# Patient Record
Sex: FEMALE | Race: WHITE
[De-identification: names, ages, dates, MRNs, and addresses within clinical notes are randomized per-mention and may not be internally consistent; named-entity substitution may affect disease eponyms.]

---

## 2021-02-05 ENCOUNTER — HOSPITAL ENCOUNTER (OUTPATIENT)
Age: 61
End: 2021-02-05
Payer: MEDICARE

## 2021-02-05 DIAGNOSIS — R92.8: Primary | ICD-10-CM

## 2021-02-05 PROCEDURE — 19081 BX BREAST 1ST LESION STRTCTC: CPT

## 2021-02-05 PROCEDURE — 88305 TISSUE EXAM BY PATHOLOGIST: CPT

## 2023-01-22 ENCOUNTER — HOSPITAL ENCOUNTER (INPATIENT)
Dept: HOSPITAL 100 - ED | Age: 63
LOS: 2 days | Discharge: HOME | DRG: 392 | End: 2023-01-24
Payer: MEDICARE

## 2023-01-22 VITALS
SYSTOLIC BLOOD PRESSURE: 102 MMHG | OXYGEN SATURATION: 100 % | HEART RATE: 72 BPM | DIASTOLIC BLOOD PRESSURE: 59 MMHG | TEMPERATURE: 98.24 F | RESPIRATION RATE: 18 BRPM

## 2023-01-22 VITALS
DIASTOLIC BLOOD PRESSURE: 71 MMHG | OXYGEN SATURATION: 94 % | TEMPERATURE: 98.24 F | RESPIRATION RATE: 18 BRPM | HEART RATE: 77 BPM | SYSTOLIC BLOOD PRESSURE: 115 MMHG

## 2023-01-22 VITALS
RESPIRATION RATE: 18 BRPM | TEMPERATURE: 98.6 F | DIASTOLIC BLOOD PRESSURE: 61 MMHG | HEART RATE: 66 BPM | SYSTOLIC BLOOD PRESSURE: 96 MMHG | OXYGEN SATURATION: 97 %

## 2023-01-22 VITALS
SYSTOLIC BLOOD PRESSURE: 102 MMHG | HEART RATE: 81 BPM | RESPIRATION RATE: 16 BRPM | OXYGEN SATURATION: 95 % | DIASTOLIC BLOOD PRESSURE: 62 MMHG

## 2023-01-22 VITALS
SYSTOLIC BLOOD PRESSURE: 151 MMHG | TEMPERATURE: 97.7 F | RESPIRATION RATE: 18 BRPM | DIASTOLIC BLOOD PRESSURE: 95 MMHG | OXYGEN SATURATION: 98 % | HEART RATE: 97 BPM

## 2023-01-22 VITALS
HEART RATE: 71 BPM | SYSTOLIC BLOOD PRESSURE: 108 MMHG | OXYGEN SATURATION: 96 % | DIASTOLIC BLOOD PRESSURE: 65 MMHG | RESPIRATION RATE: 18 BRPM | TEMPERATURE: 97.1 F

## 2023-01-22 VITALS
HEART RATE: 80 BPM | RESPIRATION RATE: 16 BRPM | SYSTOLIC BLOOD PRESSURE: 102 MMHG | DIASTOLIC BLOOD PRESSURE: 62 MMHG | TEMPERATURE: 98.3 F

## 2023-01-22 VITALS — BODY MASS INDEX: 44.6 KG/M2 | BODY MASS INDEX: 45.5 KG/M2

## 2023-01-22 VITALS
HEART RATE: 71 BPM | DIASTOLIC BLOOD PRESSURE: 63 MMHG | OXYGEN SATURATION: 96 % | RESPIRATION RATE: 18 BRPM | SYSTOLIC BLOOD PRESSURE: 108 MMHG | TEMPERATURE: 97.1 F

## 2023-01-22 DIAGNOSIS — B96.20: ICD-10-CM

## 2023-01-22 DIAGNOSIS — Z90.49: ICD-10-CM

## 2023-01-22 DIAGNOSIS — Z87.891: ICD-10-CM

## 2023-01-22 DIAGNOSIS — E11.65: ICD-10-CM

## 2023-01-22 DIAGNOSIS — Z90.710: ICD-10-CM

## 2023-01-22 DIAGNOSIS — E66.01: ICD-10-CM

## 2023-01-22 DIAGNOSIS — I10: ICD-10-CM

## 2023-01-22 DIAGNOSIS — Z79.84: ICD-10-CM

## 2023-01-22 DIAGNOSIS — I77.6: ICD-10-CM

## 2023-01-22 DIAGNOSIS — K57.20: Primary | ICD-10-CM

## 2023-01-22 DIAGNOSIS — Z85.3: ICD-10-CM

## 2023-01-22 DIAGNOSIS — Z79.899: ICD-10-CM

## 2023-01-22 DIAGNOSIS — N30.01: ICD-10-CM

## 2023-01-22 LAB
ANION GAP: 10 (ref 5–15)
ANION GAP: 7 (ref 5–15)
BUN SERPL-MCNC: 7 MG/DL (ref 7–18)
BUN SERPL-MCNC: 8 MG/DL (ref 7–18)
BUN/CREAT RATIO: 8.2 RATIO (ref 10–20)
BUN/CREAT RATIO: 9.7 RATIO (ref 10–20)
CALCIUM SERPL-MCNC: 9.5 MG/DL (ref 8.5–10.1)
CALCIUM SERPL-MCNC: 9.8 MG/DL (ref 8.5–10.1)
CARBON DIOXIDE: 23 MMOL/L (ref 21–32)
CARBON DIOXIDE: 26 MMOL/L (ref 21–32)
CHLORIDE: 100 MMOL/L (ref 98–107)
CHLORIDE: 104 MMOL/L (ref 98–107)
DEPRECATED RDW RBC: 44.2 FL (ref 35.1–43.9)
DEPRECATED RDW RBC: 44.8 FL (ref 35.1–43.9)
ERYTHROCYTE [DISTWIDTH] IN BLOOD: 15 % (ref 11.6–14.6)
ERYTHROCYTE [DISTWIDTH] IN BLOOD: 15.2 % (ref 11.6–14.6)
EST GLOM FILT RATE - AFR AMER: 104 ML/MIN (ref 60–?)
EST GLOM FILT RATE - AFR AMER: 74 ML/MIN (ref 60–?)
ESTIMATED CREATININE CLEARANCE: 56.4 ML/MIN
ESTIMATED CREATININE CLEARANCE: 76.76 ML/MIN
GLUCOSE: 104 MG/DL (ref 74–106)
GLUCOSE: 142 MG/DL (ref 74–106)
HCT VFR BLD AUTO: 38.4 % (ref 37–47)
HCT VFR BLD AUTO: 39.7 % (ref 37–47)
HEMOGLOBIN: 12.1 G/DL (ref 12–15)
HEMOGLOBIN: 12.8 G/DL (ref 12–15)
HGB BLD-MCNC: 12.1 G/DL (ref 12–15)
HGB BLD-MCNC: 12.8 G/DL (ref 12–15)
IMMATURE GRANULOCYTES COUNT: 0.16 X10^3/UL (ref 0–0)
IMMATURE GRANULOCYTES COUNT: 0.2 X10^3/UL (ref 0–0)
KETONE-DIPSTICK: 5 MG/DL
LEUKOCYTE ESTERASE UR QL STRIP: 500 /UL
MCV RBC: 80.5 FL (ref 81–99)
MCV RBC: 81.4 FL (ref 81–99)
MEAN CORP HGB CONC: 31.5 G/DL (ref 32–36)
MEAN CORP HGB CONC: 32.2 G/DL (ref 32–36)
MEAN PLATELET VOL.: 8.9 FL (ref 6.2–12)
MEAN PLATELET VOL.: 9.2 FL (ref 6.2–12)
MUCOUS THREADS URNS QL MICRO: (no result) /HPF
NRBC FLAGGED BY ANALYZER: 0 % (ref 0–5)
NRBC FLAGGED BY ANALYZER: 0 % (ref 0–5)
PLATELET # BLD: 454 K/MM3 (ref 150–450)
PLATELET # BLD: 479 K/MM3 (ref 150–450)
PLATELET COUNT: 454 K/MM3 (ref 150–450)
PLATELET COUNT: 479 K/MM3 (ref 150–450)
POTASSIUM: 3.3 MMOL/L (ref 3.5–5.1)
POTASSIUM: 3.5 MMOL/L (ref 3.5–5.1)
PROT UR QL STRIP.AUTO: 500 MG/DL
RBC # BLD AUTO: 4.72 M/MM3 (ref 4.2–5.4)
RBC # BLD AUTO: 4.93 M/MM3 (ref 4.2–5.4)
RBC DISTRIBUTION WIDTH CV: 15 % (ref 11.6–14.6)
RBC DISTRIBUTION WIDTH CV: 15.2 % (ref 11.6–14.6)
RBC DISTRIBUTION WIDTH SD: 44.2 FL (ref 35.1–43.9)
RBC DISTRIBUTION WIDTH SD: 44.8 FL (ref 35.1–43.9)
RBC UR QL: (no result) /HPF (ref 0–5)
RBC UR QL: 250 /UL
SP GR UR: 1.01 (ref 1–1.03)
SQUAMOUS URNS QL MICRO: (no result) /HPF (ref 5–10)
URINE PRESERVATIVE: (no result)
WBC # BLD AUTO: 12.6 K/MM3 (ref 4.4–11)
WBC # BLD AUTO: 14.3 K/MM3 (ref 4.4–11)
WHITE BLOOD COUNT: 12.6 K/MM3 (ref 4.4–11)
WHITE BLOOD COUNT: 14.3 K/MM3 (ref 4.4–11)

## 2023-01-22 PROCEDURE — 74176 CT ABD & PELVIS W/O CONTRAST: CPT

## 2023-01-22 PROCEDURE — 87040 BLOOD CULTURE FOR BACTERIA: CPT

## 2023-01-22 PROCEDURE — 74177 CT ABD & PELVIS W/CONTRAST: CPT

## 2023-01-22 PROCEDURE — 97802 MEDICAL NUTRITION INDIV IN: CPT

## 2023-01-22 PROCEDURE — 87088 URINE BACTERIA CULTURE: CPT

## 2023-01-22 PROCEDURE — 87086 URINE CULTURE/COLONY COUNT: CPT

## 2023-01-22 PROCEDURE — 85025 COMPLETE CBC W/AUTO DIFF WBC: CPT

## 2023-01-22 PROCEDURE — 81001 URINALYSIS AUTO W/SCOPE: CPT

## 2023-01-22 PROCEDURE — 87186 SC STD MICRODIL/AGAR DIL: CPT

## 2023-01-22 PROCEDURE — 84100 ASSAY OF PHOSPHORUS: CPT

## 2023-01-22 PROCEDURE — 83605 ASSAY OF LACTIC ACID: CPT

## 2023-01-22 PROCEDURE — A4216 STERILE WATER/SALINE, 10 ML: HCPCS

## 2023-01-22 PROCEDURE — 83735 ASSAY OF MAGNESIUM: CPT

## 2023-01-22 PROCEDURE — 80048 BASIC METABOLIC PNL TOTAL CA: CPT

## 2023-01-22 PROCEDURE — 82962 GLUCOSE BLOOD TEST: CPT

## 2023-01-22 PROCEDURE — 36415 COLL VENOUS BLD VENIPUNCTURE: CPT

## 2023-01-22 PROCEDURE — 99284 EMERGENCY DEPT VISIT MOD MDM: CPT

## 2023-01-22 RX ADMIN — SODIUM CHLORIDE 100 ML: 9 INJECTION, SOLUTION INTRAVENOUS at 04:31

## 2023-01-22 RX ADMIN — POTASSIUM CHLORIDE 20 MEQ: 1500 TABLET, EXTENDED RELEASE ORAL at 16:58

## 2023-01-22 RX ADMIN — POTASSIUM CHLORIDE 20 MEQ: 1500 TABLET, EXTENDED RELEASE ORAL at 09:30

## 2023-01-22 RX ADMIN — SODIUM CHLORIDE 150 ML: 9 INJECTION, SOLUTION INTRAVENOUS at 02:44

## 2023-01-22 RX ADMIN — SODIUM CHLORIDE 100 ML: 9 INJECTION, SOLUTION INTRAVENOUS at 15:40

## 2023-01-23 VITALS
DIASTOLIC BLOOD PRESSURE: 60 MMHG | SYSTOLIC BLOOD PRESSURE: 98 MMHG | RESPIRATION RATE: 16 BRPM | TEMPERATURE: 98.24 F | OXYGEN SATURATION: 96 % | HEART RATE: 62 BPM

## 2023-01-23 VITALS
DIASTOLIC BLOOD PRESSURE: 67 MMHG | RESPIRATION RATE: 16 BRPM | OXYGEN SATURATION: 96 % | TEMPERATURE: 97.88 F | HEART RATE: 66 BPM | SYSTOLIC BLOOD PRESSURE: 96 MMHG

## 2023-01-23 VITALS
HEART RATE: 72 BPM | OXYGEN SATURATION: 95 % | TEMPERATURE: 97.88 F | RESPIRATION RATE: 16 BRPM | DIASTOLIC BLOOD PRESSURE: 63 MMHG | SYSTOLIC BLOOD PRESSURE: 109 MMHG

## 2023-01-23 VITALS
TEMPERATURE: 98.24 F | HEART RATE: 65 BPM | DIASTOLIC BLOOD PRESSURE: 66 MMHG | RESPIRATION RATE: 18 BRPM | OXYGEN SATURATION: 99 % | SYSTOLIC BLOOD PRESSURE: 118 MMHG

## 2023-01-23 LAB
ANION GAP: 9 (ref 5–15)
BUN SERPL-MCNC: 5 MG/DL (ref 7–18)
BUN/CREAT RATIO: 7.7 RATIO (ref 10–20)
CALCIUM SERPL-MCNC: 9.1 MG/DL (ref 8.5–10.1)
CARBON DIOXIDE: 23 MMOL/L (ref 21–32)
CHLORIDE: 108 MMOL/L (ref 98–107)
DEPRECATED RDW RBC: 45.3 FL (ref 35.1–43.9)
ERYTHROCYTE [DISTWIDTH] IN BLOOD: 15.4 % (ref 11.6–14.6)
EST GLOM FILT RATE - AFR AMER: 118 ML/MIN (ref 60–?)
ESTIMATED CREATININE CLEARANCE: 85.03 ML/MIN
GLUCOSE: 93 MG/DL (ref 74–106)
HCT VFR BLD AUTO: 37.7 % (ref 37–47)
HEMOGLOBIN: 11.9 G/DL (ref 12–15)
HGB BLD-MCNC: 11.9 G/DL (ref 12–15)
IMMATURE GRANULOCYTES COUNT: 0.18 X10^3/UL (ref 0–0)
MCV RBC: 81.1 FL (ref 81–99)
MEAN CORP HGB CONC: 31.6 G/DL (ref 32–36)
MEAN PLATELET VOL.: 9.2 FL (ref 6.2–12)
NRBC FLAGGED BY ANALYZER: 0 % (ref 0–5)
PLATELET # BLD: 467 K/MM3 (ref 150–450)
PLATELET COUNT: 467 K/MM3 (ref 150–450)
POTASSIUM: 3.5 MMOL/L (ref 3.5–5.1)
RBC # BLD AUTO: 4.65 M/MM3 (ref 4.2–5.4)
RBC DISTRIBUTION WIDTH CV: 15.4 % (ref 11.6–14.6)
RBC DISTRIBUTION WIDTH SD: 45.3 FL (ref 35.1–43.9)
WBC # BLD AUTO: 9.3 K/MM3 (ref 4.4–11)
WHITE BLOOD COUNT: 9.3 K/MM3 (ref 4.4–11)

## 2023-01-23 RX ADMIN — POTASSIUM CHLORIDE 20 MEQ: 1500 TABLET, EXTENDED RELEASE ORAL at 17:03

## 2023-01-23 RX ADMIN — SODIUM CHLORIDE 100 ML: 9 INJECTION, SOLUTION INTRAVENOUS at 14:54

## 2023-01-23 RX ADMIN — SODIUM CHLORIDE 100 ML: 9 INJECTION, SOLUTION INTRAVENOUS at 03:20

## 2023-01-23 RX ADMIN — POTASSIUM CHLORIDE 20 MEQ: 1500 TABLET, EXTENDED RELEASE ORAL at 08:43

## 2023-01-24 VITALS
HEART RATE: 57 BPM | TEMPERATURE: 97.52 F | DIASTOLIC BLOOD PRESSURE: 59 MMHG | OXYGEN SATURATION: 97 % | RESPIRATION RATE: 16 BRPM | SYSTOLIC BLOOD PRESSURE: 101 MMHG

## 2023-01-24 VITALS
DIASTOLIC BLOOD PRESSURE: 75 MMHG | OXYGEN SATURATION: 98 % | HEART RATE: 72 BPM | RESPIRATION RATE: 16 BRPM | TEMPERATURE: 97.88 F | SYSTOLIC BLOOD PRESSURE: 119 MMHG

## 2023-01-24 VITALS
OXYGEN SATURATION: 98 % | TEMPERATURE: 97.88 F | SYSTOLIC BLOOD PRESSURE: 106 MMHG | RESPIRATION RATE: 16 BRPM | HEART RATE: 62 BPM | DIASTOLIC BLOOD PRESSURE: 61 MMHG

## 2023-01-24 VITALS — OXYGEN SATURATION: 97 %

## 2023-01-24 LAB
ANION GAP: 6 (ref 5–15)
BUN SERPL-MCNC: 4 MG/DL (ref 7–18)
BUN/CREAT RATIO: 6.1 RATIO (ref 10–20)
CALCIUM SERPL-MCNC: 8.9 MG/DL (ref 8.5–10.1)
CARBON DIOXIDE: 25 MMOL/L (ref 21–32)
CHLORIDE: 111 MMOL/L (ref 98–107)
DEPRECATED RDW RBC: 46.5 FL (ref 35.1–43.9)
ERYTHROCYTE [DISTWIDTH] IN BLOOD: 15.5 % (ref 11.6–14.6)
EST GLOM FILT RATE - AFR AMER: 117 ML/MIN (ref 60–?)
ESTIMATED CREATININE CLEARANCE: 83.74 ML/MIN
GLUCOSE: 89 MG/DL (ref 74–106)
HCT VFR BLD AUTO: 36.7 % (ref 37–47)
HEMOGLOBIN: 11.5 G/DL (ref 12–15)
HGB BLD-MCNC: 11.5 G/DL (ref 12–15)
IMMATURE GRANULOCYTES COUNT: 0.2 X10^3/UL (ref 0–0)
MAGNESIUM: 1.9 MG/DL (ref 1.6–2.6)
MCV RBC: 81.7 FL (ref 81–99)
MEAN CORP HGB CONC: 31.3 G/DL (ref 32–36)
MEAN PLATELET VOL.: 8.9 FL (ref 6.2–12)
NRBC FLAGGED BY ANALYZER: 0 % (ref 0–5)
PLATELET # BLD: 492 K/MM3 (ref 150–450)
PLATELET COUNT: 492 K/MM3 (ref 150–450)
POTASSIUM: 3.4 MMOL/L (ref 3.5–5.1)
RBC # BLD AUTO: 4.49 M/MM3 (ref 4.2–5.4)
RBC DISTRIBUTION WIDTH CV: 15.5 % (ref 11.6–14.6)
RBC DISTRIBUTION WIDTH SD: 46.5 FL (ref 35.1–43.9)
WBC # BLD AUTO: 7.6 K/MM3 (ref 4.4–11)
WHITE BLOOD COUNT: 7.6 K/MM3 (ref 4.4–11)

## 2023-01-24 RX ADMIN — POTASSIUM CHLORIDE 20 MEQ: 1500 TABLET, EXTENDED RELEASE ORAL at 08:43

## 2023-01-24 RX ADMIN — SODIUM CHLORIDE 100 ML: 9 INJECTION, SOLUTION INTRAVENOUS at 03:59

## 2023-01-24 RX ADMIN — POTASSIUM CHLORIDE 20 MEQ: 1500 TABLET, EXTENDED RELEASE ORAL at 16:14

## 2023-03-03 ENCOUNTER — HOSPITAL ENCOUNTER (EMERGENCY)
Age: 63
Discharge: HOME | End: 2023-03-03
Payer: MEDICARE

## 2023-03-03 VITALS
RESPIRATION RATE: 24 BRPM | HEART RATE: 88 BPM | OXYGEN SATURATION: 98 % | TEMPERATURE: 98.78 F | SYSTOLIC BLOOD PRESSURE: 118 MMHG | DIASTOLIC BLOOD PRESSURE: 84 MMHG

## 2023-03-03 VITALS — BODY MASS INDEX: 47 KG/M2

## 2023-03-03 VITALS
HEART RATE: 81 BPM | SYSTOLIC BLOOD PRESSURE: 114 MMHG | OXYGEN SATURATION: 96 % | DIASTOLIC BLOOD PRESSURE: 71 MMHG | RESPIRATION RATE: 16 BRPM

## 2023-03-03 DIAGNOSIS — R10.32: Primary | ICD-10-CM

## 2023-03-03 DIAGNOSIS — D72.829: ICD-10-CM

## 2023-03-03 DIAGNOSIS — Z87.891: ICD-10-CM

## 2023-03-03 DIAGNOSIS — K57.32: ICD-10-CM

## 2023-03-03 DIAGNOSIS — E66.9: ICD-10-CM

## 2023-03-03 LAB
ANION GAP: 7 (ref 5–15)
BUN SERPL-MCNC: 7 MG/DL (ref 7–18)
BUN/CREAT RATIO: 8.7 RATIO (ref 10–20)
CALCIUM SERPL-MCNC: 10.2 MG/DL (ref 8.5–10.1)
CARBON DIOXIDE: 29 MMOL/L (ref 21–32)
CHLORIDE: 102 MMOL/L (ref 98–107)
DEPRECATED RDW RBC: 53.2 FL (ref 35.1–43.9)
ERYTHROCYTE [DISTWIDTH] IN BLOOD: 17.9 % (ref 11.6–14.6)
EST GLOM FILT RATE - AFR AMER: 92 ML/MIN (ref 60–?)
ESTIMATED CREATININE CLEARANCE: 66.51 ML/MIN
GLUCOSE: 119 MG/DL (ref 74–106)
HCT VFR BLD AUTO: 45.2 % (ref 37–47)
HEMOGLOBIN: 14.7 G/DL (ref 12–15)
HGB BLD-MCNC: 14.7 G/DL (ref 12–15)
IMMATURE GRANULOCYTES COUNT: 0.07 X10^3/UL (ref 0–0)
LEUKOCYTE ESTERASE UR QL STRIP: 500 /UL
MCV RBC: 82.5 FL (ref 81–99)
MEAN CORP HGB CONC: 32.5 G/DL (ref 32–36)
MEAN PLATELET VOL.: 9.6 FL (ref 6.2–12)
MUCOUS THREADS URNS QL MICRO: (no result) /HPF
NRBC FLAGGED BY ANALYZER: 0 % (ref 0–5)
PLATELET # BLD: 424 K/MM3 (ref 150–450)
PLATELET COUNT: 424 K/MM3 (ref 150–450)
POTASSIUM: 3.4 MMOL/L (ref 3.5–5.1)
RBC # BLD AUTO: 5.48 M/MM3 (ref 4.2–5.4)
RBC DISTRIBUTION WIDTH CV: 17.9 % (ref 11.6–14.6)
RBC DISTRIBUTION WIDTH SD: 53.2 FL (ref 35.1–43.9)
RBC UR QL: (no result) /HPF (ref 0–5)
RBC UR QL: 10 /UL
SP GR UR: 1.01 (ref 1–1.03)
SQUAMOUS URNS QL MICRO: (no result) /HPF (ref 5–10)
URINE PRESERVATIVE: (no result)
WBC # BLD AUTO: 12.8 K/MM3 (ref 4.4–11)
WHITE BLOOD COUNT: 12.8 K/MM3 (ref 4.4–11)

## 2023-03-03 PROCEDURE — 80048 BASIC METABOLIC PNL TOTAL CA: CPT

## 2023-03-03 PROCEDURE — A4216 STERILE WATER/SALINE, 10 ML: HCPCS

## 2023-03-03 PROCEDURE — 99283 EMERGENCY DEPT VISIT LOW MDM: CPT

## 2023-03-03 PROCEDURE — 81001 URINALYSIS AUTO W/SCOPE: CPT

## 2023-03-03 PROCEDURE — 74177 CT ABD & PELVIS W/CONTRAST: CPT

## 2023-03-03 PROCEDURE — 85025 COMPLETE CBC W/AUTO DIFF WBC: CPT

## 2023-03-28 ENCOUNTER — HOSPITAL ENCOUNTER (OUTPATIENT)
Dept: HOSPITAL 100 - BIRAD | Age: 63
Discharge: HOME | End: 2023-03-28
Payer: MEDICARE

## 2023-03-28 DIAGNOSIS — R92.1: ICD-10-CM

## 2023-03-28 DIAGNOSIS — R92.8: Primary | ICD-10-CM

## 2023-03-28 PROCEDURE — 88305 TISSUE EXAM BY PATHOLOGIST: CPT

## 2023-03-28 PROCEDURE — 19081 BX BREAST 1ST LESION STRTCTC: CPT

## 2023-05-01 ENCOUNTER — HOSPITAL ENCOUNTER (OUTPATIENT)
Dept: HOSPITAL 100 - EN | Age: 63
Discharge: HOME | End: 2023-05-01
Payer: MEDICARE

## 2023-05-01 VITALS
DIASTOLIC BLOOD PRESSURE: 82 MMHG | OXYGEN SATURATION: 96 % | TEMPERATURE: 97.4 F | RESPIRATION RATE: 16 BRPM | SYSTOLIC BLOOD PRESSURE: 108 MMHG | HEART RATE: 70 BPM

## 2023-05-01 VITALS
RESPIRATION RATE: 16 BRPM | OXYGEN SATURATION: 97 % | DIASTOLIC BLOOD PRESSURE: 82 MMHG | SYSTOLIC BLOOD PRESSURE: 103 MMHG | HEART RATE: 73 BPM | TEMPERATURE: 98.7 F

## 2023-05-01 VITALS
OXYGEN SATURATION: 99 % | HEART RATE: 66 BPM | RESPIRATION RATE: 16 BRPM | DIASTOLIC BLOOD PRESSURE: 77 MMHG | SYSTOLIC BLOOD PRESSURE: 103 MMHG

## 2023-05-01 VITALS — DIASTOLIC BLOOD PRESSURE: 82 MMHG | SYSTOLIC BLOOD PRESSURE: 103 MMHG

## 2023-05-01 VITALS
HEART RATE: 61 BPM | OXYGEN SATURATION: 100 % | DIASTOLIC BLOOD PRESSURE: 82 MMHG | SYSTOLIC BLOOD PRESSURE: 107 MMHG | RESPIRATION RATE: 16 BRPM | TEMPERATURE: 97.7 F

## 2023-05-01 VITALS
SYSTOLIC BLOOD PRESSURE: 103 MMHG | DIASTOLIC BLOOD PRESSURE: 79 MMHG | RESPIRATION RATE: 16 BRPM | OXYGEN SATURATION: 98 % | HEART RATE: 67 BPM

## 2023-05-01 DIAGNOSIS — E11.9: ICD-10-CM

## 2023-05-01 DIAGNOSIS — Z79.899: ICD-10-CM

## 2023-05-01 DIAGNOSIS — Z87.891: ICD-10-CM

## 2023-05-01 DIAGNOSIS — Z79.84: ICD-10-CM

## 2023-05-01 DIAGNOSIS — K57.30: Primary | ICD-10-CM

## 2023-05-01 DIAGNOSIS — D12.0: ICD-10-CM

## 2023-05-01 DIAGNOSIS — I10: ICD-10-CM

## 2023-05-01 DIAGNOSIS — K64.0: ICD-10-CM

## 2023-05-01 DIAGNOSIS — K62.1: ICD-10-CM

## 2023-05-01 PROCEDURE — 88305 TISSUE EXAM BY PATHOLOGIST: CPT

## 2023-05-01 PROCEDURE — 0DJD8ZZ INSPECTION OF LOWER INTESTINAL TRACT, VIA NATURAL OR ARTIFICIAL OPENING ENDOSCOPIC: ICD-10-PCS | Performed by: SURGERY

## 2023-05-01 PROCEDURE — 82962 GLUCOSE BLOOD TEST: CPT

## 2023-05-01 PROCEDURE — 45380 COLONOSCOPY AND BIOPSY: CPT

## 2024-01-29 ENCOUNTER — HOSPITAL ENCOUNTER (EMERGENCY)
Age: 64
Discharge: HOME | End: 2024-01-29
Payer: MEDICARE

## 2024-01-29 VITALS
TEMPERATURE: 98.9 F | HEART RATE: 71 BPM | DIASTOLIC BLOOD PRESSURE: 96 MMHG | RESPIRATION RATE: 18 BRPM | SYSTOLIC BLOOD PRESSURE: 126 MMHG | OXYGEN SATURATION: 99 %

## 2024-01-29 VITALS — HEART RATE: 75 BPM | OXYGEN SATURATION: 99 % | RESPIRATION RATE: 16 BRPM

## 2024-01-29 VITALS — BODY MASS INDEX: 48.1 KG/M2

## 2024-01-29 DIAGNOSIS — Z87.891: ICD-10-CM

## 2024-01-29 DIAGNOSIS — E66.9: ICD-10-CM

## 2024-01-29 DIAGNOSIS — E11.9: ICD-10-CM

## 2024-01-29 DIAGNOSIS — R10.32: Primary | ICD-10-CM

## 2024-01-29 DIAGNOSIS — K57.32: ICD-10-CM

## 2024-01-29 LAB
ALANINE AMINOTRANSFER ALT/SGPT: 41 U/L (ref 13–56)
ALBUMIN SERPL-MCNC: 3.6 G/DL (ref 3.2–5)
ALKALINE PHOSPHATASE: 140 U/L (ref 45–117)
ANION GAP: 7 (ref 5–15)
AST(SGOT): 33 U/L (ref 15–37)
BUN SERPL-MCNC: 8 MG/DL (ref 7–18)
BUN/CREAT RATIO: 7.8 RATIO (ref 10–20)
CALCIUM SERPL-MCNC: 10.6 MG/DL (ref 8.5–10.1)
CARBON DIOXIDE: 25 MMOL/L (ref 21–32)
CHLORIDE: 106 MMOL/L (ref 98–107)
DEPRECATED RDW RBC: 45.3 FL (ref 35.1–43.9)
ERYTHROCYTE [DISTWIDTH] IN BLOOD: 15.1 % (ref 11.6–14.6)
EST GLOM FILT RATE - AFR AMER: 70 ML/MIN (ref 60–?)
ESTIMATED CREATININE CLEARANCE: 77.82 ML/MIN
GLOBULIN: 4.9 G/DL (ref 2.2–4.2)
GLUCOSE: 145 MG/DL (ref 74–106)
HCT VFR BLD AUTO: 48.8 % (ref 37–47)
HGB BLD-MCNC: 15.6 G/DL (ref 12–15)
IMMATURE GRANULOCYTES COUNT: 0.04 X10^3/UL (ref 0–0)
LEUKOCYTE ESTERASE UR QL STRIP: 500 /UL
MCV RBC: 84 FL (ref 81–99)
MEAN CORP HGB CONC: 32 G/DL (ref 32–36)
MEAN PLATELET VOL.: 9.8 FL (ref 6.2–12)
MUCOUS THREADS URNS QL MICRO: (no result) /HPF
NRBC FLAGGED BY ANALYZER: 0 % (ref 0–5)
PLATELET # BLD: 355 K/MM3 (ref 150–450)
POTASSIUM: 4 MMOL/L (ref 3.5–5.1)
PROT UR QL STRIP.AUTO: 15 MG/DL
RBC # BLD AUTO: 5.81 M/MM3 (ref 4.2–5.4)
RBC UR QL: (no result) /HPF (ref 0–5)
RBC UR QL: 10 /UL
SP GR UR: 1 (ref 1–1.03)
SQUAMOUS URNS QL MICRO: (no result) /HPF (ref 5–10)
URINE PRESERVATIVE: (no result)
WBC # BLD AUTO: 9.4 K/MM3 (ref 4.4–11)

## 2024-01-29 PROCEDURE — A4216 STERILE WATER/SALINE, 10 ML: HCPCS

## 2024-01-29 PROCEDURE — 81001 URINALYSIS AUTO W/SCOPE: CPT

## 2024-01-29 PROCEDURE — 99283 EMERGENCY DEPT VISIT LOW MDM: CPT

## 2024-01-29 PROCEDURE — 85025 COMPLETE CBC W/AUTO DIFF WBC: CPT

## 2024-01-29 PROCEDURE — 74177 CT ABD & PELVIS W/CONTRAST: CPT

## 2024-01-29 PROCEDURE — 80053 COMPREHEN METABOLIC PANEL: CPT

## 2024-01-29 PROCEDURE — 87631 RESP VIRUS 3-5 TARGETS: CPT

## 2024-01-29 NOTE — XMS RPT_ITS
Comprehensive CCD (C-CDA v2.1)  
  
                          Created on: 2024  
  
  
Navdeep Guillen  
External Reference #: CDR,PersonID:[id number]  
: 1960  
Sex: Female  
  
Demographics  
  
  
                                        Address             [address] [place]  [address]  
   
                                        Home Phone          [phone]  
   
                                        Mobile Phone        [phone]  
   
                                        Phone               [phone]  
   
                                        Preferred Language  en  
   
                                        Marital Status        
   
                                        [employer] Affiliation Unknown  
   
                                        Race                White  
   
                                        Ethnic Group        Not  or Lati  
no  
  
  
Author  
  
  
                                        Name                Unknown  
   
                                        Address             [address]  
[address]  
[place]  [address]  
   
                                        Phone               [phone]  
   
                                        Organization        [facility]  
  
  
Care Team Providers  
  
  
                                Care Team Member Name Role            Phone  
   
                                BARK, MARICRUZ E Unavailable     Unavailable  
   
                                BARK, MARICRUZ E Unavailable     Unavailable  
   
                                BARK, MARICRUZ E Unavailable     Unavailable  
   
                                BARK, MARICRUZ E Unavailable     Unavailable  
   
                                MAKKAR, LANI K Unavailable     Unavailable  
   
                                MAKKAR, LANI K Unavailable     Unavailable  
   
                                MAKKAR, LANI K Unavailable     Unavailable  
   
                                MAKKAR, LANI K Unavailable     Unavailable  
   
                                Steve Govea Admitting       Unavailabl  
e  
   
                                Steve Govea Attending       Unavailabl  
e  
   
                                Ganta, Cynthia C Primary Care    Unavailable  
   
                                Steve Govea Admitting       Unavailabl  
e  
   
                                Steve Govea Attending       Unavailabl  
e  
   
                                Ganta, Cynthia C Primary Care    Unavailable  
   
                                Bark, Maricruz E Admitting       Unavailable  
   
                                Bark, Maricruz E Attending       Unavailable  
   
                                Ganta, Cynthia C Primary Care    Unavailable  
   
                                Cynthia Okeefe MD Primary Care Provider [phone]  
[phone]  
   
                                Nelson BEVERLY MD, Rosaline Unavailable     [phone]  
[phone]  
   
                                Dothiago RN, Martha  Unavailable     Unavailable  
   
                                Cynthia Okeefe MD Primary Care Provider [phone]  
[phone]  
   
                                Nelson BEVERLY MD, Rosaline Unavailable     [phone]  
[phone]  
   
                                Doup RN, Martha  Unavailable     Unavailable  
   
                                Cynthia Okeefe MD Primary Care Provider [phone]  
[phone]  
   
                                Nelson BEVERLY MD, Rosaline Unavailable     [phone]  
[phone]  
   
                                Doup RN, Martha  Unavailable     Unavailable  
   
                                Doup RN, Martha  Unavailable     Unavailable  
   
                                KAJAL DUEÑAS Attending       Unavailable  
   
                                GANTA, CYNTHIA   Primary Care    Unavailable  
   
                                GANTA, CYNTHIA   Primary Care    Unavailable  
   
                                FRANCIS SHELL   Referring       Unavailable  
   
                                GANTA, CYNTHIA   Primary Care    Unavailable  
   
                                JACUQELYN MILNER     Referring       Unavailable  
   
                                GANTA, CYNTHIA   Primary Care    Unavailable  
   
                                GANTA, CYNTHIA   Referring       Unavailable  
   
                                GANTA, CYNTHIA   Primary Care    Unavailable  
   
                                GANTA, CYNTHIA   Referring       Unavailable  
   
                                GANTA, CYNTHIA   Primary Care    Unavailable  
   
                                GANTA, CYNTHIA   Attending       Unavailable  
   
                                GANTA, CYNTHIA   Primary Care    Unavailable  
   
                                GANTA, CYNTHIA   Primary Care    Unavailable  
   
                                ENRIQUE ULRICH Attending       Unavailable  
   
                                MASCIFRANCIS   Referring       Unavailable  
   
                                GANTA, CYNTHIA   Primary Care    Unavailable  
   
                                GANTA, CYNTHIA   Referring       Unavailable  
   
                                GANTA, CYNTHIA   Primary Care    Unavailable  
   
                                OLDER, SHERLY      Referring       Unavailable  
   
                                GANTA, CYNTHIA   Primary Care    Unavailable  
   
                                OLDER, SHERLY      Attending       Unavailable  
   
                                GANTA, CYNTHIA   Primary Care    Unavailable  
   
                                GANTA, CYNTHIA   Attending       Unavailable  
   
                                GANTA, CYNTHIA   Referring       Unavailable  
   
                                GANTA, CYNTHIA   Primary Care    Unavailable  
   
                                VIKKI POSADA Attending       Unavailable  
   
                                GANTA, CYNTHIA   Primary Care    Unavailable  
   
                                JACQUELYN MINLER     Referring       Unavailable  
   
                                GANTA, CYNTHIA   Primary Care    Unavailable  
   
                                FRANCIS SHELL   Attending       Unavailable  
   
                                JACQUELYN MILNER     Referring       Unavailable  
   
                                GANTA, CYNTHIA   Primary Care    Unavailable  
   
                                KAJAL DUEÑAS Attending       Unavailable  
   
                                MASCI, FRANCIS A   Referring       Unavailable  
   
                                GANTA, CYNTHIA   Primary Care    Unavailable  
  
  
  
Allergies  
  
  
                                                    Allergy   
Classification                          Reported   
Allergen(s)               Allergy Type              Date of   
Onset                     Reaction(s)               Facility  
   
                                                      
(1 source)                              Sulfonamides   
(Antibiotic);   
Translations:   
[sulfa drugs]                           Propensity to   
adverse   
reactions to   
drug   
(disorder)                                                  [facility]  
   
                                                      
(20 sources)                            silver   
sulfADIAZINE;   
Translations:   
[SILVER   
SULFADIAZINE]             Drug Allergy                
7                         [facility]  
   
                                                      
(20 sources)                            Sulfonamides   
(Antibiotic);   
Translations:   
[SULFA   
(SULFONAMIDE   
ANTIBIOTICS)]             Drug Allergy                
1                         [facility]  
  
  
  
Medications  
Current Medications  
  
  
  
                      Medication Drug Class(es) Dates      Sig (Normalized) Sig   
(Original)  
   
                                                    [id number]   
actuat albuterol   
0.09 mg/actuat   
metered dose   
inhaler  
(7 sources)                             beta2-Adrenergic   
Agonist                                 Start:   
2022  
End:   
2023                              take 2 puff(s) by   
inhalation every   
six hours as needed                     albuterol HFA   
(PROAIR HFA) 90   
mcg/actuation   
inhaler Inhale 2   
Puffs as instructed   
every 6 hours as   
needed. 1 Each 0   
2022   
Discontinued  
  
  
  
                                          
   
                                          
   
                                          
   
                                          
   
                                          
   
                                          
   
                                          
   
                                          
   
                                          
   
                                          
  
  
  
Completed/Discontinued Medications  
  
  
  
                      Medication Drug Class(es) Dates      Sig (Normalized) Sig   
(Original)  
   
                                                    ascorbic acid 500   
mg oral tablet  
(20 sources)              Vitamin C                   
End:   
10-                              take 1 tablet by   
mouth once daily                        ascorbic acid,   
vitamin C, (VITAMIN   
C) 500 mg tablet   
Take 500 mg by   
mouth once daily. 0   
10/02/2023   
Discontinued  
  
  
  
                                          
   
                                          
   
                                          
   
                                          
   
                                          
   
                                          
   
                                          
   
                                          
   
                                          
   
                                          
   
                                          
   
                                          
   
                                          
   
                                          
   
                                          
   
                                          
   
                                          
   
                                          
   
                                          
   
                                          
   
                                          
   
                                          
   
                                          
   
                                          
   
                                          
   
                                          
   
                                          
   
                                          
   
                                          
   
                                          
   
                                          
   
                                          
   
                                          
   
                                          
   
                                          
   
                                          
   
                                          
   
                                          
   
                                          
   
                                          
   
                                          
   
                                          
   
                                          
   
                                          
   
                                          
   
                                          
   
                                          
   
                                          
  
  
  
Problems  
Active Problems  
  
  
                                                    Problem   
Classification  Problem         Date            Documented Date Episodic/Chronic  
   
                                                    Cancer of breast  
(20 sources)                            Primary malignant   
neoplasm of lower   
outer quadrant of   
female breast;   
Translations:   
[Malignant neoplasm of   
lower-outer quadrant   
of right female   
breast]                                 Onset:   
05-                05-                Chronic  
   
                                                    Diabetes mellitus   
without complication  
(20 sources)                            Type 2 diabetes   
mellitus without   
complication;   
Translations: [Type 2   
diabetes mellitus   
without complications]                  Onset:   
2016                                          Chronic  
   
                                                    Disorders of lipid   
metabolism  
(20 sources)                            Hypercholesterolemia;   
Translations: [Pure   
hypercholesterolemia,   
unspecified]                            Onset:   
2014                                          Chronic  
   
                                                    Diverticulosis and   
diverticulitis  
(4 sources)                             Diverticulitis;   
Translations:   
[Diverticulitis of   
intestine, part   
unspecified, without   
perforation or abscess   
without bleeding]                       Onset:   
2023                                          Chronic  
   
                                                    Esophageal disorders  
(20 sources)                            Gastroesophageal   
reflux disease;   
Translations:   
[Gastro-esophageal   
reflux disease without   
esophagitis]                            05-          Chronic  
   
                                                    Essential   
hypertension  
(20 sources)                            Essential   
hypertension;   
Translations:   
[Essential (primary)   
hypertension]                           Onset:   
2014                                          Chronic  
   
                                                    Fluid and   
electrolyte   
disorders  
(2 sources)                             Hypokalemia;   
Translations:   
[Hypokalemia]                                               Episodic  
   
                                                    Mycoses  
(2 sources)                             Candidiasis;   
Translations:   
[Candidiasis,   
unspecified]                                                Episodic  
   
                                                    Other nutritional;   
endocrine; and   
metabolic disorders  
(20 sources)                            Body mass index 40+ -   
severely obese;   
Translations: [Morbid   
(severe) obesity due   
to excess calories]                     Onset:   
2014                                          Chronic  
   
                                                    Other upper   
respiratory   
infections  
(1 source)                              Chronic sinusitis;   
Translations: [Chronic   
sinusitis,   
unspecified]                                                Chronic  
   
                                                    Residual codes;   
unclassified  
(1 source)                              Menopause present;   
Translations:   
[Asymptomatic   
menopausal state]                                           Episodic  
   
                                                    Unclassified  
(1 source)                              New Patient /   
[id number]()                            Onset:   
2018                                            
   
                                                    Unclassified  
(1 source)                Unknown / UNK(Unknown)    Onset:   
07-                                            
  
  
Past or Other Problems  
  
  
                                                    Problem   
Classification  Problem         Date            Documented Date Episodic/Chronic  
   
                                                    Blindness and vision   
defects  
(20 sources)                            Hypermetropia;   
Translations:   
[Hypermetropia,   
unspecified eye]                        Onset:   
2016                Episodic  
   
                                                    Genitourinary   
symptoms and   
ill-defined   
conditions  
(5 sources)                             Jose hematuria;   
Translations: [Gross   
hematuria]                              Onset:   
2023                                          Episodic  
   
                                                    Nonmalignant breast   
conditions  
(3 sources)                             Mammographic   
calcification of   
right breast;   
Translations:   
[Mammographic   
calcification found   
on diagnostic   
imaging of breast]                      Onset:   
2023                                          Episodic  
   
                                                    Other and unspecified   
benign neoplasm  
(20 sources)                            Benign neoplasm of   
skin of eyelid;   
Translations: [Other   
benign neoplasm of   
skin of unspecified   
eyelid, including   
canthus]                                Onset:   
2016                Episodic  
   
                                                    Other screening for   
suspected conditions   
(not mental disorders   
or infectious   
disease)  
(7 sources)                             Mammography   
abnormal;   
Translations: [Other   
abnormal and   
inconclusive   
findings on   
diagnostic imaging   
of breast]                              Onset:   
2023                                          Episodic  
   
                                                    Residual codes;   
unclassified  
(20 sources)                            Family history of   
malignant neoplasm   
of ovary;   
Translations:   
[Family history of   
malignant neoplasm   
of ovary]                               Onset:   
05-                05-                Episodic  
   
                                                    Residual codes;   
unclassified  
(1 source)                              Estrogen receptor   
positive status   
[ER+]; Translations:   
[Carcinoma of right   
breast, estrogen and   
progesterone   
receptor positive   
(HCC)]                                  Onset:   
05-                                          Episodic  
   
                                                    Spondylosis;   
intervertebral disc   
disorders; other back   
problems  
(20 sources)                            Chronic low back   
pain; Translations:   
[Lumbago with   
sciatica, left side]                    Onset:   
2016                Episodic  
   
                                                    Unclassified  
(1 source)                              New Patient;   
Translations: [New   
Patient]                                Onset:   
2018                                            
  
  
  
Results  
  
  
                      Test Name  Value      Interpretation Reference Range Facil  
it  
  
  
  
                                          
   
                                          
   
                                          
   
                                          
   
                                          
   
                                          
   
                                          
   
                                          
   
                                          
   
                                          
   
                                          
   
                                          
   
                                          
   
                                          
   
                                          
   
                                          
   
                                          
   
                                          
   
                                          
   
                                          
   
                                          
   
                                          
   
                                          
   
                                          
   
                                          
   
                                          
   
                                          
   
                                          
   
                                          
   
                                          
   
                                          
   
                                          
   
                                          
   
                                          
   
                                          
   
                                          
   
                                          
   
                                          
   
                                          
   
                                          
   
                                          
   
                                          
   
                                          
   
                                          
   
                                          
   
                                          
   
                                          
   
                                          
   
                                          
   
                                          
   
                                          
   
                                          
   
                                          
   
                                          
   
                                          
   
                                          
   
                                          
   
                                          
   
                                          
   
                                          
   
                                          
   
                                          
   
                                          
   
                                          
   
                                          
   
                                          
   
                                          
   
                                          
   
                                          
   
                                          
   
                                          
   
                                          
   
                                          
   
                                          
   
                                          
   
                                          
   
                                          
   
                                          
   
                                          
   
                                          
   
                                          
   
                                          
   
                                          
   
                                          
   
                                          
   
                                          
   
                                          
   
                                          
   
                                          
   
                                          
   
                                          
   
                                          
   
                                          
   
                                          
   
                                          
   
                                          
   
                                          
   
                                          
   
                                          
   
                                          
   
                                          
   
                                          
   
                                          
   
                                          
   
                                          
   
                                          
   
                                          
   
                                          
   
                                          
   
                                          
   
                                          
   
                                          
   
                                          
   
                                          
   
                                          
   
                                          
   
                                          
   
                                          
   
                                          
   
                                          
   
                                          
   
                                          
   
                                          
   
                                          
   
                                          
   
                                          
   
                                          
   
                                          
   
                                          
   
                                          
   
                                          
   
                                          
   
                                          
   
                                          
   
                                          
   
                                          
   
                                          
   
                                          
   
                                          
   
                                          
   
                                          
   
                                          
   
                                          
   
                                          
   
                                          
   
                                          
   
                                          
   
                                          
   
                                          
   
                                          
   
                                          
   
                                          
   
                                          
   
                                          
   
                                          
   
                                          
   
                                          
   
                                          
   
                                          
   
                                          
   
                                          
   
                                          
   
                                          
   
                                          
   
                                          
   
                                          
   
                                          
   
                                          
   
                                          
   
                                          
   
                                          
   
                                          
   
                                          
   
                                          
   
                                          
   
                                          
   
                                          
   
                                          
   
                                          
   
                                          
   
                                          
   
                                          
   
                                          
   
                                          
   
                                          
   
                                          
   
                                          
   
                                          
   
                                          
   
                                          
   
                                          
   
                                          
   
                                          
   
                                          
   
                                          
   
                                          
   
                                          
   
                                          
   
                                          
   
                                          
   
                                          
   
                                          
   
                                          
   
                                          
   
                                          
   
                                          
   
                                          
   
                                          
   
                                          
   
                                          
   
                                          
   
                                          
   
                                          
   
                                          
   
                                          
   
                                          
   
                                          
   
                                          
   
                                          
   
                                          
   
                                          
   
                                          
   
                                          
   
                                          
   
                                          
   
                                          
   
                                          
   
                                          
   
                                          
   
                                          
   
                                          
   
                                          
   
                                          
   
                                          
   
                                          
   
                                          
   
                                          
   
                                          
   
                                          
   
                                          
   
                                          
   
                                          
   
                                          
   
                                          
   
                                          
   
                                          
   
                                          
   
                                          
   
                                          
   
                                          
   
                                          
   
                                          
   
                                          
   
                                          
   
                                          
   
                                          
   
                                          
   
                                          
   
                                          
   
                                          
   
                                          
   
                                          
   
                                          
   
                                          
   
                                          
   
                                          
   
                                          
   
                                          
   
                                          
   
                                          
   
                                          
   
                                          
   
                                          
   
                                          
   
                                          
   
                                          
   
                                          
   
                                          
   
                                          
   
                                          
   
                                          
   
                                          
   
                                          
   
                                          
   
                                          
   
                                          
   
                                          
   
                                          
   
                                          
   
                                          
   
                                          
   
                                          
   
                                          
   
                                          
   
                                          
   
                                          
   
                                          
   
                                          
   
                                          
   
                                          
   
                                          
   
                                          
   
                                          
   
                                          
   
                                          
   
                                          
   
                                          
   
                                          
   
                                          
   
                                          
   
                                          
   
                                          
   
                                          
   
                                          
   
                                          
   
                                          
   
                                          
   
                                          
   
                                          
   
                                          
   
                                          
   
                                          
   
                                          
   
                                          
   
                                          
   
                                          
   
                                          
   
                                          
   
                                          
   
                                          
   
                                          
   
                                          
   
                                          
   
                                          
   
                                          
   
                                          
   
                                          
   
                                          
   
                                          
   
                                          
   
                                          
   
                                          
   
                                          
   
                                          
   
                                          
   
                                          
   
                                          
   
                                          
   
                                          
   
                                          
   
                                          
   
                                          
   
                                          
   
                                          
   
                                          
   
                                          
   
                                          
   
                                          
   
                                          
   
                                          
   
                                          
   
                                          
   
                                          
   
                                          
   
                                          
   
                                          
   
                                          
   
                                          
   
                                          
   
                                          
   
                                          
   
                                          
   
                                          
   
                                          
   
                                          
   
                                          
   
                                          
   
                                          
   
                                          
   
                                          
   
                                          
   
                                          
   
                                          
   
                                          
   
                                          
   
                                          
   
                                          
   
                                          
   
                                          
   
                                          
   
                                          
   
                                          
   
                                          
   
                                          
   
                                          
   
                                          
   
                                          
   
                                          
   
                                          
   
                                          
   
                                          
   
                                          
   
                                          
   
                                          
   
                                          
   
                                          
   
                                          
   
                                          
   
                                          
   
                                          
   
                                          
   
                                          
   
                                          
   
                                          
   
                                          
   
                                          
   
                                          
   
                                          
   
                                          
   
                                          
   
                                          
   
                                          
   
                                          
   
                                          
   
                                          
   
                                          
   
                                          
   
                                          
   
                                          
   
                                          
   
                                          
   
                                          
   
                                          
   
                                          
   
                                          
   
                                          
   
                                          
   
                                          
   
                                          
   
                                          
   
                                          
   
                                          
   
                                          
   
                                          
   
                                          
   
                                          
   
                                          
   
                                          
   
                                          
   
                                          
   
                                          
   
                                          
   
                                          
   
                                          
   
                                          
   
                                          
   
                                          
   
                                          
   
                                          
   
                                          
   
                                          
   
                                          
   
                                          
   
                                          
   
                                          
   
                                          
   
                                          
   
                                          
   
                                          
   
                                          
   
                                          
   
                                          
   
                                          
   
                                          
   
                                          
   
                                          
   
                                          
   
                                          
   
                                          
   
                                          
   
                                          
   
                                          
   
                                          
   
                                          
   
                                          
   
                                          
   
                                          
   
                                          
   
                                          
   
                                          
   
                                          
   
                                          
   
                                          
   
                                          
   
                                          
   
                                          
   
                                          
   
                                          
   
                                          
   
                                          
   
                                          
   
                                          
   
                                          
   
                                          
   
                                          
   
                                          
   
                                          
   
                                          
   
                                          
   
                                          
   
                                          
   
                                          
   
                                          
   
                                          
   
                                          
   
                                          
   
                                          
   
                                          
   
                                          
   
                                          
   
                                          
   
                                          
   
                                          
   
                                          
   
                                          
   
                                          
   
                                          
   
                                          
   
                                          
   
                                          
   
                                          
   
                                          
   
                                          
   
                                          
   
                                          
   
                                          
   
                                          
   
                                          
   
                                          
   
                                          
   
                                          
   
                                          
   
                                          
   
                                          
   
                                          
   
                                          
   
                                          
   
                                          
   
                                          
   
                                          
   
                                          
   
                                          
   
                                          
   
                                          
   
                                          
   
                                          
   
                                          
   
                                          
   
                                          
   
                                          
   
                                          
   
                                          
   
                                          
   
                                          
   
                                          
   
                                          
   
                                          
   
                                          
   
                                          
   
                                          
   
                                          
   
                                          
   
                                          
   
                                          
   
                                          
   
                                          
   
                                          
   
                                          
   
                                          
   
                                          
   
                                          
   
                                          
   
                                          
   
                                          
   
                                          
   
                                          
   
                                          
   
                                          
   
                                          
   
                                          
   
                                          
   
                                          
   
                                          
   
                                          
   
                                          
   
                                          
   
                                          
   
                                          
   
                                          
   
                                          
   
                                          
   
                                          
   
                                          
   
                                          
   
                                          
   
                                          
   
                                          
   
                                          
   
                                          
   
                                          
   
                                          
   
                                          
   
                                          
   
                                          
   
                                          
   
                                          
   
                                          
   
                                          
   
                                          
   
                                          
  
  
  
Vital Signs  
  
  
                      Date Time  Vital Sign Value      Performing Clinician Jose sears  
   
                                                    2023   
10:          Body weight         137.53 kg            APRN.LUIS ENRIQUE  
Work Phone:   
[phone]                          [facility]  
   
                                                    2023   
10:                              Diastolic blood   
pressure                  76 mm[Hg]                  APRN.LUIS ENRIQUE  
Work Phone:   
[phone]                          [facility]  
   
                                                    2023   
10:          Heart rate          68 /min              APRN.CNP  
Work Phone:   
[phone]                          [facility]  
   
                                                    2023   
10:          Respiratory rate    16 /min             Sherly Older APRN.LUIS ENRIQUE  
Work Phone:   
[phone]                          [facility]  
   
                                                    2023   
10:                              SaO2% (BldA) [Mass   
fraction]                 97 %                      Sherly Older APRN.LUIS ENRIQUE  
Work Phone:   
[phone]                          [facility]  
   
                                                    2023   
10:                              Systolic blood   
pressure                  122 mm[Hg]                Sherly An APRN.CNP  
Work Phone:   
[phone]                          [facility]  
   
                                                    2023   
09:          Body weight         132.9 kg            Cynthia Okeefe MD  
Work Phone:   
[phone]                          [facility]  
   
                                                    2023   
09:                              Diastolic blood   
pressure                  72 mm[Hg]                 Cynthia Okeefe MD  
Work Phone:   
[phone]                          [facility]  
   
                                                    2023   
09:          Heart rate          64 /min             Cynthia Okeefe MD  
Work Phone:   
[phone]                          [facility]  
   
                                                    2023   
09:          Respiratory rate    16 /min             Cynthia Okeefe MD  
Work Phone:   
[phone]                          [facility]  
   
                                                    2023   
09:                              SaO2% (BldA) [Mass   
fraction]                 97 %                      Cynthia Okeefe MD  
Work Phone:   
[phone]                          [facility]  
   
                                                    2023   
09:                              Systolic blood   
pressure                  118 mm[Hg]                Cynthia Okeefe MD  
Work Phone:   
[phone]                          [facility]  
   
                                                    2023   
12:          Body height         167.6 cm            Kajal Dueñas MD  
Work Phone:   
[phone]                          [facility]  
   
                                                    2023   
12:          Body temperature    98.6 [degF]         Kajal Dueñas MD  
Work Phone:   
[phone]                          [facility]  
   
                                                    2023   
12:          Body weight         131.09 kg           Kajal Dueñas MD  
Work Phone:   
[phone]                          [facility]  
   
                                                    2023   
12:                              Diastolic blood   
pressure                  72 mm[Hg]                 Kajal Dueñas MD  
Work Phone:   
[phone]                          [facility]  
   
                                                    2023   
12:          Heart rate          81 /min             Kajal Dueñas MD  
Work Phone:   
[phone]                          [facility]  
   
                                                    2023   
12:                              SaO2% (BldA) [Mass   
fraction]                 96 %                      Kajal Dueñas MD  
Work Phone:   
[phone]                          [facility]  
   
                                                    2023   
12:                              Systolic blood   
pressure                  118 mm[Hg]                Kajal Dueñas MD  
Work Phone:   
[phone]                          [facility]  
   
                                                    2023   
11:          Body height         167.6 cm            Enrique Ulrich PA-C  
Work Phone:   
[phone]                          [facility]  
   
                                                    2023   
11:          Body temperature    97.7 [degF]         Enrique Ulrich PA-C  
Work Phone:   
[phone]                          [facility]  
   
                                                    2023   
11:          Body weight         132.45 kg           Enrique Ulrich PA-C  
Work Phone:   
[phone]                          [facility]  
   
                                                    2023   
11:                              Diastolic blood   
pressure                  80 mm[Hg]                 Enrique Ulrich PA-C  
Work Phone:   
[phone]                          [facility]  
   
                                                    2023   
11:          Heart rate          86 /min             Enrique Ulrich PA-C  
Work Phone:   
[phone]                          [facility]  
   
                                                    2023   
11:                              SaO2% (BldA) [Mass   
fraction]                 98 %                      Enrique Ulrich PA-C  
Work Phone:   
[phone]                          [facility]  
   
                                                    2023   
11:                              Systolic blood   
pressure                  110 mm[Hg]                Enrique Ulrich PA-C  
Work Phone:   
[phone]                          [facility]  
   
                                                    2023   
09:          Body height         167 cm              Francis Masci DO  
Work Phone:   
[phone]                          [facility]  
   
                                                    2023   
09:          Body temperature    98.2 [degF]         Francis Masci DO  
Work Phone:   
[phone]                          [facility]  
   
                                                    2023   
09:          Body weight         132.22 kg           Francis Masci DO  
Work Phone:   
[phone]                          [facility]  
   
                                                    2023   
09:                              Diastolic blood   
pressure                  70 mm[Hg]                 Francis Masci DO  
Work Phone:   
[phone]                          [facility]  
   
                                                    2023   
09:          Heart rate          81 /min             Francis Masci DO  
Work Phone:   
[phone]                          [facility]  
   
                                                    2023   
09:                              SaO2% (BldA) [Mass   
fraction]                 98 %                      Francis Shell DO  
Work Phone:   
[phone]                          [facility]  
   
                                                    2023   
09:                              Systolic blood   
pressure                  115 mm[Hg]                Francis Shell DO  
Work Phone:   
[phone]                          [facility]  
   
                                                    2023   
15:          Body height         165.1 cm            Cynthia Okeefe MD  
Work Phone:   
[phone]                          [facility]  
   
                                                    2023   
15:          Body temperature    98.29 [degF]        Cynthia Okeefe MD  
Work Phone:   
[phone]                          [facility]  
   
                                                    2023   
15:          Body weight         131.54 kg           Cynthia Okeefe MD  
Work Phone:   
[phone]                          [facility]  
   
                                                    2023   
15:                              Diastolic blood   
pressure                  82 mm[Hg]                 Cynthia Okeefe MD  
Work Phone:   
[phone]                          [facility]  
   
                                                    2023   
15:          Heart rate          71 /min             Cynthia Okeefe MD  
Work Phone:   
[phone]                          [facility]  
   
                                                    2023   
15:          Respiratory rate    16 /min             Cynthia Okeefe MD  
Work Phone:   
[phone]                          [facility]  
   
                                                    2023   
15:                              SaO2% (BldA) [Mass   
fraction]                 97 %                      Cynthia Okeefe MD  
Work Phone:   
[phone]                          [facility]  
   
                                                    2023   
15:                              Systolic blood   
pressure                  120 mm[Hg]                Cynthia Okeefe MD  
Work Phone:   
[phone]                          [facility]  
   
                                                    2022   
12:          Body temperature    97.5 [degF]         Jasbir RAMOSCNP  
Work Phone:   
[phone]                          [facility]  
   
                                                    2022   
12:          Body weight         137.71 kg           Jasbir RAMOSCNP  
Work Phone:   
[phone]                          [facility]  
   
                                                    2022   
12:                              Diastolic blood   
pressure                  74 mm[Hg]                 [facility]   
APRN.CNP  
Work Phone:   
[phone]                          [facility]  
   
                                                    2022   
12:          Heart rate          65 /min             [facility]   
AP[facility]  
Work Phone:   
[phone]                          [facility]  
   
                                                    2022   
12:          Respiratory rate    18 /min             [facility]   
APRN.CNP  
Work Phone:   
[phone]                          [facility]  
   
                                                    2022   
12:                              SaO2% (BldA) [Mass   
fraction]                 97 %                      [facility]   
APRN.CNP  
Work Phone:   
[phone]                          [facility]  
   
                                                    2022   
12:                              Systolic blood   
pressure                  122 mm[Hg]                [facility]   
AP[facility].CNP  
Work Phone:   
[phone]                          [facility]  
   
                                                    2022   
14:          Body height         165.1 cm            Cynthia Okeefe MD  
Work Phone:   
[phone]                          [facility]  
   
                                                    2022   
14:          Body temperature    98.4 [degF]         Cynthia Okeefe MD  
Work Phone:   
[phone]                          [facility]  
   
                                                    2022   
14:          Body weight         137.89 kg           Cynthia Okeefe MD  
Work Phone:   
[phone]                          [facility]  
   
                                                    2022   
14:                              Diastolic blood   
pressure                  76 mm[Hg]                 Cynthia Okeefe MD  
Work Phone:   
[phone]                          [facility]  
   
                                                    2022   
14:          Heart rate          75 /min             Cynthia Okeefe MD  
Work Phone:   
[phone]                          [facility]  
   
                                                    2022   
14:          Respiratory rate    16 /min             Cynthia Okeefe MD  
Work Phone:   
[phone]                          [facility]  
   
                                                    2022   
14:                              SaO2% (BldA) [Mass   
fraction]                 94 %                      Cynthia Okeefe MD  
Work Phone:   
[phone]                          [facility]  
   
                                                    2022   
14:                              Systolic blood   
pressure                  122 mm[Hg]                Cynthia Okeefe MD  
Work Phone:   
[phone]                          [facility]  
  
  
  
Encounters  
  
  
                          Encounter Date Encounter Type Care Provider Facility  
   
                                Start: 2023 Harvey dejesus PA-C  
Work Phone: [phone]              [facility]  
  
  
  
                                          
   
                                          
   
                                          
   
                                          
   
                                          
   
                                          
   
                                          
   
                                          
   
                                          
   
                                          
   
                                          
   
                                          
   
                                          
   
                                          
   
                                          
   
                                          
   
                                          
   
                                          
   
                                          
   
                                          
   
                                          
   
                                          
   
                                          
   
                                          
   
                                          
   
                                          
   
                                          
   
                                          
   
                                          
   
                                          
   
                                          
   
                                          
   
                                          
   
                                          
   
                                          
   
                                          
   
                                          
   
                                          
   
                                          
   
                                          
   
                                          
   
                                          
   
                                          
   
                                          
   
                                          
   
                                          
   
                                          
   
                                          
   
                                          
   
                                          
   
                                          
   
                                          
   
                                          
   
                                          
   
                                          
   
                                          
   
                                          
   
                                          
   
                                          
   
                                          
   
                                          
   
                                          
   
                                          
   
                                          
   
                                          
   
                                          
   
                                          
   
                                          
   
                                          
   
                                          
   
                                          
   
                                          
   
                                          
   
                                          
   
                                          
   
                                          
   
                                          
   
                                          
   
                                          
   
                                          
   
                                          
   
                                          
   
                                          
   
                                          
   
                                          
   
                                          
   
                                          
   
                                          
   
                                          
   
                                          
   
                                          
  
  
  
Procedures  
  
  
                          Date         Procedure    Procedure Detail Performing   
Clinician  
   
                          Start: 2023 Colonoscopy               [facility]  
er  
   
                                        Start: 2023   Urnls dip stick/tabl  
et   
rgnt auto w/o microscopy                            Cynthia Okeefe MD  
Work Phone:   
[phone]  
   
                                        Start: 2023   Urnls dip stick/tabl  
et   
rgnt auto w/o microscopy                            Enrique Ulrich PA-C  
Work Phone:   
[phone]  
   
                                                    Start: 2023  
End: 2023     Mammography                             Jacquelyn Milner MD  
Work Phone:   
[phone]  
   
                                        Start: 2022   Adult depression scr  
eening   
assessment                                          Cynthia Okeefe MD  
Work Phone:   
[phone]  
   
                          Start: 2022 Mammography               Cynthia unger MD  
Work Phone:   
[phone]  
   
                          Start: 2011 Colonoscopy               Cynthia unger MD  
Work Phone:   
[phone]  
  
  
  
Plan of Treatment  
  
  
                          Date         Care Activity Detail       Author  
   
                          Start: 2028 Colonoscopy  COLONOSCOPY  [facility]  
   
                                        Start: 2028   COLORECTAL CANCER   
SCREENING                               COLORECTAL CANCER   
SCREENING                               [facility]  
   
                                        Start: 2028   Screening for malign  
ant   
neoplasm of colon                                   [facility]  
   
                                        Start: 2024   Annual PCP Team   
efrain   
Disease Visit                           Annual PCP Team Chronic   
Disease Visit                           [facility]  
   
                          Start: 2024 BP Controlled (<130/80) BP Controlle  
d (<130/80) [facility]  
   
                                Start: 2024 Hepatitis B screening Urine Al  
bumin:Creatinine   
Ratio                                   [facility]  
   
                                        Start: 2024   Hepatitis B Vaccine   
(1 of   
3 - Risk 3-dose series)                 Hepatitis B Vaccine (1 of   
3 - Risk 3-dose series)                 [facility]  
  
  
  
                                          
   
                                          
   
                                          
   
                                          
   
                                          
   
                                          
   
                                          
   
                                          
   
                                          
   
                                          
   
                                          
   
                                          
   
                                          
   
                                          
   
                                          
   
                                          
   
                                          
   
                                          
   
                                          
   
                                          
   
                                          
   
                                          
   
                                          
   
                                          
   
                                          
   
                                          
   
                                          
   
                                          
   
                                          
   
                                          
   
                                          
   
                                          
   
                                          
   
                                          
   
                                          
   
                                          
   
                                          
   
                                          
   
                                          
   
                                          
   
                                          
   
                                          
   
                                          
   
                                          
   
                                          
   
                                          
   
                                          
   
                                          
   
                                          
   
                                          
   
                                          
   
                                          
   
                                          
   
                                          
   
                                          
   
                                          
   
                                          
   
                                          
   
                                          
   
                                          
   
                                          
   
                                          
   
                                          
   
                                          
   
                                          
   
                                          
   
                                          
   
                                          
   
                                          
   
                                          
   
                                          
   
                                          
   
                                          
   
                                          
   
                                          
   
                                          
   
                                          
   
                                          
   
                                          
   
                                          
   
                                          
   
                                          
   
                                          
   
                                          
   
                                          
   
                                          
   
                                          
   
                                          
   
                                          
   
                                          
   
                                          
   
                                          
   
                                          
   
                                          
   
                                          
   
                                          
   
                                          
   
                                          
   
                                          
   
                                          
   
                                          
   
                                          
   
                                          
   
                                          
   
                                          
   
                                          
   
                                          
   
                                          
   
                                          
   
                                          
   
                                          
   
                                          
   
                                          
   
                                          
   
                                          
   
                                          
   
                                          
   
                                          
   
                                          
   
                                          
   
                                          
   
                                          
   
                                          
   
                                          
   
                                          
   
                                          
   
                                          
   
                                          
   
                                          
   
                                          
   
                                          
   
                                          
   
                                          
   
                                          
   
                                          
   
                                          
   
                                          
   
                                          
   
                                          
   
                                          
   
                                          
   
                                          
   
                                          
   
                                          
  
  
  
Immunizations  
  
  
                      Immunization Date Immunization Notes      Care Provider Micki barrientos  
   
                                        10-          influenza virus   
vaccine, unspecified   
formulation                             [facility]         [facility]  
   
                                        2014          tetanus toxoid, redu  
xiomara   
diphtheria toxoid, and   
acellular pertussis   
vaccine, adsorbed                                   Cynthia Okeefe MD  
Work Phone:   
[phone]                          [facility]  
Work Phone:   
[phone]  
  
  
  
Payers  
  
  
                          Date         Payer Category Payer        Policy ID  
   
                          2020   Medicaid                  [id number]  
   
                                2020      Medicaid        MEDICAID [facility]  
   
MEDICAID [id number]   
2020-Present   
[phone] [address]   
[address] [place] [address]   
Medicaid                                [id number]   
[id number]  
[id number]  
   
                                2019      Medicare        [facility] MEDICARE [facility]  
 DUAL   
COMPLETE HMO SNP   
[id number]   
2019-Present   
[phone] [address]   
[address] [place]   
[address] Medicare                     [id number]   
[id number]  
[id number]  
   
                          2019   Unknown                   [id number]  
   
                          2019   Medicaid                    
   
                          2019   Medicare                    
   
                          2018   Private Health Insurance                
   
                          1960   Unknown                   [id number]   
[id number]  
[id number]  
   
                          1960   Unknown                   [id number]   
[id number]  
[id number]  
   
                          1960   Unknown                   [id number]   
[id number]  
[id number]  
   
                          1960   Unknown                   [id number]   
[id number]  
[id number]  
   
                                       Medicare                  [id number]  
  
  
  
Social History  
  
  
                          Date         Type         Detail       Facility  
   
                                                    Start:   
05-  
End: 2022                         Tobacco smoking status   
NHIS                      Ex-smoker                 [facility]  
Work Phone:   
[phone]  
   
                                                      
End: 05-     History of tobacco use Current smoker      [facility]  
Work Phone:   
[phone]  
   
                                                      
End: 05-     History of tobacco use Cigarette Smoker    [facility]  
Work Phone:   
[phone]  
   
                                                    Start:   
05-  
End: 10-                         Cigarettes smoked current   
(pack per day) - Reported 1                         [facility]  
   
                                                    Start:   
05-  
End: 2022           Tobacco use and exposure  Smokeless tobacco   
non-user                                [facility]  
Work Phone:   
[phone]  
   
                                                    Start:   
2022  
End: 2023           Alcohol intake            Current non-drinker of   
alcohol (finding)                       [facility]  
   
                                                    Start:   
10-  
End: 2023                         History SDOH Alcohol   
Binge                     1                         [facility]  
   
                                                    Start:   
10-  
End: 2023                         History SDOH Social   
Conne[employer] Phone         5                         [facility]  
   
                                                    Start:   
10-  
End: 2022                         History SDOH Social   
[employer]        98                        [facility]  
   
                                                    Start:   
10-  
End: 2023                         History SDOH Social   
[facility] Membership    2                         [facility]  
   
                                                    Start:   
10-  
End: 2022     History SDOH Financial 3                   [facility]  
   
                                                    Start:   
2020          Education           12                  [facility]  
   
                                                    Start:   
1960          Sex Assigned At Birth Female              [facility]  
   
                                                    Start:   
2022  
End: 2022                         Exposure to SARS-CoV-2   
(event)                   Not sure                  [facility]  
Work Phone:   
[phone]  
   
                                                    Start:   
2022  
End: 2023                         History SDOH Alcohol Std   
Drinks                    0                         [facility]  
   
                                                    Start:   
2023  
End: 10-                         Social connection and   
isolation panel                                     [facility]  
   
                                                            Do you belong to any  
   
clubs or organizations   
such as Anab[employer]ist groups,   
unions, fraternal or   
athletic groups, or   
school groups?            No                        [facility]  
   
                                                            Are you now ,  
   
, ,   
, never    
or living with a partner?                   [facility]  
   
                                                            How often to you hav  
e a   
drink containing alcohol? Never                     [facility]  
   
                                                            How many standard dr  
inks   
containing alcohol do you   
have on a typical day?    Patient does not drink    [facility]  
   
                                                            Do you feel stress -  
   
tense, restless, nervous,   
or anxious, or unable to   
sleep at night because   
your mind is troubled all   
the time - these days   
[OSQ]                     Not at all                [facility]  
   
                                                            (I/We) worried wheth  
er   
(my/our) food would run   
out before (I/we) got   
money to buy more.        Never true                [facility]  
   
                                                    Start:   
10-                Gender identity           Identifies as female   
gender (finding)                        [facility]  
   
                                                    Start:   
10-          Sexual orientation  Heterosexual (finding) [facility]  
  
  
  
Medical Equipment  
  
  
                                Procedure Code  Equipment Code  Equipment Origin  
al   
Text                      Equipment Identifier      Dates  
   
                                                                Sys Endscp Fix   
[employer] -   
[id number]                [id number]                Start:   
2015  
   
                                                                  Start:   
2020  
End:   
2023  
  
  
  
                                          
   
                                          
  
  
  
Clinical Notes 2016 to 2023  
Sherly An APRN.CNP - 2023 10:44 AM ESTTelephone Encounter - Daiana Kellie  
 LPN - 2023 8:11 AM EDTTelephone Encounter - Kellie Walls LPN - 2023 
10:41 AM EDTPatient Instructions  
  
                                Note Date & Type Note            Facility  
   
                                        2023 Note     HNO ID: [id number]  
Author: Sherly An APRN.CNP  
Service: ?  
Author Type: Nurse Practitioner  
Type: Progress Notes  
Filed: 2023 4:01 PM  
Note Text:  
CC: Patient presents with:  
F/U 6 months  
HPI  
Navdeepflavio Guillen is a 63 year old   
female who presents today for   
routine  
follow up.  
DIABETES MELLITUS: Ms. Guillen   
denies excessive thirst or   
increased  
frequency of urination, chest pain   
or dyspnea , numbness, tingling or   
pain  
in extremities, new or unusual   
visual symptoms, low   
sugar/hypoglycemic  
reactions, weight loss/gain,   
lightheadedness/dizziness. Follows   
a diabetic  
diet most of the time. She is   
compliant with medication(s) and   
is  
tolerating med(s) without any side   
effects. She reports checking her  
glucose on a twice a day schedule   
with sugars in the fasting   
120s-140s and  
postprandial 70s-90s range.   
Patient's last HgA1C was  
Hemoglobin A1C (%)  
Date Value  
2023 6.1  
2023 6.8  
07/15/2020 7.7  
2020 7.6  
Hemoglobin A1C (POCT) (%)  
Date Value  
10/26/2021 7.1  
)  
Last Ophthalmology exam was over a   
year ago.  
HTN: Ms. Guillen indicates that she   
is feeling well and denies any  
symptoms referable to elevated   
blood pressure. Specifically   
denies  
headache, chest pain,   
palpitations, dyspnea, and   
peripheral edema.  
Patient denies any side effects of   
her medication(s) and is compliant   
with  
their regimen. She does check BP's   
away from this office with average  
BP's in the 110s-120s/70s-80   
range. Navdeep gets sporadic   
irregular  
exercise with walking. She watches   
her diet for sodium, low fat and   
low  
cholesterol some of the time.  
Last 3 Encounter BP Readings:  
Date: BP:  
2023 122/76  
10/02/2023 103/67  
2023 118/72  
Obesity: Has difficulty with   
exercise because of arthritic   
pain. Will  
every now and then take an   
ibuprofen to help when she is   
unable to sleep.  
Does not use more than once a week   
or less.  
REVIEW OF SYSTEMS  
General: no fevers, no chills, no   
night sweats, no recurrent   
infections,  
no change in appetite, no change   
in energy, and no significant   
changes in  
weight  
Respiratory: no cough, no   
wheezing, no shortness of breath,   
no hemoptysis  
Cardiovascular: no chest pain, no   
chest pressure, no palpitations,   
and no  
swelling  
Endocrine: no fatigue, no   
polyuria, no polyphagia, and no   
polydipsia  
Neurologic: No headache, weakness,   
numbness, dizziness, memory loss,  
syncope.  
PAST MEDICAL HISTORY  
Diagnosis Date  
Abnormal mammogram 2021  
Achilles tendon pain 2015  
Ankle weakness 2015  
Chronic pain of left ankle   
12/15/2016  
Colon abnormality 2014  
Thickening of the ascending colon   
seen on the recent CT scan.   
Patient has  
had a colonoscopy around 4 years   
ago. Records were obtained for   
when they  
were done, 4 years ago , in   
[facility]. her   
colonoscopy was  
completely normal, no thickening,   
and the biopsy too was normal   
except for  
lymphoid aggregates.  
Diverticulitis   
Treated by Dr. Patricio at [facility]  
DJD (degenerative joint disease),   
ankle and foot 2016  
DM (diabetes mellitus) (HCC)  
GERD (gastroesophageal reflux   
disease)  
Gross hematuria 05/15/2014  
2014, had hematuria, investigated   
extensively along with cytoscopy,   
a  
stone was found but no signs of   
any malignancy.  
Heel pain, chronic 12/15/2016  
Hepatic steatosis 10/03/2017  
Hiatal hernia 10/03/2017  
Hypertension  
Insomnia  
Kidney stone 05/15/2014  
Morbid obesity (HCC)  
Nephrolithiasis  
Neuritis 2016  
Renal lesion 05/15/2014  
S/P Achilles tendon repair   
2015 sx done  
PAST SURGICAL HISTORY  
Procedure Laterality Date  
BREAST LUMPECTOMY HX Right 2017  
BX BREAST W/DEVICE 1ST LESION   
STEREOTACTIC GUID Right 2021  
CHOLECYSTECTOMY   
gallbladder removal  
COLONOSCOPY   
CT ABDOMEN 2014  
PAST SURGICAL HISTORY OF   
2014  
[facility] hysteroscopy  
PAST SURGICAL HISTORY OF Left   
2015  
Left foot surgery  
S PK LAVH [id number] 10/16/2014  
ALLERGIES Silvadene [Silver   
Sulfadiazine] and Sulfa   
(Sulfonamide  
Antibiotics)  
MEDICATIONS  
metFORMIN ER (GLUCOPHAGE XR) 500   
mg 24 hr tablet Take 1 tablet by   
mouth  
daily with breakfast.  
blood sugar diagnostic (BLOOD   
GLUCOSE TEST) test strip Test   
blood sugars  
2daily. Dx:ucnontrolled diabetes .   
Insulin: Yes  
chlorthalidone (HYGROTON) 25 mg   
tablet Take 0.5 tablets by mouth   
once  
daily.  
atenolol (TENORMIN) 50 mg tablet   
Take 1 tablet by mouth once daily.  
potassium chloride 20 mEq TbER   
Take 1 tablet by mouth twice   
daily.  
glipiZIDE (GLUCOTROL XL) 2.5 mg 24   
hr tablet Take 1 tablet by mouth   
once  
daily.  
exemestane (AROMASIN) 25 mg tablet   
Take 1 tablet by mouth once daily.  
ibuprofen (MOTRIN) 800 mg tablet   
Take 1 tablet by mouth every 8   
hours as  
needed for pain. Take with food.  
lancets (ONE TOUCH DELICA) 33   
gauge misc Test blood sugar(s) 2   
daily.  
Dx: Type 2 DM - Controlled E11.9   
Insulin: Yes  
Blood-Glucose Meter monitoring kit   
Glucose Meter of Choice - Kit -   
Dx:  
Type 2 DM - Uncontrolled E11.65  
MV-MN/FOLIC A (more content not   
included)...                            [facility]  
   
                                                    2023 History of   
Present illness Narrative               Formatting of this note is   
different from the original.  
CC: Patient presents with:  
F/U 6 months  
  
HPI  
Navdeepflavio Guillen is a 63 year old   
female who presents today for   
routine follow up.  
  
DIABETES MELLITUS: Ms. Guillen   
denies excessive thirst or   
increased frequency of urination,   
chest pain or dyspnea , numbness,   
tingling or pain in extremities,   
new or unusual visual symptoms,   
low sugar/hypoglycemic reactions,   
weight loss/gain,   
lightheadedness/dizziness. Follows   
a diabetic diet most of the time.   
She is compliant with   
medication(s) and is tolerating   
med(s) without any side effects.   
She reports checking her glucose   
on a twice a day schedule with   
sugars in the fasting 120s-140s   
and postprandial 70s-90s range.   
Patient's last HgA1C was  
Hemoglobin A1C (%)  
Date Value  
2023 6.1  
2023 6.8  
07/15/2020 7.7  
2020 7.6  
  
Hemoglobin A1C (POCT) (%)  
Date Value  
10/26/2021 7.1  
)  
Last Ophthalmology exam was over a   
year ago.  
  
HTN: Ms. Guillen indicates that she   
is feeling well and denies any   
symptoms referable to elevated   
blood pressure. Specifically   
denies headache, chest pain,   
palpitations, dyspnea, and   
peripheral edema. Patient denies   
any side effects of her   
medication(s) and is compliant   
with their regimen. She does check   
BP's away from this office with   
average BP's in the   
110s-120s/70s-80 range. Navdeep   
gets sporadic irregular exercise   
with walking. She watches her diet   
for sodium, low fat and low   
cholesterol some of the time.  
Last 3 Encounter BP Readings:  
Date: BP:  
2023 122/76  
10/02/2023 103/67  
2023 118/72  
  
Obesity: Has difficulty with   
exercise because of arthritic   
pain. Will every now and then take   
an ibuprofen to help when she is   
unable to sleep. Does not use more   
than once a week or less.  
  
REVIEW OF SYSTEMS  
General: no fevers, no chills, no   
night sweats, no recurrent   
infections, no change in appetite,   
no change in energy, and no   
significant changes in weight  
Respiratory: no cough, no   
wheezing, no shortness of breath,   
no hemoptysis  
Cardiovascular: no chest pain, no   
chest pressure, no palpitations,   
and no swelling  
Endocrine: no fatigue, no   
polyuria, no polyphagia, and no   
polydipsia  
Neurologic: No headache, weakness,   
numbness, dizziness, memory loss,   
syncope.  
  
PAST MEDICAL HISTORY  
Diagnosis Date  
Abnormal mammogram 2021  
Achilles tendon pain 2015  
Ankle weakness 2015  
Chronic pain of left ankle   
12/15/2016  
Colon abnormality 2014  
Thickening of the ascending colon   
seen on the recent CT scan.   
Patient has had a colonoscopy   
around 4 years ago. Records were   
obtained for when they were done,   
4 years ago , in [facility]. her colonoscopy was   
completely normal, no thickening,   
and the biopsy too was normal   
except for lymphoid aggregates.  
Diverticulitis   
Treated by Dr. Patricio at [facility]  
DJD (degenerative joint disease),   
ankle and foot 2016  
DM (diabetes mellitus) (HCC)  
GERD (gastroesophageal reflux   
disease)  
Gross hematuria 05/15/2014  
2014, had hematuria, investigated   
extensively along with cytoscopy,   
a stone was found but no signs of   
any malignancy.  
Heel pain, chronic 12/15/2016  
Hepatic steatosis 10/03/2017  
Hiatal hernia 10/03/2017  
Hypertension  
Insomnia  
Kidney stone 05/15/2014  
Morbid obesity (HCC)  
Nephrolithiasis  
Neuritis 2016  
Renal lesion 05/15/2014  
S/P Achilles tendon repair   
2015 sx done  
  
  
PAST SURGICAL HISTORY  
Procedure Laterality Date  
BREAST LUMPECTOMY HX Right   
BX BREAST W/DEVICE 1ST LESION   
STEREOTACTIC GUID Right 2021  
CHOLECYSTECTOMY   
gallbladder removal  
COLONOSCOPY   
CT ABDOMEN 2014  
PAST SURGICAL HISTORY OF   
2014  
[facility] hysteroscopy  
PAST SURGICAL HISTORY OF Left   
2015  
Left foot surgery  
S PK LAVH [id number] 10/16/2014  
  
ALLERGIES Silvadene [Silver   
Sulfadiazine] and Sulfa   
(Sulfonamide Antibiotics)  
  
MEDICATIONS  
metFORMIN ER (GLUCOPHAGE XR) 500   
mg 24 hr tablet Take 1 tablet by   
mouth daily with breakfast.  
blood sugar diagnostic (BLOOD   
GLUCOSE TEST) test strip Test   
blood sugars 2daily.   
Dx:ucnontrolled diabetes .   
Insulin: Yes  
chlorthalidone (HYGROTON) 25 mg   
tablet Take 0.5 tablets by mouth   
once daily.  
atenolol (TENORMIN) 50 mg tablet   
Take 1 tablet by mouth once daily.  
potassium chloride 20 mEq TbER   
Take 1 tablet by mouth twice   
daily.  
glipiZIDE (GLUCOTROL XL) 2.5 mg 24   
hr tablet Take 1 tablet by mouth   
once daily.  
exemestane (AROMASIN) 25 mg tablet   
Take 1 tablet by mouth once daily.  
ibuprofen (MOTRIN) 800 mg tablet   
Take 1 tablet by mouth every 8   
hours as needed for pain. Take   
with food.  
lancets (ONE TOUCH DELICA) 33   
gauge misc Test blood sugar(s) 2   
daily. Dx: Type 2 DM - Controlled   
E11.9 Insulin: Yes  
Blood-Glucose Meter monitoring kit   
Glucose Meter of Choice - Kit -   
Dx: Type 2 DM - Uncontrolled   
E11.65  
MV-MN/FOLIC ACID/CALCIUM/VIT K   
(ONE-A-DAY WOMEN'S 50 PLUS ORAL)   
Take 1 tablet by mouth once daily.  
  
azithromycin (ZITHROMAX Z-ALLAN) 250   
mg tablet TAKE 2 TABS ON THE FIRST   
DAY, THEN ONE TAB DAILY FOR 4   
DAYS.  
  
FAMILY HISTORY  
Problem Relation Age of Onset  
Ovarian cancer Mother 39  
Heart Father  
Hypertension Father  
Prostate Cancer Father 78  
other (kidney stones) Brother  
Hypertension Brother  
Diabetes Brother  
Seizures Son  
Breast Cancer Other 55  
Double first cousin (daughter of   
mother's sister and father's   
brother)  
  
Social History  
  
Tobacco Use  
Smoking status: Former  
Packs/day: 1.00  
Years: 10.00  
Additional pack years: 0.00  
Total pack years: 10.00  
Types: Cigarettes  
Quit date: 5/15/2007  
Years since quittin.5  
Smokeless tobacco: Never  
Vaping Use  
Vaping Use: Never used  
Substance Use Topics  
Alcohol use: No  
Drug use: Not Currently  
Comment: marijuana for insomnia -   
currently not using  
  
PHYSICAL EXAM  
/76 (BP Site: Left Arm, BP   
Position: Sitting, BP Cuff Size:   
Regular Adult)   Pulse 68   Resp   
16   Wt (!) 137.5 kg (303 lb 3.2   
oz)   SpO2 97%   BMI 49.69 kg/m  
General Appearance: well   
appearing, in no acute distress,   
alert  
Pysch: mood and affect broad and   
appropriate  
Skin: Skin color, texture, turgor   
normal for age;  
Eyes: conjunctiva pink and moist,   
no icterus, sclera white,   
non-injected  
Neck: Thyroid normal size and   
symmetric without palpable   
nodules, Neck supple, No   
adenopathy  
Lymph nodes: No cervical   
lymphadenopathy and No   
supraclavicular lymphadenopathy  
Lungs: Lungs clear to   
auscultation. No wheezing,   
rhonchi, rales.  
Heart: RRR without murmur, gallop,   
or rubs. No ectopy  
  
Health maintenance reviewed with   
patient:  
HIV Screening Never done  
Shingrix Vaccine(1 of 2) Never   
done  
Hepatitis B Vaccine(1 of 3 - Risk   
3-dose series) Never done  
RSV Vaccine(1 - 1-dose 60+ series)   
Never done  
Dilated Retinal Exam due on   
2022  
Urine Albumin:Creatinine Ratio due   
on 2023  
Bone Density Screening due on   
2023  
Advance Directive Discussion Never   
done  
Influenza Vaccine(1) due on   
2023  
Diabetic Foot Exam due on   
2023  
HbA1C due on 10/03/2023  
Mammogram Screening due on   
2024  
Covid-19 Vaccine(1) due on   
2024  
Pneumococcal Vaccine: 65+(1 - PCV)   
due on 2024  
LDL Cholesterol due on 2024  
Annual PCP Team Chronic Disease   
Visit due on 2024  
DTaP,Tdap,Td Vaccine(2 - Td or   
Tdap) due on 2024  
BP Controlled (<130/80) due on   
10/02/2024  
Colorectal Cancer Screening due on   
2028  
Depression Assessment Completed  
Hepatitis C Screening Completed  
HPV Vaccine Aged Out  
Pap Testing Discontinued  
  
DATA REVIEWED: No new labs  
  
ASSESSMENT/PLAN:  
1. Controlled type 2 diabetes   
mellitus without complication,   
without long-term current use of   
insulin (HCC) - ICD9: 250.00,   
ICD10: E11.9 (primary diagnosis)  
- Control undetermined, due for   
labs  
- Continue current medications  
- Blood glucose monitoring on a   
once daily schedule  
- Counseled on healthy diet and   
regular exercise  
- Discussed need for and benefit   
of weight loss. BMI 49.69 kg/(m^2)  
- CONSULT TO OPHTHALMOLOGY  
- ALBUMIN/CREAT RATIO RND UR  
- HGB A1C  
- BASIC METABOLIC PNL  
- CBC  
- LIPID PANEL BASIC  
- CBC  
- COMP METABOLIC PANEL  
- HGB A1C  
  
2. Primary hypertension - ICD9:   
401.9, ICD10: I10  
- Controlled  
- Continue current medications  
- Recommend home blood pressure   
monitoring, to bring results to   
next visit  
- Encouraged sodium restriction,   
DASH or Mediterranean diet  
- Recommend regular aerobic   
exercise  
- CBC  
- LIPID PANEL BASIC  
- CBC  
- COMP METABOLIC PANEL  
  
3. Morbid obesity with BMI of   
40.0-44.9, adult (HCC) - ICD9:   
278.01, V85.41, ICD10: E66.01,   
Z68.41  
Weight increasing  
- Behavioral intervention  
- low fat, low sugar, well   
portioned balanced diet  
- at least 30min of aerobic   
exercise 5 days a week  
  
Prescription instructions reviewed   
with patient as applicable.   
Potential red flag symptoms   
discussed with the patient.   
Reviewed appropriate action plan   
to take if red flag symptoms   
occur. Patient agreeable to   
treatment plan.  
Sherly An APRN.CNP  
Electronically signed by Sherly An APRN.CNP at 2023 4:01   
PM EST  
documented in this encounter            [facility]  
   
                                        10- Note     HNO ID: [id number]  
Author: Posada, Vikki, APRN.CNP  
Service: ?  
Author Type: Nurse Practitioner  
Type: Progress Notes  
Filed: 10/4/2023 1:49 PM  
Note Text:  
Chief Complaint  
Patient presents with:  
Established Patient  
HPI:  
Navdeep Guillen is a 63 year old   
female who presents here today for   
follow  
up breast cancer.  
Per Dr. Milner/Sumaya's previous note:  
H/o hypertension and borderline   
diabetes/insulin resistant,   
obesity, who  
presented with an abnormal breast   
exam at her PCP office. Subsequent  
diagnostic mammogram revealing a   
lobulated lesion in the right   
breast at  
the lower outer quadrant highly   
suspicious of malignancy.  
Patient had a mammotome breast   
biopsy on 3/27/17 that showed   
invasive  
ductal carcinoma, positive for   
estrogen and progesterone   
receptors,  
equivocal for overexpression   
HER-2/andi.  
She had surgery by Dr. Dueñas, right   
breast lumpectomy and right   
axillary  
sentinel lymph node biopsy on   
17 for right breast cancer.  
Stage IA- pT1c pN0 (3/3 sentinel   
lymph nodes negative for   
metastatic  
disease)  
Tumor size 1.5 cm x 1 x 0.9 cm  
Overall grade 2 out of 7  
Margins - 0.4 cm from posterior   
margin  
ER/MN >95%, [id number] not amplified   
by FISH  
Lymph/vasc invasion - none;   
derm/vasc/lymph invasion - none  
Menstrual history: Menarche at age   
11, first pregnancy at age 18, 8  
pregnancy; surgical   
menopause-total abdominal   
hysterectomy age 54. No  
estrogen replacement therapy.   
Family history: Mother  of   
ovarian  
cancer in her 40's. No family   
history of breast cancer. She was   
initially  
started on anastrozole, then   
subsequent switch to Aromasin   
because of  
joint pain.  
RADIATION-17 to 17  
Right breast  
Current treatment:  
1) Aromasin 25 mg once daily  
No new concerns today.  
Appetite: Ok.  Energy level: Eh.   
Denies fevers. +sinus   
congestion/drainage  
Resp:denies cough or sob  
Cardiac:denies chest   
pain/palpitations  
GI:denies abd pain, n/v, moving   
bowels regularly  
:denies dysuria/hematuria  
Extrem:occ. RUE aches, L foot pain   
s/p surgery  
Endo:occ. hot flash at night  
Neuro:denies symptoms of   
neuropathy  
Skin:denies rashes  
Heme:denies bleeding  
The ROS is otherwise negative.  
Past medical history,   
appointments, medications,   
allergies reviewed. No  
changes.  
EXAM:  
/67   Pulse 70   Temp 36.5   
?C (97.7 ?F) (Temporal)   Ht 166.4   
cm  
(5' 5.5 )   Wt 135.6 kg (299 lb)     
SpO2 96%   BMI 49.00 kg/m?  
APPEARANCE Well appearing, alert,   
in no acute distress,   
well-hydrated,  
well nourished.  
HEART RRR with normal S1 and S2,   
no murmurs  
LUNG clear to auscultation  
BREAST FEMALE no mass/nodule b/l,   
R radiation changes  
LYMPH NODES No cervical   
lymphadenopathy, No   
supraclavicular  
lymphadenopathy, and No axillary   
lymphadenopathy.  
ABDOMEN bowel sounds normoactive,   
soft, non-tender  
EXTREMITIES No edema  
NEURO Awake, alert and oriented x   
3, Normal gait, and No involuntary  
motions.  
SKIN Skin color, texture, turgor   
normal, no suspicious rashes or   
lesions  
ASSESSMENT/PLAN:  
1. Malignant neoplasm of   
lower-outer quadrant of right   
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  
estrogen receptor positive (HCC) -   
ICD9: 174.5, V86.0, ICD10:   
C50.511,  
Z17.0 (primary diagnosis)  
pT1c pN0 (3/3 sentinel lymph nodes   
negative for metastatic disease)   
stage  
IA infiltrating ductal carcinoma   
the right breast. ER/MN >95%,   
Jet0ety  
non-amplified by FISH.  
- No concerning findings on exam.  
- Overall tolerating aromasin   
well.  
- Continue aromasin.  
- Mammogram due end of 2024.  
- Follow up in 6 months.  
- Pt. aware to call office with   
any questions/concerns.  
The patient indicates   
understanding of these issues and   
agrees with the  
plan.  
All documentation from previous   
visit of 23-Dr. Shell was   
copied and  
pasted, documentation has been   
reviewed and edited as necessary   
for  
today's visit.  
Vikki Posada, NIC.[facility]  
   
                                                    2023 Miscellaneous   
Notes                                   Formatting of this note is   
different from the original.  
Patient has been identified by   
name and date of birth: No  
Patient phones for refill(s):  
Requested Prescriptions  
  
Pending Prescriptions Disp Refills  
metFORMIN ER (GLUCOPHAGE XR) 500   
mg 24 hr tablet 180 tablet 3  
Sig: Take 1 tablet by mouth daily   
with breakfast.  
blood sugar diagnostic (BLOOD   
GLUCOSE TEST) test strip 50 Strip   
11  
Sig: Test blood sugars 2daily.   
Dx:ucnontrolled diabetes .   
Insulin: Yes  
chlorthalidone (HYGROTON) 25 mg   
tablet 45 tablet 3  
Sig: Take 0.5 tablets by mouth   
once daily.  
atenolol (TENORMIN) 50 mg tablet   
90 tablet 3  
Sig: Take 1 tablet by mouth once   
daily.  
potassium chloride 20 mEq TbER 180   
tablet 1  
Sig: Take 1 tablet by mouth twice   
daily.  
glipiZIDE XL (GLUCOTROL XL) 2.5 mg   
24 hr tablet 90 tablet 3  
Sig: Take 1 tablet by mouth once   
daily.  
  
Date of last office visit in   
primary care: 23  
Last 2 Encounter Wt Readings:  
Date: Wt:  
2023 132.9 kg (293 lb)  
2023 132 kg (291 lb)  
Previous labs/tests for   
medication: Diabetes:  
Hemoglobin A1C (%)  
Date Value  
2023 6.1  
2023 6.8  
07/15/2020 7.7  
2020 7.6  
  
Hemoglobin A1C (POCT) (%)  
Date Value  
10/26/2021 7.1  
  
Blood Pressure:  
BUN (mg/dL)  
Date Value  
2023 10  
2022 15  
  
Sodium (mmol/L)  
Date Value  
2023 139  
2022 136  
Last 1 Encounter BP Readings:  
Date: BP:  
2023 118/72  
Please advise. Thank you. Kellie Ahmadi LPN  
  
Electronically signed by Kellie Ahmadi LPN at 2023 8:14 AM EDT  
documented in this encounter            [facility]  
   
                                                    2023 Miscellaneous   
Notes                                   Formatting of this note is   
different from the original.  
Patient has been identified by   
name and date of birth: No  
Patient phones for refill(s):  
Requested Prescriptions  
  
Pending Prescriptions Disp Refills  
glipiZIDE XL (GLUCOTROL XL) 2.5 mg   
24 hr tablet [Pharmacy Med Name:   
GLIPIZIDE ER 2.5 MG TB24 2.5   
Tablet] 90 tablet 3  
Sig: TAKE 1 TABLET BY MOUTH ONCE   
DAILY.  
  
Date of last office visit in   
primary care: 23  
Last 2 Encounter Wt Readings:  
Date: Wt:  
2023 132.9 kg (293 lb)  
2023 132 kg (291 lb)  
Previous labs/tests for   
medication: Diabetes:  
Hemoglobin A1C (%)  
Date Value  
2023 6.1  
2023 6.8  
07/15/2020 7.7  
2020 7.6  
  
Hemoglobin A1C (POCT) (%)  
Date Value  
10/26/2021 7.1  
  
Please advise. Thank you. Kellie Walls LPN  
  
Electronically signed by Kellie Walls LPN at 2023 10:42 AM   
EDT  
documented in this encounter            [facility]  
   
                                        2023 Note     HNO ID: [id number]  
Author: Cynthia Okeefe MD  
Service: ?  
Author Type: Physician  
Type: Progress Notes  
Filed: 2023 1:42 PM  
Note Text:  
Reason for Visit  
Patient presents with:  
F/U 6 months  
Navdeep Guillen is a 63 year old   
female who presents here today for   
Above  
Complaints..  
Health Maintenance  
PNEUMOCOCCAL: 65+(1 - PCV)  
HIV SCREENING  
SHINGRIX VACCINE(1 of 2)  
COLORECTAL CANCER SCREENING  
DILATED RETINAL EXAM  
DEPRESSION ASSESSMENT  
URINE ALBUMIN:CREATININE RATIO  
BONE DENSITY  
ADVANCE DIRECTIVE DISCUSSION  
HPI  
Patient is here for chronic   
medical conditions follow-up and   
to date on  
her current diverticulitis   
condition  
Reviewed blood work  
She had diverticulitis the end of   
January. Was on abx of a couple   
courses.  
Patient was in the hospital with   
IV for 3 days and the on oral abx   
, one  
for uti and one for diverticulitis   
, total of 3/4 courses of abx and   
then  
had yeast infection and took   
diflucan. Right now she still has   
discharge  
from the vagina. She is having a   
colonoscopy with Dr Bocanegra.  
Also had another breast cancer   
scare but everything is ok as of   
now.  
Sugars well controlled .  
Patient has lost 10 pounds of   
weight because of the   
diverticulitis , the  
low fiber is giving her   
constipation.  
HPL: Reviewed test results with   
patient , takes medications   
regularly ,  
does not report side effects.   
Conscious to avoid red meats, full   
fat dairy  
and its by products. Exercising 3   
to 5 times a week.  
HTN: BP controlled today. Checks   
BP at home. Compliant with   
medications.  
Denies any chest pain,   
palpitations, SOB, swelling in the   
feet. Careful  
with diet to avoid salt, trying to   
eat more fruits and vegetables,  
exercises regularly.  
Tsh is normal.  
No problem-specific Assessment AND   
Plan notes found for this   
encounter.  
PAST MEDICAL HISTORY  
Diagnosis Date  
Abnormal mammogram 2021  
Achilles tendon pain 2015  
Ankle weakness 2015  
Chronic pain of left ankle   
12/15/2016  
Colon abnormality 2014  
Thickening of the ascending colon   
seen on the recent CT scan.   
Patient has  
had a colonoscopy around 4 years   
ago. Records were obtained for   
when they  
were done, 4 years ago , in   
[facility]. her   
colonoscopy was  
completely normal, no thickening,   
and the biopsy too was normal   
except for  
lymphoid aggregates.  
Diverticulitis   
Treated by Dr. Patricio at [facility]  
DJD (degenerative joint disease),   
ankle and foot 2016  
DM (diabetes mellitus) (HCC)  
GERD (gastroesophageal reflux   
disease)  
Gross hematuria 05/15/2014  
2014, had hematuria, investigated   
extensively along with cytoscopy,   
a  
stone was found but no signs of   
any malignancy.  
Heel pain, chronic 12/15/2016  
Hepatic steatosis 10/03/2017  
Hiatal hernia 10/03/2017  
Hypertension  
Insomnia  
Kidney stone 05/15/2014  
Morbid obesity (HCC)  
Nephrolithiasis  
Neuritis 2016  
Renal lesion 05/15/2014  
S/P Achilles tendon repair   
2015 sx done  
PAST SURGICAL HISTORY  
Procedure Laterality Date  
BREAST LUMPECTOMY HX Right   
BX BREAST W/DEVICE 1ST LESION   
STEREOTACTIC GUID Right 2021  
CHOLECYSTECTOMY   
gallbladder removal  
COLONOSCOPY   
CT ABDOMEN 2014  
PAST SURGICAL HISTORY OF   
2014  
[facility] hysteroscopy  
PAST SURGICAL HISTORY OF Left   
2015  
Left foot surgery  
S PK LAVH [id number] 10/16/2014  
FAMILY HISTORY  
Problem Relation Age of Onset  
Ovarian cancer Mother 39  
Heart Father  
Hypertension Father  
Prostate Cancer Father 78  
other (kidney stones) Brother  
Hypertension Brother  
Diabetes Brother  
Seizures Son  
Breast Cancer Other 55  
Double first cousin (daughter of   
mother's sister and father's  
brother)  
Social History  
Tobacco Use  
Smoking status: Former  
Packs/day: 1.00  
Years: 10.00  
Pack years: 10.00  
Types: Cigarettes  
Quit date: 5/15/2007  
Years since quitting: 15.9  
Smokeless tobacco: Never  
Vaping Use  
Vaping Use: Never used  
Substance Use Topics  
Alcohol use: No  
Drug use: Not Currently  
Comment: marijuana for insomnia -   
currently not using  
Past medical history,   
appointments, medications,   
allergies reviewed.  
Pertinent Lab/Diagnostic Studies   
are reviewed and discussed today  
Current Outpatient Medications:  
potassium chloride 20 mEq TbER  
atenolol (TENORMIN) 50 mg tablet  
exemestane (AROMASIN) 25 mg tablet  
chlorthalidone (HYGROTON) 25 mg   
tablet  
metFORMIN ER (GLUCOPHAGE XR) 500   
mg 24 hr tablet  
glipiZIDE (GLUCOTROL XL) 2.5 mg 24   
hr tablet  
blood sugar diagnostic (BLOOD   
GLUCOSE TEST) test strip  
ibuprofen (MOTRIN) 800 mg tablet  
lancets (ONE TOUCH DELICA) 33   
gauge misc  
Blood-Glucose Meter monitoring kit  
MV-MN/FOLIC ACID/CALCIUM/VIT K   
(ONE-A-DAY WOMEN'S 50 PLUS ORAL)  
fluconazole (DIFLUCAN) 150 mg   
tablet  
ciprofloxacin HCl (CIPRO) 500 mg   
tablet  
metroNIDAZOLE (FLAGYL) 500 mg   
tablet  
ascorbic acid, vitamin C, (VITAMIN   
C) 500 mg tablet  
Review of Systems  
CONSTITUTIONAL: No fevers, chills   
night sweats, unintended weight   
loss  
CARDIOVASCULAR: N (more content   
not included)...                        [facility]  
   
                                                    2023 History of   
Present illness Narrative               Formatting of this note is   
different from the original.  
Reason for Visit  
Patient presents with:  
F/U 6 months  
  
Navdeep Guillen is a 63 year old   
female who presents here today for   
Above Complaints..  
  
Health Maintenance  
PNEUMOCOCCAL: 65+(1 - PCV)  
HIV SCREENING  
SHINGRIX VACCINE(1 of 2)  
COLORECTAL CANCER SCREENING  
DILATED RETINAL EXAM  
DEPRESSION ASSESSMENT  
URINE ALBUMIN:CREATININE RATIO  
BONE DENSITY  
ADVANCE DIRECTIVE DISCUSSION  
  
HPI  
  
Patient is here for chronic   
medical conditions follow-up and   
to date on her current   
diverticulitis condition  
  
Reviewed blood work  
  
She had diverticulitis the end of   
January. Was on abx of a couple   
courses. Patient was in the   
hospital with IV for 3 days and   
the on oral abx , one for uti and   
one for diverticulitis , total of   
3/4 courses of abx and then had   
yeast infection and took diflucan.   
Right now she still has discharge   
from the vagina. She is having a   
colonoscopy with Dr Bocanegra.  
  
Also had another breast cancer   
scare but everything is ok as of   
now.  
  
Sugars well controlled .  
  
Patient has lost 10 pounds of   
weight because of the   
diverticulitis , the low fiber is   
giving her constipation.  
  
HPL: Reviewed test results with   
patient , takes medications   
regularly , does not report side   
effects. Conscious to avoid red   
meats, full fat dairy and its by   
products. Exercising 3 to 5 times   
a week.  
  
HTN: BP controlled today. Checks   
BP at home. Compliant with   
medications. Denies any chest   
pain, palpitations, SOB, swelling   
in the feet. Careful with diet to   
avoid salt, trying to eat more   
fruits and vegetables, exercises   
regularly.  
  
Tsh is normal.  
  
No problem-specific Assessment &   
Plan notes found for this   
encounter.  
  
PAST MEDICAL HISTORY  
Diagnosis Date  
Abnormal mammogram 2021  
Achilles tendon pain 2015  
Ankle weakness 2015  
Chronic pain of left ankle   
12/15/2016  
Colon abnormality 2014  
Thickening of the ascending colon   
seen on the recent CT scan.   
Patient has had a colonoscopy   
around 4 years ago. Records were   
obtained for when they were done,   
4 years ago , in [facility]. her colonoscopy was   
completely normal, no thickening,   
and the biopsy too was normal   
except for lymphoid aggregates.  
Diverticulitis   
Treated by Dr. Patricio at [facility]  
DJD (degenerative joint disease),   
ankle and foot 2016  
DM (diabetes mellitus) (HCC)  
GERD (gastroesophageal reflux   
disease)  
Gross hematuria 05/15/2014  
2014, had hematuria, investigated   
extensively along with cytoscopy,   
a stone was found but no signs of   
any malignancy.  
Heel pain, chronic 12/15/2016  
Hepatic steatosis 10/03/2017  
Hiatal hernia 10/03/2017  
Hypertension  
Insomnia  
Kidney stone 05/15/2014  
Morbid obesity (HCC)  
Nephrolithiasis  
Neuritis 2016  
Renal lesion 05/15/2014  
S/P Achilles tendon repair   
2015 sx done  
  
  
PAST SURGICAL HISTORY  
Procedure Laterality Date  
BREAST LUMPECTOMY HX Right   
BX BREAST W/DEVICE 1ST LESION   
STEREOTACTIC GUID Right 2021  
CHOLECYSTECTOMY 2011  
gallbladder removal  
COLONOSCOPY   
CT ABDOMEN 2014  
PAST SURGICAL HISTORY OF   
2014  
[facility] hysteroscopy  
PAST SURGICAL HISTORY OF Left   
2015  
Left foot surgery  
S PK LAVH [id number] 10/16/2014  
  
FAMILY HISTORY  
Problem Relation Age of Onset  
Ovarian cancer Mother 39  
Heart Father  
Hypertension Father  
Prostate Cancer Father 78  
other (kidney stones) Brother  
Hypertension Brother  
Diabetes Brother  
Seizures Son  
Breast Cancer Other 55  
Double first cousin (daughter of   
mother's sister and father's   
brother)  
  
Social History  
  
Tobacco Use  
Smoking status: Former  
Packs/day: 1.00  
Years: 10.00  
Pack years: 10.00  
Types: Cigarettes  
Quit date: 5/15/2007  
Years since quitting: 15.9  
Smokeless tobacco: Never  
Vaping Use  
Vaping Use: Never used  
Substance Use Topics  
Alcohol use: No  
Drug use: Not Currently  
Comment: marijuana for insomnia -   
currently not using  
  
Past medical history,   
appointments, medications,   
allergies reviewed.  
Pertinent Lab/Diagnostic Studies   
are reviewed and discussed today  
  
Current Outpatient Medications:  
potassium chloride 20 mEq TbER  
atenolol (TENORMIN) 50 mg tablet  
exemestane (AROMASIN) 25 mg tablet  
chlorthalidone (HYGROTON) 25 mg   
tablet  
metFORMIN ER (GLUCOPHAGE XR) 500   
mg 24 hr tablet  
glipiZIDE (GLUCOTROL XL) 2.5 mg 24   
hr tablet  
blood sugar diagnostic (BLOOD   
GLUCOSE TEST) test strip  
ibuprofen (MOTRIN) 800 mg tablet  
lancets (ONE TOUCH DELICA) 33   
gauge misc  
Blood-Glucose Meter monitoring kit  
MV-MN/FOLIC ACID/CALCIUM/VIT K   
(ONE-A-DAY WOMEN'S 50 PLUS ORAL)  
fluconazole (DIFLUCAN) 150 mg   
tablet  
ciprofloxacin HCl (CIPRO) 500 mg   
tablet  
metroNIDAZOLE (FLAGYL) 500 mg   
tablet  
ascorbic acid, vitamin C, (VITAMIN   
C) 500 mg tablet  
  
Review of Systems  
CONSTITUTIONAL: No fevers, chills   
night sweats, unintended weight   
loss  
CARDIOVASCULAR: No chest pain,   
dyspnea, palpitations, orthopnea,   
PND, ankle edema.  
PULM: No dyspnea, unexplained   
cough.  
GI: No dysphagia/odynophagia,   
problematic reflux, constipation,   
diarrhea, changes in stool habits,   
hematochezia, melena.  
: No new urinary complaints,   
including dysuria, gross hematuria   
or pyuria.  
NEURO: No new balance problems,   
peripheral weakness/paresthesias   
or numbness of concern.  
  
Physical Exam  
/72 (BP Site: Left Arm, BP   
Position: Sitting, BP Cuff Size:   
Large Adult)   Pulse 64   Resp 16   
  Wt 132.9 kg (293 lb)   SpO2 97%   
  BMI 47.29 kg/m  
General appearance: Well   
appearing, alert, in no acute   
distress, well nourished.  
Skin: Skin color, texture, turgor   
normal, no suspicious rashes or   
lesions  
Head: Normocephalic, no masses,   
lesions, tenderness or   
abnormalities  
Eyes: Anicteric sclera. Pupils are   
equally round and reactive to   
light. Extraocular movements are   
intact.  
Lungs: Lungs clear to   
auscultation. No wheezing,   
rhonchi, rales  
Heart: RRR without murmur, gallop,   
or rubs.  
Extremities: No deformities,   
edema, skin discoloration,   
clubbing or cyanosis. Good   
capillary refill.  
  
ASSESSMENT/PLAN:  
1. Type 2 diabetes mellitus   
without complication, without   
long-term current use of insulin   
(HCC) - ICD9: 250.00, ICD10: E11.9   
(primary diagnosis)  
  
- IBUPROFEN 800 MG TABLET  
- ALBUMIN/CREAT RATIO RND UR  
- DEPRESSION SCREENING/ASSESSMENT  
  
2. Screening for osteoporosis -   
ICD9: V82.81, ICD10: Z13.820  
  
- DXA-AXIAL SKELETON  
  
3. Asymptomatic menopause - ICD9:   
V49.81, ICD10: Z78.0  
  
4. Primary hypertension - ICD9:   
401.9, ICD10: I10  
- good control  
- Recommended regular aerobic   
exercise.  
- Recommend home blood pressure   
monitoring, to bring results in on   
next visit  
- Goal of BP <130/80  
  
5. Hypercholesterolemia - ICD9:   
272.0, ICD10: E78.00  
  
6. Diverticulitis - ICD9: 562.11,   
ICD10: K57.92  
We will see what the colonoscopy   
has to say  
  
7. Dysuria - ICD9: 788.1, ICD10:   
R30.0  
acute  
- Patient education for prevention   
given  
- URINE CULTURE  
  
Cynthia Okeefe MD  
  
  
Electronically signed by Cynthia Okeefe MD at 2023 1:42 PM   
EDT  
documented in this encounter            [facility]  
   
                                        2023 Note     HNO ID: [id number]  
Author: Kajal Dueñas MD  
Service: ?  
Author Type: Physician  
Type: Progress Notes  
Filed: 2023 4:16 PM  
Note Text:  
TELEPHONE FOLLOW-UP ENCOUNTER  
Navdeep Guillen [id number]   
has requested a telemedicine   
follow-up  
visit. Navdeep Guillen verbalized   
informed consent to proceed with   
the  
telemedicine follow-up visit.   
Navdeep Guillen was informed that   
the  
details of this telephone visit   
would be recorded as part of their  
electronic medical record.  
I had a telephone visit with Ms. Guillen today for follow up of   
right  
stereotactic breast biopsy done at   
[facility] on 3/28/2023.  
Pathology reveals  fat necrosis,   
focal clustered banal  
microcalcifications, no evidence   
of malignancy   
Patient states that she has no   
problems from her biopsy.  
PAST MEDICAL HISTORY  
Diagnosis Date  
Abnormal mammogram 2021  
Achilles tendon pain 2015  
Ankle weakness 2015  
Chronic pain of left ankle   
12/15/2016  
Colon abnormality 2014  
Thickening of the ascending colon   
seen on the recent CT scan.   
Patient has  
had a colonoscopy around 4 years   
ago. Records were obtained for   
when they  
were done, 4 years ago , in   
[facility]. her   
colonoscopy was  
completely normal, no thickening,   
and the biopsy too was normal   
except for  
lymphoid aggregates.  
Diverticulitis   
Treated by Dr. Patricio at [facility]  
DJD (degenerative joint disease),   
ankle and foot 2016  
DM (diabetes mellitus) (HCC)  
GERD (gastroesophageal reflux   
disease)  
Gross hematuria 05/15/2014  
2014, had hematuria, investigated   
extensively along with cytoscopy,   
a  
stone was found but no signs of   
any malignancy.  
Heel pain, chronic 12/15/2016  
Hepatic steatosis 10/03/2017  
Hiatal hernia 10/03/2017  
Hypertension  
Insomnia  
Kidney stone 05/15/2014  
Morbid obesity (HCC)  
Nephrolithiasis  
Neuritis 2016  
Renal lesion 05/15/2014  
S/P Achilles tendon repair   
2015 sx done  
PAST SURGICAL HISTORY  
Procedure Laterality Date  
BREAST LUMPECTOMY HX Right   
BX BREAST W/DEVICE 1ST LESION   
STEREOTACTIC GUID Right 2021  
CHOLECYSTECTOMY   
gallbladder removal  
COLONOSCOPY   
CT ABDOMEN 2014  
PAST SURGICAL HISTORY OF   
2014  
DANDC hysteroscopy  
PAST SURGICAL HISTORY OF Left   
2015  
Left foot surgery  
S PK LAVH [id number] 10/16/2014  
FAMILY HISTORY  
Problem Relation Age of Onset  
Ovarian cancer Mother 39  
Heart Father  
Hypertension Father  
Prostate Cancer Father 78  
other (kidney stones) Brother  
Hypertension Brother  
Diabetes Brother  
Seizures Son  
Breast Cancer Other 55  
Double first cousin (daughter of   
mother's sister and father's  
brother)  
Social History  
Tobacco Use  
Smoking status: Former  
Packs/day: 1.00  
Years: 10.00  
Pack years: 10.00  
Types: Cigarettes  
Quit date: 5/15/2007  
Years since quitting: 15.9  
Smokeless tobacco: Never  
Vaping Use  
Vaping Use: Never used  
Substance Use Topics  
Alcohol use: No  
Drug use: Not Currently  
Comment: marijuana for insomnia -   
currently not using  
Current Outpatient Medications  
Medication Sig Dispense Refill  
fluconazole (DIFLUCAN) 150 mg   
tablet (Patient not taking:   
Reported on  
2023)  
ciprofloxacin HCl (CIPRO) 500 mg   
tablet Take 1 tablet by mouth   
twice  
daily. 20 tablet 0  
metroNIDAZOLE (FLAGYL) 500 mg   
tablet Take 500 mg by mouth three   
times  
daily. TAKE ONE(2) TABLET TWO(2)   
TIMES DAILY FOR (1) DAY. 0  
potassium chloride 20 mEq TbER   
Take 1 tablet by mouth twice   
daily. 180  
tablet 1  
atenolol (TENORMIN) 50 mg tablet   
Take 1 tablet by mouth once daily.   
90  
tablet 3  
exemestane (AROMASIN) 25 mg tablet   
TAKE 1 TABLET BY MOUTH ONCE DAILY.   
90  
tablet 3  
chlorthalidone (HYGROTON) 25 mg   
tablet Take 0.5 tablets by mouth   
once  
daily. 45 tablet 3  
metFORMIN ER (GLUCOPHAGE XR) 500   
mg 24 hr tablet Take 1 tablet by   
mouth  
daily with breakfast. 180 tablet 3  
glipiZIDE (GLUCOTROL XL) 2.5 mg 24   
hr tablet Take 1 tablet by mouth   
once  
daily. 90 tablet 3  
blood sugar diagnostic (BLOOD   
GLUCOSE TEST) test strip Test   
blood sugars  
2daily. Dx:ucnontrolled diabetes .   
Insulin: Yes 50 Strip 11  
ibuprofen (MOTRIN) 800 mg tablet   
Take 1 tablet by mouth every 8   
hours as  
needed for pain. Take with food.   
45 tablet 1  
ascorbic acid, vitamin C, (VITAMIN   
C) 500 mg tablet Take 500 mg by   
mouth  
once daily.  
lancets (ONE TOUCH DELICA) 33   
gauge misc Test blood sugar(s) 2   
daily.  
Dx: Type 2 DM - Controlled E11.9   
Insulin: Yes 1 Each 11  
Blood-Glucose Meter monitoring kit   
Glucose Meter of Choice - Kit -   
Dx:  
Type 2 DM - Uncontrolled E11.65 1   
Each 0  
MV-MN/FOLIC ACID/CALCIUM/VIT K   
(ONE-A-DAY WOMEN'S 50 PLUS ORAL)   
Take 1  
tablet by mouth once daily.  
No current facility-administered   
medications for this visit.  
ALLERGIES  
Allergen Reactions  
Silvadene [Silver S* Hives  
Sulfa (Sulfonamide * Hives  
No physical exam performed due to   
telephone encounter.  
Assessment  
IMPRESSION  
No evidence of breast cancer from   
biopsy  
PLAN  
Reassurance to patient that there   
is no c (more content not   
included)...                            [facility]  
   
                                                    2023 History of   
Present illness Narrative               Formatting of this note is   
different from the original.  
TELEPHONE FOLLOW-UP ENCOUNTER  
  
Navdeep Guillen [id number]   
has requested a telemedicine   
follow-up visit. Navdeep uGillen   
verbalized informed consent to   
proceed with the telemedicine   
follow-up visit. Navdeep Guillen   
was informed that the details of   
this telephone visit would be   
recorded as part of their   
electronic medical record.  
  
I had a telephone visit with MsMerry Guillen today for follow up of   
right stereotactic breast biopsy   
done at [facility] on 3/28/2023.  
Pathology reveals  fat necrosis,   
focal clustered banal   
microcalcifications, no evidence   
of malignancy   
Patient states that she has no   
problems from her biopsy.  
  
PAST MEDICAL HISTORY  
Diagnosis Date  
Abnormal mammogram 2021  
Achilles tendon pain 2015  
Ankle weakness 2015  
Chronic pain of left ankle   
12/15/2016  
Colon abnormality 2014  
Thickening of the ascending colon   
seen on the recent CT scan.   
Patient has had a colonoscopy   
around 4 years ago. Records were   
obtained for when they were done,   
4 years ago , in [facility]. her colonoscopy was   
completely normal, no thickening,   
and the biopsy too was normal   
except for lymphoid aggregates.  
Diverticulitis   
Treated by Dr. Patricio at [facility]  
DJD (degenerative joint disease),   
ankle and foot 2016  
DM (diabetes mellitus) (HCC)  
GERD (gastroesophageal reflux   
disease)  
Gross hematuria 05/15/2014  
2014, had hematuria, investigated   
extensively along with cytoscopy,   
a stone was found but no signs of   
any malignancy.  
Heel pain, chronic 12/15/2016  
Hepatic steatosis 10/03/2017  
Hiatal hernia 10/03/2017  
Hypertension  
Insomnia  
Kidney stone 05/15/2014  
Morbid obesity (HCC)  
Nephrolithiasis  
Neuritis 2016  
Renal lesion 05/15/2014  
S/P Achilles tendon repair   
2015 sx done  
  
PAST SURGICAL HISTORY  
Procedure Laterality Date  
BREAST LUMPECTOMY HX Right 2017  
BX BREAST W/DEVICE 1ST LESION   
STEREOTACTIC GUID Right 2021  
CHOLECYSTECTOMY   
gallbladder removal  
COLONOSCOPY   
CT ABDOMEN 2014  
PAST SURGICAL HISTORY OF   
2014  
D&C hysteroscopy  
PAST SURGICAL HISTORY OF Left   
2015  
Left foot surgery  
S PK LAVH [id number] 10/16/2014  
  
FAMILY HISTORY  
Problem Relation Age of Onset  
Ovarian cancer Mother 39  
Heart Father  
Hypertension Father  
Prostate Cancer Father 78  
other (kidney stones) Brother  
Hypertension Brother  
Diabetes Brother  
Seizures Son  
Breast Cancer Other 55  
Double first cousin (daughter of   
mother's sister and father's   
brother)  
  
Social History  
  
Tobacco Use  
Smoking status: Former  
Packs/day: 1.00  
Years: 10.00  
Pack years: 10.00  
Types: Cigarettes  
Quit date: 5/15/2007  
Years since quitting: 15.9  
Smokeless tobacco: Never  
Vaping Use  
Vaping Use: Never used  
Substance Use Topics  
Alcohol use: No  
Drug use: Not Currently  
Comment: marijuana for insomnia -   
currently not using  
  
Current Outpatient Medications  
Medication Sig Dispense Refill  
fluconazole (DIFLUCAN) 150 mg   
tablet (Patient not taking:   
Reported on 2023)  
ciprofloxacin HCl (CIPRO) 500 mg   
tablet Take 1 tablet by mouth   
twice daily. 20 tablet 0  
metroNIDAZOLE (FLAGYL) 500 mg   
tablet Take 500 mg by mouth three   
times daily. TAKE ONE(2) TABLET   
TWO(2) TIMES DAILY FOR (1) DAY. 0  
potassium chloride 20 mEq TbER   
Take 1 tablet by mouth twice   
daily. 180 tablet 1  
atenolol (TENORMIN) 50 mg tablet   
Take 1 tablet by mouth once daily.   
90 tablet 3  
exemestane (AROMASIN) 25 mg tablet   
TAKE 1 TABLET BY MOUTH ONCE DAILY.   
90 tablet 3  
chlorthalidone (HYGROTON) 25 mg   
tablet Take 0.5 tablets by mouth   
once daily. 45 tablet 3  
metFORMIN ER (GLUCOPHAGE XR) 500   
mg 24 hr tablet Take 1 tablet by   
mouth daily with breakfast. 180   
tablet 3  
glipiZIDE (GLUCOTROL XL) 2.5 mg 24   
hr tablet Take 1 tablet by mouth   
once daily. 90 tablet 3  
blood sugar diagnostic (BLOOD   
GLUCOSE TEST) test strip Test   
blood sugars 2daily.   
Dx:ucnontrolled diabetes .   
Insulin: Yes 50 Strip 11  
ibuprofen (MOTRIN) 800 mg tablet   
Take 1 tablet by mouth every 8   
hours as needed for pain. Take   
with food. 45 tablet 1  
ascorbic acid, vitamin C, (VITAMIN   
C) 500 mg tablet Take 500 mg by   
mouth once daily.  
lancets (ONE TOUCH DELICA) 33   
gauge misc Test blood sugar(s) 2   
daily. Dx: Type 2 DM - Controlled   
E11.9 Insulin: Yes 1 Each 11  
Blood-Glucose Meter monitoring kit   
Glucose Meter of Choice - Kit -   
Dx: Type 2 DM - Uncontrolled   
E11.65 1 Each 0  
MV-MN/FOLIC ACID/CALCIUM/VIT K   
(ONE-A-DAY WOMEN'S 50 PLUS ORAL)   
Take 1 tablet by mouth once daily.  
  
  
No current facility-administered   
medications for this visit.  
  
ALLERGIES  
Allergen Reactions  
Silvadene [Silver S* Hives  
Sulfa (Sulfonamide * Hives  
  
No physical exam performed due to   
telephone encounter.  
  
Assessment  
IMPRESSION  
No evidence of breast cancer from   
biopsy  
  
PLAN  
Reassurance to patient that there   
is no clinical evidence of breast   
malignancy.  
Patient to continue routine breast   
cancer screening - yearly   
mammograms.  
Patient will continue follow up   
with Hem/onc.  
  
I spent 5 minutes in the telephone   
visit.  
I have confirmed and edited as   
necessary, the PFSH and ROS   
obtained by others.  
  
Unrelated to E/M, telemedicine, or   
virtual visit service provided   
within previous 7 days.  
No E/M service or procedure   
anticipated within next 24 hours.  
  
  
Kajal Dueñas MD  
2023  
14:39 PM  
  
  
  
Electronically signed by Kajal Dueñas MD at 2023 4:16   
PM EDT  
documented in this encounter            [facility]  
   
                                                    2023 Miscellaneous   
Notes                                   Formatting of this note might be   
different from the original.  
Navdeep is scheduled for a provider   
specialty phone call on 2023.  
  
Madisyn Parikh RN  
  
Electronically signed by Madisyn Parikh RN at 2023 2:03   
PM EDT  
Formatting of this note might be   
different from the original.  
[facility] - Pathology [ID   
[id number]]  
Electronically signed by Madisyn Parikh RN at 2023 1:09   
PM EDT  
documented in this encounter            [facility]  
   
                                                    2023 Miscellaneous   
Notes                                   Formatting of this note might be   
different from the original.  
Spoke with patient. Patient was   
driving, unable to check schedule.   
Informed to schedule midday and   
mail reminder. Completed.  
Electronically signed by Shirley Jiménez Pss at 2023 4:06 PM   
EDT  
Formatting of this note might be   
different from the original.  
PSS- OV with Vikki in about 6   
months.  
Electronically signed by Samantha Hunter LPN at 2023 3:54 PM   
EDT  
Formatting of this note might be   
different from the original.  
Left detailed message on   
identified voicemail also sent   
information via my chart.  
  
Yoko Avendano LPN  
  
Electronically signed by Yoko Avendano LPN at 2023   
10:19 AM EDT  
Formatting of this note might be   
different from the original.  
If she is tolerating it then I   
would advise 7 to 10 years of   
therapy. OV with Vikki in about 6   
months.  
  
Francis Shell DO  
Electronically signed by Francis Shell DO at 2023 10:02 AM   
EDT  
Formatting of this note might be   
different from the original.  
Patient called stating biopsy was   
benign and that she has been on   
cancer medication for 6 years,   
asking if she is able to   
discontinue. Please advise.  
Electronically signed by Shirley Jiménez Pss at 2023 8:36 AM   
EDT  
documented in this encounter            [facility]  
   
                                                    2023 Miscellaneous   
Notes                                   Formatting of this note might be   
different from the original.  
Patient returned call and went   
over results, notes from express   
care provider with understanding.  
Electronically signed by Saige Garcia LPN at 2023   
10:17 AM EST  
Formatting of this note might be   
different from the original.  
Left message for pt to call back.  
Tami Tolentino MA  
  
Electronically signed by Tami Tolentino MA at 2023 10:05 AM   
EST  
Formatting of this note might be   
different from the original.  
----- Message from NIC Tubbs.CNP sent at   
3/2/2023 7:18 AM EST -----  
Urine culture did not show clear   
evidence of infection. Recommend   
follow up with PCP to ensure   
hematuria has resolved. Kerrie Don CNP  
  
Electronically signed by Tami Tolentino MA at 2023 10:02 AM   
EST  
documented in this encounter            [facility]  
   
                                        2023 Note     Patient Outreach (RHIANNA BLACKAV)  
----------------------------------  
----------------------------------  
------------  
NAVDEEP GUILLEN ([id number])   
1960 F  
Date Time Provider Department  
3/1/23 STEPHANIE HOSKINS  
During your visit today, we   
recorded the following information   
about you:  
Stephanie Hoskins MA 3/1/2023 9:35 AM   
Signed  
POPULATION HEALTH NAVIGATION   
OUTREACH  
Action/FYI  
Left message on patient's voice   
mail to return my call. [facility]   
message sent.  
Patient is on [facility] for the   
following HM care gaps:  
DILATED RETINAL EXAM  
COLORECTAL CANCER SCREENING  
Advance Directives  
Patient Identified by Name and   
: NO  
Outreach Outcome/Action  
Unable to reach patient: Left   
message  
MyChart message sent  
Did you use a PCP flex slot to   
schedule this appointment?  
N/A  
Reason for Outreach  
Care Gap or Scheduling/Wellness   
visits  
Payer:  
Payor: [facility] MEDICARE / Plan: [facility]   
DUAL COMPLETE HMO SNP / Product   
Type: Medicare  
/  
Care Gap Reviewed::  
Colorectal Cancer Screening  
Diabetic Eye Exam  
Reminder: Reminder note to check   
Health Maintenance for items below  
Health Maintenance items due:  
COVID-19 VACCINE(1) Never done  
PNEUMOCOCCAL(1 - PCV) Never done  
HIV SCREENING Never done  
SHINGRIX VACCINE(1 of 2) Never   
done  
PAP TESTING due on 2019  
HPV TESTING due on 2019  
COLORECTAL CANCER SCREENING due on   
2021  
DILATED RETINAL EXAM due on   
2022  
DEPRESSION ASSESSMENT Never done  
URINE ALBUMIN:CREATININE RATIO due   
on 2023  
Navigation Signature:  
Stephanie Hoskins MA  
2023 7:27 AM  
Allergies As of Date: 2023   
Noted Allergy Reaction  
SILVADENE (SILVER SULFADIAZINE)   
2017 4 - Hives  
SULFA (SULFONAMIDE ANTIBIOTICS)   
2021 4 - Hives  
Date Reviewed: 2023  
Reviewed by: Madisyn Parikh RN   
- Fully Assessed  
Reason for Visit:  
Population Health Navigation   
Outreach [3910] Cmt: [facility] Care Gaps  
Prescriptions as of 2023  
- fluconazole (DIFLUCAN) 150 mg   
tablet  
- ciprofloxacin HCl (CIPRO) 500 mg   
tablet  
Take 1 tablet by mouth twice   
daily.  
- metroNIDAZOLE (FLAGYL) 500 mg   
tablet  
Take 500 mg by mouth three times   
daily. TAKE ONE(2) TABLET TWO(2)   
TIMES DAILY  
FOR (1) DAY.  
- potassium chloride 20 mEq TbER  
Take 1 tablet by mouth twice   
daily.  
- atenolol (TENORMIN) 50 mg tablet  
Take 1 tablet by mouth once daily.  
- exemestane (AROMASIN) 25 mg   
tablet  
TAKE 1 TABLET BY MOUTH ONCE DAILY.  
- chlorthalidone (HYGROTON) 25 mg   
tablet  
Take 0.5 tablets by mouth once   
daily.  
- metFORMIN ER (GLUCOPHAGE XR) 500   
mg 24 hr tablet  
Take 1 tablet by mouth daily with   
breakfast.  
- glipiZIDE (GLUCOTROL XL) 2.5 mg   
24 hr tablet  
Take 1 tablet by mouth once daily.  
- blood sugar diagnostic (BLOOD   
GLUCOSE TEST) test strip  
Test blood sugars 2daily.   
Dx:ucnontrolled diabetes .   
Insulin: Yes  
- ibuprofen (MOTRIN) 800 mg tablet  
Take 1 tablet by mouth every 8   
hours as needed for pain. Take   
with food.  
- ascorbic acid, vitamin C,   
(VITAMIN C) 500 mg tablet  
Take 500 mg by mouth once daily.  
- lancets (ONE TOUCH DELICA) 33   
gauge misc  
Test blood sugar(s) 2 daily. Dx:   
Type 2 DM - Controlled E11.9   
Insulin: Yes  
- Blood-Glucose Meter monitoring   
kit  
Glucose Meter of Choice - Kit -   
Dx: Type 2 DM - Uncontrolled   
E11.65  
- MV-MN/FOLIC ACID/CALCIUM/VIT K   
(ONE-A-DAY WOMEN'S 50 PLUS ORAL)  
Take 1 tablet by mouth once daily.  
Meds Comments as of 2015:  
Patient only took the Etodolac for   
a few days and noticed no   
difference so she  
quit taking this medication.  
Problem List As Of Date 2023   
Noted Resolved  
Morbid obesity with BMI of   
40.0-44.9, adult (HC*2014  
Uterus disorder [N85.9] 2014  
Hypertension [I10] 2014  
Hypercholesterolemia [E78.00]   
2014  
Endometrial hyperplasia without   
atypia, complex*2014  
Uterine prolapse without mention   
of vaginal wal*2014  
Rectocele [N81.6] 2014  
Complex endometrial hyperplasia   
with atypia [N8*2014  
Follow-up examination, following   
other surgery *2015  
Bilateral low back pain with   
left-sided sciatic*2016  
Peroneal tendonitis [M76.70]   
2016  
Controlled diabetes mellitus type   
II without co*2016  
Hyperopia [H52.00] 2016  
Presbyopia [H52.4] 2016  
Benign neoplasm of skin of eyelid   
including can*2016  
Chronic bilateral low back pain   
with left-sided*2016  
Malignant neoplasm of lower-outer   
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  
ER+ MN+ carcinoma of breast (HCC)   
[C50.919, Z17*05/10/2017  
Family history of ovarian cancer   
[Z80.41] 05/10/2017  
GERD (gastroesophageal reflux   
disease) [K21.9]  
Encounter Number: [id number]  
Encounter Status:Closed by STEPHANIE HOSKINS on 3/1/23                        [facility]  
   
                                        2023 Note     HNO ID: [id number]  
Author: Stephanie Hoskins MA  
Service: ?  
Author Type: Medical Assistant  
Type: Progress Notes  
Filed: 3/1/2023 9:35 AM  
Note Text:  
POPULATION HEALTH NAVIGATION   
OUTREACH  
Action/FYI  
Left message on patient's voice   
mail to return my call. MyChart   
message  
sent.  
Patient is on [facility] for the   
following HM care gaps:  
DILATED RETINAL EXAM  
COLORECTAL CANCER SCREENING  
Advance Directives  
Patient Identified by Name and   
: NO  
Outreach Outcome/Action  
Unable to reach patient: Left   
message  
MyChart message sent  
Did you use a PCP flex slot to   
schedule this appointment?  
N/A  
Reason for Outreach  
Care Gap or Scheduling/Wellness   
visits  
Payer:  
Payor: [facility] MEDICARE / Plan: [facility]   
DUAL COMPLETE HMO SNP / Product   
Type:  
Medicare /  
Care Gap Reviewed::  
Colorectal Cancer Screening  
Diabetic Eye Exam  
Reminder: Reminder note to check   
Health Maintenance for items below  
Health Maintenance items due:  
COVID-19 VACCINE(1) Never done  
PNEUMOCOCCAL(1 - PCV) Never done  
HIV SCREENING Never done  
SHINGRIX VACCINE(1 of 2) Never   
done  
PAP TESTING due on 2019  
HPV TESTING due on 2019  
COLORECTAL CANCER SCREENING due on   
2021  
DILATED RETINAL EXAM due on   
2022  
DEPRESSION ASSESSMENT Never done  
URINE ALBUMIN:CREATININE RATIO due   
on 2023  
Navigation Signature:  
Stephanie Hoskins MA  
2023 7:27 AM                   [facility]  
   
                                                    2023 History of   
Present illness Narrative               Formatting of this note is   
different from the original.  
POPULATION HEALTH NAVIGATION   
OUTREACH  
  
Action/FYI  
  
Left message on patient's voice   
mail to return my call. MyChart   
message sent.  
  
Patient is on [facility] for the   
following HM care gaps:  
  
DILATED RETINAL EXAM  
  
COLORECTAL CANCER SCREENING  
  
Advance Directives  
  
  
Patient Identified by Name and   
: NO  
  
Outreach Outcome/Action  
Unable to reach patient: Left   
message  
MyChart message sent  
  
Did you use a PCP flex slot to   
schedule this appointment?  
N/A  
  
Reason for Outreach  
Care Gap or Scheduling/Wellness   
visits  
  
Payer:  
Payor: [facility] MEDICARE / Plan: [facility]   
DUAL COMPLETE HMO SNP / Product   
Type: Medicare /  
  
Care Gap Reviewed::  
Colorectal Cancer Screening  
Diabetic Eye Exam  
  
Reminder: Reminder note to check   
Health Maintenance for items below  
  
Health Maintenance items due:  
COVID-19 VACCINE(1) Never done  
PNEUMOCOCCAL(1 - PCV) Never done  
HIV SCREENING Never done  
SHINGRIX VACCINE(1 of 2) Never   
done  
PAP TESTING due on 2019  
HPV TESTING due on 2019  
COLORECTAL CANCER SCREENING due on   
2021  
DILATED RETINAL EXAM due on   
2022  
DEPRESSION ASSESSMENT Never done  
URINE ALBUMIN:CREATININE RATIO due   
on 2023  
  
Navigation Signature:  
  
Stephanie Hoskins MA  
2023 7:27 AM  
  
  
Electronically signed by Stephanie Hoskins MA at 2023 9:35 AM   
EST  
documented in this encounter            [facility]  
   
                                        2023 Note     HNO ID: [id number]  
Author: Kajal Dueñas MD  
Service: ?  
Author Type: Physician  
Type: Progress Notes  
Filed: 3/2/2023 7:49 AM  
Note Text:  
Navdeep Guillen  
1960  
REFERRING PHYSICIAN: Francis Shell DO  
CHIEF COMPLAINT: Consult (Abnormal   
mammogram)  
HPI: The patient is a 62 year old   
female presents with abnormal   
right  
breast radiographs.  
She has a history of right breast   
cancer. She is s/p right breast  
lumpectomy/SLNBBx and XRT in 2017  
There are pleomorphic scattered   
calcifications that are noted and   
they are  
in the vicinity of the previous   
surgery.  
She denies palpable breast masses.  
She denies nipple discharge.  
She denies chronic pain to the   
right breast, she notes twinges of  
discomfort in the area,   
intermittently.  
She also presents s/p episode of   
acute diverticulitis for which she   
was  
hospitalized at [facility]. I have reviewed the CT   
scan  
findings obtained there. She is   
scheduled for colonoscopy in the   
near  
future. She asks about the disease   
progression of diverticular   
disease  
and why the need for colonscopy.  
PAST MEDICAL HISTORY  
Diagnosis Date  
Abnormal mammogram 2021  
Achilles tendon pain 2015  
Ankle weakness 2015  
Chronic pain of left ankle   
12/15/2016  
Colon abnormality 2014  
Thickening of the ascending colon   
seen on the recent CT scan.   
Patient has  
had a colonoscopy around 4 years   
ago. Records were obtained for   
when they  
were done, 4 years ago , in   
[facility]. her   
colonoscopy was  
completely normal, no thickening,   
and the biopsy too was normal   
except for  
lymphoid aggregates.  
Diverticulitis   
Treated by Dr. Ptaricio at [facility]  
DJD (degenerative joint disease),   
ankle and foot 2016  
DM (diabetes mellitus) (HCC)  
GERD (gastroesophageal reflux   
disease)  
Gross hematuria 05/15/2014  
2014, had hematuria, investigated   
extensively along with cytoscopy,   
a  
stone was found but no signs of   
any malignancy.  
Heel pain, chronic 12/15/2016  
Hepatic steatosis 10/03/2017  
Hiatal hernia 10/03/2017  
Hypertension  
Insomnia  
Kidney stone 05/15/2014  
Morbid obesity (HCC)  
Nephrolithiasis  
Neuritis 2016  
Renal lesion 05/15/2014  
S/P Achilles tendon repair   
2015 sx done  
PAST SURGICAL HISTORY  
Procedure Laterality Date  
BREAST LUMPECTOMY HX Right 2017  
BX BREAST W/DEVICE 1ST LESION   
STEREOTACTIC GUID Right 2021  
CHOLECYSTECTOMY   
gallbladder removal  
COLONOSCOPY   
CT ABDOMEN 2014  
PAST SURGICAL HISTORY OF   
2014  
DANDC hysteroscopy  
PAST SURGICAL HISTORY OF Left   
2015  
Left foot surgery  
S PK LAVH [id number] 10/16/2014  
Current Outpatient Medications  
Medication Sig  
potassium chloride 20 mEq TbER   
Take 1 tablet by mouth twice   
daily.  
atenolol (TENORMIN) 50 mg tablet   
Take 1 tablet by mouth once daily.  
exemestane (AROMASIN) 25 mg tablet   
TAKE 1 TABLET BY MOUTH ONCE DAILY.  
chlorthalidone (HYGROTON) 25 mg   
tablet Take 0.5 tablets by mouth   
once  
daily.  
metFORMIN ER (GLUCOPHAGE XR) 500   
mg 24 hr tablet Take 1 tablet by   
mouth  
daily with breakfast.  
glipiZIDE (GLUCOTROL XL) 2.5 mg 24   
hr tablet Take 1 tablet by mouth   
once  
daily.  
blood sugar diagnostic (BLOOD   
GLUCOSE TEST) test strip Test   
blood sugars  
2daily. Dx:ucnontrolled diabetes .   
Insulin: Yes  
ibuprofen (MOTRIN) 800 mg tablet   
Take 1 tablet by mouth every 8   
hours as  
needed for pain. Take with food.  
lancets (ONE TOUCH DELICA) 33   
gauge misc Test blood sugar(s) 2   
daily.  
Dx: Type 2 DM - Controlled E11.9   
Insulin: Yes  
Blood-Glucose Meter monitoring kit   
Glucose Meter of Choice - Kit -   
Dx:  
Type 2 DM - Uncontrolled E11.65  
MV-MN/FOLIC ACID/CALCIUM/VIT K   
(ONE-A-DAY WOMEN'S 50 PLUS ORAL)   
Take 1  
tablet by mouth once daily.  
fluconazole (DIFLUCAN) 150 mg   
tablet (Patient not taking:   
Reported on  
2023)  
ciprofloxacin HCl (CIPRO) 500 mg   
tablet Take 1 tablet by mouth   
twice  
daily.  
metroNIDAZOLE (FLAGYL) 500 mg   
tablet Take 500 mg by mouth three   
times  
daily. TAKE ONE(2) TABLET TWO(2)   
TIMES DAILY FOR (1) DAY.  
ascorbic acid, vitamin C, (VITAMIN   
C) 500 mg tablet Take 500 mg by   
mouth  
once daily.  
ALLERGIES: Silvadene [Silver   
Sulfadiazine] and Sulfa   
(Sulfonamide  
Antibiotics)  
PERSONAL HISTORY:  
Social History  
Tobacco Use  
Smoking status: Former  
Packs/day: 1.00  
Years: 10.00  
Pack years: 10.00  
Types: Cigarettes  
Quit date: 5/15/2007  
Years since quitting: 15.8  
Smokeless tobacco: Never  
Vaping Use  
Vaping Use: Never used  
Substance Use Topics  
Alcohol use: No  
Drug use: Not Currently  
Comment: marijuana for insomnia -   
currently not using  
FAMILY HISTORY  
Problem Relation Age of Onset  
Ovarian cancer Mother 39  
Heart Father  
Hypertension Father  
Prostate Cancer Father 78  
other (kidney stones) Brother  
Hypertension Brother  
Diabetes Brother  
Seizures Son  
Breast Cancer Other 55  
Double first cousin (daughter of   
mother's sister and father's  
brother)  
The review of systems data was   
entered by the nurse and reviewed   
by me  
David (more content not   
included)...                            [facility]  
   
                                                    2023 History of   
Present illness Narrative               Formatting of this note is   
different from the original.  
Navdeep Guillen  
1960  
  
REFERRING PHYSICIAN: Francis Shell DO  
  
CHIEF COMPLAINT: Consult (Abnormal   
mammogram)  
  
HPI: The patient is a 62 year old   
female presents with abnormal   
right breast radiographs.  
She has a history of right breast   
cancer. She is s/p right breast   
lumpectomy/SLNBBx and XRT in 2017  
There are pleomorphic scattered   
calcifications that are noted and   
they are in the vicinity of the   
previous surgery.  
She denies palpable breast masses.  
She denies nipple discharge.  
She denies chronic pain to the   
right breast, she notes twinges of   
discomfort in the area,   
intermittently.  
  
She also presents s/p episode of   
acute diverticulitis for which she   
was hospitalized at [facility]. I have   
reviewed the CT scan findings   
obtained there. She is scheduled   
for colonoscopy in the near   
future. She asks about the disease   
progression of diverticular   
disease and why the need for   
colonscopy.  
  
PAST MEDICAL HISTORY  
Diagnosis Date  
Abnormal mammogram 2021  
Achilles tendon pain 2015  
Ankle weakness 2015  
Chronic pain of left ankle   
12/15/2016  
Colon abnormality 2014  
Thickening of the ascending colon   
seen on the recent CT scan.   
Patient has had a colonoscopy   
around 4 years ago. Records were   
obtained for when they were done,   
4 years ago , in [facility]. her colonoscopy was   
completely normal, no thickening,   
and the biopsy too was normal   
except for lymphoid aggregates.  
Diverticulitis   
Treated by Dr. Patricio at [facility]  
DJD (degenerative joint disease),   
ankle and foot 2016  
DM (diabetes mellitus) (HCC)  
GERD (gastroesophageal reflux   
disease)  
Gross hematuria 05/15/2014  
2014, had hematuria, investigated   
extensively along with cytoscopy,   
a stone was found but no signs of   
any malignancy.  
Heel pain, chronic 12/15/2016  
Hepatic steatosis 10/03/2017  
Hiatal hernia 10/03/2017  
Hypertension  
Insomnia  
Kidney stone 05/15/2014  
Morbid obesity (HCC)  
Nephrolithiasis  
Neuritis 2016  
Renal lesion 05/15/2014  
S/P Achilles tendon repair   
2015 sx done  
  
  
PAST SURGICAL HISTORY  
Procedure Laterality Date  
BREAST LUMPECTOMY HX Right 2017  
BX BREAST W/DEVICE 1ST LESION   
STEREOTACTIC GUID Right 2021  
CHOLECYSTECTOMY 2011  
gallbladder removal  
COLONOSCOPY   
CT ABDOMEN 2014  
PAST SURGICAL HISTORY OF   
2014  
D&C hysteroscopy  
PAST SURGICAL HISTORY OF Left   
2015  
Left foot surgery  
S PK [facility] [id number] 10/16/2014  
  
Current Outpatient Medications  
Medication Sig  
potassium chloride 20 mEq TbER   
Take 1 tablet by mouth twice   
daily.  
atenolol (TENORMIN) 50 mg tablet   
Take 1 tablet by mouth once daily.  
exemestane (AROMASIN) 25 mg tablet   
TAKE 1 TABLET BY MOUTH ONCE DAILY.  
chlorthalidone (HYGROTON) 25 mg   
tablet Take 0.5 tablets by mouth   
once daily.  
metFORMIN ER (GLUCOPHAGE XR) 500   
mg 24 hr tablet Take 1 tablet by   
mouth daily with breakfast.  
glipiZIDE (GLUCOTROL XL) 2.5 mg 24   
hr tablet Take 1 tablet by mouth   
once daily.  
blood sugar diagnostic (BLOOD   
GLUCOSE TEST) test strip Test   
blood sugars 2daily.   
Dx:ucnontrolled diabetes .   
Insulin: Yes  
ibuprofen (MOTRIN) 800 mg tablet   
Take 1 tablet by mouth every 8   
hours as needed for pain. Take   
with food.  
lancets (ONE TOUCH DELICA) 33   
gauge misc Test blood sugar(s) 2   
daily. Dx: Type 2 DM - Controlled   
E11.9 Insulin: Yes  
Blood-Glucose Meter monitoring kit   
Glucose Meter of Choice - Kit -   
Dx: Type 2 DM - Uncontrolled   
E11.65  
MV-MN/FOLIC ACID/CALCIUM/VIT K   
(ONE-A-DAY WOMEN'S 50 PLUS ORAL)   
Take 1 tablet by mouth once daily.  
  
fluconazole (DIFLUCAN) 150 mg   
tablet (Patient not taking:   
Reported on 2023)  
ciprofloxacin HCl (CIPRO) 500 mg   
tablet Take 1 tablet by mouth   
twice daily.  
metroNIDAZOLE (FLAGYL) 500 mg   
tablet Take 500 mg by mouth three   
times daily. TAKE ONE(2) TABLET   
TWO(2) TIMES DAILY FOR (1) DAY.  
ascorbic acid, vitamin C, (VITAMIN   
C) 500 mg tablet Take 500 mg by   
mouth once daily.  
  
ALLERGIES: Silvadene [Silver   
Sulfadiazine] and Sulfa   
(Sulfonamide Antibiotics)  
  
PERSONAL HISTORY:  
Social History  
  
Tobacco Use  
Smoking status: Former  
Packs/day: 1.00  
Years: 10.00  
Pack years: 10.00  
Types: Cigarettes  
Quit date: 5/15/2007  
Years since quitting: 15.8  
Smokeless tobacco: Never  
Vaping Use  
Vaping Use: Never used  
Substance Use Topics  
Alcohol use: No  
Drug use: Not Currently  
Comment: marijuana for insomnia -   
currently not using  
  
  
FAMILY HISTORY  
Problem Relation Age of Onset  
Ovarian cancer Mother 39  
Heart Father  
Hypertension Father  
Prostate Cancer Father 78  
other (kidney stones) Brother  
Hypertension Brother  
Diabetes Brother  
Seizures Son  
Breast Cancer Other 55  
Double first cousin (daughter of   
mother's sister and father's   
brother)  
  
The review of systems data was   
entered by the nurse and reviewed   
by me  
  
Nursing Notes:  
Madisyn Parikh RN 2023   
12:50 PM Signed  
REVIEW OF SYSTEMS:  
General: The patient denies   
fatigue, denies weight loss,   
denies weight gain, denies feeling   
hot, and denies feelings of cold.  
Eyes: The patient denies glaucoma,   
denies eye injury/surgery, wears   
glasses or contacts.  
Ear/Nose/Throat: The patient NOTES   
allergies, denies hayfever, denies   
ear infections, and denies bloody   
noses.  
Cardiovascular: The patient denies   
chest pain, denies heart disease,   
NOTES high blood pressure,denies   
cardiac stent, denies prior heart   
attack, denies irregular heart   
beat, denies high cholesterol,   
denies poor circulation, denies   
heart failure, other cardiac   
issues, denies claudication,   
denies cold feet, denies   
peripheral arterial stent.  
Respiratory: The patient denies   
tuberculosis, denies pneumonia,   
denies frequent cough, denies   
pulmonary embolism, denies   
shortness of breath, and denies   
coughing up blood.  
Gastrointestinal: The patient   
denies difficulty swallowing,   
denies acid reflux, denies ulcers,   
denies vomiting, denies   
jaundice/hepatitis, NOTES   
gallbladder problems, denies black   
or tarry stools, denies   
hemorrhoids, denies bleeding from   
rectum, NOTES diverticulitis,   
denies constipation, denies   
diarrhea, denies loss of stool   
control, and denies hernias.  
Kidney/Bladder: The patient NOTES   
kidney stones, NOTES urine   
infections, and NOTES bloody   
urine.  
Skin: The patient denies a history   
of skin cancer, denies   
bleeding/changing moles, and NOTES   
a history of skin rash.  
Neurologic: The patient denies a   
history of epilepsy/convulsions,   
denies headaches, denies   
head/spinal injuries, and denies   
stroke/TIA.  
Psychiatric: The patient denies   
psychiatric medications, denies   
depression, and denies voices,   
denies substance abuse.  
Endocrine: The patient denies   
thyroid disorders, NOTES diabetes,   
and denies hormonal problems.  
Hematologic: The patient denies a   
history of bruising, denies   
bleeding, and denies anemia,   
denies blood clots.  
Infections: The patient denies a   
history of measles and mumps,   
denies rheumatic fever, and denies   
sexually transmitted diseases.  
Musculoskeletal: The patient   
denies back pain/injury, denies   
back problems, denies sciatica,   
NOTES knee/foot trouble, NOTES   
arthritis, or denies gout.  
When was patient's last Mammogram   
screening? 23 and 23  
Last Colonoscopy: 2011  
Madisyn Parikh RN  
  
  
PHYSICAL EXAMINATION:  
General: The patient is 62 year   
old female, well nourished, well   
hydrated in no acute distress. The   
patient is oriented to time,   
place, and person.  
VITALS: Blood pressure 118/72,   
pulse 81, temperature 37 C (98.6   
F), height 167.6 cm (5' 6 ),   
weight 131.1 kg (289 lb), SpO2 96   
%. Body mass index is 46.65 kg/m .  
Head - Normocephalic. EOM intact   
with sclera clear and no icterus   
noted.  
Neck - supple with no jugular   
venous distention noted. Trachea   
is midline. No thyroid enlargement   
or thyroid nodules detected. No   
masses noted.  
Chest/breast - no asymmetry of   
breasts noted, no suspicious skin   
lesions noted, - healed incisional   
sites of upper outer quadrant and   
inferior aspect of right breast,   
no nipple discharge and both   
nipples everted, no breast masses   
noted  
Lungs - clear to auscultation.   
Normal breath sounds. No   
rales/rhonchi/wheezing noted. No   
labored breathing noted, such as   
retractions. No cough heard.  
Heart - normal S1 and S2   
auscultated. No   
rubs/clicks/murmurs noted. Regular   
rate.  
Abdomen - soft and benign.   
Difficult to determine if any   
masses or organomegaly due to body   
habitus.  
Extremities - no calf tenderness   
noted. No pitting edema noted.  
Skin - normal skin integrity.  
Lymph - no cervical adenopathy   
detected, no supraclavicular   
adenopathy detected, no axillary   
adenopathy detected  
Neurological - gait normal, no   
focal deficits noted  
Psych - calm and appropriate  
  
RADIOLOGIC STUDIES: As Noted  
  
Assessment  
IMPRESSION: abnormal   
calcifications on right breast   
mammograms, history of right   
breast cancer  
  
PLAN: I have discussed the above   
with the patient.  
I have reviewed the abnormal   
breast radiographs with the   
patient.  
I have offered right stereotactic   
breast biopsy  
I have explained the procedure to   
the patient.  
I have counseled the patient as to   
the risks of the procedure,   
including but not limited to:  
Infection (increased risk due to   
history of surgery and XRT),   
bleeding, injury to any blood   
vessels/nerves, scar tissue, wound   
infections, complications of  
anesthesia, etc. - the patient   
understands.  
The patient wishes to proceed.  
I have also explained diverticular   
disease to the patient and   
indications for colonoscopy   
(findings of wall thickening on CT   
scan). She was concerned about   
requiring surgery and I have   
explained the indications for   
surgery to the patient. She feels   
better informed and more   
comfortable with her course of   
testing.  
The patient acknowledges above.  
  
I have answered all questions to   
the patient s satisfaction and the   
patient has no further questions.  
  
I have confirmed and edited as   
necessary, the PFSH and ROS   
obtained by others.  
  
Consultation requested by Dr. Francis Shell for an opinion regarding   
patient's abnormal breast   
radiographs. My final   
recommendations will be   
communicated back to the   
requesting physician by way of   
shared Medical record or letter to   
requesting physician via [place] mail.  
  
.  
Diagnoses: (R92.1) Calcification   
of right breast on mammography  
(K57.30) Diverticulosis of large   
intestine without perforation or   
abscess without bleeding  
(R93.3) Abnormal CT scan,   
gastrointestinal tract  
  
Return to Clinic: The patient will   
be scheduled for stereotactic   
breast biopsy at [facility].  
  
I spent a total of 41 minutes on   
the date of the service which   
included preparing to see the   
patient with review of any   
pertinent laboratory   
studies/radiological   
imaging/medical records from other   
medical facilities such as [facility], face-to-face   
patient care, obtaining oral   
medical history from the patient   
in this encounter, performing a   
medically appropriate examination,   
counseling and educating the   
patient/family/caregiver, and   
ordering and/or scheduling of   
medications/tests/procedures, and   
completing appropriate medical   
documentation.  
  
_____________________________  
Kajal Dueñas MD  
Electronically signed by Kajal Dueñas MD at 2023 7:49   
AM EST  
documented in this encounter            [facility]  
   
                                        2023 Note     HNO ID: [id number]  
Author: NIC [email or web address].CNP  
Service: ?  
Author Type: Nurse Practitioner  
Type: Progress Notes  
Filed: 2023 11:37 AM  
Note Text:  
CC: Patient presents with:  
Hives: comes and goes x 4 days,   
itching. Pelvic cramping x 2 days  
Patient had extensive antibiotics   
treatment for diverticulitis in   
January.  
Still not completely healed from   
this and patient has a follow up  
colonoscopy scheduled.  
JONA Guillen is a 62 year old   
female who presents with complaint   
of  
possible UTI. These symptoms have   
been present for 2  
days.  
Associated symptoms: pressure   
/crampy feeling  
Denies: burning, fever, chills,   
sweats, abdominal pain, and flank   
pain  
Treatments: nothing  
The ROS was otherwise negative.  
PMH, Medications, labs, allergies,   
and recent past visits with PCP   
were  
reviewed and updated as able.  
PHYSICAL EXAM:  
/64   Pulse 82   Temp 37.2   
?C (98.9 ?F)   Resp 16   Wt 132 kg  
(291 lb)   SpO2 97%   BMI 46.97   
kg/m?  
General: Well appearing and alert  
CV: Regular rate and rhythm   
without obvious murmur  
Lungs: clear to auscultation   
bilaterally  
Back: straight and symmetric  
Abdomen: soft, nontender,   
nondistended  
PAST MEDICAL HISTORY  
Diagnosis Date  
Abnormal mammogram 2021  
Achilles tendon pain 2015  
Ankle weakness 2015  
Chronic pain of left ankle   
12/15/2016  
Colon abnormality 2014  
Thickening of the ascending colon   
seen on the recent CT scan.   
Patient has  
had a colonoscopy around 4 years   
ago. Records were obtained for   
when they  
were done, 4 years ago , in   
[facility]. her   
colonoscopy was  
completely normal, no thickening,   
and the biopsy too was normal   
except for  
lymphoid aggregates.  
DJD (degenerative joint disease),   
ankle and foot 2016  
DM (diabetes mellitus) (HCC)  
GERD (gastroesophageal reflux   
disease)  
Gross hematuria 05/15/2014  
2014, had hematuria, investigated   
extensively along with cytoscopy,   
a  
stone was found but no signs of   
any malignancy.  
Heel pain, chronic 12/15/2016  
Hepatic steatosis 10/03/2017  
Hiatal hernia 10/03/2017  
Hypertension  
Insomnia  
Kidney stone 05/15/2014  
Morbid obesity (HCC)  
Nephrolithiasis  
Neuritis 2016  
Renal lesion 05/15/2014  
S/P Achilles tendon repair   
2015 sx done  
PAST SURGICAL HISTORY  
Procedure Laterality Date  
BREAST LUMPECTOMY HX Right   
BX BREAST W/DEVICE 1ST LESION   
STEREOTACTIC GUID Right 2021  
CHOLECYSTECTOMY   
gallbladder removal  
COLONOSCOPY   
CT ABDOMEN 2014  
PAST SURGICAL HISTORY OF   
2014  
DANGA hysteroscopy  
PAST SURGICAL HISTORY OF Left   
2015  
Left foot surgery  
S [facility] [id number] 10/16/2014  
ALLERGIES Silvadene [Silver   
Sulfadiazine] and Sulfa   
(Sulfonamide  
Antibiotics)  
MEDICATIONS  
atenolol (TENORMIN) 50 mg tablet   
Take 1 tablet by mouth once daily.  
exemestane (AROMASIN) 25 mg tablet   
TAKE 1 TABLET BY MOUTH ONCE DAILY.  
chlorthalidone (HYGROTON) 25 mg   
tablet Take 0.5 tablets by mouth   
once  
daily.  
metFORMIN ER (GLUCOPHAGE XR) 500   
mg 24 hr tablet Take 1 tablet by   
mouth  
daily with breakfast.  
glipiZIDE (GLUCOTROL XL) 2.5 mg 24   
hr tablet Take 1 tablet by mouth   
once  
daily.  
blood sugar diagnostic (BLOOD   
GLUCOSE TEST) test strip Test   
blood sugars  
2daily. Dx:ucnontrolled diabetes .   
Insulin: Yes  
ibuprofen (MOTRIN) 800 mg tablet   
Take 1 tablet by mouth every 8   
hours as  
needed for pain. Take with food.  
lancets (ONE TOUCH DELICA) 33   
gauge misc Test blood sugar(s) 2   
daily.  
Dx: Type 2 DM - Controlled E11.9   
Insulin: Yes  
Blood-Glucose Meter monitoring kit   
Glucose Meter of Choice - Kit -   
Dx:  
Type 2 DM - Uncontrolled E11.65  
MV-MN/FOLIC ACID/CALCIUM/VIT K   
(ONE-A-DAY WOMEN'S 50 PLUS ORAL)   
Take 1  
tablet by mouth once daily.  
fluconazole (DIFLUCAN) 150 mg   
tablet (Patient not taking:   
Reported on  
2023)  
ciprofloxacin HCl (CIPRO) 500 mg   
tablet Take 1 tablet by mouth   
twice  
daily.  
metroNIDAZOLE (FLAGYL) 500 mg   
tablet Take 500 mg by mouth three   
times  
daily. TAKE ONE(2) TABLET TWO(2)   
TIMES DAILY FOR (1) DAY.  
potassium chloride 20 mEq TbER   
Take 1 tablet by mouth twice   
daily.  
ascorbic acid, vitamin C, (VITAMIN   
C) 500 mg tablet Take 500 mg by   
mouth  
once daily.  
FAMILY HISTORY  
Problem Relation Age of Onset  
Ovarian cancer Mother 39  
Heart Father  
Hypertension Father  
Prostate Cancer Father 78  
other (kidney stones) Brother  
Hypertension Brother  
Diabetes Brother  
Seizures Son  
Breast Cancer Other 55  
Double first cousin (daughter of   
mother's sister and father's  
brother)  
Social History  
Tobacco Use  
Smoking status: Former  
Packs/day: 1.00  
Years: 10.00  
Pack years: 10.00  
Types: Cigarettes  
Quit date: 5/15/2007  
Years since quitting: 15.8  
Smokeless tobacco: Never  
Vaping Use  
Vaping Use: Never used  
Substance Use Topics  
Alcohol use: No  
Drug use: Not Currently  
Comment: marijuana for insomnia -   
currently not using  
ASSESSMENT/PLAN:  
1. Pelvic cramping - ICD9: 625.9,   
ICD10: R10.2  
- UA DIP, URINE (POC)  
(more content not included)...          [facility]  
   
                                        2023 Nurse Note Formatting of this  
 note might be   
different from the original.  
REVIEW OF SYSTEMS:  
General: The patient denies   
fatigue, denies weight loss,   
denies weight gain, denies feeling   
hot, and denies feelings of cold.  
Eyes: The patient denies glaucoma,   
denies eye injury/surgery, wears   
glasses or contacts.  
Ear/Nose/Throat: The patient NOTES   
allergies, denies hayfever, denies   
ear infections, and denies bloody   
noses.  
Cardiovascular: The patient denies   
chest pain, denies heart disease,   
NOTES high blood pressure,denies   
cardiac stent, denies prior heart   
attack, denies irregular heart   
beat, denies high cholesterol,   
denies poor circulation, denies   
heart failure, other cardiac   
issues, denies claudication,   
denies cold feet, denies   
peripheral arterial stent.  
Respiratory: The patient denies   
tuberculosis, denies pneumonia,   
denies frequent cough, denies   
pulmonary embolism, denies   
shortness of breath, and denies   
coughing up blood.  
Gastrointestinal: The patient   
denies difficulty swallowing,   
denies acid reflux, denies ulcers,   
denies vomiting, denies   
jaundice/hepatitis, NOTES   
gallbladder problems, denies black   
or tarry stools, denies   
hemorrhoids, denies bleeding from   
rectum, NOTES diverticulitis,   
denies constipation, denies   
diarrhea, denies loss of stool   
control, and denies hernias.  
Kidney/Bladder: The patient NOTES   
kidney stones, NOTES urine   
infections, and NOTES bloody   
urine.  
Skin: The patient denies a history   
of skin cancer, denies   
bleeding/changing moles, and NOTES   
a history of skin rash.  
Neurologic: The patient denies a   
history of epilepsy/convulsions,   
denies headaches, denies   
head/spinal injuries, and denies   
stroke/TIA.  
Psychiatric: The patient denies   
psychiatric medications, denies   
depression, and denies voices,   
denies substance abuse.  
Endocrine: The patient denies   
thyroid disorders, NOTES diabetes,   
and denies hormonal problems.  
Hematologic: The patient denies a   
history of bruising, denies   
bleeding, and denies anemia,   
denies blood clots.  
Infections: The patient denies a   
history of measles and mumps,   
denies rheumatic fever, and denies   
sexually transmitted diseases.  
Musculoskeletal: The patient   
denies back pain/injury, denies   
back problems, denies sciatica,   
NOTES knee/foot trouble, NOTES   
arthritis, or denies gout.  
  
When was patient's last Mammogram   
screening? 23 and 23  
  
Last Colonoscopy: 2011  
  
Madisyn Parikh RN  
Electronically signed by Madisyn Parikh RN at 2023 12:50   
PM EST  
documented in this encounter            [facility]  
   
                                        2023 Note     HNO ID: [id number]  
Author: Karlene Brown  
Service: ?  
Author Type: Technician  
Type: Progress Notes  
Filed: 2023 10:44 AM  
Note Text:  
Radiology Service Progress Note  
PATIENT NAME: Navdeep Guillen  
MRN: [id number]  
DATE OF SERVICE: 2023  
TIME: 10:33 AM  
PATIENT IDENTITY VERIFICATION   
COMPLETED USING TWO (2)   
IDENTIFIERS: Name  
and Date of Birth confirmed by   
patient verbally.  
FALL SCREENING: Has the patient   
had 2 falls in the last year or 1   
fall  
with injury or currently using an   
Ambulatory Assistive Device   
(Walker,  
Cane, Wheelchair, Crutches, etc.)?   
No  
PATIENT GENDER DATA: Female.   
Pregnancy status: Pregnant: No  
Breastfeeding status: NO.  
PATIENT RELEVANT IMPLANT DATA   
REVIEWED: Not Applicable  
RADIOLOGY DEPARTMENT: Mammography  
PERIPHERAL IV DATA: Not applicable  
SIGNED BY: Karlene Brown  
2023 10:33 AM              [facility]  
   
                                        2023 Note     HNO ID: [id number]  
Author: Enrique Ulrich PA-C  
Service: ?  
Author Type: Physician Assistant  
Type: Progress Notes  
Filed: 2023 1:05 PM  
Note Text:  
[facility]OLOG[facility]   
INSTITUTE  
[facility] NOTE  
SERVICE DATE: 2023  
SERVICE TIME: 12:51 PM  
NAME: Navdeep Guillen  
MRN: [id number]  
CHIEF COMPLAINT: Hematuria  
HISTORY OF PRESENT ILLNESS:  
Navdeep Guillen is a 62 year old   
female presenting with 2 episodes   
of  
hematuria , she was found to have   
a UTI with E Coli and was given   
Cipro  
500 mg  
The patient reports UTI improved,   
and has not seen blood in urine   
for more  
than a week. We discussed common   
causes of hematuria and reviewed   
tests  
She had CT and urine culture with   
E coli  
LUTS:  
DYSURIA: yes  
URGENCY: Yes  
FREQUENCY:5 per day  
NOCTURIA: 0 per night  
STRAINING TO VOID: No  
EMPTIES COMPLETELY: Yes  
UTI: 1 past 12 months  
GROSS HEMATURIA: yes  
UA DIPSTICK POSITIVE ONLY: yes  
Other symptoms:  
LABS:  
No results found for: TESTOST  
No results found for: TESTFREE  
No results found for: PSA  
Hematocrit (%)  
Date Value  
2023 45.0  
2023 42.6  
2022 44.1  
2022 45.9  
2021 43.1  
07/15/2020 44.4  
No results found for: PSA  
Creatinine  
Date Value Ref Range Status  
2023 0.84 0.58 - 0.96 mg/dL   
Final  
2023 0.72 0.58 - 0.96 mg/dL   
Final  
2022 0.70 0.58 - 0.96 mg/dL   
Final  
2022 0.74 0.58 - 0.96 mg/dL   
Final  
MEDICATIONS:  
ciprofloxacin HCl (CIPRO) 500 mg   
tablet Take 1 tablet by mouth   
twice  
daily.  
metroNIDAZOLE (FLAGYL) 500 mg   
tablet Take 500 mg by mouth three   
times  
daily. TAKE ONE(2) TABLET TWO(2)   
TIMES DAILY FOR (1) DAY.  
potassium chloride 20 mEq TbER   
Take 1 tablet by mouth twice   
daily.  
atenolol (TENORMIN) 50 mg tablet   
Take 1 tablet by mouth once daily.  
exemestane (AROMASIN) 25 mg tablet   
TAKE 1 TABLET BY MOUTH ONCE DAILY.  
chlorthalidone (HYGROTON) 25 mg   
tablet Take 0.5 tablets by mouth   
once  
daily.  
metFORMIN ER (GLUCOPHAGE XR) 500   
mg 24 hr tablet Take 1 tablet by   
mouth  
daily with breakfast.  
glipiZIDE (GLUCOTROL XL) 2.5 mg 24   
hr tablet Take 1 tablet by mouth   
once  
daily.  
blood sugar diagnostic (BLOOD   
GLUCOSE TEST) test strip Test   
blood sugars  
2daily. Dx:ucnontrolled diabetes .   
Insulin: Yes  
ibuprofen (MOTRIN) 800 mg tablet   
Take 1 tablet by mouth every 8   
hours as  
needed for pain. Take with food.  
lancets (ONE TOUCH DELICA) 33   
gauge misc Test blood sugar(s) 2   
daily.  
Dx: Type 2 DM - Controlled E11.9   
Insulin: Yes  
Blood-Glucose Meter monitoring kit   
Glucose Meter of Choice - Kit -   
Dx:  
Type 2 DM - Uncontrolled E11.65  
MV-MN/FOLIC ACID/CALCIUM/VIT K   
(ONE-A-DAY WOMEN'S 50 PLUS ORAL)   
Take 1  
tablet by mouth once daily.  
fluconazole (DIFLUCAN) 150 mg   
tablet (Patient not taking:   
Reported on  
2023)  
ascorbic acid, vitamin C, (VITAMIN   
C) 500 mg tablet Take 500 mg by   
mouth  
once daily.  
PAST MEDICAL HISTORY:  
PAST MEDICAL HISTORY  
Diagnosis Date  
Abnormal mammogram 2021  
Achilles tendon pain 2015  
Ankle weakness 2015  
Chronic pain of left ankle   
12/15/2016  
Colon abnormality 2014  
Thickening of the ascending colon   
seen on the recent CT scan.   
Patient has  
had a colonoscopy around 4 years   
ago. Records were obtained for   
when they  
were done, 4 years ago , in   
[facility]. her   
colonoscopy was  
completely normal, no thickening,   
and the biopsy too was normal   
except for  
lymphoid aggregates.  
DJD (degenerative joint disease),   
ankle and foot 2016  
DM (diabetes mellitus) (HCC)  
GERD (gastroesophageal reflux   
disease)  
Gross hematuria 05/15/2014  
2014, had hematuria, investigated   
extensively along with cytoscopy,   
a  
stone was found but no signs of   
any malignancy.  
Heel pain, chronic 12/15/2016  
Hepatic steatosis 10/03/2017  
Hiatal hernia 10/03/2017  
Hypertension  
Insomnia  
Kidney stone 05/15/2014  
Morbid obesity (HCC)  
Nephrolithiasis  
Neuritis 2016  
Renal lesion 05/15/2014  
S/P Achilles tendon repair   
2015 sx done  
PAST SURGICAL HISTORY:  
PAST SURGICAL HISTORY  
Procedure Laterality Date  
BREAST LUMPECTOMY HX Right 2017  
BX BREAST W/DEVICE 1ST LESION   
STEREOTACTIC GUID Right 2021  
CHOLECYSTECTOMY   
gallbladder removal  
COLONOSCOPY   
CT ABDOMEN 2014  
PAST SURGICAL HISTORY OF   
2014  
[facility] hysteroscopy  
PAST SURGICAL HISTORY OF Left   
2015  
Left foot surgery  
S PK LAVH [id number] 10/16/2014  
FAMILY HISTORY:  
FAMILY HISTORY  
Problem Relation Age of Onset  
Ovarian cancer Mother 39  
Heart Father  
Hypertension Father  
Prostate Cancer Father 78  
other (kidney stones) Brother  
Hypertension Brother  
Diabetes Brother  
Seizures Son  
Breast Cancer Other 55  
Double first cousin (daughter of   
mother's sister and father's  
brother)  
SOCIAL HISTORY:  
Social Connections: Socially   
Isolated  
Frequency of Communication with   
Friends and Family: More than   
three  
times a week  
Frequency of Social Gatherings   
with Friends and Family: On (more   
content not included)...                [facility]  
   
                                        2023 Note     HNO ID: [id number]  
Author: Shayna Grady LPN  
Service: ?  
Author Type: ?  
Type: Progress Notes  
Filed: 2023 1:05 PM  
Note Text:  
Verified name and date of birth.  
CC Post Void Residual  
HPI:  
Navdeep Guillen is a 62 year old   
female.  
The patient is here now for an   
appointment with Enrique Ulrich, SHANE,  
MT, PA-JHOAN.  
Procedure:  
Explained procedure to patient and   
verbalizes understanding.   
Performed a  
PVR.  
Patient urinated and instructed to   
empty bladder as much as possible   
just  
prior to having PVR done using   
bladder ultrasound scanner.  
Results of scan: 0 mL  
The patient tolerated the   
procedure well.  
Plan:  
Appointment with Enrique.               [facility]  
   
                                                    2023 History of   
Present illness Narrative               Formatting of this note is   
different from the original.  
Images from the original note were   
not included.  
  
[facility]   
INSTITUTE  
[place] [facility]  
NEW PATIENT CLINIC NOTE  
  
  
SERVICE DATE: 2023  
SERVICE TIME: 12:51 PM  
NAME: Navdeep Guillen  
MRN: [id number]  
  
CHIEF COMPLAINT: Hematuria  
  
HISTORY OF PRESENT ILLNESS:  
Navdeep Guillen is a 62 year old   
female presenting with 2 episodes   
of hematuria , she was found to   
have a UTI with E Coli and was   
given Cipro 500 mg  
The patient reports UTI improved,   
and has not seen blood in urine   
for more than a week. We discussed   
common causes of hematuria and   
reviewed tests  
She had CT and urine culture with   
E coli  
  
LUTS:  
DYSURIA: yes  
URGENCY: Yes  
FREQUENCY:5 per day  
NOCTURIA: 0 per night  
STRAINING TO VOID: No  
EMPTIES COMPLETELY: Yes  
UTI: 1 past 12 months  
GROSS HEMATURIA: yes  
UA DIPSTICK POSITIVE ONLY: yes  
  
Other symptoms:  
  
LABS:  
No results found for: TESTOST  
No results found for: TESTFREE  
No results found for: PSA  
Hematocrit (%)  
Date Value  
2023 45.0  
2023 42.6  
2022 44.1  
2022 45.9  
2021 43.1  
07/15/2020 44.4  
  
No results found for: PSA  
  
Creatinine  
Date Value Ref Range Status  
2023 0.84 0.58 - 0.96 mg/dL   
Final  
2023 0.72 0.58 - 0.96 mg/dL   
Final  
2022 0.70 0.58 - 0.96 mg/dL   
Final  
2022 0.74 0.58 - 0.96 mg/dL   
Final  
  
  
MEDICATIONS:  
  
ciprofloxacin HCl (CIPRO) 500 mg   
tablet Take 1 tablet by mouth   
twice daily.  
metroNIDAZOLE (FLAGYL) 500 mg   
tablet Take 500 mg by mouth three   
times daily. TAKE ONE(2) TABLET   
TWO(2) TIMES DAILY FOR (1) DAY.  
potassium chloride 20 mEq TbER   
Take 1 tablet by mouth twice   
daily.  
atenolol (TENORMIN) 50 mg tablet   
Take 1 tablet by mouth once daily.  
exemestane (AROMASIN) 25 mg tablet   
TAKE 1 TABLET BY MOUTH ONCE DAILY.  
chlorthalidone (HYGROTON) 25 mg   
tablet Take 0.5 tablets by mouth   
once daily.  
metFORMIN ER (GLUCOPHAGE XR) 500   
mg 24 hr tablet Take 1 tablet by   
mouth daily with breakfast.  
glipiZIDE (GLUCOTROL XL) 2.5 mg 24   
hr tablet Take 1 tablet by mouth   
once daily.  
blood sugar diagnostic (BLOOD   
GLUCOSE TEST) test strip Test   
blood sugars 2daily.   
Dx:ucnontrolled diabetes .   
Insulin: Yes  
ibuprofen (MOTRIN) 800 mg tablet   
Take 1 tablet by mouth every 8   
hours as needed for pain. Take   
with food.  
lancets (ONE TOUCH DELICA) 33   
gauge misc Test blood sugar(s) 2   
daily. Dx: Type 2 DM - Controlled   
E11.9 Insulin: Yes  
Blood-Glucose Meter monitoring kit   
Glucose Meter of Choice - Kit -   
Dx: Type 2 DM - Uncontrolled   
E11.65  
MV-MN/FOLIC ACID/CALCIUM/VIT K   
(ONE-A-DAY WOMEN'S 50 PLUS ORAL)   
Take 1 tablet by mouth once daily.  
  
fluconazole (DIFLUCAN) 150 mg   
tablet (Patient not taking:   
Reported on 2023)  
ascorbic acid, vitamin C, (VITAMIN   
C) 500 mg tablet Take 500 mg by   
mouth once daily.  
  
PAST MEDICAL HISTORY:  
  
PAST MEDICAL HISTORY  
Diagnosis Date  
Abnormal mammogram 2021  
Achilles tendon pain 2015  
Ankle weakness 2015  
Chronic pain of left ankle   
12/15/2016  
Colon abnormality 2014  
Thickening of the ascending colon   
seen on the recent CT scan.   
Patient has had a colonoscopy   
around 4 years ago. Records were   
obtained for when they were done,   
4 years ago , in [facility]. her colonoscopy was   
completely normal, no thickening,   
and the biopsy too was normal   
except for lymphoid aggregates.  
DJD (degenerative joint disease),   
ankle and foot 2016  
DM (diabetes mellitus) (HCC)  
GERD (gastroesophageal reflux   
disease)  
Gross hematuria 05/15/2014  
2014, had hematuria, investigated   
extensively along with cytoscopy,   
a stone was found but no signs of   
any malignancy.  
Heel pain, chronic 12/15/2016  
Hepatic steatosis 10/03/2017  
Hiatal hernia 10/03/2017  
Hypertension  
Insomnia  
Kidney stone 05/15/2014  
Morbid obesity (HCC)  
Nephrolithiasis  
Neuritis 2016  
Renal lesion 05/15/2014  
S/P Achilles tendon repair   
2015 sx done  
  
PAST SURGICAL HISTORY:  
  
PAST SURGICAL HISTORY  
Procedure Laterality Date  
BREAST LUMPECTOMY HX Right   
BX BREAST W/DEVICE 1ST LESION   
STEREOTACTIC GUID Right 2021  
CHOLECYSTECTOMY   
gallbladder removal  
COLONOSCOPY   
CT ABDOMEN 2014  
PAST SURGICAL HISTORY OF   
2014  
[facility] hysteroscopy  
PAST SURGICAL HISTORY OF Left   
2015  
Left foot surgery  
S PK LAVH [id number] 10/16/2014  
  
FAMILY HISTORY:  
  
FAMILY HISTORY  
Problem Relation Age of Onset  
Ovarian cancer Mother 39  
Heart Father  
Hypertension Father  
Prostate Cancer Father 78  
other (kidney stones) Brother  
Hypertension Brother  
Diabetes Brother  
Seizures Son  
Breast Cancer Other 55  
Double first cousin (daughter of   
mother's sister and father's   
brother)  
  
SOCIAL HISTORY:  
Social Connections: Socially   
Isolated  
Frequency of Communication with   
Friends and Family: More than   
three times a week  
Frequency of Social Gatherings   
with Friends and Family: Once a   
week  
Attends [employer] Services: Never  
Active Member of Clubs or   
Organizations: No  
Attends Club or Organization   
Meetings: Never  
Marital Status:   
  
REVIEW OF SYSTEMS:  
GENERAL: No fever, chills, weight   
loss, or fatigue.  
ENMT: Negative  
CARDIOVASCULAR:NO CHEST PAIN,   
PALPITATIONS, ANKLE EDEMA  
RESPIRATORY: No chronic cough,   
wheezing, dyspnea, hemoptysis.  
GENITOURINARY: SEE HPI  
MUSCULOSKELETAL:NO CHRONIC BACK   
PAIN, ARTHRITIS, CHRONIC NECK PAIN  
SKIN: NO VARICOSE VEINS, RASH,   
ABNORMAL ITCHING  
HEME/LYMPH/IMMUNE:Negative for   
prolonged bleeding, bruising   
easily or swollen nodes  
NEUROLOGICAL: NO HEADACHES,   
NUMBNESS, SEIZURES, STROKE  
DIABETES: Yes  
All other systems reviewed and are   
negative  
  
PHYSICAL EXAMINATION:  
Blood pressure 110/80, pulse 86,   
temperature 36.5 C (97.7 F),   
temperature source Temporal,   
height 167.6 cm (5' 6 ), weight   
132.5 kg (292 lb), SpO2 98 %.  
GENERAL: WNL nutrition, no   
deformities, healthy appearing  
NEURO: Awake, alert and oriented x   
3 and Normal gait  
PSYCH: No signs of depression,   
anxiety, or agitation  
ENMT (Ear, Nose, Mouth, Throat):   
No masses, adenopathy, icterus.   
Thyroid nonpalpable  
RESP: NL effort, no retractions or   
purse-lip breathing.  
CV: No extremity swelling,   
varices, edema, pallor, erythema  
GASTROINTESTINAL: Soft, nontender,   
nondistended, no masses.  
HERNIAS: None  
SKIN: No rash, lesions  
No palpable lymphadenopathy  
MUSCULOSKELETAL: Extremities   
normal. No deformities, edema,   
clubbing or skin discoloration.  
  
PROBLEM LIST REVIEW:  
Yes  
  
LABS:  
  
Results for orders placed or   
performed in visit on 23  
UA DIP, URINE (POC)  
Result Value Ref Range  
GLUCOSE UA (POCT) Negative   
Negative mg/dL  
BILIRUBIN UA (POCT) Negative   
Negative  
KETONE UA (POCT) Negative Negative   
mg/dL  
SPECIFIC GRAVITY UA (POCT) 1.020   
1.005 - 1.030  
HEMOGLOBIN/BLOOD UA (POCT) Small   
(A) Negative  
PH UA (POCT) 7.0 4.5 - 8.0  
PROTEIN UA (POCT) 30 (A) Negative   
mg/dL  
UROBILINOGEN UA (POCT) 0.2 Normal   
E.U./dL  
NITRITE UA (POCT) Negative   
Negative  
LEUKOCYTES UA (POCT) Large (A)   
Negative  
COLOR UA (POCT) Yellow  
CLARITY UA (POCT) Clear  
  
Urine Culture: Pending  
  
PROCEDURES:  
  
PVR: 0 ml  
  
IMAGING:  
  
CT Urogram - Scanned into Epic -   
[facility]  
> Normal Kidney and bladder  
  
IMPRESSION/PLAN:  
62 year old female with  
1. Gross hematuria - ICD9: 599.71,   
ICD10: R31.0  
  
> Urine culture today  
> Finish Cipro and Flagyl  
  
I spent a total of 30 minutes on   
the date of the service which   
included preparing to see the   
patient, face to face patient   
care, completing clinical   
documentation, obtaining and/or   
reviewing separately obtained   
history, performing a medically   
appropriate examination,   
counseling and educating the   
patient/family/caregiver, ordering   
medications, tests, or procedures,   
and care coordination.  
  
SHANE Delaney MT, PA-C  
  
  
Electronically signed by Enrique Ulrich PA-C at 2023 1:05 PM   
EST  
Formatting of this note might be   
different from the original.  
Verified name and date of birth.  
  
CC Post Void Residual  
  
HPI:  
Navdeep Guillen is a 62 year old   
female.  
The patient is here now for an   
appointment with Enrique E.   
Ulrich, MPAS, MT, PA-COV.  
  
Procedure:  
Explained procedure to patient and   
verbalizes understanding.   
Performed a PVR.  
Patient urinated and instructed to   
empty bladder as much as possible   
just prior to having PVR done   
using bladder ultrasound scanner.  
Results of scan: 0 mL  
  
The patient tolerated the   
procedure well.  
  
Plan:  
Appointment with Enrique.  
  
  
Electronically signed by Shayna Grady LPN at 2023 1:05 PM EST  
documented in this encounter            [facility]  
   
                                                    2023 Miscellaneous   
Notes                                   Formatting of this note might be   
different from the original.  
Called pt and warmed transferred   
her as directed by scheduling tree   
to [phone]  
Electronically signed by Michelle Vaerla at 2023 1:48 PM EST  
Formatting of this note might be   
different from the original.  
PSS- please reach out to schedule   
with urology at [place] and notify   
patient.  
  
Samantha Hunter LPN  
  
Electronically signed by Samantha Hunter LPN at 2023 1:23 PM   
EST  
Addended by: FRANCIS SHELL on:   
2023 01:18 PM  
  
Modules accepted: Orders  
  
  
Electronically signed by Francis Shell DO at 2023 1:18 PM   
EST  
Formatting of this note might be   
different from the original.  
Any of the urologists at [place].  
  
Francis Shell DO  
Electronically signed by Francis Shell DO at 2023 1:18 PM   
EST  
Formatting of this note might be   
different from the original.  
Dr. Dejesus's office called to say   
they do not accept patient's   
insurance (Medicaid).  
  
Who would you like patient   
referred to instead?  
  
Samantha Hunter LPN  
  
Electronically signed by Samantha Hunter LPN at 2023 1:14 PM   
EST  
Formatting of this note might be   
different from the original.  
Patient is aware of all   
information and verbalized   
understanding.  
  
Referral faxed to Dr. Dejesus's   
office asking them to reach out to   
patient to schedule.  
  
Samantha Hunter LPN  
  
Electronically signed by Samantha Hunter LPN at 2023 11:34 AM   
EST  
Formatting of this note might be   
different from the original.  
Let her know that I reviewed her   
hospital records. As I discussed   
during the OV I recommend urology   
evaluation for the gross hematuria   
she had. Please make a referral to   
Dr. Dejesus at [facility]. Can let her   
know I talked with Dr. Patricio   
who concurs. Keep appointment with   
Dr. Patricio as well.  
  
Francis Shell DO  
Electronically signed by Francis Shell DO at 2023 11:11 AM   
EST  
documented in this encounter            [facility]  
   
                                        2023 Note     HNO ID: [id number]  
Author: Francis Shell DO  
Service: ?  
Author Type: Physician  
Type: Progress Notes  
Filed: 2023 11:10 AM  
Note Text:  
Per Dr. Milner's most recent OV note.   
Personally reviewed and updated by  
me.DIAGNOSIS: Breast cancer  
HPI: The patient is a 62 year-old   
female with history of   
hypertension and  
borderline diabetes/insulin   
resistant, obesity, who presented   
with an  
abnormal breast exam at her PCP   
office. Subsequent diagnostic   
mammogram  
revealing a lobulated lesion in   
the right breast at the lower   
outer  
quadrant highly suspicious of   
malignancy.  
Patient had a mammotome breast   
biopsy on 3/27/17 that showed   
invasive  
ductal carcinoma, positive for   
estrogen and progesterone   
receptors,  
equivocal for overexpression   
HER-2/andi.  
She had surgery by Dr. Dueñas, right   
breast lumpectomy and right   
axillary  
sentinel lymph node biopsy on   
17 for right breast cancer.  
Stage IA- pT1c pN0 (3/3 sentinel   
lymph nodes negative for   
metastatic  
disease)  
Tumor size 1.5 cm x 1 x 0.9 cm  
Overall grade 2 out of 7  
Margins - 0.4 cm from posterior   
margin  
ER/MN >95%, [id number] not amplified   
by FISH  
Lymph/vasc invasion - none;   
derm/vasc/lymph invasion - none  
Menstrual history: Menarche at age   
11, first pregnancy at age 18, 8  
pregnancy; surgical   
menopause-total abdominal   
hysterectomy age 54. No  
estrogen replacement therapy.   
Family history: Mother  of   
ovarian  
cancer in her 40's. No family   
history of breast cancer. She was   
initially  
started on anastrozole, then   
subsequent switch to Aromasin   
because of  
joint pain.  
Current treatment:  
1) Aromasin 25 mg once daily  
Interim history:  
Tolerating Aromasin well.  
Occasional ache in hands.  
Arthritis pain chronic both knees.  
Currently on antibiotics for   
diverticulitis and UTI. Went to ED   
with  
gross hematuria with clots. Was   
also having pain LLQ. Wasn't   
clearly  
having dysuria. Had no fever. CT   
of the abdomen pelvis on 2023  
demonstrated numerous diverticula   
projecting from the descending and  
sigmoid colon. Along the segment   
of the colon extending from the   
distal  
descending through the mid to   
distal sigmoid showed mild to   
moderate mural  
thickening with extensive   
pericolonic stranding. There is a   
broad band of  
soft tissue density containing air   
bubbles extending anteriorly and  
inferiorly from the proximal   
sigmoid colon indicating   
microperforation.  
No fluid collection was observed.   
Urinary bladder was noted to tilt  
toward the soft tissue density but   
no air bubbles were observed   
within the  
bladder to indicate fistula.   
However the bladder was noted to   
be nearly  
empty and the bladder wall was   
thickened with perivesicular   
stranding  
which was attributed to either   
cystitis and/or reactive edema   
from  
adjacent sigmoid inflammation.  
Urine culture demonstrated   
11-25,000 CFU's presumptive E.   
coli.  
2 sets of blood cultures drawn   
 remain negative through 5   
days.  
Gross hematuria resolved and pain   
subsided.  
PMH, medications and allergies   
personally reviewed by me today.   
Any  
changes documented in appropriate   
section.  
ROS:  
Constitutional: Denies episodes of   
fever and night sweats.  
Neuro: Denies HA, vertigo,   
dizziness and imbalance. Denies   
symptoms of  
neuropathy.  
HEENT: No recent change in voice,   
vision or hearing.  
Resp: Denies cough, wheeze and   
hemoptysis. Denies shortness of   
breath at  
rest. Denies DUBOIS.  
CVS: Denies exertional chest pain,   
PND, orthopnea and LE edema.  
GI: Denies dysgeusia. Denies   
symptoms of stomatitis. Denies   
dysphagia and  
odynophagia. Some nausea due to   
antibiotics.  
: See above.  
Endo: Denies hot flashes. Denies   
polyuria and polydipsia. Denies   
heat and  
cold intolerance.  
Musculoskeletal: See above.  
Derm: Denies rash. Denies jaundice   
and diffuse pruritis.  
Heme: Denies unusual bleeding and   
unexplained bruising.  
Psych: Normal mood.  
PHYSICAL EXAM:  
Vitals: Blood pressure 115/70,   
pulse 81, temperature 36.8 ?C   
(98.2 ?F),  
height 167 cm (5' 5.75 ), weight   
132.2 kg (291 lb 8 oz), SpO2 98 %.  
Well-appearing and in no acute   
distress.  
EYES: Sclerae are anicteric   
bilaterally.  
LYMPHATIC: There is no palpable   
cervical, supraclavicular or   
axillary  
adenopathy.  
RESPIRATORY: Inspiratory breath   
sounds are of normal intensity in   
all  
fields. No rales, wheezes or   
rhonchi.  
CARDIOVASCULAR: Rhythm is regular.  
BREAST: Declined chaperone. There   
is some contraction of the right   
breast  
secondary to radiation. In the   
lower inner quadrant there is   
chronic  
swelling/edema of the skin. No   
discrete nodule appreciated in the   
breast  
tissue itself although palpatory   
exam is difficult secondary to   
radiation  
changes. Left breast no concerning   
mass or nodule.  
ABDOMEN: The abdomen is   
nondistended.  
Extremities: No swelling or edema.  
SKIN: No jaundice or rash. No   
petechiae.  
NEUROLOGIC: CNs II-XII are grossly   
intact.  
LABS:  
Component Latest Ref Rng AND Units   
2023  
WBC 3.70 - 11.00 k/uL 11.42 (H)  
RBC 3.90 - 5.20 m/uL 5.56 (H)  
Hemoglobin 11.5 - 15. (more   
content not included)...                [facility]  
   
                                                    2023 History of   
Present illness Narrative               Formatting of this note is   
different from the original.  
Per Dr. Milner's most recent OV note.   
Personally reviewed and updated by   
me.DIAGNOSIS: Breast cancer  
  
HPI: The patient is a 62 year-old   
female with history of   
hypertension and borderline   
diabetes/insulin resistant,   
obesity, who presented with an   
abnormal breast exam at her PCP   
office. Subsequent diagnostic   
mammogram revealing a lobulated   
lesion in the right breast at the   
lower outer quadrant highly   
suspicious of malignancy.  
  
Patient had a mammotome breast   
biopsy on 3/27/17 that showed   
invasive ductal carcinoma,   
positive for estrogen and   
progesterone receptors, equivocal   
for overexpression HER-2/andi.  
  
She had surgery by Dr. Dueñas, right   
breast lumpectomy and right   
axillary sentinel lymph node   
biopsy on 17 for right breast   
cancer.  
  
Stage IA- pT1c pN0 (3/3 sentinel   
lymph nodes negative for   
metastatic disease)  
Tumor size 1.5 cm x 1 x 0.9 cm  
Overall grade 2 out of 7  
Margins - 0.4 cm from posterior   
margin  
ER/MN >95%, [id number] not amplified   
by FISH  
Lymph/vasc invasion - none;   
derm/vasc/lymph invasion - none  
  
Menstrual history: Menarche at age   
11, first pregnancy at age 18, 8   
pregnancy; surgical   
menopause-total abdominal   
hysterectomy age 54. No estrogen   
replacement therapy. Family   
history: Mother  of ovarian   
cancer in her 40's. No family   
history of breast cancer. She was   
initially started on anastrozole,   
then subsequent switch to Aromasin   
because of joint pain.  
  
Current treatment:  
1) Aromasin 25 mg once daily  
  
Interim history:  
Tolerating Aromasin well.  
Occasional ache in hands.  
Arthritis pain chronic both knees.  
  
Currently on antibiotics for   
diverticulitis and UTI. Went to ED   
with gross hematuria with clots.   
Was also having pain LLQ. Wasn't   
clearly having dysuria. Had no   
fever. CT of the abdomen pelvis on   
2023 demonstrated numerous   
diverticula projecting from the   
descending and sigmoid colon.   
Along the segment of the colon   
extending from the distal   
descending through the mid to   
distal sigmoid showed mild to   
moderate mural thickening with   
extensive pericolonic stranding.   
There is a broad band of soft   
tissue density containing air   
bubbles extending anteriorly and   
inferiorly from the proximal   
sigmoid colon indicating   
microperforation. No fluid   
collection was observed. Urinary   
bladder was noted to tilt toward   
the soft tissue density but no air   
bubbles were observed within the   
bladder to indicate fistula.   
However the bladder was noted to   
be nearly empty and the bladder   
wall was thickened with   
perivesicular stranding which was   
attributed to either cystitis   
and/or reactive edema from   
adjacent sigmoid inflammation.  
  
Urine culture demonstrated   
11-25,000 CFU's presumptive E.   
coli.  
  
2 sets of blood cultures drawn   
 remain negative through 5   
days.  
  
Gross hematuria resolved and pain   
subsided.  
  
PMH, medications and allergies   
personally reviewed by me today.   
Any changes documented in   
appropriate section.  
  
ROS:  
Constitutional: Denies episodes of   
fever and night sweats.  
Neuro: Denies HA, vertigo,   
dizziness and imbalance. Denies   
symptoms of neuropathy.  
HEENT: No recent change in voice,   
vision or hearing.  
Resp: Denies cough, wheeze and   
hemoptysis. Denies shortness of   
breath at rest. Denies DUBOIS.  
CVS: Denies exertional chest pain,   
PND, orthopnea and LE edema.  
GI: Denies dysgeusia. Denies   
symptoms of stomatitis. Denies   
dysphagia and odynophagia. Some   
nausea due to antibiotics.  
: See above.  
Endo: Denies hot flashes. Denies   
polyuria and polydipsia. Denies   
heat and cold intolerance.  
Musculoskeletal: See above.  
Derm: Denies rash. Denies jaundice   
and diffuse pruritis.  
Heme: Denies unusual bleeding and   
unexplained bruising.  
Psych: Normal mood.  
  
PHYSICAL EXAM:  
Vitals: Blood pressure 115/70,   
pulse 81, temperature 36.8 C (98.2   
F), height 167 cm (5' 5.75 ),   
weight 132.2 kg (291 lb 8 oz),   
SpO2 98 %.  
Well-appearing and in no acute   
distress.  
EYES: Sclerae are anicteric   
bilaterally.  
LYMPHATIC: There is no palpable   
cervical, supraclavicular or   
axillary adenopathy.  
RESPIRATORY: Inspiratory breath   
sounds are of normal intensity in   
all fields. No rales, wheezes or   
rhonchi.  
CARDIOVASCULAR: Rhythm is regular.  
BREAST: Declined chaperone. There   
is some contraction of the right   
breast secondary to radiation. In   
the lower inner quadrant there is   
chronic swelling/edema of the   
skin. No discrete nodule   
appreciated in the breast tissue   
itself although palpatory exam is   
difficult secondary to radiation   
changes. Left breast no concerning   
mass or nodule.  
ABDOMEN: The abdomen is   
nondistended.  
Extremities: No swelling or edema.  
SKIN: No jaundice or rash. No   
petechiae.  
NEUROLOGIC: CNs II-XII are grossly   
intact.  
  
LABS:  
Component Latest Ref Rng & Units   
2023  
WBC 3.70 - 11.00 k/uL 11.42 (H)  
RBC 3.90 - 5.20 m/uL 5.56 (H)  
Hemoglobin 11.5 - 15.5 g/dL 14.7  
Hematocrit 36.0 - 46.0 % 45.0  
MCV 80.0 - 100.0 fL 80.9  
MCH 26.0 - 34.0 pg 26.4  
MCHC 30.5 - 36.0 g/dL 32.7  
RDW-CV 11.5 - 15.0 % 17.6 (H)  
Platelet Count 150 - 400 k/uL 531   
(H)  
MPV 9.0 - 12.7 fL 8.9 (L)  
Neut% % 71.6  
Abs Neut (ANC) 1.45 - 7.50 k/uL   
8.17 (H)  
Lymph% % 17.4  
Abs Lymph 1.00 - 4.00 k/uL 1.99  
Mono% % 6.1  
Abs Mono <0.87 k/uL 0.70  
Eosin% % 3.4  
Abs Eosin <0.46 k/uL 0.39  
Baso% % 0.5  
Abs Baso <0.11 k/uL 0.06  
Immature Gran % % 1.0  
IMMATURE GRANS (ABS) <0.10 k/uL   
0.11 (H)  
NRBC /100 WBC 0.0  
Absolute nRBC <0.01 k/uL <0.01  
DTYPE Auto  
Protein, Total 6.3 - 8.0 g/dL 7.3  
Albumin 3.9 - 4.9 g/dL 3.7 (L)  
Calcium 8.5 - 10.2 mg/dL 9.5  
Bilirubin, Total 0.2 - 1.3 mg/dL   
0.3  
Alkaline Phosphatase 34 - 123 U/L   
70  
AST 13 - 35 U/L 40 (H)  
ALT 7 - 38 U/L 47 (H)  
Glucose 74 - 99 mg/dL 225 (H)  
BUN 7 - 21 mg/dL 9  
Creatinine 0.58 - 0.96 mg/dL 0.84  
Sodium 136 - 144 mmol/L 137  
Potassium 3.7 - 5.1 mmol/L 3.9  
Chloride 97 - 105 mmol/L 101  
CO2 22 - 30 mmol/L 25  
Anion Gap 9 - 18 mmol/L 11  
eGFR >=60 mL/min/1.73m 78  
  
ASSESSMENT/PLAN:  
(C50.511, Z17.0) Malignant   
neoplasm of lower-outer quadrant   
of right breast of female,   
estrogen receptor positive (HCC)   
(primary encounter diagnosis)  
(R92.8) Abnormal mammogram  
Assessment:  
-pT1c pN0 (3/3 sentinel lymph   
nodes negative for metastatic   
disease) stage IA infiltrating   
ductal carcinoma the right breast.   
ER/MN >95%, [id number] non-amplified   
by FISH.  
-Has been on exemestane  
-Reviewed mammogram. New   
calcifications lower inner   
quadrant right breast.  
-Discussed with Dr. Patricio.  
Plan:  
-Continue exemestane for now.  
-Diagnostic mammogram right breast   
with ultrasound.  
-Further plan pending those   
results.  
  
(R31.0) Gross hematuria  
Assessment:  
-She had painless gross hematuria   
with passing of clots on   
presentation to the ED. UA   
demonstrated low-level E. coli.   
She is a former smoker. I   
discussed and recommended urology   
evaluation for opinion on   
cystoscopy.  
Plan:  
-Referral to Dr. Dejesus.  
  
Portions of this documentation   
were copied and pasted from   
previous office visit notes in   
order to provide a cohesive   
continuity of the history. The   
note has been reviewed and edited   
and updated as necessary.  
  
I spent a total of 50 minutes on   
the date of the service which   
included preparing to see the   
patient, face-to-face patient   
care, completing clinical   
documentation, obtaining and/or   
reviewing separately obtained   
history, performing a medically   
appropriate examination,   
counseling and educating the   
patient/family/caregiver, ordering   
medications, tests, or procedures,   
communicating with other HCPs (not   
separately reported),   
independently interpreting results   
(not separately reported),   
communicating results to the   
patient/family/caregiver, and care   
coordination (not separately   
reported).  
  
Francis Shell DO  
  
Cc: Cynthia Okeefe MD  
Electronically signed by Francis Shell DO at 2023 11:10 AM   
EST  
documented in this encounter            [facility]  
   
                                                    2023 Miscellaneous   
Notes                                   Formatting of this note might be   
different from the original.  
2023  
  
PID: [id number] Navdeep Guillen  
[address] [place] [address]  
  
Dear Ms. Guillen,  
  
Your recent breast imaging exam on   
2023 showed a possible   
finding that requires additional   
imaging studies for a complete   
evaluation. Most such findings are   
probably benign (not cancer).  
  
If you have a healthcare provider   
who ordered/prescribed your   
screening mammogram: Please call   
[phone] or [phone]   
EXT: [phone] to schedule an   
appointment for your additional   
imaging (if you have not already   
done so).  
  
If you DO NOT have a healthcare   
provider (ie you did not have an   
order/prescription for your   
screening mammogram):  
Please call [phone] to   
schedule an appointment for your   
additional imaging (if you have   
not already done so).  
  
You must have an   
order/prescription from your   
physician when calling to schedule   
your appointment. If your   
order/prescription is not   
electronic, you must bring the   
hard copy with you on the day of   
your exam to avoid delays.  
Your imaging studies and reports   
are kept on file at [facility] as part of your permanent   
medical record, and are available   
for your continuing care.  
  
Thank you for allowing us to help   
in meeting your health care needs.  
  
Sincerely,  
  
Dr. Delgadillo  
Interpreting Radiologist  
[facility]  
  
(Additional imaging)  
Electronically signed by   
Mammography Coordinator at   
2023 9:48 AM EST  
documented in this encounter            [facility]  
   
                                        2023 Note     HNO ID: [id number]  
Author: RT Que(R)  
Service: ?  
Author Type: Technologist  
Type: Progress Notes  
Filed: 2023 10:48 AM  
Note Text:  
Radiology Service Progress Note  
PATIENT NAME: Navdeep Guillen  
MRN: [id number]  
DATE OF SERVICE: 2023  
TIME: 10:48 AM  
PATIENT IDENTITY VERIFICATION   
COMPLETED USING TWO (2)   
IDENTIFIERS: Name  
and Date of Birth confirmed by   
patient verbally.  
FALL SCREENING: Has the patient   
had 2 falls in the last year or 1   
fall  
with injury or currently using an   
Ambulatory Assistive Device   
(Walker,  
Cane, Wheelchair, Crutches, etc.)?   
No  
PATIENT GENDER DATA: Female.   
Pregnancy status: Pregnant: No  
Breastfeeding status: NO.  
PATIENT RELEVANT IMPLANT DATA   
REVIEWED: Not Applicable  
RADIOLOGY DEPARTMENT: Mammography  
PERIPHERAL IV DATA: Not applicable  
SIGNED BY: RT Que(R)  
2023 10:48 AM               [facility]  
   
                                                    2023 History of   
Present illness Narrative               Formatting of this note might be   
different from the original.  
Radiology Service Progress Note  
  
PATIENT NAME: Navdeep Guillen  
MRN: [id number]  
  
DATE OF SERVICE: 2023  
TIME: 10:48 AM  
PATIENT IDENTITY VERIFICATION   
COMPLETED USING TWO (2)   
IDENTIFIERS: Name and Date of   
Birth confirmed by patient   
verbally.  
FALL SCREENING: Has the patient   
had 2 falls in the last year or 1   
fall with injury or currently   
using an Ambulatory Assistive   
Device (Walker, Cane, Wheelchair,   
Crutches, etc.)? No  
PATIENT GENDER DATA: Female.   
Pregnancy status: Pregnant: No   
Breastfeeding status: NO.  
PATIENT RELEVANT IMPLANT DATA   
REVIEWED: Not Applicable  
  
RADIOLOGY DEPARTMENT: Mammography  
  
PERIPHERAL IV DATA: Not applicable  
  
SIGNED BY: RT Que(R)  
2023 10:48 AM  
  
  
Electronically signed by Italia Barfield RT(R) at 2023 10:48   
AM EST  
documented in this encounter            [facility]  
   
                                        2023 Note     HNO ID: [id number]  
Author: Cynthia Okeefe MD  
Service: ?  
Author Type: Physician  
Type: Progress Notes  
Filed: 2023 5:52 PM  
Note Text:  
Reason for Visit  
Patient presents with:  
Same Day Appointment: right upper   
thigh bump size of pea  
Navdeep Guillen is a 62 year old   
female who presents here today for   
Above  
Complaints.  
Health Maintenance  
COVID-19 VACCINE(1)  
PNEUMOCOCCAL(1 - PCV)  
HIV SCREENING  
SHINGRIX VACCINE(1 of 2)  
PAP TESTING  
HPV TESTING  
COLORECTAL CANCER SCREENING  
DILATED RETINAL EXAM  
DEPRESSION ASSESSMENT  
MAMMOGRAM  
URINE ALBUMIN:CREATININE RATIO  
HPI  
Being treated for diverticulitis   
currently with cipro and flagyl.   
She has  
been to the emergency room, they   
had her on IV abx and  
On clear liquids, 2 ct scans were   
done. Was there for 3 days, there   
they  
noted it to be subacute and   
discussed surgery for her. She has   
a follow up  
with surgeon.. since taking abx   
her sugars are on the lower   
side.She has  
not taken metformin and her sugar   
was a little on the lower side.   
Still  
on the glipizide. Patient is   
eating different, but not eating   
lesser than  
normal. She is eating soft foods.   
Patient has intentional weight   
loss. She  
was not eating all that much. She   
feels bloated and gassy with  
medications. Patient does have a   
follow up appointment.  
Has developed fungal infection  
She is here today also for a bump   
on the upper thigh, It is actually   
in  
the lower thing and very   
superficial , around 0.8 cms and   
hard like a  
cyst, she was concerned about dvt   
but we reassure her that it is not  
likely to be from dvt.  
No problem-specific Assessment AND   
Plan notes found for this   
encounter.  
PAST MEDICAL HISTORY  
Diagnosis Date  
Abnormal mammogram 2021  
Achilles tendon pain 2015  
Ankle weakness 2015  
Chronic pain of left ankle   
12/15/2016  
Colon abnormality 2014  
Thickening of the ascending colon   
seen on the recent CT scan.   
Patient has  
had a colonoscopy around 4 years   
ago. Records were obtained for   
when they  
were done, 4 years ago , in   
[facility]. her   
colonoscopy was  
completely normal, no thickening,   
and the biopsy too was normal   
except for  
lymphoid aggregates.  
DJD (degenerative joint disease),   
ankle and foot 2016  
DM (diabetes mellitus) (HCC)  
GERD (gastroesophageal reflux   
disease)  
Gross hematuria 05/15/2014  
2014, had hematuria, investigated   
extensively along with cytoscopy,   
a  
stone was found but no signs of   
any malignancy.  
Heel pain, chronic 12/15/2016  
Hepatic steatosis 10/03/2017  
Hiatal hernia 10/03/2017  
Hypertension  
Insomnia  
Kidney stone 05/15/2014  
Morbid obesity (HCC)  
Nephrolithiasis  
Neuritis 2016  
Renal lesion 05/15/2014  
S/P Achilles tendon repair   
2015 sx done  
PAST SURGICAL HISTORY  
Procedure Laterality Date  
BREAST LUMPECTOMY HX Right 2017  
BX BREAST W/DEVICE 1ST LESION   
STEREOTACTIC GUID Right 2021  
CHOLECYSTECTOMY   
gallbladder removal  
COLONOSCOPY   
CT ABDOMEN 2014  
PAST SURGICAL HISTORY OF   
2014  
[facility] hysteroscopy  
PAST SURGICAL HISTORY OF Left   
2015  
Left foot surgery  
S PK LAVH [id number] 10/16/2014  
FAMILY HISTORY  
Problem Relation Age of Onset  
Ovarian cancer Mother 39  
Heart Father  
Hypertension Father  
Prostate Cancer Father 78  
other (kidney stones) Brother  
Hypertension Brother  
Diabetes Brother  
Seizures Son  
Breast Cancer Other 55  
Double first cousin (daughter of   
mother's sister and father's  
brother)  
Social History  
Tobacco Use  
Smoking status: Former  
Packs/day: 1.00  
Years: 10.00  
Pack years: 10.00  
Types: Cigarettes  
Quit date: 5/15/2007  
Years since quitting: 15.7  
Smokeless tobacco: Never  
Substance Use Topics  
Alcohol use: No  
Drug use: Yes  
Comment: marijuana for insomnia -   
currently not using  
Past medical history,   
appointments, medications,   
allergies reviewed.  
Pertinent Lab/Diagnostic Studies   
are reviewed and discussed today  
Current Outpatient Medications:  
potassium chloride 20 mEq TbER  
atenolol (TENORMIN) 50 mg tablet  
exemestane (AROMASIN) 25 mg tablet  
benzonatate (TESSALON PERLES) 100   
mg capsule  
albuterol HFA (PROAIR HFA) 90   
mcg/actuation inhaler  
chlorthalidone (HYGROTON) 25 mg   
tablet  
metFORMIN ER (GLUCOPHAGE XR) 500   
mg 24 hr tablet  
glipiZIDE (GLUCOTROL XL) 2.5 mg 24   
hr tablet  
blood sugar diagnostic (BLOOD   
GLUCOSE TEST) test strip  
ibuprofen (MOTRIN) 800 mg tablet  
ascorbic acid, vitamin C, (VITAMIN   
C) 500 mg tablet  
lancets (ONE TOUCH DELICA) 33   
gauge misc  
Blood-Glucose Meter monitoring kit  
MV-MN/FOLIC ACID/CALCIUM/VIT K   
(ONE-A-DAY WOMEN'S 50 PLUS ORAL)  
Review of Systems  
CONSTITUTIONAL: No fevers, chills   
night sweats, unintended weight   
loss  
CARDIOVASCULAR: No chest pain,   
dyspnea, palpitations, orthopnea,   
PND,  
ankle edema.  
PULM: No dyspnea, unexplained   
cough.  
GI: No dysphagia/odynophagia,   
problematic reflux, constipation,   
diarrhea,  
changes in stool habits,   
hematochezia, melena.  
: No new urinary complaints,   
(more content not included)...          [facility]  
   
                                                    2023 History of   
Present illness Narrative               Formatting of this note is   
different from the original.  
Reason for Visit  
Patient presents with:  
Same Day Appointment: right upper   
thigh bump size of pea  
  
Navdeep F Guillen is a 62 year old   
female who presents here today for   
Above Complaints.  
  
Health Maintenance  
COVID-19 VACCINE(1)  
PNEUMOCOCCAL(1 - PCV)  
HIV SCREENING  
SHINGRIX VACCINE(1 of 2)  
PAP TESTING  
HPV TESTING  
COLORECTAL CANCER SCREENING  
DILATED RETINAL EXAM  
DEPRESSION ASSESSMENT  
MAMMOGRAM  
URINE ALBUMIN:CREATININE RATIO  
  
HPI  
  
Being treated for diverticulitis   
currently with cipro and flagyl.   
She has been to the emergency   
room, they had her on IV abx and  
On clear liquids, 2 ct scans were   
done. Was there for 3 days, there   
they noted it to be subacute and   
discussed surgery for her. She has   
a follow up with surgeon.. since   
taking abx her sugars are on the   
lower side.She has not taken   
metformin and her sugar was a   
little on the lower side. Still on   
the glipizide. Patient is eating   
different, but not eating lesser   
than normal. She is eating soft   
foods. Patient has intentional   
weight loss. She was not eating   
all that much. She feels bloated   
and gassy with medications.   
Patient does have a follow up   
appointment.  
Has developed fungal infection  
  
She is here today also for a bump   
on the upper thigh, It is actually   
in the lower thing and very   
superficial , around 0.8 cms and   
hard like a cyst, she was   
concerned about dvt but we   
reassure her that it is not likely   
to be from dvt.  
  
No problem-specific Assessment &   
Plan notes found for this   
encounter.  
  
PAST MEDICAL HISTORY  
Diagnosis Date  
Abnormal mammogram 2021  
Achilles tendon pain 2015  
Ankle weakness 2015  
Chronic pain of left ankle   
12/15/2016  
Colon abnormality 2014  
Thickening of the ascending colon   
seen on the recent CT scan.   
Patient has had a colonoscopy   
around 4 years ago. Records were   
obtained for when they were done,   
4 years ago , in [facility]. her colonoscopy was   
completely normal, no thickening,   
and the biopsy too was normal   
except for lymphoid aggregates.  
DJD (degenerative joint disease),   
ankle and foot 2016  
DM (diabetes mellitus) (HCC)  
GERD (gastroesophageal reflux   
disease)  
Gross hematuria 05/15/2014  
2014, had hematuria, investigated   
extensively along with cytoscopy,   
a stone was found but no signs of   
any malignancy.  
Heel pain, chronic 12/15/2016  
Hepatic steatosis 10/03/2017  
Hiatal hernia 10/03/2017  
Hypertension  
Insomnia  
Kidney stone 05/15/2014  
Morbid obesity (HCC)  
Nephrolithiasis  
Neuritis 2016  
Renal lesion 05/15/2014  
S/P Achilles tendon repair   
2015 sx done  
  
  
PAST SURGICAL HISTORY  
Procedure Laterality Date  
BREAST LUMPECTOMY HX Right 2017  
BX BREAST W/DEVICE 1ST LESION   
STEREOTACTIC GUID Right 2021  
CHOLECYSTECTOMY 2011  
gallbladder removal  
COLONOSCOPY   
CT ABDOMEN 2014  
PAST SURGICAL HISTORY OF   
2014  
[facility] hysteroscopy  
PAST SURGICAL HISTORY OF Left   
2015  
Left foot surgery  
S PK LAVH [id number] 10/16/2014  
  
FAMILY HISTORY  
Problem Relation Age of Onset  
Ovarian cancer Mother 39  
Heart Father  
Hypertension Father  
Prostate Cancer Father 78  
other (kidney stones) Brother  
Hypertension Brother  
Diabetes Brother  
Seizures Son  
Breast Cancer Other 55  
Double first cousin (daughter of   
mother's sister and father's   
brother)  
  
Social History  
  
Tobacco Use  
Smoking status: Former  
Packs/day: 1.00  
Years: 10.00  
Pack years: 10.00  
Types: Cigarettes  
Quit date: 5/15/2007  
Years since quitting: 15.7  
Smokeless tobacco: Never  
Substance Use Topics  
Alcohol use: No  
Drug use: Yes  
Comment: marijuana for insomnia -   
currently not using  
  
Past medical history,   
appointments, medications,   
allergies reviewed.  
Pertinent Lab/Diagnostic Studies   
are reviewed and discussed today  
  
Current Outpatient Medications:  
potassium chloride 20 mEq TbER  
atenolol (TENORMIN) 50 mg tablet  
exemestane (AROMASIN) 25 mg tablet  
benzonatate (TESSALON PERLES) 100   
mg capsule  
albuterol HFA (PROAIR HFA) 90   
mcg/actuation inhaler  
chlorthalidone (HYGROTON) 25 mg   
tablet  
metFORMIN ER (GLUCOPHAGE XR) 500   
mg 24 hr tablet  
glipiZIDE (GLUCOTROL XL) 2.5 mg 24   
hr tablet  
blood sugar diagnostic (BLOOD   
GLUCOSE TEST) test strip  
ibuprofen (MOTRIN) 800 mg tablet  
ascorbic acid, vitamin C, (VITAMIN   
C) 500 mg tablet  
lancets (ONE TOUCH DELICA) 33   
gauge misc  
Blood-Glucose Meter monitoring kit  
MV-MN/FOLIC ACID/CALCIUM/VIT K   
(ONE-A-DAY WOMEN'S 50 PLUS ORAL)  
  
Review of Systems  
CONSTITUTIONAL: No fevers, chills   
night sweats, unintended weight   
loss  
CARDIOVASCULAR: No chest pain,   
dyspnea, palpitations, orthopnea,   
PND, ankle edema.  
PULM: No dyspnea, unexplained   
cough.  
GI: No dysphagia/odynophagia,   
problematic reflux, constipation,   
diarrhea, changes in stool habits,   
hematochezia, melena.  
: No new urinary complaints,   
including dysuria, gross hematuria   
or pyuria.  
NEURO: No new balance problems,   
peripheral weakness/paresthesias   
or numbness of concern.  
  
Physical Exam  
/82 (BP Site: Left Arm, BP   
Position: Sitting, BP Cuff Size:   
Large Adult)   Pulse 71   Temp   
36.8 C (98.3 F)   Resp 16   Ht   
165.1 cm (5' 5 )   Wt 131.5 kg   
(290 lb)   SpO2 97%   BMI 48.26   
kg/m  
General appearance: Well   
appearing, alert, in no acute   
distress, well nourished.  
Skin: Skin color, texture, turgor   
normal, no suspicious rashes or   
lesions  
Head: Normocephalic, no masses,   
lesions, tenderness or   
abnormalities  
Eyes: Anicteric sclera. Pupils are   
equally round and reactive to   
light. Extraocular movements are   
intact.  
Lungs: Lungs clear to   
auscultation. No wheezing,   
rhonchi, rales  
Heart: RRR without murmur, gallop,   
or rubs.  
Extremities: No deformities,   
edema, skin discoloration,   
clubbing or cyanosis. Good   
capillary refill.  
  
ASSESSMENT/PLAN:  
1. Primary hypertension - ICD9:   
401.9, ICD10: I10 (primary   
diagnosis)  
- good control  
- Recommended regular aerobic   
exercise.  
- Recommend home blood pressure   
monitoring, to bring results in on   
next visit  
- Goal of BP <130/80  
  
2. Controlled type 2 diabetes   
mellitus without complication,   
without long-term current use of   
insulin (HCC) - ICD9: 250.00,   
ICD10: E11.9  
Advised to check sugars more often  
  
3. Diverticulitis - ICD9: 562.11,   
ICD10: K57.92  
She has been told that this is   
subacute and she might need   
surgery in the future. She is   
going to follow-up with surgeon to   
call team. Developed candidiasis   
for which I gave her Diflucan to   
take after her antibiotics.  
Cynthia Okeefe MD  
  
  
Electronically signed by Cynthia Okeefe MD at 2023 5:52 PM   
EST  
documented in this encounter            [facility]  
   
                                                    2023 Miscellaneous   
Notes                                   Formatting of this note is   
different from the original.  
Patient has been identified by   
name and date of birth: NO  
Patient phones for refill(s):  
Requested Prescriptions  
  
Pending Prescriptions Disp Refills  
potassium chloride 20 mEq TbER 180   
tablet 1  
Sig: Take 1 tablet by mouth twice   
daily.  
  
Date of last office visit in   
primary care: 2022  
Last 2 Encounter Wt Readings:  
Date: Wt:  
2022 137.7 kg (303 lb 9.6   
oz)  
2022 132.9 kg (293 lb)  
Previous labs/tests for   
medication: Not applicable  
Please advise. Thank you. Kellie Walls LPN  
  
Electronically signed by Kellie Walls LPN at 2023 2:21 PM   
EST  
documented in this encounter            [facility]  
   
                                                    2023 Miscellaneous   
Notes                                   Formatting of this note might be   
different from the original.  
Patient calling, she has noticed   
since doubling her potassium she   
is having some constipation.   
States that she bought miralax but   
has not taken any. Wondering if it   
is ok to start with that. She is   
having small bowel movements and   
passing gas. Please advise.  
Electronically signed by Gayla Skaggs LPN at 2023 11:10 AM   
EST  
documented in this encounter            [facility]  
   
                                                    2023 Miscellaneous   
Notes                                   Formatting of this note might be   
different from the original.  
Left below results on identified   
vm.  
Helen Zollinger LPN  
  
Electronically signed by Helen E Zollinger LPN at 2023 1:09   
PM EST  
Formatting of this note might be   
different from the original.  
----- Message from Cynthia Okeefe MD sent at 2023 5:43 PM EST   
-----  
Hba1c is a little better navdeep   
and the lipids and thyroid are   
stable  
Electronically signed by Helen E Zollinger LPN at 2023 1:07   
PM EST  
documented in this encounter            [facility]  
   
                                                    2022 Miscellaneous   
Notes                                   Formatting of this note might be   
different from the original.  
Pt is calling for a refill for   
atenolol  
  
LOV: 22  
NOV: 3/27/23  
Last Refill: 22 #90 3   
refills--this order was canceled   
bc pt was going to increase med to   
100mg then her BP's were in normal   
range so she continued at 50mg but   
order was already canceled at   
pharmacy so now pt needs a refill.  
  
Pt reports she is still getting   
good readings with her BP, states   
systolic has not been over 120.  
  
Casie Arellano LPN  
  
  
Electronically signed by Casie Arellano LPN at 2022 12:19 PM   
EST  
documented in this encounter            [facility]  
   
                                        2022 Note     HNO ID: [id number]  
Author: NIC Tyson.CNP  
Service: ?  
Author Type: Nurse Practitioner  
Type: Progress Notes  
Filed: 2022 12:32 PM  
Note Text:  
Subjective  
HPI  
Nontoxic-appearing female presents   
urgent care chief complaint cough   
and  
chest congestion. Duration of   
symptoms 10 days. Associated   
symptoms  
cough chest congestion sinus   
pressure. States initially she did   
have body  
aches chills sore throat headache.   
Son had similar signs and   
symptoms.  
Fever body aches and chills   
headache and sore throat has since   
subsided.  
Presents today with new worsening   
sinus pressure. No OTC medications   
used  
today. Denies any fever body aches   
chills productive cough chest pain  
shortness of breath pleuritic pain   
hemoptysis nausea vomiting   
abdominal  
pain change in bowel or bladder   
habits. Past medical history   
prescription  
medication use and allergies   
reviewed.  
.Patient presents with:  
Cough: Cough, chest congestion and   
scratchy throat x 10 days  
PAST MEDICAL HISTORY  
Diagnosis Date  
Abnormal mammogram 2021  
Achilles tendon pain 2015  
Ankle weakness 2015  
Chronic pain of left ankle   
12/15/2016  
Colon abnormality 2014  
Thickening of the ascending colon   
seen on the recent CT scan.   
Patient has  
had a colonoscopy around 4 years   
ago. Records were obtained for   
when they  
were done, 4 years ago , in   
[facility]. her   
colonoscopy was  
completely normal, no thickening,   
and the biopsy too was normal   
except for  
lymphoid aggregates.  
DJD (degenerative joint disease),   
ankle and foot 2016  
DM (diabetes mellitus) (HCC)  
GERD (gastroesophageal reflux   
disease)  
Gross hematuria 05/15/2014  
2014, had hematuria, investigated   
extensively along with cytoscopy,   
a  
stone was found but no signs of   
any malignancy.  
Heel pain, chronic 12/15/2016  
Hepatic steatosis 10/03/2017  
Hiatal hernia 10/03/2017  
Hypertension  
Insomnia  
Kidney stone 05/15/2014  
Morbid obesity (HCC)  
Nephrolithiasis  
Neuritis 2016  
Renal lesion 05/15/2014  
S/P Achilles tendon repair   
2015 sx done  
PAST SURGICAL HISTORY  
Procedure Laterality Date  
BREAST LUMPECTOMY HX Right 2017  
BX BREAST W/DEVICE 1ST LESION   
STEREOTACTIC GUID Right 2021  
CHOLECYSTECTOMY   
gallbladder removal  
COLONOSCOPY   
CT ABDOMEN 2014  
PAST SURGICAL HISTORY OF   
2014  
[facility] hysteroscopy  
PAST SURGICAL HISTORY OF Left   
2015  
Left foot surgery  
S PK LAVH [id number] 10/16/2014  
ALLERGIES Silvadene [Silver   
Sulfadiazine] and Sulfa   
(Sulfonamide  
Antibiotics)  
MEDICATIONS  
chlorthalidone (HYGROTON) 25 mg   
tablet Take 0.5 tablets by mouth   
once  
daily.  
potassium chloride 20 mEq TbER   
Take 1 tablet by mouth twice   
daily.  
metFORMIN ER (GLUCOPHAGE XR) 500   
mg 24 hr tablet Take 1 tablet by   
mouth  
daily with breakfast.  
glipiZIDE (GLUCOTROL XL) 2.5 mg 24   
hr tablet Take 1 tablet by mouth   
once  
daily.  
blood sugar diagnostic (BLOOD   
GLUCOSE TEST) test strip Test   
blood sugars  
2daily. Dx:ucnontrolled diabetes .   
Insulin: Yes  
ibuprofen (MOTRIN) 800 mg tablet   
Take 1 tablet by mouth every 8   
hours as  
needed for pain. Take with food.  
exemestane (AROMASIN) 25 mg tablet   
Take 1 tablet by mouth once daily.  
ascorbic acid, vitamin C, (VITAMIN   
C) 500 mg tablet Take 500 mg by   
mouth  
once daily.  
lancets (ONE TOUCH DELICA) 33   
gauge misc Test blood sugar(s) 2   
daily.  
Dx: Type 2 DM - Controlled E11.9   
Insulin: Yes  
Blood-Glucose Meter monitoring kit   
Glucose Meter of Choice - Kit -   
Dx:  
Type 2 DM - Uncontrolled E11.65  
MV-MN/FOLIC ACID/CALCIUM/VIT K   
(ONE-A-DAY WOMEN'S 50 PLUS ORAL)   
Take 1  
tablet by mouth once daily.  
atenolol (TENORMIN) 100 mg tablet   
Take 1 tablet by mouth once daily.  
Vitamin E, dl, acetate, (VITAMIN   
E) 100 unit capsule Take 100 Units   
by  
mouth once daily.  
polyethylene glycol 3350 (MIRALAX,   
GLYCOLAX) 17 gram/dose powder Use   
as  
directed for Miralax / Gatorade   
Bowel Prep Kit  
Gatorade Sports Drink Use as   
directed for Miralax / Gatorade   
Bowel Prep  
Kit  
cholecalciferol, vitamin D3,   
(VITAMIN D3 ORAL) Take 1 tablet by   
mouth  
once daily.  
FAMILY HISTORY  
Problem Relation Age of Onset  
Ovarian cancer Mother 39  
Heart Father  
Hypertension Father  
Prostate Cancer Father 78  
other (kidney stones) Brother  
Hypertension Brother  
Diabetes Brother  
Seizures Son  
Breast Cancer Other 55  
Double first cousin (daughter of   
mother's sister and father's  
brother)  
Social History  
Tobacco Use  
Smoking status: Former  
Packs/day: 1.00  
Years: 10.00  
Pack years: 10.00  
Types: Cigarettes  
Quit date: 5/15/2007  
Years since quitting: 15.5  
Smokeless tobacco: Never  
Substance Use Topics  
Alcohol use: No  
Drug use: Yes  
Comment: marijuana for insomnia -   
currently not using  
/74   Pulse 65   Temp 36.4   
?C (97.5 ?F) (Tympanic)   Resp 18   
   
Wt (!) 137.7 kg (303 lb 9.6 oz)     
SpO2 97%   BMI 50.52 kg/m?  
Review of Systems  
Constitutional: Negative for   
chills, fever and malaise/fatigue.  
HENT: Positive for congestion and   
sin (more content not included)...      [facility]  
   
                                        2022 Instructions   
  
  
NIC Tyson.CNP -   
2022 12:25 PM ESTFormatting   
of this note might be different   
from the original.  
COLD AND SINUS PATIENT   
INSTRUCTIONS  
  
AS A DECONGESTANT  
Use a nasal saline 3 times daily  
Directions; 1-2 squirts in each   
nostril  
Saline suggestions; Ocean Spray or   
Little Noses or Salt and Water  
  
AT BEDTIME TO HELP WITH CONGESTION  
Use a cool mist vaporizer  
May use Vicks Vapor Rub on the   
chest  
Elevate the head to aid with   
coughing post nasal drip  
  
ACUTE SINUSITIS OVERVIEW  
Rhinosinusitis, or more commonly   
sinusitis, is the medical term for   
inflammation (swelling) of the   
lining of the sinuses and nose.   
The sinuses are the hollow areas   
within the facial bones that are   
connected to the nasal openings.   
The sinuses are lined with mucous   
membranes, similar to the inside   
of the nose.  
There are two main types of   
sinusitis: acute and chronic.   
Acute sinusitis is inflammation   
that lasts for less than four   
weeks while chronic sinusitis   
lasts for more than 12 weeks.   
Acute sinusitis is common,   
affecting approximately one   
million people per year in the   
[place].  
  
ACUTE SINUSITIS CAUSES  
The most common cause of acute   
sinusitis is a viral infection   
associated with the common cold.   
Bacterial sinusitis occurs much   
less commonly, in only 0.5 to 2   
percent of cases, usually as a   
complication of viral sinusitis.  
Because antibiotics are effective   
only against bacterial, and not   
viral, infections, most people do   
not need antibiotics for acute   
sinusitis.  
  
ACUTE SINUSITIS SYMPTOMS  
Symptoms of acute sinusitis   
include:  
Nasal congestion or blockage  
Thick, yellow to green discharge   
from the nose  
Pain in the teeth  
Pain or pressure in the face that   
is worse when bending forwards  
Other acute sinusitis symptoms can   
include fever (temperature greater   
than 100.4 F or 38 C), fatigue,   
cough, difficulty or inability to   
smell, ear pressure or fullness,   
headache, and bad breath.  
In most cases, these symptoms   
develop over the course of one day   
and begin to improve within seven   
to 10 days.  
  
DO I NEED TO BE EXAMINED?  
It is difficult to know if you   
have a viral or bacterial sinus   
infection initially. However, most   
people with a viral infection   
improve without treatment within   
seven to 10 days after symptoms   
begin. Bacterial sinusitis also   
sometimes improves without   
treatment, although it can also   
worsen and require treatment.  
If one or more of the following   
bothersome symptoms last more than   
seven days, an examination by a   
healthcare provider is   
recommended:  
Thick, yellow to green discharge   
from the nose  
Face or tooth pain, especially if   
it is only on one side  
Tenderness over the maxillary   
sinuses (located on the left and   
right side of the nose, inside the   
cheekbones)  
Symptoms that initially improve   
and then worsen  
  
WHEN TO SEEK IMMEDIATE HELP  
If you have one or more of the   
following symptoms, you should   
seek medical attention immediately   
(even if symptoms have been   
present for less than seven days):  
High fever (>102.5 F or 39.2 C)  
Sudden, severe pain in the face or   
head  
Double vision or difficulty seeing  
Confusion or difficulty thinking   
clearly  
Swelling or redness around one or   
both eyes  
Stiff neck, shortness of breath  
  
ACUTE SINUSITIS TREATMENT  
Initial treatment of a sinus   
infection aims to relieve symptoms   
since almost everyone will improve   
within the first seven to 10 days.   
Experts recommend avoiding   
antibiotics during this time   
unless there is clear evidence of   
a severe bacterial infection.  
  
INITIAL TREATMENT  
Pain relief -- Non-prescription   
pain medications, such as   
acetaminophen (eg, Tylenol ) or   
ibuprofen (eg, Motrin , Advil )   
are recommended for pain.  
Nasal irrigation and saline sprays   
-- Rinsing the nose with a   
salt-water (saline) solution is   
called nasal irrigation or nasal   
lavage. Saline is also available   
in a standard nasal spray,   
although this is not as effective   
as using larger amounts of water   
in an irrigation.  
Nasal irrigation is particularly   
useful for treating drainage down   
the back of the throat, sneezing,   
nasal dryness, and congestion. The   
treatment helps by rinsing out   
allergens and irritants from the   
nose. Saline rinses also clean the   
nasal lining and can be used   
before applying sprays containing   
medications, to get a better   
effect from the medication.  
Nasal lavage with warmed saline   
can be performed as needed, once   
per day, or twice daily for   
increased symptoms. Nasal lavage   
carries few risks when performed   
correctly. Saline nasal sprays and   
irrigation kits can be purchased   
over-the-counter. Saline mixes can   
also be purchased or patients can   
make their own solution.  
A variety of devices, including   
bulb syringes, Neti pots, and   
bottle sprayers, may be used to   
perform nasal lavage; instructions   
for nasal lavage are provided in   
the table. At least 200 mL (about   
3/4 cup) of fluid is recommended   
for each nostril.  
Nasal decongestants -- Nasal   
decongestant sprays, including   
oxymetazoline (Afrin ) and   
phenylephrine (Jesse-synephrine )   
can be used to temporarily treat   
congestion. However, these sprays   
should not be used for more than   
two to three days due to the risk   
of rebound congestion (when the   
nose is congested constantly   
unless the medication is used   
repeatedly).  
Other treatments -- Other   
treatments for congestion, such as   
oral antihistamines (such as   
diphenhydramine/Benadryl ) or zinc   
supplements are not proven to   
improve symptoms of sinusitis and   
can have unwanted side effects.   
Medications to thin secretions   
(such as guaifenesin) may help to   
clear mucus.  
Secondline treatment -- If   
symptoms have not improved in   
seven to ten days, you should   
arrange for medical evaluation.   
You may need further treatment.  
Nasal glucocorticoids -- Nasal   
glucocorticoids (steroids   
delivered by a nasal spray) can   
help to reduce swelling inside the   
nose, usually within two to three   
days. These drugs have few side   
effects and dramatically relieve   
symptoms in most people.  
There are a number of nasal   
glucocorticoids available by   
prescription. These drugs are all   
effective, but differ in how   
frequently they must be used and   
how much they cost.  
You may need to use a nasal   
decongestant for a few days before   
starting a nasal glucocorticoid to   
reduce nasal swelling; this will   
allow the nasal glucocorticoid to   
reach more areas of the nasal   
passages  
Do I need an antibiotic? -- If   
bothersome symptoms of sinusitis   
persist for 10 or more days, it is   
possible that you have bacterial   
sinusitis. The need for   
antibiotics depends upon the   
severity of your symptoms.  
Mild symptoms -- There are two   
possible treatment options if you   
have mild sinusitis symptoms:   
treat with antibiotics or continue   
to watch and wait for one week.  
Watching and waiting is a   
reasonable option because up to 75   
percent of people with bacterial   
sinusitis improve within one month   
without antibiotics. During the   
watch and wait period, treatments   
to improve symptoms are   
recommended.  
If symptoms worsen or do not   
improve after watching and   
waiting, treatment with an   
antibiotic is usually recommended.   
Treatments to relieve symptoms are   
recommended while using   
antibiotics.  
Moderate or severe symptoms --   
Most healthcare providers will   
prescribe an antibiotic for   
moderate to severe symptoms   
(temperature >38.3 C or 101 F   
and/or severe pain that interferes   
with usual activities).  
Treatments to relieve symptoms are   
also recommended during antibiotic   
treatment.  
One of the least expensive and   
most effective antibiotics for   
sinusitis is amoxicillin. An   
alternate antibiotic will be   
prescribed if you are allergic to   
penicillin. Regardless of which   
antibiotic is prescribed, it is   
important to follow the dosing   
instructions carefully and to   
finish the entire course of   
treatment. Taking the medication   
less often than prescribed or   
stopping the medication early can   
lead to complications, such as a   
recurrent infection.  
What if I do not improve with   
treatment? -- If you do not   
improve or worsen after a course   
of antibiotics, you should be   
re-examined.  
In some cases, symptoms of   
sinusitis improve but then recur.   
This is usually because the   
infection was not completely   
eliminated by the antibiotic. An   
alternate antibiotic, extended   
antibiotic treatment, and/or   
further testing may be   
recommended, depending upon your   
individual situation.  
Electronically signed by Jasbir Govea APRN.CNP at 2022   
12:25 PM EST  
  
documented in this encounter            [facility]  
   
                                                    2022 History of   
Present illness Narrative               Formatting of this note is   
different from the original.  
Subjective  
HPI  
Nontoxic-appearing female presents   
urgent care chief complaint cough   
and chest congestion. Duration of   
symptoms 10 days. Associated   
symptoms cough chest congestion   
sinus pressure. States initially   
she did have body aches chills   
sore throat headache. Son had   
similar signs and symptoms. Fever   
body aches and chills headache and   
sore throat has since subsided.   
Presents today with new worsening   
sinus pressure. No OTC medications   
used today. Denies any fever body   
aches chills productive cough   
chest pain shortness of breath   
pleuritic pain hemoptysis nausea   
vomiting abdominal pain change in   
bowel or bladder habits. Past   
medical history prescription   
medication use and allergies   
reviewed.  
  
.Patient presents with:  
Cough: Cough, chest congestion and   
scratchy throat x 10 days  
  
PAST MEDICAL HISTORY  
Diagnosis Date  
Abnormal mammogram 2021  
Achilles tendon pain 2015  
Ankle weakness 2015  
Chronic pain of left ankle   
12/15/2016  
Colon abnormality 2014  
Thickening of the ascending colon   
seen on the recent CT scan.   
Patient has had a colonoscopy   
around 4 years ago. Records were   
obtained for when they were done,   
4 years ago , in [facility]. her colonoscopy was   
completely normal, no thickening,   
and the biopsy too was normal   
except for lymphoid aggregates.  
DJD (degenerative joint disease),   
ankle and foot 2016  
DM (diabetes mellitus) (HCC)  
GERD (gastroesophageal reflux   
disease)  
Gross hematuria 05/15/2014  
2014, had hematuria, investigated   
extensively along with cytoscopy,   
a stone was found but no signs of   
any malignancy.  
Heel pain, chronic 12/15/2016  
Hepatic steatosis 10/03/2017  
Hiatal hernia 10/03/2017  
Hypertension  
Insomnia  
Kidney stone 05/15/2014  
Morbid obesity (HCC)  
Nephrolithiasis  
Neuritis 2016  
Renal lesion 05/15/2014  
S/P Achilles tendon repair   
2015 sx done  
  
  
PAST SURGICAL HISTORY  
Procedure Laterality Date  
BREAST LUMPECTOMY HX Right   
BX BREAST W/DEVICE 1ST LESION   
STEREOTACTIC GUID Right 2021  
CHOLECYSTECTOMY 2011  
gallbladder removal  
COLONOSCOPY   
CT ABDOMEN 2014  
PAST SURGICAL HISTORY OF   
2014  
[facility] hysteroscopy  
PAST SURGICAL HISTORY OF Left   
2015  
Left foot surgery  
S PK LAVH [id number] 10/16/2014  
  
ALLERGIES Silvadene [Silver   
Sulfadiazine] and Sulfa   
(Sulfonamide Antibiotics)  
  
MEDICATIONS  
chlorthalidone (HYGROTON) 25 mg   
tablet Take 0.5 tablets by mouth   
once daily.  
potassium chloride 20 mEq TbER   
Take 1 tablet by mouth twice   
daily.  
metFORMIN ER (GLUCOPHAGE XR) 500   
mg 24 hr tablet Take 1 tablet by   
mouth daily with breakfast.  
glipiZIDE (GLUCOTROL XL) 2.5 mg 24   
hr tablet Take 1 tablet by mouth   
once daily.  
blood sugar diagnostic (BLOOD   
GLUCOSE TEST) test strip Test   
blood sugars 2daily.   
Dx:ucnontrolled diabetes .   
Insulin: Yes  
ibuprofen (MOTRIN) 800 mg tablet   
Take 1 tablet by mouth every 8   
hours as needed for pain. Take   
with food.  
exemestane (AROMASIN) 25 mg tablet   
Take 1 tablet by mouth once daily.  
ascorbic acid, vitamin C, (VITAMIN   
C) 500 mg tablet Take 500 mg by   
mouth once daily.  
lancets (ONE TOUCH DELICA) 33   
gauge misc Test blood sugar(s) 2   
daily. Dx: Type 2 DM - Controlled   
E11.9 Insulin: Yes  
Blood-Glucose Meter monitoring kit   
Glucose Meter of Choice - Kit -   
Dx: Type 2 DM - Uncontrolled   
E11.65  
MV-MN/FOLIC ACID/CALCIUM/VIT K   
(ONE-A-DAY WOMEN'S 50 PLUS ORAL)   
Take 1 tablet by mouth once daily.  
  
atenolol (TENORMIN) 100 mg tablet   
Take 1 tablet by mouth once daily.  
Vitamin E, dl, acetate, (VITAMIN   
E) 100 unit capsule Take 100 Units   
by mouth once daily.  
polyethylene glycol 3350 (MIRALAX,   
GLYCOLAX) 17 gram/dose powder Use   
as directed for Miralax / Gatorade   
Bowel Prep Kit  
Gatorade Sports Drink Use as   
directed for Miralax / Gatorade   
Bowel Prep Kit  
cholecalciferol, vitamin D3,   
(VITAMIN D3 ORAL) Take 1 tablet by   
mouth once daily.  
  
FAMILY HISTORY  
Problem Relation Age of Onset  
Ovarian cancer Mother 39  
Heart Father  
Hypertension Father  
Prostate Cancer Father 78  
other (kidney stones) Brother  
Hypertension Brother  
Diabetes Brother  
Seizures Son  
Breast Cancer Other 55  
Double first cousin (daughter of   
mother's sister and father's   
brother)  
  
Social History  
  
Tobacco Use  
Smoking status: Former  
Packs/day: 1.00  
Years: 10.00  
Pack years: 10.00  
Types: Cigarettes  
Quit date: 5/15/2007  
Years since quitting: 15.5  
Smokeless tobacco: Never  
Substance Use Topics  
Alcohol use: No  
Drug use: Yes  
Comment: marijuana for insomnia -   
currently not using  
  
/74   Pulse 65   Temp 36.4 C   
(97.5 F) (Tympanic)   Resp 18   Wt   
(!) 137.7 kg (303 lb 9.6 oz)     
SpO2 97%   BMI 50.52 kg/m  
  
Review of Systems  
Constitutional: Negative for   
chills, fever and malaise/fatigue.  
HENT: Positive for congestion and   
sinus pain. Negative for ear   
discharge, ear pain and sore   
throat.  
Eyes: Negative for blurred vision,   
pain, discharge and redness.  
Respiratory: Positive for cough.   
Negative for hemoptysis, sputum   
production, shortness of breath,   
wheezing and stridor.  
Cardiovascular: Negative for chest   
pain.  
Gastrointestinal: Negative for   
abdominal pain, diarrhea, nausea   
and vomiting.  
Musculoskeletal: Negative for   
myalgias.  
Skin: Negative for itching and   
rash.  
Neurological: Negative for   
dizziness and headaches.  
  
  
Objective  
Physical Exam  
Constitutional:  
General: She is not in acute   
distress.  
Appearance: She is not   
diaphoretic.  
HENT:  
Head: Normocephalic.  
Right Ear: Tympanic membrane, ear   
canal and external ear normal.  
Left Ear: Tympanic membrane, ear   
canal and external ear normal.  
Nose:  
Right Sinus: Frontal sinus   
tenderness present.  
Left Sinus: Frontal sinus   
tenderness present.  
Mouth/Throat:  
Lips: Pink.  
Mouth: Mucous membranes are moist.  
Pharynx: Oropharynx is clear. No   
pharyngeal swelling, oropharyngeal   
exudate, posterior oropharyngeal   
erythema or uvula swelling.  
Eyes:  
Conjunctiva/sclera: Conjunctivae   
normal.  
Pupils: Pupils are equal, round,   
and reactive to light.  
Cardiovascular:  
Rate and Rhythm: Normal rate and   
regular rhythm.  
Heart sounds: Normal heart sounds.  
Pulmonary:  
Effort: Pulmonary effort is   
normal. No tachypnea, accessory   
muscle usage or respiratory   
distress.  
Breath sounds: Normal breath   
sounds. No stridor.  
Abdominal:  
Palpations: Abdomen is soft.  
Tenderness: There is no abdominal   
tenderness.  
Musculoskeletal:  
Cervical back: Normal range of   
motion and neck supple. No   
rigidity or tenderness.  
Lymphadenopathy:  
Cervical: No cervical adenopathy.  
Skin:  
General: Skin is warm and dry.  
Neurological:  
Mental Status: She is alert and   
oriented to person, place, and   
time.  
  
ASSESSMENT/PLAN:  
1. Sinobronchitis - ICD9: 473.9,   
490, ICD10: J32.9, J40  
  
Patient was diagnosed with   
sinobronchitis. Does have a cough.   
No advantageous lung sounds was   
noted. Maxillary sinus pressure.   
More pronounced frontal sinus   
pressure with palpation. Will be   
placed on doxycycline. Has   
tolerated this in the past. Red   
flags for prompt reevaluation   
discussed. Patient was educated on   
supportive therapies. Patient will   
follow up with primary care   
provider as needed. Patient was   
instructed to immediately proceed   
to emergency room for any new,   
worsening, or symptoms lasting   
longer than anticipated. The   
patient's clinical presentation is   
otherwise unremarkable at this   
time. Based on exam and clinical   
finding, the patient is stable for   
discharge. Plan of care was   
discussed with patient. Patient   
verbalizes understanding and   
agrees to plan of care. This note   
was generated using Dragon   
software. It may contain errors in   
wording, punctuation, or spelling.  
  
Jasbir Govea APRN.CNP  
  
  
Electronically signed by Jasbir Govea APRN.CNP at 2022   
12:32 PM EST  
documented in this encounter            [facility]  
   
                                                    2022 Miscellaneous   
Notes                                   Formatting of this note might be   
different from the original.  
Noted.  
Sherly An APRN.CNP  
  
Electronically signed by Sherly An APRN.CNP at 2022 3:25   
PM EDT  
Formatting of this note might be   
different from the original.  
Patient notified, those BP   
readings are without doubling up   
the medication. Advised patient to   
only take one of the atenolol and   
monitor BP and update with any   
changes.  
Electronically signed by Manisha Jesus Ma at 2022 1:16 PM EDT  
Formatting of this note might be   
different from the original.  
Both of those blood pressures are   
normal and actually very good. If   
she is concerned, she can take the   
50 mg of atenolol and update us   
with how her blood pressure is   
doing.  
Thank you  
Sherly An APRN.CNP  
  
Electronically signed by Sherly An APRN.CNP at 2022   
12:44 PM EDT  
Formatting of this note might be   
different from the original.  
Patient reports she was instructed   
yesterday, by Np, to increase her   
atenolol from 50 mg to 100 mg, and   
decreased the chlorthalidone in   
half from 25 mg to 12.5 mg.   
Patient reports her BP last night   
was 120/74 (68), and this morning   
114/63 (68). She is worried the   
increase atenolol would drop it   
too low. Patient has not yet taken   
the atenolol 100 mg. Will wait for   
provider reply.  
Electronically signed by PHU Calderón RN at 2022 11:12 AM   
EDT  
documented in this encounter            [facility]  
   
                                                    2022 History of   
Present illness Narrative               Formatting of this note is   
different from the original.  
POPULATION HEALTH NAVIGATION   
OUTREACH  
  
Action/FYI  
  
PCP appointment needed: NO, has   
appointment scheduled on   
2022 and 3/27/2023  
  
HCC Gaps: N/A  
  
Care Gaps to Address:  
  
COLORECTAL CANCER SCREENING -   
colonoscopy ordered on 2021  
BP CONTROLLED (<130/80) - had    
PCP visit  
DILATED RETINAL EXAM - due   
2022  
Advance Directives Needed  
  
Outcomes:  
Spoke to patient who declined to   
schedule and ended call.  
Message already added to   
appointment notes.  
  
  
Pt identified by name and :   
YES, via phone  
  
Outreach Outcome/Action  
Spoke to patient or caregiver:   
Patient declined  
  
Did you use a PCP flex slot to   
schedule this appointment?  
N/A  
  
Reason for Outreach  
Care Gap or Scheduling/Wellness   
visits  
  
Payer:  
Payor: [facility] MEDICARE / Plan: [facility]   
DUAL COMPLETE HMO SNP / Product   
Type: Medicare /  
  
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   
  
                                                    Diagnosis  
   
                                                      
  
  
Hypokalemia- Primary  
  
  
Hypopotassemia  
  
documented in this encounter  
[facility]   
  
                                                    Diagnosis  
   
                                                      
  
  
Type 2 diabetes mellitus without complication, without long-term current use of   
insulin (HCC)  
  
documented in this encounter  
[facility]   
  
                                                    Diagnosis  
   
                                                      
  
  
Hypokalemia  
  
  
Hypopotassemia  
  
documented in this encounter  
[facility]   
  
                                                    Diagnosis  
   
                                                      
  
  
Sinobronchitis- Primary  
  
  
Unspecified sinusitis (chronic)  
  
documented in this encounter  
[facility]   
  
                                                    Diagnosis  
   
                                                      
  
  
Primary hypertension- Primary  
  
  
Unspecified essential hypertension  
   
                                                      
  
  
Controlled type 2 diabetes mellitus without complication, without long-term 
current   
use of insulin (HCC)  
   
                                                      
  
  
Diverticulitis  
  
  
Diverticulitis of colon (without mention of hemorrhage)  
   
                                                      
  
  
Candida infection  
  
  
Candidiasis of unspecified site  
  
documented in this encounter  
[facility]   
  
                                                    Diagnosis  
   
                                                      
  
  
Primary cancer of lower outer quadrant of right female breast (HCC)- Primary  
   
                                                      
  
  
Carcinoma of right breast, estrogen and progesterone receptor positive (HCC)  
  
documented in this encounter  
[facility]   
  
                                                    Diagnosis  
   
                                                      
  
  
Malignant neoplasm of lower-outer quadrant of right breast of female, estrogen   
receptor positive (HCC)- Primary  
   
                                                      
  
  
Abnormal mammogram  
  
  
Abnormal mammogram, unspecified  
   
                                                      
  
  
Gross hematuria  
  
documented in this encounter  
[facility]   
  
                                                    Diagnosis  
   
                                                      
  
  
Gross hematuria- Primary  
  
documented in this encounter  
[facility]   
  
                                                    Diagnosis  
   
                                                      
  
  
Gross hematuria  
  
documented in this encounter  
[facility]*   
  
                                                    Diagnosis  
   
                                                      
  
  
Calcification of right breast on mammography  
   
                                                      
  
  
Diverticulosis of large intestine without perforation or abscess without 
bleeding  
  
  
Diverticulosis of colon (without mention of hemorrhage)  
   
                                                      
  
  
Abnormal CT scan, gastrointestinal tract  
  
  
Nonspecific (abnormal) findings on radiological and other examination of   
gastrointestinal tract  
  
documented in this encounter  
[facility]   
  
                                                    Diagnosis  
   
                                                      
  
  
Calcification of right breast on mammography- Primary  
  
documented in this encounter  
[facility]   
  
                                                    Diagnosis  
   
                                                      
  
  
Type 2 diabetes mellitus without complication, without long-term current use of   
insulin (HCC)- Primary  
   
                                                      
  
  
Screening for osteoporosis  
  
  
Special screening for osteoporosis  
   
                                                      
  
  
Asymptomatic menopause  
   
                                                      
  
  
Primary hypertension  
  
  
Unspecified essential hypertension  
   
                                                      
  
  
Hypercholesterolemia  
  
  
Pure hypercholesterolemia  
   
                                                      
  
  
Diverticulitis  
  
  
Diverticulitis of colon (without mention of hemorrhage)  
   
                                                      
  
  
Dysuria  
   
                                                      
  
  
Vaginal yeast infection  
  
  
Candidiasis of vulva and vagina  
   
                                                      
  
  
Morbid obesity with BMI of 40.0-44.9, adult (HCC)  
  
  
Morbid obesity  
  
documented in this encounter  
[facility]   
  
                                                    Diagnosis  
   
                                                      
  
  
Primary cancer of lower outer quadrant of right female breast (HCC)  
   
                                                      
  
  
Carcinoma of right breast, estrogen and progesterone receptor positive (HCC)  
  
documented in this encounter  
[facility]   
  
                                                    Diagnosis  
   
                                                      
  
  
Type 2 diabetes mellitus without complication, without long-term current use of   
insulin (HCC)  
  
documented in this encounter  
[facility] note*   
  
                                                    Diagnosis  
   
                                                      
  
  
Type 2 diabetes mellitus without complication, without long-term current use of   
insulin (HCC)  
  
documented in this encounter  
[facility]   
  
                                                    Diagnosis  
   
                                                      
  
  
Primary cancer of lower outer quadrant of right female breast (HCC)  
   
                                                      
  
  
Carcinoma of right breast, estrogen and progesterone receptor positive (HCC)  
   
                                                      
  
  
Encounter for screening mammogram for malignant neoplasm of breast  
  
  
Other screening mammogram  
  
documented in this encounter  
[facility]   
  
                                                    Diagnosis  
   
                                                      
  
  
Controlled type 2 diabetes mellitus without complication, without long-term 
current   
use of insulin (HCC)- Primary  
   
                                                      
  
  
Primary hypertension  
  
  
Unspecified essential hypertension  
   
                                                      
  
  
Morbid obesity with BMI of 40.0-44.9, adult (HCC)  
  
  
Morbid obesity  
  
documented in this encounter  
[facility] for referral (narrative)* Diagnostic Procedure Only 
  (Routine) - Pending Review  
  
                          Specialty    Diagnoses / Procedures Referred By Contac  
t Referred To Contact  
   
                                        [facility]            
  
  
Diagnoses  
  
  
Abnormal mammogram  
  
  
  
Procedures  
  
  
US BREAST LTD RT  
  
  
US BREAST UNI REAL TIME   
WITH IMAGE LIMITED                        
  
  
Francis Shell, DO  
  
  
[address] [place] [address]  
  
  
Phone: [phone]  
  
  
Fax: [phone]                         
  
  
[facility]  
  
  
[address] [place] [address]  
  
  
  
                          Referral ID  Status       Reason       Start   
Date                                    Expiration   
Date                                    Visits   
Requested                               Visits   
Authorized  
   
                                        [id number]            Pending   
Review                                    
  
  
Auto-Generat  
ed Referral     2023        3/3/2024        1               1  
  
  
  
  
Electronically signed by Francis Shell DO at 2023 10:13 AM EST  
  
  
* Diagnostic Procedure Only (Routine) - Authorized  
  
                          Specialty    Diagnoses / Procedures Referred By Contac  
t Referred To Contact  
   
                                        [facility]            
  
  
Diagnoses  
  
  
Abnormal mammogram  
  
  
  
Procedures  
  
  
YUAN DIAGNOSTIC RT  
  
  
DIAGNOSTIC MAMMOGRAPHY   
COMPUTER-AIDED DETCJ UNI                  
  
  
Francis Shell DO  
  
  
[address] [facility] [address] [facility] [place] [address]  
  
  
Phone: [phone]  
  
  
Fax: [phone]                         
  
  
[facility]  
  
  
[address] [place]   
[address]  
  
  
  
                          Referral ID  Status       Reason       Start   
Date                                    Expiration   
Date                                    Visits   
Requested                               Visits   
Authorized  
   
                                [id number]        Authorized        
  
  
Auto-Generat  
ed Referral     2023        3/3/2024        1               1  
  
  
  
  
Electronically signed by Francis Shell DO at 2023 10:13 AM [facility] for referral (narrative)* Diagnostic Procedure Only 
  (Routine) - Closed  
  
                          Specialty    Diagnoses / Procedures Referred By Contac  
t Referred To Contact  
   
                                        [facility]            
  
  
Diagnoses  
  
  
Primary cancer of lower   
outer quadrant of right   
female breast (HCC)  
  
  
Carcinoma of right breast,   
estrogen and progesterone   
receptor positive (HCC)  
  
  
Encounter for screening   
mammogram for malignant   
neoplasm of breast  
  
  
  
Procedures  
  
  
YUAN SCREENING  
  
  
SCREENING MAMMOGRAPHY BI   
[facility] Jacquelyn Davis MD  
  
  
[place] [address]  
  
  
Phone: [phone]  
  
  
Fax: [phone]                         
  
  
[facility]  
  
  
[address] [place]   
[address]  
  
  
  
                    Referral ID Status    Reason    Start Date Expiration Date V  
isits   
Requested                               Visits   
Authorized  
   
                                [id number]        Closed            
  
  
Auto-Generate  
d Referral      2023       3/2/2023        1               1  
  
  
  
  
Electronically signed by Jacquelyn Milner MD at 2023 10:40 AM [facility] for visit Narrative* Diagnostic Procedure Only (Routine) 
  - Closed  
  
                          Specialty    Diagnoses / Procedures Referred By Contac  
t Referred To Contact  
   
                                        [facility]            
  
  
Diagnoses  
  
  
Primary cancer of lower   
outer quadrant of right   
female breast (HCC)  
  
  
Carcinoma of right breast,   
estrogen and progesterone   
receptor positive (HCC)  
  
  
Encounter for screening   
mammogram for malignant   
neoplasm of breast  
  
  
  
Procedures  
  
  
YUAN SCREENING  
  
  
SCREENING MAMMOGRAPHY BI   
[employer] Jacquelyn Davis MD  
  
  
[address]  
  
  
[place] [address]  
  
  
Phone: [phone]  
  
  
Fax: [phone]                         
  
  
[facility]  
  
  
[address]  
  
  
[place]   
[address]  
  
  
  
                    Referral ID Status    Reason    Start Date Expiration Date V  
isits   
Requested                               Visits   
Authorized  
   
                                [id number]        Closed            
  
  
Auto-Generate  
d Referral      2023       3/2/2023        1               1  
  
  
  
[facility]  
  
Summary Purpose  
  
  
                                                      
  
  
  
Family History  
No Family History Records FoundNo Family History Records FoundNo Family History 
Records FoundNo Family History Records FoundNo Family History Records Found  
  
Advance Directives  
No Advanced Directives Records FoundNo Advanced Directives Records FoundNo 
Advanced Directives Records FoundNo Advanced Directives Records FoundNo Advanced
Directives Records Found  
  
Reason for Referral  
  
  
                          Specialty    Diagnoses / Procedures Referred By Contac  
t Referred To Contact  
   
                                        Urology               
  
  
Diagnoses  
  
  
Gross hematuria  
  
  
  
Procedures  
  
  
CONSULT TO UROLOGY  
  
  
OFFICE/OUTPATIENT [facility] 60-74 MINUTES                         
  
  
Francis Shell, DO  
  
  
[address] [place] [address]  
  
  
Phone: [phone]  
  
  
Fax: [phone]                         
  
  
  
  
  
                          Referral ID  Status       Reason       Start   
Date                                    Expiration   
Date                                    Visits   
Requested                               Visits   
Authorized  
   
                                [id number]        Authorized        
  
  
PCP Requested   
Referral        2023        1               1  
  
  
  
                          Specialty    Diagnoses / Procedures Referred By Contac  
t Referred To Contact  
   
                                        Ophthalmology         
  
  
Diagnoses  
  
  
Controlled type 2 diabetes   
mellitus without   
complication, without   
long-term current use of   
insulin (HCC)  
  
  
  
Procedures  
  
  
CONSULT TO OPHTHALMOLOGY  
  
  
OFFICE/OUTPATIENT [facility] 60-74 MINUTES                         
  
  
Sherly An APRN.CNP  
  
  
[address] [place] [address]  
  
  
Phone: [phone]  
  
  
Fax: [phone]                         
  
  
  
  
  
                          Referral ID  Status       Reason       Start   
Date                                    Expiration   
Date                                    Visits   
Requested                               Visits   
Authorized  
   
                                        [id number]            Pending   
Review                                    
  
  
PCP Requested   
Referral                                  
3                   2024          1                   1  
  
  
  
Additional Source Comments  
  
  
  
                                                    INFORMATION SOURCE (unrecogn  
ized section and content)  
   
                                          
  
  
  
                                          
   
                                          
  
  
  
                                DATE CREATED    AUTHOR          AUTHOR'S ORGANIZ  
ATION  
   
                                2018                      [facility]  
al  
  
  
  
                                DATE CREATED    AUTHOR          AUTHOR'S ORGANIZ  
ATION  
   
                                2019                      [facility]  
  
  
  
                                DATE CREATED    AUTHOR          AUTHOR'S ORGANIZ  
ATION  
   
                                2019                      [facility]  
  
  
  
                                DATE CREATED    AUTHOR          AUTHOR'S ORGANIZ  
ATION  
   
                                11/15/2023                      [facility]  
  
  
  
  
  
                                                    Source Comments (unrecognize  
d section and content)  
   
                                                    In the event this informatio  
n is protected by the Federal Confidentiality of   
Alcohol   
and Drug Abuse Patient Records regulations: This information has been disclosed 
to   
you from records protected by Federal confidentiality rules (42 CFR Part 2). The
  
Federal rules prohibit you from making any further disclosure of this 
information   
unless further disclosure is expressly permitted by the written consent of the 
person   
to whom it pertains or as otherwise permitted by 42 CFR Part 2. A general   
authorization for the release of medical or other information is NOT sufficient 
for   
this purpose. The Federal rules restrict any use of the information to 
criminally   
investigate or prosecute any alcohol or drug abuse patient.[facility] 
the   
event this information is protected by the Federal Confidentiality of Alcohol 
and   
Drug Abuse Patient Records regulations: This information has been disclosed to 
you   
from records protected by Federal confidentiality rules (42 CFR Part 2). The 
Federal   
rules prohibit you from making any further disclosure of this information unless
  
further disclosure is expressly permitted by the written consent of the person 
to   
whom it pertains or as otherwise permitted by 42 CFR Part 2. A general 
authorization   
for the release of medical or other information is NOT sufficient for this 
purpose.   
The Federal rules restrict any use of the information to criminally investigate 
or   
prosecute any alcohol or drug abuse patient.[facility] the event this   
information is protected by the Federal Confidentiality of Alcohol and Drug 
Abuse   
Patient Records regulations: This information has been disclosed to you from 
records   
protected by Federal confidentiality rules (42 CFR Part 2). The Federal rules   
prohibit you from making any further disclosure of this information unless 
further   
disclosure is expressly permitted by the written consent of the person to whom 
it   
pertains or as otherwise permitted by 42 CFR Part 2. A general authorization for
the   
release of medical or other information is NOT sufficient for this purpose. The   
Federal rules restrict any use of the information to criminally investigate or   
prosecute any alcohol or drug abuse patient.[facility] the event this   
information is protected by the Federal Confidentiality of Alcohol and Drug 
Abuse   
Patient Records regulations: This information has been disclosed to you from 
records   
protected by Federal confidentiality rules (42 CFR Part 2). The Federal rules   
prohibit you from making any further disclosure of this information unless 
further   
disclosure is expressly permitted by the written consent of the person to whom 
it   
pertains or as otherwise permitted by 42 CFR Part 2. A general authorization for
the   
release of medical or other information is NOT sufficient for this purpose. The   
Federal rules restrict any use of the information to criminally investigate or   
prosecute any alcohol or drug abuse patient.[facility] the event this   
information is protected by the Federal Confidentiality of Alcohol and Drug 
Abuse   
Patient Records regulations: This information has been disclosed to you from 
records   
protected by Federal confidentiality rules (42 CFR Part 2). The Federal rules   
prohibit you from making any further disclosure of this information unless 
further   
disclosure is expressly permitted by the written consent of the person to whom 
it   
pertains or as otherwise permitted by 42 CFR Part 2. A general authorization for
the   
release of medical or other information is NOT sufficient for this purpose. The   
Federal rules restrict any use of the information to criminally investigate or   
prosecute any alcohol or drug abuse patient.[facility]In the event this   
information is protected by the Federal Confidentiality of Alcohol and Drug 
Abuse   
Patient Records regulations: This information has been disclosed to you from 
records   
protected by Federal confidentiality rules (42 CFR Part 2). The Federal rules   
prohibit you from making any further disclosure of this information unless 
further   
disclosure is expressly permitted by the written consent of the person to whom 
it   
pertains or as otherwise permitted by 42 CFR Part 2. A general authorization for
the   
release of medical or other information is NOT sufficient for this purpose. The   
Federal rules restrict any use of the information to criminally investigate or   
prosecute any alcohol or drug abuse patient.[facility]In the event this   
information is protected by the Federal Confidentiality of Alcohol and Drug 
Abuse   
Patient Records regulations: This information has been disclosed to you from 
records   
protected by Federal confidentiality rules (42 CFR Part 2). The Federal rules   
prohibit you from making any further disclosure of this information unless 
further   
disclosure is expressly permitted by the written consent of the person to whom 
it   
pertains or as otherwise permitted by 42 CFR Part 2. A general authorization for
the   
release of medical or other information is NOT sufficient for this purpose. The   
Federal rules restrict any use of the information to criminally investigate or   
prosecute any alcohol or drug abuse patient.[facility] HospitalIn the event this   
information is protected by the Federal Confidentiality of Alcohol and Drug 
Abuse   
Patient Records regulations: This information has been disclosed to you from 
records   
protected by Federal confidentiality rules (42 CFR Part 2). The Federal rules   
prohibit you from making any further disclosure of this information unless 
further   
disclosure is expressly permitted by the written consent of the person to whom 
it   
pertains or as otherwise permitted by 42 CFR Part 2. A general authorization for
the   
release of medical or other information is NOT sufficient for this purpose. The   
Federal rules restrict any use of the information to criminally investigate or   
prosecute any alcohol or drug abuse patient.[facility] HospitalIn the event this   
information is protected by the Federal Confidentiality of Alcohol and Drug 
Abuse   
Patient Records regulations: This information has been disclosed to you from 
records   
protected by Federal confidentiality rules (42 CFR Part 2). The Federal rules   
prohibit you from making any further disclosure of this information unless 
further   
disclosure is expressly permitted by the written consent of the person to whom 
it   
pertains or as otherwise permitted by 42 CFR Part 2. A general authorization for
the   
release of medical or other information is NOT sufficient for this purpose. The   
Federal rules restrict any use of the information to criminally investigate or   
prosecute any alcohol or drug abuse patient.[facility] HospitalIn the event this   
information is protected by the Federal Confidentiality of Alcohol and Drug 
Abuse   
Patient Records regulations: This information has been disclosed to you from 
records   
protected by Federal confidentiality rules (42 CFR Part 2). The Federal rules   
prohibit you from making any further disclosure of this information unless 
further   
disclosure is expressly permitted by the written consent of the person to whom 
it   
pertains or as otherwise permitted by 42 CFR Part 2. A general authorization for
the   
release of medical or other information is NOT sufficient for this purpose. The   
Federal rules restrict any use of the information to criminally investigate or   
prosecute any alcohol or drug abuse patient.[facility] HospitalIn the event this   
information is protected by the Federal Confidentiality of Alcohol and Drug 
Abuse   
Patient Records regulations: This information has been disclosed to you from 
records   
protected by Federal confidentiality rules (42 CFR Part 2). The Federal rules   
prohibit you from making any further disclosure of this information unless 
further   
disclosure is expressly permitted by the written consent of the person to whom 
it   
pertains or as otherwise permitted by 42 CFR Part 2. A general authorization for
the   
release of medical or other information is NOT sufficient for this purpose. The   
Federal rules restrict any use of the information to criminally investigate or   
prosecute any alcohol or drug abuse patient.[facility] HospitalIn the event this   
information is protected by the Federal Confidentiality of Alcohol and Drug 
Abuse   
Patient Records regulations: This information has been disclosed to you from 
records   
protected by Federal confidentiality rules (42 CFR Part 2). The Federal rules   
prohibit you from making any further disclosure of this information unless 
further   
disclosure is expressly permitted by the written consent of the person to whom 
it   
pertains or as otherwise permitted by 42 CFR Part 2. A general authorization for
the   
release of medical or other information is NOT sufficient for this purpose. The   
Federal rules restrict any use of the information to criminally investigate or   
prosecute any alcohol or drug abuse patient.[facility] HospitalIn the event this   
information is protected by the Federal Confidentiality of Alcohol and Drug 
Abuse   
Patient Records regulations: This information has been disclosed to you from 
records   
protected by Federal confidentiality rules (42 CFR Part 2). The Federal rules   
prohibit you from making any further disclosure of this information unless 
further   
disclosure is expressly permitted by the written consent of the person to whom 
it   
pertains or as otherwise permitted by 42 CFR Part 2. A general authorization for
the   
release of medical or other information is NOT sufficient for this purpose. The   
Federal rules restrict any use of the information to criminally investigate or   
prosecute any alcohol or drug abuse patient.[facility] HospitalIn the event this   
information is protected by the Federal Confidentiality of Alcohol and Drug 
Abuse   
Patient Records regulations: This information has been disclosed to you from 
records   
protected by Federal confidentiality rules (42 CFR Part 2). The Federal rules   
prohibit you from making any further disclosure of this information unless 
further   
disclosure is expressly permitted by the written consent of the person to whom 
it   
pertains or as otherwise permitted by 42 CFR Part 2. A general authorization for
the   
release of medical or other information is NOT sufficient for this purpose. The   
Federal rules restrict any use of the information to criminally investigate or   
prosecute any alcohol or drug abuse patient.[facility] HospitalIn the event this   
information is protected by the Federal Confidentiality of Alcohol and Drug 
Abuse   
Patient Records regulations: This information has been disclosed to you from 
records   
protected by Federal confidentiality rules (42 CFR Part 2). The Federal rules   
prohibit you from making any further disclosure of this information unless 
further   
disclosure is expressly permitted by the written consent of the person to whom 
it   
pertains or as otherwise permitted by 42 CFR Part 2. A general authorization for
the   
release of medical or other information is NOT sufficient for this purpose. The   
Federal rules restrict any use of the information to criminally investigate or   
prosecute any alcohol or drug abuse patient.[facility] HospitalIn the event this   
information is protected by the Federal Confidentiality of Alcohol and Drug 
Abuse   
Patient Records regulations: This information has been disclosed to you from 
records   
protected by Federal confidentiality rules (42 CFR Part 2). The Federal rules   
prohibit you from making any further disclosure of this information unless 
further   
disclosure is expressly permitted by the written consent of the person to whom 
it   
pertains or as otherwise permitted by 42 CFR Part 2. A general authorization for
the   
release of medical or other information is NOT sufficient for this purpose. The   
Federal rules restrict any use of the information to criminally investigate or   
prosecute any alcohol or drug abuse patient.[facility] HospitalIn the event this   
information is protected by the Federal Confidentiality of Alcohol and Drug 
Abuse   
Patient Records regulations: This information has been disclosed to you from 
records   
protected by Federal confidentiality rules (42 CFR Part 2). The Federal rules   
prohibit you from making any further disclosure of this information unless 
further   
disclosure is expressly permitted by the written consent of the person to whom 
it   
pertains or as otherwise permitted by 42 CFR Part 2. A general authorization for
the   
release of medical or other information is NOT sufficient for this purpose. The   
Federal rules restrict any use of the information to criminally investigate or   
prosecute any alcohol or drug abuse patient.[facility] HospitalIn the event this   
information is protected by the Federal Confidentiality of Alcohol and Drug 
Abuse   
Patient Records regulations: This information has been disclosed to you from 
records   
protected by Federal confidentiality rules (42 CFR Part 2). The Federal rules   
prohibit you from making any further disclosure of this information unless 
further   
disclosure is expressly permitted by the written consent of the person to whom 
it   
pertains or as otherwise permitted by 42 CFR Part 2. A general authorization for
the   
release of medical or other information is NOT sufficient for this purpose. The   
Federal rules restrict any use of the information to criminally investigate or   
prosecute any alcohol or drug abuse patient.[facility] HospitalIn the event this   
information is protected by the Federal Confidentiality of Alcohol and Drug 
Abuse   
Patient Records regulations: This information has been disclosed to you from 
records   
protected by Federal confidentiality rules (42 CFR Part 2). The Federal rules   
prohibit you from making any further disclosure of this information unless 
further   
disclosure is expressly permitted by the written consent of the person to whom 
it   
pertains or as otherwise permitted by 42 CFR Part 2. A general authorization for
the   
release of medical or other information is NOT sufficient for this purpose. The   
Federal rules restrict any use of the information to criminally investigate or   
prosecute any alcohol or drug abuse patient.[facility] HospitalIn the event this   
information is protected by the Federal Confidentiality of Alcohol and Drug 
Abuse   
Patient Records regulations: This information has been disclosed to you from 
records   
protected by Federal confidentiality rules (42 CFR Part 2). The Federal rules   
prohibit you from making any further disclosure of this information unless 
further   
disclosure is expressly permitted by the written consent of the person to whom 
it   
pertains or as otherwise permitted by 42 CFR Part 2. A general authorization for
the   
release of medical or other information is NOT sufficient for this purpose. The   
Federal rules restrict any use of the information to criminally investigate or   
prosecute any alcohol or drug abuse patient.[facility] HospitalIn the event this   
information is protected by the Federal Confidentiality of Alcohol and Drug 
Abuse   
Patient Records regulations: This information has been disclosed to you from 
records   
protected by Federal confidentiality rules (42 CFR Part 2). The Federal rules   
prohibit you from making any further disclosure of this information unless 
further   
disclosure is expressly permitted by the written consent of the person to whom 
it   
pertains or as otherwise permitted by 42 CFR Part 2. A general authorization for
the   
release of medical or other information is NOT sufficient for this purpose. The   
Federal rules restrict any use of the information to criminally investigate or   
prosecute any alcohol or drug abuse patient.[facility] HospitalIn the event this   
information is protected by the Federal Confidentiality of Alcohol and Drug 
Abuse   
Patient Records regulations: This information has been disclosed to you from 
records   
protected by Federal confidentiality rules (42 CFR Part 2). The Federal rules   
prohibit you from making any further disclosure of this information unless 
further   
disclosure is expressly permitted by the written consent of the person to whom 
it   
pertains or as otherwise permitted by 42 CFR Part 2. A general authorization for
the   
release of medical or other information is NOT sufficient for this purpose. The   
Federal rules restrict any use of the information to criminally investigate or   
prosecute any alcohol or drug abuse patient.[facility] HospitalIn the event this   
information is protected by the Federal Confidentiality of Alcohol and Drug 
Abuse   
Patient Records regulations: This information has been disclosed to you from 
records   
protected by Federal confidentiality rules (42 CFR Part 2). The Federal rules   
prohibit you from making any further disclosure of this information unless 
further   
disclosure is expressly permitted by the written consent of the person to whom 
it   
pertains or as otherwise permitted by 42 CFR Part 2. A general authorization for
the   
release of medical or other information is NOT sufficient for this purpose. The   
Federal rules restrict any use of the information to criminally investigate or   
prosecute any alcohol or drug abuse patient.[facility] HospitalIn the event this   
information is protected by the Federal Confidentiality of Alcohol and Drug 
Abuse   
Patient Records regulations: This information has been disclosed to you from 
records   
protected by Federal confidentiality rules (42 CFR Part 2). The Federal rules   
prohibit you from making any further disclosure of this information unless 
further   
disclosure is expressly permitted by the written consent of the person to whom 
it   
pertains or as otherwise permitted by 42 CFR Part 2. A general authorization for
the   
release of medical or other information is NOT sufficient for this purpose. The   
Federal rules restrict any use of the information to criminally investigate or   
prosecute any alcohol or drug abuse patient.[facility] HospitalIn the event this   
information is protected by the Federal Confidentiality of Alcohol and Drug 
Abuse   
Patient Records regulations: This information has been disclosed to you from 
records   
protected by Federal confidentiality rules (42 CFR Part 2). The Federal rules   
prohibit you from making any further disclosure of this information unless 
further   
disclosure is expressly permitted by the written consent of the person to whom 
it   
pertains or as otherwise permitted by 42 CFR Part 2. A general authorization for
the   
release of medical or other information is NOT sufficient for this purpose. The   
Federal rules restrict any use of the information to criminally investigate or   
prosecute any alcohol or drug abuse patient.[facility] HospitalIn the event this   
information is protected by the Federal Confidentiality of Alcohol and Drug 
Abuse   
Patient Records regulations: This information has been disclosed to you from 
records   
protected by Federal confidentiality rules (42 CFR Part 2). The Federal rules   
prohibit you from making any further disclosure of this information unless 
further   
disclosure is expressly permitted by the written consent of the person to whom 
it   
pertains or as otherwise permitted by 42 CFR Part 2. A general authorization for
the   
release of medical or other information is NOT sufficient for this purpose. The   
Federal rules restrict any use of the information to criminally investigate or   
prosecute any alcohol or drug abuse patient.[facility] HospitalIn the event this   
information is protected by the Federal Confidentiality of Alcohol and Drug 
Abuse   
Patient Records regulations: This information has been disclosed to you from 
records   
protected by Federal confidentiality rules (42 CFR Part 2). The Federal rules   
prohibit you from making any further disclosure of this information unless 
further   
disclosure is expressly permitted by the written consent of the person to whom 
it   
pertains or as otherwise permitted by 42 CFR Part 2. A general authorization for
the   
release of medical or other information is NOT sufficient for this purpose. The   
Federal rules restrict any use of the information to criminally investigate or   
prosecute any alcohol or drug abuse patient.[facility] HospitalIn the event this   
information is protected by the Federal Confidentiality of Alcohol and Drug 
Abuse   
Patient Records regulations: This information has been disclosed to you from 
records   
protected by Federal confidentiality rules (42 CFR Part 2). The Federal rules   
prohibit you from making any further disclosure of this information unless 
further   
disclosure is expressly permitted by the written consent of the person to whom 
it   
pertains or as otherwise permitted by 42 CFR Part 2. A general authorization for
the   
release of medical or other information is NOT sufficient for this purpose. The   
Federal rules restrict any use of the information to criminally investigate or   
prosecute any alcohol or drug abuse patient.[facility]In the event this   
information is protected by the Federal Confidentiality of Alcohol and Drug 
Abuse   
Patient Records regulations: This information has been disclosed to you from 
records   
protected by Federal confidentiality rules (42 CFR Part 2). The Federal rules   
prohibit you from making any further disclosure of this information unless 
further   
disclosure is expressly permitted by the written consent of the person to whom 
it   
pertains or as otherwise permitted by 42 CFR Part 2. A general authorization for
the   
release of medical or other information is NOT sufficient for this purpose. The   
Federal rules restrict any use of the information to criminally investigate or   
prosecute any alcohol or drug abuse patient.[facility] HospitalIn the event this   
information is protected by the Federal Confidentiality of Alcohol and Drug 
Abuse   
Patient Records regulations: This information has been disclosed to you from 
records   
protected by Federal confidentiality rules (42 CFR Part 2). The Federal rules   
prohibit you from making any further disclosure of this information unless 
further   
disclosure is expressly permitted by the written consent of the person to whom 
it   
pertains or as otherwise permitted by 42 CFR Part 2. A general authorization for
the   
release of medical or other information is NOT sufficient for this purpose. The   
Federal rules restrict any use of the information to criminally investigate or   
prosecute any alcohol or drug abuse patient.[facility] HospitalIn the event this   
information is protected by the Federal Confidentiality of Alcohol and Drug 
Abuse   
Patient Records regulations: This information has been disclosed to you from 
records   
protected by Federal confidentiality rules (42 CFR Part 2). The Federal rules   
prohibit you from making any further disclosure of this information unless 
further   
disclosure is expressly permitted by the written consent of the person to whom 
it   
pertains or as otherwise permitted by 42 CFR Part 2. A general authorization for
the   
release of medical or other information is NOT sufficient for this purpose. The   
Federal rules restrict any use of the information to criminally investigate or   
prosecute any alcohol or drug abuse patient.[facility] HospitalIn the event this   
information is protected by the Federal Confidentiality of Alcohol and Drug 
Abuse   
Patient Records regulations: This information has been disclosed to you from 
records   
protected by Federal confidentiality rules (42 CFR Part 2). The Federal rules   
prohibit you from making any further disclosure of this information unless 
further   
disclosure is expressly permitted by the written consent of the person to whom 
it   
pertains or as otherwise permitted by 42 CFR Part 2. A general authorization for
the   
release of medical or other information is NOT sufficient for this purpose. The   
Federal rules restrict any use of the information to criminally investigate or   
prosecute any alcohol or drug abuse patient.[facility] HospitalIn the event this   
information is protected by the Federal Confidentiality of Alcohol and Drug 
Abuse   
Patient Records regulations: This information has been disclosed to you from 
records   
protected by Federal confidentiality rules (42 CFR Part 2). The Federal rules   
prohibit you from making any further disclosure of this information unless 
further   
disclosure is expressly permitted by the written consent of the person to whom 
it   
pertains or as otherwise permitted by 42 CFR Part 2. A general authorization for
the   
release of medical or other information is NOT sufficient for this purpose. The   
Federal rules restrict any use of the information to criminally investigate or   
prosecute any alcohol or drug abuse patient.[facility] HospitalIn the event this   
information is protected by the Federal Confidentiality of Alcohol and Drug 
Abuse   
Patient Records regulations: This information has been disclosed to you from 
records   
protected by Federal confidentiality rules (42 CFR Part 2). The Federal rules   
prohibit you from making any further disclosure of this information unless 
further   
disclosure is expressly permitted by the written consent of the person to whom 
it   
pertains or as otherwise permitted by 42 CFR Part 2. A general authorization for
the   
release of medical or other information is NOT sufficient for this purpose. The   
Federal rules restrict any use of the information to criminally investigate or   
prosecute any alcohol or drug abuse patient.[facility] HospitalIn the event this   
information is protected by the Federal Confidentiality of Alcohol and Drug 
Abuse   
Patient Records regulations: This information has been disclosed to you from 
records   
protected by Federal confidentiality rules (42 CFR Part 2). The Federal rules   
prohibit you from making any further disclosure of this information unless 
further   
disclosure is expressly permitted by the written consent of the person to whom 
it   
pertains or as otherwise permitted by 42 CFR Part 2. A general authorization for
the   
release of medical or other information is NOT sufficient for this purpose. The   
Federal rules restrict any use of the information to criminally investigate or   
prosecute any alcohol or drug abuse patient.[facility] HospitalIn the event this   
information is protected by the Federal Confidentiality of Alcohol and Drug 
Abuse   
Patient Records regulations: This information has been disclosed to you from 
records   
protected by Federal confidentiality rules (42 CFR Part 2). The Federal rules   
prohibit you from making any further disclosure of this information unless 
further   
disclosure is expressly permitted by the written consent of the person to whom 
it   
pertains or as otherwise permitted by 42 CFR Part 2. A general authorization for
the   
release of medical or other information is NOT sufficient for this purpose. The   
Federal rules restrict any use of the information to criminally investigate or   
prosecute any alcohol or drug abuse patient.[facility] Hospital[facility] the event this   
information is protected by the Federal Confidentiality of Alcohol and Drug 
Abuse   
Patient Records regulations: This information has been disclosed to you from 
records   
protected by Federal confidentiality rules (42 CFR Part 2). The Federal rules   
prohibit you from making any further disclosure of this information unless 
further   
disclosure is expressly permitted by the written consent of the person to whom 
it   
pertains or as otherwise permitted by 42 CFR Part 2. A general authorization for
the   
release of medical or other information is NOT sufficient for this purpose. The   
Federal rules restrict any use of the information to criminally investigate or   
prosecute any alcohol or drug abuse patient.[facility] Hospital[facility] the event this   
information is protected by the Federal Confidentiality of Alcohol and Drug 
Abuse   
Patient Records regulations: This information has been disclosed to you from 
records   
protected by Federal confidentiality rules (42 CFR Part 2). The Federal rules   
prohibit you from making any further disclosure of this information unless 
further   
disclosure is expressly permitted by the written consent of the person to whom 
it   
pertains or as otherwise permitted by 42 CFR Part 2. A general authorization for
the   
release of medical or other information is NOT sufficient for this purpose. The   
Federal rules restrict any use of the information to criminally investigate or   
prosecute any alcohol or drug abuse patient.[facility]  
  
  
  
  
                                                    Reason for Visit (unrecogniz  
ed section and content)  
   
                                          
  
  
  
                                          
   
                                          
  
  
  
                          Specialty    Diagnoses / Procedures Referred By Contac  
t Referred To Contact  
   
                                                    Internal Medicine /   
INTERNAL MEDICINE                         
  
  
Diagnoses  
  
  
3 month follow up  
  
  
  
Procedures  
  
  
4C EST                                    
  
  
Self                                      
  
  
Cynthia Okeefe MD  
  
  
[address] [place] [address]  
  
  
Phone: [phone]  
  
  
Fax: [phone]  
  
  
  
                    Referral ID Status    Reason    Start Date Expiration Date V  
isits   
Requested                               Visits   
Authorized  
   
                                [id number]        Closed            
  
  
Patient   
Cleared   
INN/SMCP   
Payor Auth   
Obtained        2022      1               1  
  
  
  
                                        Reason              Comments  
   
                                        Refill Request        
  
  
  
                                Reason          Onset Date      Comments  
   
                                Refill Request  2022        
  
  
  
                                Reason          Onset Date      Comments  
   
                                Refill Request  2022        
  
  
  
                                Reason          Onset Date      Comments  
   
                                Population Health Navigation Outreach 2022  
      [facility] care gaps  
  
  
  
                                        Reason              Comments  
   
                                        Lab Orders            
   
                                        Orders                
  
  
  
                                Reason          Onset Date      Comments  
   
                                Refill Request  2022        
  
  
  
                                        Reason              Comments  
   
                                        Results               
  
  
  
                                Reason          Onset Date      Comments  
   
                                Population Health Navigation Outreach 2022  
      [facility]  
  
  
  
                                        Reason              Comments  
   
                                        Cough               Cough, chest congest  
ion and scratchy throat x 10 days  
  
  
  
                                Reason          Onset Date      Comments  
   
                                Refill Request  2022        
  
  
  
                                        Reason              Comments  
   
                                        Constipation          
  
  
  
                                Reason          Onset Date      Comments  
   
                                Refill Request  2023        
  
  
  
                                        Reason              Comments  
   
                                        Same Day Appointment right upper thigh b  
ump size of pea  
  
  
  
                                        Reason              Comments  
   
                                        Established Patient   
  
  
  
                          Specialty    Diagnoses / Procedures Referred By Contac  
t Referred To Contact  
   
                                        HEMATOLOGY/ONCOLOGY   
  
  
Diagnoses  
  
  
OV  
  
  
  
Procedures  
  
  
OFFICE VISIT, EST PT.,   
LEVEL 2 TC  
  
  
OV                                        
  
  
Jacquelyn Milner MD  
  
  
[address]  
  
  
[place] [address]  
  
  
Phone: [phone]  
  
  
Fax: [phone]                         
  
  
Juan Pending sale to [facility]  
  
  
[address]  
  
  
[place] [address]  
  
  
Phone: [phone]  
  
  
Fax: [phone]  
  
  
  
                          Referral ID  Status       Reason       Start   
Date                                    Expiration   
Date                                    Visits   
Requested                               Visits   
Authorized  
   
                                        [id number]            Pending   
Review                                    
  
  
OON/Self Pay   
Override                                  
2                   2023           1                   1  
  
  
  
                                        Reason              Comments  
   
                                        Mammogram Result Call Back   
  
  
  
                                        Reason              Comments  
   
                                        Follow Up           Referral to Dr. Cochran  
no  
  
  
  
                                        Reason              Comments  
   
                                        Consult               
   
                                        Blood In Urine        
  
  
  
                          Specialty    Diagnoses / Procedures Referred By Contac  
t Referred To Contact  
   
                                        Urology               
  
  
Diagnoses  
  
  
Gross hematuria  
  
  
  
Procedures  
  
  
CONSULT TO UROLOGY  
  
  
OFFICE/OUTPATIENT NEW HIGH   
MDM 60-74 MINUTES                         
  
  
Francis Shell DO  
  
  
[address]  
  
  
[place] [address]  
  
  
Phone: [phone]  
  
  
Fax: [phone]                         
  
  
  
  
  
                    Referral ID Status    Reason    Start Date Expiration Date V  
isits   
Requested                               Visits   
Authorized  
   
                                [id number]        Closed            
  
  
PCP Requested   
Referral        2023        1               1  
  
  
  
                                Reason          Onset Date      Comments  
   
                                Population Health Navigation Outreach 2023  
      [facility] Care Gaps  
  
  
  
                                        Reason              Comments  
   
                                        Consult             Abnormal mammogram  
  
  
  
                                        Reason              Comments  
   
                                        Patient Question      
  
  
  
                                        Reason              Comments  
   
                                        Results             Right breast biopsy   
results  
  
  
  
                                        Reason              Comments  
   
                                        Post Op Follow Up     
  
  
  
                                        Reason              Comments  
   
                                        F/U 6 months          
  
  
  
                                        Reason              Comments  
   
                                        Medication concern    
  
  
  
                                Reason          Onset Date      Comments  
   
                                Refill Request  2023        
  
  
  
                                Reason          Onset Date      Comments  
   
                                Refill Request  2023        
  
  
  
                                        Reason              Comments  
   
                                        F/U 6 months          
  
  
  
  
  
                                                    Care Teams (unrecognized sec  
tion and content)  
   
                                          
  
  
  
                                          
   
                                          
   
                                          
   
                                          
  
  
  
                      Team Member Relationship Specialty  Start Date End Date  
   
                                                      
  
  
Cynthia Okeefe MD  
  
  
[address] [place], [place] [address] [phone] (Work)  
  
  
[phone] (Fax) PCP - General   Internal Medicine 6/3/14            
   
                                                      
  
  
Rosaline Damon MD, MD  
  
  
[address] [facility] [place], [place] [address] [phone] (Work)  
  
  
[phone] (Fax) Physician       Radiation Oncology 17           
   
                                                      
  
  
Martha Steel RN                         Specialty Care   
Coordinator         Oncology            18               
  
  
  
                      Team Member Relationship Specialty  Start Date End Date  
   
                                                      
  
  
Cynthia Okeefe MD  
  
  
[address] [place], [place] [address] [phone] (Work)  
  
  
[phone] (Fax) PCP - General   Internal Medicine 6/3/14            
   
                                                      
  
  
Rosaline Damon MD, MD  
  
  
[address]  
  
  
[place] [address]  
  
  
[phone] (Work)  
  
  
[phone] (Fax) Physician       Radiation Oncology 17           
   
                                                      
  
  
Martha Steel RN                         Specialty Care   
Coordinator         Oncology            18               
  
  
  
                      Team Member Relationship Specialty  Start Date End Date  
   
                                                      
  
  
Cynthia Okeefe MD  
  
  
[address] [place], [place] [address] [phone] (Work)  
  
  
[phone] (Fax) PCP - General   Internal Medicine 6/3/14            
   
                                                      
  
  
Rosaline Damon MD, MD  
  
  
[address]  
  
  
[place] [address]  
  
  
[phone] (Work)  
  
  
[phone] (Fax) Physician       Radiation Oncology 17           
   
                                                      
  
  
Martha Steel RN                         Specialty Care   
Coordinator         Oncology            18               
  
  
  
                      Team Member Relationship Specialty  Start Date End Date  
   
                                                      
  
  
Cynthia Okeefe MD  
  
  
[address] [place] [address]  
  
  
[phone] (Work)  
  
  
[phone] (Fax) PCP - General   Internal Medicine 6/3/14            
   
                                                      
  
  
Rosaline Damon MD, MD  
  
  
[address] [facility] [place] [address]  
  
  
[phone] (Work)  
  
  
[phone] (Fax) Physician       Radiation Oncology 17           
   
                                                      
  
  
Martha Steel RN                         Specialty Care   
Coordinator         Oncology            18               
  
  
  
                      Team Member Relationship Specialty  Start Date End Date  
   
                                                      
  
  
Cynthia Okeefe MD  
  
  
[address] [place] [address]  
  
  
[phone] (Work)  
  
  
[phone] (Fax) PCP - General   Internal Medicine 6/3/14            
   
                                                      
  
  
Rosaline Damon MD, MD  
  
  
[address] [facility] [place] [address]  
  
  
[phone] (Work)  
  
  
[phone] (Fax) Physician       Radiation Oncology 17           
   
                                                      
  
  
Martha Steel RN                         Specialty Care   
Coordinator         Oncology            18               
  
  
  
                      Team Member Relationship Specialty  Start Date End Date  
   
                                                      
  
  
Cynthia Okeefe MD  
  
  
[address] [place] [address]  
  
  
[phone] (Work)  
  
  
[phone] (Fax) PCP - General   Internal Medicine 6/3/14            
   
                                                      
  
  
Rosaline Damon MD, MD  
  
  
[address] [facility] [place] [address]  
  
  
[phone] (Work)  
  
  
[phone] (Fax) Physician       Radiation Oncology 17           
   
                                                      
  
  
Martha Steel RN                         Specialty Care   
Coordinator         Oncology            18               
  
  
  
                      Team Member Relationship Specialty  Start Date End Date  
   
                                                      
  
  
Cynthia Okeefe MD  
  
  
[address] [place] [address]  
  
  
[phone] (Work)  
  
  
[phone] (Fax) PCP - General   Internal Medicine 6/3/14            
   
                                                      
  
  
Rosaline Damon MD, MD  
  
  
[address] [facility] [place] [address]  
  
  
[phone] (Work)  
  
  
[phone] (Fax) Physician       Radiation Oncology 17           
   
                                                      
  
  
Martha Steel RN                         Specialty Care   
Coordinator         Oncology            18               
  
  
  
                      Team Member Relationship Specialty  Start Date End Date  
   
                                                      
  
  
Cynthia Okeefe MD  
  
  
[address] [place] [address]  
  
  
[phone] (Work)  
  
  
[phone] (Fax) PCP - General   Internal Medicine 6/3/14            
   
                                                      
  
  
Rosaline Damon MD, MD  
  
  
[address] [facility] [place] [address]  
  
  
[phone] (Work)  
  
  
[phone] (Fax) Physician       Radiation Oncology 17           
   
                                                      
  
  
Martha Steel RN                         Specialty Care   
Coordinator         Oncology            18               
  
  
  
                      Team Member Relationship Specialty  Start Date End Date  
   
                                                      
  
  
Cynthia Okeefe MD  
  
  
[address] [place] [address]  
  
  
[phone] (Work)  
  
  
[phone] (Fax) PCP - General   Internal Medicine 6/3/14            
   
                                                      
  
  
Rosaline Damon MD, MD  
  
  
[address] [facility] [place] [address]  
  
  
[phone] (Work)  
  
  
[phone] (Fax) Physician       Radiation Oncology 17           
   
                                                      
  
  
Martha Steel RN                         Specialty Care   
Coordinator         Oncology            18               
  
  
  
                      Team Member Relationship Specialty  Start Date End Date  
   
                                                      
  
  
Cynthia Okeefe MD  
  
  
[address] [place] [address]  
  
  
[phone] (Work)  
  
  
[phone] (Fax) PCP - General   Internal Medicine 6/3/14            
   
                                                      
  
  
Rosaline Damon MD, MD  
  
  
[address] [facility] [place] [address]  
  
  
[phone] (Work)  
  
  
[phone] (Fax) Physician       Radiation Oncology 17           
   
                                                      
  
  
Martha Steel RN                         Specialty Care   
Coordinator         Oncology            18               
  
  
  
                      Team Member Relationship Specialty  Start Date End Date  
   
                                                      
  
  
Cynthia Okeefe MD  
  
  
[address] [place] [address]  
  
  
[phone] (Work)  
  
  
[phone] (Fax) PCP - General   Internal Medicine 6/3/14            
   
                                                      
  
  
Rosaline Damon MD, MD  
  
  
[address] [facility] [place] [address]  
  
  
[phone] (Work)  
  
  
[phone] (Fax) Physician       Radiation Oncology 17           
   
                                                      
  
  
Martha Steel RN                         Specialty Care   
Coordinator         Oncology            18               
  
  
  
                      Team Member Relationship Specialty  Start Date End Date  
   
                                                      
  
  
Cynhtia Okeefe MD  
  
  
[address] [place] [address]  
  
  
[phone] (Work)  
  
  
[phone] (Fax) PCP - General   Internal Medicine 6/3/14            
   
                                                      
  
  
Rosaline Damon MD, MD  
  
  
[address] [place] [address]  
  
  
[phone] (Work)  
  
  
[phone] (Fax) Physician       Radiation Oncology 17           
   
                                                      
  
  
Martha Steel RN                         Specialty Care   
Coordinator         Oncology            18               
  
  
  
                      Team Member Relationship Specialty  Start Date End Date  
   
                                                      
  
  
Cynthia Okeefe MD  
  
  
[address] [place] [address]  
  
  
[phone] (Work)  
  
  
[phone] (Fax) PCP - General   Internal Medicine 6/3/14            
   
                                                      
  
  
Rosaline Damon MD, MD  
  
  
[address] [place] [address]  
  
  
[phone] (Work)  
  
  
[phone] (Fax) Physician       Radiation Oncology 17           
   
                                                      
  
  
Martha Steel RN                         Specialty Care   
Coordinator         Oncology            18               
  
  
  
                      Team Member Relationship Specialty  Start Date End Date  
   
                                                      
  
  
Cynthia Okeefe MD  
  
  
[address] [place] [address]  
  
  
[phone] (Work)  
  
  
[phone] (Fax) PCP - General   Internal Medicine 6/3/14            
   
                                                      
  
  
Rosaline Damon MD, MD  
  
  
[address] [place] [address]  
  
  
[phone] (Work)  
  
  
[phone] (Fax) Physician       Radiation Oncology 17           
   
                                                      
  
  
Martha Steel RN                         Specialty Care   
Coordinator         Oncology            18               
  
  
  
                      Team Member Relationship Specialty  Start Date End Date  
   
                                                      
  
  
Cynthia Okeefe MD  
  
  
[address] [place] [address]  
  
  
[phone] (Work)  
  
  
[phone] (Fax) PCP - General   Internal Medicine 6/3/14            
   
                                                      
  
  
Rosaline Damon MD, MD  
  
  
[address] [facility] [place] [address]  
  
  
[phone] (Work)  
  
  
[phone] (Fax) Physician       Radiation Oncology 17           
   
                                                      
  
  
Martha Steel RN                         Specialty Care   
Coordinator         Oncology            18               
  
  
  
                      Team Member Relationship Specialty  Start Date End Date  
   
                                                      
  
  
Cynthia Okeefe MD  
  
  
[address] [place] [address]  
  
  
[phone] (Work)  
  
  
[phone] (Fax) PCP - General   Internal Medicine 6/3/14            
   
                                                      
  
  
Rosaline Damon MD, MD  
  
  
[address] [facility] [place] [address]  
  
  
[phone] (Work)  
  
  
[phone] (Fax) Physician       Radiation Oncology 17           
   
                                                      
  
  
Martha Steel RN                         Specialty Care   
Coordinator         Oncology            18               
  
  
  
                      Team Member Relationship Specialty  Start Date End Date  
   
                                                      
  
  
Cynthia Okeefe MD  
  
  
[address] [place] [address]  
  
  
[phone] (Work)  
  
  
[phone] (Fax) PCP - General   Internal Medicine 6/3/14            
   
                                                      
  
  
Rosaline Damon MD, MD  
  
  
[address] [place] [address]  
  
  
[phone] (Work)  
  
  
[phone] (Fax) Physician       Radiation Oncology 17           
   
                                                      
  
  
Martha Steel RN                         Specialty Care   
Coordinator         Oncology            18               
  
  
  
                      Team Member Relationship Specialty  Start Date End Date  
   
                                                      
  
  
Cynthia Okeefe MD  
  
  
[address] [place] [address]  
  
  
[phone] (Work)  
  
  
[phone] (Fax) PCP - General   Internal Medicine 6/3/14            
   
                                                      
  
  
Rosaline Damon MD, MD  
  
  
[address] [place] [address]  
  
  
[phone] (Work)  
  
  
[phone] (Fax) Physician       Radiation Oncology 17           
   
                                                      
  
  
Martha Steel RN                         Specialty Care   
Coordinator         Oncology            18               
  
  
  
                      Team Member Relationship Specialty  Start Date End Date  
   
                                                      
  
  
Cynthia Okeefe MD  
  
  
[address] [place] [address]  
  
  
[phone] (Work)  
  
  
[phone] (Fax) PCP - General   Internal Medicine 6/3/14            
   
                                                      
  
  
Rosaline Damon MD, MD  
  
  
[address] [place] [address]  
  
  
[phone] (Work)  
  
  
[phone] (Fax) Physician       Radiation Oncology 17           
   
                                                      
  
  
Martha Steel RN                         Specialty Care   
Coordinator         Oncology            18               
  
  
  
                      Team Member Relationship Specialty  Start Date End Date  
   
                                                      
  
  
Cynthia Okeefe MD  
  
  
NPI: [id number]  
  
  
[address] [place] [address]  
  
  
[phone] (Work)  
  
  
[phone] (Fax) PCP - General   Internal Medicine 6/3/14            
   
                                                      
  
  
Rosaline Damon MD, MD  
  
  
NPI: [id number]  
  
  
[address] [place] [address]  
  
  
[phone] (Work)  
  
  
[phone] (Fax) Physician       Radiation Oncology 17           
   
                                                      
  
  
Martha Steel RN                         Specialty Care   
Coordinator         Oncology            18               
  
  
  
                      Team Member Relationship Specialty  Start Date End Date  
   
                                                      
  
  
Cynthia Okeefe MD  
  
  
NPI: [id number]  
  
  
[address] [place] [address]  
  
  
[phone] (Work)  
  
  
[phone] (Fax) PCP - General   Internal Medicine 6/3/14            
   
                                                      
  
  
Rosaline Damon MD, MD  
  
  
NPI: [id number]  
  
  
[address] [place] [address]  
  
  
[phone] (Work)  
  
  
[phone] (Fax) Physician       Radiation Oncology 17           
   
                                                      
  
  
Martha Steel RN                         Specialty Care   
Coordinator         Oncology            18               
  
  
  
                      Team Member Relationship Specialty  Start Date End Date  
   
                                                      
  
  
Cynthia Okeefe MD  
  
  
NPI: [id number]  
  
  
[address] [place] [address]  
  
  
[phone] (Work)  
  
  
[phone] (Fax) PCP - General   Internal Medicine 6/3/14            
   
                                                      
  
  
Rosaline Damon MD, MD  
  
  
NPI: [id number]  
  
  
[address] [place] [address]  
  
  
[phone] (Work)  
  
  
[phone] (Fax) Physician       Radiation Oncology 17           
   
                                                      
  
  
Martha Steel RN                         Specialty Care   
Coordinator         Oncology            18               
  
  
  
                      Team Member Relationship Specialty  Start Date End Date  
   
                                                      
  
  
Cynthia Okeefe MD  
  
  
NPI: [id number]  
  
  
[address] [place] [address]  
  
  
[phone] (Work)  
  
  
[phone] (Fax) PCP - General   Internal Medicine 6/3/14            
   
                                                      
  
  
Rosaline Damon MD, MD  
  
  
NPI: [id number]  
  
  
[address] [place] [address]  
  
  
[phone] (Work)  
  
  
[phone] (Fax) Physician       Radiation Oncology 17           
   
                                                      
  
  
Martha Steel RN                         Specialty Care   
Coordinator         Oncology            18               
  
  
  
                      Team Member Relationship Specialty  Start Date End Date  
   
                                                      
  
  
Cynthia Okeefe MD  
  
  
NPI: [id number]  
  
  
[address] [place], [place] [address] [phone] (Work)  
  
  
[phone] (Fax) PCP - General   Internal Medicine 6/3/14            
   
                                                      
  
  
Rosaline Damon MD, MD  
  
  
NPI: [id number]  
  
  
[address], [place] [id number] [phone] (Work)  
  
  
[phone] (Fax) Physician       Radiation Oncology 17           
   
                                                      
  
  
Martha Steel RN                         Specialty Care   
Coordinator         Oncology            18               
  
  
FOR RECORDS PERTAINING TO PATIENTS WHO ARE OR HAVE BEEN ENROLLED IN A CHEMICAL 
DEPENDENCY/SUBSTANCEABUSE PROGRAM, SOME INFORMATION MAY BE OMITTED. This 
clinical summary was aggregated from multiple sources. Caution should be 
exercised in using it in the provision of clinical care. This summary normalizes
information from multiple sources, and as a consequence, information in this 
document may materially change the coding, format and clinical context of 
patient data. In addition, data may be omitted in some cases. CLINICAL DECISIONS
SHOULD BE BASED ON THE PRIMARY CLINICAL RECORDS. [employer] [facility]. provides 
no warranty or guarantee of the accuracy or completeness of information in this 
document.

## 2024-01-29 NOTE — [id number]
HPI
History of Present Illness
Chief Complaint: Abd Pain
Informant: patient
Narrative
Narrative: 
63-year-old female with a history of diverticulitis.  She states that since Saturday she has not really had much of an appetite.  She states she has been drinking lots of fluids.  She has not had a fever.  She notes some pain in the left lower 
quadrant and suprapubic region.  She states it is similar to the pain that she had when she had diverticulitis.  She reports that today she had several loose stools which she states is not unexpected for her.  She also notes nasal congestion and 
drainage into her throat.  She now notes a frontal headache.  She also feels bloated.  She states the pain in the lower abdomen seems to radiate to her low back.

[facility]
Medical History (Reviewed 01/29/24 @ 20:50 by Dr. Trey Gonzalez, )

Arthritis
Bone spur
Cancer
Diabetes
Former smoker
History of diverticulitis
History of stress test
HTN (hypertension)
Hypertension
Invasive ductal carcinoma of right breast
Rash
Wears glasses


Home Medications

metformin 500 mg tablet (Glucophage) 500 mg PO DAILY diabetes 04/10/17 [History Last Taken 01/21/23 10:00]
multivit-iron 18 mg-folic acid 400 mcg-calcium 500 mg-minerals tablet (Women's Daily Formula) 1 ea PO DAILY supplememnt 04/10/17 [History Last Taken 01/21/23 10:00]
atenolol 50 mg tablet 50 mg PO DAILY blood pressure 01/22/23 [History Last Taken 05/01/23]
chlorthalidone 25 mg tablet 12.5 mg PO DAILY water pill 01/22/23 [History Last Taken 01/21/23 10:00]
exemestane 25 mg tablet 25 mg PO DAILY breast cancer 01/22/23 [History Last Taken 01/21/23 10:00]
glipizide 2.5 mg tablet, extended release 24 hr 2.5 mg PO DAILY diabetes 01/22/23 [History Last Taken 01/21/23 10:00]
potassium chloride 20 mEq tablet,extended release 20 meq PO BID supplement 01/22/23 [History Last Taken 01/21/23 18:00]
amoxicillin 875 mg-potassium clavulanate 125 mg tablet 1 tab PO BID #19 tabs 01/29/24 [Rx Last Taken Unknown]




Allergy/AdvReac Type Severity Reaction Status Date / Time
silver sulfadiazine Allergy  Hives Verified 01/29/24 18:59
[From Hank]     


Family History (Reviewed 01/29/24 @ 20:50 by Dr. Trey Gonzalez DO)
Other
 Cancer



Surgical History (Reviewed 01/29/24 @ 20:50 by Dr. Trey Gonzalez DO)

H/O: hysterectomy
History of cholecystectomy
History of foot surgery
S/P lumpectomy, right breast


Social History (Reviewed 01/29/24 @ 20:50 by Dr. Trey Gonzalez DO)
Smoking Status:  Former smoker 



ROS
ROS ED
Constitutional
Constitutional ED: Reports chills; Denies fever(s), sweats or weight loss
Eyes
Eyes: Denies change in vision or diplopia
ENT
ENT ED: Reports rhinorrhea; Denies ear pain or sore throat
Cardiovascular
Cardiovascular: Denies chest pain, orthopnea, palpitations or racing heartbeat
Respiratory/Chest
Respiratory/Chest: Denies cough, dyspnea or orthopnea
Gastrointestinal
Gastrointestinal: Reports abdominal pain, diarrhea and other Details: Anorexia ; 
Denies nausea or vomiting
Genitourinary
Genitourinary ED: Denies dysuria, hematuria or urinary frequency
Musculoskeletal
Musculoskeletal: Reports back pain; Denies arthralgias or myalgias
Integumentary
Denies abscess or rash
Neurologic
Neurologic: Denies headache(s) or weakness
Psychiatric
Psychiatric: Denies anxiety, depression, suicidal ideation or suicidal thoughts
Endocrine
Endocrinology: Denies polydipsia, polyphagia or polyuria
Allergic/Immunologic
Allergic/Immunologic ED: Denies mouth swelling, tongue swelling or urticaria

EXAM
Physical Exam
Const
Vital Signs: 



 01/29/24
18:59 01/29/24
23:00
Temperature 98.9 F 
Temperature Source Temporal 
Pulse Rate 71 75
Respiratory Rate 18 16
Blood Pressure 126/96 H 
Blood Pressure Mean 106 
Pulse Ox 99 99
Oxygen Delivery Method Room Air Room Air



Positive well nourished, well developed and obese
General Appearance ED: well developed
Nutritional Appearance: obese
HEENT
Reports normocephalic, head/scalp atraumatic and moist mucous membranes
HEENT Narrative: 
Nasal congestion mild frontal sinus tenderness to palpation
Eyes
PERRL and EOMs intact bilaterally
Neck
no lymphadenopathy, supple and no JVD
Resp
normal respiratory effort and clear to auscultation bilaterally
Cardio
regular rate, regular rhythm and no murmurs
GI
Inspection: Negative for abdominal distention
Auscultation: normoactive bowel sounds
Palpation: soft and tender LLQ and suprapubic; Negative for guarding or rebound tenderness present
Back/Spine
no CVA tenderness and normal ROM
Extremity
normal to inspection
General Extremety ED: Negative for edema
General Extremity: Negative for edema
Neuro
oriented x3 and CN's II-XII intact bilaterally
Sensorium / Orientation: alert
Motor Exam: strength 5/5 throughout
Psych
mental status grossly normal
Mood & Affect: Negative for depressed or tearful
Skin
no rashes or lesions noted and no wounds

MDM
MDM
MDM Narrative
Medical decision making narrative: 
Patient's white count was 9.4 with a hemoglobin of 15.6.  Platelet count of 355.  BMP creatinine 1.02 CO2 of 25 alk phos 140 urinalysis with 10-25 white cells rare bacteria negative nitrates urine color is yellow.  CT of the abdomen pelvis was 
obtained.  This was reviewed by myself read by radiology as acute diverticulitis of the sigmoid colon without discrete abscess.  They feel that there is possible microperforation.  They question findings suggesting enterovesicular fistula.  Her 
urine however does not not appear to have stool with that there is a small amount of white cells.  Patient did have colonoscopy by surgery in May 2023.  I spoke with general surgery regarding this patient.  Reviewed the CT together.  Plan is that we 
will start her on Augmentin.  The patient does not wish any pain or nausea medication.  I doubt that this represents a enterovesicular fistula.  There is no obvious abscess.  Microperforation can always be a possibility and be very small.  However 
her exam is not overly tender her white count is normal and she is without fever.  She will follow-up with surgery in the office return if worsening
History & Record Review
Discussion w/independent historian: Patient
Lab Data
Attestation: I reviewed the patient's lab results.
Labs: 

Laboratory Results - last 24 hr

  01/29/24 01/29/24
  19:30 19:40
WBC   9.4
RBC   5.81 H
Hgb   15.6 H
Hct   48.8 H
MCV   84.0
MCH   26.9 L
MCHC   32.0
RDW Std Deviation   45.3 H
RDW Coeff of Elzbieta   15.1 H
Plt Count   355
MPV   9.8
Immature Gran % (Auto)   0.400
Neut % (Auto)   65.9
Lymph % (Auto)   23.0
Mono % (Auto)   8.0
Eos % (Auto)   2.0
Baso % (Auto)   0.7
Absolute Neuts (auto)   6.2
Absolute Lymphs (auto)   2.16
Nucleated RBC %   0
Sodium   138
Potassium   4.0
Chloride   106
Carbon Dioxide   25.0
Anion Gap   7
BUN   8
Creatinine   1.02
Estim Creat Clear Calc   77.82
Est GFR (MDRD) Af Amer   70
Est GFR (MDRD) Non-Af   58 L
BUN/Creatinine Ratio   7.8 L
Glucose   145 H
Calcium   10.6 H
Total Bilirubin   0.50
AST   33
ALT   41
Alkaline Phosphatase   140 H
Total Protein   8.5 H
Albumin   3.6
Globulin   4.9 H
Albumin/Globulin Ratio   0.7 L
Urine Color  Yellow 
Urine Clarity  Sl. Cloudy 
Urine pH  7.0 
Ur Specific Gravity  1.005 
Urine Protein  15 H 
Urine Glucose (UA)  Normal 
Urine Ketones  Negative 
Urine Occult Blood  10 H 
Urine Nitrite  Negative 
Urine Bilirubin  Negative 
Urine Urobilinogen  Normal 
Ur Leukocyte Esterase  500 H 
Urine RBC  0 SEEN 
Urine WBC  10-25 SEEN 
Ur Squamous Epith Cells  0-5 SEEN 
Urine Bacteria  RARE 
Urine Mucus  0 SEEN 



Radiography
Diagnostic Testing: 

Clinical Impression(s) from Imaging Studies

Abdomen/Pelvis CT  01/29/24 20:39
IMPRESSION:
 
Acute diverticulitis of the sigmoid colon. No discrete abscess. Possible
microperforation. Similar appearance of findings suggesting possible
enterovesicular fistula versus adhesions/scarring from prior disease.
 
Electronically Signed:
Misael Johnson DO
2024/01/29 at 21:20 EST
Reading Location ID and State: [address] / [place]
Tel [phone], Service support  [phone], Fax [phone]
 

ADDENDUM: 01/29/24 2154
IMPRESSION:
 
Acute diverticulitis of the sigmoid colon. No discrete abscess. Possible
microperforation. Similar appearance of findings suggesting possible
enterovesicular fistula versus adhesions/scarring from prior disease.
 
N.B. : The above Results were Read Back by Misael Johnson DO to
Trey Gonzalez DO, and understanding confirmed on 01/29/2024 21:47:39 (ET).
 
Electronically Signed:
Misael Johnson DO
2024/01/29 at 21:20 EST
Reading Location ID and State: [address] / [place]
Tel [phone], Service support  [phone], Fax [phone]
 




Management
Discussion w/another healthcare provider: Consultant (Surgery (Dr. Cooper))

Discharge Plan
Triage
Chief Complaint: Abd Pain

ED Provider: Trey Gonzalez

Dx/Rx/DC Orders
Clinical Impression:
 Abdominal pain, Diverticulitis of intestine with perforation without abscess or bleeding


Instructions:  ED Diverticulitis

Prescriptions:
New
  amoxicillin-pot clavulanate 875-125 mg tablet 
   1 tab PO BID Qty: 19 0RF

No Action
  metformin [Glucophage] 500 MG tablet 
   500 mg PO DAILY 
  Women's Daily Formula 1 EACH tablet 
   1 ea PO DAILY 
  exemestane 25 mg tablet 
   25 mg PO DAILY 
  atenolol 50 mg tablet 
   50 mg PO DAILY 
  chlorthalidone 25 mg tablet 
   12.5 mg PO DAILY 
  glipizide 2.5 mg tablet extended release 24hr 
   2.5 mg PO DAILY 
  potassium chloride 20 mEq tablet extended release 
   20 meq PO BID 

Primary Care Provider: Estela Okeefe

Referrals:
Estela Okeefe MD [Primary Care Provider] - 
Eun Patricio MD [Med Staff - Active Staff] - As soon as possible

Disposition
***Disposition***: Home, Self Care

Discharge Date/Time: 01/29/24 23:46

## 2024-01-29 NOTE — CT_ITS
**We are attempting to reach an attending provider to discuss findings. An 
addendum with communication details will be sent when the communication is 
complete. ** 
 
REPORT-ID:[id number] 
 
EXAM:  CT ABDOMEN AND PELVIS WITH INTRAVENOUS CONTRAST 
 
CLINICAL INDICATION:  diverticulitis 
 
TECHNIQUE:  Helically acquired images were obtained of the abdomen and 
pelvis with intravenous contrast.  This CT exam was performed using one or 
more of the following dose reduction techniques:  automated exposure 
control, adjustment of the mA and/or kV according to patient size, and/or 
use of iterative reconstruction technique. 
 
CONTRAST:  IV 100mL Isovue-370 
 
COMPARISON:  3/3/2023 
 
FINDINGS: 
 
LOWER THORAX:  No significant abnormality.  Lung bases are clear.  No 
cardiomegaly.  No significant pericardial effusion. 
 
ABDOMEN: 
 
LIVER:  No significant abnormality.  Homogeneous.  No focal mass. 
 
GALLBLADDER AND BILE DUCTS:  Status post cholecystectomy.  No intra- or 
extrahepatic biliary ductal dilation. 
 
PANCREAS:  No significant abnormality.  No focal cystic or solid mass. 
 
SPLEEN:  No significant abnormality.  Normal size without focal cystic or 
solid mass. 
 
ADRENALS:  No significant abnormality.  No nodules. 
 
KIDNEYS AND URETERS:  Left lower pole parenchymal calcification. No 
obstructive urolithiasis. 
 
STOMACH AND BOWEL:  Small foci of air adjacent to the sigmoid colon may be 
within focal diverticula versus indicative of microperforation.  Colonic 
diverticulosis, mild sigmoid wall thickening, and pericolonic inflammation 
suggesting acute uncomplicated diverticulitis.  No stomach or bowel 
distention. 
 
PELVIS: 
 
APPENDIX:  No evidence of acute appendicitis. 
 
BLADDER:  Strandy opacity within the mesentery between the sigmoid colon 
and the urinary bladder. Urinary bladder wall thickening. No air within the 
urinary bladder lumen. 
 
REPRODUCTIVE: Status post hysterectomy. No mass. 
 
ABDOMEN and PELVIS: 
 
INTRAPERITONEAL SPACE: Strandy opacities adjacent to the sigmoid colon. 
Possible microperforation. No abscess. No significant free fluid. 
 
BONES/JOINTS:  No significant abnormality.  No suspicious lytic or blastic 
abnormality. 
 
SOFT TISSUES:  No significant abnormality.  No discrete abdominal or pelvic 
wall hernia. 
 
VASCULATURE:  No significant abnormality.  Abdominal aorta is non-dilated. 
 
LYMPH NODES:  No significant abnormality.  No enlarged lymph nodes. 
 
ORDER #: [id number] CT/Abdomen/Pelvis W IV Cont ONLY  
IMPRESSION:  
 
  
Acute diverticulitis of the sigmoid colon. No discrete abscess. Possible  
microperforation. Similar appearance of findings suggesting possible  
enterovesicular fistula versus adhesions/scarring from prior disease.  
 
  
Electronically Signed:  
Misael Johnson DO  
2024/01/29 at 21:20 EST  
Reading Location ID and State: [address] / [place]  
Tel [phone], Service support  [phone], Fax [phone]

## 2024-01-29 NOTE — EDS_ITS
HPI    
History of Present Illness    
Chief Complaint: Abd Pain    
Informant: patient    
Narrative    
Narrative:     
63-year-old female with a history of diverticulitis.  She states that since   
Saturday she has not really had much of an appetite.  She states she has been   
drinking lots of fluids.  She has not had a fever.  She notes some pain in the   
left lower quadrant and suprapubic region.  She states it is similar to the pain  
that she had when she had diverticulitis.  She reports that today she had   
several loose stools which she states is not unexpected for her.  She also notes  
nasal congestion and drainage into her throat.  She now notes a frontal   
headache.  She also feels bloated.  She states the pain in the lower abdomen   
seems to radiate to her low back.    
    
[facility]    
Medical History (Reviewed 01/29/24 @ 20:50 by Dr. Trey Gonzalez, )    
    
Arthritis    
Bone spur    
Cancer    
Diabetes    
Former smoker    
History of diverticulitis    
History of stress test    
HTN (hypertension)    
Hypertension    
Invasive ductal carcinoma of right breast    
Rash    
Wears glasses    
    
    
                                Home Medications    
    
metformin 500 mg tablet (Glucophage) 500 mg PO DAILY diabetes 04/10/17 [History   
Last Taken 01/21/23 10:00]    
multivit-iron 18 mg-folic acid 400 mcg-calcium 500 mg-minerals tablet (Women's   
Daily Formula) 1 ea PO DAILY supplememnt 04/10/17 [History Last Taken 01/21/23   
10:00]    
atenolol 50 mg tablet 50 mg PO DAILY blood pressure 01/22/23 [History Last Taken  
 05/01/23]    
chlorthalidone 25 mg tablet 12.5 mg PO DAILY water pill 01/22/23 [History Last   
Taken 01/21/23 10:00]    
exemestane 25 mg tablet 25 mg PO DAILY breast cancer 01/22/23 [History Last   
Taken 01/21/23 10:00]    
glipizide 2.5 mg tablet, extended release 24 hr 2.5 mg PO DAILY diabetes   
01/22/23 [History Last Taken 01/21/23 10:00]    
potassium chloride 20 mEq tablet,extended release 20 meq PO BID supplement   
01/22/23 [History Last Taken 01/21/23 18:00]    
amoxicillin 875 mg-potassium clavulanate 125 mg tablet 1 tab PO BID #19 tabs   
01/29/24 [Rx Last Taken Unknown]    
    
    
                                            
    
    
    
Allergy/AdvReac Type Severity Reaction Status Date / Time    
     
silver sulfadiazine Allergy  Hives Verified 01/29/24 18:59    
    
[From Hank]         
    
    
    
Family History (Reviewed 01/29/24 @ 20:50 by Dr. Trey Gonzalez DO)    
Other   Cancer    
    
    
    
Surgical History (Reviewed 01/29/24 @ 20:50 by Dr. Trey Gonzalez DO)    
    
H/O: hysterectomy    
History of cholecystectomy    
History of foot surgery    
S/P lumpectomy, right breast    
    
    
Social History (Reviewed 01/29/24 @ 20:50 by Dr. Trey Gonzalez DO)    
Smoking Status:  Former smoker     
    
    
    
ROS    
ROS ED    
Constitutional    
Constitutional ED: Reports chills; Denies fever(s), sweats or weight loss    
Eyes    
Eyes: Denies change in vision or diplopia    
ENT    
ENT ED: Reports rhinorrhea; Denies ear pain or sore throat    
Cardiovascular    
Cardiovascular: Denies chest pain, orthopnea, palpitations or racing heartbeat    
Respiratory/Chest    
Respiratory/Chest: Denies cough, dyspnea or orthopnea    
Gastrointestinal    
Gastrointestinal: Reports abdominal pain, diarrhea and other Details: Anorexia ;  
    
Denies nausea or vomiting    
Genitourinary    
Genitourinary ED: Denies dysuria, hematuria or urinary frequency    
Musculoskeletal    
Musculoskeletal: Reports back pain; Denies arthralgias or myalgias    
Integumentary    
Denies abscess or rash    
Neurologic    
Neurologic: Denies headache(s) or weakness    
Psychiatric    
Psychiatric: Denies anxiety, depression, suicidal ideation or suicidal thoughts    
Endocrine    
Endocrinology: Denies polydipsia, polyphagia or polyuria    
Allergic/Immunologic    
Allergic/Immunologic ED: Denies mouth swelling, tongue swelling or urticaria    
    
EXAM    
Physical Exam    
Const    
Vital Signs:     
    
                                            
    
    
    
 01/29/24    
18:59 01/29/24    
23:00    
     
Temperature 98.9 F     
     
Temperature Source Temporal     
     
Pulse Rate 71 75    
     
Respiratory Rate 18 16    
     
Blood Pressure 126/96 H     
     
Blood Pressure Mean 106     
     
Pulse Ox 99 99    
     
Oxygen Delivery Method Room Air Room Air    
    
    
    
    
Positive well nourished, well developed and obese    
General Appearance ED: well developed    
Nutritional Appearance: obese    
HEENT    
Reports normocephalic, head/scalp atraumatic and moist mucous membranes    
HEENT Narrative:     
Nasal congestion mild frontal sinus tenderness to palpation    
Eyes    
PERRL and EOMs intact bilaterally    
Neck    
no lymphadenopathy, supple and no JVD    
Resp    
normal respiratory effort and clear to auscultation bilaterally    
Cardio    
regular rate, regular rhythm and no murmurs    
GI    
Inspection: Negative for abdominal distention    
Auscultation: normoactive bowel sounds    
Palpation: soft and tender LLQ and suprapubic; Negative for guarding or rebound   
tenderness present    
Back/Spine    
no CVA tenderness and normal ROM    
Extremity    
normal to inspection    
General Extremety ED: Negative for edema    
General Extremity: Negative for edema    
Neuro    
oriented x3 and CN's II-XII intact bilaterally    
Sensorium / Orientation: alert    
Motor Exam: strength 5/5 throughout    
Psych    
mental status grossly normal    
Mood & Affect: Negative for depressed or tearful    
Skin    
no rashes or lesions noted and no wounds    
    
MDM    
MDM    
MDM Narrative    
Medical decision making narrative:     
Patient's white count was 9.4 with a hemoglobin of 15.6.  Platelet count of 355.  
 BMP creatinine 1.02 CO2 of 25 alk phos 140 urinalysis with 10-25 white cells   
rare bacteria negative nitrates urine color is yellow.  CT of the abdomen pelvis  
was obtained.  This was reviewed by myself read by radiology as acute   
diverticulitis of the sigmoid colon without discrete abscess.  They feel that   
there is possible microperforation.  They question findings suggesting   
enterovesicular fistula.  Her urine however does not not appear to have stool   
with that there is a small amount of white cells.  Patient did have colonoscopy   
by surgery in May 2023.  I spoke with general surgery regarding this patient.    
Reviewed the CT together.  Plan is that we will start her on Augmentin.  The   
patient does not wish any pain or nausea medication.  I doubt that this   
represents a enterovesicular fistula.  There is no obvious abscess.    
Microperforation can always be a possibility and be very small.  However her   
exam is not overly tender her white count is normal and she is without fever.    
She will follow-up with surgery in the office return if worsening    
History & Record Review    
Discussion w/independent historian: Patient    
Lab Data    
Attestation: I reviewed the patient's lab results.    
Labs:     
    
                         Laboratory Results - last 24 hr    
    
    
    
  01/29/24 01/29/24    
    
  19:30 19:40    
     
WBC   9.4    
     
RBC   5.81 H    
     
Hgb   15.6 H    
     
Hct   48.8 H    
     
MCV   84.0    
     
MCH   26.9 L    
     
MCHC   32.0    
     
RDW Std Deviation   45.3 H    
     
RDW Coeff of Elzbieta   15.1 H    
     
Plt Count   355    
     
MPV   9.8    
     
Immature Gran % (Auto)   0.400    
     
Neut % (Auto)   65.9    
     
Lymph % (Auto)   23.0    
     
Mono % (Auto)   8.0    
     
Eos % (Auto)   2.0    
     
Baso % (Auto)   0.7    
     
Absolute Neuts (auto)   6.2    
     
Absolute Lymphs (auto)   2.16    
     
Nucleated RBC %   0    
     
Sodium   138    
     
Potassium   4.0    
     
Chloride   106    
     
Carbon Dioxide   25.0    
     
Anion Gap   7    
     
BUN   8    
     
Creatinine   1.02    
     
Estim Creat Clear Calc   77.82    
     
Est GFR (MDRD) Af Amer   70    
     
Est GFR (MDRD) Non-Af   58 L    
     
BUN/Creatinine Ratio   7.8 L    
     
Glucose   145 H    
     
Calcium   10.6 H    
     
Total Bilirubin   0.50    
     
AST   33    
     
ALT   41    
     
Alkaline Phosphatase   140 H    
     
Total Protein   8.5 H    
     
Albumin   3.6    
     
Globulin   4.9 H    
     
Albumin/Globulin Ratio   0.7 L    
     
Urine Color  Yellow     
     
Urine Clarity  Sl. Cloudy     
     
Urine pH  7.0     
     
Ur Specific Gravity  1.005     
     
Urine Protein  15 H     
     
Urine Glucose (UA)  Normal     
     
Urine Ketones  Negative     
     
Urine Occult Blood  10 H     
     
Urine Nitrite  Negative     
     
Urine Bilirubin  Negative     
     
Urine Urobilinogen  Normal     
     
Ur Leukocyte Esterase  500 H     
     
Urine RBC  0 SEEN     
     
Urine WBC  10-25 SEEN     
     
Ur Squamous Epith Cells  0-5 SEEN     
     
Urine Bacteria  RARE     
     
Urine Mucus  0 SEEN     
    
    
    
    
Radiography    
Diagnostic Testing:     
    
Clinical Impression(s) from Imaging Studies    
    
Abdomen/Pelvis CT  01/29/24 20:39    
IMPRESSION:    
     
Acute diverticulitis of the sigmoid colon. No discrete abscess. Possible    
microperforation. Similar appearance of findings suggesting possible    
enterovesicular fistula versus adhesions/scarring from prior disease.    
     
Electronically Signed:    
Misael Johnson DO    
2024/01/29 at 21:20 EST    
Reading Location ID and State: [id number] / [place]    
Tel [phone], Service support  [phone], Fax [phone]    
     
    
ADDENDUM: 01/29/24 2154    
IMPRESSION:    
     
Acute diverticulitis of the sigmoid colon. No discrete abscess. Possible    
microperforation. Similar appearance of findings suggesting possible    
enterovesicular fistula versus adhesions/scarring from prior disease.    
     
N.B. : The above Results were Read Back by Misael Johnson DO to    
Trey Gonzalez DO, and understanding confirmed on 01/29/2024 21:47:39 (ET).    
     
Electronically Signed:    
Misael Johnson DO    
2024/01/29 at 21:20 EST    
Reading Location ID and State: [id number] / [place]    
Tel [phone], Service support  [phone], Fax [phone]    
     
    
    
    
    
Management    
Discussion w/another healthcare provider: Consultant (Surgery (Dr. Cooper))    
    
Discharge Plan    
Triage    
Chief Complaint: Abd Pain    
    
ED Provider: Trey Gonzalez    
    
Dx/Rx/DC Orders    
Clinical Impression:    
 Abdominal pain, Diverticulitis of intestine with perforation without abscess or  
bleeding    
    
    
Instructions:  ED Diverticulitis    
    
Prescriptions:    
New    
  amoxicillin-pot clavulanate 875-125 mg tablet     
   1 tab PO BID Qty: 19 0RF    
    
No Action    
  metformin [Glucophage] 500 MG tablet     
   500 mg PO DAILY     
  Women's Daily Formula 1 EACH tablet     
   1 ea PO DAILY     
  exemestane 25 mg tablet     
   25 mg PO DAILY     
  atenolol 50 mg tablet     
   50 mg PO DAILY     
  chlorthalidone 25 mg tablet     
   12.5 mg PO DAILY     
  glipizide 2.5 mg tablet extended release 24hr     
   2.5 mg PO DAILY     
  potassium chloride 20 mEq tablet extended release     
   20 meq PO BID     
    
Primary Care Provider: Estela Okeefe    
    
Referrals:    
Estela Okeefe MD [Primary Care Provider] -     
Eun Patricio MD [Med Staff - Active Staff] - As soon as possible    
    
Disposition    
***Disposition***: Home, Self Care    
    
Discharge Date/Time: 01/29/24 23:46

## 2024-09-09 ENCOUNTER — HOSPITAL ENCOUNTER (EMERGENCY)
Age: 64
Discharge: HOME | End: 2024-09-09
Payer: MEDICARE

## 2024-09-09 VITALS
HEART RATE: 79 BPM | BODY MASS INDEX: 48.8 KG/M2 | DIASTOLIC BLOOD PRESSURE: 92 MMHG | OXYGEN SATURATION: 100 % | RESPIRATION RATE: 16 BRPM | SYSTOLIC BLOOD PRESSURE: 175 MMHG | TEMPERATURE: 98.2 F

## 2024-09-09 VITALS
OXYGEN SATURATION: 98 % | DIASTOLIC BLOOD PRESSURE: 81 MMHG | SYSTOLIC BLOOD PRESSURE: 132 MMHG | HEART RATE: 67 BPM | RESPIRATION RATE: 18 BRPM

## 2024-09-09 VITALS
OXYGEN SATURATION: 99 % | SYSTOLIC BLOOD PRESSURE: 120 MMHG | HEART RATE: 74 BPM | DIASTOLIC BLOOD PRESSURE: 66 MMHG | RESPIRATION RATE: 16 BRPM | TEMPERATURE: 97.4 F

## 2024-09-09 DIAGNOSIS — E11.9: ICD-10-CM

## 2024-09-09 DIAGNOSIS — Z87.891: ICD-10-CM

## 2024-09-09 DIAGNOSIS — N39.0: ICD-10-CM

## 2024-09-09 DIAGNOSIS — K57.80: Primary | ICD-10-CM

## 2024-09-09 LAB
ALANINE AMINOTRANSFER ALT/SGPT: 33 U/L (ref 13–56)
ALBUMIN SERPL-MCNC: 3.2 G/DL (ref 3.2–5)
ALKALINE PHOSPHATASE: 130 U/L (ref 45–117)
ANION GAP: 7 (ref 5–15)
AST(SGOT): 40 U/L (ref 15–37)
BUN SERPL-MCNC: 11 MG/DL (ref 7–18)
BUN/CREAT RATIO: 11.8 RATIO (ref 10–20)
CALCIUM SERPL-MCNC: 10.3 MG/DL (ref 8.5–10.1)
CARBON DIOXIDE: 27 MMOL/L (ref 21–32)
CHLORIDE: 104 MMOL/L (ref 98–107)
DEPRECATED RDW RBC: 46.2 FL (ref 35.1–43.9)
ERYTHROCYTE [DISTWIDTH] IN BLOOD: 15.3 % (ref 11.6–14.6)
EST GLOM FILT RATE - AFR AMER: 77 ML/MIN (ref 60–?)
ESTIMATED CREATININE CLEARANCE: 85 ML/MIN
GLOBULIN: 5 G/DL (ref 2.2–4.2)
GLUCOSE: 144 MG/DL (ref 74–106)
HCT VFR BLD AUTO: 45.8 % (ref 37–47)
HEMOGLOBIN: 14.8 G/DL (ref 12–15)
HGB BLD-MCNC: 14.8 G/DL (ref 12–15)
IMMATURE GRANULOCYTES COUNT: 0.05 X10^3/UL (ref 0–0)
LEUKOCYTE ESTERASE UR QL STRIP: 500 /UL
MCV RBC: 82.8 FL (ref 81–99)
MEAN CORP HGB CONC: 32.3 G/DL (ref 32–36)
MEAN PLATELET VOL.: 9.7 FL (ref 6.2–12)
MUCOUS THREADS URNS QL MICRO: (no result) /HPF
NRBC FLAGGED BY ANALYZER: 0 % (ref 0–5)
PLATELET # BLD: 326 K/MM3 (ref 150–450)
PLATELET COUNT: 326 K/MM3 (ref 150–450)
POTASSIUM: 4.5 MMOL/L (ref 3.5–5.1)
RBC # BLD AUTO: 5.53 M/MM3 (ref 4.2–5.4)
RBC DISTRIBUTION WIDTH CV: 15.3 % (ref 11.6–14.6)
RBC DISTRIBUTION WIDTH SD: 46.2 FL (ref 35.1–43.9)
RBC UR QL: (no result) /HPF (ref 0–5)
RBC UR QL: 25 /UL
SP GR UR: 1.01 (ref 1–1.03)
SQUAMOUS URNS QL MICRO: (no result) /HPF (ref 5–10)
URINE PRESERVATIVE: (no result)
WBC # BLD AUTO: 10.3 K/MM3 (ref 4.4–11)
WHITE BLOOD COUNT: 10.3 K/MM3 (ref 4.4–11)

## 2024-09-09 PROCEDURE — 74177 CT ABD & PELVIS W/CONTRAST: CPT

## 2024-09-09 PROCEDURE — 99284 EMERGENCY DEPT VISIT MOD MDM: CPT

## 2024-09-09 PROCEDURE — 81001 URINALYSIS AUTO W/SCOPE: CPT

## 2024-09-09 PROCEDURE — 85025 COMPLETE CBC W/AUTO DIFF WBC: CPT

## 2024-09-09 PROCEDURE — 87088 URINE BACTERIA CULTURE: CPT

## 2024-09-09 PROCEDURE — A4216 STERILE WATER/SALINE, 10 ML: HCPCS

## 2024-09-09 PROCEDURE — 87086 URINE CULTURE/COLONY COUNT: CPT

## 2024-09-09 PROCEDURE — 80053 COMPREHEN METABOLIC PANEL: CPT

## 2024-09-09 PROCEDURE — 96365 THER/PROPH/DIAG IV INF INIT: CPT

## 2024-09-09 NOTE — ED.VIS.GI
HPI
HPI - GI
History of Present Illness
Chief Complaint: Abd Pain
Informant: patient
Narrative
Narrative: 
Reports was 10 days ago had upper respiratory symptoms with diarrhea that was subsiding.  Last 4 days discomfort initially right flank that developed to the left side abdomen.  Nausea without vomiting.  Still having intermittent loose stools 
nonbloody.  No recent antibiotics.  She has a urine frequency for the past week.  Chills today.  No fevers.  History of diverticulitis with similar presentation.  Last time was this past February and March.  She has had previously 2 episodes.  
Hysterectomy and cholecystectomy in the past.  Denies any antibiotic allergies.
Prior similar symptoms: Yes

[facility]
Medical History (Reviewed 09/09/24 @ 21:29 by Dr. Kolby Swanson, DO)

Wears glasses
Cancer
Rash
Diabetes
Arthritis
History of diverticulitis
History of stress test
Invasive ductal carcinoma of right breast
Hypertension
Former smoker
Bone spur
HTN (hypertension)


Home Medications

?Medication ?Instructions ?Recorded ?Last Taken ?Type
metformin 500 mg tablet 500 mg PO DAILY diabetes 04/10/17 01/21/23 10:00 History
(Glucophage)    
multivit-iron 18 mg-folic acid 400 1 ea PO DAILY supplememnt 04/10/17 01/21/23 10:00 History
mcg-calcium 500 mg-minerals tablet    
(Women's Daily Formula)    
atenolol 50 mg tablet 50 mg PO DAILY blood pressure 01/22/23 05/01/23 History
chlorthalidone 25 mg tablet 12.5 mg PO DAILY water pill 01/22/23 01/21/23 10:00 History
exemestane 25 mg tablet 25 mg PO DAILY breast cancer 01/22/23 01/21/23 10:00 History
glipizide 2.5 mg tablet, extended 2.5 mg PO DAILY diabetes 01/22/23 01/21/23 10:00 History
release 24 hr    
potassium chloride 20 mEq 20 meq PO BID supplement 01/22/23 01/21/23 18:00 History
tablet,extended release    
amoxicillin 875 mg-potassium 1 tab PO BID #14 tabs 02/15/24 Unknown Rx
clavulanate 125 mg tablet    
fluconazole 200 mg tablet 200 mg PO DAILY #2 tabs 02/15/24 Unknown Rx
(Diflucan)    
cefdinir 300 mg capsule 300 mg PO Q12H #14 caps 09/09/24 Unknown Rx
metronidazole 500 mg tablet 500 mg PO Q8H #21 tabs 09/09/24 Unknown Rx




Allergy/AdvReac Type Severity Reaction Status Date / Time
silver sulfadiazine (From Allergy  Hives Verified 09/09/24 20:14
Hank)     


Family History (Reviewed 09/09/24 @ 21:29 by Dr. Kolby Swanson DO)
Other
 Cancer



Surgical History (Reviewed 09/09/24 @ 21:29 by Dr. Kolby Swanson DO)

History of colonoscopy
History of foot surgery
S/P lumpectomy, right breast
History of cholecystectomy
H/O: hysterectomy


Social History (Reviewed 09/09/24 @ 21:29 by Dr. Kolby Swanson DO)
Smoking Status:  Former smoker 



ROS
ROS ED
Constitutional
Constitutional ED: Reports chills; Denies fever(s) or sweats
Eyes
Eyes: Denies change in vision
ENT
ENT ED: Denies dysphagia or sore throat
Cardiovascular
Cardiovascular: Denies chest pain, leg edema, palpitations or racing heartbeat
Respiratory/Chest
Respiratory/Chest: Denies cough, dyspnea or dyspnea on exertion
Gastrointestinal
Gastrointestinal: Reports abdominal pain and nausea; Denies diarrhea or vomiting
Genitourinary
Genitourinary ED: Reports urinary frequency; Denies dysuria or hematuria
Musculoskeletal
Musculoskeletal: Denies back pain, extremity pain or neck pain
Integumentary
Denies rash or wounds
Neurologic
Neurologic: Denies headache(s), paresthesias or weakness

EXAM
Physical Exam
Const
Vital Signs: 



 09/09/24
20:14 09/09/24
22:14 09/09/24
23:29
Temperature 98.2 F  97.4 F L
Temperature Source Temporal  
Pulse Rate 79 67 74
Respiratory Rate 16 18 16
Blood Pressure 175/92 H 132/81 H 120/66
Blood Pressure Mean 119 98 84
Pulse Ox 100 98 99
Oxygen Delivery Method Room Air  



Positive well nourished and well developed
General Appearance ED: well developed and NAD
HEENT
Reports moist mucous membranes
normocephalic and atraumatic
Eyes
EOMs intact bilaterally and conjunctivae normal
General Eye ED: Yes normal appearance of both eyes
Neck
no lymphadenopathy and supple
General: Negative for tenderness
Chest Wall
Chest: Negative for tenderness
Resp
normal respiratory effort and normal air movement
Effort and Inspection: symmetric chest movement; Negative for respiratory distress
Cardio
regular rate, regular rhythm and no murmurs
Peripheral Pulses: pulses 2+ throughout
GI
normal to inspection, nondistended, normoactive bowel sounds
GI Narrative: 
Mild tenderness left mid abdomen, no guarding or rebound.
Palpation: Negative for guarding or rebound tenderness present
Back/Spine
no CVA tenderness and no thoracic nor lumbar tenderness
Extremity
normal to inspection
General Extremety ED: Negative for edema or tenderness
General Extremity: Negative for edema
Neuro
oriented x3 and no sensory deficits noted
Sensorium / Orientation: awake and alert
Skin
no rashes or lesions noted and no wounds

MDM
MDM
MDM Narrative
Medical decision making narrative: 
Interventions / MDM: 

Differential diagnosis: Diverticulitis, abdominal pain

Diagnosis considered but do not suspect: Kidney stone however CT negative

My EKG interpretation: N/A

Imaging independently reviewed and interpreted by myself: CT abdomen pelvis IV contrast: Uncomplicated sigmoid diverticulitis, thickening and scarring noted between sigmoid and bladder no current fistula.

External documents reviewed: N/A

Test considered but not ordered:N/A

ED course: Patient is protocol labs performed through triage white count was 10 hemoglobin 14.8.  Creatinine 0.93.  Patient had fluids ordered, Zofran, CT abdomen pelvis ordered for further evaluation.  She declines any pain medicines.

Urine resulted no signs of infection.  Culture sent.  White count is normal creatinine 0.93.  She is covered with IV Rocephin.

CT scan discussed with radiologist on-call for diverticulitis however noted scarring thickening between the bladder and sigmoid colon without gui fistulization.  I did discuss this with the patient.  She reports she is followed with surgery Dr. Patricio.  She had to follow-up with her from her last bout of diverticulitis, she was told no concerns for fistula at this time.  Further discussion with patient she states she would have occasional gas with urination.  Discussed possible fistula 
since her last event.  Discussed with her to follow-up with her surgeon for outpatient discussion.  Return precautions discussed.   all questions were answered.

Re-evaluation: stable 

Disposition discussed with patient/family/significant other: Patient

Case discussed with consulting clinician: N/A

This note was generated with Dragon dictation software. It may contain incorrect words, spelling, and punctuation that were not noted in checking the note before signing.
Lab Data
Attestation: I reviewed the patient's lab results.
Labs: 

Laboratory Results - last 24 hr

  09/09/24 09/09/24
  20:31 20:45
WBC   10.3
RBC   5.53 H
Hgb   14.8
Hct   45.8
MCV   82.8
MCH   26.8 L
MCHC   32.3
RDW Std Deviation   46.2 H
RDW Coeff of Elzbieta   15.3 H
Plt Count   326
MPV   9.7
Immature Gran % (Auto)   0.500
Neut % (Auto)   64.9
Lymph % (Auto)   23.0
Mono % (Auto)   7.4
Eos % (Auto)   3.6
Baso % (Auto)   0.6
Absolute Neuts (auto)   6.7
Absolute Lymphs (auto)   2.38
Nucleated RBC %   0
Sodium   138
Potassium   4.5
Chloride   104
Carbon Dioxide   27.0
Anion Gap   7
BUN   11
Creatinine   0.93
Estim Creat Clear Calc   85.00
Est GFR (MDRD) Af Amer   77
Est GFR (MDRD) Non-Af   64
BUN/Creatinine Ratio   11.8
Glucose   144 H
Calcium   10.3 H
Total Bilirubin   0.50
AST   40 H
ALT   33
Alkaline Phosphatase   130 H
Total Protein   8.2
Albumin   3.2
Globulin   5.0 H
Albumin/Globulin Ratio   0.6 L
Urine Color  Yellow 
Urine Clarity  Clear 
Urine pH  6.5 
Ur Specific Gravity  1.010 
Urine Protein  Negative 
Urine Glucose (UA)  Normal 
Urine Ketones  Negative 
Urine Occult Blood  25 H 
Urine Nitrite  Negative 
Urine Bilirubin  Negative 
Urine Urobilinogen  Normal 
Ur Leukocyte Esterase  500 H 
Urine RBC  0-5 SEEN 
Urine WBC  5-10 SEEN 
Ur Squamous Epith Cells  0-5 SEEN 
Urine Bacteria  2+ 
Urine Mucus  0 SEEN 



Radiography
Diagnostic Testing: 

Clinical Impression(s) from Imaging Studies

Abdomen/Pelvis CT  09/09/24 21:42
IMPRESSION:
 
1.  Findings most likely due to mild recurrent sigmoid diverticulitis.
There is a thick band of presumed inflammation between the proximal sigmoid
and the bladder visible on the coronal images, in the region of a prominent
previous pocket of air, without gui colovesical fistulization at this
time. Moderate bladder wall thickening without intraluminal air. Correlate
with evidence of cystitis.
 
2.  No discrete drainable abscess. No free air or free fluid. Mild
inflammation along the left upper pelvic sidewall-iliac fossa.
 
3.  Cholecystectomy. Hysterectomy. Nonvisualized appendix.
 
4.  Mild asymmetric irregular soft tissue lobulated density and coarse
calcifications in the slightly superior-medial breast, similar to January
2023,  not fully included. This may be surgical scarring but it is not
fully evaluated. Please correlate with prior procedures and follow-up
breast exam, mammogram and ultrasound if it is thought to be indicated.
Mammogram April 11, 2017 mentions mass within a resected right biopsy
specimen.
 
Electronically Signed:
Morena Bob MD
2024/09/09 at 22:47 EDT
Reading Location ID and State: [id number] / [place]
Tel [phone], Service support  [phone], Fax [phone]
 

ADDENDUM: 09/09/24 2303
IMPRESSION:
 
1.  Findings most likely due to mild recurrent sigmoid diverticulitis.
There is a thick band of presumed inflammation between the proximal sigmoid
and the bladder visible on the coronal images, in the region of a prominent
previous pocket of air, without gui colovesical fistulization at this
time. Moderate bladder wall thickening without intraluminal air. Correlate
with evidence of cystitis.
 
2.  No discrete drainable abscess. No free air or free fluid. Mild
inflammation along the left upper pelvic sidewall-iliac fossa.
 
3.  Cholecystectomy. Hysterectomy. Nonvisualized appendix.
 
4.  Mild asymmetric irregular soft tissue lobulated density and coarse
calcifications in the slightly superior-medial breast, similar to January
2023,  not fully included. This may be surgical scarring but it is not
fully evaluated. Please correlate with prior procedures and follow-up
breast exam, mammogram and ultrasound if it is thought to be indicated.
Mammogram April 11, 2017 mentions mass within a resected right biopsy
specimen.
 
N.B. : The above Results were Read Back by Morena Bob MD to Kolby Swanson DO, and understanding confirmed on 09/09/2024 22:56:42 (ET).
 
Electronically Signed:
Morena Bob MD
2024/09/09 at 22:47 EDT
Reading Location ID and State: [id number] / [place]
Tel [phone], Service support  [phone], Fax [phone]
 





Discharge Plan
Triage
Chief Complaint: Abd Pain

Other Complaint:
 Complaint

ED Provider: Kolby Swanson

Dx/Rx/DC Orders
Clinical Impression:
 Diverticulitis of intestine with perforation without abscess or bleeding, UTI (urinary tract infection)


Instructions:  Diverticulitis Dc, ED UTIs Women

Prescriptions:
New
  metronidazole 500 mg tablet 
   500 mg PO Q8H Qty: 21 0RF
  cefdinir 300 mg capsule 
   300 mg PO Q12H Qty: 14 0RF

No Action
  fluconazole [Diflucan] 200 mg tablet 
   200 mg PO DAILY Qty: 2 0RF
   Rx Instructions:
   take one now and if yeast infection not improved take other in 72 hrs or after the next dose of abx prescribed
  amoxicillin-pot clavulanate 875-125 mg tablet 
   1 tab PO BID Qty: 14 0RF
  metformin [Glucophage] 500 MG tablet 
   500 mg PO DAILY 
  Women's Daily Formula 1 EACH tablet 
   1 ea PO DAILY 
  exemestane 25 mg tablet 
   25 mg PO DAILY 
  atenolol 50 mg tablet 
   50 mg PO DAILY 
  chlorthalidone 25 mg tablet 
   12.5 mg PO DAILY 
  glipizide 2.5 mg tablet extended release 24hr 
   2.5 mg PO DAILY 
  potassium chloride 20 mEq tablet extended release 
   20 meq PO BID 

Primary Care Provider: Estela Okeefe

Referrals:
Estela Okeefe MD [Primary Care Provider] - 
Eun Patricio MD [Med Staff - Active Staff] - 1-2 Weeks

Activity Restrictions/Additional Instructions:
Uncomplicated diverticulitis.  UTI.  Take and finish antibiotic as prescribed.  CT also notes thickening and inflammation between proximal sigmoid and bladder, at risk for fistulization.  Follow-up with your surgeon.

Print Language: English

Disposition
***Disposition***: Home, Self Care

Discharge Date/Time: 09/09/24 23:35

## 2024-09-09 NOTE — EDS_ITS
HPI    
HPI - GI    
History of Present Illness    
Chief Complaint: Abd Pain    
Informant: patient    
Narrative    
Narrative:     
Reports was 10 days ago had upper respiratory symptoms with diarrhea that was   
subsiding.  Last 4 days discomfort initially right flank that developed to the   
left side abdomen.  Nausea without vomiting.  Still having intermittent loose   
stools nonbloody.  No recent antibiotics.  She has a urine frequency for the   
past week.  Chills today.  No fevers.  History of diverticulitis with similar   
presentation.  Last time was this past February and March.  She has had   
previously 2 episodes.  Hysterectomy and cholecystectomy in the past.  Denies   
any antibiotic allergies.    
Prior similar symptoms: Yes    
    
[facility]    
Medical History (Reviewed 09/09/24 @ 21:29 by Dr. Kolby Swanson, DO)    
    
Wears glasses    
Cancer    
Rash    
Diabetes    
Arthritis    
History of diverticulitis    
History of stress test    
Invasive ductal carcinoma of right breast    
Hypertension    
Former smoker    
Bone spur    
HTN (hypertension)    
    
    
                                Home Medications    
    
    
    
?Medication ?Instructions ?Recorded ?Last Taken ?Type    
     
metformin 500 mg tablet 500 mg PO DAILY diabetes 04/10/17 01/21/23 10:00 History    
    
(Glucophage)        
     
multivit-iron 18 mg-folic acid 400 1 ea PO DAILY supplememnt 04/10/17 01/21/23   
10:00 History    
    
mcg-calcium 500 mg-minerals tablet        
    
(Women's Daily Formula)        
     
atenolol 50 mg tablet 50 mg PO DAILY blood pressure 01/22/23 05/01/23 History    
     
chlorthalidone 25 mg tablet 12.5 mg PO DAILY water pill 01/22/23 01/21/23 10:00   
History    
     
exemestane 25 mg tablet 25 mg PO DAILY breast cancer 01/22/23 01/21/23 10:00   
History    
     
glipizide 2.5 mg tablet, extended 2.5 mg PO DAILY diabetes 01/22/23 01/21/23   
10:00 History    
    
release 24 hr        
     
potassium chloride 20 mEq 20 meq PO BID supplement 01/22/23 01/21/23 18:00   
History    
    
tablet,extended release        
     
amoxicillin 875 mg-potassium 1 tab PO BID #14 tabs 02/15/24 Unknown Rx    
    
clavulanate 125 mg tablet        
     
fluconazole 200 mg tablet 200 mg PO DAILY #2 tabs 02/15/24 Unknown Rx    
    
(Diflucan)        
     
cefdinir 300 mg capsule 300 mg PO Q12H #14 caps 09/09/24 Unknown Rx    
     
metronidazole 500 mg tablet 500 mg PO Q8H #21 tabs 09/09/24 Unknown Rx    
    
    
    
                                            
    
    
    
Allergy/AdvReac Type Severity Reaction Status Date / Time    
     
silver sulfadiazine (From Allergy  Hives Verified 09/09/24 20:14    
    
Silvadene)         
    
    
    
Family History (Reviewed 09/09/24 @ 21:29 by Dr. Kolby Swanson DO)    
Other   Cancer    
    
    
    
Surgical History (Reviewed 09/09/24 @ 21:29 by Dr. Kolby Swanson DO)    
    
History of colonoscopy    
History of foot surgery    
S/P lumpectomy, right breast    
History of cholecystectomy    
H/O: hysterectomy    
    
    
Social History (Reviewed 09/09/24 @ 21:29 by Dr. Kolby Swanson DO)    
Smoking Status:  Former smoker     
    
    
    
ROS    
ROS ED    
Constitutional    
Constitutional ED: Reports chills; Denies fever(s) or sweats    
Eyes    
Eyes: Denies change in vision    
ENT    
ENT ED: Denies dysphagia or sore throat    
Cardiovascular    
Cardiovascular: Denies chest pain, leg edema, palpitations or racing heartbeat    
Respiratory/Chest    
Respiratory/Chest: Denies cough, dyspnea or dyspnea on exertion    
Gastrointestinal    
Gastrointestinal: Reports abdominal pain and nausea; Denies diarrhea or vomiting    
Genitourinary    
Genitourinary ED: Reports urinary frequency; Denies dysuria or hematuria    
Musculoskeletal    
Musculoskeletal: Denies back pain, extremity pain or neck pain    
Integumentary    
Denies rash or wounds    
Neurologic    
Neurologic: Denies headache(s), paresthesias or weakness    
    
EXAM    
Physical Exam    
Const    
Vital Signs:     
    
                                            
    
    
    
 09/09/24    
20:14 09/09/24    
22:14 09/09/24    
23:29    
     
Temperature 98.2 F  97.4 F L    
     
Temperature Source Temporal      
     
Pulse Rate 79 67 74    
     
Respiratory Rate 16 18 16    
     
Blood Pressure 175/92 H 132/81 H 120/66    
     
Blood Pressure Mean 119 98 84    
     
Pulse Ox 100 98 99    
     
Oxygen Delivery Method Room Air      
    
    
    
    
Positive well nourished and well developed    
General Appearance ED: well developed and NAD    
HEENT    
Reports moist mucous membranes    
normocephalic and atraumatic    
Eyes    
EOMs intact bilaterally and conjunctivae normal    
General Eye ED: Yes normal appearance of both eyes    
Neck    
no lymphadenopathy and supple    
General: Negative for tenderness    
Chest Wall    
Chest: Negative for tenderness    
Resp    
normal respiratory effort and normal air movement    
Effort and Inspection: symmetric chest movement; Negative for respiratory   
distress    
Cardio    
regular rate, regular rhythm and no murmurs    
Peripheral Pulses: pulses 2+ throughout    
GI    
normal to inspection, nondistended, normoactive bowel sounds    
GI Narrative:     
Mild tenderness left mid abdomen, no guarding or rebound.    
Palpation: Negative for guarding or rebound tenderness present    
Back/Spine    
no CVA tenderness and no thoracic nor lumbar tenderness    
Extremity    
normal to inspection    
General Extremety ED: Negative for edema or tenderness    
General Extremity: Negative for edema    
Neuro    
oriented x3 and no sensory deficits noted    
Sensorium / Orientation: awake and alert    
Skin    
no rashes or lesions noted and no wounds    
    
MDM    
MDM    
MDM Narrative    
Medical decision making narrative:     
Interventions / MDM:     
    
Differential diagnosis: Diverticulitis, abdominal pain    
    
Diagnosis considered but do not suspect: Kidney stone however CT negative    
    
My EKG interpretation: N/A    
    
Imaging independently reviewed and interpreted by myself: CT abdomen pelvis IV   
contrast: Uncomplicated sigmoid diverticulitis, thickening and scarring noted   
between sigmoid and bladder no current fistula.    
    
External documents reviewed: N/A    
    
Test considered but not ordered:N/A    
    
ED course: Patient is protocol labs performed through triage white count was 10   
hemoglobin 14.8.  Creatinine 0.93.  Patient had fluids ordered, Zofran, CT   
abdomen pelvis ordered for further evaluation.  She declines any pain medicines.    
    
Urine resulted no signs of infection.  Culture sent.  White count is normal   
creatinine 0.93.  She is covered with IV Rocephin.    
    
CT scan discussed with radiologist on-call for diverticulitis however noted   
scarring thickening between the bladder and sigmoid colon without gui   
fistulization.  I did discuss this with the patient.  She reports she is   
followed with surgery Dr. Patricio.  She had to follow-up with her from her last  
bout of diverticulitis, she was told no concerns for fistula at this time.    
Further discussion with patient she states she would have occasional gas with   
urination.  Discussed possible fistula since her last event.  Discussed with her  
to follow-up with her surgeon for outpatient discussion.  Return precautions   
discussed.   all questions were answered.    
    
Re-evaluation: stable     
    
Disposition discussed with patient/family/significant other: Patient    
    
Case discussed with consulting clinician: N/A    
    
This note was generated with Dragon dictation software. It may contain incorrect  
words, spelling, and punctuation that were not noted in checking the note before  
signing.    
Lab Data    
Attestation: I reviewed the patient's lab results.    
Labs:     
    
                         Laboratory Results - last 24 hr    
    
    
    
  09/09/24 09/09/24    
    
  20:31 20:45    
     
WBC   10.3    
     
RBC   5.53 H    
     
Hgb   14.8    
     
Hct   45.8    
     
MCV   82.8    
     
MCH   26.8 L    
     
MCHC   32.3    
     
RDW Std Deviation   46.2 H    
     
RDW Coeff of Elzbieta   15.3 H    
     
Plt Count   326    
     
MPV   9.7    
     
Immature Gran % (Auto)   0.500    
     
Neut % (Auto)   64.9    
     
Lymph % (Auto)   23.0    
     
Mono % (Auto)   7.4    
     
Eos % (Auto)   3.6    
     
Baso % (Auto)   0.6    
     
Absolute Neuts (auto)   6.7    
     
Absolute Lymphs (auto)   2.38    
     
Nucleated RBC %   0    
     
Sodium   138    
     
Potassium   4.5    
     
Chloride   104    
     
Carbon Dioxide   27.0    
     
Anion Gap   7    
     
BUN   11    
     
Creatinine   0.93    
     
Estim Creat Clear Calc   85.00    
     
Est GFR (MDRD) Af Amer   77    
     
Est GFR (MDRD) Non-Af   64    
     
BUN/Creatinine Ratio   11.8    
     
Glucose   144 H    
     
Calcium   10.3 H    
     
Total Bilirubin   0.50    
     
AST   40 H    
     
ALT   33    
     
Alkaline Phosphatase   130 H    
     
Total Protein   8.2    
     
Albumin   3.2    
     
Globulin   5.0 H    
     
Albumin/Globulin Ratio   0.6 L    
     
Urine Color  Yellow     
     
Urine Clarity  Clear     
     
Urine pH  6.5     
     
Ur Specific Gravity  1.010     
     
Urine Protein  Negative     
     
Urine Glucose (UA)  Normal     
     
Urine Ketones  Negative     
     
Urine Occult Blood  25 H     
     
Urine Nitrite  Negative     
     
Urine Bilirubin  Negative     
     
Urine Urobilinogen  Normal     
     
Ur Leukocyte Esterase  500 H     
     
Urine RBC  0-5 SEEN     
     
Urine WBC  5-10 SEEN     
     
Ur Squamous Epith Cells  0-5 SEEN     
     
Urine Bacteria  2+     
     
Urine Mucus  0 SEEN     
    
    
    
    
Radiography    
Diagnostic Testing:     
    
Clinical Impression(s) from Imaging Studies    
    
Abdomen/Pelvis CT  09/09/24 21:42    
IMPRESSION:    
     
1.  Findings most likely due to mild recurrent sigmoid diverticulitis.    
There is a thick band of presumed inflammation between the proximal sigmoid    
and the bladder visible on the coronal images, in the region of a prominent    
previous pocket of air, without gui colovesical fistulization at this    
time. Moderate bladder wall thickening without intraluminal air. Correlate    
with evidence of cystitis.    
     
2.  No discrete drainable abscess. No free air or free fluid. Mild    
inflammation along the left upper pelvic sidewall-iliac fossa.    
     
3.  Cholecystectomy. Hysterectomy. Nonvisualized appendix.    
     
4.  Mild asymmetric irregular soft tissue lobulated density and coarse    
calcifications in the slightly superior-medial breast, similar to January 2023,  not fully included. This may be surgical scarring but it is not    
fully evaluated. Please correlate with prior procedures and follow-up    
breast exam, mammogram and ultrasound if it is thought to be indicated.    
Mammogram April 11, 2017 mentions mass within a resected right biopsy    
specimen.    
     
Electronically Signed:    
Morena Bob MD    
2024/09/09 at 22:47 EDT    
Reading Location ID and State: [id number] / [place]    
Tel [phone], Service support  [phone], Fax [phone]    
     
    
ADDENDUM: 09/09/24 2303    
IMPRESSION:    
     
1.  Findings most likely due to mild recurrent sigmoid diverticulitis.    
There is a thick band of presumed inflammation between the proximal sigmoid    
and the bladder visible on the coronal images, in the region of a prominent    
previous pocket of air, without gui colovesical fistulization at this    
time. Moderate bladder wall thickening without intraluminal air. Correlate    
with evidence of cystitis.    
     
2.  No discrete drainable abscess. No free air or free fluid. Mild    
inflammation along the left upper pelvic sidewall-iliac fossa.    
     
3.  Cholecystectomy. Hysterectomy. Nonvisualized appendix.    
     
4.  Mild asymmetric irregular soft tissue lobulated density and coarse    
calcifications in the slightly superior-medial breast, similar to January 2023,  not fully included. This may be surgical scarring but it is not    
fully evaluated. Please correlate with prior procedures and follow-up    
breast exam, mammogram and ultrasound if it is thought to be indicated.    
Mammogram April 11, 2017 mentions mass within a resected right biopsy    
specimen.    
     
N.B. : The above Results were Read Back by Morena Bob MD to Kolby Swanson DO, and understanding confirmed on 09/09/2024 22:56:42 (ET).    
     
Electronically Signed:    
Morena Bob MD    
2024/09/09 at 22:47 EDT    
Reading Location ID and State: [id number] / [place]    
Tel [phone], Service support  [phone], Fax [phone]    
     
    
    
    
    
    
Discharge Plan    
Triage    
Chief Complaint: Abd Pain    
    
Other Complaint:    
 Complaint    
    
ED Provider: Kolby Swanson    
    
Dx/Rx/DC Orders    
Clinical Impression:    
 Diverticulitis of intestine with perforation without abscess or bleeding, UTI   
(urinary tract infection)    
    
    
Instructions:  Diverticulitis Dc, ED UTIs Women    
    
Prescriptions:    
New    
  metronidazole 500 mg tablet     
   500 mg PO Q8H Qty: 21 0RF    
  cefdinir 300 mg capsule     
   300 mg PO Q12H Qty: 14 0RF    
    
No Action    
  fluconazole [Diflucan] 200 mg tablet     
   200 mg PO DAILY Qty: 2 0RF    
   Rx Instructions:    
   take one now and if yeast infection not improved take other in 72 hrs or   
after the next dose of abx prescribed    
  amoxicillin-pot clavulanate 875-125 mg tablet     
   1 tab PO BID Qty: 14 0RF    
  metformin [Glucophage] 500 MG tablet     
   500 mg PO DAILY     
  Women's Daily Formula 1 EACH tablet     
   1 ea PO DAILY     
  exemestane 25 mg tablet     
   25 mg PO DAILY     
  atenolol 50 mg tablet     
   50 mg PO DAILY     
  chlorthalidone 25 mg tablet     
   12.5 mg PO DAILY     
  glipizide 2.5 mg tablet extended release 24hr     
   2.5 mg PO DAILY     
  potassium chloride 20 mEq tablet extended release     
   20 meq PO BID     
    
Primary Care Provider: Estela Okeefe    
    
Referrals:    
Estela Okeefe MD [Primary Care Provider] -     
Eun Patricio MD [Med Staff - Active Staff] - 1-2 Weeks    
    
Activity Restrictions/Additional Instructions:    
Uncomplicated diverticulitis.  UTI.  Take and finish antibiotic as prescribed.    
CT also notes thickening and inflammation between proximal sigmoid and bladder,   
at risk for fistulization.  Follow-up with your surgeon.    
    
Print Language: English    
    
Disposition    
***Disposition***: Home, Self Care    
    
Discharge Date/Time: 09/09/24 23:35

## 2024-09-09 NOTE — XMS RPT_ITS
Comprehensive CCD (C-CDA v2.1)  
  
                          Created on: 2024  
  
  
Navdeep Guillen  
External Reference #: CDR,PersonID:[id number]  
: 1960  
Sex: Female  
  
Demographics  
  
  
                                        Address             [address] [place]  [address]  
   
                                        Home Phone          [phone]  
   
                                        Mobile Phone        [phone]  
   
                                        Phone               [phone]  
   
                                        Preferred Language  en  
   
                                        Marital Status        
   
                                        Orthodox Affiliation Unknown  
   
                                        Race                White  
   
                                        Ethnic Group        Not  or Lati  
no  
  
  
Author  
  
  
                                        Organization        [facility]  
  
  
Care Team Providers  
  
  
                                Care Team Member Name Role            Phone  
   
                                BARK, MARICRUZ E Unavailable     Unavailable  
   
                                BARK, MARICRUZ E Unavailable     Unavailable  
   
                                BARK, MARICRUZ E Unavailable     Unavailable  
   
                                BARK, MARICRUZ E Unavailable     Unavailable  
   
                                MAKKAR, LANI K Unavailable     Unavailable  
   
                                MAKKAR, LANI K Unavailable     Unavailable  
   
                                MAKKAR, LANI K Unavailable     Unavailable  
   
                                MAKKAR, LANI K Unavailable     Unavailable  
   
                                Steve Govea Admitting       Unavailabl  
e  
   
                                PendSteve tadeo Attending       Unavailabl  
e  
   
                                Ganta, Cynthia C Primary Care    Unavailable  
   
                                Steve Govea Admitting       Unavailabl  
e  
   
                                Steve Govea Attending       Unavailabl  
e  
   
                                Ganta, Cynthia C Primary Care    Unavailable  
   
                                Bark, Maricruz E Admitting       Unavailable  
   
                                Bark, Maricruz E Attending       Unavailable  
   
                                Sary, Cynthia C Primary Care    Unavailable  
   
                                Cynthia Douglas MD Primary Care Provider [phone]  
[phone]  
   
                                Nelson BEVERLY MD, Rosaline Unavailable     [phone]  
[phone]  
   
                                Doup RN, Martha  Unavailable     Unavailable  
   
                                Cynthia Douglas MD Primary Care Provider [phone]  
[phone]  
   
                                Nelson BEVERLY MD, Rosaline Unavailable     [phone]  
[phone]  
   
                                Doup RN, Martha  Unavailable     Unavailable  
   
                                Cynthia Douglas MD Primary Care Provider [phone]  
[phone]  
   
                                Nelson BEVERLY MD, Rosaline Unavailable     [phone]  
[phone]  
   
                                Doup RN, Martha  Unavailable     Unavailable  
   
                                Doup RN, Martha  Unavailable     Unavailable  
   
                                Rosaline Damon MD Unavailable     [phone]  
   
                                Cynthia Douglas MD Primary Care Provider [phone]  
[phone]  
   
                                OLDER, SHERLY      Referring       Unavailable  
   
                                GANTA, CYNTHIA   Primary Care    Unavailable  
   
                                OLDER, SHERLY      Attending       Unavailable  
   
                                GANTA, CYNTHIA   Primary Care    Unavailable  
   
                                CORINTA, CYNTHIA   Primary Care    Unavailable  
   
                                VIKKI POSADA Attending       Unavailable  
   
                                VIKKI POSADA Referring       Unavailable  
   
                                GANTA, CYNTHIA   Primary Care    Unavailable  
   
                                VIKKI POSADA Attending       Unavailable  
   
                                VIKKI POSADA Referring       Unavailable  
   
                                [facility], CYNTHIA   Primary Care    Unavailable  
   
                                SHERLY AN      Attending       Unavailable  
   
                                [facility], [facility]   Primary Care    Unavailable  
   
                                AMEE SHERLY      Referring       Unavailable  
   
                                [facility], CY[place]TH[place]   Primary Care    Unavailable  
   
                                [facility], CY[place]TH[place]   Primary Care    Unavailable  
   
                                MEERA MENCHACA  Attending       Unavailable  
   
                                [facility], [place]   Primary Care    Unavailable  
   
                                GAN, CYNTHIA   Referring       Unavailable  
  
  
  
Allergies  
  
  
                                                    Allergy   
Classification                          Reported   
Allergen(s)               Allergy Type              Date of   
Onset                     Reaction(s)               Facility  
   
                                                      
(1 source)                              Sulfonamides   
(Antibiotic);   
Translations:   
[sulfa drugs]                           Propensity to   
adverse   
reactions to   
drug   
(disorder)                                                  [facility]  
   
                                                      
(20 sources)                            silver   
sulfADIAZINE;   
Translations:   
[SILVER   
SULFADIAZINE]             Drug Allergy                
7                         [facility]  
   
                                                      
(20 sources)                            Sulfonamides   
(Antibiotic);   
Translations:   
[SULFA   
(SULFONAMIDE   
ANTIBIOTICS)]             Drug Allergy                
1                         [facility]  
  
  
  
Medications  
Current Medications  
  
  
  
                      Medication Drug Class(es) Dates      Sig (Normalized) Sig   
(Original)  
   
                                                    [id number]   
actuat albuterol   
0.09 mg/actuat   
metered dose   
inhaler  
(7 sources)                             beta2-Adrenergic   
Agonist                                 Start:   
2022  
End:   
2023                              take 2 puff(s) by   
inhalation every   
six hours as needed                     albuterol HFA   
(PROAIR HFA) 90   
mcg/actuation   
inhaler Inhale 2   
Puffs as instructed   
every 6 hours as   
needed. 1 Each 0   
2022   
Discontinued  
   
                                        Comment on above:   Inhale 2 Puffs as in  
structed every 6 hours as needed.   
   
                                                    atenolol 50 mg   
oral tablet  
(20 sources)                            beta-Adrenergic   
Blocker                                 Start:   
2022  
End:   
2024                              take 1 tablet by   
mouth once daily                        atenolol (TENORMIN)   
50 mg tablet Take 1   
tablet by mouth   
once daily. 90   
tablet 3 2024   
Active  
  
  
  
                                        Start: 2022   take 1 tablet by myah  
th once   
daily                                   atenolol (TENORMIN) 100 mg tablet   
Take 1 tablet by mouth once daily.   
90 tablet 1 2022 Active  
   
                                                    Start: 10-  
End: 2022                         take 1 tablet by mouth once   
daily                                   atenolol (TENORMIN) 50 mg tablet   
Take 1 tablet by mouth once daily.   
90 tablet 3 2022 Active  
  
  
  
                                        Comment on above:   Take 1 tablet by myah  
th once daily.   
   
                                                    benzonatate 100 mg   
oral capsule  
(7 sources)                             Non-narcotic   
Antitussive                             Start:   
  
2  
End:   
  
3                                       take 1 capsule   
by mouth every   
eight hours as   
needed                                  benzonatate   
(TESSALON PERLES)   
100 mg capsule Take   
1 capsule by mouth   
three times daily as   
needed for cough. 12   
capsule 0 2022   
Discontinued  
   
                                        Comment on above:   Take 1 capsule by mo  
uth three times daily as needed for cough.  
  
   
                                                    Blood-Glucose Meter   
monitoring kit  
(20 sources)                                        Start:   
  
4  
End:   
  
4                                                   Blood-Glucose Meter   
monitoring kit   
Glucose Meter of   
Choice - Kit - Dx:   
Type 2 DM -   
Controlled E11.9 1   
Each 0 2024 Active  
  
  
  
                                Start: 2018                 Blood-Glucose   
Meter monitoring kit Indications: Type 2   
diabetes   
mellitus without complication, without long-term current use of   
insulin ([facility]) Glucose Meter of Choice - Kit - Dx: Type 2 DM -   
Uncontrolled E11.65 1 Each 2018 Active  
   
                                Start: 2018                 Blood-Glucose   
Meter monitoring kit Indications: Type 2   
diabetes   
mellitus without complication, without long-term current use of   
insulin (HCC) Glucose Meter of Choice - Kit - Dx: Type 2 DM -   
Uncontrolled E11.65 1 Each 0 2018 Active  
  
  
  
                                        Comment on above:   Glucose Meter of Cho  
ice - Kit - Dx: Type 2 DM - Uncontrolled   
E11.65   
   
                                                    chlorthalidone 25 mg   
oral tablet  
(20 sources)                            Thiazide-like   
Diuretic                                Start:   
20  
22  
End:   
20  
24                                      take 0.5   
tablet by   
mouth once   
daily                                   chlorthalidone   
(HYGROTON) 25 mg   
tablet Take 0.5   
tablets by mouth once   
daily. 45 tablet 3   
2024 Active  
  
  
  
                                                    Start: 10-  
End: 2022                         take 1 tablet by mouth once   
daily                                   chlorthalidone (HYGROTON) 25 mg   
tablet Take 1 tablet by mouth once   
daily. 90 tablet 3 2022 Active  
  
  
  
                                        Comment on above:   Take 1 tablet by myah  
th once daily.   
   
                                                            Take 0.5 tablets by   
mouth once daily.   
   
                                                    ciprofloxacin 500 mg   
oral tablet  
(20 sources)                            Quinolone   
Antimicrobial                           Start:   
20  
End:   
20  
24                                      take 1 tablet   
by mouth twice   
daily                                   ciprofloxacin HCl   
(CIPRO) 500 mg tablet   
Take 1 tablet by   
mouth two times a day   
for 7 days. 14 tablet   
0 2024 Active  
  
  
  
                                                    Start: 2023  
End: 10-                         take 1 tablet by mouth twice   
daily                                   ciprofloxacin HCl (CIPRO) 500 mg   
tablet Take 1 tablet by mouth twice   
daily. 20 tablet 0 2023   
10/02/2023 Discontinued  
  
  
  
                                        Comment on above:   Take 1 tablet by myah  
th twice daily.   
   
                                                            Take 1 tablet by myah  
th two times a day for 7 days.   
   
                                                    doxycycline hyclate   
100 mg oral tablet  
(1 source)                              Tetracycline-class   
Drug                                    Start:   
  
2  
End:   
  
2                                       take 1 tablet   
by mouth twice   
daily                                   doxycycline   
(VIBRA-TABS) 100 mg   
tablet Take 1 tablet   
by mouth twice daily   
for 7 days. 14 tablet   
0 2022 Active  
   
                                        Comment on above:   Take 1 tablet by myah  
th twice daily for 7 days.   
   
                                                    exemestane 25 mg oral   
tablet  
(20 sources)              Aromatase Inhibitor       Start:   
  
4                                       take 1 tablet   
by mouth once   
daily                                   exemestane (AROMASIN)   
25 mg tablet   
Indications: Primary   
cancer of lower outer   
quadrant of right   
female breast (HCC) ,   
Carcinoma of right   
breast, estrogen and   
progesterone receptor   
positive (HCC) Take 1   
tablet by mouth once   
daily. 90 tablet 1   
2024 Active  
  
  
  
                                                    Start: 2022  
End: 06-                         take 1 tablet by mouth once   
daily                                   exemestane (AROMASIN) 25 mg tablet   
Indications: Primary cancer of lower   
outer quadrant of right female   
breast (HCC) , Carcinoma of right   
breast, estrogen and progesterone   
receptor positive (HCC) Take 1   
tablet by mouth once daily. 90   
tablet 3 2023 06/10/2024   
Discontinued  
   
                                                    Start: 2021  
End: 2022                         take 1 tablet by mouth once   
daily                                   exemestane (AROMASIN) 25 mg tablet   
Indications: Primary cancer of lower   
outer quadrant of right female   
breast (HCC) , Carcinoma of right   
breast, estrogen and progesterone   
receptor positive (HCC) Take 1   
tablet by mouth once daily. 80   
tablet 3 2022 Active  
  
  
  
                                        Comment on above:   Take 1 tablet by myah  
th once daily.   
   
                                                    fluconazole 150 mg oral   
tablet  
(10 sources)              Azole Antifungal          Start:   
2023  
End:   
2023                                          fluconazole (DIFLUCAN)   
150 mg tablet   
Indications: Vaginal   
yeast infection Take 1   
tablet by mouth one   
time only for 1 dose.   
Repeat in 3 days as   
needed. 2 tablet 0   
2023   
Active  
  
  
  
                                                    Start: 2023  
End: 2023                                     fluconazole (DIFLUCAN) 150 m  
g tablet  
   
                                                    Start: 2023  
End: 2023           take 1 tablet by mouth once fluconazole (DIFLUCAN) 150  
 mg tablet  
  
Take 1 tablet by mouth one time only   
for 1 dose. 2 tablet 1 2023 Active  
  
  
  
                                        Comment on above:   Take 1 tablet by myah  
th one time only for 1 dose.   
   
                                                            Take 1 tablet by myah  
th one time only for 1 dose. Repeat in 3 days   
as needed.   
   
                                                    glipiZIDE er 2.5 mg   
24 hr extended   
release oral tablet  
(20 sources)              Sulfonylurea              Start:   
10-  
End:   
2024                              take 1 tablet by   
mouth once daily                        glipiZIDE   
(GLUCOTROL XL) 2.5   
mg 24 hr tablet   
Take 1 tablet by   
mouth once daily.   
90 tablet 3   
2024 Active  
   
                                        Comment on above:   Take 1 tablet by myah  
th once daily.   
   
                                                    ibuprofen 800 mg   
oral tablet  
(20 sources)                            Nonsteroidal   
Anti-inflammatory Drug                  Start:   
2022  
End:   
2023                              take 1 tablet by   
mouth every   
eight hours as   
needed                                  ibuprofen (MOTRIN)   
800 mg tablet Take   
1 tablet by mouth   
every 8 hours as   
needed for pain.   
Take with food. 45   
tablet 1   
2023 Active  
  
  
  
                                                    Start: 10-  
End: 2022                         take 1 tablet by mouth every   
eight hours as needed for pain          ibuprofen (MOTRIN) 800 mg tablet   
Indications: Type 2 diabetes mellitus   
without complication, without   
long-term current use of insulin   
(HCC) Take 1 tablet by mouth every 8   
hours as needed for pain. Take with   
food. 45 tablet 1 10/11/2021   
2022 Discontinued  
  
  
  
                                        Comment on above:   Take 1 tablet by myah  
th every 8 hours as needed for pain. Take   
with food.   
   
                                                    24 hr metFORMIN   
hydrochloride 500 mg   
extended release oral   
tablet  
(20 sources)              Biguanide                 Start:   
10-  
1  
End:   
  
4                                       take 1 tablet by   
mouth once daily   
at breakfast                            metFORMIN ER   
(GLUCOPHAGE XR)   
500 mg 24 hr   
tablet   
Indications: Type   
2 diabetes   
mellitus without   
complication,   
without long-term   
current use of   
insulin (HCC) Take   
1 tablet by mouth   
daily with   
breakfast. 90   
tablet 3   
2024 Active  
   
                                        Comment on above:   Take 1 tablet by myah  
th daily with breakfast.   
   
                                                    MV-MN/FOLIC   
ACID/CALCIUM/VIT K   
(ONE-A-DAY WOMEN'S 50   
PLUS ORAL)  
(20 sources)                                                take 1 tablet by   
mouth once daily                        MV-MN/FOLIC   
ACID/CALCIUM/VIT K   
(ONE-A-DAY WOMEN'S   
50 PLUS ORAL) Take   
1 tablet by mouth   
once daily. Active  
  
  
  
                                                take 1 tablet by mouth once raul  
y MV-MN/FOLIC ACID/CALCIUM/VIT K (ONE-A-DAY   
WOMEN'S   
50 PLUS ORAL) Take 1 tablet by mouth once daily.   
0 Active  
  
  
  
                                        Comment on above:   Take 1 tablet by myah  
th once daily.  
   
   
                                                    potassium chloride   
20 meq extended   
release oral tablet  
(20 sources)                                        Start:   
10-  
End:   
2024                              take 1 tablet by   
mouth twice daily                       potassium chloride   
20 mEq TbER Take 1   
tablet by mouth two   
times a day. 180   
tablet 1 2024   
Active  
  
  
  
                                Start: 10-                 potassium chlo  
ride (K-TAB) 10 mEq   
tablet Indications: Hypokalemia   
Take 2 tablets in AM and 1 tablet   
in PM with meals 270 tablet 3   
10/06/2022 Active  
   
                                                    Start: 10-  
End: 10-                         take 1 tablet by mouth twice   
daily                                   potassium chloride (K-TAB) 10 mEq   
tablet Indications: Hypokalemia   
Take 1 tablet by mouth twice daily.   
180 tablet 1 10/03/2022 10/06/2022   
Discontinued  
   
                                        Start: 2022   take 1 tablet by myah  
th twice   
daily                                   potassium chloride (K-TAB) 10 mEq   
tablet Indications: Hypokalemia   
Take 1 tablet by mouth twice daily.   
180 tablet 1 2022 Active  
   
                                                    Start: 10-  
End: 2022                         take 1 tablet by mouth once   
daily at breakfast                      potassium chloride (K-TAB) 10 mEq   
tablet Take 1 tablet by mouth daily   
with breakfast. 90 tablet 3   
10/11/2021 2022 Discontinued  
  
  
  
                                        Comment on above:   Take 1 tablet by myah  
th daily with breakfast.   
   
                                                            Take 1 tablet by myah  
th twice daily.   
   
                                                            Take 2 tablets in AM  
 and 1 tablet in PM with meals   
   
                                                            Take 1 tablet by myah  
th two times a day.   
  
  
  
Completed/Discontinued Medications  
  
  
  
                      Medication Drug Class(es) Dates      Sig (Normalized) Sig   
(Original)  
   
                                                    ascorbic acid 500   
mg oral tablet  
(20 sources)              Vitamin C                   
End:   
10-                              take 1 tablet by   
mouth once daily                        ascorbic acid,   
vitamin C, (VITAMIN   
C) 500 mg tablet   
Take 500 mg by   
mouth once daily. 0   
10/02/2023   
Discontinued  
   
                                        Comment on above:   Take 500 mg by mouth  
 once daily.   
   
                                                    azithromycin 250 mg   
oral tablet  
(8 sources)                             Macrolide   
Antimicrobial                           Start:   
10-  
End:   
2024                                          azithromycin   
(ZITHROMAX Z-ALLAN)   
250 mg tablet TAKE   
2 TABS ON THE FIRST   
DAY, THEN ONE TAB   
DAILY FOR 4 DAYS. 6   
tablet 0 10/02/2023   
2024   
Discontinued  
   
                                        Comment on above:   TAKE 2 TABS ON THE F  
IRST DAY, THEN ONE TAB DAILY FOR 4 DAYS.   
   
                                                    cholecalciferol,   
vitamin D3,   
(VITAMIN D3 ORAL)  
(12 sources)                                                take 1 tablet by   
mouth once daily                        cholecalciferol,   
vitamin D3,   
(VITAMIN D3 ORAL)   
Take 1 tablet by   
mouth once daily. 0   
Active  
   
                                        Comment on above:   Take 1 tablet by myah  
th once daily.   
   
                                                    0.5 ml dulaglutide   
1.5 mg/ml   
auto-injector  
(1 source)                              GLP-1 Receptor   
Agonist                                 Start:   
10-  
End:   
2022                              inject 0.75 mg by   
subcutaneous   
injection every   
week                                    dulaglutide   
(TRULICITY) 0.75   
mg/0.5 mL pen   
injector Inject   
0.75 mg   
subcutaneously one   
time a week. Inject   
dose once per week.   
Discard Pen After 4   
Each 4 10/26/2021   
2022   
Discontinued  
   
                                        Comment on above:   Inject 0.75 mg subcu  
taneously one time a week. Inject dose   
once   
per week. Discard Pen After   
   
                                                    Gatorade Sports   
Drink  
(12 sources)                                        Start:   
10-                                          Gatorade Sports   
Drink Indications:   
Screening for colon   
cancer Use as   
directed for   
Miralax / Gatorade   
Bowel Prep Kit 0   
10/26/2021 Active  
   
                                        Comment on above:   Use as directed for   
Miralax / Gatorade Bowel Prep Kit   
   
                                                    metroNIDAZOLE 500   
mg oral tablet  
(18 sources)                            Nitroimidazole   
Antimicrobial                           Start:   
2023  
End:   
10-                              take 1 tablet by   
mouth three times   
daily                                   metroNIDAZOLE   
(FLAGYL) 500 mg   
tablet Take 500 mg   
by mouth three   
times daily. TAKE   
ONE(2) TABLET   
TWO(2) TIMES DAILY   
FOR (1) DAY. 0   
2023   
10/02/2023   
Discontinued  
   
                                        Comment on above:   Take 1 tablet by myah  
 three times daily. TAKE ONE(2) TABLET   
TWO(2) TIMES DAILY FOR (1) DAY.   
   
                                                            Take 500 mg by mouth  
 three times daily. TAKE ONE(2) TABLET TWO(2)   
TIMES DAILY FOR (1) DAY.   
   
                                                    polyethylene glycol   
3350 77816 mg   
powder for oral   
solution  
(12 sources)              Osmotic Laxative          Start:   
10-                                          polyethylene glycol   
3350 (MIRALAX,   
GLYCOLAX) 17   
gram/dose powder   
Indications:   
Screening for colon   
cancer Use as   
directed for   
Miralax / Gatorade   
Bowel Prep Kit 238   
g 0 10/26/2021   
Active  
   
                                        Comment on above:   Use as directed for   
Miralax / Gatorade Bowel Prep Kit   
   
                                                    vitamin e 100 unt   
oral capsule  
(12 sources)                                                take 1 capsule by   
mouth once daily                        Vitamin E, dl,   
acetate, (VITAMIN   
E) 100 unit capsule   
Take 100 Units by   
mouth once daily. 0   
Active  
   
                                        Comment on above:   Take 100 Units by mo  
[facility] once daily.   
  
  
  
Problems  
Active Problems  
  
  
                                                    Problem   
Classification  Problem         Date            Documented Date Episodic/Chronic  
   
                                                    Abdominal pain  
(1 source)                              Left flank pain;   
Translations:   
[Unspecified abdominal   
pain]                                   2024          Episodic  
   
                                                    Cancer of breast  
(20 sources)                            Primary malignant   
neoplasm of lower   
outer quadrant of   
female breast;   
Translations:   
[Malignant neoplasm of   
lower-outer quadrant   
of right female   
breast]                                 Onset:   
05-                05-                Chronic  
   
                                                    Diabetes mellitus   
without complication  
(20 sources)                            Type 2 diabetes   
mellitus without   
complication;   
Translations: [Type 2   
diabetes mellitus   
without complications]                  Onset:   
2016                                          Chronic  
   
                                                    Disorders of lipid   
metabolism  
(20 sources)                            Hypercholesterolemia;   
Translations: [Pure   
hypercholesterolemia,   
unspecified]                            Onset:   
2014                                          Chronic  
   
                                                    Diverticulosis and   
diverticulitis  
(3 sources)                             Diverticulitis;   
Translations:   
[Diverticulitis of   
intestine, part   
unspecified, without   
perforation or abscess   
without bleeding]                                           Chronic  
   
                                                    Esophageal disorders  
(20 sources)                            Gastroesophageal   
reflux disease;   
Translations:   
[Gastro-esophageal   
reflux disease without   
esophagitis]                            05-          Chronic  
   
                                                    Essential   
hypertension  
(20 sources)                            Essential   
hypertension;   
Translations:   
[Essential (primary)   
hypertension]                           Onset:   
2014                                          Chronic  
   
                                                    Fluid and   
electrolyte   
disorders  
(2 sources)                             Hypokalemia;   
Translations:   
[Hypokalemia]                                               Episodic  
   
                                                    Genitourinary   
symptoms and   
ill-defined   
conditions  
(4 sources)                             Jose hematuria;   
Translations: [Gross   
hematuria]                                                  Episodic  
   
                                                    Mycoses  
(2 sources)                             Candidiasis;   
Translations:   
[Candidiasis,   
unspecified]                                                Episodic  
   
                                                    Nonmalignant breast   
conditions  
(2 sources)                             Mammographic   
calcification of right   
breast; Translations:   
[Mammographic   
calcification found on   
diagnostic imaging of   
breast]                                                     Episodic  
   
                                                    Other nutritional;   
endocrine; and   
metabolic disorders  
(20 sources)                            Body mass index 40+ -   
severely obese;   
Translations: [Morbid   
(severe) obesity due   
to excess calories]                     Onset:   
2014                                          Chronic  
   
                                                    Other upper   
respiratory disease  
(1 source)                              Seasonal allergic   
rhinitis;   
Translations: [Other   
seasonal allergic   
rhinitis]                               2024          Chronic  
   
                                                    Other upper   
respiratory   
infections  
(1 source)                              Chronic sinusitis;   
Translations: [Chronic   
sinusitis,   
unspecified]                                                Chronic  
   
                                                    Other upper   
respiratory   
infections  
(1 source)                              Upper respiratory   
infection;   
Translations: [Acute   
upper respiratory   
infection,   
unspecified]                            2024          Episodic  
   
                                                    Residual codes;   
unclassified  
(1 source)                              Menopause present;   
Translations:   
[Asymptomatic   
menopausal state]                                           Episodic  
   
                                                    Unclassified  
(1 source)                              New Patient /   
[id number]()                            Onset:   
2018                                            
   
                                                    Unclassified  
(1 source)                Unknown / UNK(Unknown)    Onset:   
07-                                            
  
  
Past or Other Problems  
  
  
                                                    Problem   
Classification  Problem         Date            Documented Date Episodic/Chronic  
   
                                                    Blindness and vision   
defects  
(20 sources)                            Hypermetropia;   
Translations:   
[Hypermetropia,   
unspecified eye]                        Onset:   
2016                Episodic  
   
                                                    Other aftercare  
(6 sources)                             Surgical follow-up;   
Translations:   
[Encounter for   
follow-up examination   
after completed   
treatment for   
conditions other than   
malignant neoplasm]                     Onset:   
2015  
Resolved:   
2016                Episodic  
   
                                                    Other and unspecified   
benign neoplasm  
(20 sources)                            Benign neoplasm of   
skin of eyelid;   
Translations: [Other   
benign neoplasm of   
skin of unspecified   
eyelid, including   
canthus]                                Onset:   
2016                Episodic  
   
                                                    Other connective   
tissue disease  
(6 sources)                             Peroneal tendinitis;   
Translations:   
[Peroneal tendinitis,   
unspecified leg]                        Onset:   
2016  
Resolved:   
2019                Episodic  
   
                                                    Other female genital   
disorders  
(6 sources)                             Complex endometrial   
hyperplasia without   
atypia; Translations:   
[Benign endometrial   
hyperplasia]                            Onset:   
2014  
Resolved:   
2014                Chronic  
   
                                                    Other female genital   
disorders  
(6 sources)                             Complex atypical   
endometrial   
hyperplasia;   
Translations:   
[Endometrial   
intraepithelial   
neoplasia [EIN]]                        Onset:   
2014  
Resolved:   
2014                Chronic  
   
                                                    Other female genital   
disorders  
(6 sources)                             Disorder of uterus;   
Translations:   
[Noninflammatory   
disorder of uterus,   
unspecified]                            Onset:   
2014  
Resolved:   
2014                Episodic  
   
                                                    Other screening for   
suspected conditions   
(not mental disorders   
or infectious   
disease)  
(8 sources)                             Mammography abnormal;   
Translations: [Other   
abnormal and   
inconclusive findings   
on diagnostic imaging   
of breast]                              Onset:   
2024                                          Episodic  
   
                                                    Prolapse of female   
genital organs  
(12 sources)                            Uterine prolapse;   
Translations:   
[Uterovaginal   
prolapse,   
unspecified]                            Onset:   
2014  
Resolved:   
2014                Chronic  
   
                                                    Residual codes;   
unclassified  
(20 sources)                            Family history of   
malignant neoplasm of   
ovary; Translations:   
[Family history of   
malignant neoplasm of   
ovary]                                  Onset:   
05-                05-                Episodic  
   
                                                    Residual codes;   
unclassified  
(1 source)                              Estrogen receptor   
positive status   
[ER+]; Translations:   
[Malignant neoplasm   
of lower-outer   
quadrant of right   
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   
estrogen receptor   
positive (HCC) (HCC)]                   Onset:   
2023                                          Episodic  
   
                                                    Spondylosis;   
intervertebral disc   
disorders; other back   
problems  
(20 sources)                            Chronic low back   
pain; Translations:   
[Lumbago with   
sciatica, left side]                    Onset:   
2016  
Resolved:   
2016                Episodic  
   
                                                    Unclassified  
(1 source)                              New Patient;   
Translations: [New   
Patient]                                Onset:   
2018                                            
  
  
  
Results  
  
  
                      Test Name  Value      Interpretation Reference Range Facil  
ity  
   
                                                    STREP A MOLECULAR (POC)on    
   
                      Procedural Control Valid                            [facility]  
   
                      Strep A (POCT) Negative              Negative   [facility]  
   
                                                    CNPNon 2024   
   
                                        CNPN                Telephone (INTMWS)  
----------------------  
--------------  
NAVDEEP GUILLEN   
([id number]) 1960   
F  
Date Time Provider   
Department  
24 CYNTHIA DOUGLAS  
During your visit   
today, we recorded the   
following information   
about you:  
Ani Lin LPN 2024 9:50 AM   
Signed  
Pt called upset that   
she got a letter that   
instructed her Dr. Douglas was  
terminated from [facility]. Pt asking   
to please check into   
this. Pt  
repots she told them   
she would go with Sherly An ut would like us   
to check into  
this. Pt would like to   
stay with Dr. Douglas.  
Please advise pt of   
findings.  
SAMMI Chavira Chitra, MD   
2024 10:14 AM   
Signed  
Please let them know   
that the system has to   
be updated, and it   
should soon be ,  
I will still see her   
and she will remain my   
patient.  
Regards,  
Manisha Gaona MD, MA   
2024 10:48 AM   
Signed  
Patient notified.   
Patient will fax over   
copy of letter   
received.  
Allergies As of Date:   
2024 Noted   
Allergy Reaction  
SILVADENE (SILVER   
SULFADIAZINE)   
2017 4 - Hives  
SULFA (SULFONAMIDE   
ANTIBIOTICS)   
2021 4 - Hives  
Date Reviewed:   
2024  
Reviewed by:   
Vikki Posada APRN.CNP - Fully   
Assessed  
Prescriptions as of   
2024  
- atenolol (TENORMIN)   
50 mg tablet  
Take 1 tablet by mouth   
once daily.  
- chlorthalidone   
(HYGROTON) 25 mg   
tablet  
Take 0.5 tablets by   
mouth once daily.  
- glipiZIDE (GLUCOTROL   
XL) 2.5 mg 24 hr   
tablet  
Take 1 tablet by mouth   
once daily.  
- metFORMIN ER   
(GLUCOPHAGE XR) 500 mg   
24 hr tablet  
Take 1 tablet by mouth   
daily with breakfast.  
- blood sugar   
diagnostic (BLOOD   
GLUCOSE TEST) test   
strip  
Test blood sugar(s) 2   
times daily. Dx: Type   
2 DM - Controlled   
E11.9  
Insulin: No  
- Lancets  
Test blood sugar(s) 2   
times daily. Dx: Type   
2 DM - Controlled   
E11.9  
Insulin: No  
- exemestane   
(AROMASIN) 25 mg   
tablet  
Take 1 tablet by mouth   
once daily.  
- potassium chloride   
20 mEq TbER  
Take 1 tablet by mouth   
two times a day.  
- ibuprofen (MOTRIN)   
800 mg tablet  
Take 1 tablet by mouth   
every 8 hours as   
needed for pain. Take   
with food.  
- blood sugar   
diagnostic (BLOOD   
GLUCOSE TEST) test   
strip  
Test blood sugars   
2daily.   
Dx:ucnontrolled   
diabetes . Insulin:   
Yes  
- lancets (ONE TOUCH   
DELICA) 33 gauge misc  
Test blood sugar(s) 2   
daily. Dx: Type 2 DM -   
Controlled E11.9   
Insulin: Yes  
- Blood-Glucose Meter   
monitoring kit  
Glucose Meter of   
Choice - Kit - Dx:   
Type 2 DM -   
Uncontrolled E11.65  
- MV-MN/FOLIC   
ACID/CALCIUM/VIT K   
(ONE-A-DAY WOMEN'S 50   
PLUS ORAL)  
Take 1 tablet by mouth   
once daily.  
Meds Comments as of   
2015:  
Patient only took the   
Etodolac for a few   
days and noticed no   
difference so she  
quit taking this   
medication.  
Problem List As Of   
Date 2024 Noted   
Resolved  
Morbid obesity with   
BMI of 40.0-44.9,   
adult (HC*2014  
Uterus disorder   
[N85.9] 2014  
Hypertension [I10]   
2014  
Hypercholesterolemia   
[E78.00] 2014  
Endometrial   
hyperplasia without   
atypia,   
complex*2014  
Uterine prolapse   
without mention of   
vaginal wal*2014  
Rectocele [N81.6]   
2014  
Complex endometrial   
hyperplasia with   
atypia [N8*2014  
Follow-up examination,   
following other   
surgery *2015  
Bilateral low back   
pain with left-sided   
sciatic*2016  
Peroneal tendonitis   
[M76.70] 2016  
Controlled diabetes   
mellitus type II   
without co*2016  
Hyperopia [H52.00]   
2016  
Presbyopia [H52.4]   
2016  
Benign neoplasm of   
skin of eyelid   
including   
can*2016  
Chronic bilateral low   
back pain with   
left-sided*2016  
Malignant neoplasm of   
lower-outer quadrant   
of r*05/10/2017  
ER+ CA+ carcinoma of   
breast (HCC) [C50.919,   
Z17*05/10/2017  
Family history of   
ovarian cancer   
[Z80.41] 05/10/2017  
GERD (gastroesophageal   
reflux disease)   
[K21.9]  
Encounter Number:   
[id number]  
Encounter   
Status:Closed by MANISHA JESUS on 24      [facility]  
   
                                                    CNOVon 2024   
   
                                        CNOV                Office Visit (INTMWS  
)  
----------------------  
--------------  
NAVDEEP GUILLEN   
([id number]) 1960   
F  
Date Time Provider   
Department  
24 9:40 AM SHERLY AN INTHARISH  
During your visit   
today, we recorded the   
following information   
about you:  
Pulse Respiration   
Blood pressure Weight  
64/minute 16/minute   
116/78 136.5 kg  
Sherly An APRN.CNP   
2024 10:12 AM   
Signed  
CC: Patient presents   
with:  
Recheck: 6 month   
follow up  
HPI  
Navdeep LAMAS Wilmer is a   
64 year old female who   
presents today for   
routine follow  
up.  
DIABETES MELLITUS: Ms. Guillen denies   
excessive thirst or   
increased frequency of  
urination, chest pain   
or dyspnea , numbness,   
tingling or pain in   
extremities,  
new or unusual visual   
symptoms, low   
sugar/hypoglycemic   
reactions, weight  
loss/gain,   
lightheadedness/dizzin  
ess, and bowel   
changes/loose stools.  
Follows a diabetic   
diet most of the time.   
She is compliant with   
medication(s)  
and is tolerating   
med(s) without any   
side effects. She   
reports checking her  
glucose on a twice a   
day schedule with   
sugars in the fasting   
130s and  
postprandial 90s-110s   
range. Patient's last   
HgA1C was  
Hemoglobin A1C (%)  
Date Value  
2024 6.2  
2023 6.5  
07/15/2020 7.7  
2020 7.6  
Hemoglobin A1C (POCT)   
(%)  
Date Value  
10/26/2021 7.1  
)  
Last Ophthalmology   
exam was within the   
past 6 months at   
[facility] in [place]  
HTN: Ms. Guillen   
indicates that she is   
feeling well and   
denies any symptoms  
referable to elevated   
blood pressure.   
Specifically denies   
headache, chest pain,  
palpitations, dyspnea,   
and peripheral edema.   
Patient denies any   
side effects  
of her medication(s)   
and is compliant with   
their regimen. She   
does check BP's  
away from this office   
with average BP's in   
the 110s/80s range.   
Navdeep works out  
regularly 7 times per   
week with walking. She   
watches her diet for   
sodium, low  
fat and low   
cholesterol most of   
the time.  
Last 3 Encounter BP   
Readings:  
Date: BP:  
2024 116/78  
2024 99/69  
2024 146/80  
Right Breast CA: Had   
lumpectomy in 2017.   
Follows with oncology   
and still on  
aromasin  
Allergies starting   
this morning with   
runny nose and itchy   
eyes with sneezing.  
Denies fever, chills,   
headaches, dizziness,   
cough, wheezing, or   
shortness of  
breath.  
REVIEW OF SYSTEMS  
See HPI  
PAST MEDICAL HISTORY  
Diagnosis Date  
Abnormal mammogram   
2021  
Achilles tendon pain   
2015  
Ankle weakness   
2015  
Chronic pain of left   
ankle 12/15/2016  
Colon abnormality   
2014  
Thickening of the   
ascending colon seen   
on the recent CT scan.   
Patient has had  
a colonoscopy around 4   
years ago. Records   
were obtained for when   
they were  
done, 4 years ago , in   
[facility].   
her colonoscopy was   
completely  
normal, no thickening,   
and the biopsy too was   
normal except for   
lymphoid  
aggregates.  
Diverticulitis   
Treated by Dr. Patricio at [facility]  
DJD (degenerative   
joint disease), ankle   
and foot 2016  
DM (diabetes mellitus)   
(HCC)  
GERD (gastroesophageal   
reflux disease)  
Gross hematuria   
05/15/2014  
2014, had hematuria,   
investigated   
extensively along with   
cytoscopy, a stone  
was found but no signs   
of any malignancy.  
Heel pain, chronic   
12/15/2016  
Hepatic steatosis   
10/03/2017  
Hiatal hernia   
10/03/2017  
Hypertension  
Insomnia  
Kidney stone   
05/15/2014  
Morbid obesity (HCC)  
Nephrolithiasis  
Neuritis 2016  
Renal lesion   
05/15/2014  
S/P Achilles tendon   
repair 2015 sx done  
PAST SURGICAL HISTORY  
Procedure Laterality   
Date  
BREAST LUMPECTOMY HX   
Right   
BX BREAST W/DEVICE 1ST   
LESION STEREOTACTIC   
GUID Right 2021  
CHOLECYSTECTOMY 2011  
gallbladder removal  
COLONOSCOPY   
CT ABDOMEN 2014  
PAST SURGICAL HISTORY   
OF 2014  
[facility] hysteroscopy  
PAST SURGICAL HISTORY   
OF Left 2015  
Left foot surgery  
S PK LAVH [id number]   
10/16/2014  
ALLERGIES Silvadene   
[Silver Sulfadiazine]   
and Sulfa (Sulfonamide   
Antibiotics)  
MEDICATIONS  
exemestane (AROMASIN)   
25 mg tablet Take 1   
tablet by mouth once   
daily.  
potassium chloride 20   
mEq TbER Take 1 tablet   
by mouth two times a   
day.  
ibuprofen (MOTRIN) 800   
mg tablet Take 1   
tablet by mouth every   
8 hours as  
needed for pain. Take   
with food.  
metFORMIN ER   
(GLUCOPHAGE XR) 500 mg   
24 hr tablet Take 1   
tablet by mouth daily  
with breakfast.  
blood sugar diagnostic   
(BLOOD GLUCOSE TEST)   
test strip Test blood   
sugars  
2daily.   
Dx:ucnontrolled   
diabetes . Insulin:   
Yes  
chlorthalidone   
(HYGROTON) 25 mg   
tablet Take 0.5   
tablets by mouth once   
daily.  
atenolol (TENORMIN) 50   
mg tablet Take 1   
tablet by mouth once   
daily.  
glipiZIDE (GLUCOTROL   
XL) 2.5 mg 24 hr   
tablet Take 1 tablet   
by mouth once  
daily.  
lancets (ONE TOUCH   
DELICA) 33 gauge misc   
Test blood sugar(s) 2   
daily. Dx:  
Type 2 DM - Controlled   
E11.9 Insulin: Yes  
Blood-Glucose Meter   
monitoring kit Glucose   
Meter of Choice - Kit   
- Dx: Type 2  
DM - Uncontrolled   
E11.65  
MV-MN/FOLIC   
ACID/CALCIUM/VIT K   
(ONE-A-DAY WOMEN'S 50   
PLUS ORAL) Take 1   
tablet  
by mouth onc (more   
content not   
included)...        Normal                                  [facility]  
   
                                                    CBC panel Auto (Bld)on    
   
                                                    Erythrocyte   
distribution width   
(RBC) [Ratio]   14.8 %          Normal          11.5-15.0       [facility]  
   
                                        Comment on above:   Order Comment: Speci  
men Type: BLOOD SPECIMENOrdering Facility:  
   
[facility] Address: [address] [place] [address]   
   
                                                            Performed By: #### 5  
8410-2 ####[facility] CLAUDIO ACOSTA [id number] [place] [address]   
[place]   
   
                                                    Hematocrit (Bld)   
[Volume fraction] 46.3 %          High            36.0-46.0       [facility]  
   
                                        Comment on above:   Order Comment: Speci  
men Type: BLOOD SPECIMENOrdering Facility:  
  
[facility] Address: [address] [place] [address]   
   
                                                            Performed By: #### 5  
8410-2 ####[facility] [id number] [place] [address]   
[place]   
   
                                                    Hemoglobin (Bld)   
[Mass/Vol]      15.4 g/dL       Normal          11.5-15.5       [facility]  
   
                                        Comment on above:   Order Comment: Speci  
men Type: BLOOD SPECIMENOrdering Facility:  
   
[facility] Address: [address] [place] [address]   
   
                                                            Performed By: #### 5  
8410-2 ####[facility] [id number] [place] [address]   
[place]   
   
                                                    MCH (RBC) [Entitic   
mass]           27.3 pg         Normal          26.0-34.0       [facility]  
   
                                        Comment on above:   Order Comment: Speci  
men Type: BLOOD SPECIMENOrdering Facility:  
  
[facility] Address: [address] [place] [address]   
   
                                                            Performed By: #### 5  
8410-2 ####[facility] [id number] [place] [address]   
[place]   
   
                                                    MCHC (RBC)   
[Mass/Vol]      33.3 g/dL       Normal          30.5-36.0       [facility]  
   
                                        Comment on above:   Order Comment: Speci  
men Type: BLOOD SPECIMENOrdering Facility:  
   
[facility] Address: [address] [place] [address]   
   
                                                            Performed By: #### 5  
8410-2 ####[facility] HospitalNCL[facility] [id number] [place] [address]   
[place]   
   
                                                    MCV (RBC) [Entitic   
vol]            82.1 fL         Normal          80.0-100.0      [facility]  
   
                                        Comment on above:   Order Comment: Speci  
men Type: BLOOD SPECIMENOrdering Facility:  
  
[facility] Address: [address] [place] [address]   
   
                                                            Performed By: #### 5  
8410-2 ####[facility] HospitalNCD[facility] [id number] [place] [address]   
[place]   
   
                                                    Nucleated RBC (Bld)   
[#/Vol]         10*3/uL         Normal          <0.01           [facility]  
   
                                        Comment on above:   Order Comment: Speci  
men Type: BLOOD SPECIMENOrdering Facility:  
   
[facility] Address: [address] [place] [address]   
   
                                                            Performed By: #### 5  
8410-2 ####[facility] [id number] [place] [address]   
[place]   
   
                                                    Platelet mean   
volume (Bld)   
[Entitic vol]   10.3 fL         Normal          9.0-12.7        [facility]  
   
                                        Comment on above:   Order Comment: Speci  
men Type: BLOOD SPECIMENOrdering Facility:  
  
[facility] Address: [address] [place] [address]   
   
                                                            Performed By: #### 5  
8410-2 ####[facility] [id number] [place] [address]   
[place]   
   
                                                    Platelets (Bld)   
[#/Vol]         280 10*3/uL     Normal          150-400         [facility]  
   
                                        Comment on above:   Order Comment: Speci  
men Type: BLOOD SPECIMENOrdering Facility:  
   
[facility] Address: [address] [place] [address]   
   
                                                            Performed By: #### 5  
8410-2 ####[facility] [id number] [place] [address]   
[place]   
   
                      RBC (Bld) [#/Vol] 5.64 10*6/uL High       3.90-5.20  [facility]  
   
                                        Comment on above:   Order Comment: Speci  
men Type: BLOOD SPECIMENOrdering Facility:  
   
[facility] Address: [address] [place], [place] [address]   
   
                                                            Performed By: #### 5  
8410-2 ####[facility] CLAUDIO ROQUENCLIA [id number] [place] [address]   
[place]   
   
                      WBC (Bld) [#/Vol] 9.50 10*3/uL Normal     3.70-11.00 [facility]  
   
                                        Comment on above:   Order Comment: Speci  
men Type: BLOOD SPECIMENOrdering Facility:  
   
[facility] Address: [address] [place], [place] [address]   
   
                                                            Performed By: #### 5  
8410-2 ####[facility] CLAUDIO ROQUENCLIA [id number] [place], [place] [address] [place] [address] [place]   
   
                                                    CNOVSPon 2024   
   
                                        CNOVSP              Visit (SP) Office   
(HEMAWS)  
----------------------  
--------------  
WILMERNAVDEEP ADAMS   
([id number]) 1960   
F  
Date Time Provider   
Department  
24 10:30 AM   
VIKKI POSADA  
During your visit   
today, we recorded the   
following information   
about you:  
Temperature Pulse   
Blood pressure Weight  
97.9 degrees 67/minute   
99/69 136.8 kg  
Vikki Posada APRN.CNP 2024   
10:34 AM Signed  
Chief Complaint  
Patient presents with:  
Established Patient  
HPI:  
Navdeep LAMAS Wilmer is a   
64 year old female who   
presents here today   
for follow up  
breast cancer.  
Per Dr. Milner/Sumaya's   
previous note:  
H/o hypertension and   
borderline   
diabetes/insulin   
resistant, obesity,   
who  
presented with an   
abnormal breast exam   
at her PCP office.   
Subsequent diagnostic  
mammogram revealing a   
lobulated lesion in   
the right breast at   
the lower outer  
quadrant highly   
suspicious of   
malignancy.  
Patient had a   
mammotome breast   
biopsy on 3/27/17 that   
showed invasive ductal  
carcinoma, positive   
for estrogen and   
progesterone   
receptors, equivocal   
for  
overexpression   
HER-2/andi.  
She had surgery by Dr. Dueñas, right breast   
lumpectomy and right   
axillary  
sentinel lymph node   
biopsy on 17 for   
right breast cancer.  
Stage IA- pT1c pN0   
(3/3 sentinel lymph   
nodes negative for   
metastatic disease)  
Tumor size 1.5 cm x 1   
x 0.9 cm  
Overall grade 2 out of   
7  
Margins - 0.4 cm from   
posterior margin  
ER/CA >95%, [id number]   
not amplified by FISH  
Lymph/vasc invasion -   
none; derm/vasc/lymph   
invasion - none  
Menstrual history:   
Menarche at age 11,   
first pregnancy at age   
18, 8 pregnancy;  
surgical   
menopause-total   
abdominal hysterectomy   
age 54. No estrogen   
replacement  
therapy. Family   
history: Mother    
of ovarian cancer in   
her 40's. No family  
history of breast   
cancer. She was   
initially started on   
anastrozole, then  
subsequent switch to   
Aromasin because of   
joint pain.  
RADIATION-17 to   
17  
Right breast  
Current treatment:  
1) Aromasin 25 mg once   
daily  
No new concerns today.  
Appetite: Ok.  Wt.   
stable. Energy   
level: It's good.   
Denies fevers or   
recent illness.  
Resp:denies cough or   
sob, +seasonal   
allergies  
Cardiac:denies chest   
pain/palpitations  
GI:denies abd pain,   
n/v, moving bowels   
regularly h/o   
diverticulitis  
:denies   
dysuria/hematuria  
Extrem:denies new   
pain, L foot pain s/p   
surgery  
Endo:denies hot flash  
Neuro:denies symptoms   
of neuropathy  
Skin:denies   
rashes/lesions  
Heme:denies bleeding  
The ROS is otherwise   
negative.  
Past medical history,   
appointments,   
medications, allergies   
reviewed. No changes.  
EXAM:  
BP 99/69   Pulse 67     
Temp 36.6 ?C (97.9 ?F)   
(Temporal)   Wt (!)   
136.8 kg  
(301 lb 9.6 oz)   SpO2   
96%   BMI 49.43 kg/m?  
APPEARANCE Well   
appearing, alert, in   
no acute distress,   
well-hydrated, well  
nourished.  
HEART RRR with normal   
S1 and S2, no murmurs  
LUNG clear to   
auscultation  
BREAST FEMALE no   
mass/nodule b/l, R   
radiation changes  
LYMPH NODES No   
cervical   
lymphadenopathy, No   
supraclavicular   
lymphadenopathy,  
and No axillary   
lymphadenopathy.  
ABDOMEN bowel sounds   
normoactive, soft,   
non-tender  
EXTREMITIES No edema  
NEURO Awake, alert and   
oriented x 3, Normal   
gait, and No   
involuntary motions.  
SKIN Skin color,   
texture, turgor   
normal, no suspicious   
rashes or lesions  
RADIOLOGY:  
Mammogram 24:  
IMPRESSION: BENIGN   
FINDING  
There is no   
mammographic evidence   
of malignancy. A 1   
year screening  
mammogram is   
recommended.  
ASSESSMENT/PLAN:  
1. Malignant neoplasm   
of lower-outer   
quadrant of right   
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  
estrogen receptor   
positive (HCC) - ICD9:   
174.5, V86.0, ICD10:   
C50.511, Z17.0  
pT1c pN0 (3/3 sentinel   
lymph nodes negative   
for metastatic   
disease) stage IA  
infiltrating ductal   
carcinoma the right   
breast. ER/CA >95%,   
[id number]  
non-amplified by FISH.  
- No concerning   
findings on exam.  
- Overall tolerating   
aromasin well.  
- Reviewed mammogram   
with pt.  
- Continue aromasin.  
- Mammogram due mid   
2025.  
- Follow up in 6   
months.  
- Pt. aware to call   
office with any   
questions/concerns.  
The patient indicates   
understanding of these   
issues and agrees with   
the plan.  
All documentation from   
previous visit of   
10/2/23-Dr. Shell/myself was   
copied  
and pasted,   
documentation has been   
reviewed and edited as   
necessary for today's  
visit.  
NIC Ugalde.CNP  
Referring Provider:   
VIKKI POSADA   
[[id number]]  
Allergies As of Date:   
2024 Noted   
Allergy Reaction  
SILVADENE (SILVER   
SULFADIAZINE)   
2017 4 - Hives  
SULFA (SULFONAMIDE   
ANTIBIOTICS)   
2021 4 - Hives  
Date Reviewed:   
2024  
Reviewed by:   
Vikki Posada APRN.CNP - Fully   
Assessed  
Reason for Visit:  
Established Patient   
[175]  
Primary Visit   
Diagnosis:Malignant   
neoplasm of   
lower-outer quadrant   
of right  
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   
estrogen receptor   
positive (HCC)  
[C50.511, Z17.0]  
Other Visit   
Diagnosis:Morbid   
obesity with BMI of   
40.0-44.9, adult (HCC)  
[E66.01, Z68.41]  
Follow-up and   
Disposition History   
for Encounter  
Date Provider   
Department Center   
(more content not   
included)...        Normal                                  [facility]  
   
                                                    Comprehensive metabolic 2000  
 panelon 2024   
   
                      Albumin [Mass/Vol] 4.0 g/dL   Normal     3.9-4.9    [facility]  
   
                                        Comment on above:   Order Comment: Speci  
men Type: BLOOD SPECIMENOrdering Facility:  
   
[facility] Address: [address] [place] [address]   
   
                                                            Performed By: #### 2  
4323-8 ####[facility] CenterWNCLIA [id number] [place] [address]   
[place]   
   
                                                    ALP [Catalytic   
activity/Vol]   132 U/L         High                      [facility]  
   
                                        Comment on above:   Order Comment: Speci  
men Type: BLOOD SPECIMENOrdering Facility:  
  
[facility] Address: [address] [place] [address]   
   
                                                            Performed By: #### 2  
4323-8 ####[facility]   
MILLWNCLIA [id number] [place] [address]   
[place]   
   
                                                    ALT [Catalytic   
activity/Vol]   23 U/L          Normal          7-38            [facility]  
   
                                        Comment on above:   Order Comment: Speci  
men Type: BLOOD SPECIMENOrdering Facility:  
  
[facility] Address: [address] [place] [address]   
   
                                                            Performed By: #### 2  
4323-8 ####[facility] [id number] [place] [address]   
[place]   
   
                                                    Anion gap   
[Moles/Vol]     11 mmol/L       Normal          9-18            [facility]  
   
                                        Comment on above:   Order Comment: Speci  
men Type: BLOOD SPECIMENOrdering Facility:  
   
[facility] Address: [address] [place] [address]   
   
                                                            Performed By: #### 2  
4323-8 ####[facility]   
MILLWNCLIA [id number] [place] [address]   
[place]   
   
                                                    AST [Catalytic   
activity/Vol]   17 U/L          Normal          13-35           [facility]  
   
                                        Comment on above:   Order Comment: Speci  
men Type: BLOOD SPECIMENOrdering Facility:  
  
[facility] Address: [address] [place] [address]   
   
                                                            Performed By: #### 2  
4323-8 ####[facility]   
MILLTOWNCLIA [id number] [place] [address]   
[place]   
   
                                                    Bilirubin   
[Mass/Vol]      0.3 mg/dL       Normal          0.2-1.3         [facility]  
   
                                        Comment on above:   Order Comment: Speci  
men Type: BLOOD SPECIMENOrdering Facility:  
   
[facility] Address: [address] [place] [address]   
   
                                                            Performed By: #### 2  
4323-8 ####[facility]   
MILLWNCLIA [id number] [place] [address]   
[place]   
   
                      Calcium [Mass/Vol] 10.3 mg/dL High       8.5-10.2   [facility]  
   
                                        Comment on above:   Order Comment: Speci  
men Type: BLOOD SPECIMENOrdering Facility:  
   
[facility] Address: [address] [place] [address]   
   
                                                            Performed By: #### 2  
4323-8 ####[facility] HospitalLIA [id number] [place] [address]   
[place]   
   
                                                    Chloride   
[Moles/Vol]     105 mmol/L      Normal                    [facility]  
   
                                        Comment on above:   Order Comment: Speci  
men Type: BLOOD SPECIMENOrdering Facility:  
   
[facility] Address: [address] [place] [address]   
   
                                                            Performed By: #### 2  
4323-8 ####[facility]   
MILLTOWNCLIA [id number] [place] [address]   
[place]   
   
                      CO2 [Moles/Vol] 22 mmol/L  Normal     22-30      [facility]  
   
                                        Comment on above:   Order Comment: Speci  
men Type: BLOOD SPECIMENOrdering Facility:  
   
[facility] Address: [address] [place] [address]   
   
                                                            Performed By: #### 2  
4323-8 ####[facility] HospitalNCLIA [id number] [place] [address]   
[place]   
   
                                                    Creatinine   
[Mass/Vol]      0.72 mg/dL      Normal          0.58-0.96       [facility]  
   
                                        Comment on above:   Order Comment: Speci  
men Type: BLOOD SPECIMENOrdering Facility:  
   
[facility] Address: [address] [place] [address]   
   
                                                            Performed By: #### 2  
4323-8 ####[facility] [id number] [place] [address]   
[place]   
   
                                                    Creatinine and   
Glomerular   
filtration   
rate.predicted   
panel (S/P/Bld) 92 mL/min/1.73m??? Normal          >=60            [facility]  
   
                                        Comment on above:   Order Comment: Speci  
men Type: BLOOD SPECIMENOrdering Facility:  
   
[facility] Address: [address] [place] [address]   
   
                                                            Result Comment: Orly  
mated Glomerular Filtration Rate (eGFR) is   
calculated using the  CKD-EPI creatinine equation. This   
equation utilizes serum creatinine, sex, and age as parameters.   
The creatinine assay has traceable calibration to isotope   
dilution-mass spectrometry. Refer to KDIGO guidelines for clinical   
interpretation. In patients with unstable renal function, e.g.   
those with acute kidney injury, the eGFR may not accurately   
reflect actual GFR.   
   
                                                            Performed By: #### 2  
4323-8 ####[facility] [id number] [place] [address]   
[place]   
   
                      Glucose [Mass/Vol] 141 mg/dL  High       74-99      [facility]  
   
                                        Comment on above:   Order Comment: Speci  
men Type: BLOOD SPECIMENOrdering Facility:  
   
[facility] Address: [address] [place] [address]   
   
                                                            Result Comment: The   
American Diabetes Association (ADA) provides   
guidance for cutoff values for fasting glucose and random glucose.   
The ADA defines fasting as no caloric intake for at least 8 hours.   
Fasting plasma glucose results between 100 to 125 mg/dL indicate   
increased risk for diabetes (prediabetes).  
Fasting plasma glucose results greater than or equal to 126 mg/dL   
meet the criteria for diagnosis of diabetes. In the absence of   
unequivocal hyperglycemia, results should be confirmed by repeat   
testing. In a patient with classic symptoms of hyperglycemia or   
hyperglycemic crisis, random plasma glucose results greater than   
or equal to 200 mg/dL meet the criteria for diagnosis of diabetes.  
Reference: Standards of Medical Care in Diabetes 2016, American   
Diabetes Association. Diabetes Care. 2016.39(Suppl 1).   
   
                                                            Performed By: #### 2  
4323-8 ####[facility] CLAUDIO ROQUEKAI [id number] [place] [address]   
[place]   
   
                                                    Potassium   
[Moles/Vol]     3.8 mmol/L      Normal          3.7-5.1         [facility]  
   
                                        Comment on above:   Order Comment: Speci  
men Type: BLOOD SPECIMENOrdering Facility:  
   
[facility] Address: [address] [place] [address]   
   
                                                            Performed By: #### 2  
4323-8 ####[facility] HospitalKAI [id number] [place] [address]   
[place]   
   
                      Protein [Mass/Vol] 7.5 g/dL   Normal     6.3-8.0    [facility]  
   
                                        Comment on above:   Order Comment: Speci  
men Type: BLOOD SPECIMENOrdering Facility:  
   
[facility] Address: [address] [place] [address]   
   
                                                            Performed By: #### 2  
4323-8 ####[facility] HospitalJÚNIORA [id number] [place] [address]   
[place]   
   
                      Sodium [Moles/Vol] 138 mmol/L Normal     136-144    [facility]  
   
                                        Comment on above:   Order Comment: Speci  
men Type: BLOOD SPECIMENOrdering Facility:  
   
[facility] Address: [address] [place] [address]   
   
                                                            Performed By: #### 2  
4323-8 ####[facility] HospitalNCLIA [id number] [place] [address]   
[place]   
   
                                                    Urea nitrogen   
[Mass/Vol]      11 mg/dL        Normal          7-21            [facility]  
   
                                        Comment on above:   Order Comment: Speci  
men Type: BLOOD SPECIMENOrdering Facility:  
   
[facility] Address: [address] [place] [address]   
   
                                                            Performed By: #### 2  
4323-8 ####[facility] [id number] [place] [address]   
[place]   
   
                                                    HbA1c (Bld)on 2024   
   
                                                    Average glucose   
Estimated from   
glycated hemoglobin   
(Bld) [Mass/Vol] 131 mg/dL       Normal                          [facility]  
   
                                        Comment on above:   Order Comment: Speci  
men Type: BLOOD SPECIMENOrdering Facility:  
   
[facility] Address: [address] [place] [address]   
   
                                                            Result Comment: eAG:  
 (Estimated average glucose) is a calculated   
value from HgbA1c and is representative of the average blood   
glucose level in the last 2-3 month period.   
   
                                                            Performed By: #### 5  
5454-3 ####[facility]   
LABIA [id number] [place] [address]   
[place]   
   
                                                    HbA1c (Bld) [Mass   
fraction]       6.2 %           High            4.3-5.6         [facility]  
   
                                        Comment on above:   Order Comment: Speci  
men Type: BLOOD SPECIMENOrdering Facility:  
  
[facility] Address: [address] [place] [address]   
   
                                                            Result Comment: Amer  
ican Diabetes Association guidelines indicate   
that patients with HgbA1c in the range 5.7-6.4% are at increased   
risk for development of diabetes, and intervention by lifestyle   
modification may be beneficial. HgbA1c greater or equal to 6.5% is   
considered diagnostic of diabetes.   
   
                                                            Performed By: #### 5  
5454-3 ####[facility]   
LABIA [id number] [place] [address]   
[place]   
   
                                                    Lipid 1996 panelon   
4   
   
                                                    Cholesterol   
[Mass/Vol]      170 mg/dL       Normal          <200            [facility]  
   
                                        Comment on above:   Order Comment: Speci  
men Type: BLOOD SPECIMENOrdering Facility:  
   
[facility] Address: [address] [place] [address]   
   
                                                            Result Comment: <200  
 mg/dL, Desirable  
200-239 mg/dL, Borderline high  
>239 mg/dL, High   
   
                                                            Performed By: #### 2  
4331-1 ####[facility]   
LABCLIA [id number] [address]   
[id number] [place] [address] [place]   
   
                                                    Cholesterol in HDL   
[Mass/Vol]      37 mg/dL        Low             >39             [facility]  
   
                                        Comment on above:   Order Comment: Speci  
men Type: BLOOD SPECIMENOrdering Facility:  
   
[facility] Address: [address] [place] [address]   
   
                                                            Result Comment: 40-5  
9 mg/dL, Acceptable  
>59 mg/dL, High: Negative risk factor for coronary heart disease  
<40 mg/dL, Low: Positive risk factor for coronary heart disease   
   
                                                            Performed By: #### 2  
4331-1 ####[facility]   
LABCLIA [id number] [address]   
[id number] [place] [address] [place]   
   
                                                    Cholesterol in LDL   
[Mass/Vol]      103 mg/dL       High            <100            [facility]  
   
                                        Comment on above:   Order Comment: Speci  
men Type: BLOOD SPECIMENOrdering Facility:  
   
[facility] [facility] Address: [address] [place] [address]   
   
                                                            Result Comment: <100  
 mg/dL, Optimal  
100-129 mg/dL, Near optimal/above optimal  
130-159 mg/dL, Borderline high  
160-189 mg/dL, High  
>189 mg/dL, Very high  
Secondary prevention optimal LDL Cholesterol levels are   
recommended to be < 70 mg/dL   
   
                                                            Performed By: #### 2  
4331-1 ####[facility]   
LABCLIA [id number] [address]   
[id number] [place] [address] [place]   
   
                                                    Cholesterol in   
LDL/Cholesterol in   
HDL [Mass ratio] 2.78 {ratio}    High            <2.54           [facility]  
   
                                        Comment on above:   Order Comment: Speci  
men Type: BLOOD SPECIMENOrdering Facility:  
  
[facility] Address: [address] [place] [address]   
   
                                                            Result Comment: Margaret smith:  
1. National Cholesterol Education Program ATP III Guideline   
At-A-Glance Quick Desk Reference: National Heart, Lung, and Blood   
[place]. National Institutes of Health. 2001: NIH Publication   
No. .  
2. An International Atherosclerosis Society position paper: global   
recommendations for the management of dyslipidemia: executive   
summary, Atherosclerosis. 2014: 232(2):410-413.   
   
                                                            Performed By: #### 2  
4331-1 ####[facility]   
LABCLIA [id number] [address]   
[id number] [place] [address] [place]   
[place]   
   
                                                    Cholesterol in VLDL   
[Mass/Vol]      30 mg/dL        High            <30             [facility]  
   
                                        Comment on above:   Order Comment: Speci  
men Type: BLOOD SPECIMENOrdering Facility:  
   
[facility] Address: [address] [place] [address]   
   
                                                            Performed By: #### 2  
4331-1 ####[facility]   
LABCLIA [id number] [address]   
[id number] [place] [address] [place]   
   
                                                    Cholesterol non HDL   
[Mass/Vol]      133 mg/dL       High            <130            [facility]  
   
                                        Comment on above:   Order Comment: Speci  
men Type: BLOOD SPECIMENOrdering Facility:  
   
[facility] Address: [address] [place] [address]   
   
                                                            Result Comment: <130  
 mg/dL, Optimal  
130-159 mg/dL, Near optimal/above optimal  
160-189 mg/dL, Borderline high  
190-219 mg/dL, High  
>219 mg/dL, Very high  
Secondary prevention optimal non HDL Cholesterol levels are   
recommended to be <100 mg/dL   
   
                                                            Performed By: #### 2  
4331-1 ####[facility]   
LABCLIA [id number] [address]   
[id number] [place] [address] [place]   
   
                                                    Cholesterol.total/C  
holesterol in HDL   
[Mass ratio]    4.59 {ratio}    Normal          <5.10           [facility]  
   
                                        Comment on above:   Order Comment: Speci  
men Type: BLOOD SPECIMENOrdering Facility:  
  
[facility] Address: [address] [place], [place] [address]   
   
                                                            Performed By: #### 2  
4331-1 ####[facility]   
LABCLIA [id number] [address]   
[id number] [place] [address] [place]   
   
                      FASTING TIME 12 hrs     Normal                [facility]  
   
                                        Comment on above:   Order Comment: Speci  
men Type: BLOOD SPECIMENOrdering Facility:  
  
[facility] Address: [address] [place] [address]   
   
                                                            Performed By: #### 2  
4331-1 ####[facility]   
LABCLIA [id number] [address]   
[id number] [place] [address] [place]   
   
                                                    Triglyceride   
[Mass/Vol]      148 mg/dL       Normal          <150            [facility]  
   
                                        Comment on above:   Order Comment: Speci  
men Type: BLOOD SPECIMENOrdering Facility:  
   
[facility] Address: [address] [place], [place] [address]   
   
                                                            Result Comment: <150  
 mg/dL, Normal  
150-199 mg/dL, Borderline high  
200-499 mg/dL, High  
>499 mg/dL, Very high   
   
                                                            Performed By: #### 2  
4331-1 ####[facility]   
LABIA [id number] [place] [id number]   
[facility]   
[id number] [place] [address] [place]   
   
                                                    Bacteria Ur Culton   
4   
   
                                                    Bacteria identified   
Cx Nom (U)                              ORGANISM ID: 1  
10,000 -<50,000 CFU/ml   
Mixed microbiota  
No further workup.   
Mixed microbiota can   
be due   
to???urine???contamina  
tion with skin   
bacteria at time of   
collection or presence   
of a long-term urinary   
catheter. If a new   
culture is needed,   
please consider   
re-education of the   
patient on proper   
midstream collection   
technique or straight   
catheterization   
for???urine???collecti  
on.                 Normal                                  [facility]  
   
                                        Comment on above:   Performed By: #### 6  
30-4 ####[facility]   
LABCLIA   
[id number] [address] OF [facility]   
   
                                                    CNOVon 2024   
   
                                        CNOV                Office Visit (INTMWS  
)  
----------------------  
--------------  
NAVDEEP GUILLEN   
([id number]) 1960   
F  
Date Time Provider   
Department  
3/4/24 2:40 PM MEERA MENCHACA INTMWS  
During your visit   
today, we recorded the   
following information   
about you:  
Temperature Pulse   
Respiration Blood   
pressure  
98.7 degrees 73/minute   
16/minute 146/80  
Weight Height  
133.4 kg 1.664 m  
Meera Menchaca PA-C   
3/4/2024 6:16 PM   
Signed  
CC: Patient presents   
with:  
Same Day Appointment:   
abdomen cramping, left   
flank pain, pinkish   
when wipes,  
burning at times  
HPI  
Navdeep LAMAS Wilmer is a   
63 year old female who   
presents with   
complaint of  
increased frequency,   
blood in urine, and L   
flank discomfort and   
left abdominal  
discomfort for 1 days.   
Other symptoms include   
lower back pain (right   
sided)  
and nausea. The   
patient denies fever,   
vomiting, and vaginal   
discharge. She  
has tried nothing to   
help to alleviate her   
symptoms. Ms. Guillen   
has history of  
previous UTIs and   
kidney stones.  
She called her   
surgeon, Dr. Patricio   
(gen surg at [facility])   
because of her hx of  
diverticulitis, for   
which she just had a   
bout and was txed end   
of January.  
REVIEW OF SYSTEMS  
GI: Positive for   
abdominal discomfort   
looser stools, Denies   
any melena or  
hematochezia  
All other systems   
negative.  
PAST MEDICAL HISTORY  
Diagnosis Date  
Abnormal mammogram   
2021  
Achilles tendon pain   
2015  
Ankle weakness   
2015  
Chronic pain of left   
ankle 12/15/2016  
Colon abnormality   
2014  
Thickening of the   
ascending colon seen   
on the recent CT scan.   
Patient has had  
a colonoscopy around 4   
years ago. Records   
were obtained for when   
they were  
done, 4 years ago , in   
[facility].   
her colonoscopy was   
completely  
normal, no thickening,   
and the biopsy too was   
normal except for   
lymphoid  
aggregates.  
Diverticulitis   
Treated by Dr. Patricio at [facility]  
DJD (degenerative   
joint disease), ankle   
and foot 2016  
DM (diabetes mellitus)   
(HCC)  
GERD (gastroesophageal   
reflux disease)  
Gross hematuria   
05/15/2014  
2014, had hematuria,   
investigated   
extensively along with   
cytoscopy, a stone  
was found but no signs   
of any malignancy.  
Heel pain, chronic   
12/15/2016  
Hepatic steatosis   
10/03/2017  
Hiatal hernia   
10/03/2017  
Hypertension  
Insomnia  
Kidney stone   
05/15/2014  
Morbid obesity (HCC)  
Nephrolithiasis  
Neuritis 2016  
Renal lesion   
05/15/2014  
S/P Achilles tendon   
repair 2015 sx done  
PAST SURGICAL HISTORY  
Procedure Laterality   
Date  
BREAST LUMPECTOMY HX   
Right   
BX BREAST W/DEVICE 1ST   
LESION STEREOTACTIC   
GUID Right 2021  
CHOLECYSTECTOMY   
gallbladder removal  
COLONOSCOPY   
CT ABDOMEN 2014  
PAST SURGICAL HISTORY   
OF 2014  
[facility] hysteroscopy  
PAST SURGICAL HISTORY   
OF Left 2015  
Left foot surgery  
[id number]   
10/16/2014  
ALLERGIES Silvadene   
[Silver Sulfadiazine]   
and Sulfa (Sulfonamide   
Antibiotics)  
MEDICATIONS  
ibuprofen (MOTRIN) 800   
mg tablet Take 1   
tablet by mouth every   
8 hours as  
needed for pain. Take   
with food.  
metFORMIN ER   
(GLUCOPHAGE XR) 500 mg   
24 hr tablet Take 1   
tablet by mouth daily  
with breakfast.  
blood sugar diagnostic   
(BLOOD GLUCOSE TEST)   
test strip Test blood   
sugars  
2daily.   
Dx:ucnontrolled   
diabetes . Insulin:   
Yes  
chlorthalidone   
(HYGROTON) 25 mg   
tablet Take 0.5   
tablets by mouth once   
daily.  
atenolol (TENORMIN) 50   
mg tablet Take 1   
tablet by mouth once   
daily.  
potassium chloride 20   
mEq TbER Take 1 tablet   
by mouth twice daily.  
glipiZIDE (GLUCOTROL   
XL) 2.5 mg 24 hr   
tablet Take 1 tablet   
by mouth once  
daily.  
exemestane (AROMASIN)   
25 mg tablet Take 1   
tablet by mouth once   
daily.  
azithromycin   
(ZITHROMAX Z-ALLAN) 250   
mg tablet TAKE 2 TABS   
ON THE FIRST DAY,  
THEN ONE TAB DAILY FOR   
4 DAYS.  
lancets (ONE TOUCH   
DELICA) 33 gauge misc   
Test blood sugar(s) 2   
daily. Dx:  
Type 2 DM - Controlled   
E11.9 Insulin: Yes  
Blood-Glucose Meter   
monitoring kit Glucose   
Meter of Choice - Kit   
- Dx: Type 2  
DM - Uncontrolled   
E11.65  
MV-MN/FOLIC   
ACID/CALCIUM/VIT K   
(ONE-A-DAY WOMEN'S 50   
PLUS ORAL) Take 1   
tablet  
by mouth once daily.  
FAMILY HISTORY  
Problem Relation Age   
of Onset  
Ovarian cancer Mother   
39  
Heart Father  
Hypertension Father  
Prostate Cancer Father   
78  
other (kidney stones)   
Brother  
Hypertension Brother  
Diabetes Brother  
Seizures Son  
Breast Cancer Other 55  
Double first cousin   
(daughter of mother's   
sister and father's   
brother)  
Social History  
Tobacco Use  
Smoking status: Former  
Packs/day: 1.00  
Years: 10.00  
Additional pack years:   
0.00  
Total pack years:   
10.00  
Types: Cigarettes  
Quit date: 5/15/2007  
Years since quittin.8  
Smokeless tobacco:   
Never  
Vaping Use  
Vaping Use: Never used  
Substance Use Topics  
Alcohol use: No  
Drug use: Not   
Currently  
Comment: marijuana for   
insomnia - currently   
not using  
PHYSICAL EXAM  
Pulse 73   Temp 37.1   
?C (98.7 ?F)   Resp 16   
  Ht 166.4 cm (5'   
5.5 )   Wt  
133.4 kg (294 lb)     
SpO2 97%   BMI 48.18   
kg/m?  
General Appe (more   
content not   
included)...        Normal                                  [facility]  
   
                                                    UA DIP, URINE (POC)on 2024   
   
                      BILIRUBIN UA (POCT) Negative              Negative   [facility]  
   
                      CLARITY UA (POCT) Cloudy                           [facility]  
[facility]  
   
                      COLOR UA (POCT) Yellow                           [facility]  
   
                      GLUCOSE UA (POCT) Negative              Negative mg/dL [facility]  
   
                      Hemoglobin Ql (U) Trace-intact Abnormal   Negative   [facility]  
   
                      KETONE UA (POCT) Negative              Negative mg/dL [facility]  
   
                                                    LEUKOCYTES UA   
(POCT)          Large           Abnormal        Negative        [facility]  
   
                      NITRITE UA (POCT) Negative              Negative   [facility]  
   
                      PH UA (POCT) 7.0                   4.5 - 8.0  [facility]  
   
                      Protein Ql (U) Negative              Negative mg/dL [facility]  
[facility]   
[facility]  
   
                                                    SPECIFIC GRAVITY UA   
(POCT)          1.020                           1.005 - 1.030   [facility]  
   
                                                    UROBILINOGEN UA   
(POCT)          0.2 E.U./dL                     Normal E.U./dL  [facility]  
   
                                                    CNCOon 2024   
   
                                        CNCO                HNO ID: [id number]  
Author: COORDINATOR,   
MAMMOGRAPHY, ?  
Service: ?  
Author Type: Physician  
Type: Letter  
Filed: 2024   
10:52  
Note Text:  
2024  
PID:  
[id number] Navdeep Guillen  
[address] [place] [address]  
Dear Ms. Guillen,  
We are pleased to   
inform you that the   
results of your recent   
breast imaging exam  
on 2024 are   
normal.  
Early detection of   
cancer is very   
important. We also   
understand   
recommendations  
regarding breast   
cancer screening are   
controversial. Please   
discuss with your  
primary care provider   
which strategy is best   
for you and whether a   
mammogram is  
right for you.  
Your imaging studies   
and report will be   
kept on file at   
[facility] as   
part  
of your permanent   
medical record and are   
available for your   
continuing care.  
Thank you for allowing   
us to help in meeting   
your health care   
needs.  
Sincerely,  
Dr. Mcdermott  
Interpreting   
Radiologist  
[facility]  
(Normal over 40)    Normal                                  [facility]  
   
                                                    BD DXA - AXIAL SKELETONon    
   
                                                    BD DXA - AXIAL   
SKELETON                                * * *Final Report* * *  
DATE OF EXAM: 2024 10:47AM  
[facility] 0804 - BD DXA -   
AXIAL SKELETON /   
ACCESSION # [id number]  
PROCEDURE REASON:   
Screening for   
osteoporosis  
  
* * * * Physician   
Interpretation * * * *  
EXAMINATION: DXA BONE   
DENSITOMETRY BD DXA -   
AXIAL SKELETON  
PATIENT DEMOGRAPHICS:   
Age: 63 years, Gender:   
Female  
SCANNER INFORMATION:  
DXA Model: [place]   
[facility] [employer]   
[facility] C [id number]  
Date Scanned:   
2024 10:47 AM  
CLINICAL HISTORY:   
DIAGNOSTIC Screening   
for osteoporosis .  
RISK FACTORS FOR   
OSTEOPOROSIS AND   
ASSOCIATED FRACTURES   
REPORTED BY THIS  
PATIENT:  
Please refer to Bone   
Health Questionnaire   
in the EMR  
CURRENT THERAPY:  
Please refer to Bone   
Health Questionnaire   
in the EMR  
TECHNICAL LIMITATIONS:  
None  
RESULTS:  
Lumbar spine (L1, L2,   
L3, L4): 1.062 g/cm2,   
T-score 0.2, Z-score   
2.0  
Lumbar spine: 2017:   
1.117 g/cm2  
Right Femoral Neck:   
1.064 g/cm2, T-score   
1.9, Z-score 3.5  
Right Total Hip: 1.254   
g/cm2, T-score 2.6,   
Z-score 3.8  
Left Femoral Neck:   
0.989 g/cm2, T-score   
1.3, Z-score 2.8  
Left Femoral Neck:   
2017: 1.015 g/cm2  
There has been a 2.6%   
interval decrease in   
bone mineral density  
Left Total Hip: 1.314   
g/cm2, T-score 3.1,   
Z-score 4.3  
Left Total Hip: 2017:   
1.344 g/cm2  
There has been a 2.2%   
statistically   
significant interval   
decrease in bone  
mineral density.  
CHANGE IS   
STATISTICALLY   
SIGNIFICANT IN THE   
SPINE OR HIP IF   
GREATER THAN  
OR EQUAL TO 0.04 g/cm2  
VERTEBRAL FRACTURE   
ASSESSMENT  
Not performed.  
IMPRESSION:  
THE LOWEST T-SCORE IS   
0.2 IN THE SPINE  
1) DIAGNOSIS (based on   
BMD alone): NORMAL   
BONE DENSITY  
Caution: Medical   
conditions other than   
osteoporosis may cause   
low bone  
density, such as   
osteomalacia or renal   
osteodystrophy.   
Clinical  
correlation is   
necessary.  
2) FRACTURE RISK   
(based on FRAX):  
10-year absolute   
fracture risk:  
- major osteoporotic   
fracture = 4.8 %  
- hip fracture = 0.1 %  
- A diagnosis of   
Osteoporosis, a 10   
year probability  
of hip fracture   
greater  
than or equal to 3% or   
a 10 year probability   
of any  
major   
osteoporosis-related  
fracture greater than   
or equal to 20% should   
be  
considered for  
treatment.  
- DXA scanner   
generated FRAX   
calculations may  
slightly differ from  
online FRAX   
calculations due to   
differences in  
software versions.  
- All recommendations   
and calculations are   
to be considered as  
guidelines and should   
not replace sound   
clinical judgement  
- Caution: Fracture   
risk may be increased   
independent of BMD in  
patients with   
corticosteroid use,   
age greater than 65   
years, or a  
history of prior   
fragility fracture.  
RECOMMENDATIONS:  
Follow-up in 2 years   
or as clinically   
indicated. Patients   
that are  
taking   
corticosteroids, are   
transplant recipients   
or have  
hyperparathyroidism   
should have annual   
follow-up. Follow-up   
scans should  
always be done on the   
same machine for   
accurate comparison.  
FOR MORE INFORMATION   
ABOUT DIAGNOSIS AND   
TREATMENT:  
[facility] for   
Osteoporosis and   
Metabolic Bone  
Disease:?   
www.ccf.org/arthritis/  
osteo  
National Osteoporosis   
Foundation:?   
www.nof.org  
International Society   
of Clinical   
Densitometry   
www.iscd.org  
: SHARON  
Transcribe Date/Time:   
2024 11:13A  
Dictated by : CHRISTOPHER BOYER MD  
This examination was   
interpreted and the   
report reviewed and  
electronically signed   
by:  
CHRISTOPHER BOYER MD on   
2024 11:15AM   
EST  
[id number]  
0.2                 Normal                                  [facility]  
   
                                                    DXA Skeletal system.axial Vi  
ews for bone densityon 2024   
   
                      LOWEST T-SCORE 0.2                              [facility]  
   
                                                    YUAN SCREENING W TOMOon    
   
                                                    YUAN SCREENING W   
GALILEA                                    * * *Final Report* * *  
DATE OF EXAM: 2024 11:25AM  
[facility] [id number] - YUAN   
SCREENING W GALILEA /   
ACCESSION # [id number]  
PROCEDURE REASON:   
multiple diagnoses  
  
* * * * Physician   
Interpretation * * * *  
RESULT: #[id number] -   
YUAN SCREENING W GALILEA  
BILATERAL DIGITAL   
SCREENING MAMMOGRAM   
TOMOSYNTHESIS WITH   
CAD: 2024  
HISTORY: Multiple   
Diagnoses / Screening   
Mammogram-Patient   
reports NO  
symptoms. /priors   
available for   
comparison /SEE TECH   
NOTE.  
RESULT:  
TECHNIQUE: The study   
was acquired using   
full field digital   
technology and  
interpreted from soft   
copy.  
Digital Breast   
Tomosynthesis (DBT)   
images were obtained   
and used to  
assist in the   
interpretation of this   
examination.  
Current study was also   
evaluated with a   
Computer Aided   
Detection (CAD).  
Comparison is made to   
exams dated: 2023   
mammogram, 2023  
mammogram, 2022   
mammogram, 2020   
mammogram, and   
2021  
mammogram - [facility].   
There are scattered   
areas of  
fibroglandular   
density.  
There are benign post   
operative findings and   
a biopsy clip in the   
right  
breast.  
No significant masses,   
calcifications, or   
other findings are   
seen in  
either breast.  
There has been no   
significant interval   
change.  
IMPRESSION: BENIGN   
FINDING  
There is no   
mammographic evidence   
of malignancy. A 1   
year screening  
mammogram is   
recommended.  
Ana Mcdermott M.D., ch/penrad:2024   
10:52:48  
Imaging   
Technologist(s):   
Italia Barfield   
[facility]  
letter sent: Normal   
over 40  
Mammogram BI-RADS: 2   
Benign finding  
Multiple national   
specialty   
organizations have   
released breast cancer  
screening guidelines   
for women at average   
risk for developing   
breast  
cancer - guidelines   
that are based on both   
evidence and opinion,   
yet  
differ on when to   
start and how often to   
screen for breast   
cancer. With  
representation from   
Breast Imaging,   
Internal Medicine,   
Women's Health,  
Family Medicine, and   
Medical/Surgical   
Oncology, the   
[facility] has  
carefully reviewed the   
data and reached the   
following consensus:  
1) All women should   
engage in shared   
decision-making with   
their providers  
to decide when to   
start and how often to   
screen;  
2) All women should   
have the opportunity   
to start screening   
mammography  
at age 40;  
3) For women ages   
45-55, we recommend   
annual screening   
mammograms;  
4) For women ages 55   
and over, we support   
both the transition   
from an  
annual to a biennial   
interval if this   
aligns more with   
patient's values  
and preferences, or   
continuation with   
annual screening;  
5) All women should   
discuss with their   
providers when to stop   
screening  
mammograms.  
:   
Lillie  
Transcribe Date/Time:   
2024 10:45A  
Dictated by: ANA MCDERMOTT MD  
This examination was   
interpreted and the   
report reviewed and  
electronically signed   
by:  
ANA MCDERMOTT MD on   
2024 10:52AM   
EST  
[id number] Normal                                  [facility]  
   
                                                    Emilia 2024   
   
                                        LENNY                Telephone (INTMWS)  
----------------------  
--------------  
NAVDEEP GUILLEN   
([id number]) 1960   
F  
Date Time Provider   
Department  
24 CYNTHIA DOUGLAS   
INTPHUWS  
During your visit   
today, we recorded the   
following information   
about you:  
Joie Hardwick, RN   
2024 2:33 PM   
Signed  
Patient reports she   
went to [facility] ER on   
 and was   
seen for  
diverticulitis.  
She was prescribed:  
Amoxicillin-Pot   
Clavulanate Active 1   
TABLET PO TWICE A DAY   
 12:00am  
Pt states she has not   
improved much because   
she has only taken the   
antibiotics  
for 2 days so far.  
Patient was   
recommended to follow   
up with PCP Office and   
Dr. Patricio. Pt  
states she sees Dr. Patricio for her   
diverticulitis.  
Pt asking if she   
should follow-up with   
PCP or not? She does   
not wish to follow  
up with both PCP and   
Dr. Patricio, if not   
necessary.  
Please advise patient,   
if able. [phone]  
Thank you.  
Sherly An APRN.CNP   
2024 8:40 AM   
Signed  
Please get [facility]   
records so I can   
review them  
Thank you  
NIC Barney.CNP  
Manisha Jesus Ma 2024   
10:44 AM Signed  
Records printed and   
placed on provider   
desk for review.  
Sherly An APRN.CNP   
2024 12:29 PM   
Signed  
If patient is   
following closely with   
general surgery then   
no need to follow up  
with me. Her kidney   
function was slightly   
decreased in ER so   
needs rechecked  
after after she is   
feeling better and   
diverticulitis has   
resolved.  
Thank you  
NIC Barney.Olimpia Philippe, RN   
2024 12:48 PM   
Signed  
Called and left a   
voicemail for the   
Patient to call back   
and ask for a nurse to  
receive the providers   
message.  
RAISSA Gannon Barbara, RN   
2024 10:50 AM   
Signed  
Pt returned call and   
given Sherly An's   
instructions and   
recommendations. Pt  
states she will call   
Dr. Patricio's office   
to set up her follow   
up. She was  
waiting to hear back   
from Sherly about who she   
should f/u with.   
Notified to call  
us back to repeat labs   
when feeling better  
Allergies As of Date:   
2024 Noted   
Allergy Reaction  
SILVADENE (SILVER   
SULFADIAZINE)   
2017 4 - Hives  
SULFA (SULFONAMIDE   
ANTIBIOTICS)   
2021 4 - Hives  
Date Reviewed:   
2023  
Reviewed by: Meme Matute - Fully   
Assessed  
Reason for Visit:  
Patient Question   
[6717]  
ER F/U [41]  
Prescriptions as of   
2024  
- ibuprofen (MOTRIN)   
800 mg tablet  
Take 1 tablet by mouth   
every 8 hours as   
needed for pain. Take   
with food.  
- azithromycin   
(ZITHROMAX Z-ALLAN) 250   
mg tablet  
TAKE 2 TABS ON THE   
FIRST DAY, THEN ONE   
TAB DAILY FOR 4 DAYS.  
- metFORMIN ER   
(GLUCOPHAGE XR) 500 mg   
24 hr tablet  
Take 1 tablet by mouth   
daily with breakfast.  
- blood sugar   
diagnostic (BLOOD   
GLUCOSE TEST) test   
strip  
Test blood sugars   
2daily.   
Dx:ucnontrolled   
diabetes . Insulin:   
Yes  
- chlorthalidone   
(HYGROTON) 25 mg   
tablet  
Take 0.5 tablets by   
mouth once daily.  
- atenolol (TENORMIN)   
50 mg tablet  
Take 1 tablet by mouth   
once daily.  
- potassium chloride   
20 mEq TbER  
Take 1 tablet by mouth   
twice daily.  
- glipiZIDE (GLUCOTROL   
XL) 2.5 mg 24 hr   
tablet  
Take 1 tablet by mouth   
once daily.  
- exemestane   
(AROMASIN) 25 mg   
tablet  
Take 1 tablet by mouth   
once daily.  
- lancets (ONE TOUCH   
DELICA) 33 gauge misc  
Test blood sugar(s) 2   
daily. Dx: Type 2 DM -   
Controlled E11.9   
Insulin: Yes  
- Blood-Glucose Meter   
monitoring kit  
Glucose Meter of   
Choice - Kit - Dx:   
Type 2 DM -   
Uncontrolled E11.65  
- MV-MN/FOLIC   
ACID/CALCIUM/VIT K   
(ONE-A-DAY WOMEN'S 50   
PLUS ORAL)  
Take 1 tablet by mouth   
once daily.  
Meds Comments as of   
2015:  
Patient only took the   
Etodolac for a few   
days and noticed no   
difference so she  
quit taking this   
medication.  
Problem List As Of   
Date 2024 Noted   
Resolved  
Morbid obesity with   
BMI of 40.0-44.9,   
adult (HC*2014  
Uterus disorder   
[N85.9] 2014  
Hypertension [I10]   
2014  
Hypercholesterolemia   
[E78.00] 2014  
Endometrial   
hyperplasia without   
atypia,   
complex*2014  
Uterine prolapse   
without mention of   
vaginal wal*2014  
Rectocele [N81.6]   
2014  
Complex endometrial   
hyperplasia with   
atypia [N8*2014  
Follow-up examination,   
following other   
surgery *2015  
Bilateral low back   
pain with left-sided   
sciatic*2016  
Peroneal tendonitis   
[M76.70] 2016  
Controlled diabetes   
mellitus type II   
without co*2016  
Hyperopia [H52.00]   
2016  
Presbyopia [H52.4]   
2016  
Benign neoplasm of   
skin of eyelid   
including   
can*2016  
Chronic bilateral low   
back pain with   
left-sided*2016  
Malignant neoplasm of   
lower-outer quadrant   
of r*05/10/2017  
ER+ CA+ carcinoma of   
breast (HCC) [C50.919,   
Z17*05/10/2017  
Family history of   
ovarian cancer   
[Z80.41] 05/10/2017  
GERD (gastroesophageal   
reflux disease)   
[K21.9]  
Encounter Number:   
[id number]  
Encounter   
Status:Closed by   
AMANUEL ABREU on   
24              Normal                                  [facility]  
   
                                                    ALBUMIN/CREAT RATIO RND URon  
 2023   
   
                                                    Albumin DL <= 20   
mg/L (U) [Mass/Vol] 105.3 mg/L      Normal                          [facility]  
   
                                        Comment on above:   Order Comment: Speci  
men Type: URINE SPECIMENOrdering Facility:  
   
[facility] Address: [address] [place] [address]   
   
                                                            Performed By: #### U  
ACR ####[facility] LABCLIA   
[id number] [place] [address] [place]   
   
                                                    Albumin/Creatinine   
(U) [Mass ratio] 85 mg/g         High            <30             [facility]  
   
                                        Comment on above:   Order Comment: Speci  
men Type: URINE SPECIMENOrdering Facility:  
  
[facility] Address: [address] [place] [address]   
   
                                                            Result Comment: Adul  
t Male and Female Nephrotic Criteria:  
<30 mg/g is considered normal to mildly increased  
 mg/g is considered moderately increased  
>300 mg/g is considered severely increased  
KDIGO. (2013). KDIGO 2012 Clinical Practice Guideline for the   
Evaluation and Management of Chronic Kidney Disease. Official   
Journal of the International Society of Nephrology, 3(1), 1-150.   
   
                                                            Performed By: #### U  
ACR ####[facility] LABCLIA   
[id number] [place] [address] [place]   
   
                                                    Creatinine (U)   
[Mass/Vol]      123.2 mg/dL     Normal          20.0-300.0      [facility]  
   
                                        Comment on above:   Order Comment: Speci  
men Type: URINE SPECIMENOrdering Facility:  
   
[facility] Address: [address] [place] [address]   
   
                                                            Performed By: #### U  
ACR ####[facility] LABCLIA   
[id number] [place] [address] [place]   
   
                                                    Basic metabolic 2000 panelon  
 2023   
   
                                                    Anion gap   
[Moles/Vol]     13 mmol/L       Normal          9-18            [facility]  
   
                                        Comment on above:   Order Comment: Speci  
men Type: BLOOD SPECIMENOrdering Facility:  
   
[facility] Address: [address] [place] [address]   
   
                                                            Performed By: #### 2  
4321-2 ####[facility]   
LABCLIA [id number] [place] [address]   
[place]   
   
                      Calcium [Mass/Vol] 10.3 mg/dL High       8.5-10.2   [facility]  
   
                                        Comment on above:   Order Comment: Speci  
men Type: BLOOD SPECIMENOrdering Facility:  
   
[facility] Address: [address] [place] [address]   
   
                                                            Performed By: #### 2  
4321-2 ####[facility]   
LABCLIA [id number] [place] [address]   
[place]   
   
                                                    Chloride   
[Moles/Vol]     104 mmol/L      Normal                    [facility]  
   
                                        Comment on above:   Order Comment: Speci  
men Type: BLOOD SPECIMENOrdering Facility:  
   
[facility] Address:  [place] [address]   
   
                                                            Performed By: #### 2  
4321-2 ####[facility]   
LABCLIA [id number] [place] [address]   
[place]   
   
                      CO2 [Moles/Vol] 26 mmol/L  Normal     22-30      [facility]  
   
                                        Comment on above:   Order Comment: Speci  
men Type: BLOOD SPECIMENOrdering Facility:  
   
[facility] Address: [address] [place] [address]   
   
                                                            Performed By: #### 2  
4321-2 ####[facility]   
LABCLIA [id number] [place] [address]   
[place]   
   
                                                    Creatinine   
[Mass/Vol]      0.83 mg/dL      Normal          0.58-0.96       [facility]  
   
                                        Comment on above:   Order Comment: Speci  
men Type: BLOOD SPECIMENOrdering Facility:  
   
[facility] Address:  [place] [address]   
   
                                                            Performed By: #### 2  
4321-2 ####[facility]   
LABCLIA [id number] [place] [address]   
[place]   
   
                                                    Creatinine and   
Glomerular   
filtration   
rate.predicted   
panel (S/P/Bld) 78 mL/min/1.73m??? Normal          >=60            [facility]  
   
                                        Comment on above:   Order Comment: Speci  
men Type: BLOOD SPECIMENOrdering Facility:  
   
[facility] Address: [address] [place] [address]   
   
                                                            Result Comment: Orly  
mated Glomerular Filtration Rate (eGFR) is   
calculated using the  CKD-EPI creatinine equation. This   
equation utilizes serum creatinine, sex, and age as parameters.   
The creatinine assay has traceable calibration to isotope   
dilution-mass spectrometry. Refer to KDIGO guidelines for clinical   
interpretation. In patients with unstable renal function, e.g.   
those with acute kidney injury, the eGFR may not accurately   
reflect actual GFR.   
   
                                                            Performed By: #### 2  
4321-2 ####[facility]   
LABCLIA [id number] [place] [address]   
[place]   
   
                      Glucose [Mass/Vol] 146 mg/dL  High       74-99      [facility]  
   
                                        Comment on above:   Order Comment: Speci  
men Type: BLOOD SPECIMENOrdering Facility:  
   
[facility] Address: [address] [place] [address]   
   
                                                            Result Comment: The   
American Diabetes Association (ADA) provides   
guidance for cutoff values for fasting glucose and random glucose.   
The ADA defines fasting as no caloric intake for at least 8 hours.   
Fasting plasma glucose results between 100 to 125 mg/dL indicate   
increased risk for diabetes (prediabetes).  
Fasting plasma glucose results greater than or equal to 126 mg/dL   
meet the criteria for diagnosis of diabetes. In the absence of   
unequivocal hyperglycemia, results should be confirmed by repeat   
testing. In a patient with classic symptoms of hyperglycemia or   
hyperglycemic crisis, random plasma glucose results greater than   
or equal to 200 mg/dL meet the criteria for diagnosis of diabetes.  
Reference: Standards of Medical Care in Diabetes 2016, American   
Diabetes Association. Diabetes Care. 2016.39(Suppl 1).   
   
                                                            Performed By: #### 2  
4321-2 ####[facility]   
LABCLIA [id number] [place] [address]   
[place]   
   
                                                    Potassium   
[Moles/Vol]     3.9 mmol/L      Normal          3.7-5.1         [facility]  
   
                                        Comment on above:   Order Comment: Speci  
men Type: BLOOD SPECIMENOrdering Facility:  
   
[facility] Address: [address] [place] [address]   
   
                                                            Performed By: #### 2  
4321-2 ####[facility]   
LABIA [id number] [place] [address]   
[place]   
   
                      Sodium [Moles/Vol] 143 mmol/L Normal     136-144    [facility]  
   
                                        Comment on above:   Order Comment: Speci  
men Type: BLOOD SPECIMENOrdering Facility:  
   
[facility] Address: [address] [place] [address]   
   
                                                            Performed By: #### 2  
4321-2 ####[facility]   
LABIA [id number] [place] [address]   
[place]   
   
                                                    Urea nitrogen   
[Mass/Vol]      11 mg/dL        Normal          7-21            [facility]  
   
                                        Comment on above:   Order Comment: Speci  
men Type: BLOOD SPECIMENOrdering Facility:  
   
[facility] Address: [address] [place] [address]   
   
                                                            Performed By: #### 2  
4321-2 ####[facility]   
LABIA [id number] [place] [address]   
[place]   
   
                                                    CBC panel Auto (Bld)on    
   
                                                    Erythrocyte   
distribution width   
(RBC) [Ratio]   15.8 %          High            11.5 - 15.0 %   [facility]  
   
                                                    Hematocrit (Bld)   
[Volume fraction] 47.1 %          High            36.0 - 46.0 %   [facility]  
   
                                                    Hemoglobin (Bld)   
[Mass/Vol]          15.2 g/dL                               11.5 - 15.5   
g/dL                                    [facility]  
   
                                                    MCH (RBC) [Entitic   
mass]           27.1 pg                         26.0 - 34.0 pg  [facility]  
   
                                                    MCHC (RBC)   
[Mass/Vol]          32.3 g/dL                               30.5 - 36.0   
g/dL                                    [facility]  
   
                                                    MCV (RBC) [Entitic   
vol]            84.0 fL                         80.0 - 100.0 fL [facility]  
   
                                                    Nucleated RBC (Bld)   
[#/Vol]                                         <0.01 k/uL      [facility]  
   
                                                    Platelet mean   
volume (Bld)   
[Entitic vol]   10.0 fL                         9.0 - 12.7 fL   [facility]  
   
                                                    Platelets (Bld)   
[#/Vol]         354 10*3/uL                     150 - 400 k/uL  [facility]  
   
                          RBC (Bld) [#/Vol] 5.61 10*6/uL High         3.90 - 5.2  
0   
m/uL                                    [facility]  
   
                          WBC (Bld) [#/Vol] 9.70 10*3/uL              3.70 - 11.  
00   
k/uL                                    [facility]  
   
                                                    Erythrocyte   
distribution width   
(RBC) [Ratio]   15.8 %          High            11.5-15.0       [facility]  
   
                                        Comment on above:   Order Comment: Speci  
men Type: BLOOD SPECIMENOrdering Facility:  
   
[facility] Address: [address] [place] [address]   
   
                                                            Performed By: #### 5  
8410-2 ####[facility]   
LABIA [id number] [place] [address]   
[place]   
   
                                                    Hematocrit (Bld)   
[Volume fraction] 47.1 %          High            36.0-46.0       [facility]  
   
                                        Comment on above:   Order Comment: Speci  
men Type: BLOOD SPECIMENOrdering Facility:  
  
[facility] Address: [address] [place] [address]   
   
                                                            Performed By: #### 5  
8410-2 ####[facility]   
LABIA [id number] [place] [address]   
[place]   
   
                                                    Hemoglobin (Bld)   
[Mass/Vol]      15.2 g/dL       Normal          11.5-15.5       [facility]  
   
                                        Comment on above:   Order Comment: Speci  
men Type: BLOOD SPECIMENOrdering Facility:  
   
[facility] Address: [address] [place] [address]   
   
                                                            Performed By: #### 5  
8410-2 ####[facility]   
LABIA [id number] [place] [address]   
[place]   
   
                                                    MCH (RBC) [Entitic   
mass]           27.1 pg         Normal          26.0-34.0       [facility]  
   
                                        Comment on above:   Order Comment: Speci  
men Type: BLOOD SPECIMENOrdering Facility:  
  
[facility] Address: [address] [place] [address]   
   
                                                            Performed By: #### 5  
8410-2 ####[facility]   
LABIA [id number] [place] [address]   
[place]   
   
                                                    MCHC (RBC)   
[Mass/Vol]      32.3 g/dL       Normal          30.5-36.0       [facility]  
   
                                        Comment on above:   Order Comment: Speci  
men Type: BLOOD SPECIMENOrdering Facility:  
   
[facility] Address: [address] [place] [address]   
   
                                                            Performed By: #### 5  
8410-2 ####[facility]   
LABIA [id number] [place] [address]   
[place]   
   
                                                    MCV (RBC) [Entitic   
vol]            84.0 fL         Normal          80.0-100.0      [facility]  
   
                                        Comment on above:   Order Comment: Speci  
men Type: BLOOD SPECIMENOrdering Facility:  
  
[facility] Address: [address] [place] [address]   
   
                                                            Performed By: #### 5  
8410-2 ####[facility]   
LABIA [id number] [place] [address]   
[place]   
   
                                                    Nucleated RBC (Bld)   
[#/Vol]         10*3/uL         Normal          <0.01           [facility]  
   
                                        Comment on above:   Order Comment: Speci  
men Type: BLOOD SPECIMENOrdering Facility:  
   
[facility] Address: [address] [place], [place] [address]   
   
                                                            Performed By: #### 5  
8410-2 ####[facility]   
LABCLIA [id number] [place] [address]   
[place]   
   
                                                    Platelet mean   
volume (Bld)   
[Entitic vol]   10.0 fL         Normal          9.0-12.7        [facility]  
   
                                        Comment on above:   Order Comment: Speci  
men Type: BLOOD SPECIMENOrdering Facility:  
  
[facility] Address:  [place] [address]   
   
                                                            Performed By: #### 5  
8410-2 ####[facility]   
LABCLIA [id number] [place] [address]   
[place]   
   
                                                    Platelets (Bld)   
[#/Vol]         354 10*3/uL     Normal          150-400         [facility]  
   
                                        Comment on above:   Order Comment: Speci  
men Type: BLOOD SPECIMENOrdering Facility:  
   
[facility] Address: [address] [place] [address]   
   
                                                            Performed By: #### 5  
8410-2 ####[facility]   
LABCLIA [id number] [place] [address]   
[place]   
   
                      RBC (Bld) [#/Vol] 5.61 10*6/uL High       3.90-5.20  [facility]  
   
                                        Comment on above:   Order Comment: Speci  
men Type: BLOOD SPECIMENOrdering Facility:  
   
[facility] Address:  [place] [address]   
   
                                                            Performed By: #### 5  
8410-2 ####[facility]   
LABCLIA [id number] [place] [address]   
[place]   
   
                      WBC (Bld) [#/Vol] 9.70 10*3/uL Normal     3.70-11.00 [facility]  
   
                                        Comment on above:   Order Comment: Speci  
men Type: BLOOD SPECIMENOrdering Facility:  
   
[facility] Address: [address] [place] [address]   
   
                                                            Performed By: #### 5  
8410-2 ####[facility]   
LABCLIA [id number] [facility] [id number] [place] [address]   
[place]   
   
                                                    CNOVon 2023   
   
                                        CNOV                Office Visit (INTMWS  
)  
----------------------  
--------------  
NAVDEEP GUILLEN   
([id number]) 1960   
F  
Date Time Provider   
Department  
23 10:40 AM   
OLDERSHERLY INTHARISH  
During your visit   
today, we recorded the   
following information   
about you:  
Pulse Respiration   
Blood pressure Weight  
68/minute 16/minute   
122/76 137.5 kg  
Sherly An APRN.CNP   
2023 4:01 PM   
Signed  
CC: Patient presents   
with:  
F/U 6 months  
HPI  
Navdeep LAMAS Wilmer is a   
63 year old female who   
presents today for   
routine follow  
up.  
DIABETES MELLITUS: Ms. Guillen denies   
excessive thirst or   
increased frequency  
of urination, chest   
pain or dyspnea ,   
numbness, tingling or   
pain in  
extremities, new or   
unusual visual   
symptoms, low   
sugar/hypoglycemic   
reactions,  
weight loss/gain,   
lightheadedness/dizzin  
ess. Follows a   
diabetic diet most of  
the time. She is   
compliant with   
medication(s) and is   
tolerating med(s)   
without  
any side effects. She   
reports checking her   
glucose on a twice a   
day schedule  
with sugars in the   
fasting 120s-140s and   
postprandial 70s-90s   
range.  
Patient's last HgA1C   
was  
Hemoglobin A1C (%)  
Date Value  
2023 6.1  
2023 6.8  
07/15/2020 7.7  
2020 7.6  
Hemoglobin A1C (POCT)   
(%)  
Date Value  
10/26/2021 7.1  
)  
Last Ophthalmology   
exam was over a year   
ago.  
HTN: Ms. Guillen   
indicates that she is   
feeling well and   
denies any symptoms  
referable to elevated   
blood pressure.   
Specifically denies   
headache, chest pain,  
palpitations, dyspnea,   
and peripheral edema.   
Patient denies any   
side effects  
of her medication(s)   
and is compliant with   
their regimen. She   
does check BP's  
away from this office   
with average BP's in   
the 110s-120s/70s-80   
range. Navdeep  
gets sporadic   
irregular exercise   
with walking. She   
watches her diet for  
sodium, low fat and   
low cholesterol some   
of the time.  
Last 3 Encounter BP   
Readings:  
Date: BP:  
2023 122/76  
10/02/2023 103/67  
2023 118/72  
Obesity: Has   
difficulty with   
exercise because of   
arthritic pain. Will   
every  
now and then take an   
ibuprofen to help when   
she is unable to   
sleep. Does not  
use more than once a   
week or less.  
REVIEW OF SYSTEMS  
General: no fevers, no   
chills, no night   
sweats, no recurrent   
infections, no  
change in appetite, no   
change in energy, and   
no significant changes   
in weight  
Respiratory: no cough,   
no wheezing, no   
shortness of breath,   
no hemoptysis  
Cardiovascular: no   
chest pain, no chest   
pressure, no   
palpitations, and no  
swelling  
Endocrine: no fatigue,   
no polyuria, no   
polyphagia, and no   
polydipsia  
Neurologic: No   
headache, weakness,   
numbness, dizziness,   
memory loss, syncope.  
PAST MEDICAL HISTORY  
Diagnosis Date  
Abnormal mammogram   
2021  
Achilles tendon pain   
2015  
Ankle weakness   
2015  
Chronic pain of left   
ankle 12/15/2016  
Colon abnormality   
2014  
Thickening of the   
ascending colon seen   
on the recent CT scan.   
Patient has had  
a colonoscopy around 4   
years ago. Records   
were obtained for when   
they were  
done, 4 years ago , in   
[facility].   
her colonoscopy was   
completely  
normal, no thickening,   
and the biopsy too was   
normal except for   
lymphoid  
aggregates.  
Diverticulitis   
Treated by Dr. Patricio at [facility]  
DJD (degenerative   
joint disease), ankle   
and foot 2016  
DM (diabetes mellitus)   
(HCC)  
GERD (gastroesophageal   
reflux disease)  
Gross hematuria   
05/15/2014  
2014, had hematuria,   
investigated   
extensively along with   
cytoscopy, a stone  
was found but no signs   
of any malignancy.  
Heel pain, chronic   
12/15/2016  
Hepatic steatosis   
10/03/2017  
Hiatal hernia   
10/03/2017  
Hypertension  
Insomnia  
Kidney stone   
05/15/2014  
Morbid obesity (HCC)  
Nephrolithiasis  
Neuritis 2016  
Renal lesion   
05/15/2014  
S/P Achilles tendon   
repair 2015 sx done  
PAST SURGICAL HISTORY  
Procedure Laterality   
Date  
BREAST LUMPECTOMY HX   
Right 2017  
BX BREAST W/DEVICE 1ST   
LESION STEREOTACTIC   
GUID Right 2021  
CHOLECYSTECTOMY 2011  
gallbladder removal  
COLONOSCOPY   
CT ABDOMEN 2014  
PAST SURGICAL HISTORY   
OF 2014  
DANMN hysteroscopy  
PAST SURGICAL HISTORY   
OF Left 2015  
Left foot surgery  
S PK LAVH [id number]   
10/16/2014  
ALLERGIES Silvadene   
[Silver Sulfadiazine]   
and Sulfa (Sulfonamide   
Antibiotics)  
MEDICATIONS  
metFORMIN ER   
(GLUCOPHAGE XR) 500 mg   
24 hr tablet Take 1   
tablet by mouth daily  
with breakfast.  
blood sugar diagnostic   
(BLOOD GLUCOSE TEST)   
test strip Test blood   
sugars  
2daily.   
Dx:ucnontrolled   
diabetes . Insulin:   
Yes  
chlorthalidone   
(HYGROTON) 25 mg   
tablet Take 0.5   
tablets by mouth once   
daily.  
atenolol (TENORMIN) 50   
mg tablet Take 1   
tablet by mouth once   
daily.  
potassium chloride 20   
mEq TbER Take 1 tablet   
by mouth twice daily.  
glipiZIDE (GLUCOTROL   
XL) 2.5 mg 24 hr   
tablet Take 1 tablet   
by mouth once  
daily.  
exemestane (AROMASIN)   
25 mg tablet Take 1   
tablet by mouth once   
daily.  
ibuprofen (MOTRIN) 800   
mg tablet Take 1   
tablet by mouth every   
8 hours as  
needed for pain. Take   
wit (more content not   
included)...        Normal                                  [facility]  
   
                                                    HbA1c (Bld)on 2023   
   
                                                    Average glucose   
Estimated from   
glycated hemoglobin   
(Bld) [Mass/Vol] 140 mg/dL                                       [facility]  
   
                                                    HbA1c (Bld) [Mass   
fraction]       6.5 %           High            4.3 - 5.6 %     [facility]  
   
                                                    Average glucose   
Estimated from   
glycated hemoglobin   
(Bld) [Mass/Vol] 140 mg/dL       Normal                          [facility]  
   
                                        Comment on above:   Order Comment: Speci  
men Type: BLOOD SPECIMENOrdering Facility:  
   
[facility] Address: [address] [place] [address]   
   
                                                            Result Comment: eAG:  
 (Estimated average glucose) is a calculated   
value from HgbA1c and is representative of the average blood   
glucose level in the last 2-3 month period.   
   
                                                            Performed By: #### 5  
5454-3 ####[facility]   
LABCLIA [id number] [place] [address]   
[place]   
   
                                                    HbA1c (Bld) [Mass   
fraction]       6.5 %           High            4.3-5.6         [facility]  
   
                                        Comment on above:   Order Comment: Speci  
men Type: BLOOD SPECIMENOrdering Facility:  
  
[facility] Address: [address] [place] [address]   
   
                                                            Result Comment: Amer  
ican Diabetes Association guidelines indicate   
that patients with HgbA1c in the range 5.7-6.4% are at increased   
risk for development of diabetes, and intervention by lifestyle   
modification may be beneficial. HgbA1c greater or equal to 6.5% is   
considered diagnostic of diabetes.   
   
                                                            Performed By: #### 5  
5454-3 ####[facility]   
LABCLIA [id number] [id number] [id number]   
[facility]   
   
                                                    CNOVSPon 10-   
   
                                        CNOVSP              Visit (SP) Office   
(HEMAWS)  
----------------------  
--------------  
NAVDEEP GUILLEN   
([id number]) 1960   
F  
Date Time Provider   
Department  
10/2/23 1:30 PM   
VIKKI POSADA  
During your visit   
today, we recorded the   
following information   
about you:  
Temperature Pulse   
Blood pressure Weight  
97.7 degrees 70/minute   
103/67 135.6 kg  
Height  
1.664 m  
Vikki Posada APRN.CNP 10/4/2023   
1:49 PM Signed  
Chief Complaint  
Patient presents with:  
Established Patient  
HPI:  
Navdeepflavio Guillen is a   
63 year old female who   
presents here today   
for follow up  
breast cancer.  
Per Dr. Milner/Sumaya's   
previous note:  
H/o hypertension and   
borderline   
diabetes/insulin   
resistant, obesity,   
who  
presented with an   
abnormal breast exam   
at her PCP office.   
Subsequent diagnostic  
mammogram revealing a   
lobulated lesion in   
the right breast at   
the lower outer  
quadrant highly   
suspicious of   
malignancy.  
Patient had a   
mammotome breast   
biopsy on 3/27/17 that   
showed invasive ductal  
carcinoma, positive   
for estrogen and   
progesterone   
receptors, equivocal   
for  
overexpression   
HER-2/andi.  
She had surgery by Dr. Dueñas, right breast   
lumpectomy and right   
axillary  
sentinel lymph node   
biopsy on 17 for   
right breast cancer.  
Stage IA- pT1c pN0   
(3/3 sentinel lymph   
nodes negative for   
metastatic disease)  
Tumor size 1.5 cm x 1   
x 0.9 cm  
Overall grade 2 out of   
7  
Margins - 0.4 cm from   
posterior margin  
ER/CA >95%, [id number]   
not amplified by FISH  
Lymph/vasc invasion -   
none; derm/vasc/lymph   
invasion - none  
Menstrual history:   
Menarche at age 11,   
first pregnancy at age   
18, 8 pregnancy;  
surgical   
menopause-total   
abdominal hysterectomy   
age 54. No estrogen   
replacement  
therapy. Family   
history: Mother    
of ovarian cancer in   
her 40's. No family  
history of breast   
cancer. She was   
initially started on   
anastrozole, then  
subsequent switch to   
Aromasin because of   
joint pain.  
RADIATION-17 to   
17  
Right breast  
Current treatment:  
1) Aromasin 25 mg once   
daily  
No new concerns today.  
Appetite: Ok.  Energy   
level: Eh.   
Denies fevers. +sinus   
congestion/drainage  
Resp:denies cough or   
sob  
Cardiac:denies chest   
pain/palpitations  
GI:denies abd pain,   
n/v, moving bowels   
regularly  
:denies   
dysuria/hematuria  
Extrem:occ. RUE aches,   
L foot pain s/p   
surgery  
Endo:occ. hot flash at   
night  
Neuro:denies symptoms   
of neuropathy  
Skin:denies rashes  
Heme:denies bleeding  
The ROS is otherwise   
negative.  
Past medical history,   
appointments,   
medications, allergies   
reviewed. No changes.  
EXAM:  
/67   Pulse 70     
Temp 36.5 ?C (97.7 ?F)   
(Temporal)   Ht 166.4   
cm (5'  
5.5 )   Wt 135.6 kg   
(299 lb)   SpO2 96%     
BMI 49.00 kg/m?  
APPEARANCE Well   
appearing, alert, in   
no acute distress,   
well-hydrated, well  
nourished.  
HEART RRR with normal   
S1 and S2, no murmurs  
LUNG clear to   
auscultation  
BREAST FEMALE no   
mass/nodule b/l, R   
radiation changes  
LYMPH NODES No   
cervical   
lymphadenopathy, No   
supraclavicular   
lymphadenopathy,  
and No axillary   
lymphadenopathy.  
ABDOMEN bowel sounds   
normoactive, soft,   
non-tender  
EXTREMITIES No edema  
NEURO Awake, alert and   
oriented x 3, Normal   
gait, and No   
involuntary motions.  
SKIN Skin color,   
texture, turgor   
normal, no suspicious   
rashes or lesions  
ASSESSMENT/PLAN:  
1. Malignant neoplasm   
of lower-outer   
quadrant of right   
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  
estrogen receptor   
positive (HCC) - ICD9:   
174.5, V86.0, ICD10:   
C50.511, Z17.0  
(primary diagnosis)  
pT1c pN0 (3/3 sentinel   
lymph nodes negative   
for metastatic   
disease) stage IA  
infiltrating ductal   
carcinoma the right   
breast. ER/CA >95%,   
[id number]  
non-amplified by FISH.  
- No concerning   
findings on exam.  
- Overall tolerating   
aromasin well.  
- Continue aromasin.  
- Mammogram due end of   
2024.  
- Follow up in 6   
months.  
- Pt. aware to call   
office with any   
questions/concerns.  
The patient indicates   
understanding of these   
issues and agrees with   
the plan.  
All documentation from   
previous visit of   
23-Dr. Shell was   
copied and  
pasted, documentation   
has been reviewed and   
edited as necessary   
for today's  
visit.  
NIC Ugalde.CNP  
Allergies As of Date:   
10/02/2023 Noted   
Allergy Reaction  
SILVADENE (SILVER   
SULFADIAZINE)   
2017 4 - Hives  
SULFA (SULFONAMIDE   
ANTIBIOTICS)   
2021 4 - Hives  
Date Reviewed:   
10/02/2023  
Reviewed by:   
Vikki Posada APRN.CNP - Fully   
Assessed  
Reason for Visit:  
Established Patient   
[175]  
Primary Visit   
Diagnosis:Malignant   
neoplasm of   
lower-outer quadrant   
of right  
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   
estrogen receptor   
positive (HCC)  
[C50.511, Z17.0]  
Other Visit   
Diagnoses:Encounter   
for screening   
mammogram for   
high-risk patient  
[Z12.31]  
Morbid obesity with   
BMI of 40.0-44.9,   
adult (HCC)  
[E66.01, Z68.41]  
Order(s):[facility] SCREENING   
W GALILEA [[id number]] Order   
#: [id number] FUTURE  
azithromycin   
(ZITHROMAX Z-ALLAN) 250   
mg tabletTAKE 2 TABS   
ON THE FIRST  
DAY, THEN ONE TAB   
DAILY FOR 4 DAYS.Disp:   
6 tabletRfl: 0  
Follow-up and   
Disposition History   
for Encounter  
Date Pr (more content   
not included)...    Normal                                  [facility]  
   
                                                    UA DIP, URINE (POC)on 2023   
   
                      BILIRUBIN UA (POCT) Negative              Negative   [facility]  
   
                      CLARITY UA (POCT) Clear                            [facility]  
[facility]  
   
                      COLOR UA (POCT) Yellow                           [facility]  
   
                      GLUCOSE UA (POCT) Negative              Negative mg/dL [facility]  
   
                                                    HEMOGLOBIN/BLOOD UA   
(POCT)          Trace-intact    Abnormal        Negative        [facility]  
   
                      KETONE UA (POCT) Negative              Negative mg/dL [facility]  
   
                                                    LEUKOCYTES UA   
(POCT)          Moderate        Abnormal        Negative        [facility]  
   
                      NITRITE UA (POCT) Negative              Negative   [facility]  
   
                      PH UA (POCT) 6.0                   4.5 - 8.0  [facility]  
   
                      Protein Ql (U) Negative              Negative mg/dL [facility]  
and   
Clinic  
   
                                                    SPECIFIC GRAVITY UA   
(POCT)          1.020                           1.005 - 1.030   [facility]  
   
                                                    UROBILINOGEN UA   
(POCT)          0.2 E.U./dL                     Normal E.U./dL  [facility]  
   
                                                    UA DIP, URINE (POC)on 2023   
   
                      BILIRUBIN UA (POCT) Negative              Negative   [facility]  
   
                      CLARITY UA (POCT) Clear                            [facility]  
   
                      COLOR UA (POCT) Yellow                           [facility]  
   
                      GLUCOSE UA (POCT) Negative              Negative mg/dL [facility]  
   
                                                    HEMOGLOBIN/BLOOD UA   
(POCT)          Small           Abnormal        Negative        [facility]  
   
                      KETONE UA (POCT) Negative              Negative mg/dL [facility]  
   
                                                    LEUKOCYTES UA   
(POCT)          Large           Abnormal        Negative        [facility]  
   
                      NITRITE UA (POCT) Negative              Negative   [facility]  
   
                      PH UA (POCT) 7.0                   4.5 - 8.0  [facility]  
   
                      Protein Ql (U) 30 mg/dL   Abnormal   Negative [facility]dL [facility]  
[facility]   
Cl[facility]  
   
                                                    SPECIFIC GRAVITY UA   
(POCT)          1.020                           1.005 - 1.030   [facility]  
   
                                                    UROBILINOGEN UA   
(POCT)          0.2 E.U./dL                     Normal E.U./dL  [facility]  
   
                                                    YUAN SCREENINGon 2023   
   
                                                                  [facility]  
   
                                                    XR Foot 3+ Views Righton    
   
                                                    XR Foot 3+ Views   
Right                                   Exam Date/Time:  
2019 14:15 EDT  
Reason for Exam:  
Pain, Traumatic  
Report  
STUDY:  
XR Foot 3+ Views   
Right; 2019 2:15   
pm  
INDICATION:  
Pain, Traumatic.  
COMPARISON:  
None.  
ACCESSION NUMBER(S):  
[id number]  
ORDERING CLINICIAN:  
Steve Govea  
FINDINGS:  
Three views of the   
right foot including   
AP, oblique and   
lateral projections   
were obtained. There  
is no radiographic   
evidence of acute   
fracture or   
dislocation of the   
right foot.   
Degenerative  
changes are present at   
multiple   
interphalangeal joints   
in the right great toe   
metatarsal-  
phalangeal joint with   
mild hallux valgus   
angulation. No acute   
erosive changes are   
present.  
Prominent posterior   
and plantar calcaneal   
enthesophytes are   
present off of the   
calcaneal  
tuberosity.  
IMPRESSION:  
1. No acute fracture   
or dislocation   
identified.  
2. Degenerative   
changes, as described   
above.  
***** FINAL REPORT   
*****  
Dictated: 2019   
2:25 pm Odin Yost MD  
Signed (Electronic   
Signature): 2019   
2:25 pm  
Signed by: Odin Yost MD  
Technologist: MB    [facility]  
   
                                                    PROGRESSon 2018   
   
                      OSU NOTES             [facility]  
   
                      OSU NOTES             [facility]  
  
  
  
Vital Signs  
  
  
                      Date Time  Vital Sign Value      Performing Clinician Yolyi  
[facility]ion  
   
                                                    2024   
16:                              Body mass index (BMI)   
[Ratio]                   49.46 kg/m2               Fabiana Schmitt   
APRN.CNP  
Work Phone:   
[phone]                          [facility]  
   
                                                    2024   
16:          Body temperature    98.49 [degF]        Fabiana Schmitt   
APRN.CNP  
Work Phone:   
[phone]                          [facility]  
   
                                                    2024   
16:          Body weight         136.9 kg            Fabiana Schmitt   
APRN.CNP  
Work Phone:   
[phone]                          [facility]  
   
                                                    2024   
16:                              Diastolic blood   
pressure                  78 mm[Hg]                 Fabiana Schmitt   
APRN.CNP  
Work Phone:   
[phone]                          [facility]  
   
                                                    2024   
16:          Heart rate          63 /min             Fabiana Schmitt   
APRN.CNP  
Work Phone:   
[phone]                          [facility]  
   
                                                    2024   
16:          Respiratory rate    18 /min             Fabiana Schmitt   
APRN.CNP  
Work Phone:   
[phone]                          [facility]  
   
                                                    2024   
16:                              SaO2% (BldA) [Mass   
fraction]                 97 %                      Fabiana Schmitt   
APRN.CNP  
Work Phone:   
[phone]                          [facility]  
   
                                                    2024   
16:                              Systolic blood   
pressure                  122 mm[Hg]                Fabiana Schmitt   
APRN.CNP  
Work Phone:   
[phone]                          [facility]  
   
                                                    2024   
09:                              Body mass index (BMI)   
[Ratio]                   49.33 kg/m2               Sherly Older APRN.CNP  
Work Phone:   
[phone]                          [facility]  
   
                                                    2024   
09:          Body weight         136.53 kg           Sherly Older APRN.CNP  
Work Phone:   
[phone]                          [facility]  
   
                                                    2024   
09:                              Diastolic blood   
pressure                  78 mm[Hg]                 Sherly Older APRN.CNP  
Work Phone:   
[phone]                          [facility]  
   
                                                    2024   
09:          Heart rate          64 /min             Sherly Older APRN.CNP  
Work Phone:   
[phone]                          [facility]  
   
                                                    2024   
09:          Respiratory rate    16 /min             Sherly Older APRN.CNP  
Work Phone:   
[phone]                          [facility]  
   
                                                    2024   
09:                              SaO2% (BldA) [Mass   
fraction]                 97 %                      Sherly Older APRN.CNP  
Work Phone:   
[phone]                          [facility]  
   
                                                    2024   
09:                              Systolic blood   
pressure                  116 mm[Hg]                 APRN.CNP  
Work Phone:   
[phone]                          [facility]  
   
                                                    2024   
10:          Body temperature    97.9 [degF]         Quincy Posada   
APRN.CNP  
Work Phone:   
[phone]                          [facility]  
   
                                                    2024   
10:          Body weight         136.81 kg           Vikki Posada   
APRN.CNP  
Work Phone:   
[phone]                          [facility]  
   
                                                    2024   
10:                              Diastolic blood   
pressure                  69 mm[Hg]                 Quincy Posada   
APRN.CNP  
Work Phone:   
[phone]                          [facility]  
   
                                                    2024   
10:          Heart rate          67 /min             Vikki Posada   
APRN.CNP  
Work Phone:   
[phone]                          [facility]  
   
                                                    2024   
10:                              SaO2% (BldA) [Mass   
fraction]                 96 %                      Vikki Posada   
APRN.CNP  
Work Phone:   
[phone]                          [facility]  
   
                                                    2024   
10:                              Systolic blood   
pressure                  99 mm[Hg]                 Vikki Posada   
APRN.CNP  
Work Phone:   
[phone]                          [facility]  
   
                                                    2024   
14:          Body height         166.4 cm            Meera Denbow PA-C  
Work Phone:   
[phone]                          [facility]  
   
                                                    2024   
14:          Body temperature    98.71 [degF]        Meera Denbow PA-C  
Work Phone:   
[phone]                          [facility]  
   
                                                    2024   
14:          Body weight         133.36 kg           Meera Denbow PA-C  
Work Phone:   
[phone]                          [facility]  
   
                                                    2024   
14:                              Diastolic blood   
pressure                  80 mm[Hg]                 Meera Denbow PA-C  
Work Phone:   
[phone]                          [facility]  
   
                                                    2024   
14:          Heart rate          73 /min             Meera Denbow PA-C  
Work Phone:   
[phone]                          [facility]  
   
                                                    2024   
14:          Respiratory rate    16 /min             Meera Denbow PA-C  
Work Phone:   
[phone]                          [facility]  
   
                                                    2024   
14:                              SaO2% (BldA) [Mass   
fraction]                 97 %                      Meera Denbow PA-C  
Work Phone:   
[phone]                          [facility]  
   
                                                    2024   
14:                              Systolic blood   
pressure                  146 mm[Hg]                Meera Denbow PA-C  
Work Phone:   
[phone]                          [facility]  
   
                                                    2023   
10:          Body weight         137.53 kg           Sherly Older APRN.CNP  
Work Phone:   
[phone]                          [facility]  
   
                                                    2023   
10:                              Diastolic blood   
pressure                  76 mm[Hg]                 Sherly Older APRN.CNP  
Work Phone:   
[phone]                          [facility]  
   
                                                    2023   
10:          Heart rate          68 /min             Sherly Older APRN.CNP  
Work Phone:   
[phone]                          [facility]  
   
                                                    2023   
10:          Respiratory rate    16 /min             Sherly Older APRN.CNP  
Work Phone:   
[phone]                          [facility]  
   
                                                    2023   
10:                              SaO2% (BldA) [Mass   
fraction]                 97 %                      Sherly Older APRN.CNP  
Work Phone:   
[phone]                          [facility]  
   
                                                    2023   
10:                              Systolic blood   
pressure                  122 mm[Hg]                Sherly Older APRN.CNP  
Work Phone:   
[phone]                          [facility]  
   
                                                    2023   
09:          Body weight         132.9 kg            Cynthia Douglas MD  
Work Phone:   
[phone]                          [facility]  
   
                                                    2023   
09:                              Diastolic blood   
pressure                  72 mm[Hg]                 Cynthia Douglas MD  
Work Phone:   
[phone]                          [facility]  
   
                                                    2023   
09:          Heart rate          64 /min             Cynthia Douglas MD  
Work Phone:   
[phone]                          [facility]  
   
                                                    2023   
09:          Respiratory rate    16 /min             Cynthia Douglas MD  
Work Phone:   
[phone]                          [facility]  
   
                                                    2023   
09:                              SaO2% (BldA) [Mass   
fraction]                 97 %                      Cynthia Douglas MD  
Work Phone:   
[phone]                          [facility]  
   
                                                    2023   
09:                              Systolic blood   
pressure                  118 mm[Hg]                Cynthia Douglas MD  
Work Phone:   
[phone]                          [facility]  
   
                                                    2023   
12:          Body height         167.6 cm            Kajal Dueñas MD  
Work Phone:   
[phone]                          [facility]  
   
                                                    2023   
12:          Body temperature    98.6 [degF]         Kajal Dueñas MD  
Work Phone:   
[phone]                          [facility]  
   
                                                    2023   
12:          Body weight         131.09 kg           Kajal Duñeas MD  
Work Phone:   
[phone]                          [facility]  
   
                                                    2023   
12:                              Diastolic blood   
pressure                  72 mm[Hg]                 Kajal Dueñas MD  
Work Phone:   
[phone]                          [facility]  
   
                                                    2023   
12:          Heart rate          81 /min             Kajal Dueñas MD  
Work Phone:   
[phone]                          [facility]  
   
                                                    2023   
12:                              SaO2% (BldA) [Mass   
fraction]                 96 %                      Kajal Dueñas MD  
Work Phone:   
[phone]                          [facility]  
   
                                                    2023   
12:                              Systolic blood   
pressure                  118 mm[Hg]                Kajal Dueñas MD  
Work Phone:   
[phone]                          [facility]  
   
                                                    2023   
11:          Body height         167.6 cm            Julito Osborne PA-C  
Work Phone:   
[phone]                          [facility]  
   
                                                    2023   
11:          Body temperature    97.7 [degF]         Julito Osborne PA-C  
Work Phone:   
[phone]                          [facility]  
   
                                                    2023   
11:          Body weight         132.45 kg           Julito Osborne PA-C  
Work Phone:   
[phone]                          [facility]  
   
                                                    2023   
11:                              Diastolic blood   
pressure                  80 mm[Hg]                 Julito Osborne PA-C  
Work Phone:   
[phone]                          [facility]  
   
                                                    2023   
11:          Heart rate          86 /min             Julito Osborne PA-C  
Work Phone:   
[phone]                          [facility]  
   
                                                    2023   
11:                              SaO2% (BldA) [Mass   
fraction]                 98 %                      Julito Osborne PA-C  
Work Phone:   
[phone]                          [facility]  
   
                                                    2023   
11:                              Systolic blood   
pressure                  110 mm[Hg]                Julito Osborne PA-C  
Work Phone:   
[phone]                          [facility]  
   
                                                    2023   
09:          Body height         167 cm              Francis Shell DO  
Work Phone:   
[phone]                          [facility]  
   
                                                    2023   
09:          Body temperature    98.2 [degF]         Francis Shell DO  
Work Phone:   
[phone]                          [facility]  
   
                                                    2023   
09:          Body weight         132.22 kg           Francis Shell DO  
Work Phone:   
[phone]                          [facility]  
   
                                                    2023   
09:                              Diastolic blood   
pressure                  70 mm[Hg]                 Francis Shell DO  
Work Phone:   
[phone]                          [facility]  
   
                                                    2023   
09:          Heart rate          81 /min             Francis Lacyi DO  
Work Phone:   
[phone]                          [facility]  
   
                                                    2023   
09:                              SaO2% (BldA) [Mass   
fraction]                 98 %                      Francis Shell DO  
Work Phone:   
[phone]                          [facility]  
   
                                                    2023   
09:                              Systolic blood   
pressure                  115 mm[Hg]                Francis Lacyi DO  
Work Phone:   
[phone]                          [facility]  
   
                                                    2023   
15:          Body height         165.1 cm            Cynthia Douglas MD  
Work Phone:   
[phone]                          [facility]  
   
                                                    2023   
15:          Body temperature    98.29 [degF]        Cynthia Douglas MD  
Work Phone:   
[phone]                          [facility]  
   
                                                    2023   
15:          Body weight         131.54 kg           Cynthia Douglas MD  
Work Phone:   
[phone]                          [facility]  
   
                                                    2023   
15:                              Diastolic blood   
pressure                  82 mm[Hg]                 Cynthia Douglas MD  
Work Phone:   
[phone]                          [facility]  
   
                                                    2023   
15:          Heart rate          71 /min             Cynthia Douglas MD  
Work Phone:   
[phone]                          [facility]  
   
                                                    2023   
15:          Respiratory rate    16 /min             Cynthia Douglas MD  
Work Phone:   
[phone]                          [facility]  
   
                                                    2023   
15:                              SaO2% (BldA) [Mass   
fraction]                 97 %                      Cynthia Douglas MD  
Work Phone:   
[phone]                          [facility]  
   
                                                    2023   
15:                              Systolic blood   
pressure                  120 mm[Hg]                Cynthia Douglas MD  
Work Phone:   
[phone]                          [facility]  
   
                                                    2022   
12:          Body temperature    97.5 [degF]         Jasbir Pendlebury   
APRN.CNP  
Work Phone:   
[phone]                          [facility]  
   
                                                    2022   
12:          Body weight         137.71 kg           [facility]   
AP[facility]  
Work Phone:   
[phone]                          [facility]  
   
                                                    2022   
12:                              Diastolic blood   
pressure                  74 mm[Hg]                 Jasbir Pendlebury   
APRN.CNP  
Work Phone:   
[phone]                          [facility]  
   
                                                    2022   
12:          Heart rate          65 /min             Jasbir Pendvinibury   
APRN.CNP  
Work Phone:   
[phone]                          [facility]  
   
                                                    2022   
12:          Respiratory rate    18 /min             [facility]   
AP[facility].CNP  
Work Phone:   
[phone]                          [facility]  
   
                                                    2022   
12:                              SaO2% (BldA) [Mass   
fraction]                 97 %                      [facility]   
APRN.CNP  
Work Phone:   
[phone]                          [facility]  
   
                                                    2022   
12:                              Systolic blood   
pressure                  122 mm[Hg]                [facility]   
APRN.CNP  
Work Phone:   
[phone]                          [facility]  
   
                                                    2022   
14:          Body height         165.1 cm            Cynthia Douglas MD  
Work Phone:   
[phone]                          [facility]  
   
                                                    2022   
14:          Body temperature    98.4 [degF]         Cynthia Douglas MD  
Work Phone:   
[phone]                          [facility]  
   
                                                    2022   
14:          Body weight         137.89 kg           Cynthia Douglas MD  
Work Phone:   
[phone]                          [facility]  
   
                                                    2022   
14:                              Diastolic blood   
pressure                  76 mm[Hg]                 Cynthia Douglas MD  
Work Phone:   
[phone]                          [facility]  
   
                                                    2022   
14:          Heart rate          75 /min             Cynthia Douglas MD  
Work Phone:   
[phone]                          [facility]  
   
                                                    2022   
14:          Respiratory rate    16 /min             Cynthia Douglas MD  
Work Phone:   
[phone]                          [facility]  
   
                                                    2022   
14:060400                              SaO2% (BldA) [Mass   
fraction]                 94 %                      Cynthia Douglas MD  
Work Phone:   
[phone]                          [facility]  
   
                                                    2022   
14:060400                              Systolic blood   
pressure                  122 mm[Hg]                Cynthia Douglas MD  
Work Phone:   
[phone]                          [facility]  
  
  
  
Encounters  
  
  
                          Encounter Date Encounter Type Care Provider Facility  
   
                                                    Start: 2024  
End: 2024     ambulatory          Kerrie Temple MA [facility]   
[place]  
   
                                                    Start: 2024  
End: 2024                         Patient encounter   
procedure                 Kerrie Temple MA      [facility]  
   
                                                    Start: 2024  
End: 2024                         Patient encounter   
procedure                               Fabiana Schmitt   
APRN.CNP  
Work Phone:   
[phone]                          [facility]  
   
                                        Comment on above:   Upper respiratory tr  
act infection, unspecified type (Primary   
Dx)   
   
                                Start: 2024 Telephone encounter Cynthia unger MD  
Work Phone:   
[phone]                          Internal Medicine   
[place]  
   
                                                    Start: 2024  
End: 2024     ambulatory          SHERLY AN           Facility:[facility]  
   
                                                    Start: 2024  
End: 2024                         Patient encounter   
procedure                               Sherly An APRN.CNP  
Work Phone:   
[phone]                          Internal Medicine   
[place]  
   
                                        Comment on above:   Type 2 diabetes aruna  
itus without complication, without long-  
term   
current use of insulin (HCC) (Primary Dx);  
Primary hypertension;  
Hypercholesterolemia;  
Malignant neoplasm of lower-outer quadrant of right breast of   
female, estrogen receptor positive (HCC);  
Seasonal allergic rhinitis, unspecified trigger   
   
                                Start: 2024 Refill          Vikki robison   
APRN.CNP  
Work Phone:   
[phone]                          Hematology/Oncology  
   
                                        Comment on above:   Refill Request   
   
                                Start: 06- Refill          Vikki robison   
APRN.CNP  
Work Phone:   
[phone]                          Hematology/Oncology  
   
                                        Comment on above:   Refill Request   
   
                                                    Start: 2024  
End: 2024                         Patient encounter   
procedure                               Vikki Posada   
APRN.CNP  
Work Phone:   
[phone]                          [facility]  
   
                                                    Start: 2024  
End: 2024           ambulatory                Vikki Posada   
APRN.CNP  
Work Phone:   
[phone]                          Hematology/Oncology  
   
                                        Comment on above:   Malignant neoplasm o  
f lower-outer quadrant of right breast of   
female, estrogen receptor positive (HCC) (Primary Dx);  
Morbid obesity with BMI of 40.0-44.9, adult (HCC)   
   
                                Start: 2024 ambulatory      Sherly An APRN  
.CNP  
Work Phone:   
[phone]                          Internal Medicine   
[place]  
   
                                        Comment on above:   Potassium   
   
                                Start: 2024 ambulatory      Kinza Doshi MA                               [facility] [place]  
   
                                        Start: 2024   Patient encounter   
procedure                               Kinza Doshi MA                               [facility]   
[place]  
   
                                        Comment on above:   Population Health Na  
vigation Outreach ([facility]   
Visit )   
   
                                                    Start: 2024  
End: 2024     ambulatory          CYNTHIA DOUGLAS        Facility:[facility]  
   
                                                    Start: 2024  
End: 2024                         Patient encounter   
procedure                               Meera Menchaca PA-C  
Work Phone:   
[phone]                          Internal Medicine   
[place]  
   
                                        Comment on above:   Left flank pain (Ashley  
slava Dx)   
   
                                Start: 2024 Documentation procedure Mammog  
jason   
Coordinator                             [facility]   
MAIN  
   
                                Start: 2024 Letter encounter Mammography   
Coordinator                             [facility]   
Department  
   
                                                    Start: 2024  
End: 2024     ambulatory          VIKKI POSADA     Facility:[facility]  
   
                                                    Start: 2024  
End: 2024                         Subsequent hospital visit   
by physician                            [facility]  
Work Phone:   
[phone]                          Radiology  
   
                                        Comment on above:   Screening for osteop  
orosis [Z13.820]   
   
                                                            Malignant neoplasm o  
f lower-outer quadrant of right breast of   
female, estrogen receptor positive (HCC) (HCC) [C50.511, Z17.0]   
   
                                Start: 2024 ambulatory      Sherly An APRN  
.CNP  
Work Phone:   
[phone]                          Internal Medicine   
[place]  
   
                                        Comment on above:   Phone message   
   
                                Start: 2024 Telephone encounter Cynthia unger MD  
Work Phone:   
[phone]                          Internal Medicine   
[place]  
   
                                        Comment on above:   Patient Question; ER  
 F/U   
   
                                Start: 2023 Refill          Lillie dejesus PA-C  
Work Phone:   
[phone]                          Internal Medicine   
[place]  
   
                                        Comment on above:   Refill Request   
   
                                                    Start: 2023  
End: 2023     ambulatory          SHERLY OLDER           Facility:[facility]  
   
                                                    Start: 2023  
End: 2023                         Patient encounter   
procedure                               Sherly Older APRN.CNP  
Work Phone:   
[phone]                          Internal Medicine   
[place]  
   
                                        Comment on above:   Controlled type 2 di  
abetes mellitus without complication,   
without   
long-term current use of insulin ([facility]) (Primary Dx);  
Primary hypertension;  
Morbid obesity with BMI of 40.0-44.9, adult ([facility])   
   
                                                    Start: 10-  
End: 10-     ambulatory          CYNTHIA DOUGLAS        Facility:[facility]  
   
                                Start: 2023 Refill          Sherly Older APRN  
.CNP  
Work Phone:   
[phone]                          Internal Medicine   
[place]  
   
                                        Comment on above:   Refill Request   
   
                                Start: 2023 Refill          Sherly Older APRN  
.CNP  
Work Phone:   
[phone]                          Internal Medicine   
[place]  
   
                                        Comment on above:   Refill Request   
   
                                Start: 2023 Refill          Sherly Older APRN  
.CNP  
Work Phone:   
[phone]                          Internal Medicine   
[place]  
   
                                        Comment on above:   Refill Request   
   
                          Start: 2023 Refill       [facility] Provider Hematology  
/Oncology  
   
                                        Comment on above:   Refill Request   
   
                                Start: 2023 Telephone encounter Cynthia unger MD  
Work Phone:   
[phone]                          Internal Medicine   
[place]  
   
                                                    Start: 2023  
End: 2023                         Patient encounter   
procedure                               Cynthia Douglas MD  
Work Phone:   
[phone]                          Internal Medicine   
[place]  
   
                                        Comment on above:   Type 2 diabetes aruna  
itus without complication, without long-  
term   
current use of insulin (HCC) (Primary Dx);  
Screening for osteoporosis;  
Asymptomatic menopause;  
Primary hypertension;  
Hypercholesterolemia;  
Diverticulitis;  
Dysuria;  
Vaginal yeast infection;  
Morbid obesity with BMI of 40.0-44.9, adult ([facility])   
   
                                                    Start: 2023  
End: 2023           ambulatory                Kajal Dueñas MD  
Work Phone:   
[phone]                          General Surgery  
   
                                        Comment on above:   Calcification of rig  
ht breast on mammography (Primary Dx)   
   
                                                    Start: 2023  
End: 2023                         Telemedicine consultation   
with patient                            Kajal Dueñas MD  
Work Phone:   
[phone]                          [facility]  
   
                                Start: 2023 Telephone encounter Francis miramontes DO  
Work Phone:   
[phone]                          Hematology/Oncology  
   
                                        Comment on above:   Patient Question   
   
                                Start: 2023 Telephone encounter Kajal Padilla MD  
Work Phone:   
[phone]                          General Surgery  
   
                                        Comment on above:   Results (Right breas  
t biopsy results)   
   
                          Start: 2023 ambulatory   Stephanie Hoskins MA [facility]   
[place]  
   
                                        Comment on above:   Population Health Na  
vigation Outreach ([facility])   
   
                                Start: 2023 Telephone encounter Frannie cisneros PA-C  
Work Phone:   
[phone]                          [facility]  
   
                                        Comment on above:   Results   
   
                                                    Start: 2023  
End: 2023                         Patient encounter   
procedure                               Kajal Dueñas MD  
Work Phone:   
[phone]                          General Surgery  
   
                                        Comment on above:   Calcification of rig  
ht breast on mammography;  
Diverticulosis of large intestine without perforation or abscess   
without bleeding;  
Abnormal CT scan, gastrointestinal tract   
   
                                                    Start: 2023  
End: 2023                         Patient encounter   
procedure                               Julito Osborne PA-C  
Work Phone:   
[phone]                          Urology  
   
                                        Comment on above:   Gross hematuria   
   
                                Start: 2023 Telephone encounter Tasneem morales MD  
Work Phone:   
[phone]                          Mammography  
   
                                        Comment on above:   Mammogram Result Alex  
l Back   
   
                                                            Follow Up (Referral   
to Dr. Dejesus)   
   
                                                    Start: 2023  
End: 2023           ambulatory                Francis Shell DO  
Work Phone:   
[phone]                          Hematology/Oncology  
   
                                        Comment on above:   Malignant neoplasm o  
f lower-outer quadrant of right breast of   
female, estrogen receptor positive (HCC) (Primary Dx);  
Abnormal mammogram;  
Gross hematuria   
   
                                                    Start: 2023  
End: 2023                         Patient encounter   
procedure                               Francis Shell DO  
Work Phone:   
[phone]                          [facility]  
   
                                Start: 2023 Documentation procedure Mammog  
jason   
Coordinator                             [facility]   
MAIN  
   
                                Start: 2023 Letter encounter Mammography   
Coordinator                             [facility]   
Department  
   
                                                    Start: 2023  
End: 2023                         Subsequent hospital visit   
by physician              [facility]     Mammogram  
   
                                        Comment on above:   Primary cancer of lo  
wer outer quadrant of right female breast   
(HCC)   
[C50.511]   
   
                                                    Start: 2023  
End: 2023                         Patient encounter   
procedure                               Cynthia Douglas MD  
Work Phone:   
[phone]                          Internal Medicine   
[place]  
   
                                        Comment on above:   Primary hypertension  
 (Primary Dx);  
Controlled type 2 diabetes mellitus without complication, without   
long-term current use of insulin (HCC);  
Diverticulitis;  
Candida infection   
   
                                                            Primary cancer of lo  
wer outer quadrant of right female breast (HCC)   
(Primary Dx);  
Carcinoma of right breast, estrogen and progesterone receptor   
positive (HCC)   
   
                                Start: 2023 Refill          Sherly SousaCNP  
Work Phone:   
[phone]                          Internal Medicine   
[place]  
   
                                        Comment on above:   Refill Request   
   
                                Start: 2023 Telephone encounter Cynthia unger MD  
Work Phone:   
[phone]                          Internal Medicine   
[place]  
   
                                        Comment on above:   Constipation   
   
                                Start: 2023 Telephone encounter Cynthia unger MD  
Work Phone:   
[phone]                          Internal Medicine   
[place]  
   
                                        Comment on above:   Results   
   
                                Start: 2022 Refill          Cynthia STRINGER  
Work Phone:   
[phone]                          Family Medicine   
[place]  
   
                                        Comment on above:   Refill Request   
   
                                                    Start: 2022  
End: 2022                         Office outpatient visit 25   
minutes                                 Jasbir RAMOSCNP  
Work Phone:   
[phone]                          [facility]  
   
                                        Comment on above:   Sinobronchitis (Prim  
david Dx)   
   
                          Start: 2022 ambulatory   Itzel Stone MA [facility]  
e Clinic   
[place]  
   
                                        Comment on above:   Population Health Na  
vigation Outreach ([facility])   
   
                                Start: 2022 Telephone encounter Cynthia unger MD  
Work Phone:   
[phone]                          Internal Medicine   
[place]  
   
                                        Comment on above:   Medication concern   
   
                                Start: 10- Telephone encounter Sherly An APRN.CNP  
Work Phone:   
[phone]                          Internal Medicine   
[place]  
   
                                        Comment on above:   Results   
   
                                Start: 2022 Refill          Jacquelyn Milner MD  
Work Phone:   
[phone]                          Hematology/Oncology  
   
                                        Comment on above:   Refill Request   
   
                                                            Results   
   
                                Start: 2022 Telephone encounter Cynthia unger MD  
Work Phone:   
[phone]                          Family Medicine   
[place]  
   
                                        Comment on above:   Lab Orders; Orders   
   
                          Start: 2022 ambulatory   Shayna [facility]   
[place]  
   
                                        Comment on above:   Population Health Na  
vigation Outreach ([facility] care gaps)   
   
                                Start: 2022 Refill          Francis AMARAL Masci D  
O  
Work Phone:   
[phone]                          Hematology/Oncology  
   
                                        Comment on above:   Refill Request   
   
                                Start: 2022 Refill          Haley SousaCNP  
Work Phone:   
[phone]                          Internal Medicine   
[place]  
   
                                        Comment on above:   Refill Request   
   
                                Start: 2022 Refill          Francis AMARAL Masci D  
O  
Work Phone:   
[phone]                          Hematology/Oncology  
   
                                        Comment on above:   Refill Request   
   
                                                    Start: 2022  
End: 2022                         Patient encounter   
procedure                               Cynthia Douglas MD  
Work Phone:   
[phone]                          Internal Medicine   
[place]  
   
                                        Comment on above:   Morbid obesity with   
BMI of 40.0-44.9, adult (HCC) (Primary   
Dx);  
Primary hypertension;  
Hypercholesterolemia;  
Controlled type 2 diabetes mellitus without complication, without   
long-term current use of insulin (HCC)   
   
                                                    Start: 2019  
End: 2019                         Patient encounter   
procedure                 Steve SHAH Bonnievinistuart      Facility:[facility]  
   
                                        Start: 2019   Patient encounter   
procedure                                           Facility:[id number]  
   
                                                    Start: 2018  
End: 2018                         Patient encounter   
procedure                 Maricruz Ravi           Facility:[facility]  
   
                          Start: 2018 Ambulatory   MARICRUZ RAVI [facility]  
   
                          Start: 2017 Ambulatory   [facility]  
   
                          Start: 07- Ambulatory   [facility]  
  
  
  
Procedures  
  
  
                          Date         Procedure    Procedure Detail Performing   
Clinician  
   
                          Start: 2024 STREP A MOLECULAR (POC)               
 Fabiana Schmitt APRN.CNP  
Work Phone:   
[phone]  
   
                                        Start: 2024   Urnls dip stick/tabl  
et   
rgnt auto w/o microscopy                            Meera Menchaca PA-C  
Work Phone:   
[phone]  
   
                                        Start: 2024   Dxa bone density donis  
dy 1/>   
sites axial skel                                    Cynthia Douglas MD  
Work Phone:   
[phone]  
   
                          Start: 2023 Colonoscopy               [facility]  
er  
   
                                        Start: 2023   Urnls dip stick/tabl  
et   
rgnt auto w/o microscopy                            Cynthia Douglas MD  
Work Phone:   
[phone]  
   
                                        Start: 2023   Urnls dip stick/tabl  
et   
rgnt auto w/o microscopy                            Julito Osborne PA-C  
Work Phone:   
[phone]  
   
                                                    Start: 2023  
End: 2023     Mammography                             Jacquelyn Milner MD  
Work Phone:   
[phone]  
   
                                        Start: 2022   Adult depression scr  
eening   
assessment                                          Cynthia Douglas MD  
Work Phone:   
[phone]  
   
                          Start: 2022 Mammography               Cynthia unger MD  
Work Phone:   
[phone]  
   
                          Start: 2011 Colonoscopy               Cynthia unger MD  
Work Phone:   
[phone]  
  
  
  
Plan of Treatment  
  
  
                          Date         Care Activity Detail       Author  
   
                                                    Start:   
2028          Colonoscopy         COLONOSCOPY         [facility]  
   
                                                    Start:   
2028                              COLORECTAL CANCER   
SCREENING                 COLORECTAL CANCER SCREENING [facility]  
   
                                                    Start:   
2028                              Screening for   
malignant neoplasm of   
colon                                               [facility]  
   
                                                    Start:   
2025                              BP Controlled   
(<130/80)                 BP Controlled (<130/80)   [facility]  
   
                                                    Start:   
2025                              Annual PCP Team   
Chronic Disease Visit                   Annual PCP Team Chronic   
Disease Visit                           [facility]  
   
                                                    Start:   
2025                              BP Controlled   
(<130/80)                 BP Controlled (<130/80)   [facility]  
   
                                                    Start:   
2025                              Covid-19 Vaccine (1 -   
2023-24 season)                         Covid-19 Vaccine (1 - 2023-24   
season)                                 [facility]  
   
                                                    Comment on   
above:                                  Postponed from 2023 (Declined at t  
his time)   
   
                                                    Start:   
2025                              Pneumococcal Vaccine:   
65+ (1 of 2 - PCV)                      Pneumococcal Vaccine: 65+ (1   
of 2 - PCV)                             [facility]  
   
                                                    Comment on   
above:                                  Postponed from 1964 (Declined at t  
his time)   
   
                                                    Start:   
2025                              BP Controlled   
(<130/80)                 BP Controlled (<130/80)   [facility]  
   
                                                    Start:   
2025                              Hepatitis B surface   
antibody level            LDL Cholesterol           [facility]  
   
                                                    Start:   
2025                              Annual PCP Team   
Chronic Disease Visit                   Annual PCP Team Chronic   
Disease Visit                           [facility]  
   
                                                    Start:   
2025                              Screening for   
malignant neoplasm of   
breast                    Mammogram Screening       [facility]  
   
                                                    Start:   
2024  
End: 2024                         Patient encounter   
procedure                               2024 10:40 AM EST   
Office Visit Internal   
Medicine [facility] [address]   
[place], [place]   
[address] [phone] Sherly An APRN.CNP [address] [facility] [place] [address] [phone] (Work)   
[phone] (Fax) 6 month   
follow up                               Internal Medicine   
[place]  
   
                                                    Comment on   
above:                                  6 month follow up   
   
                                                    Start:   
2024  
End: 2025                         CBC panel - Blood by   
Automated count                         COMPLETE BLOOD COUNT Lab   
Routine Type 2 diabetes   
mellitus without   
complication, without   
long-term current use of   
insulin (HCC) Primary   
hypertension Expected:   
2024 (Approximate),   
Expires: 2025                     [facility]  
   
                                                    Comment on   
above:                                  Expected: 2024 (Approximate), Expi  
res: 2025   
   
                                                    Start:   
2024  
End: 2025                         Comprehensive   
metabolic 2000 panel -   
Serum or Plasma                         COMPREHENSIVE METABOLIC PANEL   
Lab Routine Type 2 diabetes   
mellitus without   
complication, without   
long-term current use of   
insulin (HCC) Primary   
hypertension   
Hypercholesterolemia   
Expected: 2024   
(Approximate), Expires:   
2025                              [facility]  
   
                                                    Comment on   
above:                                  Expected: 2024 (Approximate), Expi  
res: 2025   
   
                                                    Start:   
2024  
End: 2025                         Hemoglobin A1c in   
Blood                                   HEMOGLOBIN A1C Lab Routine   
Type 2 diabetes mellitus   
without complication, without   
long-term current use of   
insulin (HCC) Expected:   
2024 (Approximate),   
Expires: 2025                     [facility]  
   
                                                    Comment on   
above:                                  Expected: 2024 (Approximate), Expi  
res: 2025   
   
                                                    Start:   
2024  
End: 2025                         Lipid 1996 panel -   
Serum or Plasma                         LIPID PANEL BASIC Lab Routine   
Type 2 diabetes mellitus   
without complication, without   
long-term current use of   
insulin (HCC)   
Hypercholesterolemia   
Expected: 2024   
(Approximate), Expires:   
2025                              [facility]  
Work Phone:   
[phone]  
   
                                                    Comment on   
above:                                  Expected: 2024 (Approximate), Expi  
res: 2025   
   
                                                    Start:   
2024                              Annual PCP Team   
Chronic Disease Visit                   Annual PCP Team Chronic   
Disease Visit                           [facility]  
   
                                                    Start:   
2024                              BP Controlled   
(<130/80)                 BP Controlled (<130/80)   [facility]  
   
                                                    Start:   
2024                Hepatitis B screening     Urine Albumin:Creatinine   
Ratio                                   [facility]  
   
                                                    Start:   
2024                              Hepatitis B Vaccine (1   
of 3 - Risk 3-dose   
series)                                 Hepatitis B Vaccine (1 of 3 -   
Risk 3-dose series)                     [facility]  
   
                                                    Comment on   
above:                                  Postponed from 2018 (Declined at t  
his time)   
   
                                                    Start:   
2024          HIV Screening       HIV Screening       [facility]  
   
                                                    Comment on   
above:                                  Postponed from 1976 (Declined at t  
his time)   
   
                                                    Start:   
2024          HIV screening       HIV Screening       [facility]  
   
                                                    Comment on   
above:                                  Postponed from 1976 (Declined at t  
his time)   
   
                                                    Start:   
2024                              RSV Vaccine (1 -   
1-dose 60+ series)                      RSV Vaccine (1 - 1-dose 60+   
series)                                 [facility]  
   
                                                    Comment on   
above:                                  Postponed from 2018 (Declined at t  
his time)   
   
                                                    Start:   
2024                              Shingrix Vaccine (1 of   
2)                        Shingrix Vaccine (1 of 2) [facility]  
   
                                                    Comment on   
above:                                  Postponed from 2008 (Declined at t  
his time)   
   
                                                    Start:   
10-                              BP Controlled   
(<130/80)                 BP Controlled (<130/80)   [facility]  
   
                                                    Start:   
10-                              Hemoglobin A1c   
measurement               HbA1C                     [facility]  
   
                                                    Start:   
10-  
End: 10-           ambulatory                10/02/2024 10:30 AM EDT Deann tarango   
(SP) Office   
Hematology/Oncology [address]   
[place] [address]   
[phone] Vikki Posada, [address] [address]   
[phone] (Work)   
[phone] (Fax) 6 MO OV*             Hematology/Oncolog  
y  
   
                                                    Comment on   
above:                                  6 MO OV*   
   
                                                    Start:   
2024          Influenza vaccination                     [facility]  
   
                                                    Start:   
2024                              Urine microalbumin   
profile                                             [facility]  
   
                                                    Start:   
2024          [facility]  
[facility]  
   
                                                    Comment on   
above:                                  Postponed from 2023 (Declined at t  
his time)   
   
                                                    Start:   
2024  
End: 2024                         Patient encounter   
procedure                               2024 9:40 AM EDT Office   
Visit Internal Medicine   
[facility] [address] [facility], [place] [id number] [phone] Sherly An APRN.CNP [address] [place], [place] [id number] [phone] (Work)   
[phone] (Fax) 6 Month   
follow up                               Internal Medicine   
[place]  
   
                                                    Comment on   
above:                                  6 Month follow up   
   
                                                    Start:   
2024                              Hemoglobin A1c   
measurement               HbA1C                     [facility]  
   
                                                    Start:   
2024                              Hemoglobin   
A1c/Hemoglobin.total   
in Blood                  HbA1C                     [facility]  
   
                                                    Start:   
2024  
End: 2024                         CBC panel - Blood by   
Automated count                         CBC Lab Routine Controlled   
type 2 diabetes mellitus   
without complication, without   
long-term current use of   
insulin (HCC) Primary   
hypertension Expected:   
2024 (Approximate),   
Expires: 2024                     [facility]  
Work Phone:   
[phone]  
   
                                                    Comment on   
above:                                  Expected: 2024 (Approximate), Expi  
res: 2024   
   
                                                    Start:   
2024  
End: 2024                         Comprehensive   
metabolic 2000 panel -   
Serum or Plasma                         COMP METABOLIC PANEL Lab   
Routine Controlled type 2   
diabetes mellitus without   
complication, without   
long-term current use of   
insulin (HCC) Primary   
hypertension Expected:   
2024 (Approximate),   
Expires: 2024                     [facility]  
Work Phone:   
[phone]  
   
                                                    Comment on   
above:                                  Expected: 2024 (Approximate), Expi  
res: 2024   
   
                                                    Start:   
2024  
End: 2024                         Hemoglobin A1c in   
Blood                                   HGB A1C Lab Routine   
Controlled type 2 diabetes   
mellitus without   
complication, without   
long-term current use of   
insulin (HCC) Expected:   
2024 (Approximate),   
Expires: 2024                     [facility]  
Work Phone:   
[phone]  
   
                                                    Comment on   
above:                                  Expected: 2024 (Approximate), Expi  
res: 2024   
   
                                                    Start:   
2024  
End: 2024                         Lipid 1996 panel -   
Serum or Plasma                         LIPID PANEL BASIC Lab Routine   
Controlled type 2 diabetes   
mellitus without   
complication, without   
long-term current use of   
insulin (HCC) Primary   
hypertension Expected:   
2024 (Approximate),   
Expires: 2024                     [facility]  
Work Phone:   
[phone]  
   
                                                    Comment on   
above:                                  Expected: 2024 (Approximate), Expi  
res: 2024   
   
                                                    Start:   
2024                              ANNUAL PCP TEAM   
CHRONIC DISEASE VISIT                   ANNUAL PCP TEAM CHRONIC   
DISEASE VISIT                           [facility]  
   
                                                    Start:   
2024                              BP CONTROLLED   
(<130/80)                 BP CONTROLLED (<130/80)   [facility]  
   
                                                    Start:   
2024          COVID-19 VACCINE (#1) COVID-19 VACCINE (#1) [facility]  
   
                                                    Comment on   
above:                                  Postponed from 1958 (Declined at t  
his time)   
   
                                                    Start:   
2024                              Pneumococcal Vaccine:   
65+ (1 - PCV)                           Pneumococcal Vaccine: 65+ (1   
- PCV)                                  [facility]  
   
                                                    Comment on   
above:                                  Postponed from 1964 (Declined at t  
his time)   
   
                                                    Start:   
2024                              Pneumococcal Vaccine:   
65+ (1 of 2 - PCV)                      Pneumococcal Vaccine: 65+ (1   
of 2 - PCV)                             [facility]  
   
                                                    Comment on   
above:                                  Postponed from 1964 (Declined at t  
his time)   
   
                                                    Start:   
2024                              PNEUMOCOCCAL: 65+ (1 -   
PCV)                      PNEUMOCOCCAL: 65+ (1 - PCV) [facility]  
   
                                                    Comment on   
above:                                  Postponed from 1964 (Declined at t  
his time)   
   
                                                    Start:   
2024                              Hepatitis B surface   
antibody level            LDL CHOLESTEROL           [facility]  
   
                                                    Start:   
2024                              BP CONTROLLED   
(<130/80)                 BP CONTROLLED (<130/80)   [facility]  
   
                                                    Start:   
2024                              BP CONTROLLED   
(<130/80)                 BP CONTROLLED (<130/80)   [facility]  
   
                                                    Start:   
2024          Mammography                             [facility]  
   
                                                    Start:   
2024                              Screening for   
malignant neoplasm of   
breast                    Mammogram Screening       [facility]  
   
                                                    Start:   
2024                              ANNUAL PCP TEAM   
CHRONIC DISEASE VISIT                   ANNUAL PCP TEAM CHRONIC   
DISEASE VISIT                           [facility]  
   
                                                    Start:   
2024                              Hepatitis B surface   
antibody level            LDL CHOLESTEROL           [facility]  
   
                                                    Start:   
2024                              Advance Directive   
Discussion                Advance Directive Discussion [facility]  
   
                                                    Start:   
2024                              Behavioral Health   
Screening                 Behavioral Health Screening [facility]  
   
                                                    Start:   
2024          Depression Assessment Depression Assessment [facility]  
   
                                                    Start:   
2023                              BP CONTROLLED   
(<130/80)                 BP CONTROLLED (<130/80)   [facility]  
   
                                                    Start:   
2023  
End: 2024                         ALBUMIN/CREAT RATIO   
RND UR                                              [facility]  
Work Phone:   
[phone]  
   
                                                    Comment on   
above:                                  Expected: 2023, Expires:   
4   
   
                                                    Start:   
2023  
End: 2024                         Basic metabolic 2000   
panel - Serum or   
Plasma                                              [facility]  
Work Phone:   
[phone]  
   
                                                    Comment on   
above:                                  Expected: 2023, Expires:   
4   
   
                                                    Start:   
10-                              Hemoglobin   
A1c/Hemoglobin.total   
in Blood                  HBA1C                     [facility]  
   
                                                    Start:   
2023                              3 comp foot exam   
completed                 DIABETIC FOOT EXAM        [facility]  
   
                                                    Start:   
2023                              ANNUAL PCP TEAM   
CHRONIC DISEASE VISIT                   ANNUAL PCP TEAM CHRONIC   
DISEASE VISIT                           [facility]  
   
                                                    Start:   
2023                              Hepatitis B surface   
antibody level            LDL CHOLESTEROL           [facility]  
   
                                                    Start:   
2023                              Covid-19 Vaccine (1 -   
2023-24 season)                         Covid-19 Vaccine (1 - 2023-24   
season)                                 [facility]  
   
                                                    Start:   
2023          Influenza vaccination                     [facility]  
   
                                                    Start:   
2023                              Hemoglobin   
A1c/Hemoglobin.total   
in Blood                  HBA1C                     [facility]  
   
                                                    Start:   
2023          Influenza vaccination INFLUENZA (#1)      [facility]  
   
                                                    Comment on   
above:                                  Postponed from 2022 (Declined at t  
his time)   
   
                                                    Start:   
2023  
End: 2023                         ALBUMIN/CREAT RATIO   
RND UR                                  ALBUMIN/CREAT RATIO RND UR   
Lab Routine Type 2 diabetes   
mellitus without   
complication, without   
long-term current use of   
insulin (HCC) Expected:   
2023, Expires:   
2023                              [facility]  
Work Phone:   
[phone]  
   
                                                    Comment on   
above:                                  Expected: 2023, Expires:   
3   
   
                                                    Start:   
2023                              ANNUAL PCP TEAM   
CHRONIC DISEASE VISIT                   ANNUAL PCP TEAM CHRONIC   
DISEASE VISIT                           [facility]  
   
                                                    Start:   
2023                              BP CONTROLLED   
(<130/80)                 BP CONTROLLED (<130/80)   [facility]  
   
                                                    Start:   
2023                              Hemoglobin   
A1c/Hemoglobin.total   
in Blood                  HBA1C                     [facility]  
   
                                                    Start:   
2023                              ADVANCE DIRECTIVE   
DISCUSSION                ADVANCE DIRECTIVE DISCUSSION [facility]  
   
                                                    Start:   
2023          BONE DENSITY        BONE DENSITY        [facility]  
   
                                                    Start:   
2023          Bone Density Screening Bone Density Screening [facility]  
   
                                                    Start:   
2023                              Screening for   
osteoporosis              Bone Density Screening    [facility]  
   
                                                    Start:   
2023  
End: 2023                         CBC W Auto   
Differential panel -   
Blood                                   CBC + DIFF Lab STAT Primary   
cancer of lower outer   
quadrant of right female   
breast (HCC) Carcinoma of   
right breast, estrogen and   
progesterone receptor   
positive (HCC) Expected:   
2023, Expires:   
2023                              [facility]  
Work Phone:   
[phone]  
   
                                                    Comment on   
above:                                  Expected: 2023, Expires:   
3   
   
                                                    Start:   
2023  
End: 2023                         Comprehensive   
metabolic 2000 panel -   
Serum or Plasma                         COMP METABOLIC PANEL Lab   
Routine Primary cancer of   
lower outer quadrant of right   
female breast (HCC) Carcinoma   
of right breast, estrogen and   
progesterone receptor   
positive (HCC) Expected:   
2023, Expires:   
2023                              [facility]  
Work Phone:   
[phone]  
   
                                                    Comment on   
above:                                  Expected: 2023, Expires:   
3   
   
                                                    Start:   
2023                              Adult depression   
screening assessment      DEPRESSION SCREENING      [facility]  
   
                                                    Start:   
2023                Hepatitis B screening     URINE ALBUMIN:CREATININE   
RATIO                                   [facility]  
   
                                                    Start:   
2023                              Hepatitis B surface   
antibody level            LDL CHOLESTEROL           [facility]  
   
                                                    Start:   
2023          Mammography         MAMMOGRAM           [facility]  
   
                                                    Start:   
2023          DEPRESSION ASSESSMENT DEPRESSION ASSESSMENT [facility]  
   
                                                    Start:   
2022          Glaucoma screening  Dilated Retinal Exam [facility]  
   
                                                    Start:   
2022                              Hepatitis C antibody,   
confirmatory test         DILATED RETINAL EXAM      [facility]  
   
                                                    Start:   
2022  
End: 2023                         Basic metabolic 2000   
panel - Serum or   
Plasma                                  BASIC METABOLIC PNL Lab   
Routine Hypokalemia Expected:   
2022 (Approximate),   
Expires: 2023                     [facility]  
Work Phone:   
[phone]  
   
                                                    Comment on   
above:                                  Expected: 2022 (Approximate), Expi  
res: 2023   
   
                                                    Start:   
10-                              3 comp foot exam   
completed                 DIABETIC FOOT EXAM        [facility]  
   
                                                    Start:   
10-                              BP CONTROLLED   
(<130/80)                 BP CONTROLLED (<130/80)   [facility]  
   
                                                    Start:   
10-          COVID-19 VACCINE (#1) COVID-19 VACCINE (#1) [facility]  
   
                                                    Comment on   
above:                                  Postponed from 1963 (Declined at t  
his time)   
   
                                                            Postponed from  (Declined at this time)   
   
                                                    Start:   
10-          COVID-19 VACCINE (1) COVID-19 VACCINE (1) [facility]  
   
                                                    Comment on   
above:                                  Postponed from 1970 (Declined at t  
his time)   
   
                                                    Start:   
10-                              SHINGRIX VACCINE (1 of   
2)                        SHINGRIX VACCINE (1 of 2) [facility]  
   
                                                    Comment on   
above:                                  Postponed from 2008 (Declined at t  
his time)   
   
                                                            Postponed from  (Declined at this time)   
   
                                                    Start:   
10-  
End: 2022           POTASSIUM BLD             POTASSIUM BLD Lab Routine   
Hypokalemia Expected:   
10/05/2022 (Approximate),   
Expires: 2022                     [facility]  
Work Phone:   
[phone]  
   
                                                    Comment on   
above:                                  Expected: 10/05/2022 (Approximate), Expi  
res: 2022   
   
                                                    Start:   
2022                              Hemoglobin   
A1c/Hemoglobin.total   
in Blood                  HBA1C                     [facility]  
   
                                                    Start:   
2022          Influenza vaccination                     [facility]  
   
                                                    Start:   
2022          Influenza vaccination INFLUENZA (#1)      [facility]  
   
                                                    Comment on   
above:                                  Postponed from 2021 (Declined at t  
his time)   
   
                                                    Start:   
2022          DEPRESSION ASSESSMENT DEPRESSION ASSESSMENT [facility]  
   
                                                    Start:   
2021          Colonoscopy         COLONOSCOPY         [facility]  
   
                                                    Start:   
2021                              COLORECTAL CANCER   
SCREENING                 COLORECTAL CANCER SCREENING [facility]  
   
                                                    Start:   
2019          HPV TESTING         HPV TESTING         [facility]  
   
                                                    Start:   
2019          PAP TESTING         PAP TESTING         [facility]  
   
                                                    Start:   
2018                              Hepatitis B Vaccine (1   
of 3 - Risk 3-dose   
series)                                 Hepatitis B Vaccine (1 of 3 -   
Risk 3-dose series)                     [facility]  
   
                                                    Start:   
2018                              RSV Vaccine (1 -   
1-dose 60+ series)                      RSV Vaccine (1 - 1-dose 60+   
series)                                 [facility]  
   
                                                    Start:   
2008                              SHINGRIX VACCINE (1 of   
2)                        SHINGRIX VACCINE (1 of 2) [facility]  
   
                                                    Start:   
2003          COLOGUARD (FIT-DNA) COLOGUARD (FIT-DNA) [facility]  
   
                                                    Start:   
2003          CT COLONOGRAPHY     CT COLONOGRAPHY     [facility]  
   
                                                    Start:   
2003          FECAL OCCULT BLOOD  FECAL OCCULT BLOOD  [facility]  
   
                                                    Start:   
2003                              Screening for   
malignant neoplasm of   
colon                                               [facility]  
   
                                                    Start:   
2003          SIGMOIDOSCOPY       SIGMOIDOSCOPY       [facility]  
   
                                                    Start:   
1976          Anxiety Screening   Anxiety Screening   [facility]  
   
                                                    Start:   
1976          Depression Screening Depression Screening [facility]  
   
                                                    Start:   
1976          HIV SCREENING       HIV SCREENING       [facility]  
   
                                                    Start:   
1964          PNEUMOCOCCAL (1 - PCV) PNEUMOCOCCAL (1 - PCV) [facility]  
ic  
   
                                                    Start:   
1964                              Pneumococcal Vaccine:   
65+ (1 of 2 - PCV)                      Pneumococcal Vaccine: 65+ (1   
of 2 - PCV)                             [facility]  
   
                                                    Start:   
1964                              PNEUMOCOCCAL: 65+ (1 -   
PCV)                      PNEUMOCOCCAL: 65+ (1 - PCV) [facility]  
   
                                                    Start:   
1958          COVID-19 VACCINE (#1) COVID-19 VACCINE (#1) [facility]  
   
                                                            Bacteria identified   
in   
Urine by Culture                        URINE CULTURE Microbiology   
Routine Gross hematuria   
2023 1:07 PM EST                  [facility]  
Work Phone:   
[phone]  
   
                                                            Bacteria identified   
in   
Urine by Culture                        URINE CULTURE Microbiology   
Routine Dysuria 2023   
10:34 AM EDT                            [facility]  
Work Phone:   
[phone]  
   
                                                            Bacteria identified   
in   
Urine by Culture                        URINE CULTURE Microbiology   
Routine Left flank pain   
2024 3:35 PM EST                  [facility]  
Work Phone:   
[phone]  
   
                                                      
End: 2023                         CBC panel - Blood by   
Automated count                         CBC Lab Routine Controlled   
type 2 diabetes mellitus   
without complication, without   
long-term current use of   
insulin (HCC) Every 6 months   
for 12 Occurrences starting   
2022 until 2023             [facility]  
Work Phone:   
[phone]  
   
                                                    Comment on   
above:                                  Every 6 months for 12 Occurrences starti  
ng 2022 until   
2023   
   
                                                      
End: 2023                         Comprehensive   
metabolic 2000 panel -   
Serum or Plasma                         COMP METABOLIC PANEL Lab   
Routine Controlled type 2   
diabetes mellitus without   
complication, without   
long-term current use of   
insulin (HCC) Every 6 months   
for 12 Occurrences starting   
2022 until 2023             [facility]  
Work Phone:   
[phone]  
   
                                                    Comment on   
above:                                  Every 6 months for 12 Occurrences starti  
ng 2022 until   
2023   
   
                                                            DBT Breast - bilater  
al   
screening                               [facility]   
Radiology Routine Malignant   
neoplasm of lower-outer   
quadrant of right breast of   
female, estrogen receptor   
positive (HCC) (HCC)   
Encounter for screening   
mammogram for high-risk   
patient 2024 11:26 AM   
EST                                     [facility]  
Work Phone:   
[phone]  
   
                                                      
End: 2024                         Diagnostic mammography   
computer-aided detcj   
uni                                     [facility]   
Routine Abnormal mammogram 1   
Occurrences starting   
2023 until 2024             [facility]  
Work Phone:   
[phone]  
   
                                                    Comment on   
above:                                  1 Occurrences starting 2023 until   
2024   
   
                                                      
End: 2024           DXA-AXIAL SKELETON        [facility] Radiology  
   
Routine Screening for   
osteoporosis 1 Occurrences   
starting 2023 until   
2024                              [facility]  
Work Phone:   
[phone]  
   
                                                    Comment on   
above:                                  1 Occurrences starting 2023 until   
2024   
   
                                                      
End: 2023                         Hemoglobin A1c in   
Blood                                   HGB A1C Lab Routine   
Controlled type 2 diabetes   
mellitus without   
complication, without   
long-term current use of   
insulin (HCC) Every 3 months   
for 24 Occurrences starting   
2022 until 2023             [facility]  
Work Phone:   
[phone]  
   
                                                    Comment on   
above:                                  Every 3 months for 24 Occurrences starti  
ng 2022 until   
2023   
   
                                                      
End: 2023                         Lipid 1996 panel -   
Serum or Plasma                         LIPID PANEL BASIC Lab Routine   
Controlled type 2 diabetes   
mellitus without   
complication, without   
long-term current use of   
insulin (HCC) Every 6 months   
for 12 Occurrences starting   
2022 until 2023             [facility]  
Work Phone:   
[phone]  
   
                                                    Comment on   
above:                                  Every 6 months for 12 Occurrences starti  
ng 2022 until   
2023   
   
                                                POST VOID RESIDUAL POST VOID RES  
IDUAL Procedures   
Routine Gross hematuria   
Ordered: 2023                     [facility]  
Work Phone:   
[phone]  
   
                                                    Comment on   
above:                                  Ordered: 2023   
   
                                                      
End: 2023                         Thyrotropin   
[Units/volume] in   
Serum or Plasma                         TSH BLD Lab Routine   
Controlled type 2 diabetes   
mellitus without   
complication, without   
long-term current use of   
insulin (HCC) Every 3 months   
for 24 Occurrences starting   
2022 until 2023             [facility]  
Work Phone:   
[phone]  
   
                                                    Comment on   
above:                                  Every 3 months for 24 Occurrences starti  
ng 2022 until   
2023   
   
                                                      
End: 2024                         Us breast uni real   
time with image   
limited                                 US BREAST LTD RT Radiology   
Routine Abnormal mammogram 1   
Occurrences starting   
2023 until 2024             [facility]  
Work Phone:   
[phone]  
   
                                                    Comment on   
above:                                  1 Occurrences starting 2023 until   
2024   
   
                                                                 [facility]  
c  
   
                                                                 [facility]  
c  
   
                                                                 [facility]  
c  
   
                                                                 [facility]  
c  
   
                                                                 [facility]  
c  
   
                                                                 [facility]  
c  
   
                                                                 [facility]  
c  
   
                                                                 [facility]  
c  
   
                                                                 [facility]  
c  
   
                                                                 [facility]  
c  
   
                                                                 [facility]  
c  
   
                                                                 [facility]  
c  
  
  
  
Immunizations  
  
  
                      Immunization Date Immunization Notes      Care Provider Micki barrientos  
   
                                        10-          influenza virus   
vaccine, unspecified   
formulation                             Screen Wstr         [facility]  
   
                                        2014          tetanus toxoid, redu  
xiomara   
diphtheria toxoid, and   
acellular pertussis   
vaccine, adsorbed                                   Cynthia Douglas MD  
Work Phone:   
[phone]                          [facility]  
Work Phone:   
[phone]  
  
  
  
Payers  
  
  
                          Date         Payer Category Payer        Policy ID  
   
                          2020   Unknown                   [id number]  
   
                                2020      Medicaid        MEDICAID [facility]  
   
MEDICAID [id number]   
2020-Present   
[phone] [address]   
[address] [place] [address]   
Medicaid                                [id number]   
[id number]  
[id number]  
   
                          2020   Medicaid                  [id number]  
   
                                2019      Medicare        [facility] MEDICARE [facility]  
 DUAL   
COMPLETE HMO SNP   
[id number]   
2019-Present   
[phone] [address]   
[address] [place], [place]   
[address] Medicare                     [id number]   
[id number]  
[id number]  
   
                          2019   Medicaid                    
   
                          2019   Medicare                    
   
                          2018   Private Health Insurance                
   
                          1960   Unknown                   [id number]   
[id number]  
[id number]  
   
                          1960   Unknown                   [id number]   
[id number]  
[id number]  
   
                          1960   Unknown                   [id number]   
[id number]  
[id number]  
   
                          1960   Unknown                   [id number]   
[id number]  
[id number]  
   
                                       Medicare                  [id number]  
  
  
  
Social History  
  
  
                          Date         Type         Detail       Facility  
   
                                                    Start:   
05-  
End: 2022                         Tobacco smoking status   
NHIS                      Ex-smoker                 [facility]  
Work Phone:   
[phone]  
   
                                                    Start:   
05-  
End: 05-     History of tobacco use Current smoker      [facility]  
Work Phone:   
[phone]  
   
                                                    Start:   
05-  
End: 05-     History of tobacco use Cigarette Smoker    [facility]  
Work Phone:   
[phone]  
   
                                                    Start:   
05-  
End: 10-                         Cigarettes smoked current   
(pack per day) - Reported 1                         [facility]  
   
                                                    Start:   
05-  
End: 2022           Tobacco use and exposure  Smokeless tobacco   
non-user                                [facility]  
Work Phone:   
[phone]  
   
                                                    Start:   
2022  
End: 2024           Alcohol intake            Current non-drinker of   
alcohol (finding)                       [facility]  
   
                                                    Start:   
10-  
End: 2023                         History SDOH Alcohol   
Binge                     1                         [facility]  
   
                                                    Start:   
10-  
End: 2023                         History SDOH Social   
Connections Phone         5                         [facility]  
   
                                                    Start:   
10-  
End: 2022                         History SDOH Social   
Connections Faith        98                        [facility]  
   
                                                    Start:   
10-  
End: 2023                         History SDOH Social   
Connections Membership    2                         [facility]  
   
                                                    Start:   
10-  
End: 2022     History SDOH Financial 3                   [facility]  
   
                                                    Start:   
2020          Education           12                  [facility]  
   
                                                    Start:   
1960          Sex Assigned At Birth Female              [facility]  
   
                                                    Start:   
2022  
End: 2022                         Exposure to SARS-CoV-2   
(event)                   Not sure                  [facility]  
Work Phone:   
[phone]  
   
                                                    Start:   
2022  
End: 2023                         History SDOH Alcohol Std   
Drinks                    0                         [facility]  
   
                                                    Start:   
2023  
End: 10-                         Social connection and   
isolation panel                                     [facility]  
   
                                                            Do you belong to any  
   
clubs or organizations   
such as Oriental orthodox groups,   
unions, fraternal or   
athletic groups, or   
school groups?            No                        [facility]  
   
                                                            Are you now ,  
   
, ,   
, never    
or living with a partner?                   [facility]  
   
                                                            How often to you hav  
e a   
drink containing alcohol? Never                     [facility]  
   
                                                            How many standard dr  
inks   
containing alcohol do you   
have on a typical day?    Patient does not drink    [facility]  
   
                                                            Do you feel stress -  
   
tense, restless, nervous,   
or anxious, or unable to   
sleep at night because   
your mind is troubled all   
the time - these days   
[OSQ]                     Not at all                [facility]  
   
                                                            (I/We) worried wheth  
er   
(my/our) food would run   
out before (I/we) got   
money to buy more.        Never true                [facility]  
   
                                                    Start:   
10-                Gender identity           Identifies as female   
gender (finding)                        [facility]  
   
                                                    Start:   
10-          Sexual orientation  Heterosexual (finding) [facility]  
   
                                                            How hard is it for y  
ou to   
pay for the very basics   
like food, housing,   
medical care, and heating Not very hard             [facility]  
   
                                                            Do you feel stress -  
   
tense, restless, nervous,   
or anxious, or unable to   
sleep at night because   
your mind is troubled all   
the time - these days   
[OSQ]                     Only a little             [facility]  
  
  
  
Medical Equipment  
  
  
                                Procedure Code  Equipment Code  Equipment Origin  
al   
Text                                    Equipment   
Identifier                              Dates  
   
                                                                [facility]   
[facility]   
[id number]                [id number]                Start:   
2015  
   
                                                                 [id number],   
[id number],   
[id number],   
[id number]                              Start:   
2020  
End:   
2023  
   
                                        Comment on above:   Test blood sugars 2d  
aily. Dx:ucnontrolled diabetes . Insulin:   
Yes   
   
                                                            Test blood sugar(s)   
2 daily. Dx: Type 2 DM - Controlled E11.9   
Insulin: Yes   
  
  
  
Clinical Notes 2016 to 2024  
   Kerrie Temple MA - 2024 4:13 PM Fabiana Forrest APRN.CNP -   
2024 4:54 PM EDTTelephone Encounter - Manisha Jesus MA - 2024 10:44 AM
 Indiana Douglas MD - 2023 10:18 AM EDT  
  
                                Note Date & Type Note            Facility  
   
                                                    2024 History of   
Present illness Narrative               Formatting of this note might be   
different from the original.  
  
Opened in error  
Electronically signed by   
Kerrie Temple MA at   
2024 4:14 PM EDT  
documented in this encounter            [facility]  
   
                                                    08- History of   
Present illness Narrative               Formatting of this note is   
different from the original.  
This note was created using   
[employer].  
Subjective  
Navdeep Guillen is a 64 year old   
female.  
  
64 year old female with PMH HTN,   
GERD, DM presents for illness.  
  
Acute onset yesterday  
+sore throat  
+headache  
+pain with swallowing  
+enlarged lymph nodes  
  
Mild cough  
Denies N/V/D  
Denies skin rash or lesions.  
  
+exposure to strep, citing her   
grandson was over this past   
weekend and was positive for   
strep.  
  
Denies using homeopathic or OTC  
  
Endorses   would like to be tested   
for strep   
  
The history is provided by the   
patient. No    
was used.  
Sore Throat  
This is a new problem. The current   
episode started yesterday. The   
problem has been unchanged.   
Neither side of throat is   
experiencing more pain than the   
other. There has been no fever.   
The pain is at a severity of 6/10.   
The pain is moderate. Associated   
symptoms include coughing,   
headaches and swollen glands.   
Pertinent negatives include no   
abdominal pain, congestion,   
diarrhea, drooling, ear discharge,   
ear pain, hoarse voice, plugged   
ear sensation, neck pain,   
shortness of breath, stridor,   
trouble swallowing or vomiting.   
She has had exposure to strep. She   
has had no exposure to mono. She   
has tried nothing for the   
symptoms. The treatment provided   
no relief.  
  
PAST MEDICAL HISTORY  
2021: Abnormal mammogram  
2015: Achilles tendon pain  
2015: Ankle weakness  
12/15/2016: Chronic pain of left   
ankle  
2014: Colon abnormality  
Comment: Thickening of the   
ascending colon seen on the recent   
CT  
scan. Patient has had a   
colonoscopy around 4 years ago.  
Records were obtained for when   
they were done, 4 years  
ago , in [facility]. her   
colonoscopy was  
completely normal, no thickening,   
and the biopsy too was  
normal except for lymphoid   
aggregates.  
: Diverticulitis  
Comment: Treated by Dr. Patricio   
at [facility]  
2016: DJD (degenerative   
joint disease), ankle and foot  
No date: DM (diabetes mellitus)   
(HCC)  
No date: GERD (gastroesophageal   
reflux disease)  
05/15/2014: Gross hematuria  
Comment: , had hematuria,   
investigated extensively along  
with cytoscopy, a stone was found   
but no signs of any  
malignancy.  
12/15/2016: Heel pain, chronic  
10/03/2017: Hepatic steatosis  
10/03/2017: Hiatal hernia  
No date: Hypertension  
No date: Insomnia  
05/15/2014: Kidney stone  
No date: Morbid obesity (HCC)  
No date: Nephrolithiasis  
2016: Neuritis  
05/15/2014: Renal lesion  
2015: S/P Achilles tendon   
repair  
Comment: 2015 sx done  
  
PAST SURGICAL HISTORY  
2017: BREAST LUMPECTOMY HX; Right  
2021: BX BREAST W/DEVICE 1ST   
LESION STEREOTACTIC GUID; Right  
: CHOLECYSTECTOMY  
Comment: gallbladder removal  
2011: COLONOSCOPY  
2014: CT ABDOMEN  
2014: PAST SURGICAL HISTORY   
OF  
Comment: D&C hysteroscopy  
2015: PAST SURGICAL HISTORY   
OF; Left  
Comment: Left foot surgery  
10/16/2014: S PK LAVH [id number]  
  
ALLERGIES Silvadene [Silver   
Sulfadiazine] and Sulfa   
(Sulfonamide Antibiotics)  
  
MEDICATIONS  
atenolol (TENORMIN) 50 mg tablet   
Take 1 tablet by mouth once daily.  
chlorthalidone (HYGROTON) 25 mg   
tablet Take 0.5 tablets by mouth   
once daily.  
glipiZIDE (GLUCOTROL XL) 2.5 mg 24   
hr tablet Take 1 tablet by mouth   
once daily.  
metFORMIN ER (GLUCOPHAGE XR) 500   
mg 24 hr tablet Take 1 tablet by   
mouth daily with breakfast.  
blood sugar diagnostic (BLOOD   
GLUCOSE TEST) test strip Test   
blood sugar(s) 2 times daily. Dx:   
Type 2 DM - Controlled E11.9   
Insulin: No  
Lancets Test blood sugar(s) 2   
times daily. Dx: Type 2 DM -   
Controlled E11.9 Insulin: No  
exemestane (AROMASIN) 25 mg tablet   
Take 1 tablet by mouth once daily.  
potassium chloride 20 mEq TbER   
Take 1 tablet by mouth two times a   
day.  
ibuprofen (MOTRIN) 800 mg tablet   
Take 1 tablet by mouth every 8   
hours as needed for pain. Take   
with food.  
blood sugar diagnostic (BLOOD   
GLUCOSE TEST) test strip Test   
blood sugars 2daily.   
Dx:ucnontrolled diabetes .   
Insulin: Yes  
lancets (ONE TOUCH DELICA) 33   
gauge misc Test blood sugar(s) 2   
daily. Dx: Type 2 DM - Controlled   
E11.9 Insulin: Yes  
Blood-Glucose Meter monitoring kit   
Glucose Meter of Choice - Kit -   
Dx: Type 2 DM - Uncontrolled   
E11.65  
MV-MN/FOLIC ACID/CALCIUM/VIT K   
(ONE-A-DAY WOMEN'S 50 PLUS ORAL)   
Take 1 tablet by mouth once daily.  
  
  
FAMILY HISTORY  
Problem Relation Age of Onset  
Ovarian cancer Mother 39  
Heart Father  
Hypertension Father  
Prostate Cancer Father 78  
other (kidney stones) Brother  
Hypertension Brother  
Diabetes Brother  
Seizures Son  
Breast Cancer Other 55  
Double first cousin (daughter of   
mother's sister and father's   
brother)  
  
Social History  
  
Tobacco Use  
Smoking status: Former  
Packs/day: 1.00  
Years: 10.00  
Additional pack years: 0.00  
Total pack years: 10.00  
Types: Cigarettes  
Quit date: 5/15/2007  
Years since quittin.2  
Smokeless tobacco: Never  
Vaping Use  
Vaping Use: Never used  
Substance Use Topics  
Alcohol use: No  
Drug use: Not Currently  
Comment: marijuana for insomnia -   
currently not using  
  
Review of Systems  
Constitutional: Negative for   
activity change, appetite change,   
chills and fatigue.  
HENT: Positive for sore throat.   
Negative for congestion, drooling,   
ear discharge, ear pain, hoarse   
voice, rhinorrhea, sinus pain and   
trouble swallowing.  
Eyes: Negative for pain,   
discharge, redness and itching.  
Respiratory: Positive for cough.   
Negative for apnea, chest   
tightness, shortness of breath and   
stridor.  
Gastrointestinal: Negative for   
abdominal pain, diarrhea and   
vomiting.  
Musculoskeletal: Negative for   
arthralgias, back pain, gait   
problem and neck pain.  
Skin: Negative for color change,   
pallor and rash.  
Allergic/Immunologic: Negative for   
environmental allergies, food   
allergies and immunocompromised   
state.  
Neurological: Positive for   
headaches. Negative for dizziness   
and facial asymmetry.  
Hematological: Negative for   
adenopathy. Does not bruise/bleed   
easily.  
Psychiatric/Behavioral: Negative   
for agitation and behavioral   
problems.  
  
Objective  
/78   Pulse 63   Temp 36.9 C   
(98.5 F) (Tympanic)   Resp 18   Wt   
(!) 136.9 kg (301 lb 13 oz)   SpO2   
97%   BMI 49.46 kg/m  
Physical Exam  
Vitals and nursing note reviewed.  
Constitutional:  
General: She is not in acute   
distress.  
Appearance: Normal appearance. She   
is normal weight. She is not   
ill-appearing, toxic-appearing or   
diaphoretic.  
HENT:  
Head: Normocephalic and   
atraumatic.  
Right Ear: Ear canal and external   
ear normal.  
Left Ear: Ear canal and external   
ear normal.  
Nose: Congestion present. No   
rhinorrhea.  
Mouth/Throat:  
Mouth: Mucous membranes are moist.  
Pharynx: Posterior oropharyngeal   
erythema (1 + enlarged bilateral.   
Uvula midline. Handling   
secretions) present. No   
oropharyngeal exudate.  
Eyes:  
General:  
Right eye: No discharge.  
Left eye: No discharge.  
Extraocular Movements: Extraocular   
movements intact.  
Conjunctiva/sclera: Conjunctivae   
normal.  
Pupils: Pupils are equal, round,   
and reactive to light.  
Cardiovascular:  
Rate and Rhythm: Normal rate and   
regular rhythm.  
Pulses: Normal pulses.  
Heart sounds: Normal heart sounds.   
No murmur heard.  
No friction rub.  
Pulmonary:  
Effort: Pulmonary effort is   
normal. No respiratory distress.  
Breath sounds: Normal breath   
sounds. No stridor. No wheezing,   
rhonchi or rales.  
Chest:  
Chest wall: No tenderness.  
Abdominal:  
General: Abdomen is flat. There is   
no distension.  
Palpations: Abdomen is soft. There   
is no mass.  
Tenderness: There is no abdominal   
tenderness. There is no right CVA   
tenderness, left CVA tenderness,   
guarding or rebound.  
Hernia: No hernia is present.  
Musculoskeletal:  
General: No swelling, tenderness,   
deformity or signs of injury.   
Normal range of motion.  
Cervical back: Normal range of   
motion and neck supple. No   
rigidity.  
Right lower leg: No edema.  
Left lower leg: No edema.  
Lymphadenopathy:  
Cervical: Cervical adenopathy   
present.  
Skin:  
General: Skin is warm and dry.  
Coloration: Skin is not jaundiced   
or pale.  
Findings: No bruising, erythema,   
lesion or rash.  
Neurological:  
General: No focal deficit present.  
Mental Status: She is alert and   
oriented to person, place, and   
time.  
Cranial Nerves: No cranial nerve   
deficit.  
Sensory: No sensory deficit.  
Motor: No weakness.  
Coordination: Coordination normal.  
Gait: Gait normal.  
Psychiatric:  
Mood and Affect: Mood normal.  
Behavior: Behavior normal.  
Thought Content: Thought content   
normal.  
Judgment: Judgment normal.  
  
Assessment and Plan  
  
ASSESSMENT/PLAN:  
1. Upper respiratory tract   
infection, unspecified type -   
ICD9: 465.9, ICD10: J06.9  
X one day  
No red flags  
- Discussed viral etiology and   
rationale for treatment.  
- Group A strep molecular testing   
negative  
- Symptomatic treatment with prn   
analgesia  
- Supportive care with fluids and   
rest  
- The patient may also use OTC   
cough and cold meds as needed,   
warm salt water gargles, throat   
lozenges and/or OTC throat spray   
as needed, and nasal saline gtts   
and suction prn.  
- Follow up in 3-5 days if   
symptoms persist or sooner if   
worsening of symptoms  
- STREP A MOLECULAR (POC)  
  
NIC Lam.CNP  
Electronically signed by Fabiana Schmitt APRN.CNP at 2024   
5:24 PM EDT  
documented in this encounter            [facility]  
   
                                                    2024 Telephone   
encounter Note                          Formatting of this note might be   
different from the original.  
Patient notified. Patient will fax   
over copy of letter received.  
Electronically signed by Manisha Jesus MA at 2024 10:48 AM   
EDT  
                                        [facility]  
   
                                                    2024 Miscellaneous   
Notes                                   Formatting of this note might be   
different from the original.  
Patient notified. Patient will fax   
over copy of letter received.  
Electronically signed by Manisha Jesus MA at 2024 10:48 AM   
EDT  
Formatting of this note might be   
different from the original.  
Please let them know that the   
system has to be updated, and it   
should soon be , I will still see   
her and she will remain my   
patient.  
  
Regards,  
  
Cynthia Douglas MD  
  
Electronically signed by Cynthia Douglas MD at 2024 10:14 AM   
EDT  
Formatting of this note might be   
different from the original.  
Pt called upset that she got a   
letter that instructed her Dr. Douglas was terminated from [facility]. Pt asking to please   
check into this. Pt repots she   
told them she would go with Sherly An ut would like us to check   
into this. Pt would like to stay   
with Dr. Douglas.  
  
Please advise pt of findings.  
  
Ani Lin LPN  
  
Electronically signed by Ani Lin LPN at 2024   
9:50 AM EDT  
documented in this encounter            [facility]  
   
                                                    2024 Telephone   
encounter Note                          Formatting of this note might be   
different from the original.  
Please let them know that the   
system has to be updated, and it   
should soon be , I will still see   
her and she will remain my   
patient.  
  
Regards,  
  
Cynthia Douglas MD  
  
Electronically signed by Cynthia Douglas MD at 2024 10:14 AM   
EDT  
                                        [facility]  
   
                                                    2024 Telephone   
encounter Note                          Formatting of this note might be   
different from the original.  
Pt called upset that she got a   
letter that instructed her Dr. Douglas was terminated from [facility]. Pt asking to please   
check into this. Pt repots she   
told them she would go with Sherly An ut would like us to check   
into this. Pt would like to stay   
with Dr. Douglas.  
  
Please advise pt of findings.  
  
Ani Lin LPN  
  
Electronically signed by Ani Lin LPN at 2024   
9:50 AM EDT  
                                        [facility]  
   
                                        2024 Note     HNO ID: [id number]  
Author: SHERLY AN APRN.CNP  
Service: ?  
Author Type: Nurse Practitioner  
Type: Progress Notes  
Filed: 2024 10:12  
Note Text:  
CC: Patient presents with:  
Recheck: 6 month follow up  
HPI  
Navdeep Guillen is a 64 year old   
female who presents today for   
routine follow  
up.  
DIABETES MELLITUS: Ms. Guillen   
denies excessive thirst or   
increased frequency of  
urination, chest pain or dyspnea ,   
numbness, tingling or pain in   
extremities,  
new or unusual visual symptoms,   
low sugar/hypoglycemic reactions,   
weight  
loss/gain,   
lightheadedness/dizziness, and   
bowel changes/loose stools.   
Follows  
a diabetic diet most of the time.   
She is compliant with   
medication(s) and is  
tolerating med(s) without any side   
effects. She reports checking her   
glucose  
on a twice a day schedule with   
sugars in the fasting 130s and   
postprandial  
90s-110s range. Patient's last   
HgA1C was  
Hemoglobin A1C (%)  
Date Value  
2024 6.2  
2023 6.5  
07/15/2020 7.7  
2020 7.6  
Hemoglobin A1C (POCT) (%)  
Date Value  
10/26/2021 7.1  
)  
Last Ophthalmology exam was within   
the past 6 months at [facility] in   
[place]  
HTN: Ms. Guillen indicates that she   
is feeling well and denies any   
symptoms  
referable to elevated blood   
pressure. Specifically denies   
headache, chest pain,  
palpitations, dyspnea, and   
peripheral edema. Patient denies   
any side effects of  
her medication(s) and is compliant   
with their regimen. She does check   
BP's away  
from this office with average BP's   
in the 110s/80s range. Navdeep   
works out  
regularly 7 times per week with   
walking. She watches her diet for   
sodium, low  
fat and low cholesterol most of   
the time.  
Last 3 Encounter BP Readings:  
Date: BP:  
2024 116/78  
2024 99/69  
2024 146/80  
Right Breast CA: Had lumpectomy in   
2017. Follows with oncology and   
still on  
aromasin  
Allergies starting this morning   
with runny nose and itchy eyes   
with sneezing.  
Denies fever, chills, headaches,   
dizziness, cough, wheezing, or   
shortness of  
breath.  
REVIEW OF SYSTEMS  
See HPI  
PAST MEDICAL HISTORY  
Diagnosis Date  
Abnormal mammogram 2021  
Achilles tendon pain 2015  
Ankle weakness 2015  
Chronic pain of left ankle   
12/15/2016  
Colon abnormality 2014  
Thickening of the ascending colon   
seen on the recent CT scan.   
Patient has had a  
colonoscopy around 4 years ago.   
Records were obtained for when   
they were done, 4  
years ago , in [facility].   
her colonoscopy was completely   
normal, no  
thickening, and the biopsy too was   
normal except for lymphoid   
aggregates.  
Diverticulitis   
Treated by Dr. Patricio at [facility]  
DJD (degenerative joint disease),   
ankle and foot 2016  
DM (diabetes mellitus) (HCC)  
GERD (gastroesophageal reflux   
disease)  
Gross hematuria 05/15/2014  
2014, had hematuria, investigated   
extensively along with cytoscopy,   
a stone  
was found but no signs of any   
malignancy.  
Heel pain, chronic 12/15/2016  
Hepatic steatosis 10/03/2017  
Hiatal hernia 10/03/2017  
Hypertension  
Insomnia  
Kidney stone 05/15/2014  
Morbid obesity (HCC)  
Nephrolithiasis  
Neuritis 2016  
Renal lesion 05/15/2014  
S/P Achilles tendon repair   
2015 sx done  
PAST SURGICAL HISTORY  
Procedure Laterality Date  
BREAST LUMPECTOMY HX Right 2017  
BX BREAST W/DEVICE 1ST LESION   
STEREOTACTIC GUID Right 2021  
CHOLECYSTECTOMY 2011  
gallbladder removal  
COLONOSCOPY   
CT ABDOMEN 2014  
PAST SURGICAL HISTORY OF   
2014  
DANMN hysteroscopy  
PAST SURGICAL HISTORY OF Left   
2015  
Left foot surgery  
S PK LAVH [id number] 10/16/2014  
ALLERGIES Silvadene [Silver   
Sulfadiazine] and Sulfa   
(Sulfonamide Antibiotics)  
MEDICATIONS  
exemestane (AROMASIN) 25 mg tablet   
Take 1 tablet by mouth once daily.  
potassium chloride 20 mEq TbER   
Take 1 tablet by mouth two times a   
day.  
ibuprofen (MOTRIN) 800 mg tablet   
Take 1 tablet by mouth every 8   
hours as needed  
for pain. Take with food.  
metFORMIN ER (GLUCOPHAGE XR) 500   
mg 24 hr tablet Take 1 tablet by   
mouth daily  
with breakfast.  
blood sugar diagnostic (BLOOD   
GLUCOSE TEST) test strip Test   
blood sugars  
2daily. Dx:ucnontrolled diabetes .   
Insulin: Yes  
chlorthalidone (HYGROTON) 25 mg   
tablet Take 0.5 tablets by mouth   
once daily.  
atenolol (TENORMIN) 50 mg tablet   
Take 1 tablet by mouth once daily.  
glipiZIDE (GLUCOTROL XL) 2.5 mg 24   
hr tablet Take 1 tablet by mouth   
once daily.  
lancets (ONE TOUCH DELICA) 33   
gauge misc Test blood sugar(s) 2   
daily. Dx: Type  
2 DM - Controlled E11.9 Insulin:   
Yes  
Blood-Glucose Meter monitoring kit   
Glucose Meter of Choice - Kit -   
Dx: Type 2  
DM - Uncontrolled E11.65  
MV-MN/FOLIC ACID/CALCIUM/VIT K   
(ONE-A-DAY WOMEN'S 50 PLUS ORAL)   
Take 1 tablet  
by mouth once daily.  
FAMILY HISTORY  
Problem Relation Age of Onset  
Ovarian cancer Mother 39  
Heart Father  
Hypertension Father  
Prostate Cancer Father 78  
other (kidney stones) Brother  
Hypertension Brother  
Diabetes Brother  
Seizures Son  
Breast Cancer Other 55 (more   
content not included)...                [facility]  
   
                                                    2024 History of   
Present illness Narrative               Formatting of this note is   
different from the original.  
CC: Patient presents with:  
Recheck: 6 month follow up  
  
HPI  
Navdeep Guillen is a 64 year old   
female who presents today for   
routine follow up.  
  
DIABETES MELLITUS: Ms. Guillen   
denies excessive thirst or   
increased frequency of urination,   
chest pain or dyspnea , numbness,   
tingling or pain in extremities,   
new or unusual visual symptoms,   
low sugar/hypoglycemic reactions,   
weight loss/gain,   
lightheadedness/dizziness, and   
bowel changes/loose stools.   
Follows a diabetic diet most of   
the time. She is compliant with   
medication(s) and is tolerating   
med(s) without any side effects.   
She reports checking her glucose   
on a twice a day schedule with   
sugars in the fasting 130s and   
postprandial 90s-110s range.   
Patient's last HgA1C was  
Hemoglobin A1C (%)  
Date Value  
2024 6.2  
2023 6.5  
07/15/2020 7.7  
2020 7.6  
  
Hemoglobin A1C (POCT) (%)  
Date Value  
10/26/2021 7.1  
)  
Last Ophthalmology exam was within   
the past 6 months at [facility] in   
[place]  
  
HTN: Ms. Guillen indicates that she   
is feeling well and denies any   
symptoms referable to elevated   
blood pressure. Specifically   
denies headache, chest pain,   
palpitations, dyspnea, and   
peripheral edema. Patient denies   
any side effects of her   
medication(s) and is compliant   
with their regimen. She does check   
BP's away from this office with   
average BP's in the 110s/80s   
range. Navdeep works out regularly   
7 times per week with walking. She   
watches her diet for sodium, low   
fat and low cholesterol most of   
the time.  
Last 3 Encounter BP Readings:  
Date: BP:  
2024 116/78  
2024 99/69  
2024 146/80  
  
Right Breast CA: Had lumpectomy in   
2017. Follows with oncology and   
still on aromasin  
  
Allergies starting this morning   
with runny nose and itchy eyes   
with sneezing.  
  
Denies fever, chills, headaches,   
dizziness, cough, wheezing, or   
shortness of breath.  
  
REVIEW OF SYSTEMS  
See HPI  
  
PAST MEDICAL HISTORY  
Diagnosis Date  
Abnormal mammogram 2021  
Achilles tendon pain 2015  
Ankle weakness 2015  
Chronic pain of left ankle   
12/15/2016  
Colon abnormality 2014  
Thickening of the ascending colon   
seen on the recent CT scan.   
Patient has had a colonoscopy   
around 4 years ago. Records were   
obtained for when they were done,   
4 years ago , in [facility]. her colonoscopy was   
completely normal, no thickening,   
and the biopsy too was normal   
except for lymphoid aggregates.  
Diverticulitis   
Treated by Dr. Patricio at [facility]  
DJD (degenerative joint disease),   
ankle and foot 2016  
DM (diabetes mellitus) (HCC)  
GERD (gastroesophageal reflux   
disease)  
Gross hematuria 05/15/2014  
2014, had hematuria, investigated   
extensively along with cytoscopy,   
a stone was found but no signs of   
any malignancy.  
Heel pain, chronic 12/15/2016  
Hepatic steatosis 10/03/2017  
Hiatal hernia 10/03/2017  
Hypertension  
Insomnia  
Kidney stone 05/15/2014  
Morbid obesity (HCC)  
Nephrolithiasis  
Neuritis 2016  
Renal lesion 05/15/2014  
S/P Achilles tendon repair   
2015 sx done  
  
  
PAST SURGICAL HISTORY  
Procedure Laterality Date  
BREAST LUMPECTOMY HX Right 2017  
BX BREAST W/DEVICE 1ST LESION   
STEREOTACTIC GUID Right 2021  
CHOLECYSTECTOMY 2011  
gallbladder removal  
COLONOSCOPY   
CT ABDOMEN 2014  
PAST SURGICAL HISTORY OF   
2014  
D&C hysteroscopy  
PAST SURGICAL HISTORY OF Left   
2015  
Left foot surgery  
S PK LAVH [id number] 10/16/2014  
  
ALLERGIES Silvadene [Silver   
Sulfadiazine] and Sulfa   
(Sulfonamide Antibiotics)  
  
MEDICATIONS  
exemestane (AROMASIN) 25 mg tablet   
Take 1 tablet by mouth once daily.  
potassium chloride 20 mEq TbER   
Take 1 tablet by mouth two times a   
day.  
ibuprofen (MOTRIN) 800 mg tablet   
Take 1 tablet by mouth every 8   
hours as needed for pain. Take   
with food.  
metFORMIN ER (GLUCOPHAGE XR) 500   
mg 24 hr tablet Take 1 tablet by   
mouth daily with breakfast.  
blood sugar diagnostic (BLOOD   
GLUCOSE TEST) test strip Test   
blood sugars 2daily.   
Dx:ucnontrolled diabetes .   
Insulin: Yes  
chlorthalidone (HYGROTON) 25 mg   
tablet Take 0.5 tablets by mouth   
once daily.  
atenolol (TENORMIN) 50 mg tablet   
Take 1 tablet by mouth once daily.  
glipiZIDE (GLUCOTROL XL) 2.5 mg 24   
hr tablet Take 1 tablet by mouth   
once daily.  
lancets (ONE TOUCH DELICA) 33   
gauge misc Test blood sugar(s) 2   
daily. Dx: Type 2 DM - Controlled   
E11.9 Insulin: Yes  
Blood-Glucose Meter monitoring kit   
Glucose Meter of Choice - Kit -   
Dx: Type 2 DM - Uncontrolled   
E11.65  
MV-MN/FOLIC ACID/CALCIUM/VIT K   
(ONE-A-DAY WOMEN'S 50 PLUS ORAL)   
Take 1 tablet by mouth once daily.  
  
  
FAMILY HISTORY  
Problem Relation Age of Onset  
Ovarian cancer Mother 39  
Heart Father  
Hypertension Father  
Prostate Cancer Father 78  
other (kidney stones) Brother  
Hypertension Brother  
Diabetes Brother  
Seizures Son  
Breast Cancer Other 55  
Double first cousin (daughter of   
mother's sister and father's   
brother)  
  
Social History  
  
Tobacco Use  
Smoking status: Former  
Packs/day: 1.00  
Years: 10.00  
Additional pack years: 0.00  
Total pack years: 10.00  
Types: Cigarettes  
Quit date: 5/15/2007  
Years since quittin.1  
Smokeless tobacco: Never  
Vaping Use  
Vaping Use: Never used  
Substance Use Topics  
Alcohol use: No  
Drug use: Not Currently  
Comment: marijuana for insomnia -   
currently not using  
  
PHYSICAL EXAM  
/78   Pulse 64   Resp 16     
Wt (!) 136.5 kg (301 lb)   SpO2   
97%   BMI 49.33 kg/m  
General Appearance: well   
appearing, in no acute distress,   
alert  
Skin: Skin color, texture, turgor   
normal for age;  
Eyes: conjunctiva pink and moist,   
no icterus, sclera white,   
non-injected  
Nose/sinus: Nares normal. Septum   
midline. Mucosa normal. No   
drainage., No sinus tenderness  
Neck: Thyroid normal size and   
symmetric without palpable   
nodules, No adenopathy  
Lymph nodes: No cervical   
lymphadenopathy, No   
supraclavicular lymphadenopathy,   
and No inguinal lymphadenopathy.  
Lungs: Lungs clear to   
auscultation. No wheezing,   
rhonchi, rales.  
Heart: RRR without murmur, gallop,   
or rubs. No ectopy  
  
Health maintenance reviewed with   
patient:  
Pneumococcal Vaccine: 65+(1 of 2 -   
PCV) Never done  
Dilated Retinal Exam due on   
2022  
Covid-19 Vaccine(2023-   
season) Never done  
Advance Directive Discussion due   
on 2024  
Behavioral Health Screening Never   
done  
DTaP,Tdap,Td Vaccine(2 - Td or   
Tdap) due on 2024  
RSV Vaccine(1 - 1-dose 60+ series)   
due on 2024  
Shingrix Vaccine(1 of 2) due on   
2024  
Influenza Vaccine(Season Ended)   
due on 2024  
HbA1C due on 10/02/2024  
Urine Albumin:Creatinine Ratio due   
on 2024  
Mammogram Screening due on   
2025  
Annual PCP Team Chronic Disease   
Visit due on 2025  
LDL Cholesterol due on 2025  
BP Controlled (<130/80) due on   
2025  
Colorectal Cancer Screening due on   
2028  
Bone Density Screening Completed  
Hepatitis C Screening Completed  
HPV Vaccine Aged Out  
Diabetic Foot Exam Discontinued  
Cervical Cancer Screening   
Discontinued  
  
DATA REVIEWED: Most recent labs  
  
ASSESSMENT/PLAN:  
1. Type 2 diabetes mellitus   
without complication, without   
long-term current use of insulin   
(HCC) - ICD9: 250.00, ICD10: E11.9   
(primary diagnosis)  
- Controlled  
- Continue current medications  
- declines statin therapy at this   
time to lower risk of heart attack   
and stroke  
- METFORMIN  MG   
TABLET,EXTENDED RELEASE 24 HR  
- LIPID PANEL BASIC  
- HEMOGLOBIN A1C  
- COMPLETE BLOOD COUNT  
- COMPREHENSIVE METABOLIC PANEL  
  
2. Primary hypertension - ICD9:   
401.9, ICD10: I10  
- Controlled  
- Continue current medications  
- Recommend home blood pressure   
monitoring, to bring results to   
next visit  
- Encouraged sodium restriction,   
DASH or Mediterranean diet  
- Recommend regular aerobic   
exercise  
- COMPLETE BLOOD COUNT  
- COMPREHENSIVE METABOLIC PANEL  
  
3. Hypercholesterolemia - ICD9:   
272.0, ICD10: E78.00  
Controlled but declines statin   
therapy to decrease risk of heart   
attack and stroke  
- LIPID PANEL BASIC  
- COMPREHENSIVE METABOLIC PANEL  
The 10-year ASCVD risk score   
(Liliana STEWART, et al., 2019) is:   
13.7%  
Values used to calculate the   
score:  
Age: 64 years  
Sex: Female  
Is Non- :   
No  
Diabetic: Yes  
Tobacco smoker: No  
Systolic Blood Pressure: 116 mmHg  
Is BP treated: Yes  
HDL Cholesterol: 37 mg/dL  
Total Cholesterol: 170 mg/dL  
  
4. Malignant neoplasm of   
lower-outer quadrant of right   
breast of female, estrogen   
receptor positive (HCC) - ICD9:   
174.5, V86.0, ICD10: C50.511,   
Z17.0  
Stable  
Continue with recommendations by   
oncology  
  
5. Seasonal allergic rhinitis,   
unspecified trigger - ICD9: 477.9,   
ICD10: J30.2  
Claritin and flonase as discussed  
Follow up if no improvement or   
worsening of symptoms.  
  
Prescription instructions reviewed   
with patient as applicable.   
Potential red flag symptoms   
discussed with the patient.   
Reviewed appropriate action plan   
to take if red flag symptoms   
occur. Patient agreeable to   
treatment plan.  
Sherly An APRN.CNP  
Electronically signed by Sherly An APRN.CNP at 2024 10:12   
AM EDT  
documented in this encounter            [facility]  
   
                                                    2024 Telephone   
encounter Note                          Formatting of this note might be   
different from the original.  
Duplicate request.  
  
Samantha Hunter LPN  
  
Electronically signed by Samantha Hunter LPN at 2024 9:14 AM   
EDT  
                                        [facility]  
   
                                                    2024 Miscellaneous   
Notes                                   Formatting of this note might be   
different from the original.  
Duplicate request.  
  
Samantha Hunter LPN  
  
Electronically signed by Samantha Hunter LPN at 2024 9:14 AM   
EDT  
documented in this encounter            [facility]  
   
                                                    06- Telephone   
encounter Note                          Formatting of this note is   
different from the original.  
Prescription Refill Information  
  
The patient has been identified by   
name and date of birth: Yes  
  
Caregiver verified no other   
encounters exist for this   
prescription request: Yes  
  
Caregiver confirmed with   
patient/requestor that no other   
refills are due, in the near   
future, with this provider at this   
time: Yes  
  
The last office visit in the   
department: 2024  
  
Does the patient have a future   
office visit with this   
provider/department: Yes  
  
Requested Prescriptions  
  
Pending Prescriptions Disp Refills  
exemestane (AROMASIN) 25 mg tablet   
90 tablet 3  
Sig: Take 1 tablet by mouth once   
daily.  
  
Yoko Mills LPN  
Ila 10, 2024 4:38 PM  
  
  
Electronically signed by Yoko Mills LPN at 06/10/2024 4:39   
PM EDT  
                                        [facility]  
   
                                                    06- Miscellaneous   
Notes                                   Formatting of this note is   
different from the original.  
Prescription Refill Information  
  
The patient has been identified by   
name and date of birth: Yes  
  
Caregiver verified no other   
encounters exist for this   
prescription request: Yes  
  
Caregiver confirmed with   
patient/requestor that no other   
refills are due, in the near   
future, with this provider at this   
time: Yes  
  
The last office visit in the   
department: 2024  
  
Does the patient have a future   
office visit with this   
provider/department: Yes  
  
Requested Prescriptions  
  
Pending Prescriptions Disp Refills  
exemestane (AROMASIN) 25 mg tablet   
90 tablet 3  
Sig: Take 1 tablet by mouth once   
daily.  
  
Yoko Mills LPN  
Ila 10, 2024 4:38 PM  
  
  
Electronically signed by Yoko Mills LPN at 06/10/2024 4:39   
PM EDT  
documented in this encounter            [facility]  
   
                                        2024 Note     HNO ID: [id number]  
Author: VIKKI POSADA APRN.CNP  
Service: ?  
Author Type: Nurse Practitioner  
Type: Progress Notes  
Filed: 2024 10:34  
Note Text:  
Chief Complaint  
Patient presents with:  
Established Patient  
HPI:  
Navdeep Guillen is a 64 year old   
female who presents here today for   
follow up  
breast cancer.  
Per Dr. Milner/Sumaya's previous note:  
H/o hypertension and borderline   
diabetes/insulin resistant,   
obesity, who  
presented with an abnormal breast   
exam at her PCP office. Subsequent   
diagnostic  
mammogram revealing a lobulated   
lesion in the right breast at the   
lower outer  
quadrant highly suspicious of   
malignancy.  
Patient had a mammotome breast   
biopsy on 3/27/17 that showed   
invasive ductal  
carcinoma, positive for estrogen   
and progesterone receptors,   
equivocal for  
overexpression HER-2/andi.  
She had surgery by Dr. Dueñas, right   
breast lumpectomy and right   
axillary sentinel  
lymph node biopsy on 17 for   
right breast cancer.  
Stage IA- pT1c pN0 (3/3 sentinel   
lymph nodes negative for   
metastatic disease)  
Tumor size 1.5 cm x 1 x 0.9 cm  
Overall grade 2 out of 7  
Margins - 0.4 cm from posterior   
margin  
ER/CA >95%, [id number] not amplified   
by FISH  
Lymph/vasc invasion - none;   
derm/vasc/lymph invasion - none  
Menstrual history: Menarche at age   
11, first pregnancy at age 18, 8   
pregnancy;  
surgical menopause-total abdominal   
hysterectomy age 54. No estrogen   
replacement  
therapy. Family history: Mother   
 of ovarian cancer in her   
40's. No family  
history of breast cancer. She was   
initially started on anastrozole,   
then  
subsequent switch to Aromasin   
because of joint pain.  
RADIATION-17 to 17  
Right breast  
Current treatment:  
1) Aromasin 25 mg once daily  
No new concerns today.  
Appetite: Ok.  Wt. stable. Energy   
level: It's good.   
Denies fevers or recent illness.  
Resp:denies cough or sob,   
+seasonal allergies  
Cardiac:denies chest   
pain/palpitations  
GI:denies abd pain, n/v, moving   
bowels regularly h/o   
diverticulitis  
:denies dysuria/hematuria  
Extrem:denies new pain, L foot   
pain s/p surgery  
Endo:denies hot flash  
Neuro:denies symptoms of   
neuropathy  
Skin:denies rashes/lesions  
Heme:denies bleeding  
The ROS is otherwise negative.  
Past medical history,   
appointments, medications,   
allergies reviewed. No changes.  
EXAM:  
BP 99/69   Pulse 67   Temp 36.6 ?C   
(97.9 ?F) (Temporal)   Wt (!)   
136.8 kg  
(301 lb 9.6 oz)   SpO2 96%   BMI   
49.43 kg/m?  
APPEARANCE Well appearing, alert,   
in no acute distress,   
well-hydrated, well  
nourished.  
HEART RRR with normal S1 and S2,   
no murmurs  
LUNG clear to auscultation  
BREAST FEMALE no mass/nodule b/l,   
R radiation changes  
LYMPH NODES No cervical   
lymphadenopathy, No   
supraclavicular lymphadenopathy,   
and  
No axillary lymphadenopathy.  
ABDOMEN bowel sounds normoactive,   
soft, non-tender  
EXTREMITIES No edema  
NEURO Awake, alert and oriented x   
3, Normal gait, and No involuntary   
motions.  
SKIN Skin color, texture, turgor   
normal, no suspicious rashes or   
lesions  
RADIOLOGY:  
Mammogram 24:  
IMPRESSION: BENIGN FINDING  
There is no mammographic evidence   
of malignancy. A 1 year screening  
mammogram is recommended.  
ASSESSMENT/PLAN:  
1. Malignant neoplasm of   
lower-outer quadrant of right   
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  
estrogen receptor positive (HCC) -   
ICD9: 174.5, V86.0, ICD10:   
C50.511, Z17.0  
pT1c pN0 (3/3 sentinel lymph nodes   
negative for metastatic disease)   
stage IA  
infiltrating ductal carcinoma the   
right breast. ER/CA >95%, [id number]  
non-amplified by FISH.  
- No concerning findings on exam.  
- Overall tolerating aromasin   
well.  
- Reviewed mammogram with pt.  
- Continue aromasin.  
- Mammogram due mid 2025.  
- Follow up in 6 months.  
- Pt. aware to call office with   
any questions/concerns.  
The patient indicates   
understanding of these issues and   
agrees with the plan.  
All documentation from previous   
visit of 10/2/23-Dr. Shell/myself   
was copied and  
pasted, documentation has been   
reviewed and edited as necessary   
for today's  
visit.  
[facility] [place]  
   
                                                    2024 History of   
Present illness Narrative               Formatting of this note might be   
different from the original.  
Chief Complaint  
Patient presents with:  
Established Patient  
  
HPI:  
Navdeep Guillen is a 64 year old   
female who presents here today for   
follow up breast cancer.  
  
Per Dr. Milner/Sumaya's previous note:  
H/o hypertension and borderline   
diabetes/insulin resistant,   
obesity, who presented with an   
abnormal breast exam at her PCP   
office. Subsequent diagnostic   
mammogram revealing a lobulated   
lesion in the right breast at the   
lower outer quadrant highly   
suspicious of malignancy.  
  
Patient had a mammotome breast   
biopsy on 3/27/17 that showed   
invasive ductal carcinoma,   
positive for estrogen and   
progesterone receptors, equivocal   
for overexpression HER-2/andi.  
  
She had surgery by Dr. Dueñas, right   
breast lumpectomy and right   
axillary sentinel lymph node   
biopsy on 17 for right breast   
cancer.  
  
Stage IA- pT1c pN0 (3/3 sentinel   
lymph nodes negative for   
metastatic disease)  
Tumor size 1.5 cm x 1 x 0.9 cm  
Overall grade 2 out of 7  
Margins - 0.4 cm from posterior   
margin  
ER/CA >95%, [id number] not amplified   
by FISH  
Lymph/vasc invasion - none;   
derm/vasc/lymph invasion - none  
  
Menstrual history: Menarche at age   
11, first pregnancy at age 18, 8   
pregnancy; surgical   
menopause-total abdominal   
hysterectomy age 54. No estrogen   
replacement therapy. Family   
history: Mother  of ovarian   
cancer in her 40's. No family   
history of breast cancer. She was   
initially started on anastrozole,   
then subsequent switch to Aromasin   
because of joint pain.  
  
  
RADIATION-17 to 17  
Right breast  
  
  
Current treatment:  
1) Aromasin 25 mg once daily  
  
  
  
  
  
  
  
  
  
  
  
  
No new concerns today.  
  
  
Appetite: Ok.  Wt. stable. Energy   
level: It's good.   
Denies fevers or recent illness.  
Resp:denies cough or sob,   
+seasonal allergies  
Cardiac:denies chest   
pain/palpitations  
GI:denies abd pain, n/v, moving   
bowels regularly h/o   
diverticulitis  
:denies dysuria/hematuria  
Extrem:denies new pain, L foot   
pain s/p surgery  
Endo:denies hot flash  
Neuro:denies symptoms of   
neuropathy  
Skin:denies rashes/lesions  
Heme:denies bleeding  
  
The ROS is otherwise negative.  
  
Past medical history,   
appointments, medications,   
allergies reviewed. No changes.  
  
EXAM:  
BP 99/69   Pulse 67   Temp 36.6 C   
(97.9 F) (Temporal)   Wt (!) 136.8   
kg (301 lb 9.6 oz)   SpO2 96%     
BMI 49.43 kg/m  
  
APPEARANCE Well appearing, alert,   
in no acute distress,   
well-hydrated, well nourished.  
HEART RRR with normal S1 and S2,   
no murmurs  
LUNG clear to auscultation  
BREAST FEMALE no mass/nodule b/l,   
R radiation changes  
LYMPH NODES No cervical   
lymphadenopathy, No   
supraclavicular lymphadenopathy,   
and No axillary lymphadenopathy.  
ABDOMEN bowel sounds normoactive,   
soft, non-tender  
EXTREMITIES No edema  
NEURO Awake, alert and oriented x   
3, Normal gait, and No involuntary   
motions.  
SKIN Skin color, texture, turgor   
normal, no suspicious rashes or   
lesions  
  
RADIOLOGY:  
Mammogram 24:  
IMPRESSION: BENIGN FINDING  
There is no mammographic evidence   
of malignancy. A 1 year screening  
mammogram is recommended.  
  
ASSESSMENT/PLAN:  
1. Malignant neoplasm of   
lower-outer quadrant of right   
breast of female, estrogen   
receptor positive (HCC) - ICD9:   
174.5, V86.0, ICD10: C50.511,   
Z17.0  
pT1c pN0 (3/3 sentinel lymph nodes   
negative for metastatic disease)   
stage IA infiltrating ductal   
carcinoma the right breast. ER/CA   
>95%, Nbb4ile non-amplified by   
FISH.  
- No concerning findings on exam.  
- Overall tolerating aromasin   
well.  
- Reviewed mammogram with pt.  
- Continue aromasin.  
- Mammogram due mid 2025.  
- Follow up in 6 months.  
- Pt. aware to call office with   
any questions/concerns.  
  
  
The patient indicates   
understanding of these issues and   
agrees with the plan.  
  
  
All documentation from previous   
visit of 10/2/23-Dr. Shell/myself   
was copied and pasted,   
documentation has been reviewed   
and edited as necessary for   
today's visit.  
  
  
  
NIC Ugalde.LUIS ENRIQUE  
  
Electronically signed by   
Vikki Posada APRN.CNP at   
2024 10:34 AM EDT  
documented in this encounter            [facility]  
   
                                        2024 Note     HNO ID: [id number]  
Author: KINZA VALLECILLO MA  
Service: ?  
Author Type: Medical Assistant  
Type: Progress Notes  
Filed: 2024 09:40  
Note Text:  
POPULATION HEALTH NAVIGATION   
OUTREACH  
Action/  
Last Wellness exam 10/2021  
Reason for Outreach  
Care Gap/HCC or Scheduling   
Wellness Visits  
Care Gaps due:  
Medicare Annual Wellness Visit  
Diabetic Eye Exam  
Patient Contacted:  
Unable or unnecessary to reach   
patient:  
Left message  
Nexmo message sent  
Navigation Signature:  
Kinza Doshi MA  
2024 9:36 AM                   [facility]  
   
                                                    2024 History of   
Present illness Narrative               Formatting of this note is   
different from the original.  
POPULATION HEALTH NAVIGATION   
OUTREACH  
  
Action/  
Last Wellness exam 10/2021  
  
  
Reason for Outreach  
Care Gap/HCC or Scheduling   
Wellness Visits  
  
Care Gaps due:  
Medicare Annual Wellness Visit  
Diabetic Eye Exam  
  
Patient Contacted:  
Unable or unnecessary to reach   
patient:  
Left message  
Nexmo message sent  
  
Navigation Signature:  
  
Kinza Doshi MA  
2024 9:36 AM  
  
  
Electronically signed by Kinza Vallecillo MA at 2024   
9:40 AM EST  
documented in this encounter            [facility]  
   
                                        2024 Note     Patient Outreach (RHIANNA CANTU)  
----------------------------------  
----------------------------------  
------------  
NAVDEEP GUILLEN ([id number])   
1960 F  
Date Time Provider Department  
3/8/24 KINZA VALLECILLO   
NETNAV  
During your visit today, we   
recorded the following information   
about you:  
Kinza Vallecillo MA   
3/8/2024 9:40 AM Signed  
POPULATION HEALTH NAVIGATION   
OUTREACH  
Action/  
Last Wellness exam 10/2021  
Reason for Outreach  
Care Gap/HCC or Scheduling   
Wellness Visits  
Care Gaps due:  
Medicare Annual Wellness Visit  
Diabetic Eye Exam  
Patient Contacted:  
Unable or unnecessary to reach   
patient:  
Left message  
Nexmo message sent  
Navigation Signature:  
Kinza Doshi MA  
2024 9:36 AM  
Allergies As of Date: 2024   
Noted Allergy Reaction  
SILVADENE (SILVER SULFADIAZINE)   
2017 4 - Hives  
SULFA (SULFONAMIDE ANTIBIOTICS)   
2021 4 - Hives  
Date Reviewed: 2024  
Reviewed by: Kellie Ahmadi LPN -   
Fully Assessed  
Reason for Visit:  
Population Health Navigation   
Outreach [3910] Cmt: [facility] Annual   
Wellness Visit  
Prescriptions as of 2024  
- ciprofloxacin HCl (CIPRO) 500 mg   
tablet  
Take 1 tablet by mouth two times a   
day for 7 days.  
- ibuprofen (MOTRIN) 800 mg tablet  
Take 1 tablet by mouth every 8   
hours as needed for pain. Take   
with food.  
- metFORMIN ER (GLUCOPHAGE XR) 500   
mg 24 hr tablet  
Take 1 tablet by mouth daily with   
breakfast.  
- blood sugar diagnostic (BLOOD   
GLUCOSE TEST) test strip  
Test blood sugars 2daily.   
Dx:ucnontrolled diabetes .   
Insulin: Yes  
- chlorthalidone (HYGROTON) 25 mg   
tablet  
Take 0.5 tablets by mouth once   
daily.  
- atenolol (TENORMIN) 50 mg tablet  
Take 1 tablet by mouth once daily.  
- potassium chloride 20 mEq TbER  
Take 1 tablet by mouth twice   
daily.  
- glipiZIDE (GLUCOTROL XL) 2.5 mg   
24 hr tablet  
Take 1 tablet by mouth once daily.  
- exemestane (AROMASIN) 25 mg   
tablet  
Take 1 tablet by mouth once daily.  
- lancets (ONE TOUCH DELICA) 33   
gauge misc  
Test blood sugar(s) 2 daily. Dx:   
Type 2 DM - Controlled E11.9   
Insulin: Yes  
- Blood-Glucose Meter monitoring   
kit  
Glucose Meter of Choice - Kit -   
Dx: Type 2 DM - Uncontrolled   
E11.65  
- MV-MN/FOLIC ACID/CALCIUM/VIT K   
(ONE-A-DAY WOMEN'S 50 PLUS ORAL)  
Take 1 tablet by mouth once daily.  
Meds Comments as of 2015:  
Patient only took the Etodolac for   
a few days and noticed no   
difference so she  
quit taking this medication.  
Problem List As Of Date 2024   
Noted Resolved  
Morbid obesity with BMI of   
40.0-44.9, adult (HC*2014  
Uterus disorder [N85.9] 2014  
Hypertension [I10] 2014  
Hypercholesterolemia [E78.00]   
2014  
Endometrial hyperplasia without   
atypia, complex*2014  
Uterine prolapse without mention   
of vaginal wal*2014  
Rectocele [N81.6] 2014  
Complex endometrial hyperplasia   
with atypia [N8*2014  
Follow-up examination, following   
other surgery *2015  
Bilateral low back pain with   
left-sided sciatic*2016  
Peroneal tendonitis [M76.70]   
2016  
Controlled diabetes mellitus type   
II without co*2016  
Hyperopia [H52.00] 2016  
Presbyopia [H52.4] 2016  
Benign neoplasm of skin of eyelid   
including can*2016  
Chronic bilateral low back pain   
with left-sided*2016  
Malignant neoplasm of lower-outer   
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  
ER+ CA+ carcinoma of breast (HCC)   
[C50.919, Z17*05/10/2017  
Family history of ovarian cancer   
[Z80.41] 05/10/2017  
GERD (gastroesophageal reflux   
disease) [K21.9]  
Encounter Number: [id number]  
Encounter Status:Closed by KINZA VALLECILLO on 3/8/24               [facility]  
   
                                        2024 Note     HNO ID: [id number]  
Author: MEERA MENCHACA PA-C  
Service: ?  
Author Type: Physician Assistant  
Type: Progress Notes  
Filed: 2024 18:16  
Note Text:  
CC: Patient presents with:  
Same Day Appointment: abdomen   
cramping, left flank pain, pinkish   
when wipes,  
burning at times  
HPI  
Navdeep Guillen is a 63 year old   
female who presents with complaint   
of  
increased frequency, blood in   
urine, and L flank discomfort and   
left abdominal  
discomfort for 1 days. Other   
symptoms include lower back pain   
(right sided) and  
nausea. The patient denies fever,   
vomiting, and vaginal discharge.   
She has  
tried nothing to help to alleviate   
her symptoms. Ms. Guillen has   
history of  
previous UTIs and kidney stones.  
She called her surgeon, Dr. Patricio (gen surg at [facility]) because   
of her hx of  
diverticulitis, for which she just   
had a bout and was txed end of   
January.  
REVIEW OF SYSTEMS  
GI: Positive for abdominal   
discomfort looser stools, Denies   
any melena or  
hematochezia  
All other systems negative.  
PAST MEDICAL HISTORY  
Diagnosis Date  
Abnormal mammogram 2021  
Achilles tendon pain 2015  
Ankle weakness 2015  
Chronic pain of left ankle   
12/15/2016  
Colon abnormality 2014  
Thickening of the ascending colon   
seen on the recent CT scan.   
Patient has had a  
colonoscopy around 4 years ago.   
Records were obtained for when   
they were done, 4  
years ago , in [facility].   
her colonoscopy was completely   
normal, no  
thickening, and the biopsy too was   
normal except for lymphoid   
aggregates.  
Diverticulitis   
Treated by Dr. Patricio at [facility]  
DJD (degenerative joint disease),   
ankle and foot 2016  
DM (diabetes mellitus) (HCC)  
GERD (gastroesophageal reflux   
disease)  
Gross hematuria 05/15/2014  
2014, had hematuria, investigated   
extensively along with cytoscopy,   
a stone  
was found but no signs of any   
malignancy.  
Heel pain, chronic 12/15/2016  
Hepatic steatosis 10/03/2017  
Hiatal hernia 10/03/2017  
Hypertension  
Insomnia  
Kidney stone 05/15/2014  
Morbid obesity (HCC)  
Nephrolithiasis  
Neuritis 2016  
Renal lesion 05/15/2014  
S/P Achilles tendon repair   
2015 sx done  
PAST SURGICAL HISTORY  
Procedure Laterality Date  
BREAST LUMPECTOMY HX Right 2017  
BX BREAST W/DEVICE 1ST LESION   
STEREOTACTIC GUID Right 2021  
CHOLECYSTECTOMY   
gallbladder removal  
COLONOSCOPY   
CT ABDOMEN 2014  
PAST SURGICAL HISTORY OF   
2014  
[facility] hysteroscopy  
PAST SURGICAL HISTORY OF Left   
2015  
Left foot surgery  
S PK LAVH [id number] 10/16/2014  
ALLERGIES Silvadene [Silver   
Sulfadiazine] and Sulfa   
(Sulfonamide Antibiotics)  
MEDICATIONS  
ibuprofen (MOTRIN) 800 mg tablet   
Take 1 tablet by mouth every 8   
hours as needed  
for pain. Take with food.  
metFORMIN ER (GLUCOPHAGE XR) 500   
mg 24 hr tablet Take 1 tablet by   
mouth daily  
with breakfast.  
blood sugar diagnostic (BLOOD   
GLUCOSE TEST) test strip Test   
blood sugars  
2daily. Dx:ucnontrolled diabetes .   
Insulin: Yes  
chlorthalidone (HYGROTON) 25 mg   
tablet Take 0.5 tablets by mouth   
once daily.  
atenolol (TENORMIN) 50 mg tablet   
Take 1 tablet by mouth once daily.  
potassium chloride 20 mEq TbER   
Take 1 tablet by mouth twice   
daily.  
glipiZIDE (GLUCOTROL XL) 2.5 mg 24   
hr tablet Take 1 tablet by mouth   
once daily.  
exemestane (AROMASIN) 25 mg tablet   
Take 1 tablet by mouth once daily.  
azithromycin (ZITHROMAX Z-ALLAN) 250   
mg tablet TAKE 2 TABS ON THE FIRST   
DAY, THEN  
ONE TAB DAILY FOR 4 DAYS.  
lancets (ONE TOUCH DELICA) 33   
gauge misc Test blood sugar(s) 2   
daily. Dx: Type  
2 DM - Controlled E11.9 Insulin:   
Yes  
Blood-Glucose Meter monitoring kit   
Glucose Meter of Choice - Kit -   
Dx: Type 2  
DM - Uncontrolled E11.65  
MV-MN/FOLIC ACID/CALCIUM/VIT K   
(ONE-A-DAY WOMEN'S 50 PLUS ORAL)   
Take 1 tablet  
by mouth once daily.  
FAMILY HISTORY  
Problem Relation Age of Onset  
Ovarian cancer Mother 39  
Heart Father  
Hypertension Father  
Prostate Cancer Father 78  
other (kidney stones) Brother  
Hypertension Brother  
Diabetes Brother  
Seizures Son  
Breast Cancer Other 55  
Double first cousin (daughter of   
mother's sister and father's   
brother)  
Social History  
Tobacco Use  
Smoking status: Former  
Packs/day: 1.00  
Years: 10.00  
Additional pack years: 0.00  
Total pack years: 10.00  
Types: Cigarettes  
Quit date: 5/15/2007  
Years since quittin.8  
Smokeless tobacco: Never  
Vaping Use  
Vaping Use: Never used  
Substance Use Topics  
Alcohol use: No  
Drug use: Not Currently  
Comment: marijuana for insomnia -   
currently not using  
PHYSICAL EXAM  
Pulse 73   Temp 37.1 ?C (98.7 ?F)   
  Resp 16   Ht 166.4 cm (5' 5.5 )   
  Wt  
133.4 kg (294 lb)   SpO2 97%   BMI   
48.18 kg/m?  
General Appearance: well   
appearing, in no acute distress,   
alert, morbidly obese  
Pysch: mood and affect broad and   
appropriate  
Skin: Skin color, texture, turgor   
normal for age; no rashes noted  
Eyes: conjunctiva pink and moist,   
no icterus, sclera white,   
non-injected  
Oropharynx: Moist mucous membranes  
Lymph nodes (more content not   
included)...                            [facility]  
   
                                                    2024 History of   
Present illness Narrative               Formatting of this note is   
different from the original.  
CC: Patient presents with:  
Same Day Appointment: abdomen   
cramping, left flank pain, pinkish   
when wipes, burning at times  
  
HPI  
Navdeep Guillen is a 63 year old   
female who presents with complaint   
of increased frequency, blood in   
urine, and L flank discomfort and   
left abdominal discomfort for 1   
days. Other symptoms include lower   
back pain (right sided) and   
nausea. The patient denies fever,   
vomiting, and vaginal discharge.   
She has tried nothing to help to   
alleviate her symptoms. Ms. Guillen   
has history of previous UTIs and   
kidney stones.  
  
She called her surgeon, Dr. Patricio (gen surg at [facility]) because   
of her hx of diverticulitis, for   
which she just had a bout and was   
txed end of January.  
  
REVIEW OF SYSTEMS  
GI: Positive for abdominal   
discomfort looser stools, Denies   
any melena or hematochezia  
All other systems negative.  
  
PAST MEDICAL HISTORY  
Diagnosis Date  
Abnormal mammogram 2021  
Achilles tendon pain 2015  
Ankle weakness 2015  
Chronic pain of left ankle   
12/15/2016  
Colon abnormality 2014  
Thickening of the ascending colon   
seen on the recent CT scan.   
Patient has had a colonoscopy   
around 4 years ago. Records were   
obtained for when they were done,   
4 years ago , in [facility]. her colonoscopy was   
completely normal, no thickening,   
and the biopsy too was normal   
except for lymphoid aggregates.  
Diverticulitis   
Treated by Dr. Patricio at [facility]  
DJD (degenerative joint disease),   
ankle and foot 2016  
DM (diabetes mellitus) (HCC)  
GERD (gastroesophageal reflux   
disease)  
Gross hematuria 05/15/2014  
2014, had hematuria, investigated   
extensively along with cytoscopy,   
a stone was found but no signs of   
any malignancy.  
Heel pain, chronic 12/15/2016  
Hepatic steatosis 10/03/2017  
Hiatal hernia 10/03/2017  
Hypertension  
Insomnia  
Kidney stone 05/15/2014  
Morbid obesity (HCC)  
Nephrolithiasis  
Neuritis 2016  
Renal lesion 05/15/2014  
S/P Achilles tendon repair   
2015 sx done  
  
  
PAST SURGICAL HISTORY  
Procedure Laterality Date  
BREAST LUMPECTOMY HX Right   
BX BREAST W/DEVICE 1ST LESION   
STEREOTACTIC GUID Right 2021  
CHOLECYSTECTOMY   
gallbladder removal  
COLONOSCOPY   
CT ABDOMEN 2014  
PAST SURGICAL HISTORY OF   
2014  
[facility] hysteroscopy  
PAST SURGICAL HISTORY OF Left   
2015  
Left foot surgery  
S PK LAVH [id number] 10/16/2014  
  
ALLERGIES Silvadene [Silver   
Sulfadiazine] and Sulfa   
(Sulfonamide Antibiotics)  
  
MEDICATIONS  
ibuprofen (MOTRIN) 800 mg tablet   
Take 1 tablet by mouth every 8   
hours as needed for pain. Take   
with food.  
metFORMIN ER (GLUCOPHAGE XR) 500   
mg 24 hr tablet Take 1 tablet by   
mouth daily with breakfast.  
blood sugar diagnostic (BLOOD   
GLUCOSE TEST) test strip Test   
blood sugars 2daily.   
Dx:ucnontrolled diabetes .   
Insulin: Yes  
chlorthalidone (HYGROTON) 25 mg   
tablet Take 0.5 tablets by mouth   
once daily.  
atenolol (TENORMIN) 50 mg tablet   
Take 1 tablet by mouth once daily.  
potassium chloride 20 mEq TbER   
Take 1 tablet by mouth twice   
daily.  
glipiZIDE (GLUCOTROL XL) 2.5 mg 24   
hr tablet Take 1 tablet by mouth   
once daily.  
exemestane (AROMASIN) 25 mg tablet   
Take 1 tablet by mouth once daily.  
azithromycin (ZITHROMAX Z-ALLAN) 250   
mg tablet TAKE 2 TABS ON THE FIRST   
DAY, THEN ONE TAB DAILY FOR 4   
DAYS.  
lancets (ONE TOUCH DELICA) 33   
gauge misc Test blood sugar(s) 2   
daily. Dx: Type 2 DM - Controlled   
E11.9 Insulin: Yes  
Blood-Glucose Meter monitoring kit   
Glucose Meter of Choice - Kit -   
Dx: Type 2 DM - Uncontrolled   
E11.65  
MV-MN/FOLIC ACID/CALCIUM/VIT K   
(ONE-A-DAY WOMEN'S 50 PLUS ORAL)   
Take 1 tablet by mouth once daily.  
  
  
FAMILY HISTORY  
Problem Relation Age of Onset  
Ovarian cancer Mother 39  
Heart Father  
Hypertension Father  
Prostate Cancer Father 78  
other (kidney stones) Brother  
Hypertension Brother  
Diabetes Brother  
Seizures Son  
Breast Cancer Other 55  
Double first cousin (daughter of   
mother's sister and father's   
brother)  
  
Social History  
  
Tobacco Use  
Smoking status: Former  
Packs/day: 1.00  
Years: 10.00  
Additional pack years: 0.00  
Total pack years: 10.00  
Types: Cigarettes  
Quit date: 5/15/2007  
Years since quittin.8  
Smokeless tobacco: Never  
Vaping Use  
Vaping Use: Never used  
Substance Use Topics  
Alcohol use: No  
Drug use: Not Currently  
Comment: marijuana for insomnia -   
currently not using  
  
PHYSICAL EXAM  
Pulse 73   Temp 37.1 C (98.7 F)     
Resp 16   Ht 166.4 cm (5' 5.5 )     
Wt 133.4 kg (294 lb)   SpO2 97%     
BMI 48.18 kg/m  
  
General Appearance: well   
appearing, in no acute distress,   
alert, morbidly obese  
Pysch: mood and affect broad and   
appropriate  
Skin: Skin color, texture, turgor   
normal for age; no rashes noted  
Eyes: conjunctiva pink and moist,   
no icterus, sclera white,   
non-injected  
Oropharynx: Moist mucous membranes  
Lymph nodes: No cervical   
lymphadenopathy  
Lungs: Lungs clear to   
auscultation. No wheezing,   
rhonchi, rales.  
Heart: RRR without murmur, gallop,   
or rubs.  
Abdomen: Protuberant abdomen. Mild   
TTP L periumbilical region,   
extending down into LLQ. Bowel   
sounds normal. No masses,   
organomegaly. No rigidity,   
guarding, or other evidence of   
acute abdomen. No CVA tenderness   
bilaterally. Extremities: No   
deformities, edema, skin   
discoloration, clubbing or   
cyanosis.  
Neurological: Gait normal. No   
focal neurological deficits.   
Sensation grossly intact.  
  
ASSESSMENT/PLAN:  
1. Left flank pain - ICD9: 789.09,   
ICD10: R10.9  
UA findings suggestive of UTI, so   
will go ahead and empirically   
treat w/ anitibiotics (cipro in   
the case UTI is negative, and   
potentially a diverticulitis   
component). Recommend plenty of   
fluids and rest. Will send out for   
culture and adjust treatment if   
necessary pending results  
  
- URINE CULTURE  
  
Prescription instructions reviewed   
with patient as applicable.   
Potential red flag symptoms   
discussed with the patient.   
Reviewed appropriate action plan   
to take if red flag symptoms   
occur. Patient agreeable to   
treatment plan.  
  
Meera Menchaca PA-C  
Electronically signed by Meera Menchaca PA-C at 2024 6:16 PM   
EST  
documented in this encounter            [facility]  
   
                                                    2024 Miscellaneous   
Notes                                   Formatting of this note might be   
different from the original.  
2024  
  
PID: [id number] Navdeep Guillen  
[address] [place] [address]  
  
Dear Ms. Wilmer,  
  
We are pleased to inform you that   
the results of your recent breast   
imaging exam on 2024 are   
normal.  
  
Early detection of cancer is very   
important. We also understand   
recommendations regarding breast   
cancer screening are   
controversial. Please discuss with   
your primary care provider which   
strategy is best for you and   
whether a mammogram is right for   
you.  
  
Your imaging studies and report   
will be kept on file at [facility] as part of your permanent   
medical record and are available   
for your continuing care.  
  
Thank you for allowing us to help   
in meeting your health care needs.  
  
Sincerely,  
  
Dr. Mcdermott  
Interpreting Radiologist  
[facility]  
  
(Normal over 40)  
Electronically signed by   
Coordinator, Mammography at   
2024 10:52 AM EST  
documented in this encounter            [facility]  
   
                                                    2024 History of   
Present illness Narrative               Formatting of this note might be   
different from the original.  
Radiology Service Progress Note  
  
PATIENT NAME: Navdeep Guillen  
MRN: [id number]  
  
DATE OF SERVICE: 2024  
TIME: 11:01 AM  
PATIENT IDENTITY VERIFICATION   
COMPLETED USING TWO (2)   
IDENTIFIERS: Name and Date of   
Birth confirmed by patient   
verbally.  
FALL SCREENING: Has the patient   
had 2 falls in the last year or 1   
fall with injury or currently   
using an Ambulatory Assistive   
Device (Walker, Cane, Wheelchair,   
Crutches, etc.)? No  
PATIENT GENDER DATA: Female.   
Pregnancy status: Pregnant: No   
Breastfeeding status: NO.  
PATIENT RELEVANT IMPLANT DATA   
REVIEWED: Not Applicable  
PATIENT PRESENTS WITH AN   
IMPLANTABLE OR ATTACHED MEDICAL   
DEVICE: No  
  
RADIOLOGY DEPARTMENT: Mammography  
  
PERIPHERAL IV DATA: Not applicable  
  
SIGNED BY: RT Que(ADAIR)  
2024 11:01 AM  
  
  
Electronically signed by Italia Barfield RT(R) at 2024 11:01   
AM EST  
documented in this encounter            [facility]  
   
                                        2024 Note     HNO ID: [id number]  
Author: ITALIA BARFIELD RT(R)  
Service: ?  
Author Type: Technologist  
Type: Progress Notes  
Filed: 2024 11:01  
Note Text:  
Radiology Service Progress Note  
PATIENT NAME: Navdeep Guillen  
MRN: [id number]  
DATE OF SERVICE: 2024  
TIME: 11:01 AM  
PATIENT IDENTITY VERIFICATION   
COMPLETED USING TWO (2)   
IDENTIFIERS: Name and  
Date of Birth confirmed by patient   
verbally.  
FALL SCREENING: Has the patient   
had 2 falls in the last year or 1   
fall with  
injury or currently using an   
Ambulatory Assistive Device   
(Walker, Cane,  
Wheelchair, Crutches, etc.)? No  
PATIENT GENDER DATA: Female.   
Pregnancy status: Pregnant: No   
Breastfeeding  
status: NO.  
PATIENT RELEVANT IMPLANT DATA   
REVIEWED: Not Applicable  
PATIENT PRESENTS WITH AN   
IMPLANTABLE OR ATTACHED MEDICAL   
DEVICE: No  
RADIOLOGY DEPARTMENT: Mammography  
PERIPHERAL IV DATA: Not applicable  
SIGNED BY: RT Que(ADAIR)  
2024 11:01 AM              [facility]  
   
                                                    2024 History of   
Present illness Narrative               Formatting of this note might be   
different from the original.  
Radiology Service Progress Note  
  
PATIENT NAME: Navdeep Guillen  
MRN: [id number]  
  
DATE OF SERVICE: 2024  
TIME: 10:23 AM  
PATIENT IDENTITY VERIFICATION   
COMPLETED USING TWO (2)   
IDENTIFIERS: Name and Date of   
Birth confirmed by patient   
verbally.  
FALL SCREENING: Has the patient   
had 2 falls in the last year or 1   
fall with injury or currently   
using an Ambulatory Assistive   
Device (Walker, Cane, Wheelchair,   
Crutches, etc.)? Yes, Patient High   
Risk for Falls  
What interventions were put in   
place to prevent falls during this   
visit? Increased Observations by   
Caregivers  
PATIENT GENDER DATA: Female.   
Pregnancy status: Pregnant: No   
Breastfeeding status: NO.  
PATIENT RELEVANT IMPLANT DATA   
REVIEWED: Not Applicable  
PATIENT PRESENTS WITH AN   
IMPLANTABLE OR ATTACHED MEDICAL   
DEVICE: No  
  
RADIOLOGY DEPARTMENT: Bone Density  
  
PERIPHERAL IV DATA: Not applicable  
  
SIGNED BY: KARLA SeguraR)  
2024 10:23 AM  
  
  
Electronically signed by Billy Knox RT(R) at 2024   
10:34 AM EST  
documented in this encounter            [facility]  
   
                                        2024 Note     HNO ID: [id number]  
Author: BILLY KNOX RT(R)  
Service: ?  
Author Type: Technologist  
Type: Progress Notes  
Filed: 2024 10:34  
Note Text:  
Radiology Service Progress Note  
PATIENT NAME: Navdeep Guillen  
MRN: [id number]  
DATE OF SERVICE: 2024  
TIME: 10:23 AM  
PATIENT IDENTITY VERIFICATION   
COMPLETED USING TWO (2)   
IDENTIFIERS: Name and  
Date of Birth confirmed by patient   
verbally.  
FALL SCREENING: Has the patient   
had 2 falls in the last year or 1   
fall with  
injury or currently using an   
Ambulatory Assistive Device   
(Walker, Cane,  
Wheelchair, Crutches, etc.)? Yes,   
Patient High Risk for Falls  
What interventions were put in   
place to prevent falls during this   
visit?  
Increased Observations by   
Caregivers  
PATIENT GENDER DATA: Female.   
Pregnancy status: Pregnant: No   
Breastfeeding  
status: NO.  
PATIENT RELEVANT IMPLANT DATA   
REVIEWED: Not Applicable  
PATIENT PRESENTS WITH AN   
IMPLANTABLE OR ATTACHED MEDICAL   
DEVICE: No  
RADIOLOGY DEPARTMENT: Bone Density  
PERIPHERAL IV DATA: Not applicable  
SIGNED BY: RT Imelda(R)  
2024 10:23 AM              [facility]  
   
                                                    2024 Miscellaneous   
Notes                                   Formatting of this note might be   
different from the original.  
Pt returned call and given Sherly An's instructions and   
recommendations. Pt states she   
will call Dr. Patricio's office to   
set up her follow up. She was   
waiting to hear back from Sherly   
about who she should f/u with.   
Notified to call us back to repeat   
labs when feeling better  
Electronically signed by Amanuel Abreu RN at 2024 10:50 AM   
EST  
Formatting of this note might be   
different from the original.  
Called and left a voicemail for   
the Patient to call back and ask   
for a nurse to receive the   
providers message.  
  
Olimpia Jean RN  
  
Electronically signed by Olimpia Jean RN at 2024 12:48 PM   
EST  
Formatting of this note might be   
different from the original.  
If patient is following closely   
with general surgery then no need   
to follow up with me. Her kidney   
function was slightly decreased in   
ER so needs rechecked after after   
she is feeling better and   
diverticulitis has resolved.  
Thank you  
Sherly An APRN.CNP  
  
Electronically signed by Sherly An APRN.CNP at 2024 12:29   
PM EST  
Formatting of this note might be   
different from the original.  
Records printed and placed on   
provider desk for review.  
Electronically signed by Manisha Jesus Ma at 2024 10:44 AM EST  
Formatting of this note might be   
different from the original.  
Please get [facility] ER records so I   
can review them  
Thank you  
Sherly An APRN.CNP  
  
Electronically signed by Sherly An APRN.CNP at 2024 8:40   
AM EST  
Formatting of this note is   
different from the original.  
Patient reports she went to [facility] ER   
on  and was seen for   
diverticulitis.  
  
She was prescribed:  
Amoxicillin-Pot Clavulanate Active   
1 TABLET PO TWICE A DAY  12:00am  
  
Pt states she has not improved   
much because she has only taken   
the antibiotics for 2 days so far.  
  
Patient was recommended to follow   
up with PCP Office and Dr. Patricio. Pt states she sees Dr. Patricio for her diverticulitis.  
  
Pt asking if she should follow-up   
with PCP or not? She does not wish   
to follow up with both PCP and Dr. Patricio, if not necessary.  
  
Please advise patient, if able.   
[phone]  
  
Thank you.  
Electronically signed by Joie Hardwick RN at 2024 2:33 PM   
EST  
documented in this encounter            [facility]  
   
                                        2023 Note     HNO ID: [id number]  
Author: Sherly An APRN.CNP  
Service: ?  
Author Type: Nurse Practitioner  
Type: Progress Notes  
Filed: 2023 4:01 PM  
Note Text:  
CC: Patient presents with:  
F/U 6 months  
HPI  
Navdeep Guillen is a 63 year old   
female who presents today for   
routine  
follow up.  
DIABETES MELLITUS: Ms. Guillen   
denies excessive thirst or   
increased  
frequency of urination, chest pain   
or dyspnea , numbness, tingling or   
pain  
in extremities, new or unusual   
visual symptoms, low   
sugar/hypoglycemic  
reactions, weight loss/gain,   
lightheadedness/dizziness. Follows   
a diabetic  
diet most of the time. She is   
compliant with medication(s) and   
is  
tolerating med(s) without any side   
effects. She reports checking her  
glucose on a twice a day schedule   
with sugars in the fasting   
120s-140s and  
postprandial 70s-90s range.   
Patient's last HgA1C was  
Hemoglobin A1C (%)  
Date Value  
2023 6.1  
2023 6.8  
07/15/2020 7.7  
2020 7.6  
Hemoglobin A1C (POCT) (%)  
Date Value  
10/26/2021 7.1  
)  
Last Ophthalmology exam was over a   
year ago.  
HTN: Ms. Guillen indicates that she   
is feeling well and denies any  
symptoms referable to elevated   
blood pressure. Specifically   
denies  
headache, chest pain,   
palpitations, dyspnea, and   
peripheral edema.  
Patient denies any side effects of   
her medication(s) and is compliant   
with  
their regimen. She does check BP's   
away from this office with average  
BP's in the 110s-120s/70s-80   
range. Navdeep gets sporadic   
irregular  
exercise with walking. She watches   
her diet for sodium, low fat and   
low  
cholesterol some of the time.  
Last 3 Encounter BP Readings:  
Date: BP:  
2023 122/76  
10/02/2023 103/67  
2023 118/72  
Obesity: Has difficulty with   
exercise because of arthritic   
pain. Will  
every now and then take an   
ibuprofen to help when she is   
unable to sleep.  
Does not use more than once a week   
or less.  
REVIEW OF SYSTEMS  
General: no fevers, no chills, no   
night sweats, no recurrent   
infections,  
no change in appetite, no change   
in energy, and no significant   
changes in  
weight  
Respiratory: no cough, no   
wheezing, no shortness of breath,   
no hemoptysis  
Cardiovascular: no chest pain, no   
chest pressure, no palpitations,   
and no  
swelling  
Endocrine: no fatigue, no   
polyuria, no polyphagia, and no   
polydipsia  
Neurologic: No headache, weakness,   
numbness, dizziness, memory loss,  
syncope.  
PAST MEDICAL HISTORY  
Diagnosis Date  
Abnormal mammogram 2021  
Achilles tendon pain 2015  
Ankle weakness 2015  
Chronic pain of left ankle   
12/15/2016  
Colon abnormality 2014  
Thickening of the ascending colon   
seen on the recent CT scan.   
Patient has  
had a colonoscopy around 4 years   
ago. Records were obtained for   
when they  
were done, 4 years ago , in   
[facility]. her   
colonoscopy was  
completely normal, no thickening,   
and the biopsy too was normal   
except for  
lymphoid aggregates.  
Diverticulitis   
Treated by Dr. Patricio at [facility]  
DJD (degenerative joint disease),   
ankle and foot 2016  
DM (diabetes mellitus) (HCC)  
GERD (gastroesophageal reflux   
disease)  
Gross hematuria 05/15/2014  
2014, had hematuria, investigated   
extensively along with cytoscopy,   
a  
stone was found but no signs of   
any malignancy.  
Heel pain, chronic 12/15/2016  
Hepatic steatosis 10/03/2017  
Hiatal hernia 10/03/2017  
Hypertension  
Insomnia  
Kidney stone 05/15/2014  
Morbid obesity (HCC)  
Nephrolithiasis  
Neuritis 2016  
Renal lesion 05/15/2014  
S/P Achilles tendon repair   
2015 sx done  
PAST SURGICAL HISTORY  
Procedure Laterality Date  
BREAST LUMPECTOMY HX Right   
BX BREAST W/DEVICE 1ST LESION   
STEREOTACTIC GUID Right 2021  
CHOLECYSTECTOMY   
gallbladder removal  
COLONOSCOPY   
CT ABDOMEN 2014  
PAST SURGICAL HISTORY OF   
2014  
DANMN hysteroscopy  
PAST SURGICAL HISTORY OF Left   
2015  
Left foot surgery  
S PK LAVH [id number] 10/16/2014  
ALLERGIES Silvadene [Silver   
Sulfadiazine] and Sulfa   
(Sulfonamide  
Antibiotics)  
MEDICATIONS  
metFORMIN ER (GLUCOPHAGE XR) 500   
mg 24 hr tablet Take 1 tablet by   
mouth  
daily with breakfast.  
blood sugar diagnostic (BLOOD   
GLUCOSE TEST) test strip Test   
blood sugars  
2daily. Dx:ucnontrolled diabetes .   
Insulin: Yes  
chlorthalidone (HYGROTON) 25 mg   
tablet Take 0.5 tablets by mouth   
once  
daily.  
atenolol (TENORMIN) 50 mg tablet   
Take 1 tablet by mouth once daily.  
potassium chloride 20 mEq TbER   
Take 1 tablet by mouth twice   
daily.  
glipiZIDE (GLUCOTROL XL) 2.5 mg 24   
hr tablet Take 1 tablet by mouth   
once  
daily.  
exemestane (AROMASIN) 25 mg tablet   
Take 1 tablet by mouth once daily.  
ibuprofen (MOTRIN) 800 mg tablet   
Take 1 tablet by mouth every 8   
hours as  
needed for pain. Take with food.  
lancets (ONE TOUCH DELICA) 33   
gauge misc Test blood sugar(s) 2   
daily.  
Dx: Type 2 DM - Controlled E11.9   
Insulin: Yes  
Blood-Glucose Meter monitoring kit   
Glucose Meter of Choice - Kit -   
Dx:  
Type 2 DM - Uncontrolled E11.65  
MV-MN/FOLIC A (more content not   
included)...                            [facility]  
   
                                                    2023 History of   
Present illness Narrative               Formatting of this note is   
different from the original.  
CC: Patient presents with:  
F/U 6 months  
  
HPI  
Navdeep Guillen is a 63 year old   
female who presents today for   
routine follow up.  
  
DIABETES MELLITUS: Ms. Guillen   
denies excessive thirst or   
increased frequency of urination,   
chest pain or dyspnea , numbness,   
tingling or pain in extremities,   
new or unusual visual symptoms,   
low sugar/hypoglycemic reactions,   
weight loss/gain,   
lightheadedness/dizziness. Follows   
a diabetic diet most of the time.   
She is compliant with   
medication(s) and is tolerating   
med(s) without any side effects.   
She reports checking her glucose   
on a twice a day schedule with   
sugars in the fasting 120s-140s   
and postprandial 70s-90s range.   
Patient's last HgA1C was  
Hemoglobin A1C (%)  
Date Value  
2023 6.1  
2023 6.8  
07/15/2020 7.7  
2020 7.6  
  
Hemoglobin A1C (POCT) (%)  
Date Value  
10/26/2021 7.1  
)  
Last Ophthalmology exam was over a   
year ago.  
  
HTN: Ms. Guillen indicates that she   
is feeling well and denies any   
symptoms referable to elevated   
blood pressure. Specifically   
denies headache, chest pain,   
palpitations, dyspnea, and   
peripheral edema. Patient denies   
any side effects of her   
medication(s) and is compliant   
with their regimen. She does check   
BP's away from this office with   
average BP's in the   
110s-120s/70s-80 range. Navdeep   
gets sporadic irregular exercise   
with walking. She watches her diet   
for sodium, low fat and low   
cholesterol some of the time.  
Last 3 Encounter BP Readings:  
Date: BP:  
2023 122/76  
10/02/2023 103/67  
2023 118/72  
  
Obesity: Has difficulty with   
exercise because of arthritic   
pain. Will every now and then take   
an ibuprofen to help when she is   
unable to sleep. Does not use more   
than once a week or less.  
  
REVIEW OF SYSTEMS  
General: no fevers, no chills, no   
night sweats, no recurrent   
infections, no change in appetite,   
no change in energy, and no   
significant changes in weight  
Respiratory: no cough, no   
wheezing, no shortness of breath,   
no hemoptysis  
Cardiovascular: no chest pain, no   
chest pressure, no palpitations,   
and no swelling  
Endocrine: no fatigue, no   
polyuria, no polyphagia, and no   
polydipsia  
Neurologic: No headache, weakness,   
numbness, dizziness, memory loss,   
syncope.  
  
PAST MEDICAL HISTORY  
Diagnosis Date  
Abnormal mammogram 2021  
Achilles tendon pain 2015  
Ankle weakness 2015  
Chronic pain of left ankle   
12/15/2016  
Colon abnormality 2014  
Thickening of the ascending colon   
seen on the recent CT scan.   
Patient has had a colonoscopy   
around 4 years ago. Records were   
obtained for when they were done,   
4 years ago , in [facility]. her colonoscopy was   
completely normal, no thickening,   
and the biopsy too was normal   
except for lymphoid aggregates.  
Diverticulitis   
Treated by Dr. Patricio at [facility]  
DJD (degenerative joint disease),   
ankle and foot 2016  
DM (diabetes mellitus) (HCC)  
GERD (gastroesophageal reflux   
disease)  
Gross hematuria 05/15/2014  
2014, had hematuria, investigated   
extensively along with cytoscopy,   
a stone was found but no signs of   
any malignancy.  
Heel pain, chronic 12/15/2016  
Hepatic steatosis 10/03/2017  
Hiatal hernia 10/03/2017  
Hypertension  
Insomnia  
Kidney stone 05/15/2014  
Morbid obesity (HCC)  
Nephrolithiasis  
Neuritis 2016  
Renal lesion 05/15/2014  
S/P Achilles tendon repair   
2015 sx done  
  
  
PAST SURGICAL HISTORY  
Procedure Laterality Date  
BREAST LUMPECTOMY HX Right 2017  
BX BREAST W/DEVICE 1ST LESION   
STEREOTACTIC GUID Right 2021  
CHOLECYSTECTOMY 2011  
gallbladder removal  
COLONOSCOPY   
CT ABDOMEN 2014  
PAST SURGICAL HISTORY OF   
2014  
D&C hysteroscopy  
PAST SURGICAL HISTORY OF Left   
2015  
Left foot surgery  
S PK LAVH [id number] 10/16/2014  
  
ALLERGIES Silvadene [Silver   
Sulfadiazine] and Sulfa   
(Sulfonamide Antibiotics)  
  
MEDICATIONS  
metFORMIN ER (GLUCOPHAGE XR) 500   
mg 24 hr tablet Take 1 tablet by   
mouth daily with breakfast.  
blood sugar diagnostic (BLOOD   
GLUCOSE TEST) test strip Test   
blood sugars 2daily.   
Dx:ucnontrolled diabetes .   
Insulin: Yes  
chlorthalidone (HYGROTON) 25 mg   
tablet Take 0.5 tablets by mouth   
once daily.  
atenolol (TENORMIN) 50 mg tablet   
Take 1 tablet by mouth once daily.  
potassium chloride 20 mEq TbER   
Take 1 tablet by mouth twice   
daily.  
glipiZIDE (GLUCOTROL XL) 2.5 mg 24   
hr tablet Take 1 tablet by mouth   
once daily.  
exemestane (AROMASIN) 25 mg tablet   
Take 1 tablet by mouth once daily.  
ibuprofen (MOTRIN) 800 mg tablet   
Take 1 tablet by mouth every 8   
hours as needed for pain. Take   
with food.  
lancets (ONE TOUCH DELICA) 33   
gauge misc Test blood sugar(s) 2   
daily. Dx: Type 2 DM - Controlled   
E11.9 Insulin: Yes  
Blood-Glucose Meter monitoring kit   
Glucose Meter of Choice - Kit -   
Dx: Type 2 DM - Uncontrolled   
E11.65  
MV-MN/FOLIC ACID/CALCIUM/VIT K   
(ONE-A-DAY WOMEN'S 50 PLUS ORAL)   
Take 1 tablet by mouth once daily.  
  
azithromycin (ZITHROMAX Z-ALLAN) 250   
mg tablet TAKE 2 TABS ON THE FIRST   
DAY, THEN ONE TAB DAILY FOR 4   
DAYS.  
  
FAMILY HISTORY  
Problem Relation Age of Onset  
Ovarian cancer Mother 39  
Heart Father  
Hypertension Father  
Prostate Cancer Father 78  
other (kidney stones) Brother  
Hypertension Brother  
Diabetes Brother  
Seizures Son  
Breast Cancer Other 55  
Double first cousin (daughter of   
mother's sister and father's   
brother)  
  
Social History  
  
Tobacco Use  
Smoking status: Former  
Packs/day: 1.00  
Years: 10.00  
Additional pack years: 0.00  
Total pack years: 10.00  
Types: Cigarettes  
Quit date: 5/15/2007  
Years since quittin.5  
Smokeless tobacco: Never  
Vaping Use  
Vaping Use: Never used  
Substance Use Topics  
Alcohol use: No  
Drug use: Not Currently  
Comment: marijuana for insomnia -   
currently not using  
  
PHYSICAL EXAM  
/76 (BP Site: Left Arm, BP   
Position: Sitting, BP Cuff Size:   
Regular Adult)   Pulse 68   Resp   
16   Wt (!) 137.5 kg (303 lb 3.2   
oz)   SpO2 97%   BMI 49.69 kg/m  
General Appearance: well   
appearing, in no acute distress,   
alert  
Pysch: mood and affect broad and   
appropriate  
Skin: Skin color, texture, turgor   
normal for age;  
Eyes: conjunctiva pink and moist,   
no icterus, sclera white,   
non-injected  
Neck: Thyroid normal size and   
symmetric without palpable   
nodules, Neck supple, No   
adenopathy  
Lymph nodes: No cervical   
lymphadenopathy and No   
supraclavicular lymphadenopathy  
Lungs: Lungs clear to   
auscultation. No wheezing,   
rhonchi, rales.  
Heart: RRR without murmur, gallop,   
or rubs. No ectopy  
  
Health maintenance reviewed with   
patient:  
HIV Screening Never done  
Shingrix Vaccine(1 of 2) Never   
done  
Hepatitis B Vaccine(1 of 3 - Risk   
3-dose series) Never done  
RSV Vaccine(1 - 1-dose 60+ series)   
Never done  
Dilated Retinal Exam due on   
2022  
Urine Albumin:Creatinine Ratio due   
on 2023  
Bone Density Screening due on   
2023  
Advance Directive Discussion Never   
done  
Influenza Vaccine(1) due on   
2023  
Diabetic Foot Exam due on   
2023  
HbA1C due on 10/03/2023  
Mammogram Screening due on   
2024  
Covid-19 Vaccine(1) due on   
2024  
Pneumococcal Vaccine: 65+(1 - PCV)   
due on 2024  
LDL Cholesterol due on 2024  
Annual PCP Team Chronic Disease   
Visit due on 2024  
DTaP,Tdap,Td Vaccine(2 - Td or   
Tdap) due on 2024  
BP Controlled (<130/80) due on   
10/02/2024  
Colorectal Cancer Screening due on   
2028  
Depression Assessment Completed  
Hepatitis C Screening Completed  
HPV Vaccine Aged Out  
Pap Testing Discontinued  
  
DATA REVIEWED: No new labs  
  
ASSESSMENT/PLAN:  
1. Controlled type 2 diabetes   
mellitus without complication,   
without long-term current use of   
insulin (HCC) - ICD9: 250.00,   
ICD10: E11.9 (primary diagnosis)  
- Control undetermined, due for   
labs  
- Continue current medications  
- Blood glucose monitoring on a   
once daily schedule  
- Counseled on healthy diet and   
regular exercise  
- Discussed need for and benefit   
of weight loss. BMI 49.69 kg/(m^2)  
- CONSULT TO OPHTHALMOLOGY  
- ALBUMIN/CREAT RATIO RND UR  
- HGB A1C  
- BASIC METABOLIC PNL  
- CBC  
- LIPID PANEL BASIC  
- CBC  
- COMP METABOLIC PANEL  
- HGB A1C  
  
2. Primary hypertension - ICD9:   
401.9, ICD10: I10  
- Controlled  
- Continue current medications  
- Recommend home blood pressure   
monitoring, to bring results to   
next visit  
- Encouraged sodium restriction,   
DASH or Mediterranean diet  
- Recommend regular aerobic   
exercise  
- CBC  
- LIPID PANEL BASIC  
- CBC  
- COMP METABOLIC PANEL  
  
3. Morbid obesity with BMI of   
40.0-44.9, adult (HCC) - ICD9:   
278.01, V85.41, ICD10: E66.01,   
Z68.41  
Weight increasing  
- Behavioral intervention  
- low fat, low sugar, well   
portioned balanced diet  
- at least 30min of aerobic   
exercise 5 days a week  
  
Prescription instructions reviewed   
with patient as applicable.   
Potential red flag symptoms   
discussed with the patient.   
Reviewed appropriate action plan   
to take if red flag symptoms   
occur. Patient agreeable to   
treatment plan.  
Sherly An APRN.CNP  
Electronically signed by Sherly An APRN.CNP at 2023 4:01   
PM EST  
documented in this encounter            [facility]  
   
                                        10- Note     HNO ID: [id number]  
Author: Vikki Posada APRN.CNP  
Service: ?  
Author Type: Nurse Practitioner  
Type: Progress Notes  
Filed: 10/4/2023 1:49 PM  
Note Text:  
Chief Complaint  
Patient presents with:  
Established Patient  
HPI:  
Navdeep Guillen is a 63 year old   
female who presents here today for   
follow  
up breast cancer.  
Per Dr. Milner/Smuaya's previous note:  
H/o hypertension and borderline   
diabetes/insulin resistant,   
obesity, who  
presented with an abnormal breast   
exam at her PCP office. Subsequent  
diagnostic mammogram revealing a   
lobulated lesion in the right   
breast at  
the lower outer quadrant highly   
suspicious of malignancy.  
Patient had a mammotome breast   
biopsy on 3/27/17 that showed   
invasive  
ductal carcinoma, positive for   
estrogen and progesterone   
receptors,  
equivocal for overexpression   
HER-2/andi.  
She had surgery by Dr. Dueñas, right   
breast lumpectomy and right   
axillary  
sentinel lymph node biopsy on   
17 for right breast cancer.  
Stage IA- pT1c pN0 (3/3 sentinel   
lymph nodes negative for   
metastatic  
disease)  
Tumor size 1.5 cm x 1 x 0.9 cm  
Overall grade 2 out of 7  
Margins - 0.4 cm from posterior   
margin  
ER/CA >95%, [id number] not amplified   
by FISH  
Lymph/vasc invasion - none;   
derm/vasc/lymph invasion - none  
Menstrual history: Menarche at age   
11, first pregnancy at age 18, 8  
pregnancy; surgical   
menopause-total abdominal   
hysterectomy age 54. No  
estrogen replacement therapy.   
Family history: Mother  of   
ovarian  
cancer in her 40's. No family   
history of breast cancer. She was   
initially  
started on anastrozole, then   
subsequent switch to Aromasin   
because of  
joint pain.  
RADIATION-17 to 17  
Right breast  
Current treatment:  
1) Aromasin 25 mg once daily  
No new concerns today.  
Appetite: Ok.  Energy level: Eh.   
Denies fevers. +sinus   
congestion/drainage  
Resp:denies cough or sob  
Cardiac:denies chest   
pain/palpitations  
GI:denies abd pain, n/v, moving   
bowels regularly  
:denies dysuria/hematuria  
Extrem:occ. RUE aches, L foot pain   
s/p surgery  
Endo:occ. hot flash at night  
Neuro:denies symptoms of   
neuropathy  
Skin:denies rashes  
Heme:denies bleeding  
The ROS is otherwise negative.  
Past medical history,   
appointments, medications,   
allergies reviewed. No  
changes.  
EXAM:  
/67   Pulse 70   Temp 36.5   
?C (97.7 ?F) (Temporal)   Ht 166.4   
cm  
(5' 5.5 )   Wt 135.6 kg (299 lb)     
SpO2 96%   BMI 49.00 kg/m?  
APPEARANCE Well appearing, alert,   
in no acute distress,   
well-hydrated,  
well nourished.  
HEART RRR with normal S1 and S2,   
no murmurs  
LUNG clear to auscultation  
BREAST FEMALE no mass/nodule b/l,   
R radiation changes  
LYMPH NODES No cervical   
lymphadenopathy, No   
supraclavicular  
lymphadenopathy, and No axillary   
lymphadenopathy.  
ABDOMEN bowel sounds normoactive,   
soft, non-tender  
EXTREMITIES No edema  
NEURO Awake, alert and oriented x   
3, Normal gait, and No involuntary  
motions.  
SKIN Skin color, texture, turgor   
normal, no suspicious rashes or   
lesions  
ASSESSMENT/PLAN:  
1. Malignant neoplasm of   
lower-outer quadrant of right   
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  
estrogen receptor positive (HCC) -   
ICD9: 174.5, V86.0, ICD10:   
C50.511,  
Z17.0 (primary diagnosis)  
pT1c pN0 (3/3 sentinel lymph nodes   
negative for metastatic disease)   
stage  
IA infiltrating ductal carcinoma   
the right breast. ER/CA >95%,   
[id number]  
non-amplified by FISH.  
- No concerning findings on exam.  
- Overall tolerating aromasin   
well.  
- Continue aromasin.  
- Mammogram due end of 2024.  
- Follow up in 6 months.  
- Pt. aware to call office with   
any questions/concerns.  
The patient indicates   
understanding of these issues and   
agrees with the  
plan.  
All documentation from previous   
visit of 23-Dr. Shell was   
copied and  
pasted, documentation has been   
reviewed and edited as necessary   
for  
today's visit.  
[facility]  
   
                                                    2023 Miscellaneous   
Notes                                   Formatting of this note is   
different from the original.  
Patient has been identified by   
name and date of birth: No  
Patient phones for refill(s):  
Requested Prescriptions  
  
Pending Prescriptions Disp Refills  
metFORMIN ER (GLUCOPHAGE XR) 500   
mg 24 hr tablet 180 tablet 3  
Sig: Take 1 tablet by mouth daily   
with breakfast.  
blood sugar diagnostic (BLOOD   
GLUCOSE TEST) test strip 50 Strip   
11  
Sig: Test blood sugars 2daily.   
Dx:ucnontrolled diabetes .   
Insulin: Yes  
chlorthalidone (HYGROTON) 25 mg   
tablet 45 tablet 3  
Sig: Take 0.5 tablets by mouth   
once daily.  
atenolol (TENORMIN) 50 mg tablet   
90 tablet 3  
Sig: Take 1 tablet by mouth once   
daily.  
potassium chloride 20 mEq TbER 180   
tablet 1  
Sig: Take 1 tablet by mouth twice   
daily.  
glipiZIDE XL (GLUCOTROL XL) 2.5 mg   
24 hr tablet 90 tablet 3  
Sig: Take 1 tablet by mouth once   
daily.  
  
Date of last office visit in   
primary care: 23  
Last 2 Encounter Wt Readings:  
Date: Wt:  
2023 132.9 kg (293 lb)  
2023 132 kg (291 lb)  
Previous labs/tests for   
medication: Diabetes:  
Hemoglobin A1C (%)  
Date Value  
2023 6.1  
2023 6.8  
07/15/2020 7.7  
2020 7.6  
  
Hemoglobin A1C (POCT) (%)  
Date Value  
10/26/2021 7.1  
  
Blood Pressure:  
BUN (mg/dL)  
Date Value  
2023 10  
2022 15  
  
Sodium (mmol/L)  
Date Value  
2023 139  
2022 136  
Last 1 Encounter BP Readings:  
Date: BP:  
2023 118/72  
Please advise. Thank you. Kellie Ahmadi LPN  
  
Electronically signed by Kellie Ahmadi LPN at 2023 8:14 AM EDT  
documented in this encounter            [facility]  
   
                                                    2023 Miscellaneous   
Notes                                   Formatting of this note is   
different from the original.  
Patient has been identified by   
name and date of birth: No  
Patient phones for refill(s):  
Requested Prescriptions  
  
Pending Prescriptions Disp Refills  
glipiZIDE XL (GLUCOTROL XL) 2.5 mg   
24 hr tablet [Pharmacy Med Name:   
GLIPIZIDE ER 2.5 MG TB24 2.5   
Tablet] 90 tablet 3  
Sig: TAKE 1 TABLET BY MOUTH ONCE   
DAILY.  
  
Date of last office visit in   
primary care: 23  
Last 2 Encounter Wt Readings:  
Date: Wt:  
2023 132.9 kg (293 lb)  
2023 132 kg (291 lb)  
Previous labs/tests for   
medication: Diabetes:  
Hemoglobin A1C (%)  
Date Value  
2023 6.1  
2023 6.8  
07/15/2020 7.7  
2020 7.6  
  
Hemoglobin A1C (POCT) (%)  
Date Value  
10/26/2021 7.1  
  
Please advise. Thank you. Kellie Walls LPN  
  
Electronically signed by Kellie Walls LPN at 2023 10:42 AM   
EDT  
documented in this encounter            [facility]  
   
                                                    2023 Miscellaneous   
Notes                                   Formatting of this note might be   
different from the original.  
Mychart message sent  
Electronically signed by Rosemary Irvin LPN at 2023 2:12   
PM EDT  
Formatting of this note might be   
different from the original.  
Navdeep,  
  
Your urine culture showed mixed   
organisms which is more supportive   
of a contamination. If you have no   
symptoms and the fluconazole is   
helping with the itching I would   
not treat you.  
  
If you do have symptoms then   
please get back to us and we will   
send you an antibiotic.  
  
Regards,  
  
Cynthia Douglas MD  
  
Electronically signed by Cynthia Douglas MD at 2023 1:45 PM   
EDT  
documented in this encounter            [facility]  
   
                                                    2023 History of   
Present illness Narrative               Formatting of this note is   
different from the original.  
Reason for Visit  
Patient presents with:  
F/U 6 months  
  
Navdeep ADAMS Guillen is a 63 year old   
female who presents here today for   
Above Complaints..  
  
Health Maintenance  
PNEUMOCOCCAL: 65+(1 - PCV)  
HIV SCREENING  
SHINGRIX VACCINE(1 of 2)  
COLORECTAL CANCER SCREENING  
DILATED RETINAL EXAM  
DEPRESSION ASSESSMENT  
URINE ALBUMIN:CREATININE RATIO  
BONE DENSITY  
ADVANCE DIRECTIVE DISCUSSION  
  
HPI  
  
Patient is here for chronic   
medical conditions follow-up and   
to date on her current   
diverticulitis condition  
  
Reviewed blood work  
  
She had diverticulitis the end of   
January. Was on abx of a couple   
courses. Patient was in the   
hospital with IV for 3 days and   
the on oral abx , one for uti and   
one for diverticulitis , total of   
3/4 courses of abx and then had   
yeast infection and took diflucan.   
Right now she still has discharge   
from the vagina. She is having a   
colonoscopy with Dr Bocanegra.  
  
Also had another breast cancer   
scare but everything is ok as of   
now.  
  
Sugars well controlled .  
  
Patient has lost 10 pounds of   
weight because of the   
diverticulitis , the low fiber is   
giving her constipation.  
  
HPL: Reviewed test results with   
patient , takes medications   
regularly , does not report side   
effects. Conscious to avoid red   
meats, full fat dairy and its by   
products. Exercising 3 to 5 times   
a week.  
  
HTN: BP controlled today. Checks   
BP at home. Compliant with   
medications. Denies any chest   
pain, palpitations, SOB, swelling   
in the feet. Careful with diet to   
avoid salt, trying to eat more   
fruits and vegetables, exercises   
regularly.  
  
Tsh is normal.  
  
No problem-specific Assessment &   
Plan notes found for this   
encounter.  
  
PAST MEDICAL HISTORY  
Diagnosis Date  
Abnormal mammogram 2021  
Achilles tendon pain 2015  
Ankle weakness 2015  
Chronic pain of left ankle   
12/15/2016  
Colon abnormality 2014  
Thickening of the ascending colon   
seen on the recent CT scan.   
Patient has had a colonoscopy   
around 4 years ago. Records were   
obtained for when they were done,   
4 years ago , in [facility]. her colonoscopy was   
completely normal, no thickening,   
and the biopsy too was normal   
except for lymphoid aggregates.  
Diverticulitis   
Treated by Dr. Patricio at [facility]  
DJD (degenerative joint disease),   
ankle and foot 2016  
DM (diabetes mellitus) (HCC)  
GERD (gastroesophageal reflux   
disease)  
Gross hematuria 05/15/2014  
2014, had hematuria, investigated   
extensively along with cytoscopy,   
a stone was found but no signs of   
any malignancy.  
Heel pain, chronic 12/15/2016  
Hepatic steatosis 10/03/2017  
Hiatal hernia 10/03/2017  
Hypertension  
Insomnia  
Kidney stone 05/15/2014  
Morbid obesity (HCC)  
Nephrolithiasis  
Neuritis 2016  
Renal lesion 05/15/2014  
S/P Achilles tendon repair   
2015 sx done  
  
  
PAST SURGICAL HISTORY  
Procedure Laterality Date  
BREAST LUMPECTOMY HX Right   
BX BREAST W/DEVICE 1ST LESION   
STEREOTACTIC GUID Right 2021  
CHOLECYSTECTOMY   
gallbladder removal  
COLONOSCOPY   
CT ABDOMEN 2014  
PAST SURGICAL HISTORY OF   
2014  
D&C hysteroscopy  
PAST SURGICAL HISTORY OF Left   
2015  
Left foot surgery  
S PK [facility] [id number] 10/16/2014  
  
FAMILY HISTORY  
Problem Relation Age of Onset  
Ovarian cancer Mother 39  
Heart Father  
Hypertension Father  
Prostate Cancer Father 78  
other (kidney stones) Brother  
Hypertension Brother  
Diabetes Brother  
Seizures Son  
Breast Cancer Other 55  
Double first cousin (daughter of   
mother's sister and father's   
brother)  
  
Social History  
  
Tobacco Use  
Smoking status: Former  
Packs/day: 1.00  
Years: 10.00  
Pack years: 10.00  
Types: Cigarettes  
Quit date: 5/15/2007  
Years since quitting: 15.9  
Smokeless tobacco: Never  
Vaping Use  
Vaping Use: Never used  
Substance Use Topics  
Alcohol use: No  
Drug use: Not Currently  
Comment: marijuana for insomnia -   
currently not using  
  
Past medical history,   
appointments, medications,   
allergies reviewed.  
Pertinent Lab/Diagnostic Studies   
are reviewed and discussed today  
  
Current Outpatient Medications:  
potassium chloride 20 mEq TbER  
atenolol (TENORMIN) 50 mg tablet  
exemestane (AROMASIN) 25 mg tablet  
chlorthalidone (HYGROTON) 25 mg   
tablet  
metFORMIN ER (GLUCOPHAGE XR) 500   
mg 24 hr tablet  
glipiZIDE (GLUCOTROL XL) 2.5 mg 24   
hr tablet  
blood sugar diagnostic (BLOOD   
GLUCOSE TEST) test strip  
ibuprofen (MOTRIN) 800 mg tablet  
lancets (ONE TOUCH DELICA) 33   
gauge misc  
Blood-Glucose Meter monitoring kit  
MV-MN/FOLIC ACID/CALCIUM/VIT K   
(ONE-A-DAY WOMEN'S 50 PLUS ORAL)  
fluconazole (DIFLUCAN) 150 mg   
tablet  
ciprofloxacin HCl (CIPRO) 500 mg   
tablet  
metroNIDAZOLE (FLAGYL) 500 mg   
tablet  
ascorbic acid, vitamin C, (VITAMIN   
C) 500 mg tablet  
  
Review of Systems  
CONSTITUTIONAL: No fevers, chills   
night sweats, unintended weight   
loss  
CARDIOVASCULAR: No chest pain,   
dyspnea, palpitations, orthopnea,   
PND, ankle edema.  
PULM: No dyspnea, unexplained   
cough.  
GI: No dysphagia/odynophagia,   
problematic reflux, constipation,   
diarrhea, changes in stool habits,   
hematochezia, melena.  
: No new urinary complaints,   
including dysuria, gross hematuria   
or pyuria.  
NEURO: No new balance problems,   
peripheral weakness/paresthesias   
or numbness of concern.  
  
Physical Exam  
/72 (BP Site: Left Arm, BP   
Position: Sitting, BP Cuff Size:   
Large Adult)   Pulse 64   Resp 16   
  Wt 132.9 kg (293 lb)   SpO2 97%   
  BMI 47.29 kg/m  
General appearance: Well   
appearing, alert, in no acute   
distress, well nourished.  
Skin: Skin color, texture, turgor   
normal, no suspicious rashes or   
lesions  
Head: Normocephalic, no masses,   
lesions, tenderness or   
abnormalities  
Eyes: Anicteric sclera. Pupils are   
equally round and reactive to   
light. Extraocular movements are   
intact.  
Lungs: Lungs clear to   
auscultation. No wheezing,   
rhonchi, rales  
Heart: RRR without murmur, gallop,   
or rubs.  
Extremities: No deformities,   
edema, skin discoloration,   
clubbing or cyanosis. Good   
capillary refill.  
  
ASSESSMENT/PLAN:  
1. Type 2 diabetes mellitus   
without complication, without   
long-term current use of insulin   
(HCC) - ICD9: 250.00, ICD10: E11.9   
(primary diagnosis)  
  
- IBUPROFEN 800 MG TABLET  
- ALBUMIN/CREAT RATIO RND UR  
- DEPRESSION SCREENING/ASSESSMENT  
  
2. Screening for osteoporosis -   
ICD9: V82.81, ICD10: Z13.820  
  
- DXA-AXIAL SKELETON  
  
3. Asymptomatic menopause - ICD9:   
V49.81, ICD10: Z78.0  
  
4. Primary hypertension - ICD9:   
401.9, ICD10: I10  
- good control  
- Recommended regular aerobic   
exercise.  
- Recommend home blood pressure   
monitoring, to bring results in on   
next visit  
- Goal of BP <130/80  
  
5. Hypercholesterolemia - ICD9:   
272.0, ICD10: E78.00  
  
6. Diverticulitis - ICD9: 562.11,   
ICD10: K57.92  
We will see what the colonoscopy   
has to say  
  
7. Dysuria - ICD9: 788.1, ICD10:   
R30.0  
acute  
- Patient education for prevention   
given  
- URINE CULTURE  
  
Cynthia Douglas MD  
  
  
Electronically signed by Cynthia Douglas MD at 2023 1:42 PM   
EDT  
documented in this encounter            [facility]  
   
                                                    2023 History of   
Present illness Narrative               Formatting of this note is   
different from the original.  
TELEPHONE FOLLOW-UP ENCOUNTER  
  
Navdeep Guillen [id number]   
has requested a telemedicine   
follow-up visit. Navdeep Guillen   
verbalized informed consent to   
proceed with the telemedicine   
follow-up visit. Navdeep Guillen   
was informed that the details of   
this telephone visit would be   
recorded as part of their   
electronic medical record.  
  
I had a telephone visit with Ms. Guillen today for follow up of   
right stereotactic breast biopsy   
done at [facility] on 3/28/2023.  
Pathology reveals  fat necrosis,   
focal clustered banal   
microcalcifications, no evidence   
of malignancy   
Patient states that she has no   
problems from her biopsy.  
  
PAST MEDICAL HISTORY  
Diagnosis Date  
Abnormal mammogram 2021  
Achilles tendon pain 2015  
Ankle weakness 2015  
Chronic pain of left ankle   
12/15/2016  
Colon abnormality 2014  
Thickening of the ascending colon   
seen on the recent CT scan.   
Patient has had a colonoscopy   
around 4 years ago. Records were   
obtained for when they were done,   
4 years ago , in [facility]. her colonoscopy was   
completely normal, no thickening,   
and the biopsy too was normal   
except for lymphoid aggregates.  
Diverticulitis   
Treated by Dr. Patricio at [facility]  
DJD (degenerative joint disease),   
ankle and foot 2016  
DM (diabetes mellitus) (HCC)  
GERD (gastroesophageal reflux   
disease)  
Gross hematuria 05/15/2014  
2014, had hematuria, investigated   
extensively along with cytoscopy,   
a stone was found but no signs of   
any malignancy.  
Heel pain, chronic 12/15/2016  
Hepatic steatosis 10/03/2017  
Hiatal hernia 10/03/2017  
Hypertension  
Insomnia  
Kidney stone 05/15/2014  
Morbid obesity (HCC)  
Nephrolithiasis  
Neuritis 2016  
Renal lesion 05/15/2014  
S/P Achilles tendon repair   
2015 sx done  
  
PAST SURGICAL HISTORY  
Procedure Laterality Date  
BREAST LUMPECTOMY HX Right 2017  
BX BREAST W/DEVICE 1ST LESION   
STEREOTACTIC GUID Right 2021  
CHOLECYSTECTOMY   
gallbladder removal  
COLONOSCOPY   
CT ABDOMEN 2014  
PAST SURGICAL HISTORY OF   
2014  
D&C hysteroscopy  
PAST SURGICAL HISTORY OF Left   
2015  
Left foot surgery  
S PK [facility] [id number] 10/16/2014  
  
FAMILY HISTORY  
Problem Relation Age of Onset  
Ovarian cancer Mother 39  
Heart Father  
Hypertension Father  
Prostate Cancer Father 78  
other (kidney stones) Brother  
Hypertension Brother  
Diabetes Brother  
Seizures Son  
Breast Cancer Other 55  
Double first cousin (daughter of   
mother's sister and father's   
brother)  
  
Social History  
  
Tobacco Use  
Smoking status: Former  
Packs/day: 1.00  
Years: 10.00  
Pack years: 10.00  
Types: Cigarettes  
Quit date: 5/15/2007  
Years since quitting: 15.9  
Smokeless tobacco: Never  
Vaping Use  
Vaping Use: Never used  
Substance Use Topics  
Alcohol use: No  
Drug use: Not Currently  
Comment: marijuana for insomnia -   
currently not using  
  
Current Outpatient Medications  
Medication Sig Dispense Refill  
fluconazole (DIFLUCAN) 150 mg   
tablet (Patient not taking:   
Reported on 2023)  
ciprofloxacin HCl (CIPRO) 500 mg   
tablet Take 1 tablet by mouth   
twice daily. 20 tablet 0  
metroNIDAZOLE (FLAGYL) 500 mg   
tablet Take 500 mg by mouth three   
times daily. TAKE ONE(2) TABLET   
TWO(2) TIMES DAILY FOR (1) DAY. 0  
potassium chloride 20 mEq TbER   
Take 1 tablet by mouth twice   
daily. 180 tablet 1  
atenolol (TENORMIN) 50 mg tablet   
Take 1 tablet by mouth once daily.   
90 tablet 3  
exemestane (AROMASIN) 25 mg tablet   
TAKE 1 TABLET BY MOUTH ONCE DAILY.   
90 tablet 3  
chlorthalidone (HYGROTON) 25 mg   
tablet Take 0.5 tablets by mouth   
once daily. 45 tablet 3  
metFORMIN ER (GLUCOPHAGE XR) 500   
mg 24 hr tablet Take 1 tablet by   
mouth daily with breakfast. 180   
tablet 3  
glipiZIDE (GLUCOTROL XL) 2.5 mg 24   
hr tablet Take 1 tablet by mouth   
once daily. 90 tablet 3  
blood sugar diagnostic (BLOOD   
GLUCOSE TEST) test strip Test   
blood sugars 2daily.   
Dx:ucnontrolled diabetes .   
Insulin: Yes 50 Strip 11  
ibuprofen (MOTRIN) 800 mg tablet   
Take 1 tablet by mouth every 8   
hours as needed for pain. Take   
with food. 45 tablet 1  
ascorbic acid, vitamin C, (VITAMIN   
C) 500 mg tablet Take 500 mg by   
mouth once daily.  
lancets (ONE TOUCH DELICA) 33   
gauge misc Test blood sugar(s) 2   
daily. Dx: Type 2 DM - Controlled   
E11.9 Insulin: Yes 1 Each 11  
Blood-Glucose Meter monitoring kit   
Glucose Meter of Choice - Kit -   
Dx: Type 2 DM - Uncontrolled   
E11.65 1 Each 0  
MV-MN/FOLIC ACID/CALCIUM/VIT K   
(ONE-A-DAY WOMEN'S 50 PLUS ORAL)   
Take 1 tablet by mouth once daily.  
  
  
No current facility-administered   
medications for this visit.  
  
ALLERGIES  
Allergen Reactions  
Silvadene [Silver S* Hives  
Sulfa (Sulfonamide * Hives  
  
No physical exam performed due to   
telephone encounter.  
  
Assessment  
IMPRESSION  
No evidence of breast cancer from   
biopsy  
  
PLAN  
Reassurance to patient that there   
is no clinical evidence of breast   
malignancy.  
Patient to continue routine breast   
cancer screening - yearly   
mammograms.  
Patient will continue follow up   
with Hem/onc.  
  
I spent 5 minutes in the telephone   
visit.  
I have confirmed and edited as   
necessary, the PFSH and ROS   
obtained by others.  
  
Unrelated to E/M, telemedicine, or   
virtual visit service provided   
within previous 7 days.  
No E/M service or procedure   
anticipated within next 24 hours.  
  
  
Kajal Dueñas MD  
2023  
14:39 PM  
  
  
  
Electronically signed by Kajal Dueñas MD at 2023 4:16   
PM EDT  
documented in this encounter            [facility]  
   
                                                    2023 Miscellaneous   
Notes                                   Formatting of this note might be   
different from the original.  
Navdeep is scheduled for a provider   
specialty phone call on 2023.  
  
Madisyn Parikh RN  
  
Electronically signed by Madisyn Parikh RN at 2023 2:03   
PM EDT  
Formatting of this note might be   
different from the original.  
[facility] - Pathology [ID   
[id number]]  
Electronically signed by Madisyn Parikh RN at 2023 1:09   
PM EDT  
documented in this encounter            [facility]  
   
                                                    2023 Miscellaneous   
Notes                                   Formatting of this note might be   
different from the original.  
Spoke with patient. Patient was   
driving, unable to check schedule.   
Informed to schedule midday and   
mail reminder. Completed.  
Electronically signed by Shirley Aguilera at 2023 4:06 PM   
EDT  
Formatting of this note might be   
different from the original.  
PSS- OV with Vikki in about 6   
months.  
Electronically signed by Samantha Hunter LPN at 2023 3:54 PM   
EDT  
Formatting of this note might be   
different from the original.  
Left detailed message on   
identified voicemail also sent   
information via my chart.  
  
Ykoo Avendano LPN  
  
Electronically signed by Yoko Avendano LPN at 2023   
10:19 AM EDT  
Formatting of this note might be   
different from the original.  
If she is tolerating it then I   
would advise 7 to 10 years of   
therapy. OV with [facility]cy in about 6   
months.  
  
Francis Shell DO  
Electronically signed by Francis Shell DO at 2023 10:02 AM   
EDT  
Formatting of this note might be   
different from the original.  
Patient called stating biopsy was   
benign and that she has been on   
cancer medication for 6 years,   
asking if she is able to   
discontinue. Please advise.  
Electronically signed by Shirley Aguilera at 2023 8:36 AM   
EDT  
documented in this encounter            [facility]  
   
                                                    2023 Miscellaneous   
Notes                                   Formatting of this note might be   
different from the original.  
Patient returned call and went   
over results, notes from [facility]   
care provider with understanding.  
Electronically signed by Saige Garcia LPN at 2023   
10:17 AM EST  
Formatting of this note might be   
different from the original.  
Left message for pt to call back.  
Tami Tolentino MA  
  
Electronically signed by Tami Tolentino MA at 2023 10:05 AM   
EST  
Formatting of this note might be   
different from the original.  
----- Message from NIC Tubbs.CNP sent at   
3/2/2023 7:18 AM EST -----  
Urine culture did not show clear   
evidence of infection. Recommend   
follow up with PCP to ensure   
hematuria has resolved. Kerrie Don CNP  
  
Electronically signed by Tami Tolentino MA at 2023 10:02 AM   
EST  
documented in this encounter            [facility]  
   
                                                    2023 History of   
Present illness Narrative               Formatting of this note is   
different from the original.  
POPULATION HEALTH NAVIGATION   
OUTREACH  
  
Action/FYI  
  
Left message on patient's voice   
mail to return my call. MyChart   
message sent.  
  
Patient is on [facility] for the   
following HM care gaps:  
  
DILATED RETINAL EXAM  
  
COLORECTAL CANCER SCREENING  
  
Advance Directives  
  
  
Patient Identified by Name and   
: NO  
  
Outreach Outcome/Action  
Unable to reach patient: Left   
message  
Sleep Numberhart message sent  
  
Did you use a PCP flex slot to   
schedule this appointment?  
N/A  
  
Reason for Outreach  
Care Gap or Scheduling/Wellness   
visits  
  
Payer:  
Payor: [facility] MEDICARE / Plan: [facility]   
DUAL COMPLETE HMO SNP / Product   
Type: Medicare /  
  
Care Gap Reviewed::  
Colorectal Cancer Screening  
Diabetic Eye Exam  
  
Reminder: Reminder note to check   
Health Maintenance for items below  
  
Health Maintenance items due:  
COVID-19 VACCINE(1) Never done  
PNEUMOCOCCAL(1 - PCV) Never done  
HIV SCREENING Never done  
SHINGRIX VACCINE(1 of 2) Never   
done  
PAP TESTING due on 2019  
HPV TESTING due on 2019  
COLORECTAL CANCER SCREENING due on   
2021  
DILATED RETINAL EXAM due on   
2022  
DEPRESSION ASSESSMENT Never done  
URINE ALBUMIN:CREATININE RATIO due   
on 2023  
  
Navigation Signature:  
  
Stephanie Hoskins MA  
2023 7:27 AM  
  
  
Electronically signed by Stephanie Hoskins MA at 2023 9:35 AM   
EST  
documented in this encounter            [facility]  
   
                                                    2023 History of   
Present illness Narrative               Formatting of this note is   
different from the original.  
Navdeep Guillen  
1960  
  
REFERRING PHYSICIAN: Francis Shell DO  
  
CHIEF COMPLAINT: Consult (Abnormal   
mammogram)  
  
HPI: The patient is a 62 year old   
female presents with abnormal   
right breast radiographs.  
She has a history of right breast   
cancer. She is s/p right breast   
lumpectomy/SLNBBx and XRT in 2017  
There are pleomorphic scattered   
calcifications that are noted and   
they are in the vicinity of the   
previous surgery.  
She denies palpable breast masses.  
She denies nipple discharge.  
She denies chronic pain to the   
right breast, she notes twinges of   
discomfort in the area,   
intermittently.  
  
She also presents s/p episode of   
acute diverticulitis for which she   
was hospitalized at [facility]. I have   
reviewed the CT scan findings   
obtained there. She is scheduled   
for colonoscopy in the near   
future. She asks about the disease   
progression of diverticular   
disease and why the need for   
colonscopy.  
  
PAST MEDICAL HISTORY  
Diagnosis Date  
Abnormal mammogram 2021  
Achilles tendon pain 2015  
Ankle weakness 2015  
Chronic pain of left ankle   
12/15/2016  
Colon abnormality 2014  
Thickening of the ascending colon   
seen on the recent CT scan.   
Patient has had a colonoscopy   
around 4 years ago. Records were   
obtained for when they were done,   
4 years ago , in [facility]. her colonoscopy was   
completely normal, no thickening,   
and the biopsy too was normal   
except for lymphoid aggregates.  
Diverticulitis   
Treated by Dr. Patricio at [facility]  
DJD (degenerative joint disease),   
ankle and foot 2016  
DM (diabetes mellitus) (HCC)  
GERD (gastroesophageal reflux   
disease)  
Gross hematuria 05/15/2014  
2014, had hematuria, investigated   
extensively along with cytoscopy,   
a stone was found but no signs of   
any malignancy.  
Heel pain, chronic 12/15/2016  
Hepatic steatosis 10/03/2017  
Hiatal hernia 10/03/2017  
Hypertension  
Insomnia  
Kidney stone 05/15/2014  
Morbid obesity (HCC)  
Nephrolithiasis  
Neuritis 2016  
Renal lesion 05/15/2014  
S/P Achilles tendon repair   
2015 sx done  
  
  
PAST SURGICAL HISTORY  
Procedure Laterality Date  
BREAST LUMPECTOMY HX Right   
BX BREAST W/DEVICE 1ST LESION   
STEREOTACTIC GUID Right 2021  
CHOLECYSTECTOMY   
gallbladder removal  
COLONOSCOPY   
CT ABDOMEN 2014  
PAST SURGICAL HISTORY OF   
2014  
D&C hysteroscopy  
PAST SURGICAL HISTORY OF Left   
2015  
Left foot surgery  
S PK [facility] [id number] 10/16/2014  
  
Current Outpatient Medications  
Medication Sig  
potassium chloride 20 mEq TbER   
Take 1 tablet by mouth twice   
daily.  
atenolol (TENORMIN) 50 mg tablet   
Take 1 tablet by mouth once daily.  
exemestane (AROMASIN) 25 mg tablet   
TAKE 1 TABLET BY MOUTH ONCE DAILY.  
chlorthalidone (HYGROTON) 25 mg   
tablet Take 0.5 tablets by mouth   
once daily.  
metFORMIN ER (GLUCOPHAGE XR) 500   
mg 24 hr tablet Take 1 tablet by   
mouth daily with breakfast.  
glipiZIDE (GLUCOTROL XL) 2.5 mg 24   
hr tablet Take 1 tablet by mouth   
once daily.  
blood sugar diagnostic (BLOOD   
GLUCOSE TEST) test strip Test   
blood sugars 2daily.   
Dx:ucnontrolled diabetes .   
Insulin: Yes  
ibuprofen (MOTRIN) 800 mg tablet   
Take 1 tablet by mouth every 8   
hours as needed for pain. Take   
with food.  
lancets (ONE TOUCH DELICA) 33   
gauge misc Test blood sugar(s) 2   
daily. Dx: Type 2 DM - Controlled   
E11.9 Insulin: Yes  
Blood-Glucose Meter monitoring kit   
Glucose Meter of Choice - Kit -   
Dx: Type 2 DM - Uncontrolled   
E11.65  
MV-MN/FOLIC ACID/CALCIUM/VIT K   
(ONE-A-DAY WOMEN'S 50 PLUS ORAL)   
Take 1 tablet by mouth once daily.  
  
fluconazole (DIFLUCAN) 150 mg   
tablet (Patient not taking:   
Reported on 2023)  
ciprofloxacin HCl (CIPRO) 500 mg   
tablet Take 1 tablet by mouth   
twice daily.  
metroNIDAZOLE (FLAGYL) 500 mg   
tablet Take 500 mg by mouth three   
times daily. TAKE ONE(2) TABLET   
TWO(2) TIMES DAILY FOR (1) DAY.  
ascorbic acid, vitamin C, (VITAMIN   
C) 500 mg tablet Take 500 mg by   
mouth once daily.  
  
ALLERGIES: Silvadene [Silver   
Sulfadiazine] and Sulfa   
(Sulfonamide Antibiotics)  
  
PERSONAL HISTORY:  
Social History  
  
Tobacco Use  
Smoking status: Former  
Packs/day: 1.00  
Years: 10.00  
Pack years: 10.00  
Types: Cigarettes  
Quit date: 5/15/2007  
Years since quitting: 15.8  
Smokeless tobacco: Never  
Vaping Use  
Vaping Use: Never used  
Substance Use Topics  
Alcohol use: No  
Drug use: Not Currently  
Comment: marijuana for insomnia -   
currently not using  
  
  
FAMILY HISTORY  
Problem Relation Age of Onset  
Ovarian cancer Mother 39  
Heart Father  
Hypertension Father  
Prostate Cancer Father 78  
other (kidney stones) Brother  
Hypertension Brother  
Diabetes Brother  
Seizures Son  
Breast Cancer Other 55  
Double first cousin (daughter of   
mother's sister and father's   
brother)  
  
The review of systems data was   
entered by the nurse and reviewed   
by me  
  
Nursing Notes:  
Madisyn Parikh RN 2023   
12:50 PM Signed  
REVIEW OF SYSTEMS:  
General: The patient denies   
fatigue, denies weight loss,   
denies weight gain, denies feeling   
hot, and denies feelings of cold.  
Eyes: The patient denies glaucoma,   
denies eye injury/surgery, wears   
glasses or contacts.  
Ear/Nose/Throat: The patient NOTES   
allergies, denies hayfever, denies   
ear infections, and denies bloody   
noses.  
Cardiovascular: The patient denies   
chest pain, denies heart disease,   
NOTES high blood pressure,denies   
cardiac stent, denies prior heart   
attack, denies irregular heart   
beat, denies high cholesterol,   
denies poor circulation, denies   
heart failure, other cardiac   
issues, denies claudication,   
denies cold feet, denies   
peripheral arterial stent.  
Respiratory: The patient denies   
tuberculosis, denies pneumonia,   
denies frequent cough, denies   
pulmonary embolism, denies   
shortness of breath, and denies   
coughing up blood.  
Gastrointestinal: The patient   
denies difficulty swallowing,   
denies acid reflux, denies ulcers,   
denies vomiting, denies   
jaundice/hepatitis, NOTES   
gallbladder problems, denies black   
or tarry stools, denies   
hemorrhoids, denies bleeding from   
rectum, NOTES diverticulitis,   
denies constipation, denies   
diarrhea, denies loss of stool   
control, and denies hernias.  
Kidney/Bladder: The patient NOTES   
kidney stones, NOTES urine   
infections, and NOTES bloody   
urine.  
Skin: The patient denies a history   
of skin cancer, denies   
bleeding/changing moles, and NOTES   
a history of skin rash.  
Neurologic: The patient denies a   
history of epilepsy/convulsions,   
denies headaches, denies   
head/spinal injuries, and denies   
stroke/TIA.  
Psychiatric: The patient denies   
psychiatric medications, denies   
depression, and denies voices,   
denies substance abuse.  
Endocrine: The patient denies   
thyroid disorders, NOTES diabetes,   
and denies hormonal problems.  
Hematologic: The patient denies a   
history of bruising, denies   
bleeding, and denies anemia,   
denies blood clots.  
Infections: The patient denies a   
history of measles and mumps,   
denies rheumatic fever, and denies   
sexually transmitted diseases.  
Musculoskeletal: The patient   
denies back pain/injury, denies   
back problems, denies sciatica,   
NOTES knee/foot trouble, NOTES   
arthritis, or denies gout.  
When was patient's last Mammogram   
screening? 23 and 23  
Last Colonoscopy: 2011  
Madisyn Parikh RN  
  
  
PHYSICAL EXAMINATION:  
General: The patient is 62 year   
old female, well nourished, well   
hydrated in no acute distress. The   
patient is oriented to time,   
place, and person.  
VITALS: Blood pressure 118/72,   
pulse 81, temperature 37 C (98.6   
F), height 167.6 cm (5' 6 ),   
weight 131.1 kg (289 lb), SpO2 96   
%. Body mass index is 46.65 kg/m .  
Head - Normocephalic. EOM intact   
with sclera clear and no icterus   
noted.  
Neck - supple with no jugular   
venous distention noted. Trachea   
is midline. No thyroid enlargement   
or thyroid nodules detected. No   
masses noted.  
Chest/breast - no asymmetry of   
breasts noted, no suspicious skin   
lesions noted, - healed incisional   
sites of upper outer quadrant and   
inferior aspect of right breast,   
no nipple discharge and both   
nipples everted, no breast masses   
noted  
Lungs - clear to auscultation.   
Normal breath sounds. No   
rales/rhonchi/wheezing noted. No   
labored breathing noted, such as   
retractions. No cough heard.  
Heart - normal S1 and S2   
auscultated. No   
rubs/clicks/murmurs noted. Regular   
rate.  
Abdomen - soft and benign.   
Difficult to determine if any   
masses or organomegaly due to body   
habitus.  
Extremities - no calf tenderness   
noted. No pitting edema noted.  
Skin - normal skin integrity.  
Lymph - no cervical adenopathy   
detected, no supraclavicular   
adenopathy detected, no axillary   
adenopathy detected  
Neurological - gait normal, no   
focal deficits noted  
Psych - calm and appropriate  
  
RADIOLOGIC STUDIES: As Noted  
  
Assessment  
IMPRESSION: abnormal   
calcifications on right breast   
mammograms, history of right   
breast cancer  
  
PLAN: I have discussed the above   
with the patient.  
I have reviewed the abnormal   
breast radiographs with the   
patient.  
I have offered right stereotactic   
breast biopsy  
I have explained the procedure to   
the patient.  
I have counseled the patient as to   
the risks of the procedure,   
including but not limited to:  
Infection (increased risk due to   
history of surgery and XRT),   
bleeding, injury to any blood   
vessels/nerves, scar tissue, wound   
infections, complications of  
anesthesia, etc. - the patient   
understands.  
The patient wishes to proceed.  
I have also explained diverticular   
disease to the patient and   
indications for colonoscopy   
(findings of wall thickening on CT   
scan). She was concerned about   
requiring surgery and I have   
explained the indications for   
surgery to the patient. She feels   
better informed and more   
comfortable with her course of   
testing.  
The patient acknowledges above.  
  
I have answered all questions to   
the patient s satisfaction and the   
patient has no further questions.  
  
I have confirmed and edited as   
necessary, the PFSH and ROS   
obtained by others.  
  
Consultation requested by Dr. Francis Shell for an opinion regarding   
patient's abnormal breast   
radiographs. My final   
recommendations will be   
communicated back to the   
requesting physician by way of   
shared Medical record or letter to   
requesting physician via US mail.  
  
.  
Diagnoses: (R92.1) Calcification   
of right breast on mammography  
(K57.30) Diverticulosis of large   
intestine without perforation or   
abscess without bleeding  
(R93.3) Abnormal CT scan,   
gastrointestinal tract  
  
Return to Clinic: The patient will   
be scheduled for stereotactic   
breast biopsy at [facility].  
  
I spent a total of 41 minutes on   
the date of the service which   
included preparing to see the   
patient with review of any   
pertinent laboratory   
studies/radiological   
imaging/medical records from other   
medical facilities such as [facility], face-to-face   
patient care, obtaining oral   
medical history from the patient   
in this encounter, performing a   
medically appropriate examination,   
counseling and educating the   
patient/family/caregiver, and   
ordering and/or scheduling of   
medications/tests/procedures, and   
completing appropriate medical   
documentation.  
  
_____________________________  
Kajal Dueñas MD  
Electronically signed by Kajal Dueñas MD at 2023 7:49   
AM EST  
documented in this encounter            [facility]  
   
                                        2023 Nurse Note Formatting of this  
 note might be   
different from the original.  
REVIEW OF SYSTEMS:  
General: The patient denies   
fatigue, denies weight loss,   
denies weight gain, denies feeling   
hot, and denies feelings of cold.  
Eyes: The patient denies glaucoma,   
denies eye injury/surgery, wears   
glasses or contacts.  
Ear/Nose/Throat: The patient NOTES   
allergies, denies hayfever, denies   
ear infections, and denies bloody   
noses.  
Cardiovascular: The patient denies   
chest pain, denies heart disease,   
NOTES high blood pressure,denies   
cardiac stent, denies prior heart   
attack, denies irregular heart   
beat, denies high cholesterol,   
denies poor circulation, denies   
heart failure, other cardiac   
issues, denies claudication,   
denies cold feet, denies   
peripheral arterial stent.  
Respiratory: The patient denies   
tuberculosis, denies pneumonia,   
denies frequent cough, denies   
pulmonary embolism, denies   
shortness of breath, and denies   
coughing up blood.  
Gastrointestinal: The patient   
denies difficulty swallowing,   
denies acid reflux, denies ulcers,   
denies vomiting, denies   
jaundice/hepatitis, NOTES   
gallbladder problems, denies black   
or tarry stools, denies   
hemorrhoids, denies bleeding from   
rectum, NOTES diverticulitis,   
denies constipation, denies   
diarrhea, denies loss of stool   
control, and denies hernias.  
Kidney/Bladder: The patient NOTES   
kidney stones, NOTES urine   
infections, and NOTES bloody   
urine.  
Skin: The patient denies a history   
of skin cancer, denies   
bleeding/changing moles, and NOTES   
a history of skin rash.  
Neurologic: The patient denies a   
history of epilepsy/convulsions,   
denies headaches, denies   
head/spinal injuries, and denies   
stroke/TIA.  
Psychiatric: The patient denies   
psychiatric medications, denies   
depression, and denies voices,   
denies substance abuse.  
Endocrine: The patient denies   
thyroid disorders, NOTES diabetes,   
and denies hormonal problems.  
Hematologic: The patient denies a   
history of bruising, denies   
bleeding, and denies anemia,   
denies blood clots.  
Infections: The patient denies a   
history of measles and mumps,   
denies rheumatic fever, and denies   
sexually transmitted diseases.  
Musculoskeletal: The patient   
denies back pain/injury, denies   
back problems, denies sciatica,   
NOTES knee/foot trouble, NOTES   
arthritis, or denies gout.  
  
When was patient's last Mammogram   
screening? 23 and 23  
  
Last Colonoscopy: 2011  
  
Madisyn Parikh RN  
Electronically signed by Madisyn Parikh RN at 2023 12:50   
PM EST  
documented in this encounter            [facility]  
   
                                                    2023 History of   
Present illness Narrative               Formatting of this note is   
different from the original.  
Images from the original note were   
not included.  
  
[facility]   
[facility]  
NEW PATIENT CLINIC NOTE  
  
  
SERVICE DATE: 2023  
SERVICE TIME: 12:51 PM  
NAME: Navdepe Guillen  
MRN: [id number]  
  
CHIEF COMPLAINT: Hematuria  
  
HISTORY OF PRESENT ILLNESS:  
Navdeep Guillen is a 62 year old   
female presenting with 2 episodes   
of hematuria , she was found to   
have a UTI with E Coli and was   
given Cipro 500 mg  
The patient reports UTI improved,   
and has not seen blood in urine   
for more than a week. We discussed   
common causes of hematuria and   
reviewed tests  
She had CT and urine culture with   
E coli  
  
LUTS:  
DYSURIA: yes  
URGENCY: Yes  
FREQUENCY:5 per day  
NOCTURIA: 0 per night  
STRAINING TO VOID: No  
EMPTIES COMPLETELY: Yes  
UTI: 1 past 12 months  
GROSS HEMATURIA: yes  
UA DIPSTICK POSITIVE ONLY: yes  
  
Other symptoms:  
  
LABS:  
No results found for: TESTOST  
No results found for: TESTFREE  
No results found for: PSA  
Hematocrit (%)  
Date Value  
2023 45.0  
2023 42.6  
2022 44.1  
2022 45.9  
2021 43.1  
07/15/2020 44.4  
  
No results found for: PSA  
  
Creatinine  
Date Value Ref Range Status  
2023 0.84 0.58 - 0.96 mg/dL   
Final  
2023 0.72 0.58 - 0.96 mg/dL   
Final  
2022 0.70 0.58 - 0.96 mg/dL   
Final  
2022 0.74 0.58 - 0.96 mg/dL   
Final  
  
  
MEDICATIONS:  
  
ciprofloxacin HCl (CIPRO) 500 mg   
tablet Take 1 tablet by mouth   
twice daily.  
metroNIDAZOLE (FLAGYL) 500 mg   
tablet Take 500 mg by mouth three   
times daily. TAKE ONE(2) TABLET   
TWO(2) TIMES DAILY FOR (1) DAY.  
potassium chloride 20 mEq TbER   
Take 1 tablet by mouth twice   
daily.  
atenolol (TENORMIN) 50 mg tablet   
Take 1 tablet by mouth once daily.  
exemestane (AROMASIN) 25 mg tablet   
TAKE 1 TABLET BY MOUTH ONCE DAILY.  
chlorthalidone (HYGROTON) 25 mg   
tablet Take 0.5 tablets by mouth   
once daily.  
metFORMIN ER (GLUCOPHAGE XR) 500   
mg 24 hr tablet Take 1 tablet by   
mouth daily with breakfast.  
glipiZIDE (GLUCOTROL XL) 2.5 mg 24   
hr tablet Take 1 tablet by mouth   
once daily.  
blood sugar diagnostic (BLOOD   
GLUCOSE TEST) test strip Test   
blood sugars 2daily.   
Dx:ucnontrolled diabetes .   
Insulin: Yes  
ibuprofen (MOTRIN) 800 mg tablet   
Take 1 tablet by mouth every 8   
hours as needed for pain. Take   
with food.  
lancets (ONE TOUCH DELICA) 33   
gauge misc Test blood sugar(s) 2   
daily. Dx: Type 2 DM - Controlled   
E11.9 Insulin: Yes  
Blood-Glucose Meter monitoring kit   
Glucose Meter of Choice - Kit -   
Dx: Type 2 DM - Uncontrolled   
E11.65  
MV-MN/FOLIC ACID/CALCIUM/VIT K   
(ONE-A-DAY WOMEN'S 50 PLUS ORAL)   
Take 1 tablet by mouth once daily.  
  
fluconazole (DIFLUCAN) 150 mg   
tablet (Patient not taking:   
Reported on 2023)  
ascorbic acid, vitamin C, (VITAMIN   
C) 500 mg tablet Take 500 mg by   
mouth once daily.  
  
PAST MEDICAL HISTORY:  
  
PAST MEDICAL HISTORY  
Diagnosis Date  
Abnormal mammogram 2021  
Achilles tendon pain 2015  
Ankle weakness 2015  
Chronic pain of left ankle   
12/15/2016  
Colon abnormality 2014  
Thickening of the ascending colon   
seen on the recent CT scan.   
Patient has had a colonoscopy   
around 4 years ago. Records were   
obtained for when they were done,   
4 years ago , in [facility]. her colonoscopy was   
completely normal, no thickening,   
and the biopsy too was normal   
except for lymphoid aggregates.  
DJD (degenerative joint disease),   
ankle and foot 2016  
DM (diabetes mellitus) (HCC)  
GERD (gastroesophageal reflux   
disease)  
Gross hematuria 05/15/2014  
2014, had hematuria, investigated   
extensively along with cytoscopy,   
a stone was found but no signs of   
any malignancy.  
Heel pain, chronic 12/15/2016  
Hepatic steatosis 10/03/2017  
Hiatal hernia 10/03/2017  
Hypertension  
Insomnia  
Kidney stone 05/15/2014  
Morbid obesity (HCC)  
Nephrolithiasis  
Neuritis 2016  
Renal lesion 05/15/2014  
S/P Achilles tendon repair   
2015 sx done  
  
PAST SURGICAL HISTORY:  
  
PAST SURGICAL HISTORY  
Procedure Laterality Date  
BREAST LUMPECTOMY HX Right   
BX BREAST W/DEVICE 1ST LESION   
STEREOTACTIC GUID Right 2021  
CHOLECYSTECTOMY 2011  
gallbladder removal  
COLONOSCOPY   
CT ABDOMEN 2014  
PAST SURGICAL HISTORY OF   
2014  
D&C hysteroscopy  
PAST SURGICAL HISTORY OF Left   
2015  
Left foot surgery  
S PK LAVH [id number] 10/16/2014  
  
FAMILY HISTORY:  
  
FAMILY HISTORY  
Problem Relation Age of Onset  
Ovarian cancer Mother 39  
Heart Father  
Hypertension Father  
Prostate Cancer Father 78  
other (kidney stones) Brother  
Hypertension Brother  
Diabetes Brother  
Seizures Son  
Breast Cancer Other 55  
Double first cousin (daughter of   
mother's sister and father's   
brother)  
  
SOCIAL HISTORY:  
Social Connections: Socially   
Isolated  
Frequency of Communication with   
Friends and Family: More than   
three times a week  
Frequency of Social Gatherings   
with Friends and Family: Once a   
week  
Attends [employer] Services: Never  
Active Member of Clubs or   
Organizations: No  
Attends Club or Organization   
Meetings: Never  
Marital Status:   
  
REVIEW OF SYSTEMS:  
GENERAL: No fever, chills, weight   
loss, or fatigue.  
ENMT: Negative  
CARDIOVASCULAR:NO CHEST PAIN,   
PALPITATIONS, ANKLE EDEMA  
RESPIRATORY: No chronic cough,   
wheezing, dyspnea, hemoptysis.  
GENITOURINARY: SEE HPI  
MUSCULOSKELETAL:NO CHRONIC BACK   
PAIN, ARTHRITIS, CHRONIC NECK PAIN  
SKIN: NO VARICOSE VEINS, RASH,   
ABNORMAL ITCHING  
HEME/LYMPH/IMMUNE:Negative for   
prolonged bleeding, bruising   
easily or swollen nodes  
NEUROLOGICAL: NO HEADACHES,   
NUMBNESS, SEIZURES, STROKE  
DIABETES: Yes  
All other systems reviewed and are   
negative  
  
PHYSICAL EXAMINATION:  
Blood pressure 110/80, pulse 86,   
temperature 36.5 C (97.7 F),   
temperature source Temporal,   
height 167.6 cm (5' 6 ), weight   
132.5 kg (292 lb), SpO2 98 %.  
GENERAL: WNL nutrition, no   
deformities, healthy appearing  
NEURO: Awake, alert and oriented x   
3 and Normal gait  
PSYCH: No signs of depression,   
anxiety, or agitation  
ENMT (Ear, Nose, Mouth, Throat):   
No masses, adenopathy, icterus.   
Thyroid nonpalpable  
RESP: NL effort, no retractions or   
purse-lip breathing.  
CV: No extremity swelling,   
varices, edema, pallor, erythema  
GASTROINTESTINAL: Soft, nontender,   
nondistended, no masses.  
HERNIAS: None  
SKIN: No rash, lesions  
No palpable lymphadenopathy  
MUSCULOSKELETAL: Extremities   
normal. No deformities, edema,   
clubbing or skin discoloration.  
  
PROBLEM LIST REVIEW:  
Yes  
  
LABS:  
  
Results for orders placed or   
performed in visit on 23  
UA DIP, URINE (POC)  
Result Value Ref Range  
GLUCOSE UA (POCT) Negative   
Negative mg/dL  
BILIRUBIN UA (POCT) Negative   
Negative  
KETONE UA (POCT) Negative Negative   
mg/dL  
SPECIFIC GRAVITY UA (POCT) 1.020   
1.005 - 1.030  
HEMOGLOBIN/BLOOD UA (POCT) Small   
(A) Negative  
PH UA (POCT) 7.0 4.5 - 8.0  
PROTEIN UA (POCT) 30 (A) Negative   
mg/dL  
UROBILINOGEN UA (POCT) 0.2 Normal   
E.U./dL  
NITRITE UA (POCT) Negative   
Negative  
LEUKOCYTES UA (POCT) Large (A)   
Negative  
COLOR UA (POCT) Yellow  
CLARITY UA (POCT) Clear  
  
Urine Culture: Pending  
  
PROCEDURES:  
  
PVR: 0 ml  
  
IMAGING:  
  
CT Urogram - Scanned into Epic -   
[facility]  
> Normal Kidney and bladder  
  
IMPRESSION/PLAN:  
62 year old female with  
1. Gross hematuria - ICD9: 599.71,   
ICD10: R31.0  
  
> Urine culture today  
> Finish Cipro and Flagyl  
  
I spent a total of 30 minutes on   
the date of the service which   
included preparing to see the   
patient, face to face patient   
care, completing clinical   
documentation, obtaining and/or   
reviewing separately obtained   
history, performing a medically   
appropriate examination,   
counseling and educating the   
patient/family/caregiver, ordering   
medications, tests, or procedures,   
and care coordination.  
  
SHANE Delaney MT, PA-C  
  
  
Electronically signed by Julito Osborne PA-C at 2023 1:05 PM   
EST  
Formatting of this note might be   
different from the original.  
Verified name and date of birth.  
  
CC Post Void Residual  
  
HPI:  
Navdeep Guillen is a 62 year old   
female.  
The patient is here now for an   
appointment with SHANE Delaney MT, PA-COV.  
  
Procedure:  
Explained procedure to patient and   
verbalizes understanding.   
Performed a PVR.  
Patient urinated and instructed to   
empty bladder as much as possible   
just prior to having PVR done   
using bladder ultrasound scanner.  
Results of scan: 0 mL  
  
The patient tolerated the   
procedure well.  
  
Plan:  
Appointment with Julito.  
  
  
Electronically signed by Shayna Grady LPN at 2023 1:05 PM EST  
documented in this encounter            [facility]  
   
                                                    2023 Miscellaneous   
Notes                                   Formatting of this note might be   
different from the original.  
Called pt and warmed transferred   
her as directed by scheduling tree   
to [phone]  
Electronically signed by Michelle Varela at 2023 1:48 PM EST  
Formatting of this note might be   
different from the original.  
PSS- please reach out to schedule   
with urology at [place] and notify   
patient.  
  
Samantha Hunter LPN  
  
Electronically signed by Samantha Hunter LPN at 2023 1:23 PM   
EST  
Addended by: FRANCIS SHELL on:   
2023 01:18 PM  
  
Modules accepted: Orders  
  
  
Electronically signed by Francis Shell DO at 2023 1:18 PM   
EST  
Formatting of this note might be   
different from the original.  
Any of the urologists at [place].  
  
Francis Shell DO  
Electronically signed by Francis Shell DO at 2023 1:18 PM   
EST  
Formatting of this note might be   
different from the original.  
Dr. Dejesus's office called to say   
they do not accept patient's   
insurance (Medicaid).  
  
Who would you like patient   
referred to instead?  
  
Samantha Hunter LPN  
  
Electronically signed by Samantha Hunter LPN at 2023 1:14 PM   
EST  
Formatting of this note might be   
different from the original.  
Patient is aware of all   
information and verbalized   
understanding.  
  
Referral faxed to Dr. Dejesus's   
office asking them to reach out to   
patient to schedule.  
  
Samantha Hunter LPN  
  
Electronically signed by Samantha Hunter LPN at 2023 11:34 AM   
EST  
Formatting of this note might be   
different from the original.  
Let her know that I reviewed her   
hospital records. As I discussed   
during the OV I recommend urology   
evaluation for the gross hematuria   
she had. Please make a referral to   
Dr. Dejesus at [facility]. Can let her   
know I talked with Dr. Patricio   
who concurs. Keep appointment with   
Dr. Patricio as well.  
  
Francis Shell DO  
Electronically signed by Francis Shell DO at 2023 11:11 AM   
EST  
documented in this encounter            [facility]  
   
                                                    2023 History of   
Present illness Narrative               Formatting of this note is   
different from the original.  
Per Dr. Milner's most recent OV note.   
Personally reviewed and updated by   
me.DIAGNOSIS: Breast cancer  
  
HPI: The patient is a 62 year-old   
female with history of   
hypertension and borderline   
diabetes/insulin resistant,   
obesity, who presented with an   
abnormal breast exam at her PCP   
office. Subsequent diagnostic   
mammogram revealing a lobulated   
lesion in the right breast at the   
lower outer quadrant highly   
suspicious of malignancy.  
  
Patient had a mammotome breast   
biopsy on 3/27/17 that showed   
invasive ductal carcinoma,   
positive for estrogen and   
progesterone receptors, equivocal   
for overexpression HER-2/andi.  
  
She had surgery by Dr. Dueñas, right   
breast lumpectomy and right   
axillary sentinel lymph node   
biopsy on 17 for right breast   
cancer.  
  
Stage IA- pT1c pN0 (3/3 sentinel   
lymph nodes negative for   
metastatic disease)  
Tumor size 1.5 cm x 1 x 0.9 cm  
Overall grade 2 out of 7  
Margins - 0.4 cm from posterior   
margin  
ER/CA >95%, [id number] not amplified   
by FISH  
Lymph/vasc invasion - none;   
derm/vasc/lymph invasion - none  
  
Menstrual history: Menarche at age   
11, first pregnancy at age 18, 8   
pregnancy; surgical   
menopause-total abdominal   
hysterectomy age 54. No estrogen   
replacement therapy. Family   
history: Mother  of ovarian   
cancer in her 40's. No family   
history of breast cancer. She was   
initially started on anastrozole,   
then subsequent switch to Aromasin   
because of joint pain.  
  
Current treatment:  
1) Aromasin 25 mg once daily  
  
Interim history:  
Tolerating Aromasin well.  
Occasional ache in hands.  
Arthritis pain chronic both knees.  
  
Currently on antibiotics for   
diverticulitis and UTI. Went to ED   
with gross hematuria with clots.   
Was also having pain LLQ. Wasn't   
clearly having dysuria. Had no   
fever. CT of the abdomen pelvis on   
2023 demonstrated numerous   
diverticula projecting from the   
descending and sigmoid colon.   
Along the segment of the colon   
extending from the distal   
descending through the mid to   
distal sigmoid showed mild to   
moderate mural thickening with   
extensive pericolonic stranding.   
There is a broad band of soft   
tissue density containing air   
bubbles extending anteriorly and   
inferiorly from the proximal   
sigmoid colon indicating   
microperforation. No fluid   
collection was observed. Urinary   
bladder was noted to tilt toward   
the soft tissue density but no air   
bubbles were observed within the   
bladder to indicate fistula.   
However the bladder was noted to   
be nearly empty and the bladder   
wall was thickened with   
perivesicular stranding which was   
attributed to either cystitis   
and/or reactive edema from   
adjacent sigmoid inflammation.  
  
Urine culture demonstrated   
11-25,000 CFU's presumptive E.   
coli.  
  
2 sets of blood cultures drawn   
 remain negative through 5   
days.  
  
Gross hematuria resolved and pain   
subsided.  
  
PMH, medications and allergies   
personally reviewed by me today.   
Any changes documented in   
appropriate section.  
  
ROS:  
Constitutional: Denies episodes of   
fever and night sweats.  
Neuro: Denies HA, vertigo,   
dizziness and imbalance. Denies   
symptoms of neuropathy.  
HEENT: No recent change in voice,   
vision or hearing.  
Resp: Denies cough, wheeze and   
hemoptysis. Denies shortness of   
breath at rest. Denies DUBOIS.  
CVS: Denies exertional chest pain,   
PND, orthopnea and LE edema.  
GI: Denies dysgeusia. Denies   
symptoms of stomatitis. Denies   
dysphagia and odynophagia. Some   
nausea due to antibiotics.  
: See above.  
Endo: Denies hot flashes. Denies   
polyuria and polydipsia. Denies   
heat and cold intolerance.  
Musculoskeletal: See above.  
Derm: Denies rash. Denies jaundice   
and diffuse pruritis.  
Heme: Denies unusual bleeding and   
unexplained bruising.  
Psych: Normal mood.  
  
PHYSICAL EXAM:  
Vitals: Blood pressure 115/70,   
pulse 81, temperature 36.8 C (98.2   
F), height 167 cm (5' 5.75 ),   
weight 132.2 kg (291 lb 8 oz),   
SpO2 98 %.  
Well-appearing and in no acute   
distress.  
EYES: Sclerae are anicteric   
bilaterally.  
LYMPHATIC: There is no palpable   
cervical, supraclavicular or   
axillary adenopathy.  
RESPIRATORY: Inspiratory breath   
sounds are of normal intensity in   
all fields. No rales, wheezes or   
rhonchi.  
CARDIOVASCULAR: Rhythm is regular.  
BREAST: Declined chaperone. There   
is some contraction of the right   
breast secondary to radiation. In   
the lower inner quadrant there is   
chronic swelling/edema of the   
skin. No discrete nodule   
appreciated in the breast tissue   
itself although palpatory exam is   
difficult secondary to radiation   
changes. Left breast no concerning   
mass or nodule.  
ABDOMEN: The abdomen is   
nondistended.  
Extremities: No swelling or edema.  
SKIN: No jaundice or rash. No   
petechiae.  
NEUROLOGIC: CNs II-XII are grossly   
intact.  
  
LABS:  
Component Latest Ref Rng & Units   
2023  
WBC 3.70 - 11.00 k/uL 11.42 (H)  
RBC 3.90 - 5.20 m/uL 5.56 (H)  
Hemoglobin 11.5 - 15.5 g/dL 14.7  
Hematocrit 36.0 - 46.0 % 45.0  
MCV 80.0 - 100.0 fL 80.9  
MCH 26.0 - 34.0 pg 26.4  
MCHC 30.5 - 36.0 g/dL 32.7  
RDW-CV 11.5 - 15.0 % 17.6 (H)  
Platelet Count 150 - 400 k/uL 531   
(H)  
MPV 9.0 - 12.7 fL 8.9 (L)  
Neut% % 71.6  
Abs Neut (ANC) 1.45 - 7.50 k/uL   
8.17 (H)  
Lymph% % 17.4  
Abs Lymph 1.00 - 4.00 k/uL 1.99  
Mono% % 6.1  
Abs Mono <0.87 k/uL 0.70  
Eosin% % 3.4  
Abs Eosin <0.46 k/uL 0.39  
Baso% % 0.5  
Abs Baso <0.11 k/uL 0.06  
Immature Gran % % 1.0  
IMMATURE GRANS (ABS) <0.10 k/uL   
0.11 (H)  
NRBC /100 WBC 0.0  
Absolute nRBC <0.01 k/uL <0.01  
DTYPE Auto  
Protein, Total 6.3 - 8.0 g/dL 7.3  
Albumin 3.9 - 4.9 g/dL 3.7 (L)  
Calcium 8.5 - 10.2 mg/dL 9.5  
Bilirubin, Total 0.2 - 1.3 mg/dL   
0.3  
Alkaline Phosphatase 34 - 123 U/L   
70  
AST 13 - 35 U/L 40 (H)  
ALT 7 - 38 U/L 47 (H)  
Glucose 74 - 99 mg/dL 225 (H)  
BUN 7 - 21 mg/dL 9  
Creatinine 0.58 - 0.96 mg/dL 0.84  
Sodium 136 - 144 mmol/L 137  
Potassium 3.7 - 5.1 mmol/L 3.9  
Chloride 97 - 105 mmol/L 101  
CO2 22 - 30 mmol/L 25  
Anion Gap 9 - 18 mmol/L 11  
eGFR >=60 mL/min/1.73m 78  
  
ASSESSMENT/PLAN:  
(C50.511, Z17.0) Malignant   
neoplasm of lower-outer quadrant   
of right breast of female,   
estrogen receptor positive (HCC)   
(primary encounter diagnosis)  
(R92.8) Abnormal mammogram  
Assessment:  
-pT1c pN0 (3/3 sentinel lymph   
nodes negative for metastatic   
disease) stage IA infiltrating   
ductal carcinoma the right breast.   
ER/CA >95%, [id number] non-amplified   
by FISH.  
-Has been on exemestane  
-Reviewed mammogram. New   
calcifications lower inner   
quadrant right breast.  
-Discussed with Dr. aPtricio.  
Plan:  
-Continue exemestane for now.  
-Diagnostic mammogram right breast   
with ultrasound.  
-Further plan pending those   
results.  
  
(R31.0) Gross hematuria  
Assessment:  
-She had painless gross hematuria   
with passing of clots on   
presentation to the ED. UA   
demonstrated low-level E. coli.   
She is a former smoker. I   
discussed and recommended urology   
evaluation for opinion on   
cystoscopy.  
Plan:  
-Referral to Dr. Dejesus.  
  
Portions of this documentation   
were copied and pasted from   
previous office visit notes in   
order to provide a cohesive   
continuity of the history. The   
note has been reviewed and edited   
and updated as necessary.  
  
I spent a total of 50 minutes on   
the date of the service which   
included preparing to see the   
patient, face-to-face patient   
care, completing clinical   
documentation, obtaining and/or   
reviewing separately obtained   
history, performing a medically   
appropriate examination,   
counseling and educating the   
patient/family/caregiver, ordering   
medications, tests, or procedures,   
communicating with other HCPs (not   
separately reported),   
independently interpreting results   
(not separately reported),   
communicating results to the   
patient/family/caregiver, and care   
coordination (not separately   
reported).  
  
Francis Shell DO  
  
Cc: Cynthia Douglas MD  
Electronically signed by Francis Shell DO at 2023 11:10 AM   
EST  
documented in this encounter            [facility]  
   
                                                    2023 Miscellaneous   
Notes                                   Formatting of this note might be   
different from the original.  
2023  
  
PID: [id number] Navdeep Guillen  
[address] [id number]  
  
Dear Ms. Guillen,  
  
Your recent breast imaging exam on   
2023 showed a possible   
finding that requires additional   
imaging studies for a complete   
evaluation. Most such findings are   
probably benign (not cancer).  
  
If you have a healthcare provider   
who ordered/prescribed your   
screening mammogram: Please call   
[phone] or [phone]   
EXT: [phone] to schedule an   
appointment for your additional   
imaging (if you have not already   
done so).  
  
If you DO NOT have a healthcare   
provider (ie you did not have an   
order/prescription for your   
screening mammogram):  
Please call [phone] to   
schedule an appointment for your   
additional imaging (if you have   
not already done so).  
  
You must have an   
order/prescription from your   
physician when calling to schedule   
your appointment. If your   
order/prescription is not   
electronic, you must bring the   
hard copy with you on the day of   
your exam to avoid delays.  
Your imaging studies and reports   
are kept on file at [facility] as part of your permanent   
medical record, and are available   
for your continuing care.  
  
Thank you for allowing us to help   
in meeting your health care needs.  
  
Sincerely,  
  
Dr. Delgadillo  
Interpreting Radiologist  
[facility]  
  
(Additional imaging)  
Electronically signed by   
Mammography Coordinator at   
2023 9:48 AM EST  
documented in this encounter            [facility]  
   
                                                    2023 History of   
Present illness Narrative               Formatting of this note might be   
different from the original.  
Radiology Service Progress Note  
  
PATIENT NAME: Navdeep Guillen  
MRN: [id number]  
  
DATE OF SERVICE: 2023  
TIME: 10:48 AM  
PATIENT IDENTITY VERIFICATION   
COMPLETED USING TWO (2)   
IDENTIFIERS: Name and Date of   
Birth confirmed by patient   
verbally.  
FALL SCREENING: Has the patient   
had 2 falls in the last year or 1   
fall with injury or currently   
using an Ambulatory Assistive   
Device (Walker, Cane, Wheelchair,   
Crutches, etc.)? No  
PATIENT GENDER DATA: Female.   
Pregnancy status: Pregnant: No   
Breastfeeding status: NO.  
PATIENT RELEVANT IMPLANT DATA   
REVIEWED: Not Applicable  
  
RADIOLOGY DEPARTMENT: Mammography  
  
PERIPHERAL IV DATA: Not applicable  
  
SIGNED BY: RT Que(R)  
2023 10:48 AM  
  
  
Electronically signed by Italia Barfield RT(R) at 2023 10:48   
AM EST  
documented in this encounter            [facility]  
   
                                                    2023 History of   
Present illness Narrative               Formatting of this note is   
different from the original.  
Reason for Visit  
Patient presents with:  
Same Day Appointment: right upper   
thigh bump size of pea  
  
Navdeep Guillen is a 62 year old   
female who presents here today for   
Above Complaints.  
  
Health Maintenance  
COVID-19 VACCINE(1)  
PNEUMOCOCCAL(1 - PCV)  
HIV SCREENING  
SHINGRIX VACCINE(1 of 2)  
PAP TESTING  
HPV TESTING  
COLORECTAL CANCER SCREENING  
DILATED RETINAL EXAM  
DEPRESSION ASSESSMENT  
MAMMOGRAM  
URINE ALBUMIN:CREATININE RATIO  
  
HPI  
  
Being treated for diverticulitis   
currently with cipro and flagyl.   
She has been to the emergency   
room, they had her on IV abx and  
On clear liquids, 2 ct scans were   
done. Was there for 3 days, there   
they noted it to be subacute and   
discussed surgery for her. She has   
a follow up with surgeon.. since   
taking abx her sugars are on the   
lower side.She has not taken   
metformin and her sugar was a   
little on the lower side. Still on   
the glipizide. Patient is eating   
different, but not eating lesser   
than normal. She is eating soft   
foods. Patient has intentional   
weight loss. She was not eating   
all that much. She feels bloated   
and gassy with medications.   
Patient does have a follow up   
appointment.  
Has developed fungal infection  
  
She is here today also for a bump   
on the upper thigh, It is actually   
in the lower thing and very   
superficial , around 0.8 cms and   
hard like a cyst, she was   
concerned about dvt but we   
reassure her that it is not likely   
to be from dvt.  
  
No problem-specific Assessment &   
Plan notes found for this   
encounter.  
  
PAST MEDICAL HISTORY  
Diagnosis Date  
Abnormal mammogram 2021  
Achilles tendon pain 2015  
Ankle weakness 2015  
Chronic pain of left ankle   
12/15/2016  
Colon abnormality 2014  
Thickening of the ascending colon   
seen on the recent CT scan.   
Patient has had a colonoscopy   
around 4 years ago. Records were   
obtained for when they were done,   
4 years ago , in [facility]. her colonoscopy was   
completely normal, no thickening,   
and the biopsy too was normal   
except for lymphoid aggregates.  
DJD (degenerative joint disease),   
ankle and foot 2016  
DM (diabetes mellitus) (HCC)  
GERD (gastroesophageal reflux   
disease)  
Gross hematuria 05/15/2014  
2014, had hematuria, investigated   
extensively along with cytoscopy,   
a stone was found but no signs of   
any malignancy.  
Heel pain, chronic 12/15/2016  
Hepatic steatosis 10/03/2017  
Hiatal hernia 10/03/2017  
Hypertension  
Insomnia  
Kidney stone 05/15/2014  
Morbid obesity (HCC)  
Nephrolithiasis  
Neuritis 2016  
Renal lesion 05/15/2014  
S/P Achilles tendon repair   
2015 sx done  
  
  
PAST SURGICAL HISTORY  
Procedure Laterality Date  
BREAST LUMPECTOMY HX Right 2017  
BX BREAST W/DEVICE 1ST LESION   
STEREOTACTIC GUID Right 2021  
CHOLECYSTECTOMY   
gallbladder removal  
COLONOSCOPY   
CT ABDOMEN 2014  
PAST SURGICAL HISTORY OF   
2014  
D&C hysteroscopy  
PAST SURGICAL HISTORY OF Left   
2015  
Left foot surgery  
S PK LAVH [id number] 10/16/2014  
  
FAMILY HISTORY  
Problem Relation Age of Onset  
Ovarian cancer Mother 39  
Heart Father  
Hypertension Father  
Prostate Cancer Father 78  
other (kidney stones) Brother  
Hypertension Brother  
Diabetes Brother  
Seizures Son  
Breast Cancer Other 55  
Double first cousin (daughter of   
mother's sister and father's   
brother)  
  
Social History  
  
Tobacco Use  
Smoking status: Former  
Packs/day: 1.00  
Years: 10.00  
Pack years: 10.00  
Types: Cigarettes  
Quit date: 5/15/2007  
Years since quitting: 15.7  
Smokeless tobacco: Never  
Substance Use Topics  
Alcohol use: No  
Drug use: Yes  
Comment: marijuana for insomnia -   
currently not using  
  
Past medical history,   
appointments, medications,   
allergies reviewed.  
Pertinent Lab/Diagnostic Studies   
are reviewed and discussed today  
  
Current Outpatient Medications:  
potassium chloride 20 mEq TbER  
atenolol (TENORMIN) 50 mg tablet  
exemestane (AROMASIN) 25 mg tablet  
benzonatate (TESSALON PERLES) 100   
mg capsule  
albuterol HFA (PROAIR HFA) 90   
mcg/actuation inhaler  
chlorthalidone (HYGROTON) 25 mg   
tablet  
metFORMIN ER (GLUCOPHAGE XR) 500   
mg 24 hr tablet  
glipiZIDE (GLUCOTROL XL) 2.5 mg 24   
hr tablet  
blood sugar diagnostic (BLOOD   
GLUCOSE TEST) test strip  
ibuprofen (MOTRIN) 800 mg tablet  
ascorbic acid, vitamin C, (VITAMIN   
C) 500 mg tablet  
lancets (ONE TOUCH DELICA) 33   
gauge misc  
Blood-Glucose Meter monitoring kit  
MV-MN/FOLIC ACID/CALCIUM/VIT K   
(ONE-A-DAY WOMEN'S 50 PLUS ORAL)  
  
Review of Systems  
CONSTITUTIONAL: No fevers, chills   
night sweats, unintended weight   
loss  
CARDIOVASCULAR: No chest pain,   
dyspnea, palpitations, orthopnea,   
PND, ankle edema.  
PULM: No dyspnea, unexplained   
cough.  
GI: No dysphagia/odynophagia,   
problematic reflux, constipation,   
diarrhea, changes in stool habits,   
hematochezia, melena.  
: No new urinary complaints,   
including dysuria, gross hematuria   
or pyuria.  
NEURO: No new balance problems,   
peripheral weakness/paresthesias   
or numbness of concern.  
  
Physical Exam  
/82 (BP Site: Left Arm, BP   
Position: Sitting, BP Cuff Size:   
Large Adult)   Pulse 71   Temp   
36.8 C (98.3 F)   Resp 16   Ht   
165.1 cm (5' 5 )   Wt 131.5 kg   
(290 lb)   SpO2 97%   BMI 48.26   
kg/m  
General appearance: Well   
appearing, alert, in no acute   
distress, well nourished.  
Skin: Skin color, texture, turgor   
normal, no suspicious rashes or   
lesions  
Head: Normocephalic, no masses,   
lesions, tenderness or   
abnormalities  
Eyes: Anicteric sclera. Pupils are   
equally round and reactive to   
light. Extraocular movements are   
intact.  
Lungs: Lungs clear to   
auscultation. No wheezing,   
rhonchi, rales  
Heart: RRR without murmur, gallop,   
or rubs.  
Extremities: No deformities,   
edema, skin discoloration,   
clubbing or cyanosis. Good   
capillary refill.  
  
ASSESSMENT/PLAN:  
1. Primary hypertension - ICD9:   
401.9, ICD10: I10 (primary   
diagnosis)  
- good control  
- Recommended regular aerobic   
exercise.  
- Recommend home blood pressure   
monitoring, to bring results in on   
next visit  
- Goal of BP <130/80  
  
2. Controlled type 2 diabetes   
mellitus without complication,   
without long-term current use of   
insulin (HCC) - ICD9: 250.00,   
ICD10: E11.9  
Advised to check sugars more often  
  
3. Diverticulitis - ICD9: 562.11,   
ICD10: K57.92  
She has been told that this is   
subacute and she might need   
surgery in the future. She is   
going to follow-up with surgeon to   
call team. Developed candidiasis   
for which I gave her Diflucan to   
take after her antibiotics.  
Cynthia Douglas MD  
  
  
Electronically signed by Cynthia Douglas MD at 2023 5:52 PM   
EST  
documented in this encounter            [facility]  
   
                                                    2023 Miscellaneous   
Notes                                   Formatting of this note is   
different from the original.  
Patient has been identified by   
name and date of birth: NO  
Patient phones for refill(s):  
Requested Prescriptions  
  
Pending Prescriptions Disp Refills  
potassium chloride 20 mEq TbER 180   
tablet 1  
Sig: Take 1 tablet by mouth twice   
daily.  
  
Date of last office visit in   
primary care: 2022  
Last 2 Encounter Wt Readings:  
Date: Wt:  
2022 137.7 kg (303 lb 9.6   
oz)  
2022 132.9 kg (293 lb)  
Previous labs/tests for   
medication: Not applicable  
Please advise. Thank you. Kellie Walls LPN  
  
Electronically signed by Kellie Walls LPN at 2023 2:21 PM   
EST  
documented in this encounter            [facility]  
   
                                                    2023 Miscellaneous   
Notes                                   Formatting of this note might be   
different from the original.  
Patient calling, she has noticed   
since doubling her potassium she   
is having some constipation.   
States that she bought miralax but   
has not taken any. Wondering if it   
is ok to start with that. She is   
having small bowel movements and   
passing gas. Please advise.  
Electronically signed by Gayla Skaggs LPN at 2023 11:10 AM   
EST  
documented in this encounter            [facility]  
   
                                                    2023 Miscellaneous   
Notes                                   Formatting of this note might be   
different from the original.  
Left below results on identified   
vm.  
Helen Zollinger LPN  
  
Electronically signed by Helen E Zollinger LPN at 2023 1:09   
PM EST  
Formatting of this note might be   
different from the original.  
----- Message from Cynthia Douglas MD sent at 2023 5:43 PM EST   
-----  
Hba1c is a little better navdeep   
and the lipids and thyroid are   
stable  
Electronically signed by Helen E Zollinger LPN at 2023 1:07   
PM EST  
documented in this encounter            [facility]  
   
                                                    2022 Miscellaneous   
Notes                                   Formatting of this note might be   
different from the original.  
Pt is calling for a refill for   
atenolol  
  
LOV: 22  
NOV: 3/27/23  
Last Refill: 22 #90 3   
refills--this order was canceled   
bc pt was going to increase med to   
100mg then her BP's were in normal   
range so she continued at 50mg but   
order was already canceled at   
pharmacy so now pt needs a refill.  
  
Pt reports she is still getting   
good readings with her BP, states   
systolic has not been over 120.  
  
Casie Arellano LPN  
  
  
Electronically signed by Casie Arellano LPN at 2022 12:19 PM   
EST  
documented in this encounter            [facility]  
   
                                        2022 Instructions   
  
  
NIC Tyson.CNP -   
2022 12:25 PM ESTFormatting   
of this note might be different   
from the original.  
COLD AND SINUS PATIENT   
INSTRUCTIONS  
  
AS A DECONGESTANT  
Use a nasal saline 3 times daily  
Directions; 1-2 squirts in each   
nostril  
Saline suggestions; Ocean Spray or   
Little Noses or Salt and Water  
  
AT BEDTIME TO HELP WITH CONGESTION  
Use a cool mist vaporizer  
May use Vicks Vapor Rub on the   
chest  
Elevate the head to aid with   
coughing post nasal drip  
  
ACUTE SINUSITIS OVERVIEW  
Rhinosinusitis, or more commonly   
sinusitis, is the medical term for   
inflammation (swelling) of the   
lining of the sinuses and nose.   
The sinuses are the hollow areas   
within the facial bones that are   
connected to the nasal openings.   
The sinuses are lined with mucous   
membranes, similar to the inside   
of the nose.  
There are two main types of   
sinusitis: acute and chronic.   
Acute sinusitis is inflammation   
that lasts for less than four   
weeks while chronic sinusitis   
lasts for more than 12 weeks.   
Acute sinusitis is common,   
affecting approximately one   
million people per year in the   
[place].  
  
ACUTE SINUSITIS CAUSES  
The most common cause of acute   
sinusitis is a viral infection   
associated with the common cold.   
Bacterial sinusitis occurs much   
less commonly, in only 0.5 to 2   
percent of cases, usually as a   
complication of viral sinusitis.  
Because antibiotics are effective   
only against bacterial, and not   
viral, infections, most people do   
not need antibiotics for acute   
sinusitis.  
  
ACUTE SINUSITIS SYMPTOMS  
Symptoms of acute sinusitis   
include:  
Nasal congestion or blockage  
Thick, yellow to green discharge   
from the nose  
Pain in the teeth  
Pain or pressure in the face that   
is worse when bending forwards  
Other acute sinusitis symptoms can   
include fever (temperature greater   
than 100.4 F or 38 C), fatigue,   
cough, difficulty or inability to   
smell, ear pressure or fullness,   
headache, and bad breath.  
In most cases, these symptoms   
develop over the course of one day   
and begin to improve within seven   
to 10 days.  
  
DO I NEED TO BE EXAMINED?  
It is difficult to know if you   
have a viral or bacterial sinus   
infection initially. However, most   
people with a viral infection   
improve without treatment within   
seven to 10 days after symptoms   
begin. Bacterial sinusitis also   
sometimes improves without   
treatment, although it can also   
worsen and require treatment.  
If one or more of the following   
bothersome symptoms last more than   
seven days, an examination by a   
healthcare provider is   
recommended:  
Thick, yellow to green discharge   
from the nose  
Face or tooth pain, especially if   
it is only on one side  
Tenderness over the maxillary   
sinuses (located on the left and   
right side of the nose, inside the   
cheekbones)  
Symptoms that initially improve   
and then worsen  
  
WHEN TO SEEK IMMEDIATE HELP  
If you have one or more of the   
following symptoms, you should   
seek medical attention immediately   
(even if symptoms have been   
present for less than seven days):  
High fever (>102.5 F or 39.2 C)  
Sudden, severe pain in the face or   
head  
Double vision or difficulty seeing  
Confusion or difficulty thinking   
clearly  
Swelling or redness around one or   
both eyes  
Stiff neck, shortness of breath  
  
ACUTE SINUSITIS TREATMENT  
Initial treatment of a sinus   
infection aims to relieve symptoms   
since almost everyone will improve   
within the first seven to 10 days.   
Experts recommend avoiding   
antibiotics during this time   
unless there is clear evidence of   
a severe bacterial infection.  
  
INITIAL TREATMENT  
Pain relief -- Non-prescription   
pain medications, such as   
acetaminophen (eg, Tylenol ) or   
ibuprofen (eg, Motrin , Advil )   
are recommended for pain.  
Nasal irrigation and saline sprays   
-- Rinsing the nose with a   
salt-water (saline) solution is   
called nasal irrigation or nasal   
lavage. Saline is also available   
in a standard nasal spray,   
although this is not as effective   
as using larger amounts of water   
in an irrigation.  
Nasal irrigation is particularly   
useful for treating drainage down   
the back of the throat, sneezing,   
nasal dryness, and congestion. The   
treatment helps by rinsing out   
allergens and irritants from the   
nose. Saline rinses also clean the   
nasal lining and can be used   
before applying sprays containing   
medications, to get a better   
effect from the medication.  
Nasal lavage with warmed saline   
can be performed as needed, once   
per day, or twice daily for   
increased symptoms. Nasal lavage   
carries few risks when performed   
correctly. Saline nasal sprays and   
irrigation kits can be purchased   
over-the-counter. Saline mixes can   
also be purchased or patients can   
make their own solution.  
A variety of devices, including   
bulb syringes, Neti pots, and   
bottle sprayers, may be used to   
perform nasal lavage; instructions   
for nasal lavage are provided in   
the table. At least 200 mL (about   
3/4 cup) of fluid is recommended   
for each nostril.  
Nasal decongestants -- Nasal   
decongestant sprays, including   
oxymetazoline (Afrin ) and   
phenylephrine (Jesse-synephrine )   
can be used to temporarily treat   
congestion. However, these sprays   
should not be used for more than   
two to three days due to the risk   
of rebound congestion (when the   
nose is congested constantly   
unless the medication is used   
repeatedly).  
Other treatments -- Other   
treatments for congestion, such as   
oral antihistamines (such as   
diphenhydramine/Benadryl ) or zinc   
supplements are not proven to   
improve symptoms of sinusitis and   
can have unwanted side effects.   
Medications to thin secretions   
(such as guaifenesin) may help to   
clear mucus.  
Secondline treatment -- If   
symptoms have not improved in   
seven to ten days, you should   
arrange for medical evaluation.   
You may need further treatment.  
Nasal glucocorticoids -- Nasal   
glucocorticoids (steroids   
delivered by a nasal spray) can   
help to reduce swelling inside the   
nose, usually within two to three   
days. These drugs have few side   
effects and dramatically relieve   
symptoms in most people.  
There are a number of nasal   
glucocorticoids available by   
prescription. These drugs are all   
effective, but differ in how   
frequently they must be used and   
how much they cost.  
You may need to use a nasal   
decongestant for a few days before   
starting a nasal glucocorticoid to   
reduce nasal swelling; this will   
allow the nasal glucocorticoid to   
reach more areas of the nasal   
passages  
Do I need an antibiotic? -- If   
bothersome symptoms of sinusitis   
persist for 10 or more days, it is   
possible that you have bacterial   
sinusitis. The need for   
antibiotics depends upon the   
severity of your symptoms.  
Mild symptoms -- There are two   
possible treatment options if you   
have mild sinusitis symptoms:   
treat with antibiotics or continue   
to watch and wait for one week.  
Watching and waiting is a   
reasonable option because up to 75   
percent of people with bacterial   
sinusitis improve within one month   
without antibiotics. During the   
watch and wait period, treatments   
to improve symptoms are   
recommended.  
If symptoms worsen or do not   
improve after watching and   
waiting, treatment with an   
antibiotic is usually recommended.   
Treatments to relieve symptoms are   
recommended while using   
antibiotics.  
Moderate or severe symptoms --   
Most healthcare providers will   
prescribe an antibiotic for   
moderate to severe symptoms   
(temperature >38.3 C or 101 F   
and/or severe pain that interferes   
with usual activities).  
Treatments to relieve symptoms are   
also recommended during antibiotic   
treatment.  
One of the least expensive and   
most effective antibiotics for   
sinusitis is amoxicillin. An   
alternate antibiotic will be   
prescribed if you are allergic to   
penicillin. Regardless of which   
antibiotic is prescribed, it is   
important to follow the dosing   
instructions carefully and to   
finish the entire course of   
treatment. Taking the medication   
less often than prescribed or   
stopping the medication early can   
lead to complications, such as a   
recurrent infection.  
What if I do not improve with   
treatment? -- If you do not   
improve or worsen after a course   
of antibiotics, you should be   
re-examined.  
In some cases, symptoms of   
sinusitis improve but then recur.   
This is usually because the   
infection was not completely   
eliminated by the antibiotic. An   
alternate antibiotic, extended   
antibiotic treatment, and/or   
further testing may be   
recommended, depending upon your   
individual situation.  
Electronically signed by Jasbir Govea APRN.CNP at 2022   
12:25 PM EST  
  
documented in this encounter            [facility]  
   
                                                    2022 History of   
Present illness Narrative               Formatting of this note is   
different from the original.  
Subjective  
HPI  
Nontoxic-appearing female presents   
urgent care chief complaint cough   
and chest congestion. Duration of   
symptoms 10 days. Associated   
symptoms cough chest congestion   
sinus pressure. States initially   
she did have body aches chills   
sore throat headache. Son had   
similar signs and symptoms. Fever   
body aches and chills headache and   
sore throat has since subsided.   
Presents today with new worsening   
sinus pressure. No OTC medications   
used today. Denies any fever body   
aches chills productive cough   
chest pain shortness of breath   
pleuritic pain hemoptysis nausea   
vomiting abdominal pain change in   
bowel or bladder habits. Past   
medical history prescription   
medication use and allergies   
reviewed.  
  
.Patient presents with:  
Cough: Cough, chest congestion and   
scratchy throat x 10 days  
  
PAST MEDICAL HISTORY  
Diagnosis Date  
Abnormal mammogram 2021  
Achilles tendon pain 2015  
Ankle weakness 2015  
Chronic pain of left ankle   
12/15/2016  
Colon abnormality 2014  
Thickening of the ascending colon   
seen on the recent CT scan.   
Patient has had a colonoscopy   
around 4 years ago. Records were   
obtained for when they were done,   
4 years ago , in [facility]. her colonoscopy was   
completely normal, no thickening,   
and the biopsy too was normal   
except for lymphoid aggregates.  
DJD (degenerative joint disease),   
ankle and foot 2016  
DM (diabetes mellitus) (HCC)  
GERD (gastroesophageal reflux   
disease)  
Gross hematuria 05/15/2014  
2014, had hematuria, investigated   
extensively along with cytoscopy,   
a stone was found but no signs of   
any malignancy.  
Heel pain, chronic 12/15/2016  
Hepatic steatosis 10/03/2017  
Hiatal hernia 10/03/2017  
Hypertension  
Insomnia  
Kidney stone 05/15/2014  
Morbid obesity (HCC)  
Nephrolithiasis  
Neuritis 2016  
Renal lesion 05/15/2014  
S/P Achilles tendon repair   
2015 sx done  
  
  
PAST SURGICAL HISTORY  
Procedure Laterality Date  
BREAST LUMPECTOMY HX Right 2017  
BX BREAST W/DEVICE 1ST LESION   
STEREOTACTIC GUID Right 2021  
CHOLECYSTECTOMY 2011  
gallbladder removal  
COLONOSCOPY   
CT ABDOMEN 2014  
PAST SURGICAL HISTORY OF   
2014  
D&C hysteroscopy  
PAST SURGICAL HISTORY OF Left   
2015  
Left foot surgery  
S PK LAVH [id number] 10/16/2014  
  
ALLERGIES Silvadene [Silver   
Sulfadiazine] and Sulfa   
(Sulfonamide Antibiotics)  
  
MEDICATIONS  
chlorthalidone (HYGROTON) 25 mg   
tablet Take 0.5 tablets by mouth   
once daily.  
potassium chloride 20 mEq TbER   
Take 1 tablet by mouth twice   
daily.  
metFORMIN ER (GLUCOPHAGE XR) 500   
mg 24 hr tablet Take 1 tablet by   
mouth daily with breakfast.  
glipiZIDE (GLUCOTROL XL) 2.5 mg 24   
hr tablet Take 1 tablet by mouth   
once daily.  
blood sugar diagnostic (BLOOD   
GLUCOSE TEST) test strip Test   
blood sugars 2daily.   
Dx:ucnontrolled diabetes .   
Insulin: Yes  
ibuprofen (MOTRIN) 800 mg tablet   
Take 1 tablet by mouth every 8   
hours as needed for pain. Take   
with food.  
exemestane (AROMASIN) 25 mg tablet   
Take 1 tablet by mouth once daily.  
ascorbic acid, vitamin C, (VITAMIN   
C) 500 mg tablet Take 500 mg by   
mouth once daily.  
lancets (ONE TOUCH DELICA) 33   
gauge misc Test blood sugar(s) 2   
daily. Dx: Type 2 DM - Controlled   
E11.9 Insulin: Yes  
Blood-Glucose Meter monitoring kit   
Glucose Meter of Choice - Kit -   
Dx: Type 2 DM - Uncontrolled   
E11.65  
MV-MN/FOLIC ACID/CALCIUM/VIT K   
(ONE-A-DAY WOMEN'S 50 PLUS ORAL)   
Take 1 tablet by mouth once daily.  
  
atenolol (TENORMIN) 100 mg tablet   
Take 1 tablet by mouth once daily.  
Vitamin E, dl, acetate, (VITAMIN   
E) 100 unit capsule Take 100 Units   
by mouth once daily.  
polyethylene glycol 3350 (MIRALAX,   
GLYCOLAX) 17 gram/dose powder Use   
as directed for Miralax / Gatorade   
Bowel Prep Kit  
Gatorade Sports Drink Use as   
directed for Miralax / Gatorade   
Bowel Prep Kit  
cholecalciferol, vitamin D3,   
(VITAMIN D3 ORAL) Take 1 tablet by   
mouth once daily.  
  
FAMILY HISTORY  
Problem Relation Age of Onset  
Ovarian cancer Mother 39  
Heart Father  
Hypertension Father  
Prostate Cancer Father 78  
other (kidney stones) Brother  
Hypertension Brother  
Diabetes Brother  
Seizures Son  
Breast Cancer Other 55  
Double first cousin (daughter of   
mother's sister and father's   
brother)  
  
Social History  
  
Tobacco Use  
Smoking status: Former  
Packs/day: 1.00  
Years: 10.00  
Pack years: 10.00  
Types: Cigarettes  
Quit date: 5/15/2007  
Years since quitting: 15.5  
Smokeless tobacco: Never  
Substance Use Topics  
Alcohol use: No  
Drug use: Yes  
Comment: marijuana for insomnia -   
currently not using  
  
/74   Pulse 65   Temp 36.4 C   
(97.5 F) (Tympanic)   Resp 18   Wt   
(!) 137.7 kg (303 lb 9.6 oz)     
SpO2 97%   BMI 50.52 kg/m  
  
Review of Systems  
Constitutional: Negative for   
chills, fever and malaise/fatigue.  
HENT: Positive for congestion and   
sinus pain. Negative for ear   
discharge, ear pain and sore   
throat.  
Eyes: Negative for blurred vision,   
pain, discharge and redness.  
Respiratory: Positive for cough.   
Negative for hemoptysis, sputum   
production, shortness of breath,   
wheezing and stridor.  
Cardiovascular: Negative for chest   
pain.  
Gastrointestinal: Negative for   
abdominal pain, diarrhea, nausea   
and vomiting.  
Musculoskeletal: Negative for   
myalgias.  
Skin: Negative for itching and   
rash.  
Neurological: Negative for   
dizziness and headaches.  
  
  
Objective  
Physical Exam  
Constitutional:  
General: She is not in acute   
distress.  
Appearance: She is not   
diaphoretic.  
HENT:  
Head: Normocephalic.  
Right Ear: Tympanic membrane, ear   
canal and external ear normal.  
Left Ear: Tympanic membrane, ear   
canal and external ear normal.  
Nose:  
Right Sinus: Frontal sinus   
tenderness present.  
Left Sinus: Frontal sinus   
tenderness present.  
Mouth/Throat:  
Lips: Pink.  
Mouth: Mucous membranes are moist.  
Pharynx: Oropharynx is clear. No   
pharyngeal swelling, oropharyngeal   
exudate, posterior oropharyngeal   
erythema or uvula swelling.  
Eyes:  
Conjunctiva/sclera: Conjunctivae   
normal.  
Pupils: Pupils are equal, round,   
and reactive to light.  
Cardiovascular:  
Rate and Rhythm: Normal rate and   
regular rhythm.  
Heart sounds: Normal heart sounds.  
Pulmonary:  
Effort: Pulmonary effort is   
normal. No tachypnea, accessory   
muscle usage or respiratory   
distress.  
Breath sounds: Normal breath   
sounds. No stridor.  
Abdominal:  
Palpations: Abdomen is soft.  
Tenderness: There is no abdominal   
tenderness.  
Musculoskeletal:  
Cervical back: Normal range of   
motion and neck supple. No   
rigidity or tenderness.  
Lymphadenopathy:  
Cervical: No cervical adenopathy.  
Skin:  
General: Skin is warm and dry.  
Neurological:  
Mental Status: She is alert and   
oriented to person, place, and   
time.  
  
ASSESSMENT/PLAN:  
1. Sinobronchitis - ICD9: 473.9,   
490, ICD10: J32.9, J40  
  
Patient was diagnosed with   
sinobronchitis. Does have a cough.   
No advantageous lung sounds was   
noted. Maxillary sinus pressure.   
More pronounced frontal sinus   
pressure with palpation. Will be   
placed on doxycycline. Has   
tolerated this in the past. Red   
flags for prompt reevaluation   
discussed. Patient was educated on   
supportive therapies. Patient will   
follow up with primary care   
provider as needed. Patient was   
instructed to immediately proceed   
to emergency room for any new,   
worsening, or symptoms lasting   
longer than anticipated. The   
patient's clinical presentation is   
otherwise unremarkable at this   
time. Based on exam and clinical   
finding, the patient is stable for   
discharge. Plan of care was   
discussed with patient. Patient   
verbalizes understanding and   
agrees to plan of care. This note   
was generated using Dragon   
software. It may contain errors in   
wording, punctuation, or spelling.  
  
NIC Tyson.LUIS ENRIQUE  
  
  
Electronically signed by Jasbir Govea APRN.CNP at 2022   
12:32 PM EST  
documented in this encounter            [facility]  
   
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  
   
                                                      
  
  
Primary cancer of lower outer quadrant of right female breast (HCC)  
   
                                                      
  
  
Carcinoma of right breast, estrogen and progesterone receptor positive (HCC)  
  
documented in this encounter  
[facility]   
  
                                                    Diagnosis  
   
                                                      
  
  
Type 2 diabetes mellitus without complication, without long-term current use of   
insulin (HCC)  
  
documented in this encounter  
[facility]   
  
                                                    Diagnosis  
   
                                                      
  
  
Type 2 diabetes mellitus without complication, without long-term current use of   
insulin (HCC)  
  
documented in this encounter  
[facility]   
  
                                                    Diagnosis  
   
                                                      
  
  
Primary cancer of lower outer quadrant of right female breast (HCC)  
   
                                                      
  
  
Carcinoma of right breast, estrogen and progesterone receptor positive (HCC)  
   
                                                      
  
  
Encounter for screening mammogram for malignant neoplasm of breast  
  
  
Other screening mammogram  
  
documented in this encounter  
[facility]   
  
                                                    Diagnosis  
   
                                                      
  
  
Controlled type 2 diabetes mellitus without complication, without long-term 
current   
use of insulin (HCC)- Primary  
   
                                                      
  
  
Primary hypertension  
  
  
Unspecified essential hypertension  
   
                                                      
  
  
Morbid obesity with BMI of 40.0-44.9, adult (HCC)  
  
  
Morbid obesity  
  
documented in this encounter  
[facility]   
  
                                                    Diagnosis  
   
                                                      
  
  
Screening for osteoporosis  
  
  
Special screening for osteoporosis  
  
documented in this encounter  
[place] note*   
  
                                                    Diagnosis  
   
                                                      
  
  
Malignant neoplasm of lower-outer quadrant of right breast of female, estrogen   
receptor positive (HCC) (HCC)  
   
                                                      
  
  
Encounter for screening mammogram for high-risk patient  
  
documented in this encounter  
[facility] note*   
  
                                                    Diagnosis  
   
                                                      
  
  
Left flank pain- Primary  
  
  
Abdominal pain, unspecified site  
  
documented in this encounter  
[facility] note*   
  
                                                    Diagnosis  
   
                                                      
  
  
Malignant neoplasm of lower-outer quadrant of right breast of female, estrogen   
receptor positive (HCC)- Primary  
   
                                                      
  
  
Morbid obesity with BMI of 40.0-44.9, adult (HCC)  
  
  
Morbid obesity  
  
documented in this encounter  
[facility] note*   
  
                                                    Diagnosis  
   
                                                      
  
  
Primary cancer of lower outer quadrant of right female breast (HCC)  
   
                                                      
  
  
Carcinoma of right breast, estrogen and progesterone receptor positive (HCC)  
  
documented in this encounter  
[facility]   
  
                                                    Diagnosis  
   
                                                      
  
  
Primary cancer of lower outer quadrant of right female breast (HCC)  
   
                                                      
  
  
Carcinoma of right breast, estrogen and progesterone receptor positive (HCC)  
  
documented in this encounter  
[facility] note*   
  
                                                    Diagnosis  
   
                                                      
  
  
Type 2 diabetes mellitus without complication, without long-term current use of   
insulin (HCC)- Primary  
   
                                                      
  
  
Primary hypertension  
  
  
Unspecified essential hypertension  
   
                                                      
  
  
Hypercholesterolemia  
  
  
Pure hypercholesterolemia  
   
                                                      
  
  
Malignant neoplasm of lower-outer quadrant of right breast of female, estrogen   
receptor positive (HCC)  
   
                                                      
  
  
Seasonal allergic rhinitis, unspecified trigger  
  
documented in this encounter  
[facility] note*   
  
                                                    Diagnosis  
   
                                                      
  
  
Upper respiratory tract infection, unspecified type- Primary  
  
documented in this encounter  
[facility] for referral (narrative)* Diagnostic Procedure Only 
  (Routine) - Pending Review  
  
                          Specialty    Diagnoses / Procedures Referred By Contac  
t Referred To Contact  
   
                                        [facility]            
  
  
Diagnoses  
  
  
Abnormal mammogram  
  
  
  
Procedures  
  
  
US BREAST LTD RT  
  
  
US BREAST UNI REAL TIME   
WITH IMAGE LIMITED                        
  
  
Francis Shell DO  
  
  
[address] [place] [address]  
  
  
Phone: [phone]  
  
  
Fax: [phone]                         
  
  
[facility]  
  
  
[facility] [address]  
  
  
[place] [address]  
  
  
  
                          Referral ID  Status       Reason       Start   
Date                                    Expiration   
Date                                    Visits   
Requested                               Visits   
Authorized  
   
                                        [id number]            Pending   
Review                                    
  
  
[employer]  
ed Referral     2023        3/3/2024        1               1  
  
  
  
  
Electronically signed by Francis Shell DO at 2023 10:13 AM EST  
  
  
* Diagnostic Procedure Only (Routine) - Authorized  
  
                          Specialty    Diagnoses / Procedures Referred By Contac  
t Referred To Contact  
   
                                        [facility]            
  
  
Diagnoses  
  
  
Abnormal mammogram  
  
  
  
Procedures  
  
  
[facility]  
  
  
DIAGNOSTIC MAMMOGRAPHY   
COMPUTER-AIDED DETCJ UNI                  
  
  
Francis Shell DO  
  
  
[address] [place] [address]  
  
  
Phone: [phone]  
  
  
Fax: [phone]                         
  
  
[facility]  
  
  
[address] [place], [place]   
[address]  
  
  
  
                          Referral ID  Status       Reason       Start   
Date                                    Expiration   
Date                                    Visits   
Requested                               Visits   
Authorized  
   
                                [id number]        Authorized        
  
  
[employer]  
ed Referral     2023        3/3/2024        1               1  
  
  
  
  
Electronically signed by Francis Shell DO at 2023 10:13 AM [facility] for referral (narrative)* Diagnostic Procedure Only 
  (Routine) - Closed  
  
                          Specialty    Diagnoses / Procedures Referred By Contac  
t Referred To Contact  
   
                                        [facility]            
  
  
Diagnoses  
  
  
Primary cancer of lower   
outer quadrant of right   
female breast (HCC)  
  
  
Carcinoma of right breast,   
estrogen and progesterone   
receptor positive (HCC)  
  
  
Encounter for screening   
mammogram for malignant   
neoplasm of breast  
  
  
  
Procedures  
  
  
[facility] SCREEN[facility]  
  
  
SCREENING MAMMOGRAPHY BI   
2-VIEW BREAST INC Jacquelyn Davis MD  
  
  
[address] [place], [place] [id number]  
  
  
Phone: [phone]  
  
  
Fax: [phone]                         
  
  
[facility]  
  
  
[address] [place]   
[address]  
  
  
  
                    Referral ID Status    Reason    Start Date Expiration Date V  
isits   
Requested                               Visits   
Authorized  
   
                                [id number]        Closed            
  
  
Auto-Generate  
d Referral      2023       3/2/2023        1               1  
  
  
  
  
Electronically signed by Jacquelyn Milner MD at 2023 10:40 AM [facility] for visit Narrative* Diagnostic Procedure Only (Routine) 
  - Closed  
  
                          Specialty    Diagnoses / Procedures Referred By Contac  
t Referred To Contact  
   
                                        [facility]            
  
  
Diagnoses  
  
  
Primary cancer of lower   
outer quadrant of right   
female breast (HCC)  
  
  
Carcinoma of right breast,   
estrogen and progesterone   
receptor positive (HCC)  
  
  
Encounter for screening   
mammogram for malignant   
neoplasm of breast  
  
  
  
Procedures  
  
  
[facility]  
  
  
SCREENING MAMMOGRAPHY BI   
[facility] Jacquelyn Davis MD  
  
  
[address] [place] [address]  
  
  
Phone: [phone]  
  
  
Fax: [phone]                         
  
  
[facility]  
  
  
[address] [place]   
[address]  
  
  
  
                    Referral ID Status    Reason    Start Date Expiration Date V  
isits   
Requested                               Visits   
Authorized  
   
                                [id number]        Closed            
  
  
Auto-Generate  
d Referral      2023       3/2/2023        1               1  
  
  
  
[facility] Diagnostic Procedure Only (Routine) 
  - Closed  
  
                          Specialty    Diagnoses / Procedures Referred By Contac  
t Referred To Contact  
   
                                        [facility]            
  
  
Diagnoses  
  
  
Malignant neoplasm of   
lower-outer quadrant of   
right breast of female,   
estrogen receptor positive   
(HCC) (HCC)  
  
  
Encounter for screening   
mammogram for high-risk   
patient  
  
  
  
Procedures  
  
  
[facility] SCREEN[facility]  
  
  
SCREENING DIGITAL BREAST   
TOMOSYNTHESIS BI  
  
  
SCREENING MAMMOGRAPHY BI   
[facility] CAD                     
  
  
Vikki Posada,   
APRN.CNP  
  
  
[address] [place] [address]  
  
  
Phone: [phone]  
  
  
Fax: [phone]                         
  
  
[facility]  
  
  
[address]  
  
  
[place]   
[address]  
  
  
  
                    Referral ID Status    Reason    Start Date Expiration Date V  
isits   
Requested                               Visits   
Authorized  
   
                                [id number]        Closed            
  
  
Auto-Generate  
d Referral      10/2/2023       10/31/2024      1               1  
  
  
  
[facility]  
  
Summary Purpose  
  
  
                                                      
  
  
  
Family History  
No Family History Records FoundNo Family History Records FoundNo Family History 
Records FoundNo Family History Records FoundNo Family History Records Found  
  
Advance Directives  
No Advanced Directives Records FoundNo Advanced Directives Records FoundNo 
Advanced Directives Records FoundNo Advanced Directives Records FoundNo Advanced
Directives Records Found  
  
Reason for Referral  
  
  
                          Specialty    Diagnoses / Procedures Referred By Contac  
t Referred To Contact  
   
                                        Urology               
  
  
Diagnoses  
  
  
Gross hematuria  
  
  
  
Procedures  
  
  
CONSULT TO UROLOGY  
  
  
OFFICE/OUTPATIENT [facility]   
MDM 60-74 MINUTES                         
  
  
Francis Shell, DO  
  
  
[address] [place] [address]  
  
  
Phone: [phone]  
  
  
Fax: [phone]                         
  
  
  
  
  
                          Referral ID  Status       Reason       Start   
Date                                    Expiration   
Date                                    Visits   
Requested                               Visits   
Authorized  
   
                                [id number]        Authorized        
  
  
PCP Requested   
Referral        2023        1               1  
  
  
  
                          Specialty    Diagnoses / Procedures Referred By Contac  
t Referred To Contact  
   
                                        Ophthalmology         
  
  
Diagnoses  
  
  
Controlled type 2 diabetes   
mellitus without   
complication, without   
long-term current use of   
insulin (HCC)  
  
  
  
Procedures  
  
  
CONSULT TO OPHTHALMOLOGY  
  
  
OFFICE/OUTPATIENT [facility]   
MDM 60-74 MINUTES                         
  
  
Sherly An APRN.CNP  
  
  
[address] [place] [address]  
  
  
Phone: [phone]  
  
  
Fax: [phone]                         
  
  
  
  
  
                          Referral ID  Status       Reason       Start   
Date                                    Expiration   
Date                                    Visits   
Requested                               Visits   
Authorized  
   
                                        [id number]            Pending   
Review                                    
  
  
PCP Requested   
Referral                                  
3                   2024          1                   1  
  
  
  
Additional Source Comments  
  
  
  
                                                    INFORMATION SOURCE (unrecogn  
ized section and content)  
   
                                          
  
  
  
                                        DATE CREATED        AUTHOR  
   
                                2018                      [facility]  
  
  
  
                                DATE CREATED    AUTHOR          AUTHOR'S ORGANIZ  
ATION  
   
                                2018                      [facility]  
al  
  
  
  
                                DATE CREATED    AUTHOR          AUTHOR'S ORGANIZ  
ATION  
   
                                2019                      [facility]  
  
  
  
                                DATE CREATED    AUTHOR          AUTHOR'S ORGANIZ  
ATION  
   
                                2019                      [facility]  
  
  
  
                                DATE CREATED    AUTHOR          AUTHOR'S ORGANIZ  
ATION  
   
                                2024                      [facility]  
  
  
  
  
  
                                                    Source Comments (unrecognize  
d section and content)  
   
                                                    In the event this informatio  
n is protected by the Federal Confidentiality of   
Alcohol   
and Drug Abuse Patient Records regulations: This information has been disclosed 
to   
you from records protected by Federal confidentiality rules (42 CFR Part 2). The
  
Federal rules prohibit you from making any further disclosure of this 
information   
unless further disclosure is expressly permitted by the written consent of the 
person   
to whom it pertains or as otherwise permitted by 42 CFR Part 2. A general   
authorization for the release of medical or other information is NOT sufficient 
for   
this purpose. The Federal rules restrict any use of the information to 
criminally   
investigate or prosecute any alcohol or drug abuse patient.[facility] CenterIn 
the   
event this information is protected by the Federal Confidentiality of Alcohol 
and   
Drug Abuse Patient Records regulations: This information has been disclosed to 
you   
from records protected by Federal confidentiality rules (42 CFR Part 2). The 
Federal   
rules prohibit you from making any further disclosure of this information unless
  
further disclosure is expressly permitted by the written consent of the person 
to   
whom it pertains or as otherwise permitted by 42 CFR Part 2. A general 
authorization   
for the release of medical or other information is NOT sufficient for this 
purpose.   
The Federal rules restrict any use of the information to criminally investigate 
or   
prosecute any alcohol or drug abuse patient.[facility] CenterIn the event this   
information is protected by the Federal Confidentiality of Alcohol and Drug 
Abuse   
Patient Records regulations: This information has been disclosed to you from 
records   
protected by Federal confidentiality rules (42 CFR Part 2). The Federal rules   
prohibit you from making any further disclosure of this information unless 
further   
disclosure is expressly permitted by the written consent of the person to whom 
it   
pertains or as otherwise permitted by 42 CFR Part 2. A general authorization for
the   
release of medical or other information is NOT sufficient for this purpose. The   
Federal rules restrict any use of the information to criminally investigate or   
prosecute any alcohol or drug abuse patient.[facility] CenterIn the event this   
information is protected by the Federal Confidentiality of Alcohol and Drug 
Abuse   
Patient Records regulations: This information has been disclosed to you from 
records   
protected by Federal confidentiality rules (42 CFR Part 2). The Federal rules   
prohibit you from making any further disclosure of this information unless 
further   
disclosure is expressly permitted by the written consent of the person to whom 
it   
pertains or as otherwise permitted by 42 CFR Part 2. A general authorization for
the   
release of medical or other information is NOT sufficient for this purpose. The   
Federal rules restrict any use of the information to criminally investigate or   
prosecute any alcohol or drug abuse patient.[facility] CenterIn the event this   
information is protected by the Federal Confidentiality of Alcohol and Drug 
Abuse   
Patient Records regulations: This information has been disclosed to you from 
records   
protected by Federal confidentiality rules (42 CFR Part 2). The Federal rules   
prohibit you from making any further disclosure of this information unless 
further   
disclosure is expressly permitted by the written consent of the person to whom 
it   
pertains or as otherwise permitted by 42 CFR Part 2. A general authorization for
the   
release of medical or other information is NOT sufficient for this purpose. The   
Federal rules restrict any use of the information to criminally investigate or   
prosecute any alcohol or drug abuse patient.[facility] CenterIn the event this   
information is protected by the Federal Confidentiality of Alcohol and Drug 
Abuse   
Patient Records regulations: This information has been disclosed to you from 
records   
protected by Federal confidentiality rules (42 CFR Part 2). The Federal rules   
prohibit you from making any further disclosure of this information unless 
further   
disclosure is expressly permitted by the written consent of the person to whom 
it   
pertains or as otherwise permitted by 42 CFR Part 2. A general authorization for
the   
release of medical or other information is NOT sufficient for this purpose. The   
Federal rules restrict any use of the information to criminally investigate or   
prosecute any alcohol or drug abuse patient.[facility] CenterIn the event this   
information is protected by the Federal Confidentiality of Alcohol and Drug 
Abuse   
Patient Records regulations: This information has been disclosed to you from 
records   
protected by Federal confidentiality rules (42 CFR Part 2). The Federal rules   
prohibit you from making any further disclosure of this information unless 
further   
disclosure is expressly permitted by the written consent of the person to whom 
it   
pertains or as otherwise permitted by 42 CFR Part 2. A general authorization for
the   
release of medical or other information is NOT sufficient for this purpose. The   
Federal rules restrict any use of the information to criminally investigate or   
prosecute any alcohol or drug abuse patient.[facility] CenterIn the event this   
information is protected by the Federal Confidentiality of Alcohol and Drug 
Abuse   
Patient Records regulations: This information has been disclosed to you from 
records   
protected by Federal confidentiality rules (42 CFR Part 2). The Federal rules   
prohibit you from making any further disclosure of this information unless 
further   
disclosure is expressly permitted by the written consent of the person to whom 
it   
pertains or as otherwise permitted by 42 CFR Part 2. A general authorization for
the   
release of medical or other information is NOT sufficient for this purpose. The   
Federal rules restrict any use of the information to criminally investigate or   
prosecute any alcohol or drug abuse patient.[facility] CenterIn the event this   
information is protected by the Federal Confidentiality of Alcohol and Drug 
Abuse   
Patient Records regulations: This information has been disclosed to you from 
records   
protected by Federal confidentiality rules (42 CFR Part 2). The Federal rules   
prohibit you from making any further disclosure of this information unless 
further   
disclosure is expressly permitted by the written consent of the person to whom 
it   
pertains or as otherwise permitted by 42 CFR Part 2. A general authorization for
the   
release of medical or other information is NOT sufficient for this purpose. The   
Federal rules restrict any use of the information to criminally investigate or   
prosecute any alcohol or drug abuse patient.[facility] CenterIn the event this   
information is protected by the Federal Confidentiality of Alcohol and Drug 
Abuse   
Patient Records regulations: This information has been disclosed to you from 
records   
protected by Federal confidentiality rules (42 CFR Part 2). The Federal rules   
prohibit you from making any further disclosure of this information unless 
further   
disclosure is expressly permitted by the written consent of the person to whom 
it   
pertains or as otherwise permitted by 42 CFR Part 2. A general authorization for
the   
release of medical or other information is NOT sufficient for this purpose. The   
Federal rules restrict any use of the information to criminally investigate or   
prosecute any alcohol or drug abuse patient.[facility] CenterIn the event this   
information is protected by the Federal Confidentiality of Alcohol and Drug 
Abuse   
Patient Records regulations: This information has been disclosed to you from 
records   
protected by Federal confidentiality rules (42 CFR Part 2). The Federal rules   
prohibit you from making any further disclosure of this information unless 
further   
disclosure is expressly permitted by the written consent of the person to whom 
it   
pertains or as otherwise permitted by 42 CFR Part 2. A general authorization for
the   
release of medical or other information is NOT sufficient for this purpose. The   
Federal rules restrict any use of the information to criminally investigate or   
prosecute any alcohol or drug abuse patient.[facility] CenterIn the event this   
information is protected by the Federal Confidentiality of Alcohol and Drug 
Abuse   
Patient Records regulations: This information has been disclosed to you from 
records   
protected by Federal confidentiality rules (42 CFR Part 2). The Federal rules   
prohibit you from making any further disclosure of this information unless 
further   
disclosure is expressly permitted by the written consent of the person to whom 
it   
pertains or as otherwise permitted by 42 CFR Part 2. A general authorization for
the   
release of medical or other information is NOT sufficient for this purpose. The   
Federal rules restrict any use of the information to criminally investigate or   
prosecute any alcohol or drug abuse patient.[facility] CenterIn the event this   
information is protected by the Federal Confidentiality of Alcohol and Drug 
Abuse   
Patient Records regulations: This information has been disclosed to you from 
records   
protected by Federal confidentiality rules (42 CFR Part 2). The Federal rules   
prohibit you from making any further disclosure of this information unless 
further   
disclosure is expressly permitted by the written consent of the person to whom 
it   
pertains or as otherwise permitted by 42 CFR Part 2. A general authorization for
the   
release of medical or other information is NOT sufficient for this purpose. The   
Federal rules restrict any use of the information to criminally investigate or   
prosecute any alcohol or drug abuse patient.[facility] CenterIn the event this   
information is protected by the Federal Confidentiality of Alcohol and Drug 
Abuse   
Patient Records regulations: This information has been disclosed to you from 
records   
protected by Federal confidentiality rules (42 CFR Part 2). The Federal rules   
prohibit you from making any further disclosure of this information unless 
further   
disclosure is expressly permitted by the written consent of the person to whom 
it   
pertains or as otherwise permitted by 42 CFR Part 2. A general authorization for
the   
release of medical or other information is NOT sufficient for this purpose. The   
Federal rules restrict any use of the information to criminally investigate or   
prosecute any alcohol or drug abuse patient.[facility] CenterIn the event this   
information is protected by the Federal Confidentiality of Alcohol and Drug 
Abuse   
Patient Records regulations: This information has been disclosed to you from 
records   
protected by Federal confidentiality rules (42 CFR Part 2). The Federal rules   
prohibit you from making any further disclosure of this information unless 
further   
disclosure is expressly permitted by the written consent of the person to whom 
it   
pertains or as otherwise permitted by 42 CFR Part 2. A general authorization for
the   
release of medical or other information is NOT sufficient for this purpose. The   
Federal rules restrict any use of the information to criminally investigate or   
prosecute any alcohol or drug abuse patient.[facility] CenterIn the event this   
information is protected by the Federal Confidentiality of Alcohol and Drug 
Abuse   
Patient Records regulations: This information has been disclosed to you from 
records   
protected by Federal confidentiality rules (42 CFR Part 2). The Federal rules   
prohibit you from making any further disclosure of this information unless 
further   
disclosure is expressly permitted by the written consent of the person to whom 
it   
pertains or as otherwise permitted by 42 CFR Part 2. A general authorization for
the   
release of medical or other information is NOT sufficient for this purpose. The   
Federal rules restrict any use of the information to criminally investigate or   
prosecute any alcohol or drug abuse patient.[facility] CenterIn the event this   
information is protected by the Federal Confidentiality of Alcohol and Drug 
Abuse   
Patient Records regulations: This information has been disclosed to you from 
records   
protected by Federal confidentiality rules (42 CFR Part 2). The Federal rules   
prohibit you from making any further disclosure of this information unless 
further   
disclosure is expressly permitted by the written consent of the person to whom 
it   
pertains or as otherwise permitted by 42 CFR Part 2. A general authorization for
the   
release of medical or other information is NOT sufficient for this purpose. The   
Federal rules restrict any use of the information to criminally investigate or   
prosecute any alcohol or drug abuse patient.[facility] CenterIn the event this   
information is protected by the Federal Confidentiality of Alcohol and Drug 
Abuse   
Patient Records regulations: This information has been disclosed to you from 
records   
protected by Federal confidentiality rules (42 CFR Part 2). The Federal rules   
prohibit you from making any further disclosure of this information unless 
further   
disclosure is expressly permitted by the written consent of the person to whom 
it   
pertains or as otherwise permitted by 42 CFR Part 2. A general authorization for
the   
release of medical or other information is NOT sufficient for this purpose. The   
Federal rules restrict any use of the information to criminally investigate or   
prosecute any alcohol or drug abuse patient.[facility] CenterIn the event this   
information is protected by the Federal Confidentiality of Alcohol and Drug 
Abuse   
Patient Records regulations: This information has been disclosed to you from 
records   
protected by Federal confidentiality rules (42 CFR Part 2). The Federal rules   
prohibit you from making any further disclosure of this information unless 
further   
disclosure is expressly permitted by the written consent of the person to whom 
it   
pertains or as otherwise permitted by 42 CFR Part 2. A general authorization for
the   
release of medical or other information is NOT sufficient for this purpose. The   
Federal rules restrict any use of the information to criminally investigate or   
prosecute any alcohol or drug abuse patient.[facility] CenterIn the event this   
information is protected by the Federal Confidentiality of Alcohol and Drug 
Abuse   
Patient Records regulations: This information has been disclosed to you from 
records   
protected by Federal confidentiality rules (42 CFR Part 2). The Federal rules   
prohibit you from making any further disclosure of this information unless 
further   
disclosure is expressly permitted by the written consent of the person to whom 
it   
pertains or as otherwise permitted by 42 CFR Part 2. A general authorization for
the   
release of medical or other information is NOT sufficient for this purpose. The   
Federal rules restrict any use of the information to criminally investigate or   
prosecute any alcohol or drug abuse patient.[facility] CenterIn the event this   
information is protected by the Federal Confidentiality of Alcohol and Drug 
Abuse   
Patient Records regulations: This information has been disclosed to you from 
records   
protected by Federal confidentiality rules (42 CFR Part 2). The Federal rules   
prohibit you from making any further disclosure of this information unless 
further   
disclosure is expressly permitted by the written consent of the person to whom 
it   
pertains or as otherwise permitted by 42 CFR Part 2. A general authorization for
the   
release of medical or other information is NOT sufficient for this purpose. The   
Federal rules restrict any use of the information to criminally investigate or   
prosecute any alcohol or drug abuse patient.[facility] CenterIn the event this   
information is protected by the Federal Confidentiality of Alcohol and Drug 
Abuse   
Patient Records regulations: This information has been disclosed to you from 
records   
protected by Federal confidentiality rules (42 CFR Part 2). The Federal rules   
prohibit you from making any further disclosure of this information unless 
further   
disclosure is expressly permitted by the written consent of the person to whom 
it   
pertains or as otherwise permitted by 42 CFR Part 2. A general authorization for
the   
release of medical or other information is NOT sufficient for this purpose. The   
Federal rules restrict any use of the information to criminally investigate or   
prosecute any alcohol or drug abuse patient.[facility] CenterIn the event this   
information is protected by the Federal Confidentiality of Alcohol and Drug 
Abuse   
Patient Records regulations: This information has been disclosed to you from 
records   
protected by Federal confidentiality rules (42 CFR Part 2). The Federal rules   
prohibit you from making any further disclosure of this information unless 
further   
disclosure is expressly permitted by the written consent of the person to whom 
it   
pertains or as otherwise permitted by 42 CFR Part 2. A general authorization for
the   
release of medical or other information is NOT sufficient for this purpose. The   
Federal rules restrict any use of the information to criminally investigate or   
prosecute any alcohol or drug abuse patient.[facility] the event this   
information is protected by the Federal Confidentiality of Alcohol and Drug 
Abuse   
Patient Records regulations: This information has been disclosed to you from 
records   
protected by Federal confidentiality rules (42 CFR Part 2). The Federal rules   
prohibit you from making any further disclosure of this information unless 
further   
disclosure is expressly permitted by the written consent of the person to whom 
it   
pertains or as otherwise permitted by 42 CFR Part 2. A general authorization for
the   
release of medical or other information is NOT sufficient for this purpose. The   
Federal rules restrict any use of the information to criminally investigate or   
prosecute any alcohol or drug abuse patient.[facility] CenterIn the event this   
information is protected by the Federal Confidentiality of Alcohol and Drug 
Abuse   
Patient Records regulations: This information has been disclosed to you from 
records   
protected by Federal confidentiality rules (42 CFR Part 2). The Federal rules   
prohibit you from making any further disclosure of this information unless 
further   
disclosure is expressly permitted by the written consent of the person to whom 
it   
pertains or as otherwise permitted by 42 CFR Part 2. A general authorization for
the   
release of medical or other information is NOT sufficient for this purpose. The   
Federal rules restrict any use of the information to criminally investigate or   
prosecute any alcohol or drug abuse patient.[facility] CenterIn the event this   
information is protected by the Federal Confidentiality of Alcohol and Drug 
Abuse   
Patient Records regulations: This information has been disclosed to you from 
records   
protected by Federal confidentiality rules (42 CFR Part 2). The Federal rules   
prohibit you from making any further disclosure of this information unless 
further   
disclosure is expressly permitted by the written consent of the person to whom 
it   
pertains or as otherwise permitted by 42 CFR Part 2. A general authorization for
the   
release of medical or other information is NOT sufficient for this purpose. The   
Federal rules restrict any use of the information to criminally investigate or   
prosecute any alcohol or drug abuse patient.[facility] CenterIn the event this   
information is protected by the Federal Confidentiality of Alcohol and Drug 
Abuse   
Patient Records regulations: This information has been disclosed to you from 
records   
protected by Federal confidentiality rules (42 CFR Part 2). The Federal rules   
prohibit you from making any further disclosure of this information unless 
further   
disclosure is expressly permitted by the written consent of the person to whom 
it   
pertains or as otherwise permitted by 42 CFR Part 2. A general authorization for
the   
release of medical or other information is NOT sufficient for this purpose. The   
Federal rules restrict any use of the information to criminally investigate or   
prosecute any alcohol or drug abuse patient.[facility] CenterIn the event this   
information is protected by the Federal Confidentiality of Alcohol and Drug 
Abuse   
Patient Records regulations: This information has been disclosed to you from 
records   
protected by Federal confidentiality rules (42 CFR Part 2). The Federal rules   
prohibit you from making any further disclosure of this information unless 
further   
disclosure is expressly permitted by the written consent of the person to whom 
it   
pertains or as otherwise permitted by 42 CFR Part 2. A general authorization for
the   
release of medical or other information is NOT sufficient for this purpose. The   
Federal rules restrict any use of the information to criminally investigate or   
prosecute any alcohol or drug abuse patient.[facility] CenterIn the event this   
information is protected by the Federal Confidentiality of Alcohol and Drug 
Abuse   
Patient Records regulations: This information has been disclosed to you from 
records   
protected by Federal confidentiality rules (42 CFR Part 2). The Federal rules   
prohibit you from making any further disclosure of this information unless 
further   
disclosure is expressly permitted by the written consent of the person to whom 
it   
pertains or as otherwise permitted by 42 CFR Part 2. A general authorization for
the   
release of medical or other information is NOT sufficient for this purpose. The   
Federal rules restrict any use of the information to criminally investigate or   
prosecute any alcohol or drug abuse patient.[facility] CenterIn the event this   
information is protected by the Federal Confidentiality of Alcohol and Drug 
Abuse   
Patient Records regulations: This information has been disclosed to you from 
records   
protected by Federal confidentiality rules (42 CFR Part 2). The Federal rules   
prohibit you from making any further disclosure of this information unless 
further   
disclosure is expressly permitted by the written consent of the person to whom 
it   
pertains or as otherwise permitted by 42 CFR Part 2. A general authorization for
the   
release of medical or other information is NOT sufficient for this purpose. The   
Federal rules restrict any use of the information to criminally investigate or   
prosecute any alcohol or drug abuse patient.[facility] CenterIn the event this   
information is protected by the Federal Confidentiality of Alcohol and Drug 
Abuse   
Patient Records regulations: This information has been disclosed to you from 
records   
protected by Federal confidentiality rules (42 CFR Part 2). The Federal rules   
prohibit you from making any further disclosure of this information unless 
further   
disclosure is expressly permitted by the written consent of the person to whom 
it   
pertains or as otherwise permitted by 42 CFR Part 2. A general authorization for
the   
release of medical or other information is NOT sufficient for this purpose. The   
Federal rules restrict any use of the information to criminally investigate or   
prosecute any alcohol or drug abuse patient.[facility] CenterIn the event this   
information is protected by the Federal Confidentiality of Alcohol and Drug 
Abuse   
Patient Records regulations: This information has been disclosed to you from 
records   
protected by Federal confidentiality rules (42 CFR Part 2). The Federal rules   
prohibit you from making any further disclosure of this information unless 
further   
disclosure is expressly permitted by the written consent of the person to whom 
it   
pertains or as otherwise permitted by 42 CFR Part 2. A general authorization for
the   
release of medical or other information is NOT sufficient for this purpose. The   
Federal rules restrict any use of the information to criminally investigate or   
prosecute any alcohol or drug abuse patient.[facility] CenterIn the event this   
information is protected by the Federal Confidentiality of Alcohol and Drug 
Abuse   
Patient Records regulations: This information has been disclosed to you from 
records   
protected by Federal confidentiality rules (42 CFR Part 2). The Federal rules   
prohibit you from making any further disclosure of this information unless 
further   
disclosure is expressly permitted by the written consent of the person to whom 
it   
pertains or as otherwise permitted by 42 CFR Part 2. A general authorization for
the   
release of medical or other information is NOT sufficient for this purpose. The   
Federal rules restrict any use of the information to criminally investigate or   
prosecute any alcohol or drug abuse patient.[facility] CenterIn the event this   
information is protected by the Federal Confidentiality of Alcohol and Drug 
Abuse   
Patient Records regulations: This information has been disclosed to you from 
records   
protected by Federal confidentiality rules (42 CFR Part 2). The Federal rules   
prohibit you from making any further disclosure of this information unless 
further   
disclosure is expressly permitted by the written consent of the person to whom 
it   
pertains or as otherwise permitted by 42 CFR Part 2. A general authorization for
the   
release of medical or other information is NOT sufficient for this purpose. The   
Federal rules restrict any use of the information to criminally investigate or   
prosecute any alcohol or drug abuse patient.[facility] CenterIn the event this   
information is protected by the Federal Confidentiality of Alcohol and Drug 
Abuse   
Patient Records regulations: This information has been disclosed to you from 
records   
protected by Federal confidentiality rules (42 CFR Part 2). The Federal rules   
prohibit you from making any further disclosure of this information unless 
further   
disclosure is expressly permitted by the written consent of the person to whom 
it   
pertains or as otherwise permitted by 42 CFR Part 2. A general authorization for
the   
release of medical or other information is NOT sufficient for this purpose. The   
Federal rules restrict any use of the information to criminally investigate or   
prosecute any alcohol or drug abuse patient.[facility]In the event this   
information is protected by the Federal Confidentiality of Alcohol and Drug 
Abuse   
Patient Records regulations: This information has been disclosed to you from 
records   
protected by Federal confidentiality rules (42 CFR Part 2). The Federal rules   
prohibit you from making any further disclosure of this information unless 
further   
disclosure is expressly permitted by the written consent of the person to whom 
it   
pertains or as otherwise permitted by 42 CFR Part 2. A general authorization for
the   
release of medical or other information is NOT sufficient for this purpose. The   
Federal rules restrict any use of the information to criminally investigate or   
prosecute any alcohol or drug abuse patient.[facility]In the event this   
information is protected by the Federal Confidentiality of Alcohol and Drug 
Abuse   
Patient Records regulations: This information has been disclosed to you from 
records   
protected by Federal confidentiality rules (42 CFR Part 2). The Federal rules   
prohibit you from making any further disclosure of this information unless 
further   
disclosure is expressly permitted by the written consent of the person to whom 
it   
pertains or as otherwise permitted by 42 CFR Part 2. A general authorization for
the   
release of medical or other information is NOT sufficient for this purpose. The   
Federal rules restrict any use of the information to criminally investigate or   
prosecute any alcohol or drug abuse patient.[facility]In the event this   
information is protected by the Federal Confidentiality of Alcohol and Drug 
Abuse   
Patient Records regulations: This information has been disclosed to you from 
records   
protected by Federal confidentiality rules (42 CFR Part 2). The Federal rules   
prohibit you from making any further disclosure of this information unless 
further   
disclosure is expressly permitted by the written consent of the person to whom 
it   
pertains or as otherwise permitted by 42 CFR Part 2. A general authorization for
the   
release of medical or other information is NOT sufficient for this purpose. The   
Federal rules restrict any use of the information to criminally investigate or   
prosecute any alcohol or drug abuse patient.[facility]In the event this   
information is protected by the Federal Confidentiality of Alcohol and Drug 
Abuse   
Patient Records regulations: This information has been disclosed to you from 
records   
protected by Federal confidentiality rules (42 CFR Part 2). The Federal rules   
prohibit you from making any further disclosure of this information unless 
further   
disclosure is expressly permitted by the written consent of the person to whom 
it   
pertains or as otherwise permitted by 42 CFR Part 2. A general authorization for
the   
release of medical or other information is NOT sufficient for this purpose. The   
Federal rules restrict any use of the information to criminally investigate or   
prosecute any alcohol or drug abuse patient.[facility]In the event this   
information is protected by the Federal Confidentiality of Alcohol and Drug 
Abuse   
Patient Records regulations: This information has been disclosed to you from 
records   
protected by Federal confidentiality rules (42 CFR Part 2). The Federal rules   
prohibit you from making any further disclosure of this information unless 
further   
disclosure is expressly permitted by the written consent of the person to whom 
it   
pertains or as otherwise permitted by 42 CFR Part 2. A general authorization for
the   
release of medical or other information is NOT sufficient for this purpose. The   
Federal rules restrict any use of the information to criminally investigate or   
prosecute any alcohol or drug abuse patient.[facility]In the event this   
information is protected by the Federal Confidentiality of Alcohol and Drug 
Abuse   
Patient Records regulations: This information has been disclosed to you from 
records   
protected by Federal confidentiality rules (42 CFR Part 2). The Federal rules   
prohibit you from making any further disclosure of this information unless 
further   
disclosure is expressly permitted by the written consent of the person to whom 
it   
pertains or as otherwise permitted by 42 CFR Part 2. A general authorization for
the   
release of medical or other information is NOT sufficient for this purpose. The   
Federal rules restrict any use of the information to criminally investigate or   
prosecute any alcohol or drug abuse patient.[facility]In the event this   
information is protected by the Federal Confidentiality of Alcohol and Drug 
Abuse   
Patient Records regulations: This information has been disclosed to you from 
records   
protected by Federal confidentiality rules (42 CFR Part 2). The Federal rules   
prohibit you from making any further disclosure of this information unless 
further   
disclosure is expressly permitted by the written consent of the person to whom 
it   
pertains or as otherwise permitted by 42 CFR Part 2. A general authorization for
the   
release of medical or other information is NOT sufficient for this purpose. The   
Federal rules restrict any use of the information to criminally investigate or   
prosecute any alcohol or drug abuse patient.[facility]In the event this   
information is protected by the Federal Confidentiality of Alcohol and Drug 
Abuse   
Patient Records regulations: This information has been disclosed to you from 
records   
protected by Federal confidentiality rules (42 CFR Part 2). The Federal rules   
prohibit you from making any further disclosure of this information unless 
further   
disclosure is expressly permitted by the written consent of the person to whom 
it   
pertains or as otherwise permitted by 42 CFR Part 2. A general authorization for
the   
release of medical or other information is NOT sufficient for this purpose. The   
Federal rules restrict any use of the information to criminally investigate or   
prosecute any alcohol or drug abuse patient.[facility]In the event this   
information is protected by the Federal Confidentiality of Alcohol and Drug 
Abuse   
Patient Records regulations: This information has been disclosed to you from 
records   
protected by Federal confidentiality rules (42 CFR Part 2). The Federal rules   
prohibit you from making any further disclosure of this information unless 
further   
disclosure is expressly permitted by the written consent of the person to whom 
it   
pertains or as otherwise permitted by 42 CFR Part 2. A general authorization for
the   
release of medical or other information is NOT sufficient for this purpose. The   
Federal rules restrict any use of the information to criminally investigate or   
prosecute any alcohol or drug abuse patient.[facility]In the event this   
information is protected by the Federal Confidentiality of Alcohol and Drug 
Abuse   
Patient Records regulations: This information has been disclosed to you from 
records   
protected by Federal confidentiality rules (42 CFR Part 2). The Federal rules   
prohibit you from making any further disclosure of this information unless 
further   
disclosure is expressly permitted by the written consent of the person to whom 
it   
pertains or as otherwise permitted by 42 CFR Part 2. A general authorization for
the   
release of medical or other information is NOT sufficient for this purpose. The   
Federal rules restrict any use of the information to criminally investigate or   
prosecute any alcohol or drug abuse patient.[facility]In the event this   
information is protected by the Federal Confidentiality of Alcohol and Drug 
Abuse   
Patient Records regulations: This information has been disclosed to you from 
records   
protected by Federal confidentiality rules (42 CFR Part 2). The Federal rules   
prohibit you from making any further disclosure of this information unless 
further   
disclosure is expressly permitted by the written consent of the person to whom 
it   
pertains or as otherwise permitted by 42 CFR Part 2. A general authorization for
the   
release of medical or other information is NOT sufficient for this purpose. The   
Federal rules restrict any use of the information to criminally investigate or   
prosecute any alcohol or drug abuse patient.[facility]In the event this   
information is protected by the Federal Confidentiality of Alcohol and Drug 
Abuse   
Patient Records regulations: This information has been disclosed to you from 
records   
protected by Federal confidentiality rules (42 CFR Part 2). The Federal rules   
prohibit you from making any further disclosure of this information unless 
further   
disclosure is expressly permitted by the written consent of the person to whom 
it   
pertains or as otherwise permitted by 42 CFR Part 2. A general authorization for
the   
release of medical or other information is NOT sufficient for this purpose. The   
Federal rules restrict any use of the information to criminally investigate or   
prosecute any alcohol or drug abuse patient.[facility]In the event this   
information is protected by the Federal Confidentiality of Alcohol and Drug 
Abuse   
Patient Records regulations: This information has been disclosed to you from 
records   
protected by Federal confidentiality rules (42 CFR Part 2). The Federal rules   
prohibit you from making any further disclosure of this information unless 
further   
disclosure is expressly permitted by the written consent of the person to whom 
it   
pertains or as otherwise permitted by 42 CFR Part 2. A general authorization for
the   
release of medical or other information is NOT sufficient for this purpose. The   
Federal rules restrict any use of the information to criminally investigate or   
prosecute any alcohol or drug abuse patient.[facility]In the event this   
information is protected by the Federal Confidentiality of Alcohol and Drug 
Abuse   
Patient Records regulations: This information has been disclosed to you from 
records   
protected by Federal confidentiality rules (42 CFR Part 2). The Federal rules   
prohibit you from making any further disclosure of this information unless 
further   
disclosure is expressly permitted by the written consent of the person to whom 
it   
pertains or as otherwise permitted by 42 CFR Part 2. A general authorization for
the   
release of medical or other information is NOT sufficient for this purpose. The   
Federal rules restrict any use of the information to criminally investigate or   
prosecute any alcohol or drug abuse patient.[facility] the event this   
information is protected by the Federal Confidentiality of Alcohol and Drug 
Abuse   
Patient Records regulations: This information has been disclosed to you from 
records   
protected by Federal confidentiality rules (42 CFR Part 2). The Federal rules   
prohibit you from making any further disclosure of this information unless 
further   
disclosure is expressly permitted by the written consent of the person to whom 
it   
pertains or as otherwise permitted by 42 CFR Part 2. A general authorization for
the   
release of medical or other information is NOT sufficient for this purpose. The   
Federal rules restrict any use of the information to criminally investigate or   
prosecute any alcohol or drug abuse patient.[facility]In the event this   
information is protected by the Federal Confidentiality of Alcohol and Drug 
Abuse   
Patient Records regulations: This information has been disclosed to you from 
records   
protected by Federal confidentiality rules (42 CFR Part 2). The Federal rules   
prohibit you from making any further disclosure of this information unless 
further   
disclosure is expressly permitted by the written consent of the person to whom 
it   
pertains or as otherwise permitted by 42 CFR Part 2. A general authorization for
the   
release of medical or other information is NOT sufficient for this purpose. The   
Federal rules restrict any use of the information to criminally investigate or   
prosecute any alcohol or drug abuse patient.[facility]In the event this   
information is protected by the Federal Confidentiality of Alcohol and Drug 
Abuse   
Patient Records regulations: This information has been disclosed to you from 
records   
protected by Federal confidentiality rules (42 CFR Part 2). The Federal rules   
prohibit you from making any further disclosure of this information unless 
further   
disclosure is expressly permitted by the written consent of the person to whom 
it   
pertains or as otherwise permitted by 42 CFR Part 2. A general authorization for
the   
release of medical or other information is NOT sufficient for this purpose. The   
Federal rules restrict any use of the information to criminally investigate or   
prosecute any alcohol or drug abuse patient.[facility]In the event this   
information is protected by the Federal Confidentiality of Alcohol and Drug 
Abuse   
Patient Records regulations: This information has been disclosed to you from 
records   
protected by Federal confidentiality rules (42 CFR Part 2). The Federal rules   
prohibit you from making any further disclosure of this information unless 
further   
disclosure is expressly permitted by the written consent of the person to whom 
it   
pertains or as otherwise permitted by 42 CFR Part 2. A general authorization for
the   
release of medical or other information is NOT sufficient for this purpose. The   
Federal rules restrict any use of the information to criminally investigate or   
prosecute any alcohol or drug abuse patient.[facility]In the event this   
information is protected by the Federal Confidentiality of Alcohol and Drug 
Abuse   
Patient Records regulations: This information has been disclosed to you from 
records   
protected by Federal confidentiality rules (42 CFR Part 2). The Federal rules   
prohibit you from making any further disclosure of this information unless 
further   
disclosure is expressly permitted by the written consent of the person to whom 
it   
pertains or as otherwise permitted by 42 CFR Part 2. A general authorization for
the   
release of medical or other information is NOT sufficient for this purpose. The   
Federal rules restrict any use of the information to criminally investigate or   
prosecute any alcohol or drug abuse patient.[facility]  
  
  
  
  
                                                    Reason for Visit (unrecogniz  
ed section and content)  
   
                                          
  
  
  
                                        Reason              Comments  
   
                                        Established Patient 3month follow up  
  
  
  
                          Specialty    Diagnoses / Procedures Referred By Contac  
t Referred To Contact  
   
                                                    Internal Medicine /   
INTERNAL MEDICINE                         
  
  
Diagnoses  
  
  
3 month follow up  
  
  
  
Procedures  
  
  
4C EST                                    
  
  
Self                                      
  
  
Cynthia Douglas MD  
  
  
[address] [place] [address]  
  
  
Phone: [phone]  
  
  
Fax: [phone]  
  
  
  
                    Referral ID Status    Reason    Start Date Expiration Date V  
isits   
Requested                               Visits   
Authorized  
   
                                [id number]        Closed            
  
  
Patient   
Cleared   
INN/SMCP   
Payor Auth   
Obtained        2022      1               1  
  
  
  
                                        Reason              Comments  
   
                                        Refill Request        
  
  
  
                                Reason          Onset Date      Comments  
   
                                Refill Request  2022        
  
  
  
                                Reason          Onset Date      Comments  
   
                                Refill Request  2022        
  
  
  
                                Reason          Onset Date      Comments  
   
                                Population Health Navigation Outreach 2022  
      [facility] care gaps  
  
  
  
                                        Reason              Comments  
   
                                        Lab Orders            
   
                                        Orders                
  
  
  
                                Reason          Onset Date      Comments  
   
                                Refill Request  2022        
  
  
  
                                        Reason              Comments  
   
                                        Results               
  
  
  
                                Reason          Onset Date      Comments  
   
                                Population Health Navigation Outreach 2022  
      [facility]  
  
  
  
                                        Reason              Comments  
   
                                        Cough               Cough, chest congest  
ion and scratchy throat x 10 days  
  
  
  
                                Reason          Onset Date      Comments  
   
                                Refill Request  2022        
  
  
  
                                        Reason              Comments  
   
                                        Constipation          
  
  
  
                                Reason          Onset Date      Comments  
   
                                Refill Request  2023        
  
  
  
                                        Reason              Comments  
   
                                        Same Day Appointment right upper thigh b  
ump size of pea  
  
  
  
                                        Reason              Comments  
   
                                        Established Patient   
  
  
  
                          Specialty    Diagnoses / Procedures Referred By Contac  
t Referred To Contact  
   
                                        HEMATOLOGY/ONCOLOGY   
  
  
Diagnoses  
  
  
OV  
  
  
  
Procedures  
  
  
OFFICE VISIT, EST PT.,   
LEVEL 2 TC  
  
  
OV                                        
  
  
Jacquelyn Milner MD  
  
  
[address] [place] [address]  
  
  
Phone: [phone]  
  
  
Fax: [phone]                         
  
  
[facility]  
  
  
[address] [place] [address]  
  
  
Phone: [phone]  
  
  
Fax: [phone]  
  
  
  
                          Referral ID  Status       Reason       Start   
Date                                    Expiration   
Date                                    Visits   
Requested                               Visits   
Authorized  
   
                                        [id number]            Pending   
Review                                    
  
  
OON/Self Pay   
Override                                  
2                   2023           1                   1  
  
  
  
                                        Reason              Comments  
   
                                        Mammogram Result Call Back   
  
  
  
                                        Reason              Comments  
   
                                        Follow Up           Referral to Dr. Cochran  
no  
  
  
  
                                        Reason              Comments  
   
                                        Consult               
   
                                        Blood In Urine        
  
  
  
                          Specialty    Diagnoses / Procedures Referred By Contac  
t Referred To Contact  
   
                                        Urology               
  
  
Diagnoses  
  
  
Gross hematuria  
  
  
  
Procedures  
  
  
CONSULT TO UROLOGY  
  
  
OFFICE/OUTPATIENT [facility] 60-74 MINUTES                         
  
  
Francis Shell DO  
  
  
[address] [place] [address]  
  
  
Phone: [phone]  
  
  
Fax: [phone]                         
  
  
  
  
  
                    Referral ID Status    Reason    Start Date Expiration Date V  
isits   
Requested                               Visits   
Authorized  
   
                                [id number]        Closed            
  
  
PCP Requested   
Referral        2023        1               1  
  
  
  
                                Reason          Onset Date      Comments  
   
                                [facility] Health Navigation Outreach 2023  
      [facility] Care Gaps  
  
  
  
                                        Reason              Comments  
   
                                        Consult             Abnormal mammogram  
  
  
  
                                        Reason              Comments  
   
                                        Patient Question      
  
  
  
                                        Reason              Comments  
   
                                        Results             Right breast biopsy   
results  
  
  
  
                                        Reason              Comments  
   
                                        Post Op Follow Up     
  
  
  
                                        Reason              Comments  
   
                                        F/U 6 months          
  
  
  
                                        Reason              Comments  
   
                                        Medication concern    
  
  
  
                                Reason          Onset Date      Comments  
   
                                Refill Request  2023        
  
  
  
                                Reason          Onset Date      Comments  
   
                                Refill Request  2023        
  
  
  
                                        Reason              Comments  
   
                                        F/U 6 months          
  
  
  
                                        Reason              Comments  
   
                                        Patient Question      
   
                                        ER F/U                
  
  
  
                                        Reason              Comments  
   
                                        Same Day Appointment abdomen cramping, l  
eft flank pain, pinkish when wipes,   
burning   
at times  
  
  
  
                                Reason          Onset Date      Comments  
   
                                [facility] Health Navigation Outreach 2024  
      [facility] Annual Wellness Visit  
  
  
  
                                        Reason              Comments  
   
                                        Established Patient   
  
  
  
                                Reason          Onset Date      Comments  
   
                                Refill Request  06/10/2024        
  
  
  
                                        Reason              Comments  
   
                                        Recheck             6 month follow up  
  
  
  
                                        Reason              Comments  
   
                                        Sore Throat         ST, HA x 1 day  
  
  
  
  
  
                                                    Care Teams (unrecognized sec  
tion and content)  
   
                                          
  
  
  
                      Team Member Relationship Specialty  Start Date End Date  
   
                                                      
  
  
Cynthia Douglas MD  
  
  
[address] [place], [place] [id number] [phone] (Work)  
  
  
[phone] (Fax) PCP - General   Internal Medicine 6/3/14            
   
                                                      
  
  
Rosaline Damon MD, MD  
  
  
[address] [facility] [place], [place] [id number] [phone] (Work)  
  
  
[phone] (Fax) Physician       Radiation Oncology 17           
   
                                                      
  
  
Doup, Martha, RN                         Specialty Care   
Coordinator         Oncology            18               
  
  
  
                      Team Member Relationship Specialty  Start Date End Date  
   
                                                      
  
  
Cynthia Douglas MD  
  
  
[address] [facility] [place] [address]  
  
  
[phone] (Work)  
  
  
[phone] (Fax) PCP - General   Internal Medicine 6/3/14            
   
                                                      
  
  
Rosaline Damon MD, MD  
  
  
[address] [place] [address]  
  
  
[phone] (Work)  
  
  
[phone] (Fax) Physician       Radiation Oncology 17           
   
                                                      
  
  
Martha Steel RN                         Specialty Care   
Coordinator         Oncology            18               
  
  
  
                      Team Member Relationship Specialty  Start Date End Date  
   
                                                      
  
  
Cynthia Douglas MD  
  
  
[address] [facility] [place] [address]  
  
  
[phone] (Work)  
  
  
[phone] (Fax) PCP - General   Internal Medicine 6/3/14            
   
                                                      
  
  
Rosaline Damon MD, MD  
  
  
[address] [place] [address]  
  
  
[phone] (Work)  
  
  
[phone] (Fax) Physician       Radiation Oncology 17           
   
                                                      
  
  
Martha Steel RN                         Specialty Care   
Coordinator         Oncology            18               
  
  
  
                      Team Member Relationship Specialty  Start Date End Date  
   
                                                      
  
  
Cynthia Douglas MD  
  
  
[address] [facility] [place] [address]  
  
  
[phone] (Work)  
  
  
[phone] (Fax) PCP - General   Internal Medicine 6/3/14            
   
                                                      
  
  
Rosaline Damon MD, MD  
  
  
[address] [place] [address]  
  
  
[phone] (Work)  
  
  
[phone] (Fax) Physician       Radiation Oncology 17           
   
                                                      
  
  
Martha Steel RN                         Specialty Care   
Coordinator         Oncology            18               
  
  
  
                      Team Member Relationship Specialty  Start Date End Date  
   
                                                      
  
  
Cynthia Douglas MD  
  
  
[address] [facility] [place] [address]  
  
  
[phone] (Work)  
  
  
[phone] (Fax) PCP - General   Internal Medicine 6/3/14            
   
                                                      
  
  
Rosaline Damon MD, MD  
  
  
[address] [facility] [place] [address]  
  
  
[phone] (Work)  
  
  
[phone] (Fax) Physician       Radiation Oncology 17           
   
                                                      
  
  
Martha Steel RN                         Specialty Care   
Coordinator         Oncology            18               
  
  
  
                      Team Member Relationship Specialty  Start Date End Date  
   
                                                      
  
  
Cynthia Douglas MD  
  
  
[address] [facility] [place] [address]  
  
  
[phone] (Work)  
  
  
[phone] (Fax) PCP - General   Internal Medicine 6/3/14            
   
                                                      
  
  
Rosaline Damon MD, MD  
  
  
[address] [place] [address]  
  
  
[phone] (Work)  
  
  
[phone] (Fax) Physician       Radiation Oncology 17           
   
                                                      
  
  
Martha Steel RN                         Specialty Care   
Coordinator         Oncology            18               
  
  
  
                      Team Member Relationship Specialty  Start Date End Date  
   
                                                      
  
  
Cynthia Douglas MD  
  
  
[address] [facility] [place] [address]  
  
  
[phone] (Work)  
  
  
[phone] (Fax) PCP - General   Internal Medicine 6/3/14            
   
                                                      
  
  
Rosaline Damon MD, MD  
  
  
[address] [place] [address]  
  
  
[phone] (Work)  
  
  
[phone] (Fax) Physician       Radiation Oncology 17           
   
                                                      
  
  
Martha Steel RN                         Specialty Care   
Coordinator         Oncology            18               
  
  
  
                      Team Member Relationship Specialty  Start Date End Date  
   
                                                      
  
  
Cynthia Douglas MD  
  
  
[address] [facility] [place] [address]  
  
  
[phone] (Work)  
  
  
[phone] (Fax) PCP - General   Internal Medicine 6/3/14            
   
                                                      
  
  
Rosaline Damon MD, MD  
  
  
[address] [place] [address]  
  
  
[phone] (Work)  
  
  
[phone] (Fax) Physician       Radiation Oncology 17           
   
                                                      
  
  
Martha Steel RN                         Specialty Care   
Coordinator         Oncology            18               
  
  
  
                      Team Member Relationship Specialty  Start Date End Date  
   
                                                      
  
  
Cynthia Douglas MD  
  
  
[address] [facility] [place] [address]  
  
  
[phone] (Work)  
  
  
[phone] (Fax) PCP - General   Internal Medicine 6/3/14            
   
                                                      
  
  
Rosaline Damon MD, MD  
  
  
[address] [place] [address]  
  
  
[phone] (Work)  
  
  
[phone] (Fax) Physician       Radiation Oncology 17           
   
                                                      
  
  
Martha Steel RN                         Specialty Care   
Coordinator         Oncology            18               
  
  
  
                      Team Member Relationship Specialty  Start Date End Date  
   
                                                      
  
  
Cynthia Douglas MD  
  
  
[address] [facility] [place] [address]  
  
  
[phone] (Work)  
  
  
[phone] (Fax) PCP - General   Internal Medicine 6/3/14            
   
                                                      
  
  
Rosaline Damon MD, MD  
  
  
[address] [place] [address]  
  
  
[phone] (Work)  
  
  
[phone] (Fax) Physician       Radiation Oncology 17           
   
                                                      
  
  
Martha Steel RN                         Specialty Care   
Coordinator         Oncology            18               
  
  
  
                      Team Member Relationship Specialty  Start Date End Date  
   
                                                      
  
  
Cynthia Douglas MD  
  
  
[address] [facility] [place] [address]  
  
  
[phone] (Work)  
  
  
[phone] (Fax) PCP - General   Internal Medicine 6/3/14            
   
                                                      
  
  
Rosaline Damon MD, MD  
  
  
[address] [place] [address]  
  
  
[phone] (Work)  
  
  
[phone] (Fax) Physician       Radiation Oncology 17           
   
                                                      
  
  
Martha Steel RN                         Specialty Care   
Coordinator         Oncology            18               
  
  
  
                      Team Member Relationship Specialty  Start Date End Date  
   
                                                      
  
  
Cynthia Douglas MD  
  
  
[address] [facility] [place] [address]  
  
  
[phone] (Work)  
  
  
[phone] (Fax) PCP - General   Internal Medicine 6/3/14            
   
                                                      
  
  
Rosaline Damon MD, MD  
  
  
[address] [place] [address]  
  
  
[phone] (Work)  
  
  
[phone] (Fax) Physician       Radiation Oncology 17           
   
                                                      
  
  
Martha Steel RN                         Specialty Care   
Coordinator         Oncology            18               
  
  
  
                      Team Member Relationship Specialty  Start Date End Date  
   
                                                      
  
  
Cynthia Douglas MD  
  
  
[address] [facility] [place] [address]  
  
  
[phone] (Work)  
  
  
[phone] (Fax) PCP - General   Internal Medicine 6/3/14            
   
                                                      
  
  
Rosaline Damon MD, MD  
  
  
[address] [place] [address]  
  
  
[phone] (Work)  
  
  
[phone] (Fax) Physician       Radiation Oncology 17           
   
                                                      
  
  
Martha Steel RN                         Specialty Care   
Coordinator         Oncology            18               
  
  
  
                      Team Member Relationship Specialty  Start Date End Date  
   
                                                      
  
  
Cynthia Douglas MD  
  
  
[address] [facility] [place] [address]  
  
  
[phone] (Work)  
  
  
[phone] (Fax) PCP - General   Internal Medicine 6/3/14            
   
                                                      
  
  
Rosaline Damon MD, MD  
  
  
[address]   
  
  
[place] [address]  
  
  
[phone] (Work)  
  
  
[phone] (Fax) Physician       Radiation Oncology 17           
   
                                                      
  
  
Martha Steel RN                         Specialty Care   
Coordinator         Oncology            18               
  
  
  
                      Team Member Relationship Specialty  Start Date End Date  
   
                                                      
  
  
Cynthia Douglas MD  
  
  
[address] [place] [address]  
  
  
[phone] (Work)  
  
  
[phone] (Fax) PCP - General   Internal Medicine 6/3/14            
   
                                                      
  
  
Rosaline Damon MD, MD  
  
  
[address] [facility] [place] [address]  
  
  
[phone] (Work)  
  
  
[phone] (Fax) Physician       Radiation Oncology 17           
   
                                                      
  
  
Martha Steel RN                         Specialty Care   
Coordinator         Oncology            18               
  
  
  
                      Team Member Relationship Specialty  Start Date End Date  
   
                                                      
  
  
Cynthia Douglas MD  
  
  
[address] [place] [address]  
  
  
[phone] (Work)  
  
  
[phone] (Fax) PCP - General   Internal Medicine 6/3/14            
   
                                                      
  
  
Rosaline Damon MD, MD  
  
  
[address] [place] [address]  
  
  
[phone] (Work)  
  
  
[phone] (Fax) Physician       Radiation Oncology 17           
   
                                                      
  
  
Martha Steel RN                         Specialty Care   
Coordinator         Oncology            18               
  
  
  
                      Team Member Relationship Specialty  Start Date End Date  
   
                                                      
  
  
Cynthia Douglas MD  
  
  
[address] [place] [address]  
  
  
[phone] (Work)  
  
  
[phone] (Fax) PCP - General   Internal Medicine 6/3/14            
   
                                                      
  
  
Rosaline Damon MD, MD  
  
  
[address]  
  
  
[place] [address]  
  
  
[phone] (Work)  
  
  
[phone] (Fax) Physician       Radiation Oncology 17           
   
                                                      
  
  
Martha Steel RN                         Specialty Care   
Coordinator         Oncology            18               
  
  
  
                      Team Member Relationship Specialty  Start Date End Date  
   
                                                      
  
  
Cynthia Douglas MD  
  
  
[address] [place] [address]  
  
  
[phone] (Work)  
  
  
[phone] (Fax) PCP - General   Internal Medicine 6/3/14            
   
                                                      
  
  
Rosaline Damon MD, MD  
  
  
[address] [place] [address]  
  
  
[phone] (Work)  
  
  
[phone] (Fax) Physician       Radiation Oncology 17           
   
                                                      
  
  
Martha Steel RN                         Specialty Care   
Coordinator         Oncology            18               
  
  
  
                      Team Member Relationship Specialty  Start Date End Date  
   
                                                      
  
  
Cynthia Douglas MD  
  
  
[address] [place] [address]  
  
  
[phone] (Work)  
  
  
[phone] (Fax) PCP - General   Internal Medicine 6/3/14            
   
                                                      
  
  
Rosaline Damon MD, MD  
  
  
[address] [place] [address]  
  
  
[phone] (Work)  
  
  
[phone] (Fax) Physician       Radiation Oncology 17           
   
                                                      
  
  
Martha Steel RN                         Specialty Care   
Coordinator         Oncology            18               
  
  
  
                      Team Member Relationship Specialty  Start Date End Date  
   
                                                      
  
  
Cynthia Douglas MD  
  
  
[address] [place] [address]  
  
  
[phone] (Work)  
  
  
[phone] (Fax) PCP - General   Internal Medicine 6/3/14            
   
                                                      
  
  
Rosaline Damon MD, MD  
  
  
[address]  
  
  
[place] [address]  
  
  
[phone] (Work)  
  
  
[phone] (Fax) Physician       Radiation Oncology 17           
   
                                                      
  
  
Martha Steel RN                         Specialty Care   
Coordinator         Oncology            18               
  
  
  
                      Team Member Relationship Specialty  Start Date End Date  
   
                                                      
  
  
Cynthia Douglas MD  
  
  
NPI: [id number]  
  
  
[address] [facility] [place] [address]  
  
  
[phone] (Work)  
  
  
[phone] (Fax) PCP - General   Internal Medicine 6/3/14            
   
                                                      
  
  
Rosaline Damon MD, MD  
  
  
NPI: [id number]  
  
  
[address]  
  
  
[place] [address]  
  
  
[phone] (Work)  
  
  
[phone] (Fax) Physician       Radiation Oncology 17           
   
                                                      
  
  
Martha Steel RN                         Specialty Care   
Coordinator         Oncology            18               
  
  
  
                      Team Member Relationship Specialty  Start Date End Date  
   
                                                      
  
  
Cynthia Douglas MD  
  
  
NPI: [id number]  
  
  
[address]  
  
  
[place] [address]  
  
  
[phone] (Work)  
  
  
[phone] (Fax) PCP - General   Internal Medicine 6/3/14            
   
                                                      
  
  
Rosaline Damon MD, MD  
  
  
NPI: [id number]  
  
  
[address] [place] [address]  
  
  
[phone] (Work)  
  
  
[phone] (Fax) Physician       Radiation Oncology 17           
   
                                                      
  
  
Martha Steel RN                         Specialty Care   
Coordinator         Oncology            18               
  
  
  
                      Team Member Relationship Specialty  Start Date End Date  
   
                                                      
  
  
Cynthia Douglas MD  
  
  
NPI: [id number]  
  
  
[address] [place] [address]  
  
  
[phone] (Work)  
  
  
[phone] (Fax) PCP - General   Internal Medicine 6/3/14            
   
                                                      
  
  
Rosaline Damon MD, MD  
  
  
NPI: [id number]  
  
  
[address] [place] [address]  
  
  
[phone] (Work)  
  
  
[phone] (Fax) Physician       Radiation Oncology 17           
   
                                                      
  
  
Martha Steel RN                         Specialty Care   
Coordinator         Oncology            18               
  
  
  
                      Team Member Relationship Specialty  Start Date End Date  
   
                                                      
  
  
Cynthia Douglas MD  
  
  
NPI: [id number]  
  
  
[address] [place] [address]  
  
  
[phone] (Work)  
  
  
[phone] (Fax) PCP - General   Internal Medicine 6/3/14            
   
                                                      
  
  
Rosaline Damon MD, MD  
  
  
NPI: [id number]  
  
  
[address] [place] [address]  
  
  
[phone] (Work)  
  
  
[phone] (Fax) Physician       Radiation Oncology 17           
   
                                                      
  
  
Martha Steel RN                         Specialty Care   
Coordinator         Oncology            18               
  
  
  
                      Team Member Relationship Specialty  Start Date End Date  
   
                                                      
  
  
Cynthia Douglas MD  
  
  
NPI: [id number]  
  
  
[address] [place] [address]  
  
  
[phone] (Work)  
  
  
[phone] (Fax) PCP - General   Internal Medicine 6/3/14            
   
                                                      
  
  
Rosaline Damon MD, MD  
  
  
NPI: [id number]  
  
  
[address] [place] [address]  
  
  
[phone] (Work)  
  
  
[phone] (Fax) Physician       Radiation Oncology 17           
   
                                                      
  
  
Martha Steel RN                         Specialty Care   
Coordinator         Oncology            18               
  
  
  
                      Team Member Relationship Specialty  Start Date End Date  
   
                                                      
  
  
Cynthia Douglas MD  
  
  
NPI: [id number]  
  
  
[address] [place] [address]  
  
  
[phone] (Work)  
  
  
[phone] (Fax) PCP - General   Internal Medicine 6/3/14            
   
                                                      
  
  
Rosaline Damon MD, MD  
  
  
NPI: [id number]  
  
  
[address] [place] [address]  
  
  
[phone] (Work)  
  
  
[phone] (Fax) Physician       Radiation Oncology 17           
   
                                                      
  
  
Martha Steel RN                         Specialty Care   
Coordinator         Oncology            18               
  
  
  
                      Team Member Relationship Specialty  Start Date End Date  
   
                                                      
  
  
Cynthia Douglas MD  
  
  
NPI: [id number]  
  
  
[address] [place] [address]  
  
  
[phone] (Work)  
  
  
[phone] (Fax) PCP - General   Internal Medicine 6/3/14            
   
                                                      
  
  
Rosaline Damon MD, MD  
  
  
NPI: [id number]  
  
  
[address] [place] [address]  
  
  
[phone] (Work)  
  
  
[phone] (Fax) Physician       Radiation Oncology 17           
   
                                                      
  
  
Martha Steel RN                         Specialty Care   
Coordinator         Oncology            18               
  
  
  
                      Team Member Relationship Specialty  Start Date End Date  
   
                                                      
  
  
Cynthia Douglas MD  
  
  
NPI: [id number]  
  
  
[address] [place] [address]  
  
  
[phone] (Work)  
  
  
[phone] (Fax) PCP - General   Internal Medicine 6/3/14            
   
                                                      
  
  
Rosaline Damon MD  
  
  
NPI: [id number]  
  
  
[address] [place] [address]  
  
  
[phone] (Work)  
  
  
[phone] (Fax) Physician       Radiation Oncology 17           
   
                                                      
  
  
Martha Steel RN                         Specialty Care   
Coordinator         Oncology            18               
  
  
  
                      Team Member Relationship Specialty  Start Date End Date  
   
                                                      
  
  
Cynthia Douglas MD  
  
  
NPI: [id number]  
  
  
[address] [place] [address]  
  
  
[phone] (Work)  
  
  
[phone] (Fax) PCP - General   Internal Medicine 6/3/14            
   
                                                      
  
  
Rosaline Damon MD  
  
  
NPI: [id number]  
  
  
[address] [place] [address]  
  
  
[phone] (Work)  
  
  
[phone] (Fax) Physician       Radiation Oncology 17           
   
                                                      
  
  
Martha Steel RN                         Specialty Care   
Coordinator         Oncology            18               
  
  
  
                      Team Member Relationship Specialty  Start Date End Date  
   
                                                      
  
  
Cynthia Douglas MD  
  
  
NPI: [id number]  
  
  
[address] [place] [address]  
  
  
[phone] (Work)  
  
  
[phone] (Fax) PCP - General   Internal Medicine 6/3/14            
   
                                                      
  
  
Rosaline Damon MD  
  
  
NPI: [id number]  
  
  
[address] [place] [address]  
  
  
[phone] (Work)  
  
  
[phone] (Fax) Physician       Radiation Oncology 17           
   
                                                      
  
  
Martha Steel RN                         Specialty Care   
Coordinator         Oncology            18               
  
  
  
                      Team Member Relationship Specialty  Start Date End Date  
   
                                                      
  
  
Cynthia Douglas MD  
  
  
NPI: [id number]  
  
  
[address] [place] [address]  
  
  
[phone] (Work)  
  
  
[phone] (Fax) PCP - General   Internal Medicine 6/3/14            
   
                                                      
  
  
Rosaline Damon MD  
  
  
NPI: [id number]  
  
  
[address] [place] [address]  
  
  
[phone] (Work)  
  
  
[phone] (Fax) Physician       Radiation Oncology 17           
   
                                                      
  
  
Martha Steel RN                         Specialty Care   
Coordinator         Oncology            18               
  
  
  
                      Team Member Relationship Specialty  Start Date End Date  
   
                                                      
  
  
Cynthia Douglas MD  
  
  
NPI: [id number]  
  
  
[address] [place] [address]  
  
  
[phone] (Work)  
  
  
[phone] (Fax) PCP - General   Internal Medicine 6/3/14            
   
                                                      
  
  
Rosaline Damon MD  
  
  
NPI: [id number]  
  
  
[address] [place] [address]  
  
  
[phone] (Work)  
  
  
[phone] (Fax) Physician       Radiation Oncology 17           
   
                                                      
  
  
Martha Steel RN                         Specialty Care   
Coordinator         Oncology            18               
  
  
  
                      Team Member Relationship Specialty  Start Date End Date  
   
                                                      
  
  
Cynthia Douglas MD  
  
  
NPI: [id number]  
  
  
[address] [place] [address]  
  
  
[phone] (Work)  
  
  
[phone] (Fax) PCP - General   Internal Medicine 6/3/14            
   
                                                      
  
  
Rosaline Damon MD  
  
  
NPI: [id number]  
  
  
[address] [place] [address]  
  
  
[phone] (Work)  
  
  
[phone] (Fax) Physician       Radiation Oncology 17           
   
                                                      
  
  
Martha Steel RN                         Specialty Care   
Coordinator         Oncology            18               
  
  
  
                      Team Member Relationship Specialty  Start Date End Date  
   
                                                      
  
  
Cynthia Douglas MD  
  
  
NPI: [id number]  
  
  
[address] [place] [address]  
  
  
[phone] (Work)  
  
  
[phone] (Fax) PCP - General   Internal Medicine 6/3/14            
   
                                                      
  
  
Rosaline Damon MD  
  
  
NPI: [id number]  
  
  
[address] [place] [address]  
  
  
[phone] (Work)  
  
  
[phone] (Fax) Physician       Radiation Oncology 17           
   
                                                      
  
  
Martha Steel RN                         Specialty Care   
Coordinator         Oncology            18               
  
  
  
                      Team Member Relationship Specialty  Start Date End Date  
   
                                                      
  
  
Cynthia Douglas MD  
  
  
NPI: [id number]  
  
  
[address] [place] [address]  
  
  
[phone] (Work)  
  
  
[phone] (Fax) PCP - General   Internal Medicine 6/3/14            
   
                                                      
  
  
Rosaline Damon MD  
  
  
NPI: [id number]  
  
  
[address] [place] [address]  
  
  
[phone] (Work)  
  
  
[phone] (Fax) Physician       Radiation Oncology 17           
   
                                                      
  
  
Martha Steel RN                         Specialty Care   
Coordinator         Oncology            18               
  
  
  
                      Team Member Relationship Specialty  Start Date End Date  
   
                                                      
  
  
Cynthia Douglas MD  
  
  
NPI: [id number]  
  
  
[address] [place] [address]  
  
  
[phone] (Work)  
  
  
[phone] (Fax) PCP - General   Internal Medicine 6/3/14            
   
                                                      
  
  
Rosaline Damon MD  
  
  
NPI: [id number]  
  
  
[address] [place] [address]  
  
  
[phone] (Work)  
  
  
[phone] (Fax) Physician       Radiation Oncology 17           
   
                                                      
  
  
Martha Steel RN                         Specialty Care   
Coordinator         Oncology            18               
  
  
  
                      Team Member Relationship Specialty  Start Date End Date  
   
                                                      
  
  
Cynthia Douglas MD  
  
  
NPI: [id number]  
  
  
[address] [facility]  
  
  
[place] [address]  
  
  
[phone] (Work)  
  
  
[phone] (Fax) PCP - General   Internal Medicine 6/3/14            
   
                                                      
  
  
Rosaline Damon MD  
  
  
NPI: [id number]  
  
  
[address] [place] [address]  
  
  
[phone] (Work)  
  
  
[phone] (Fax) Physician       Radiation Oncology 17           
   
                                                      
  
  
Martha Steel RN                         Specialty Care   
Coordinator         Oncology            18               
  
  
  
                      Team Member Relationship Specialty  Start Date End Date  
   
                                                      
  
  
Cynthia Douglas MD  
  
  
NPI: [id number]  
  
  
[address] [facility]  
  
  
[place] [address]  
  
  
[phone] (Work)  
  
  
[phone] (Fax) PCP - General   Internal Medicine 6/3/14            
   
                                                      
  
  
Rosaline Damon MD  
  
  
NPI: [id number]  
  
  
[address] [place] [address]  
  
  
[phone] (Work)  
  
  
[phone] (Fax) Physician       Radiation Oncology 17           
   
                                                      
  
  
Martha Steel RN                         Specialty Care   
Coordinator         Oncology            18               
  
  
  
                      Team Member Relationship Specialty  Start Date End Date  
   
                                                      
  
  
Cynthia Douglas MD  
  
  
NPI: [id number]  
  
  
[address] [facility]  
  
  
[place] [address]  
  
  
[phone] (Work)  
  
  
[phone] (Fax) PCP - General   Internal Medicine 6/3/14            
   
                                                      
  
  
Rosaline Damon MD  
  
  
NPI: [id number]  
  
  
[address] [place] [address]  
  
  
[phone] (Work)  
  
  
[phone] (Fax) Physician       Radiation Oncology 17           
   
                                                      
  
  
Martha Steel RN                         Specialty Care   
Coordinator         Oncology            18               
  
  
FOR RECORDS PERTAINING TO PATIENTS WHO ARE OR HAVE BEEN ENROLLED IN A CHEMICAL 
DEPENDENCY/SUBSTANCEABUSE PROGRAM, SOME INFORMATION MAY BE OMITTED. This 
clinical summary was aggregated from multiple sources. Caution should be 
exercised in using it in the provision of clinical care. This summary normalizes
information from multiple sources, and as a consequence, information in this 
document may materially change the coding, format and clinical context of 
patient data. In addition, data may be omitted in some cases. CLINICAL DECISIONS
SHOULD BE BASED ON THE PRIMARY CLINICAL RECORDS. [facility] provides 
no warranty or guarantee of the accuracy or completeness of information in this 
document.

## 2024-09-09 NOTE — CT_ITS
**We are attempting to reach an attending provider to discuss findings. An 
addendum with communication details will be sent when the communication is 
complete. ** 
 
REPORT-ID:[id number] 
 
EXAM:  CT ABDOMEN AND PELVIS WITH INTRAVENOUS CONTRAST 
 
CLINICAL INDICATION:  left abd pain     January 24, 2023 
 
TECHNIQUE:  Helically acquired images were obtained of the abdomen and 
pelvis with intravenous contrast.  This CT exam was performed using one or 
more of the following dose reduction techniques:  automated exposure 
control, adjustment of the mA and/or kV according to patient size, and/or 
use of iterative reconstruction technique. 
 
CONTRAST:  IV 100mL Isovue-370 
 
RADIATION DOSE:  CTDIvol = 17.03 mGy, DLP = 1346.09 mGy-cm 
 
COMPARISON:  January 29, 2024. 
 
FINDINGS: 
 
LOWER THORAX:  Unremarkable.  Lung bases are clear.  No cardiomegaly.  No 
significant pericardial effusion. 
 
ABDOMEN: 
 
LIVER:  Elongated right lobe of liver is similar to prior exam, 21.1 cm 
craniocaudal. 
 
GALLBLADDER AND BILE DUCTS:  Cholecystectomy clips are again noted.  No 
intra- or extrahepatic biliary ductal dilation. 
 
PANCREAS:  Unremarkable.  No focal cystic or solid mass. 
 
SPLEEN:  Unremarkable.  Normal size without focal cystic or solid mass. 
 
ADRENALS:  Unremarkable.  No nodules. 
 
KIDNEYS AND URETERS:  Similar nonobstructing coarse calcification at the 
left lower pole renal cortex.  No hydronephrosis or ureter stone. 
 
STOMACH AND BOWEL:  Thick-walled and narrowed long segment of sigmoid and 
mild adjacent left fascial thickening, similar to prior exam. 
Diverticulosis of the descending and proximal sigmoid colon.  Moderate 
diverticulosis of hepatic flexure.  No stomach or bowel distention. 
 
PELVIS: 
 
APPENDIX:  Nonvisualized appendix. Moderate stool in descending colon. 
 
BLADDER:  Moderately thick walled but almost collapsed urinary bladder. It 
is closely adjacent to the inflammatory change of the ethmoid without gui 
fistula visible at this time. 
 
REPRODUCTIVE:  Hysterectomy. 
 
ABDOMEN and PELVIS: 
 
INTRAPERITONEAL SPACE:  Unremarkable.  No free air or free fluid. 
 
BONES/JOINTS:  Unremarkable.  No suspicious lytic or blastic abnormality. 
 
SOFT TISSUES:  There are some coarse calcifications and asymmetric 
appearing soft tissue density in the superior-medial right breast, similar 
to January 24, 2023.  No discrete abdominal or pelvic wall hernia. 
 
VASCULATURE:  Unremarkable.  Abdominal aorta is non-dilated. 
 
LYMPH NODES:  Unremarkable.  No enlarged lymph nodes. 
 
ORDER #: [id number] CT/Abdomen/Pelvis W IV Cont ONLY  
IMPRESSION:  
 
  
1.  Findings most likely due to mild recurrent sigmoid diverticulitis.  
There is a thick band of presumed inflammation between the proximal sigmoid  
and the bladder visible on the coronal images, in the region of a prominent  
previous pocket of air, without gui colovesical fistulization at this  
time. Moderate bladder wall thickening without intraluminal air. Correlate  
with evidence of cystitis.  
 
  
2.  No discrete drainable abscess. No free air or free fluid. Mild  
inflammation along the left upper pelvic sidewall-iliac fossa.  
 
  
3.  Cholecystectomy. Hysterectomy. Nonvisualized appendix.  
 
  
4.  Mild asymmetric irregular soft tissue lobulated density and coarse  
calcifications in the slightly superior-medial breast, similar to January 2023,  not fully included. This may be surgical scarring but it is not  
fully evaluated. Please correlate with prior procedures and follow-up  
breast exam, mammogram and ultrasound if it is thought to be indicated.  
Mammogram April 11, 2017 mentions mass within a resected right biopsy  
specimen.  
 
  
Electronically Signed:  
Morena Bob MD  
2024/09/09 at 22:47 EDT  
Reading Location ID and State: [id number] / [place]  
Tel [phone], Service support  [phone], Fax [phone]

## 2024-10-01 ENCOUNTER — HOSPITAL ENCOUNTER (OUTPATIENT)
Dept: HOSPITAL 100 - ED | Age: 64
Setting detail: OBSERVATION
LOS: 2 days | Discharge: HOME | End: 2024-10-03
Payer: MEDICARE

## 2024-10-01 VITALS
RESPIRATION RATE: 18 BRPM | HEART RATE: 57 BPM | SYSTOLIC BLOOD PRESSURE: 118 MMHG | TEMPERATURE: 97.9 F | DIASTOLIC BLOOD PRESSURE: 69 MMHG | OXYGEN SATURATION: 98 %

## 2024-10-01 VITALS
SYSTOLIC BLOOD PRESSURE: 132 MMHG | DIASTOLIC BLOOD PRESSURE: 79 MMHG | RESPIRATION RATE: 18 BRPM | TEMPERATURE: 97.88 F | OXYGEN SATURATION: 96 % | HEART RATE: 64 BPM

## 2024-10-01 VITALS — DIASTOLIC BLOOD PRESSURE: 70 MMHG | SYSTOLIC BLOOD PRESSURE: 115 MMHG

## 2024-10-01 VITALS
RESPIRATION RATE: 18 BRPM | TEMPERATURE: 98.06 F | HEART RATE: 58 BPM | SYSTOLIC BLOOD PRESSURE: 114 MMHG | DIASTOLIC BLOOD PRESSURE: 68 MMHG | OXYGEN SATURATION: 99 %

## 2024-10-01 VITALS
RESPIRATION RATE: 18 BRPM | HEART RATE: 57 BPM | OXYGEN SATURATION: 98 % | SYSTOLIC BLOOD PRESSURE: 110 MMHG | DIASTOLIC BLOOD PRESSURE: 52 MMHG | TEMPERATURE: 98.06 F

## 2024-10-01 VITALS
DIASTOLIC BLOOD PRESSURE: 70 MMHG | SYSTOLIC BLOOD PRESSURE: 115 MMHG | HEART RATE: 58 BPM | TEMPERATURE: 97.9 F | RESPIRATION RATE: 16 BRPM

## 2024-10-01 VITALS
SYSTOLIC BLOOD PRESSURE: 118 MMHG | HEART RATE: 57 BPM | DIASTOLIC BLOOD PRESSURE: 69 MMHG | RESPIRATION RATE: 18 BRPM | OXYGEN SATURATION: 98 % | TEMPERATURE: 97.9 F

## 2024-10-01 VITALS — BODY MASS INDEX: 46.7 KG/M2 | BODY MASS INDEX: 47.2 KG/M2

## 2024-10-01 DIAGNOSIS — H92.02: ICD-10-CM

## 2024-10-01 DIAGNOSIS — K57.32: Primary | ICD-10-CM

## 2024-10-01 DIAGNOSIS — E11.9: ICD-10-CM

## 2024-10-01 DIAGNOSIS — E66.01: ICD-10-CM

## 2024-10-01 DIAGNOSIS — I10: ICD-10-CM

## 2024-10-01 DIAGNOSIS — Z79.84: ICD-10-CM

## 2024-10-01 DIAGNOSIS — Z79.899: ICD-10-CM

## 2024-10-01 DIAGNOSIS — Z87.891: ICD-10-CM

## 2024-10-01 LAB
ALANINE AMINOTRANSFER ALT/SGPT: 46 U/L (ref 13–56)
ALBUMIN SERPL-MCNC: 3.2 G/DL (ref 3.2–5)
ALKALINE PHOSPHATASE: 95 U/L (ref 45–117)
ANION GAP: 5 (ref 5–15)
AST(SGOT): 24 U/L (ref 15–37)
BUN SERPL-MCNC: 8 MG/DL (ref 7–18)
BUN/CREAT RATIO: 9 RATIO (ref 10–20)
CALCIUM SERPL-MCNC: 10 MG/DL (ref 8.5–10.1)
CARBON DIOXIDE: 29 MMOL/L (ref 21–32)
CHLORIDE: 107 MMOL/L (ref 98–107)
DEPRECATED RDW RBC: 48.1 FL (ref 35.1–43.9)
ERYTHROCYTE [DISTWIDTH] IN BLOOD: 15.9 % (ref 11.6–14.6)
EST GLOM FILT RATE - AFR AMER: 82 ML/MIN (ref 60–?)
ESTIMATED CREATININE CLEARANCE: 86.5 ML/MIN
GLOBULIN: 4.4 G/DL (ref 2.2–4.2)
GLUCOSE: 106 MG/DL (ref 74–106)
HCT VFR BLD AUTO: 46.1 % (ref 37–47)
HEMOGLOBIN: 14.9 G/DL (ref 12–15)
HGB BLD-MCNC: 14.9 G/DL (ref 12–15)
IMMATURE GRANULOCYTES COUNT: 0.03 X10^3/UL (ref 0–0)
LEUKOCYTE ESTERASE UR QL STRIP: 500 /UL
LIPASE: 26 U/L (ref 13–75)
MCV RBC: 82.9 FL (ref 81–99)
MEAN CORP HGB CONC: 32.3 G/DL (ref 32–36)
MEAN PLATELET VOL.: 9.6 FL (ref 6.2–12)
MUCOUS THREADS URNS QL MICRO: (no result) /HPF
NRBC FLAGGED BY ANALYZER: 0 % (ref 0–5)
PLATELET # BLD: 310 K/MM3 (ref 150–450)
PLATELET COUNT: 310 K/MM3 (ref 150–450)
POTASSIUM: 3.5 MMOL/L (ref 3.5–5.1)
PROT UR QL STRIP.AUTO: 15 MG/DL
RBC # BLD AUTO: 5.56 M/MM3 (ref 4.2–5.4)
RBC DISTRIBUTION WIDTH CV: 15.9 % (ref 11.6–14.6)
RBC DISTRIBUTION WIDTH SD: 48.1 FL (ref 35.1–43.9)
RBC UR QL: (no result) /HPF (ref 0–5)
RBC UR QL: 50 /UL
SP GR UR: 1 (ref 1–1.03)
SQUAMOUS URNS QL MICRO: (no result) /HPF (ref 5–10)
TROPONIN-I HS: 6 PG/ML (ref 3–54)
URINE PRESERVATIVE: (no result)
WBC # BLD AUTO: 8.1 K/MM3 (ref 4.4–11)
WHITE BLOOD COUNT: 8.1 K/MM3 (ref 4.4–11)

## 2024-10-01 PROCEDURE — 36415 COLL VENOUS BLD VENIPUNCTURE: CPT

## 2024-10-01 PROCEDURE — 96365 THER/PROPH/DIAG IV INF INIT: CPT

## 2024-10-01 PROCEDURE — 96366 THER/PROPH/DIAG IV INF ADDON: CPT

## 2024-10-01 PROCEDURE — 83690 ASSAY OF LIPASE: CPT

## 2024-10-01 PROCEDURE — 83605 ASSAY OF LACTIC ACID: CPT

## 2024-10-01 PROCEDURE — 94668 MNPJ CHEST WALL SBSQ: CPT

## 2024-10-01 PROCEDURE — 74177 CT ABD & PELVIS W/CONTRAST: CPT

## 2024-10-01 PROCEDURE — 96361 HYDRATE IV INFUSION ADD-ON: CPT

## 2024-10-01 PROCEDURE — 80048 BASIC METABOLIC PNL TOTAL CA: CPT

## 2024-10-01 PROCEDURE — 99221 1ST HOSP IP/OBS SF/LOW 40: CPT

## 2024-10-01 PROCEDURE — 87086 URINE CULTURE/COLONY COUNT: CPT

## 2024-10-01 PROCEDURE — 96372 THER/PROPH/DIAG INJ SC/IM: CPT

## 2024-10-01 PROCEDURE — 87088 URINE BACTERIA CULTURE: CPT

## 2024-10-01 PROCEDURE — 80053 COMPREHEN METABOLIC PANEL: CPT

## 2024-10-01 PROCEDURE — 99284 EMERGENCY DEPT VISIT MOD MDM: CPT

## 2024-10-01 PROCEDURE — 82962 GLUCOSE BLOOD TEST: CPT

## 2024-10-01 PROCEDURE — 87186 SC STD MICRODIL/AGAR DIL: CPT

## 2024-10-01 PROCEDURE — G0378 HOSPITAL OBSERVATION PER HR: HCPCS

## 2024-10-01 PROCEDURE — 93005 ELECTROCARDIOGRAM TRACING: CPT

## 2024-10-01 PROCEDURE — 84484 ASSAY OF TROPONIN QUANT: CPT

## 2024-10-01 PROCEDURE — 85025 COMPLETE CBC W/AUTO DIFF WBC: CPT

## 2024-10-01 PROCEDURE — 87077 CULTURE AEROBIC IDENTIFY: CPT

## 2024-10-01 PROCEDURE — 85027 COMPLETE CBC AUTOMATED: CPT

## 2024-10-01 PROCEDURE — 81001 URINALYSIS AUTO W/SCOPE: CPT

## 2024-10-01 PROCEDURE — A4216 STERILE WATER/SALINE, 10 ML: HCPCS

## 2024-10-01 RX ADMIN — PIPERACILLIN SODIUM AND TAZOBACTAM SODIUM 12.5 GM: 3; .375 INJECTION, POWDER, LYOPHILIZED, FOR SOLUTION INTRAVENOUS at 21:10

## 2024-10-01 RX ADMIN — PIPERACILLIN SODIUM AND TAZOBACTAM SODIUM 100 GM: 3; .375 INJECTION, POWDER, LYOPHILIZED, FOR SOLUTION INTRAVENOUS at 17:11

## 2024-10-01 RX ADMIN — SODIUM CHLORIDE 999 ML: 9 INJECTION, SOLUTION INTRAVENOUS at 15:28

## 2024-10-01 NOTE — EKG12_ITS
Test Reason : GENERAL 
Blood Pressure : ***/*** mmHG 
Vent. Rate : 060 BPM     Atrial Rate : 060 BPM 
   P-R Int : 178 ms          QRS Dur : 082 ms 
    QT Int : 426 ms       P-R-T Axes : 046 009 053 degrees 
   QTc Int : 426 ms 
  
Sinus rhythm with Premature supraventricular complexes 
Otherwise normal ECG 
  
Confirmed by TJ BEVERLY, PALMER ([id number]),  WANDA BOURNE ([id number]) on 10/3/2024 1:06:32 PM 
  
Referred By:             Confirmed By:PALMER SPENCER MD

## 2024-10-01 NOTE — HP.PCM.HOS_ITS
HPI - General    
General    
Date of Admission: 10/01/24    
Date of Service: 10/01/24    
Chief Complaint: Worsening abdominal pain    
HPI Narrative    
ISMAEL PIZANO, is a 64 F who presented to [facility] ED on   
10/1/2024 with worsening abdominal pain.  Patient has known history of sigmoid   
diverticulitis, has followed with Dr. Patricio for this since January 2023.    
Most recent episode was in early September.  CT abdomen pelvis on 9/9 showed   
findings consistent with mild recurrent sigmoid diverticulitis.  She was   
discharged home on p.o. cefdinir and Flagyl and completed course of these   
antibiotics.  Saw Dr. Patricio in the office on 9/18.  Patient reported mild   
left mid quadrant pain at that visit but was otherwise feeling improved from   
previous.  Plan was to see Dr. Patricio in the office today but patient has had   
worsening discomfort in the lower abdomen and is generally not been feeling well  
for the past few days so she was told to come into the ED for further   
evaluation.  CT abdomen pelvis showed persistent acute sigmoid diverticulitis   
with no focal fluid collection or free air.    
    
I saw patient at bedside in the ED and then discussed case with Dr. Crow   
over the phone prior to admission.  Patient was comfortable appearing in the ED   
and reported only mild lower abdominal pain.  Her abdomen was soft and nontender  
to palpation.  Patient had not eaten anything yesterday and she was stating she   
was hungry and was asking for a clinic with diet.  Dr. Patricio noted in her   
recent office visit note that patient could consider elective surgery for her   
recurrent diverticulitis, but she would refer patient to colorectal surgery for   
this.  On discussion with Dr. Crow, patient has no urgent surgical needs   
and will be fine to treat here with antibiotics and escalate diet as tolerated   
then discharged home.  Patient can then consider outpatient follow-up with   
surgery and/or establishing with a colorectal surgeon for consideration of   
elective surgery at that point.  Will be admitted here for further management   
with general surgery consulted.    
    
[facility]    
Medical History (Updated 10/01/24 @ 17:23 by Emily Shaver)    
    
Kidney stones    
Diverticulitis    
Wears glasses    
Cancer    
Rash    
Diabetes    
Arthritis    
History of diverticulitis    
History of stress test    
Invasive ductal carcinoma of right breast    
Hypertension    
Former smoker    
Bone spur    
HTN (hypertension)    
    
    
                                Home Medications    
    
    
    
?Medication ?Instructions ?Recorded ?Last Taken ?Type    
     
multivit-iron 18 mg-folic acid 400 1 ea PO QODAY supplememnt 04/10/17 10/01/24   
History    
    
mcg-calcium 500 mg-minerals tablet        
    
(Women's Daily Formula)        
     
atenolol 50 mg tablet 50 mg PO DAILY blood pressure 01/22/23 10/01/24 History    
     
chlorthalidone 25 mg tablet 12.5 mg PO DAILY water pill 01/22/23 10/01/24   
History    
     
exemestane 25 mg tablet 25 mg PO DAILY breast cancer 01/22/23 10/01/24 History    
     
glipizide 2.5 mg tablet, extended 2.5 mg PO DAILY diabetes 01/22/23 10/01/24   
History    
    
release 24 hr        
     
potassium chloride 20 mEq 20 meq PO BID supplement 01/22/23 10/01/24 History    
    
tablet,extended release        
     
metformin 500 mg tablet,extended 500 mg PO DAILY 10/01/24 10/01/24 History    
    
release 24 hr        
    
    
    
                                            
    
    
    
Allergy/AdvReac Type Severity Reaction Status Date / Time    
     
silver sulfadiazine (From Allergy  Hives Verified 09/18/24 14:42    
    
Silvadene)         
    
    
    
Family History (Reviewed 10/01/24 @ 15:36 by Dr. Tj Tabor DO)    
Other   Cancer    
    
    
    
Surgical History (Reviewed 10/01/24 @ 15:36 by Dr. Tj Tabor DO)    
    
History of colonoscopy    
History of foot surgery    
S/P lumpectomy, right breast    
History of cholecystectomy    
H/O: hysterectomy    
    
    
Social History (Reviewed 10/01/24 @ 15:36 by Dr. Tj Tabor DO)    
Smoking Status:  Former smoker     
    
    
    
ROS    
Constitutional    
Constitutional: Denies chills, fatigue, fever(s) or weakness    
Eyes    
Eyes: Denies change in vision    
Cardiovascular    
Cardiovascular: Denies chest pain    
Respiratory/Chest    
Respiratory/Chest: Denies shortness of breath at rest    
Gastrointestinal    
Gastrointestinal: Reports abdominal pain; Denies constipation, diarrhea, nausea   
or vomiting    
Genitourinary    
Genitourinary: Denies dysuria    
    
Vital Signs    
Vital Signs    
Vital Signs:     
                                            
    
    
    
 10/01/24    
13:59 10/01/24    
15:58 10/01/24    
16:00    
     
Temperature 97.9 F  97.9 F    
     
Temperature Source Temporal  Oral    
     
Pulse Rate 64  58 L    
     
Respiratory Rate 18  16    
     
Blood Pressure 132/79 H 115/70 115/70    
     
Blood Pressure Mean 96 85 85    
     
Pulse Ox 96      
     
Oxygen Delivery Method Room Air  Room Air    
    
    
    
    
 10/01/24    
17:06 10/01/24    
17:07    
     
Temperature 97.9 F 97.9 F    
     
Temperature Source Oral     
     
Pulse Rate 57 L 57 L    
     
Respiratory Rate 18 18    
     
Blood Pressure 118/69 118/69    
     
Blood Pressure Mean 85 85    
     
Pulse Ox 98 98    
     
Oxygen Delivery Method Room Air     
    
    
                                     Weight    
    
    
    
Weight:                        131.36 kg                                          
    
     
Body Mass Index (BMI)          46.7                                               
    
    
    
    
    
    
Physical Exam    
Const    
alert, oriented x3 and no apparent distress    
Constitutional Narrative:     
Elderly female, morbidly obese, mildly fatigued appearing, otherwise sitting up   
comfortably in bed, conversing normally, no acute distress.    
General Appearance: cooperative and comfortable    
HEENT    
normocephalic, head/scalp atraumatic, hearing grossly normal bilaterally, nasal   
mucous membranes and turbinates normal and moist oral mucous membranes    
Eyes    
PERRL, EOMs intact bilaterally and conjunctivae normal    
Neck    
full ROM    
Chest    
inspection of chest normal    
Resp    
normal respiratory effort, normal air movement, no use of accessory muscles and   
clear to auscultation bilaterally    
Cardio    
regular rate, regular rhythm, no murmurs and peripheral pulses 2+ throughout    
GI    
normal to inspection, nondistended, normoactive bowel sounds, soft to palpation,  
non-tender and non-distended    
Back/Spine    
normal ROM    
Extremity    
normal to inspection, full ROM and no pedal edema    
Skin    
no rashes or lesions noted    
Neuro    
moves all extremities and no focal motor deficits    
Speech: speech normal    
Psych    
mental status grossly normal    
    
Results    
Lab / Micro Data    
    
                                                        10/01/24 15:13              
    
                                                        10/01/24 15:13              
    
Labs:     
                         Laboratory Results - last 24 hr    
    
10/01/24 14:55: Urine Color Yellow, Urine Clarity Cloudy, Urine pH 7.0, Ur   
Specific Gravity 1.005, Urine Protein 15 H, Urine Glucose (UA) Normal, Urine   
Ketones Negative, Urine Occult Blood 50 H, Urine Nitrite Negative, Urine   
Bilirubin Negative, Urine Urobilinogen Normal, Ur Leukocyte Esterase 500 H,   
Urine RBC 0 SEEN, Urine WBC 25-50 SEEN, Ur Squamous Epith Cells 0-5 SEEN, Urine   
Bacteria 2+, Urine Mucus 0 SEEN    
10/01/24 15:13: WBC 8.1, RBC 5.56 H, Hgb 14.9, Hct 46.1, MCV 82.9, MCH 26.8 L,   
MCHC 32.3, RDW Std Deviation 48.1 H, RDW Coeff of Elzbieta 15.9 H, Plt Count 310, MPV  
9.6, Immature Gran % (Auto) 0.400, Neut % (Auto) 64.0, Lymph % (Auto) 21.4, Mono  
% (Auto) 11.0 H, Eos % (Auto) 2.5, Baso % (Auto) 0.7, Absolute Neuts (auto) 5.2,  
Absolute Lymphs (auto) 1.73, Nucleated RBC % 0, Sodium 140, Potassium 3.5,   
Chloride 107, Carbon Dioxide 29.0, Anion Gap 5, BUN 8, Creatinine 0.89, Estim   
Creat Clear Calc 86.50, Est GFR (MDRD) Af Amer 82, Est GFR (MDRD) Non-Af 68, B  
UN/Creatinine Ratio 9.0 L, Glucose 106, Lactic Acid 1.4, Calcium 10.0, Total   
Bilirubin 0.50, AST 24, ALT 46, Alkaline Phosphatase 95, Troponin I High Sens 6,  
Total Protein 7.6, Albumin 3.2, Globulin 4.4 H, Albumin/Globulin Ratio 0.7 L,   
Lipase 26    
    
    
Imaging    
                              Radiology Impression    
    
Abdomen/Pelvis CT  10/01/24 14:35    
IMPRESSION:    
     
Persistent acute sigmoid diverticulitis. No focal fluid collection or free    
air.    
     
Electronically Signed:    
Rosas Davenport MD    
2024/10/01 at 16:36 EDT    
Reading Location ID and State: [id number] / [place]    
Tel [phone], Service support  [phone], Fax [phone]    
     
    
    
    
    
Assessment & Plan    
Assessment/Plan    
(1) Diverticulitis:     
PLAN:     
Plan    
Patient is a 64-year-old female who presented [facility] ED on   
10/1/2024 with worsening abdominal pain.    
    
1.  Recurrent mild sigmoid diverticulitis    
? Admit under observation status to [facility].  General surgery consulted.  CT   
abdomen pelvis showed recurrent acute sigmoid diverticulitis with no perforation  
or abscess noted.  Patient very stable appearing on exam with minimal abdominal   
pain.  Hemodynamically stable and afebrile.  Will treat with IV Zosyn for now.    
Okay for clear liquid diet for now, advance diet per surgery recommendations.      
    
Chronic medical conditions:    
? Morbid obesity: BMI 47 on admit.  Encouraged lifestyle modifications.    
Complicates hospital course, care and prognosis.    
? Hypertension: Stable.  Continue home atenolol and chlorthalidone.    
? Type 2 diabetes mellitus: Home regimen of metformin 500 mg daily and glipizide  
2.5 mg daily.  Blood glucose 106 on admit.  Okay for sliding scale insulin with   
meals while inpatient.    
? History of breast cancer: Continue home exemestane.    
    
DVT prophylaxis: Lovenox twice daily    
CODE STATUS: Full code, verified    
Expected disposition: Home, 1 to 2 days    
    
Total clinical time spent by myself addressing the patient's medical issues,   
reviewing all the data, and collaborating with patient's care team: 55 minutes.    
    
Charges/Coding    
Visit Charges    
Inpatient E&M: 43727 Init Hosp L2

## 2024-10-01 NOTE — CT_ITS
REPORT-ID:[id number] 
 
INDICATION: recent diverticulitis 
 
EXAMINATION: CT Abdomen And Pelvis W/ Contrast Injection 
 
TECHNIQUE: 
Helically acquired images were obtained of the abdomen and pelvis after IV 
contrast. A radiation dose optimization technique was used for this scan. 
IV Contrast dosage and agent: IV 100mL [id number] 
Oral contrast: None. 
 
COMPARISON: 9/9/2024. 
 
FINDINGS: 
 
Visualized lung bases: Unremarkable 
 
Liver: Unremarkable 
 
Gallbladder:  Surgically absent. 
 
Spleen: Unremarkable 
 
Pancreas: Unremarkable 
 
Adrenal Glands: Unremarkable 
 
Kidneys: 7 mm nonobstructing stone in the left lower renal pole. 
 
Vasculature: Unremarkable 
 
GI Tract: Scattered colonic diverticula. There is short segment 
circumferential wall thickening of the sigmoid colon with surrounding 
mesenteric fat stranding. 
 
Lymphadenopathy: None 
 
Peritoneum: No ascites. 
 
Bladder: Unremarkable 
 
Reproductive organs: Unremarkable 
 
Bones/Soft tissues: Mild scattered degenerative changes of the visualized 
spine. 
 
ORDER #: [id number] CT/Abdomen/Pelvis W IV Cont ONLY  
IMPRESSION:  
 
  
Persistent acute sigmoid diverticulitis. No focal fluid collection or free  
air.  
 
  
Electronically Signed:  
Rosas Davenport MD  
2024/10/01 at 16:36 EDT  
Reading Location ID and State: [id number] / [place]  
Tel [phone], Service support  [phone], Fax [phone]

## 2024-10-01 NOTE — XMS RPT_ITS
Comprehensive CCD (C-CDA v2.1)  
  
                           Created on: 2024  
  
  
Navdeep Guillen  
External Reference #: CDR,PersonID:[id number]  
: 1960  
Sex: Female  
  
Demographics  
  
  
                                        Address             [address] [place]  [address]  
   
                                        Home Phone          [phone]  
   
                                        Mobile Phone        [phone]  
   
                                        Phone               [phone]  
   
                                        Preferred Language  en  
   
                                        Marital Status        
   
                                        Evangelical Affiliation Unknown  
   
                                        Race                White  
   
                                        Ethnic Group        Not  or Lati  
no  
  
  
Author  
  
  
                                        Organization        [facility]  
  
  
Care Team Providers  
  
  
                                Care Team Member Name Role            Phone  
   
                                BARK, MARICRUZ E Unavailable     Unavailable  
   
                                BARK, MARICRUZ E Unavailable     Unavailable  
   
                                BARK, MARICRUZ E Unavailable     Unavailable  
   
                                BARK, MARICRUZ E Unavailable     Unavailable  
   
                                MAKKAR, LANI K Unavailable     Unavailable  
   
                                MAKKAR, LANI K Unavailable     Unavailable  
   
                                MAKKAR, LANI K Unavailable     Unavailable  
   
                                MAKKAR, LANI K Unavailable     Unavailable  
   
                                Steve Govea Admitting       Unavailabl  
e  
   
                                PendSteve tadeo Attending       Unavailabl  
e  
   
                                Ganute, Cynthia C Primary Care    Unavailable  
   
                                Steve Govea Admitting       Unavailabl  
e  
   
                                Steve Govea Attending       Unavailabl  
e  
   
                                Ganta, Cynthia C Primary Care    Unavailable  
   
                                Bark, Maricruz E Admitting       Unavailable  
   
                                Bark, Maricruz E Attending       Unavailable  
   
                                Misael, Cynthia C Primary Care    Unavailable  
   
                                Cynthia Douglas MD Primary Care Provider [phone]  
[phone]  
   
                                Nelson BEVERLY MD, Rosaline Unavailable     [phone]  
[phone]  
   
                                Doup RN, Martha  Unavailable     Unavailable  
   
                                Cynthia Douglas MD Primary Care Provider [phone]  
[phone]  
   
                                Nelson BEVERLY MD, Rosaline Unavailable     [phone]  
[phone]  
   
                                Doup RN, Martha  Unavailable     Unavailable  
   
                                Cynthia Douglas MD Primary Care Provider [phone]  
[phone]  
   
                                Nelson BEVERLY MD, Rosaline Unavailable     [phone]  
[phone]  
   
                                Doup RN, Mratha  Unavailable     Unavailable  
   
                                Doup RN, Martha  Unavailable     Unavailable  
   
                                Rosaline Damon MD Unavailable     [phone]  
   
                                Cynthia Douglas MD Primary Care Provider [phone]  
[phone]  
   
                                CYNTHIA DOUGLAS   Primary Care    Unavailable  
   
                                OLDER, SHERLY      Referring       Unavailable  
   
                                CORINTA, CYNTHIA   Primary Care    Unavailable  
   
                                OLDER, SHERLY      Attending       Unavailable  
   
                                VIKKI POSADA Attending       Unavailable  
   
                                MISAEL, CYNTHIA   Primary Care    Unavailable  
   
                                CORINTACYNTHIA   Referring       Unavailable  
   
                                GANTA, CYNTHIA   Primary Care    Unavailable  
   
                                [facility]   [facility]    Unavailable  
   
                                AMEE SHERLY      Referring       Unavailable  
   
                                VIKKI POSADA Referring       Unavailable  
   
                                VIKKI POSADA Attending       Unavailable  
   
                                [facility]   Primary Care    Unavailable  
   
                                [facility]   Primary Care    Unavailable  
   
                                [facility]   Primary Care    Unavailable  
   
                                OLDER, SHERLY      Attending       Unavailable  
   
                                [facility]    Unavailable  
   
                                MEERA MENCHACA Attending       Unavailable  
   
                                [facility]   Primary Care    Unavailable  
   
                                VIKKI POSADA Referring       Unavailable  
  
  
  
Allergies  
  
  
                                                    Allergy   
Classification                          Reported   
Allergen(s)               Allergy Type              Date of   
Onset                     Reaction(s)               Facility  
   
                                                      
(1 source)                              Sulfonamides   
(Antibiotic);   
Translations:   
[sulfa drugs]                           Propensity to   
adverse   
reactions to   
drug   
(disorder)                                                  [facility]  
   
                                                      
(20 sources)                            silver   
sulfADIAZINE;   
Translations:   
[SILVER   
SULFADIAZINE]             Drug Allergy                
7                         [facility]  
   
                                                      
(20 sources)                            Sulfonamides   
(Antibiotic);   
Translations:   
[SULFA   
(SULFONAMIDE   
ANTIBIOTICS)]             Drug Allergy                
1                         [facility]  
  
  
  
Medications  
Current Medications  
  
  
  
                      Medication Drug Class(es) Dates      Sig (Normalized) Sig   
(Original)  
   
                                                    [id number]   
actuat albuterol   
0.09 mg/actuat   
metered dose   
inhaler  
(7 sources)                             beta2-Adrenergic   
Agonist                                 Start:   
2022  
End:   
2023                              take 2 puff(s) by   
inhalation every   
six hours as needed                     albuterol HFA   
(PROAIR HFA) 90   
mcg/actuation   
inhaler Inhale 2   
Puffs as instructed   
every 6 hours as   
needed. 1 Each 0   
2022   
Discontinued  
   
                                        Comment on above:   Inhale 2 Puffs as in  
structed every 6 hours as needed.   
   
                                                    atenolol 50 mg   
oral tablet  
(20 sources)                            beta-Adrenergic   
Blocker                                 Start:   
2022  
End:   
2024                              take 1 tablet by   
mouth once daily                        atenolol (TENORMIN)   
50 mg tablet Take 1   
tablet by mouth   
once daily. 90   
tablet 3 2024   
Active  
  
  
  
                                        Start: 2022   take 1 tablet by myah  
th once   
daily                                   atenolol (TENORMIN) 100 mg tablet   
Take 1 tablet by mouth once daily.   
90 tablet 1 2022 Active  
   
                                                    Start: 10-  
End: 2022                         take 1 tablet by mouth once   
daily                                   atenolol (TENORMIN) 50 mg tablet   
Take 1 tablet by mouth once daily.   
90 tablet 3 2022 Active  
  
  
  
                                        Comment on above:   Take 1 tablet by myah  
th once daily.   
   
                                                    benzonatate 100 mg   
oral capsule  
(7 sources)                             Non-narcotic   
Antitussive                             Start:   
  
2  
End:   
  
3                                       take 1 capsule   
by mouth every   
eight hours as   
needed                                  benzonatate   
(TESSALON PERLES)   
100 mg capsule Take   
1 capsule by mouth   
three times daily as   
needed for cough. 12   
capsule 0 2022   
Discontinued  
   
                                        Comment on above:   Take 1 capsule by mo  
uth three times daily as needed for cough.  
  
   
                                                    Blood-Glucose Meter   
monitoring kit  
(20 sources)                                        Start:   
  
4  
End:   
  
4                                                   Blood-Glucose Meter   
monitoring kit   
Glucose Meter of   
Choice - Kit - Dx:   
Type 2 DM -   
Controlled E11.9 1   
Each 0 2024 Active  
  
  
  
                                Start: 2018                 Blood-Glucose   
Meter monitoring kit Indications: Type 2   
diabetes   
mellitus without complication, without long-term current use of   
insulin ([facility]) Glucose Meter of Choice - Kit - Dx: Type 2 DM -   
Uncontrolled E11.65 1 Each 2018 Active  
   
                                Start: 2018                 Blood-Glucose   
Meter monitoring kit Indications: Type 2   
diabetes   
mellitus without complication, without long-term current use of   
insulin ([facility]) Glucose Meter of Choice - Kit - Dx: Type 2 DM -   
Uncontrolled E11.65 1 Each 0 2018 Active  
  
  
  
                                        Comment on above:   Glucose Meter of Cho  
ice - Kit - Dx: Type 2 DM - Uncontrolled   
E11.65   
   
                                                    chlorthalidone 25 mg   
oral tablet  
(20 sources)                            Thiazide-like   
Diuretic                                Start:   
20  
End:   
20  
24                                      take 0.5   
tablet by   
mouth once   
daily                                   chlorthalidone   
(HYGROTON) 25 mg   
tablet Take 0.5   
tablets by mouth once   
daily. 45 tablet 3   
2024 Active  
  
  
  
                                                    Start: 10-  
End: 2022                         take 1 tablet by mouth once   
daily                                   chlorthalidone (HYGROTON) 25 mg   
tablet Take 1 tablet by mouth once   
daily. 90 tablet 3 2022 Active  
  
  
  
                                        Comment on above:   Take 1 tablet by myah  
th once daily.   
   
                                                            Take 0.5 tablets by   
mouth once daily.   
   
                                                    ciprofloxacin 500 mg   
oral tablet  
(20 sources)                            Quinolone   
Antimicrobial                           Start:   
20  
End:   
20                                      take 1 tablet   
by mouth twice   
daily                                   ciprofloxacin HCl   
(CIPRO) 500 mg tablet   
Take 1 tablet by   
mouth two times a day   
for 7 days. 14 tablet   
0 2024 Active  
  
  
  
                                                    Start: 2023  
End: 10-                         take 1 tablet by mouth twice   
daily                                   ciprofloxacin HCl (CIPRO) 500 mg   
tablet Take 1 tablet by mouth twice   
daily. 20 tablet 0 2023   
10/02/2023 Discontinued  
  
  
  
                                        Comment on above:   Take 1 tablet by myah  
th twice daily.   
   
                                                            Take 1 tablet by myah  
th two times a day for 7 days.   
   
                                                    doxycycline hyclate   
100 mg oral tablet  
(1 source)                              Tetracycline-class   
Drug                                    Start:   
  
2  
End:   
  
2                                       take 1 tablet   
by mouth twice   
daily                                   doxycycline   
(VIBRA-TABS) 100 mg   
tablet Take 1 tablet   
by mouth twice daily   
for 7 days. 14 tablet   
0 2022 Active  
   
                                        Comment on above:   Take 1 tablet by myah  
th twice daily for 7 days.   
   
                                                    exemestane 25 mg oral   
tablet  
(20 sources)              Aromatase Inhibitor       Start:   
  
4                                       take 1 tablet   
by mouth once   
daily                                   exemestane (AROMASIN)   
25 mg tablet   
Indications: Primary   
cancer of lower outer   
quadrant of right   
female breast (HCC) ,   
Carcinoma of right   
breast, estrogen and   
progesterone receptor   
positive (HCC) Take 1   
tablet by mouth once   
daily. 90 tablet 1   
2024 Active  
  
  
  
                                                    Start: 2022  
End: 06-                         take 1 tablet by mouth once   
daily                                   exemestane (AROMASIN) 25 mg tablet   
Indications: Primary cancer of lower   
outer quadrant of right female   
breast (HCC) , Carcinoma of right   
breast, estrogen and progesterone   
receptor positive (HCC) Take 1   
tablet by mouth once daily. 90   
tablet 3 2023 06/10/2024   
Discontinued  
   
                                                    Start: 2021  
End: 2022                         take 1 tablet by mouth once   
daily                                   exemestane (AROMASIN) 25 mg tablet   
Indications: Primary cancer of lower   
outer quadrant of right female   
breast (HCC) , Carcinoma of right   
breast, estrogen and progesterone   
receptor positive (HCC) Take 1   
tablet by mouth once daily. 80   
tablet 3 2022 Active  
  
  
  
                                        Comment on above:   Take 1 tablet by myah  
th once daily.   
   
                                                    fluconazole 150 mg oral   
tablet  
(10 sources)              Azole Antifungal          Start:   
2023  
End:   
2023                                          fluconazole (DIFLUCAN)   
150 mg tablet   
Indications: Vaginal   
yeast infection Take 1   
tablet by mouth one   
time only for 1 dose.   
Repeat in 3 days as   
needed. 2 tablet 0   
2023   
Active  
  
  
  
                                                    Start: 2023  
End: 2023                                     fluconazole (DIFLUCAN) 150 m  
g tablet  
   
                                                    Start: 2023  
End: 2023           take 1 tablet by mouth once fluconazole (DIFLUCAN) 150  
 mg tablet  
  
Take 1 tablet by mouth one time only   
for 1 dose. 2 tablet 1 2023 Active  
  
  
  
                                        Comment on above:   Take 1 tablet by myah  
th one time only for 1 dose.   
   
                                                            Take 1 tablet by myah  
th one time only for 1 dose. Repeat in 3 days   
as needed.   
   
                                                    glipiZIDE er 2.5 mg   
24 hr extended   
release oral tablet  
(20 sources)              Sulfonylurea              Start:   
10-  
End:   
2024                              take 1 tablet by   
mouth once daily                        glipiZIDE   
(GLUCOTROL XL) 2.5   
mg 24 hr tablet   
Take 1 tablet by   
mouth once daily.   
90 tablet 3   
2024 Active  
   
                                        Comment on above:   Take 1 tablet by myah  
th once daily.   
   
                                                    ibuprofen 800 mg   
oral tablet  
(20 sources)                            Nonsteroidal   
Anti-inflammatory Drug                  Start:   
2022  
End:   
2023                              take 1 tablet by   
mouth every   
eight hours as   
needed                                  ibuprofen (MOTRIN)   
800 mg tablet Take   
1 tablet by mouth   
every 8 hours as   
needed for pain.   
Take with food. 45   
tablet 1   
2023 Active  
  
  
  
                                                    Start: 10-  
End: 2022                         take 1 tablet by mouth every   
eight hours as needed for pain          ibuprofen (MOTRIN) 800 mg tablet   
Indications: Type 2 diabetes mellitus   
without complication, without   
long-term current use of insulin   
(HCC) Take 1 tablet by mouth every 8   
hours as needed for pain. Take with   
food. 45 tablet 1 10/11/2021   
2022 Discontinued  
  
  
  
                                        Comment on above:   Take 1 tablet by myah  
th every 8 hours as needed for pain. Take   
with food.   
   
                                                    24 hr metFORMIN   
hydrochloride 500 mg   
extended release oral   
tablet  
(20 sources)              Biguanide                 Start:   
10-  
1  
End:   
  
4                                       take 1 tablet by   
mouth once daily   
at breakfast                            metFORMIN ER   
(GLUCOPHAGE XR)   
500 mg 24 hr   
tablet   
Indications: Type   
2 diabetes   
mellitus without   
complication,   
without long-term   
current use of   
insulin (HCC) Take   
1 tablet by mouth   
daily with   
breakfast. 90   
tablet 3   
2024 Active  
   
                                        Comment on above:   Take 1 tablet by myah  
th daily with breakfast.   
   
                                                    MV-MN/FOLIC   
ACID/CALCIUM/VIT K   
(ONE-A-DAY WOMEN'S 50   
PLUS ORAL)  
(20 sources)                                                take 1 tablet by   
mouth once daily                        MV-MN/FOLIC   
ACID/CALCIUM/VIT K   
(ONE-A-DAY WOMEN'S   
50 PLUS ORAL) Take   
1 tablet by mouth   
once daily. Active  
  
  
  
                                                take 1 tablet by mouth once raul  
y MV-MN/FOLIC ACID/CALCIUM/VIT K (ONE-A-DAY   
WOMEN'S   
50 PLUS ORAL) Take 1 tablet by mouth once daily.   
0 Active  
  
  
  
                                        Comment on above:   Take 1 tablet by myah  
th once daily.  
   
   
                                                    potassium chloride   
20 meq extended   
release oral tablet  
(20 sources)                                        Start:   
2024                              take 1 tablet by   
mouth twice daily                       potassium chloride   
20 mEq TbER Take 1   
tablet by mouth two   
times a day. 180   
tablet 1 2024   
Active  
  
  
  
                                                    Start: 10-  
End: 2024                         take 1 tablet by mouth twice   
daily                                   potassium chloride 20 mEq TbER Take   
1 tablet by mouth two times a day.   
180 tablet 1 2024   
Discontinued  
   
                                Start: 10-                 potassium chlo  
ride (K-TAB) 10 mEq   
tablet Indications: Hypokalemia   
Take 2 tablets in AM and 1 tablet   
in PM with meals 270 tablet 3   
10/06/2022 Active  
   
                                                    Start: 10-  
End: 10-                         take 1 tablet by mouth twice   
daily                                   potassium chloride (K-TAB) 10 mEq   
tablet Indications: Hypokalemia   
Take 1 tablet by mouth twice daily.   
180 tablet 1 10/03/2022 10/06/2022   
Discontinued  
   
                                        Start: 2022   take 1 tablet by myah  
th twice   
daily                                   potassium chloride (K-TAB) 10 mEq   
tablet Indications: Hypokalemia   
Take 1 tablet by mouth twice daily.   
180 tablet 1 2022 Active  
   
                                                    Start: 10-  
End: 2022                         take 1 tablet by mouth once   
daily at breakfast                      potassium chloride (K-TAB) 10 mEq   
tablet Take 1 tablet by mouth daily   
with breakfast. 90 tablet 3   
10/11/2021 2022 Discontinued  
  
  
  
                                        Comment on above:   Take 1 tablet by myah  
th daily with breakfast.   
   
                                                            Take 1 tablet by myah  
th twice daily.   
   
                                                            Take 2 tablets in AM  
 and 1 tablet in PM with meals   
   
                                                            Take 1 tablet by myah  
th two times a day.   
  
  
  
Completed/Discontinued Medications  
  
  
  
                      Medication Drug Class(es) Dates      Sig (Normalized) Sig   
(Original)  
   
                                                    ascorbic acid 500   
mg oral tablet  
(20 sources)              Vitamin C                   
End:   
10-                              take 1 tablet by   
mouth once daily                        ascorbic acid,   
vitamin C, (VITAMIN   
C) 500 mg tablet   
Take 500 mg by   
mouth once daily. 0   
10/02/2023   
Discontinued  
   
                                        Comment on above:   Take 500 mg by mouth  
 once daily.   
   
                                                    azithromycin 250 mg   
oral tablet  
(8 sources)                             Macrolide   
Antimicrobial                           Start:   
10-  
End:   
2024                                          azithromycin   
(ZITHROMAX Z-ALLAN)   
250 mg tablet TAKE   
2 TABS ON THE FIRST   
DAY, THEN ONE TAB   
DAILY FOR 4 DAYS. 6   
tablet 0 10/02/2023   
2024   
Discontinued  
   
                                        Comment on above:   TAKE 2 TABS ON THE F  
IRST DAY, THEN ONE TAB DAILY FOR 4 DAYS.   
   
                                                    cholecalciferol,   
vitamin D3,   
(VITAMIN D3 ORAL)  
(12 sources)                                                take 1 tablet by   
mouth once daily                        cholecalciferol,   
vitamin D3,   
(VITAMIN D3 ORAL)   
Take 1 tablet by   
mouth once daily. 0   
Active  
   
                                        Comment on above:   Take 1 tablet by myah  
th once daily.   
   
                                                    0.5 ml dulaglutide   
1.5 mg/ml   
auto-injector  
(1 source)                              GLP-1 Receptor   
Agonist                                 Start:   
10-  
End:   
2022                              inject 0.75 mg by   
subcutaneous   
injection every   
week                                    dulaglutide   
(TRULICITY) 0.75   
mg/0.5 mL pen   
injector Inject   
0.75 mg   
subcutaneously one   
time a week. Inject   
dose once per week.   
Discard Pen After 4   
Each 4 10/26/2021   
2022   
Discontinued  
   
                                        Comment on above:   Inject 0.75 mg subcu  
taneously one time a week. Inject dose   
once   
per week. Discard Pen After   
   
                                                    Gatorade Sports   
Drink  
(12 sources)                                        Start:   
10-                                          Gatorade Sports   
Drink Indications:   
Screening for colon   
cancer Use as   
directed for   
Miralax / Gatorade   
Bowel Prep Kit 0   
10/26/2021 Active  
   
                                        Comment on above:   Use as directed for   
Miralax / Gatorade Bowel Prep Kit   
   
                                                    metroNIDAZOLE 500   
mg oral tablet  
(18 sources)                            Nitroimidazole   
Antimicrobial                           Start:   
2023  
End:   
10-                              take 1 tablet by   
mouth three times   
daily                                   metroNIDAZOLE   
(FLAGYL) 500 mg   
tablet Take 500 mg   
by mouth three   
times daily. TAKE   
ONE(2) TABLET   
TWO(2) TIMES DAILY   
FOR (1) DAY. 0   
2023   
10/02/2023   
Discontinued  
   
                                        Comment on above:   Take 1 tablet by myah  
th three times daily. TAKE ONE(2) TABLET   
TWO(2) TIMES DAILY FOR (1) DAY.   
   
                                                            Take 500 mg by mouth  
 three times daily. TAKE ONE(2) TABLET TWO(2)   
TIMES DAILY FOR (1) DAY.   
   
                                                    polyethylene glycol   
3350 84280 mg   
powder for oral   
solution  
(12 sources)              Osmotic Laxative          Start:   
10-                                          polyethylene glycol   
3350 (MIRALAX,   
GLYCOLAX) 17   
gram/dose powder   
Indications:   
Screening for colon   
cancer Use as   
directed for   
Miralax / Gatorade   
Bowel Prep Kit 238   
g 0 10/26/2021   
Active  
   
                                        Comment on above:   Use as directed for   
Miralax / Gatorade Bowel Prep Kit   
   
                                                    vitamin e 100 unt   
oral capsule  
(12 sources)                                                take 1 capsule by   
mouth once daily                        Vitamin E, dl,   
acetate, (VITAMIN   
E) 100 unit capsule   
Take 100 Units by   
mouth once daily. 0   
Active  
   
                                        Comment on above:   Take 100 Units by mo  
ut once daily.   
  
  
  
Problems  
Active Problems  
  
  
                                                    Problem   
Classification  Problem         Date            Documented Date Episodic/Chronic  
   
                                                    Abdominal pain  
(1 source)                              Left flank pain;   
Translations:   
[Unspecified abdominal   
pain]                                   2024          Episodic  
   
                                                    Cancer of breast  
(20 sources)                            Primary malignant   
neoplasm of lower   
outer quadrant of   
female breast;   
Translations:   
[Malignant neoplasm of   
lower-outer quadrant   
of right female   
breast]                                 Onset:   
05-                05-                Chronic  
   
                                                    Diabetes mellitus   
without complication  
(20 sources)                            Type 2 diabetes   
mellitus without   
complication;   
Translations: [Type 2   
diabetes mellitus   
without complications]                  Onset:   
2016                                          Chronic  
   
                                                    Disorders of lipid   
metabolism  
(20 sources)                            Hypercholesterolemia;   
Translations: [Pure   
hypercholesterolemia,   
unspecified]                            Onset:   
2014                                          Chronic  
   
                                                    Diverticulosis and   
diverticulitis  
(3 sources)                             Diverticulitis;   
Translations:   
[Diverticulitis of   
intestine, part   
unspecified, without   
perforation or abscess   
without bleeding]                                           Chronic  
   
                                                    Esophageal disorders  
(20 sources)                            Gastroesophageal   
reflux disease;   
Translations:   
[Gastro-esophageal   
reflux disease without   
esophagitis]                            05-          Chronic  
   
                                                    Essential   
hypertension  
(20 sources)                            Essential   
hypertension;   
Translations:   
[Essential (primary)   
hypertension]                           Onset:   
2014                                          Chronic  
   
                                                    Fluid and   
electrolyte   
disorders  
(2 sources)                             Hypokalemia;   
Translations:   
[Hypokalemia]                                               Episodic  
   
                                                    Genitourinary   
symptoms and   
ill-defined   
conditions  
(4 sources)                             Jose hematuria;   
Translations: [Gross   
hematuria]                                                  Episodic  
   
                                                    Mycoses  
(2 sources)                             Candidiasis;   
Translations:   
[Candidiasis,   
unspecified]                                                Episodic  
   
                                                    Nonmalignant breast   
conditions  
(2 sources)                             Mammographic   
calcification of right   
breast; Translations:   
[Mammographic   
calcification found on   
diagnostic imaging of   
breast]                                                     Episodic  
   
                                                    Other nutritional;   
endocrine; and   
metabolic disorders  
(20 sources)                            Body mass index 40+ -   
severely obese;   
Translations: [Morbid   
(severe) obesity due   
to excess calories]                     Onset:   
2014                                          Chronic  
   
                                                    Other upper   
respiratory disease  
(1 source)                              Seasonal allergic   
rhinitis;   
Translations: [Other   
seasonal allergic   
rhinitis]                               2024          Chronic  
   
                                                    Other upper   
respiratory   
infections  
(1 source)                              Chronic sinusitis;   
Translations: [Chronic   
sinusitis,   
unspecified]                                                Chronic  
   
                                                    Other upper   
respiratory   
infections  
(1 source)                              Upper respiratory   
infection;   
Translations: [Acute   
upper respiratory   
infection,   
unspecified]                            2024          Episodic  
   
                                                    Residual codes;   
unclassified  
(1 source)                              Menopause present;   
Translations:   
[Asymptomatic   
menopausal state]                                           Episodic  
   
                                                    Unclassified  
(1 source)                              New Patient /   
[id number]()                            Onset:   
2018                                            
   
                                                    Unclassified  
(1 source)                Unknown / UNK(Unknown)    Onset:   
07-                                            
  
  
Past or Other Problems  
  
  
                                                    Problem   
Classification  Problem         Date            Documented Date Episodic/Chronic  
   
                                                    Blindness and vision   
defects  
(20 sources)                            Hypermetropia;   
Translations:   
[Hypermetropia,   
unspecified eye]                        Onset:   
2016                Episodic  
   
                                                    Other aftercare  
(8 sources)                             Surgical follow-up;   
Translations:   
[Encounter for   
follow-up examination   
after completed   
treatment for   
conditions other than   
malignant neoplasm]                     Onset:   
2015  
Resolved:   
2016                Episodic  
   
                                                    Other and unspecified   
benign neoplasm  
(20 sources)                            Benign neoplasm of   
skin of eyelid;   
Translations: [Other   
benign neoplasm of   
skin of unspecified   
eyelid, including   
canthus]                                Onset:   
2016                Episodic  
   
                                                    Other connective   
tissue disease  
(8 sources)                             Peroneal tendinitis;   
Translations:   
[Peroneal tendinitis,   
unspecified leg]                        Onset:   
2016  
Resolved:   
2019                Episodic  
   
                                                    Other female genital   
disorders  
(8 sources)                             Complex endometrial   
hyperplasia without   
atypia; Translations:   
[Benign endometrial   
hyperplasia]                            Onset:   
2014  
Resolved:   
2014                Chronic  
   
                                                    Other female genital   
disorders  
(8 sources)                             Complex atypical   
endometrial   
hyperplasia;   
Translations:   
[Endometrial   
intraepithelial   
neoplasia [EIN]]                        Onset:   
2014  
Resolved:   
2014                Chronic  
   
                                                    Other female genital   
disorders  
(8 sources)                             Disorder of uterus;   
Translations:   
[Noninflammatory   
disorder of uterus,   
unspecified]                            Onset:   
2014  
Resolved:   
2014                Episodic  
   
                                                    Other screening for   
suspected conditions   
(not mental disorders   
or infectious   
disease)  
(8 sources)                             Mammography abnormal;   
Translations: [Other   
abnormal and   
inconclusive findings   
on diagnostic imaging   
of breast]                              Onset:   
2024                                          Episodic  
   
                                                    Prolapse of female   
genital organs  
(16 sources)                            Uterine prolapse;   
Translations:   
[Uterovaginal   
prolapse,   
unspecified]                            Onset:   
2014  
Resolved:   
2014                Chronic  
   
                                                    Residual codes;   
unclassified  
(20 sources)                            Family history of   
malignant neoplasm of   
ovary; Translations:   
[Family history of   
malignant neoplasm of   
ovary]                                  Onset:   
05-                05-                Episodic  
   
                                                    Residual codes;   
unclassified  
(1 source)                              Estrogen receptor   
positive status   
[ER+]; Translations:   
[Malignant neoplasm   
of lower-outer   
quadrant of right   
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   
estrogen receptor   
positive (HCC) (HCC)]                   Onset:   
2023                                          Episodic  
   
                                                    Spondylosis;   
intervertebral disc   
disorders; other back   
problems  
(20 sources)                            Chronic low back   
pain; Translations:   
[Lumbago with   
sciatica, left side]                    Onset:   
2016  
Resolved:   
2016                Episodic  
   
                                                    Unclassified  
(1 source)                              New Patient;   
Translations: [New   
Patient]                                Onset:   
2018                                            
  
  
  
Results  
  
  
                      Test Name  Value      Interpretation Reference Range Facil  
ity  
   
                                                    CNOVon 2024   
   
                                        CNOV                Office Visit ([facility]  
)  
----------------------  
--------------  
NAVDEEP GUILLEN   
([id number]) 1960   
F  
Date Time Provider   
Department  
24 5:00 PM FABIANA AMBROSIO  
During your visit   
today, we recorded the   
following information   
about you:  
Temperature Pulse   
Respiration Blood   
pressure  
98.5 degrees 63/minute   
18/minute 122/78  
Weight  
136.9 kg  
Fabiana Ambrosio APRN.CNP 2024   
5:24 PM Signed  
This note was created   
using NoteWriter.  
Subjective  
Navdeep Guillen is a   
64 year old female.  
64 year old female   
with PMH HTN, GERD, DM   
presents for illness.  
Acute onset yesterday  
+sore throat  
+headache  
+pain with swallowing  
+enlarged lymph nodes  
Mild cough  
Denies N/V/D  
Denies skin rash or   
lesions.  
+exposure to strep,   
citing her grandson   
was over this past   
weekend and was  
positive for strep.  
Denies using   
homeopathic or OTC  
Endorses   would like   
to be tested for   
strep   
The history is   
provided by the   
patient. No language   
 was used.  
Sore Throat  
This is a new problem.   
The current episode   
started yesterday. The   
problem has  
been unchanged.   
Neither side of throat   
is experiencing more   
pain than the  
other. There has been   
no fever. The pain is   
at a severity of 6/10.   
The pain is  
moderate. Associated   
symptoms include   
coughing, headaches   
and swollen glands.  
Pertinent negatives   
include no abdominal   
pain, congestion,   
diarrhea, drooling,  
ear discharge, ear   
pain, hoarse voice,   
plugged ear sensation,   
neck pain,  
shortness of breath,   
stridor, trouble   
swallowing or   
vomiting. She has had  
exposure to strep. She   
has had no exposure to   
mono. She has tried   
nothing for  
the symptoms. The   
treatment provided no   
relief.  
PAST MEDICAL HISTORY  
2021: Abnormal   
mammogram  
2015: Achilles   
tendon pain  
2015: Ankle   
weakness  
12/15/2016: Chronic   
pain of left ankle  
2014: Colon   
abnormality  
Comment: Thickening of   
the ascending colon   
seen on the recent CT  
scan. Patient has had   
a colonoscopy around 4   
years ago.  
Records were obtained   
for when they were   
done, 4 years  
ago , in [facility]. her   
colonoscopy was  
completely normal, no   
thickening, and the   
biopsy too was  
normal except for   
lymphoid aggregates.  
: Diverticulitis  
Comment: Treated by   
Dr. Patricio at [facility]  
2016: DJD   
(degenerative joint   
disease), ankle and   
foot  
No date: DM (diabetes   
mellitus) (HCC)  
No date: GERD   
(gastroesophageal   
reflux disease)  
05/15/2014: Gross   
hematuria  
Comment: , had   
hematuria,   
investigated   
extensively along  
with cytoscopy, a   
stone was found but no   
signs of any  
malignancy.  
12/15/2016: Heel pain,   
chronic  
10/03/2017: Hepatic   
steatosis  
10/03/2017: Hiatal   
hernia  
No date: Hypertension  
No date: Insomnia  
05/15/2014: Kidney   
stone  
No date: Morbid   
obesity (HCC)  
No date:   
Nephrolithiasis  
2016: Neuritis  
05/15/2014: Renal   
lesion  
2015: S/P   
Achilles tendon repair  
Comment: 2015   
sx done  
PAST SURGICAL HISTORY  
2017: BREAST   
LUMPECTOMY HX; Right  
2021: BX BREAST   
W/DEVICE 1ST LESION   
STEREOTACTIC GUID;   
Right  
: CHOLECYSTECTOMY  
Comment: gallbladder   
removal  
: COLONOSCOPY  
2014: CT ABDOMEN  
2014: PAST   
SURGICAL HISTORY OF  
Comment: DANGA   
hysteroscopy  
2015: PAST   
SURGICAL HISTORY OF;   
Left  
Comment: Left foot   
surgery  
10/16/2014: S PK LAVH   
[id number]  
ALLERGIES Silvadene   
[Silver Sulfadiazine]   
and Sulfa (Sulfonamide   
Antibiotics)  
MEDICATIONS  
atenolol (TENORMIN) 50   
mg tablet Take 1   
tablet by mouth once   
daily.  
chlorthalidone   
(HYGROTON) 25 mg   
tablet Take 0.5   
tablets by mouth once   
daily.  
glipiZIDE (GLUCOTROL   
XL) 2.5 mg 24 hr   
tablet Take 1 tablet   
by mouth once  
daily.  
metFORMIN ER   
(GLUCOPHAGE XR) 500 mg   
24 hr tablet Take 1   
tablet by mouth daily  
with breakfast.  
blood sugar diagnostic   
(BLOOD GLUCOSE TEST)   
test strip Test blood   
sugar(s) 2  
times daily. Dx: Type   
2 DM - Controlled   
E11.9 Insulin: No  
Lancets Test blood   
sugar(s) 2 times   
daily. Dx: Type 2 DM -   
Controlled E11.9  
Insulin: No  
exemestane (AROMASIN)   
25 mg tablet Take 1   
tablet by mouth once   
daily.  
potassium chloride 20   
mEq TbER Take 1 tablet   
by mouth two times a   
day.  
ibuprofen (MOTRIN) 800   
mg tablet Take 1   
tablet by mouth every   
8 hours as  
needed for pain. Take   
with food.  
blood sugar diagnostic   
(BLOOD GLUCOSE TEST)   
test strip Test blood   
sugars  
2daily.   
Dx:ucnontrolled   
diabetes . Insulin:   
Yes  
lancets (ONE TOUCH   
DELICA) 33 gauge misc   
Test blood sugar(s) 2   
daily. Dx:  
Type 2 DM - Controlled   
E11.9 Insulin: Yes  
Blood-Glucose Meter   
monitoring kit Glucose   
Meter of Choice - Kit   
- Dx: Type 2  
DM - Uncontrolled   
E11.65  
MV-MN/FOLIC   
ACID/CALCIUM/VIT K   
(ONE-A-DAY WOMEN'S 50   
PLUS ORAL) Take 1   
tablet  
by mouth once daily.  
FAMILY HISTORY  
Problem Relation Age   
of Onset  
Ovarian cancer Mother   
39  
Heart Father  
Hypertension Father  
Prostate Cancer Father   
78  
other (kidney stones)   
Brother  
Hypertension Brother  
Diabetes Brother  
Seizures Son  
Breast Cancer Other   
(more content not   
included)...        Normal                                  [facility]  
   
                                                    STREP A MOLECULAR (POC)on    
   
                      Procedural Control Valid                            [facility]  
   
                      Strep A (POCT) Negative              Negative   [facility]  
   
                                                    CNPNon 2024   
   
                                        CNPN                Telephone ([facility])  
----------------------  
--------------  
NAVDEEP GUILLEN   
([id number]) 1960   
F  
Date Time Provider   
Department  
24 CYNTHIA DOUGLAS   
[facility]  
During your visit   
today, we recorded the   
following information   
about you:  
Ani Lin LPN 2024 9:50 AM   
Signed  
Pt called upset that   
she got a letter that   
instructed her Dr. Douglas was  
terminated from [facility]. Pt asking   
to please check into   
this. Pt  
repots she told them   
she would go with Sherly   
Older ut would like us   
to check into  
this. Pt would like to   
stay with Dr. Douglas.  
Please advise pt of   
findings.  
SAMMI Chavira Chitra, MD   
2024 10:14 AM   
Signed  
Please let them know   
that the system has to   
be updated, and it   
should soon be ,  
I will still see her   
and she will remain my   
patient.  
Regards,  
Manisha Gaona MD, MA   
2024 10:48 AM   
Signed  
Patient notified.   
Patient will fax over   
copy of letter   
received.  
Allergies As of Date:   
2024 Noted   
Allergy Reaction  
SILVADENE (SILVER   
SULFADIAZINE)   
2017 4 - Hives  
SULFA (SULFONAMIDE   
ANTIBIOTICS)   
2021 4 - Hives  
Date Reviewed:   
2024  
Reviewed by:   
Vikki Posada APRN.CNP - Fully   
Assessed  
Prescriptions as of   
2024  
- atenolol (TENORMIN)   
50 mg tablet  
Take 1 tablet by mouth   
once daily.  
- chlorthalidone   
(HYGROTON) 25 mg   
tablet  
Take 0.5 tablets by   
mouth once daily.  
- glipiZIDE (GLUCOTROL   
XL) 2.5 mg 24 hr   
tablet  
Take 1 tablet by mouth   
once daily.  
- metFORMIN ER   
(GLUCOPHAGE XR) 500 mg   
24 hr tablet  
Take 1 tablet by mouth   
daily with breakfast.  
- blood sugar   
diagnostic (BLOOD   
GLUCOSE TEST) test   
strip  
Test blood sugar(s) 2   
times daily. Dx: Type   
2 DM - Controlled   
E11.9  
Insulin: No  
- Lancets  
Test blood sugar(s) 2   
times daily. Dx: Type   
2 DM - Controlled   
E11.9  
Insulin: No  
- exemestane   
(AROMASIN) 25 mg   
tablet  
Take 1 tablet by mouth   
once daily.  
- potassium chloride   
20 mEq TbER  
Take 1 tablet by mouth   
two times a day.  
- ibuprofen (MOTRIN)   
800 mg tablet  
Take 1 tablet by mouth   
every 8 hours as   
needed for pain. Take   
with food.  
- blood sugar   
diagnostic (BLOOD   
GLUCOSE TEST) test   
strip  
Test blood sugars   
2daily.   
Dx:ucnontrolled   
diabetes . Insulin:   
Yes  
- lancets (ONE TOUCH   
DELICA) 33 gauge misc  
Test blood sugar(s) 2   
daily. Dx: Type 2 DM -   
Controlled E11.9   
Insulin: Yes  
- Blood-Glucose Meter   
monitoring kit  
Glucose Meter of   
Choice - Kit - Dx:   
Type 2 DM -   
Uncontrolled E11.65  
- MV-MN/FOLIC   
ACID/CALCIUM/VIT K   
(ONE-A-DAY WOMEN'S 50   
PLUS ORAL)  
Take 1 tablet by mouth   
once daily.  
Meds Comments as of   
2015:  
Patient only took the   
Etodolac for a few   
days and noticed no   
difference so she  
quit taking this   
medication.  
Problem List As Of   
Date 2024 Noted   
Resolved  
Morbid obesity with   
BMI of 40.0-44.9,   
adult (HC*2014  
Uterus disorder   
[N85.9] 2014  
Hypertension [I10]   
2014  
Hypercholesterolemia   
[E78.00] 2014  
Endometrial   
hyperplasia without   
atypia,   
complex*2014  
Uterine prolapse   
without mention of   
vaginal wal*2014  
Rectocele [N81.6]   
2014  
Complex endometrial   
hyperplasia with   
atypia [N8*2014  
Follow-up examination,   
following other   
surgery *2015  
Bilateral low back   
pain with left-sided   
sciatic*2016  
Peroneal tendonitis   
[M76.70] 2016  
Controlled diabetes   
mellitus type II   
without co*2016  
Hyperopia [H52.00]   
2016  
Presbyopia [H52.4]   
2016  
Benign neoplasm of   
skin of eyelid   
including   
can*2016  
Chronic bilateral low   
back pain with   
left-sided*2016  
Malignant neoplasm of   
lower-outer quadrant   
of r*05/10/2017  
ER+ CA+ carcinoma of   
breast (HCC) [C50.919,   
Z17*05/10/2017  
Family history of   
ovarian cancer   
[Z80.41] 05/10/2017  
GERD (gastroesophageal   
reflux disease)   
[K21.9]  
Encounter Number:   
[id number]  
Encounter   
Status:Closed by MANISHA JESUS on 24      [facility]  
   
                                                    Brad 2024   
   
                                        CNOV                Office Visit ([facility]  
)  
----------------------  
--------------  
NAVDEEP GUILLEN   
([id number]) 1960   
F  
Date Time Provider   
Department  
24 9:40 AM SHERLY AN  
During your visit   
today, we recorded the   
following information   
about you:  
Pulse Respiration   
Blood pressure Weight  
64/minute 16/minute   
116/78 136.5 kg  
Sherly An APRN.CNP   
2024 10:12 AM   
Signed  
CC: Patient presents   
with:  
Recheck: 6 month   
follow up  
HPI  
Navdeep Guillen is a   
64 year old female who   
presents today for   
routine follow  
up.  
DIABETES MELLITUS: Ms. Guillen denies   
excessive thirst or   
increased frequency of  
urination, chest pain   
or dyspnea , numbness,   
tingling or pain in   
extremities,  
new or unusual visual   
symptoms, low   
sugar/hypoglycemic   
reactions, weight  
loss/gain,   
lightheadedness/dizzin  
ess, and bowel   
changes/loose stools.  
Follows a diabetic   
diet most of the time.   
She is compliant with   
medication(s)  
and is tolerating   
med(s) without any   
side effects. She   
reports checking her  
glucose on a twice a   
day schedule with   
sugars in the fasting   
130s and  
postprandial 90s-110s   
range. Patient's last   
HgA1C was  
Hemoglobin A1C (%)  
Date Value  
2024 6.2  
2023 6.5  
07/15/2020 7.7  
2020 7.6  
Hemoglobin A1C (POCT)   
(%)  
Date Value  
10/26/2021 7.1  
)  
Last Ophthalmology   
exam was within the   
past 6 months at   
[facility] in [place]  
HTN: Ms. Guillen   
indicates that she is   
feeling well and   
denies any symptoms  
referable to elevated   
blood pressure.   
Specifically denies   
headache, chest pain,  
palpitations, dyspnea,   
and peripheral edema.   
Patient denies any   
side effects  
of her medication(s)   
and is compliant with   
their regimen. She   
does check BP's  
away from this office   
with average BP's in   
the 110s/80s range.   
Navdeep works out  
regularly 7 times per   
week with walking. She   
watches her diet for   
sodium, low  
fat and low   
cholesterol most of   
the time.  
Last 3 Encounter BP   
Readings:  
Date: BP:  
2024 116/78  
2024 99/69  
2024 146/80  
Right Breast CA: Had   
lumpectomy in 2017.   
Follows with oncology   
and still on  
aromasin  
Allergies starting   
this morning with   
runny nose and itchy   
eyes with sneezing.  
Denies fever, chills,   
headaches, dizziness,   
cough, wheezing, or   
shortness of  
breath.  
REVIEW OF SYSTEMS  
See HPI  
PAST MEDICAL HISTORY  
Diagnosis Date  
Abnormal mammogram   
2021  
Achilles tendon pain   
2015  
Ankle weakness   
2015  
Chronic pain of left   
ankle 12/15/2016  
Colon abnormality   
2014  
Thickening of the   
ascending colon seen   
on the recent CT scan.   
Patient has had  
a colonoscopy around 4   
years ago. Records   
were obtained for when   
they were  
done, 4 years ago , in   
[facility].   
her colonoscopy was   
completely  
normal, no thickening,   
and the biopsy too was   
normal except for   
lymphoid  
aggregates.  
Diverticulitis   
Treated by Dr. Patricio at [facility]  
DJD (degenerative   
joint disease), ankle   
and foot 2016  
DM (diabetes mellitus)   
(HCC)  
GERD (gastroesophageal   
reflux disease)  
Gross hematuria   
05/15/2014  
2014, had hematuria,   
investigated   
extensively along with   
cytoscopy, a stone  
was found but no signs   
of any malignancy.  
Heel pain, chronic   
12/15/2016  
Hepatic steatosis   
10/03/2017  
Hiatal hernia   
10/03/2017  
Hypertension  
Insomnia  
Kidney stone   
05/15/2014  
Morbid obesity (HCC)  
Nephrolithiasis  
Neuritis 2016  
Renal lesion   
05/15/2014  
S/P Achilles tendon   
repair 2015 sx done  
PAST SURGICAL HISTORY  
Procedure Laterality   
Date  
BREAST LUMPECTOMY HX   
Right 2017  
BX BREAST W/DEVICE 1ST   
LESION STEREOTACTIC   
GUID Right 2021  
CHOLECYSTECTOMY 2011  
gallbladder removal  
COLONOSCOPY   
CT ABDOMEN 2014  
PAST SURGICAL HISTORY   
OF 2014  
[facility] hysteroscopy  
PAST SURGICAL HISTORY   
OF Left 2015  
Left foot surgery  
S PK LAVH [id number]   
10/16/2014  
ALLERGIES Silvadene   
[Silver Sulfadiazine]   
and Sulfa (Sulfonamide   
Antibiotics)  
MEDICATIONS  
exemestane (AROMASIN)   
25 mg tablet Take 1   
tablet by mouth once   
daily.  
potassium chloride 20   
mEq TbER Take 1 tablet   
by mouth two times a   
day.  
ibuprofen (MOTRIN) 800   
mg tablet Take 1   
tablet by mouth every   
8 hours as  
needed for pain. Take   
with food.  
metFORMIN ER   
(GLUCOPHAGE XR) 500 mg   
24 hr tablet Take 1   
tablet by mouth daily  
with breakfast.  
blood sugar diagnostic   
(BLOOD GLUCOSE TEST)   
test strip Test blood   
sugars  
2daily.   
Dx:ucnontrolled   
diabetes . Insulin:   
Yes  
chlorthalidone   
(HYGROTON) 25 mg   
tablet Take 0.5   
tablets by mouth once   
daily.  
atenolol (TENORMIN) 50   
mg tablet Take 1   
tablet by mouth once   
daily.  
glipiZIDE (GLUCOTROL   
XL) 2.5 mg 24 hr   
tablet Take 1 tablet   
by mouth once  
daily.  
lancets (ONE TOUCH   
DELICA) 33 gauge misc   
Test blood sugar(s) 2   
daily. Dx:  
Type 2 DM - Controlled   
E11.9 Insulin: Yes  
Blood-Glucose Meter   
monitoring kit Glucose   
Meter of Choice - Kit   
- Dx: Type 2  
DM - Uncontrolled   
E11.65  
MV-MN/FOLIC   
ACID/CALCIUM/VIT K   
(ONE-A-DAY WOMEN'S 50   
PLUS ORAL) Take 1   
tablet  
by mouth onc (more   
content not   
included)...        Normal                                  [facility]  
   
                                                    CBC panel Auto (Bld)on    
   
                                                    Erythrocyte   
distribution width   
(RBC) [Ratio]   14.8 %          Normal          11.5-15.0       [facility]  
   
                                        Comment on above:   Order Comment: Speci  
men Type: BLOOD SPECIMENOrdering Facility:  
   
[facility] Address: [address] [place] [address]   
   
                                                            Performed By: #### 5  
8410-2 ####[facility] [id number] [address] [place]   
   
                                                    Hematocrit (Bld)   
[Volume fraction] 46.3 %          High            36.0-46.0       [facility]  
   
                                        Comment on above:   Order Comment: Speci  
men Type: BLOOD SPECIMENOrdering Facility:  
  
[facility] Address: [address] [id number]   
   
                                                            Performed By: #### 5  
8410-2 ####[facility] [id number] [place] [address]   
[place]   
   
                                                    Hemoglobin (Bld)   
[Mass/Vol]      15.4 g/dL       Normal          11.5-15.5       [facility]  
   
                                        Comment on above:   Order Comment: Speci  
men Type: BLOOD SPECIMENOrdering Facility:  
   
[facility] Address: [address] [place] [address]   
   
                                                            Performed By: #### 5  
8410-2 ####[facility]   
THEODOREMexicoKAI [id number] [address] [place] [facility]   
   
                                                    MCH (RBC) [Entitic   
mass]           27.3 pg         Normal          26.0-34.0       [facility]  
   
                                        Comment on above:   Order Comment: Speci  
men Type: BLOOD SPECIMENOrdering Facility:  
  
[facility] Address: [address] [place] [address]   
   
                                                            Performed By: #### 5  
8410-2 ####[facility] HospitalNCCast[facility] [id number] [place] [address]   
[place]   
   
                                                    MCHC (RBC)   
[Mass/Vol]      33.3 g/dL       Normal          30.5-36.0       [facility]  
   
                                        Comment on above:   Order Comment: Speci  
men Type: BLOOD SPECIMENOrdering Facility:  
   
[facility] Address: [address] [place] [address]   
   
                                                            Performed By: #### 5  
8410-2 ####[facility] [id number] [place] [address]   
[place]   
   
                                                    MCV (RBC) [Entitic   
vol]            82.1 fL         Normal          80.0-100.0      [facility]  
   
                                        Comment on above:   Order Comment: Speci  
men Type: BLOOD SPECIMENOrdering Facility:  
  
[facility] Address: [address] [place] [address]   
   
                                                            Performed By: #### 5  
8410-2 ####[facility] CenterL[facility] [id number] [place] [address]   
[place]   
   
                                                    Nucleated RBC (Bld)   
[#/Vol]         10*3/uL         Normal          <0.01           [facility]  
   
                                        Comment on above:   Order Comment: Speci  
men Type: BLOOD SPECIMENOrdering Facility:  
   
[facility] Address: [address] [place] [address]   
   
                                                            Performed By: #### 5  
8410-2 ####[facility] CenterL[facility] [id number] [place] [address]   
[place]   
   
                                                    Platelet mean   
volume (Bld)   
[Entitic vol]   10.3 fL         Normal          9.0-12.7        [facility]  
   
                                        Comment on above:   Order Comment: Speci  
men Type: BLOOD SPECIMENOrdering Facility:  
  
[facility] Address: [address] [place] [address]   
   
                                                            Performed By: #### 5  
8410-2 ####[facility]   
THEODOREJUANYA [id number] [place] [address]   
[place]   
   
                                                    Platelets (Bld)   
[#/Vol]         280 10*3/uL     Normal          150-400         [facility]  
   
                                        Comment on above:   Order Comment: Speci  
men Type: BLOOD SPECIMENOrdering Facility:  
   
[facility] Address: [address] [place] [address]   
   
                                                            Performed By: #### 5  
8410-2 ####[facility] HospitalDEANDRAA [id number] [place] [address]   
[place]   
   
                      RBC (Bld) [#/Vol] 5.64 10*6/uL High       3.90-5.20  [facility]  
   
                                        Comment on above:   Order Comment: Speci  
men Type: BLOOD SPECIMENOrdering Facility:  
   
[facility] Address: [address] [place] [address]   
   
                                                            Performed By: #### 5  
8410-2 ####[facility] HospitalDEANDRAA [id number] [place] [address]   
[place]   
   
                      WBC (Bld) [#/Vol] 9.50 10*3/uL Normal     3.70-11.00 [facility]  
   
                                        Comment on above:   Order Comment: Speci  
men Type: BLOOD SPECIMENOrdering Facility:  
   
[facility] Address: [address] [place] [address]   
   
                                                            Performed By: #### 5  
8410-2 ####[facility] HospitalNCLIA [id number] [address] [address]   
[place]   
   
                                                    CNOVSPon 2024   
   
                                        CNOVSP              Visit (SP) Office   
(GABE)  
----------------------  
--------------  
NAVDEEP GUILLEN   
([id number]) 1960   
F  
Date Time Provider   
Department  
24 10:30 AM   
VIKKI POSADA  
During your visit   
today, we recorded the   
following information   
about you:  
Temperature Pulse   
Blood pressure Weight  
97.9 degrees 67/minute   
99/69 136.8 kg  
Vikki Posada APRN.CNP 2024   
10:34 AM Signed  
Chief Complaint  
Patient presents with:  
Established Patient  
HPI:  
Navdeepmunir Guillen is a   
64 year old female who   
presents here today   
for follow up  
breast cancer.  
Per Dr. Milner/Sumaya's   
previous note:  
H/o hypertension and   
borderline   
diabetes/insulin   
resistant, obesity,   
who  
presented with an   
abnormal breast exam   
at her PCP office.   
Subsequent diagnostic  
mammogram revealing a   
lobulated lesion in   
the right breast at   
the lower outer  
quadrant highly   
suspicious of   
malignancy.  
Patient had a   
mammotome breast   
biopsy on 3/27/17 that   
showed invasive ductal  
carcinoma, positive   
for estrogen and   
progesterone   
receptors, equivocal   
for  
overexpression   
HER-2/andi.  
She had surgery by Dr. Dueñas, right breast   
lumpectomy and right   
axillary  
sentinel lymph node   
biopsy on 17 for   
right breast cancer.  
Stage IA- pT1c pN0   
(3/3 sentinel lymph   
nodes negative for   
metastatic disease)  
Tumor size 1.5 cm x 1   
x 0.9 cm  
Overall grade 2 out of   
7  
Margins - 0.4 cm from   
posterior margin  
ER/CA >95%, [id number]   
not amplified by FISH  
Lymph/vasc invasion -   
none; derm/vasc/lymph   
invasion - none  
Menstrual history:   
Menarche at age 11,   
first pregnancy at age   
18, 8 pregnancy;  
surgical   
menopause-total   
abdominal hysterectomy   
age 54. No estrogen   
replacement  
therapy. Family   
history: Mother    
of ovarian cancer in   
her 40's. No family  
history of breast   
cancer. She was   
initially started on   
anastrozole, then  
subsequent switch to   
Aromasin because of   
joint pain.  
RADIATION-17 to   
17  
Right breast  
Current treatment:  
1) Aromasin 25 mg once   
daily  
No new concerns today.  
Appetite: Ok.  Wt.   
stable. Energy   
level: It's good.   
Denies fevers or   
recent illness.  
Resp:denies cough or   
sob, +seasonal   
allergies  
Cardiac:denies chest   
pain/palpitations  
GI:denies abd pain,   
n/v, moving bowels   
regularly h/o   
diverticulitis  
:denies   
dysuria/hematuria  
Extrem:denies new   
pain, L foot pain s/p   
surgery  
Endo:denies hot flash  
Neuro:denies symptoms   
of neuropathy  
Skin:denies   
rashes/lesions  
Heme:denies bleeding  
The ROS is otherwise   
negative.  
Past medical history,   
appointments,   
medications, allergies   
reviewed. No changes.  
EXAM:  
BP 99/69   Pulse 67     
Temp 36.6 ?C (97.9 ?F)   
(Temporal)   Wt (!)   
136.8 kg  
(301 lb 9.6 oz)   SpO2   
96%   BMI 49.43 kg/m?  
APPEARANCE Well   
appearing, alert, in   
no acute distress,   
well-hydrated, well  
nourished.  
HEART RRR with normal   
S1 and S2, no murmurs  
LUNG clear to   
auscultation  
BREAST FEMALE no   
mass/nodule b/l, R   
radiation changes  
LYMPH NODES No   
cervical   
lymphadenopathy, No   
supraclavicular   
lymphadenopathy,  
and No axillary   
lymphadenopathy.  
ABDOMEN bowel sounds   
normoactive, soft,   
non-tender  
EXTREMITIES No edema  
NEURO Awake, alert and   
oriented x 3, Normal   
gait, and No   
involuntary motions.  
SKIN Skin color,   
texture, turgor   
normal, no suspicious   
rashes or lesions  
RADIOLOGY:  
Mammogram 24:  
IMPRESSION: BENIGN   
FINDING  
There is no   
mammographic evidence   
of malignancy. A 1   
year screening  
mammogram is   
recommended.  
ASSESSMENT/PLAN:  
1. Malignant neoplasm   
of lower-outer   
quadrant of right   
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  
estrogen receptor   
positive (HCC) - ICD9:   
174.5, V86.0, ICD10:   
C50.511, Z17.0  
pT1c pN0 (3/3 sentinel   
lymph nodes negative   
for metastatic   
disease) stage IA  
infiltrating ductal   
carcinoma the right   
breast. ER/CA >95%,   
[id number]  
non-amplified by FISH.  
- No concerning   
findings on exam.  
- Overall tolerating   
aromasin well.  
- Reviewed mammogram   
with pt.  
- Continue aromasin.  
- Mammogram due mid   
2025.  
- Follow up in 6   
months.  
- Pt. aware to call   
office with any   
questions/concerns.  
The patient indicates   
understanding of these   
issues and agrees with   
the plan.  
All documentation from   
previous visit of   
10/2/23-Dr. Shell/myself was   
copied  
and pasted,   
documentation has been   
reviewed and edited as   
necessary for today's  
visit.  
NIC Ugalde.CNP  
Referring Provider:   
VIKKI POSADA   
[[id number]]  
Allergies As of Date:   
2024 Noted   
Allergy Reaction  
SILVADENE (SILVER   
SULFADIAZINE)   
2017 4 - Hives  
SULFA (SULFONAMIDE   
ANTIBIOTICS)   
2021 4 - Hives  
Date Reviewed:   
2024  
Reviewed by:   
Vikki Posada APRN.CNP - Fully   
Assessed  
Reason for Visit:  
Established Patient   
[175]  
Primary Visit   
Diagnosis:Malignant   
neoplasm of   
lower-outer quadrant   
of right  
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   
estrogen receptor   
positive (HCC)  
[C50.511, Z17.0]  
Other Visit   
Diagnosis:Morbid   
obesity with BMI of   
40.0-44.9, adult (HCC)  
[E66.01, Z68.41]  
Follow-up and   
Disposition History   
for Encounter  
Date Provider   
Department Center   
(more content not   
included)...        Normal                                  [facility]  
   
                                                    Comprehensive metabolic 2000  
 panelon 2024   
   
                      Albumin [Mass/Vol] 4.0 g/dL   Normal     3.9-4.9    [facility]  
   
                                        Comment on above:   Order Comment: Speci  
men Type: BLOOD SPECIMENOrdering Facility:  
   
[facility] Address: [address] [place] [address]   
   
                                                            Performed By: #### 2  
4323-8 ####[facility] [id number] [place] [address]   
[place]   
   
                                                    ALP [Catalytic   
activity/Vol]   132 U/L         High                      [facility]  
   
                                        Comment on above:   Order Comment: Speci  
men Type: BLOOD SPECIMENOrdering Facility:  
  
[facility] Address: [address] [place] [address]   
   
                                                            Performed By: #### 2  
4323-8 ####[facility] CenterWNCL[facility] [id number] [place] [address]   
[place]   
   
                                                    ALT [Catalytic   
activity/Vol]   23 U/L          Normal          7-38            [facility]  
   
                                        Comment on above:   Order Comment: Speci  
men Type: BLOOD SPECIMENOrdering Facility:  
  
[facility] Address: [address] [place] [address]   
   
                                                            Performed By: #### 2  
4323-8 ####[facility]   
MILLWNCLIA [id number] [place] [address]   
[place]   
   
                                                    Anion gap   
[Moles/Vol]     11 mmol/L       Normal          9-18            [facility]  
   
                                        Comment on above:   Order Comment: Speci  
men Type: BLOOD SPECIMENOrdering Facility:  
   
[facility] Address: [address] [place] [address]   
   
                                                            Performed By: #### 2  
4323-8 ####[facility] CenterL[facility] [id number] [place] [address]   
[place]   
   
                                                    AST [Catalytic   
activity/Vol]   17 U/L          Normal          13-35           [facility]  
   
                                        Comment on above:   Order Comment: Speci  
men Type: BLOOD SPECIMENOrdering Facility:  
  
[facility] Address: [address] [place] [address]   
   
                                                            Performed By: #### 2  
4323-8 ####[facility] CenterL[facility] [id number] [place] [address]   
[place]   
   
                                                    Bilirubin   
[Mass/Vol]      0.3 mg/dL       Normal          0.2-1.3         [facility]  
   
                                        Comment on above:   Order Comment: Speci  
men Type: BLOOD SPECIMENOrdering Facility:  
   
[facility] Address: [address] [place] [address]   
   
                                                            Performed By: #### 2  
4323-8 ####[facility] CenterL[facility] [id number] [place] [address]   
[place]   
   
                      Calcium [Mass/Vol] 10.3 mg/dL High       8.5-10.2   [facility]  
   
                                        Comment on above:   Order Comment: Speci  
men Type: BLOOD SPECIMENOrdering Facility:  
   
[facility] Address: [address] [place] [address]   
   
                                                            Performed By: #### 2  
4323-8 ####[facility] HospitalNCL[facility] [id number] [place] [address]   
[place]   
   
                                                    Chloride   
[Moles/Vol]     105 mmol/L      Normal                    [facility]  
   
                                        Comment on above:   Order Comment: Speci  
men Type: BLOOD SPECIMENOrdering Facility:  
   
[facility] Address: [address] [place] [address]   
   
                                                            Performed By: #### 2  
4323-8 ####[facility] HospitalNCA [id number] [place] [address]   
[place]   
   
                      CO2 [Moles/Vol] 22 mmol/L  Normal     22-30      [facility]  
   
                                        Comment on above:   Order Comment: Speci  
men Type: BLOOD SPECIMENOrdering Facility:  
   
[facility] Address: [address] [place] [address]   
   
                                                            Performed By: #### 2  
4323-8 ####[facility] HospitalNCLIA [id number] [place] [address]   
[place]   
   
                                                    Creatinine   
[Mass/Vol]      0.72 mg/dL      Normal          0.58-0.96       [facility]  
   
                                        Comment on above:   Order Comment: Speci  
men Type: BLOOD SPECIMENOrdering Facility:  
   
[facility] Address: [address] [place] [address]   
   
                                                            Performed By: #### 2  
4323-8 ####[facility] HospitalNCLIA [id number] [place] [address]   
[place]   
   
                                                    Creatinine and   
Glomerular   
filtration   
rate.predicted   
panel (S/P/Bld) 92 mL/min/1.73m??? Normal          >=60            [facility]  
   
                                        Comment on above:   Order Comment: Speci  
men Type: BLOOD SPECIMENOrdering Facility:  
   
[facility] Address: [address] [place] [address]   
   
                                                            Result Comment: Orly  
mated Glomerular Filtration Rate (eGFR) is   
calculated using the  CKD-EPI creatinine equation. This   
equation utilizes serum creatinine, sex, and age as parameters.   
The creatinine assay has traceable calibration to isotope   
dilution-mass spectrometry. Refer to KDIGO guidelines for clinical   
interpretation. In patients with unstable renal function, e.g.   
those with acute kidney injury, the eGFR may not accurately   
reflect actual GFR.   
   
                                                            Performed By: #### 2  
4323-8 ####[facility] HospitalKAI [id number] [place] [address]   
[place]   
   
                      Glucose [Mass/Vol] 141 mg/dL  High       74-99      [facility]  
   
                                        Comment on above:   Order Comment: Speci  
men Type: BLOOD SPECIMENOrdering Facility:  
   
[facility] Address: [address] [place] [address]   
   
                                                            Result Comment: The   
American Diabetes Association (ADA) provides   
guidance for cutoff values for fasting glucose and random glucose.   
The ADA defines fasting as no caloric intake for at least 8 hours.   
Fasting plasma glucose results between 100 to 125 mg/dL indicate   
increased risk for diabetes (prediabetes).  
Fasting plasma glucose results greater than or equal to 126 mg/dL   
meet the criteria for diagnosis of diabetes. In the absence of   
unequivocal hyperglycemia, results should be confirmed by repeat   
testing. In a patient with classic symptoms of hyperglycemia or   
hyperglycemic crisis, random plasma glucose results greater than   
or equal to 200 mg/dL meet the criteria for diagnosis of diabetes.  
Reference: Standards of Medical Care in Diabetes 2016, American   
Diabetes Association. Diabetes Care. 2016.39(Suppl 1).   
   
                                                            Performed By: #### 2  
4323-8 ####[facility] HospitalDEANDRAA [id number] [place] [address]   
[place]   
   
                                                    Potassium   
[Moles/Vol]     3.8 mmol/L      Normal          3.7-5.1         [facility]  
   
                                        Comment on above:   Order Comment: Speci  
men Type: BLOOD SPECIMENOrdering Facility:  
   
[facility] Address: [address] [place] [address]   
   
                                                            Performed By: #### 2  
4323-8 ####[facility] [id number] [place] [address]   
[place]   
   
                      Protein [Mass/Vol] 7.5 g/dL   Normal     6.3-8.0    [facility]  
   
                                        Comment on above:   Order Comment: Speci  
men Type: BLOOD SPECIMENOrdering Facility:  
   
[facility] Address: [address] [place] [address]   
   
                                                            Performed By: #### 2  
4323-8 ####[facility] [id number] [place] [address]   
[place]   
   
                      Sodium [Moles/Vol] 138 mmol/L Normal     136-144    [facility]  
   
                                        Comment on above:   Order Comment: Speci  
men Type: BLOOD SPECIMENOrdering Facility:  
   
[facility] Address: [address] [place] [address]   
   
                                                            Performed By: #### 2  
4323-8 ####[facility] [id number] [place] [address]   
[place]   
   
                                                    Urea nitrogen   
[Mass/Vol]      11 mg/dL        Normal          7-21            [facility]  
   
                                        Comment on above:   Order Comment: Speci  
men Type: BLOOD SPECIMENOrdering Facility:  
   
[facility] Address: [address] [place] [address]   
   
                                                            Performed By: #### 2  
4323-8 ####[facility] [id number] [place] [address]   
[place]   
   
                                                    HbA1c (Bld)on 2024   
   
                                                    Average glucose   
Estimated from   
glycated hemoglobin   
(Bld) [Mass/Vol] 131 mg/dL       Normal                          [facility]  
   
                                        Comment on above:   Order Comment: Speci  
men Type: BLOOD SPECIMENOrdering Facility:  
   
[facility] Address: [address] [place] [address]   
   
                                                            Result Comment: eAG:  
 (Estimated average glucose) is a calculated   
value from HgbA1c and is representative of the average blood   
glucose level in the last 2-3 month period.   
   
                                                            Performed By: #### 5  
5454-3 ####[facility]   
LABCLIA [id number] [address] [facility]   
   
                                                    HbA1c (Bld) [Mass   
fraction]       6.2 %           High            4.3-5.6         [facility]  
   
                                        Comment on above:   Order Comment: Donna  
men Type: BLOOD SPECIMENOrdering Facility:  
  
[facility] Address: [address] [place] [address]   
   
                                                            Result Comment: Amer  
ican Diabetes Association guidelines indicate   
that patients with HgbA1c in the range 5.7-6.4% are at increased   
risk for development of diabetes, and intervention by lifestyle   
modification may be beneficial. HgbA1c greater or equal to 6.5% is   
considered diagnostic of diabetes.   
   
                                                            Performed By: #### 5  
5454-3 ####[facility]   
LABCLIA [id number] [place] [address]   
[place]   
   
                                                    Lipid 1996 panelon   
4   
   
                                                    Cholesterol   
[Mass/Vol]      170 mg/dL       Normal          <200            [facility]  
   
                                        Comment on above:   Order Comment: Speci  
men Type: BLOOD SPECIMENOrdering Facility:  
   
[facility] Address: [address] [place] [address]   
   
                                                            Result Comment: <200  
 mg/dL, Desirable  
200-239 mg/dL, Borderline high  
>239 mg/dL, High   
   
                                                            Performed By: #### 2  
4331-1 ####[facility]   
LABCLIA [id number] [address]   
[id number] [address] [place]   
   
                                                    Cholesterol in HDL   
[Mass/Vol]      37 mg/dL        Low             >39             [facility]  
   
                                        Comment on above:   Order Comment: Donna  
men Type: BLOOD SPECIMENOrdering Facility:  
   
[facility] Address: [address] [place] [address]   
   
                                                            Result Comment: 40-5  
9 mg/dL, Acceptable  
>59 mg/dL, High: Negative risk factor for coronary heart disease  
<40 mg/dL, Low: Positive risk factor for coronary heart disease   
   
                                                            Performed By: #### 2  
4331-1 ####[facility]   
LABCLIA [id number] [address]   
[id number] [place] [address] [place]   
   
                                                    Cholesterol in LDL   
[Mass/Vol]      103 mg/dL       High            <100            [facility]  
   
                                        Comment on above:   Order Comment: Speci  
men Type: BLOOD SPECIMENOrdering Facility:  
   
[facility] Address: [address] [place] [address]   
   
                                                            Result Comment: <100  
 mg/dL, Optimal  
100-129 mg/dL, Near optimal/above optimal  
130-159 mg/dL, Borderline high  
160-189 mg/dL, High  
>189 mg/dL, Very high  
Secondary prevention optimal LDL Cholesterol levels are   
recommended to be < 70 mg/dL   
   
                                                            Performed By: #### 2  
4331-1 ####[facility]   
LABCLIA [id number] [address]   
[id number] [place] [address] [place] [facility]   
   
                                                    Cholesterol in   
LDL/Cholesterol in   
HDL [Mass ratio] 2.78 {ratio}    High            <2.54           [facility]  
   
                                        Comment on above:   Order Comment: Speci  
men Type: BLOOD SPECIMENOrdering Facility:  
  
[facility] Address: [address] [place] [address]   
   
                                                            Result Comment: Refe  
rence:  
1. National Cholesterol Education Program ATP III Guideline   
At-A-Glance Quick Desk Reference: National Heart, Lung, and Blood   
[place]. National Institutes of Health. 2001: NIH Publication   
No. .  
2. An International Atherosclerosis Society position paper: global   
recommendations for the management of dyslipidemia: executive   
summary, Atherosclerosis. 2014: 232(2):410-413.   
   
                                                            Performed By: #### 2  
4331-1 ####[facility]   
LABCLIA [id number] [address]   
[id number] [address] [place]   
   
                                                    Cholesterol in VLDL   
[Mass/Vol]      30 mg/dL        High            <30             [facility]  
   
                                        Comment on above:   Order Comment: Speci  
men Type: BLOOD SPECIMENOrdering Facility:  
   
[facility] Address: [address] [place] [address]   
   
                                                            Performed By: #### 2  
4331-1 ####[facility]   
LABCLIA [id number] [address]   
[id number] [place] [address] [place]   
   
                                                    Cholesterol non HDL   
[Mass/Vol]      133 mg/dL       High            <130            [facility]  
   
                                        Comment on above:   Order Comment: Speci  
men Type: BLOOD SPECIMENOrdering Facility:  
   
[facility] Address: [address] [place] [address]   
   
                                                            Result Comment: <130  
 mg/dL, Optimal  
130-159 mg/dL, Near optimal/above optimal  
160-189 mg/dL, Borderline high  
190-219 mg/dL, High  
>219 mg/dL, Very high  
Secondary prevention optimal non HDL Cholesterol levels are   
recommended to be <100 mg/dL   
   
                                                            Performed By: #### 2  
4331-1 ####[facility]   
LABCLIA [id number] [address]   
[id number] [place] [address] [place]   
   
                                                    Cholesterol.total/C  
holesterol in HDL   
[Mass ratio]    4.59 {ratio}    Normal          <5.10           [facility]  
   
                                        Comment on above:   Order Comment: Speci  
men Type: BLOOD SPECIMENOrdering Facility:  
  
[facility] Address: [address] [place], [place] [address]   
   
                                                            Performed By: #### 2  
4331-1 ####[facility]   
LABCLIA [id number] [address]   
[id number] [place] [address] [place]   
   
                      FASTING TIME 12 hrs     Normal                [facility]  
   
                                        Comment on above:   Order Comment: Speci  
men Type: BLOOD SPECIMENOrdering Facility:  
  
[facility] Address: [address] [place] [address]   
   
                                                            Performed By: #### 2  
4331-1 ####[facility]   
LABCLIA [id number] [address]   
[id number] [place] [address] [place]   
   
                                                    Triglyceride   
[Mass/Vol]      148 mg/dL       Normal          <150            [facility]  
   
                                        Comment on above:   Order Comment: Speci  
men Type: BLOOD SPECIMENOrdering Facility:  
   
[facility] Address: [address] [place] [address]   
   
                                                            Result Comment: <150  
 mg/dL, Normal  
150-199 mg/dL, Borderline high  
200-499 mg/dL, High  
>499 mg/dL, Very high   
   
                                                            Performed By: #### 2  
4331-1 ####[facility]   
LABCLIA [id number] [address]   
[id number] [place] [address] [place]   
   
                                                    Bacteria Ur Culton    
   
                                                    Bacteria identified   
Cx Nom (U)                              ORGANISM ID: 1  
10,000 -<50,000 CFU/ml   
Mixed microbiota  
No further workup.   
Mixed microbiota can   
be due   
to???urine???contamina  
tion with skin   
bacteria at time of   
collection or presence   
of a long-term urinary   
catheter. If a new   
culture is needed,   
please consider   
re-education of the   
patient on proper   
midstream collection   
technique or straight   
catheterization   
for???urine???collecti  
on.                 Normal                                  [facility]  
   
                                        Comment on above:   Performed By: #### 6  
30-4 ####[facility]   
LABCLIA   
[id number] [place] [address] [place]   
   
                                                    CNOVon 2024   
   
                                        CNOV                Office Visit (INTMWS  
)  
----------------------  
--------------  
NAVDEEP GUILLEN   
([id number]) 1960   
F  
Date Time Provider   
Department  
3/4/24 2:40 PM MEERA MENCHACA INTMWS  
During your visit   
today, we recorded the   
following information   
about you:  
Temperature Pulse   
Respiration Blood   
pressure  
98.7 degrees 73/minute   
16/minute 146/80  
Weight Height  
133.4 kg 1.664 m  
Meera Menchaca PA-C   
3/4/2024 6:16 PM   
Signed  
CC: Patient presents   
with:  
Same Day Appointment:   
abdomen cramping, left   
flank pain, pinkish   
when wipes,  
burning at times  
HPI  
Navdeep Guillen is a   
63 year old female who   
presents with   
complaint of  
increased frequency,   
blood in urine, and L   
flank discomfort and   
left abdominal  
discomfort for 1 days.   
Other symptoms include   
lower back pain (right   
sided)  
and nausea. The   
patient denies fever,   
vomiting, and vaginal   
discharge. She  
has tried nothing to   
help to alleviate her   
symptoms. Ms. Guillen   
has history of  
previous UTIs and   
kidney stones.  
She called her   
surgeon, Dr. Patricio   
(gen surg at [facility])   
because of her hx of  
diverticulitis, for   
which she just had a   
bout and was txed end   
of January.  
REVIEW OF SYSTEMS  
GI: Positive for   
abdominal discomfort   
looser stools, Denies   
any melena or  
hematochezia  
All other systems   
negative.  
PAST MEDICAL HISTORY  
Diagnosis Date  
Abnormal mammogram   
2021  
Achilles tendon pain   
2015  
Ankle weakness   
2015  
Chronic pain of left   
ankle 12/15/2016  
Colon abnormality   
2014  
Thickening of the   
ascending colon seen   
on the recent CT scan.   
Patient has had  
a colonoscopy around 4   
years ago. Records   
were obtained for when   
they were  
done, 4 years ago , in   
[facility].   
her colonoscopy was   
completely  
normal, no thickening,   
and the biopsy too was   
normal except for   
lymphoid  
aggregates.  
Diverticulitis   
Treated by Dr. Patricio at [facility]  
DJD (degenerative   
joint disease), ankle   
and foot 2016  
DM (diabetes mellitus)   
(HCC)  
GERD (gastroesophageal   
reflux disease)  
Gross hematuria   
05/15/2014  
2014, had hematuria,   
investigated   
extensively along with   
cytoscopy, a stone  
was found but no signs   
of any malignancy.  
Heel pain, chronic   
12/15/2016  
Hepatic steatosis   
10/03/2017  
Hiatal hernia   
10/03/2017  
Hypertension  
Insomnia  
Kidney stone   
05/15/2014  
Morbid obesity (HCC)  
Nephrolithiasis  
Neuritis 2016  
Renal lesion   
05/15/2014  
S/P Achilles tendon   
repair 2015 sx done  
PAST SURGICAL HISTORY  
Procedure Laterality   
Date  
BREAST LUMPECTOMY HX   
Right   
BX BREAST W/DEVICE 1ST   
LESION STEREOTACTIC   
GUID Right 2021  
CHOLECYSTECTOMY   
gallbladder removal  
COLONOSCOPY   
CT ABDOMEN 2014  
PAST SURGICAL HISTORY   
OF 2014  
DANGA hysteroscopy  
PAST SURGICAL HISTORY   
OF Left 2015  
Left foot surgery  
S PK LAVH [id number]   
10/16/2014  
ALLERGIES Silvadene   
[Silver Sulfadiazine]   
and Sulfa (Sulfonamide   
Antibiotics)  
MEDICATIONS  
ibuprofen (MOTRIN) 800   
mg tablet Take 1   
tablet by mouth every   
8 hours as  
needed for pain. Take   
with food.  
metFORMIN ER   
(GLUCOPHAGE XR) 500 mg   
24 hr tablet Take 1   
tablet by mouth daily  
with breakfast.  
blood sugar diagnostic   
(BLOOD GLUCOSE TEST)   
test strip Test blood   
sugars  
2daily.   
Dx:ucnontrolled   
diabetes . Insulin:   
Yes  
chlorthalidone   
(HYGROTON) 25 mg   
tablet Take 0.5   
tablets by mouth once   
daily.  
atenolol (TENORMIN) 50   
mg tablet Take 1   
tablet by mouth once   
daily.  
potassium chloride 20   
mEq TbER Take 1 tablet   
by mouth twice daily.  
glipiZIDE (GLUCOTROL   
XL) 2.5 mg 24 hr   
tablet Take 1 tablet   
by mouth once  
daily.  
exemestane (AROMASIN)   
25 mg tablet Take 1   
tablet by mouth once   
daily.  
azithromycin   
(ZITHROMAX Z-ALLAN) 250   
mg tablet TAKE 2 TABS   
ON THE FIRST DAY,  
THEN ONE TAB DAILY FOR   
4 DAYS.  
lancets (ONE TOUCH   
DELICA) 33 gauge misc   
Test blood sugar(s) 2   
daily. Dx:  
Type 2 DM - Controlled   
E11.9 Insulin: Yes  
Blood-Glucose Meter   
monitoring kit Glucose   
Meter of Choice - Kit   
- Dx: Type 2  
DM - Uncontrolled   
E11.65  
MV-MN/FOLIC   
ACID/CALCIUM/VIT K   
(ONE-A-DAY WOMEN'S 50   
PLUS ORAL) Take 1   
tablet  
by mouth once daily.  
FAMILY HISTORY  
Problem Relation Age   
of Onset  
Ovarian cancer Mother   
39  
Heart Father  
Hypertension Father  
Prostate Cancer Father   
78  
other (kidney stones)   
Brother  
Hypertension Brother  
Diabetes Brother  
Seizures Son  
Breast Cancer Other 55  
Double first cousin   
(daughter of mother's   
sister and father's   
brother)  
Social History  
Tobacco Use  
Smoking status: Former  
Packs/day: 1.00  
Years: 10.00  
Additional pack years:   
0.00  
Total pack years:   
10.00  
Types: Cigarettes  
Quit date: 5/15/2007  
Years since quittin.8  
Smokeless tobacco:   
Never  
Vaping Use  
Vaping Use: Never used  
Substance Use Topics  
Alcohol use: No  
Drug use: Not   
Currently  
Comment: marijuana for   
insomnia - currently   
not using  
PHYSICAL EXAM  
Pulse 73   Temp 37.1   
?C (98.7 ?F)   Resp 16   
  Ht 166.4 cm (5'   
5.5 )   Wt  
133.4 kg (294 lb)     
SpO2 97%   BMI 48.18   
kg/m?  
General Appe (more   
content not   
included)...        Normal                                  [facility]  
   
                                                    UA DIP, URINE (POC)on 2024   
   
                      BILIRUBIN UA (POCT) Negative              Negative   [facility]  
   
                      CLARITY UA (POCT) Cloudy                           [facility]  
   
                      COLOR UA (POCT) Yellow                           [facility]  
   
                      GLUCOSE UA (POCT) Negative              Negative mg/dL [facility]  
   
                      Hemoglobin Ql (U) Trace-intact Abnormal   Negative   [facility]  
   
                      KETONE UA (POCT) Negative              Negative mg/dL [facility]  
   
                                                    LEUKOCYTES UA   
(POCT)          Large           Abnormal        Negative        [facility]  
   
                      NITRITE UA (POCT) Negative              Negative   [facility]  
   
                      PH UA (POCT) 7.0                   4.5 - 8.0  [facility]  
   
                      Protein Ql (U) Negative              Negative mg[facility]dL [facility]  
   
                                                    SPECIFIC GRAVITY UA   
(POCT)          1.020                           1.005 - 1.030   [facility]  
   
                                                    UROBILINOGEN UA   
(POCT)          0.2 E.U./dL                     Normal E.U./dL  [facility]  
   
                                                    CNCOon 2024   
   
                                        CNCO                HNO ID: [id number]  
Author: COORDINATOR,   
MAMMOGRAPHY, ?  
Service: ?  
Author Type: Physician  
Type: Letter  
Filed: 2024   
10:52  
Note Text:  
2024  
PID:  
[id number] Navdeep Guillen  
[address] [id number]  
Dear Ms. Guillen,  
We are pleased to   
inform you that the   
results of your recent   
breast imaging exam  
on 2024 are   
normal.  
Early detection of   
cancer is very   
important. We also   
understand   
recommendations  
regarding breast   
cancer screening are   
controversial. Please   
discuss with your  
primary care provider   
which strategy is best   
for you and whether a   
mammogram is  
right for you.  
Your imaging studies   
and report will be   
kept on file at   
[facility] as   
part  
of your permanent   
medical record and are   
available for your   
continuing care.  
Thank you for allowing   
us to help in meeting   
your health care   
needs.  
Sincerely,  
Dr. Mcdermott  
Interpreting   
Radiologist  
[facility]  
(Normal over 40)    Normal                                  [facility]  
   
                                                    BD DXA - AXIAL SKELETONon    
   
                                                    BD DXA - AXIAL   
SKELETON                                * * *Final Report* * *  
DATE OF EXAM: 2024 10:47AM  
[facility] 0804 - BD DXA -   
AXIAL SKELETON /   
ACCESSION # [id number]  
PROCEDURE REASON:   
Screening for   
osteoporosis  
  
* * * * Physician   
Interpretation * * * *  
EXAMINATION: DXA BONE   
DENSITOMETRY BD DXA -   
AXIAL SKELETON  
PATIENT DEMOGRAPHICS:   
Age: 63 years, Gender:   
Female  
SCANNER INFORMATION:  
DXA Model: [facility] - wavecatch   
Discovery C [id number]  
Date Scanned:   
2024 10:47 AM  
CLINICAL HISTORY:   
DIAGNOSTIC Screening   
for osteoporosis .  
RISK FACTORS FOR   
OSTEOPOROSIS AND   
ASSOCIATED FRACTURES   
REPORTED BY THIS  
PATIENT:  
Please refer to Bone   
Health Questionnaire   
in the EMR  
CURRENT THERAPY:  
Please refer to Bone   
Health Questionnaire   
in the EMR  
TECHNICAL LIMITATIONS:  
None  
RESULTS:  
Lumbar spine (L1, L2,   
L3, L4): 1.062 g/cm2,   
T-score 0.2, Z-score   
2.0  
Lumbar spine: 2017:   
1.117 g/cm2  
Right Femoral Neck:   
1.064 g/cm2, T-score   
1.9, Z-score 3.5  
Right Total Hip: 1.254   
g/cm2, T-score 2.6,   
Z-score 3.8  
Left Femoral Neck:   
0.989 g/cm2, T-score   
1.3, Z-score 2.8  
Left Femoral Neck:   
2017: 1.015 g/cm2  
There has been a 2.6%   
interval decrease in   
bone mineral density  
Left Total Hip: 1.314   
g/cm2, T-score 3.1,   
Z-score 4.3  
Left Total Hip: 2017:   
1.344 g/cm2  
There has been a 2.2%   
statistically   
significant interval   
decrease in bone  
mineral density.  
CHANGE IS   
STATISTICALLY   
SIGNIFICANT IN THE   
SPINE OR HIP IF   
GREATER THAN  
OR EQUAL TO 0.04 g/cm2  
VERTEBRAL FRACTURE   
ASSESSMENT  
Not performed.  
IMPRESSION:  
THE LOWEST T-SCORE IS   
0.2 IN THE SPINE  
1) DIAGNOSIS (based on   
BMD alone): NORMAL   
BONE DENSITY  
Caution: Medical   
conditions other than   
osteoporosis may cause   
low bone  
density, such as   
osteomalacia or renal   
osteodystrophy.   
Clinical  
correlation is   
necessary.  
2) FRACTURE RISK   
(based on FRAX):  
10-year absolute   
fracture risk:  
- major osteoporotic   
fracture = 4.8 %  
- hip fracture = 0.1 %  
- A diagnosis of   
Osteoporosis, a 10   
year probability  
of hip fracture   
greater  
than or equal to 3% or   
a 10 year probability   
of any  
major   
osteoporosis-related  
fracture greater than   
or equal to 20% should   
be  
considered for  
treatment.  
- DXA scanner   
generated FRAX   
calculations may  
slightly differ from  
online FRAX   
calculations due to   
differences in  
software versions.  
- All recommendations   
and calculations are   
to be considered as  
guidelines and should   
not replace sound   
clinical judgement  
- Caution: Fracture   
risk may be increased   
independent of BMD in  
patients with   
corticosteroid use,   
age greater than 65   
years, or a  
history of prior   
fragility fracture.  
RECOMMENDATIONS:  
Follow-up in 2 years   
or as clinically   
indicated. Patients   
that are  
taking   
corticosteroids, are   
transplant recipients   
or have  
hyperparathyroidism   
should have annual   
follow-up. Follow-up   
scans should  
always be done on the   
same machine for   
accurate comparison.  
FOR MORE INFORMATION   
ABOUT DIAGNOSIS AND   
TREATMENT:  
[facility]   
Osteoporosis and   
Metabolic Bone  
Disease:?   
www.ccf.org/arthritis/  
osteo  
National Osteoporosis   
Foundation:?   
www.nof.org  
International Society   
of Clinical   
Densitometry   
www.iscd.org  
: SHARON  
Transcribe Date/Time:   
2024 11:13A  
Dictated by : CHRISTOPHER BOEYR MD  
This examination was   
interpreted and the   
report reviewed and  
electronically signed   
by:  
CHRISTOPHER BOYER MD on   
2024 11:15AM   
EST  
[id number]  
0.2                 Normal                                  [facility]  
   
                                                    DXA Skeletal system.axial Vi  
ews for bone densityon 2024   
   
                      LOWEST T-SCORE 0.2                              [facility]  
   
                                                    YUAN SCREENING W TOMOon    
   
                                                    YUAN SCREENING W   
GALILEA                                    * * *Final Report* * *  
DATE OF EXAM: 2024 11:25AM  
WRW [id number] - YUAN   
SCREENING W GALILEA /   
ACCESSION # [id number]  
PROCEDURE REASON:   
multiple diagnoses  
  
* * * * Physician   
Interpretation * * * *  
RESULT: #[id number] -   
[facility] SCREENING W GALILEA  
BILATERAL DIGITAL   
SCREENING MAMMOGRAM   
TOMOSYNTHESIS WITH   
CAD: 2024  
HISTORY: Multiple   
Diagnoses / Screening   
Mammogram-Patient   
reports NO  
symptoms. /priors   
available for   
comparison /SEE TECH   
NOTE.  
RESULT:  
TECHNIQUE: The study   
was acquired using   
full field digital   
technology and  
interpreted from soft   
copy.  
Digital Breast   
Tomosynthesis (DBT)   
images were obtained   
and used to  
assist in the   
interpretation of this   
examination.  
Current study was also   
evaluated with a   
Computer Aided   
Detection (CAD).  
Comparison is made to   
exams dated: 2023   
mammogram, 2023  
mammogram, 2022   
mammogram, 2020   
mammogram, and   
2021  
mammogram - [facility].   
There are scattered   
areas of  
fibroglandular   
density.  
There are benign post   
operative findings and   
a biopsy clip in the   
right  
breast.  
No significant masses,   
calcifications, or   
other findings are   
seen in  
either breast.  
There has been no   
significant interval   
change.  
IMPRESSION: BENIGN   
FINDING  
There is no   
mammographic evidence   
of malignancy. A 1   
year screening  
mammogram is   
recommended.  
Ana Mcdermott M.D.  
/penleobardo:2024   
10:52:48  
Imaging   
Technologist(s):   
Italia Barfield,   
[facility]  
letter sent: Normal   
over 40  
Mammogram BI-RADS: 2   
Benign finding  
Multiple national   
specialty   
organizations have   
released breast cancer  
screening guidelines   
for women at average   
risk for developing   
breast  
cancer - guidelines   
that are based on both   
evidence and opinion,   
yet  
differ on when to   
start and how often to   
screen for breast   
cancer. With  
representation from   
Breast Imaging,   
Internal Medicine,   
Women's Health,  
Family Medicine, and   
Medical/Surgical   
Oncology, the   
[facility] has  
carefully reviewed the   
data and reached the   
following consensus:  
1) All women should   
engage in shared   
decision-making with   
their providers  
to decide when to   
start and how often to   
screen;  
2) All women should   
have the opportunity   
to start screening   
mammography  
at age 40;  
3) For women ages   
45-55, we recommend   
annual screening   
mammograms;  
4) For women ages 55   
and over, we support   
both the transition   
from an  
annual to a biennial   
interval if this   
aligns more with   
patient's values  
and preferences, or   
continuation with   
annual screening;  
5) All women should   
discuss with their   
providers when to stop   
screening  
mammograms.  
:   
Lillie  
Transcribe Date/Time:   
2024 10:45A  
Dictated by: ANA MCDERMOTT MD  
This examination was   
interpreted and the   
report reviewed and  
electronically signed   
by:  
ANA MCDERMOTT MD on   
2024 10:52AM   
EST  
[id number] Normal                                  [facility] 2024   
   
                                        CNPN                Telephone ([facility])  
----------------------  
--------------  
NAVDEEP GUILLEN   
([id number]) 1960   
F  
Date Time Provider   
Department  
24 CYNTHIA DOUGLAS   
[facility]  
During your visit   
today, we recorded the   
following information   
about you:  
Joie Hardwick RN   
2024 2:33 PM   
Signed  
Patient reports she   
went to [facility] ER on   
 and was   
seen for  
diverticulitis.  
She was prescribed:  
Amoxicillin-Pot   
Clavulanate Active 1   
TABLET PO TWICE A DAY   
 12:00am  
Pt states she has not   
improved much because   
she has only taken the   
antibiotics  
for 2 days so far.  
Patient was   
recommended to follow   
up with PCP Office and   
Dr. Patricio. Pt  
states she sees Dr. Patricio for her   
diverticulitis.  
Pt asking if she   
should follow-up with   
PCP or not? She does   
not wish to follow  
up with both PCP and   
Dr. Patricio, if not   
necessary.  
Please advise patient,   
if able. [phone]  
Thank you.  
Sherly An APRN.CNP   
2024 8:40 AM   
Signed  
Please get [facility]   
records so I can   
review them  
Thank you  
NIC Barney.CNP  
Manisha Jesus Ma 2024   
10:44 AM Signed  
Records printed and   
placed on provider   
desk for review.  
Sherly An APRN.CNP   
2024 12:29 PM   
Signed  
If patient is   
following closely with   
general surgery then   
no need to follow up  
with me. Her kidney   
function was slightly   
decreased in ER so   
needs rechecked  
after after she is   
feeling better and   
diverticulitis has   
resolved.  
Thank you  
NIC Barney.Olimpia Philippe RN   
2024 12:48 PM   
Signed  
Called and left a   
voicemail for the   
Patient to call back   
and ask for a nurse to  
receive the providers   
message.  
RAISSA Gannon Barbara, RN   
2024 10:50 AM   
Signed  
Pt returned call and   
given Sherly An's   
instructions and   
recommendations. Pt  
states she will call   
Dr. Patricio's office   
to set up her follow   
up. She was  
waiting to hear back   
from Sherly about who she   
should f/u with.   
Notified to call  
us back to repeat labs   
when feeling better  
Allergies As of Date:   
2024 Noted   
Allergy Reaction  
SILVADENE (SILVER   
SULFADIAZINE)   
2017 4 - Hives  
SULFA (SULFONAMIDE   
ANTIBIOTICS)   
2021 4 - Hives  
Date Reviewed:   
2023  
Reviewed by: Meme Matute - Fully   
Assessed  
Reason for Visit:  
Patient Question   
[9537]  
ER F/U [41]  
Prescriptions as of   
2024  
- ibuprofen (MOTRIN)   
800 mg tablet  
Take 1 tablet by mouth   
every 8 hours as   
needed for pain. Take   
with food.  
- azithromycin   
(ZITHROMAX Z-ALLAN) 250   
mg tablet  
TAKE 2 TABS ON THE   
FIRST DAY, THEN ONE   
TAB DAILY FOR 4 DAYS.  
- metFORMIN ER   
(GLUCOPHAGE XR) 500 mg   
24 hr tablet  
Take 1 tablet by mouth   
daily with breakfast.  
- blood sugar   
diagnostic (BLOOD   
GLUCOSE TEST) test   
strip  
Test blood sugars   
2daily.   
Dx:ucnontrolled   
diabetes . Insulin:   
Yes  
- chlorthalidone   
(HYGROTON) 25 mg   
tablet  
Take 0.5 tablets by   
mouth once daily.  
- atenolol (TENORMIN)   
50 mg tablet  
Take 1 tablet by mouth   
once daily.  
- potassium chloride   
20 mEq TbER  
Take 1 tablet by mouth   
twice daily.  
- glipiZIDE (GLUCOTROL   
XL) 2.5 mg 24 hr   
tablet  
Take 1 tablet by mouth   
once daily.  
- exemestane   
(AROMASIN) 25 mg   
tablet  
Take 1 tablet by mouth   
once daily.  
- lancets (ONE TOUCH   
DELICA) 33 gauge misc  
Test blood sugar(s) 2   
daily. Dx: Type 2 DM -   
Controlled E11.9   
Insulin: Yes  
- Blood-Glucose Meter   
monitoring kit  
Glucose Meter of   
Choice - Kit - Dx:   
Type 2 DM -   
Uncontrolled E11.65  
- MV-MN/FOLIC   
ACID/CALCIUM/VIT K   
(ONE-A-DAY WOMEN'S 50   
PLUS ORAL)  
Take 1 tablet by mouth   
once daily.  
Meds Comments as of   
2015:  
Patient only took the   
Etodolac for a few   
days and noticed no   
difference so she  
quit taking this   
medication.  
Problem List As Of   
Date 2024 Noted   
Resolved  
Morbid obesity with   
BMI of 40.0-44.9,   
adult (HC*2014  
Uterus disorder   
[N85.9] 2014  
Hypertension [I10]   
2014  
Hypercholesterolemia   
[E78.00] 2014  
Endometrial   
hyperplasia without   
atypia,   
complex*2014  
Uterine prolapse   
without mention of   
vaginal wal*2014  
Rectocele [N81.6]   
2014  
Complex endometrial   
hyperplasia with   
atypia [N8*2014  
Follow-up examination,   
following other   
surgery *2015  
Bilateral low back   
pain with left-sided   
sciatic*2016  
Peroneal tendonitis   
[M76.70] 2016  
Controlled diabetes   
mellitus type II   
without co*2016  
Hyperopia [H52.00]   
2016  
Presbyopia [H52.4]   
2016  
Benign neoplasm of   
skin of eyelid   
including   
can*2016  
Chronic bilateral low   
back pain with   
left-sided*2016  
Malignant neoplasm of   
lower-outer quadrant   
of r*05/10/2017  
ER+ CA+ carcinoma of   
breast (HCC) [C50.919,   
Z17*05/10/2017  
Family history of   
ovarian cancer   
[Z80.41] 05/10/2017  
GERD (gastroesophageal   
reflux disease)   
[K21.9]  
Encounter Number:   
[id number]  
Encounter   
Status:Closed by   
AMANUEL ABREU on   
24              Normal                                  [facility]  
   
                                                    ALBUMIN/CREAT RATIO RND URon  
 2023   
   
                                                    Albumin DL <= 20   
mg/L (U) [Mass/Vol] 105.3 mg/L      Normal                          [facility]  
   
                                        Comment on above:   Order Comment: Speci  
men Type: URINE SPECIMENOrdering Facility:  
   
[facility] Address: [address] [place] [address]   
   
                                                            Performed By: #### U  
ACR ####[facility] LABCLIA   
[id number] [place] [address] [place]   
   
                                                    Albumin/Creatinine   
(U) [Mass ratio] 85 mg/g         High            <30             [facility]  
   
                                        Comment on above:   Order Comment: Speci  
men Type: URINE SPECIMENOrdering Facility:  
  
[facility] Address: [address] [place] [address]   
   
                                                            Result Comment: Adul  
t Male and Female Nephrotic Criteria:  
<30 mg/g is considered normal to mildly increased  
 mg/g is considered moderately increased  
>300 mg/g is considered severely increased  
KDIGO. (2013). KDIGO 2012 Clinical Practice Guideline for the   
Evaluation and Management of Chronic Kidney Disease. Official   
Journal of the International Society of Nephrology, 3(1), 1-150.   
   
                                                            Performed By: #### U  
ACR ####[facility] LABCLIA   
[id number] [place] [address] [place]   
   
                                                    Creatinine (U)   
[Mass/Vol]      123.2 mg/dL     Normal          20.0-300.0      [facility]  
   
                                        Comment on above:   Order Comment: Speci  
men Type: URINE SPECIMENOrdering Facility:  
   
[facility] Address: [address] [place] [address]   
   
                                                            Performed By: #### U  
ACR ####[facility] LABCLIA   
[id number] [place] [id number] [place]   
   
                                                    Basic metabolic 2000 panelon  
 2023   
   
                                                    Anion gap   
[Moles/Vol]     13 mmol/L       Normal          9-18            [facility]  
   
                                        Comment on above:   Order Comment: Speci  
men Type: BLOOD SPECIMENOrdering Facility:  
   
[facility] Address: [address] [place] [address]   
   
                                                            Performed By: #### 2  
4321-2 ####[facility]   
LABCLIA [id number] [place] [address]   
[place]   
   
                      Calcium [Mass/Vol] 10.3 mg/dL High       8.5-10.2   [facility]  
   
                                        Comment on above:   Order Comment: Speci  
men Type: BLOOD SPECIMENOrdering Facility:  
   
[facility] Address: [address] [place] [address]   
   
                                                            Performed By: #### 2  
4321-2 ####[facility]   
LABCLIA [id number] [place] [address]   
[place]   
   
                                                    Chloride   
[Moles/Vol]     104 mmol/L      Normal                    [facility]  
   
                                        Comment on above:   Order Comment: Speci  
men Type: BLOOD SPECIMENOrdering Facility:  
   
[facility] Address: [address] [place] [address]   
   
                                                            Performed By: #### 2  
4321-2 ####[facility]   
LABCLIA [id number] [place] [address]   
[place]   
   
                      CO2 [Moles/Vol] 26 mmol/L  Normal     22-30      [facility]  
   
                                        Comment on above:   Order Comment: Speci  
men Type: BLOOD SPECIMENOrdering Facility:  
   
[facility] Address: [address] [place] [address]   
   
                                                            Performed By: #### 2  
4321-2 ####[facility]   
LABIA [id number] [place] [address]   
[place]   
   
                                                    Creatinine   
[Mass/Vol]      0.83 mg/dL      Normal          0.58-0.96       [facility]  
   
                                        Comment on above:   Order Comment: Speci  
men Type: BLOOD SPECIMENOrdering Facility:  
   
[facility] Address: [address] [place] [address]   
   
                                                            Performed By: #### 2  
4321-2 ####[facility]   
LABCLIA [id number] [place] [address]   
[place]   
   
                                                    Creatinine and   
Glomerular   
filtration   
rate.predicted   
panel (S/P/Bld) 78 mL/min/1.73m??? Normal          >=60            [facility]  
   
                                        Comment on above:   Order Comment: Speci  
men Type: BLOOD SPECIMENOrdering Facility:  
   
[facility] Address: [address] [place] [address]   
   
                                                            Result Comment: Orly  
mated Glomerular Filtration Rate (eGFR) is   
calculated using the  CKD-EPI creatinine equation. This   
equation utilizes serum creatinine, sex, and age as parameters.   
The creatinine assay has traceable calibration to isotope   
dilution-mass spectrometry. Refer to KDIGO guidelines for clinical   
interpretation. In patients with unstable renal function, e.g.   
those with acute kidney injury, the eGFR may not accurately   
reflect actual GFR.   
   
                                                            Performed By: #### 2  
4321-2 ####[facility]   
LABIA [id number] [place] [address]   
[place]   
   
                      Glucose [Mass/Vol] 146 mg/dL  High       74-99      [facility]  
   
                                        Comment on above:   Order Comment: Speci  
men Type: BLOOD SPECIMENOrdering Facility:  
   
[facility] Address: [address] [place] [address]   
   
                                                            Result Comment: The   
American Diabetes Association (ADA) provides   
guidance for cutoff values for fasting glucose and random glucose.   
The ADA defines fasting as no caloric intake for at least 8 hours.   
Fasting plasma glucose results between 100 to 125 mg/dL indicate   
increased risk for diabetes (prediabetes).  
Fasting plasma glucose results greater than or equal to 126 mg/dL   
meet the criteria for diagnosis of diabetes. In the absence of   
unequivocal hyperglycemia, results should be confirmed by repeat   
testing. In a patient with classic symptoms of hyperglycemia or   
hyperglycemic crisis, random plasma glucose results greater than   
or equal to 200 mg/dL meet the criteria for diagnosis of diabetes.  
Reference: Standards of Medical Care in Diabetes 2016, American   
Diabetes Association. Diabetes Care. 2016.39(Suppl 1).   
   
                                                            Performed By: #### 2  
4321-2 ####[facility] [id number] [place] [address]   
[place]   
   
                                                    Potassium   
[Moles/Vol]     3.9 mmol/L      Normal          3.7-5.1         [facility]  
   
                                        Comment on above:   Order Comment: Speci  
men Type: BLOOD SPECIMENOrdering Facility:  
   
[facility] Address: [address] [place] [address]   
   
                                                            Performed By: #### 2  
4321-2 ####[facility]   
LABCLIA [id number] [place] [address]   
[place]   
   
                      Sodium [Moles/Vol] 143 mmol/L Normal     136-144    [facility]  
   
                                        Comment on above:   Order Comment: Speci  
men Type: BLOOD SPECIMENOrdering Facility:  
   
[facility] Address: [address] [place] [address]   
   
                                                            Performed By: #### 2  
4321-2 ####[facility]   
LABCLIA [id number] [place] [address]   
[place]   
   
                                                    Urea nitrogen   
[Mass/Vol]      11 mg/dL        Normal          7-21            [facility]  
   
                                        Comment on above:   Order Comment: Speci  
men Type: BLOOD SPECIMENOrdering Facility:  
   
[facility] Address: [address] [place] [address]   
   
                                                            Performed By: #### 2  
4321-2 ####[facility]   
LABCLIA [id number] [place] [address]   
[place]   
   
                                                    CBC panel Auto (Bld)on    
   
                                                    Erythrocyte   
distribution width   
(RBC) [Ratio]   15.8 %          High            11.5 - 15.0 %   [facility]  
   
                                                    Hematocrit (Bld)   
[Volume fraction] 47.1 %          High            36.0 - 46.0 %   [facility]  
   
                                                    Hemoglobin (Bld)   
[Mass/Vol]          15.2 g/dL                               11.5 - 15.5   
g/dL                                    [facility]  
   
                                                    MCH (RBC) [Entitic   
mass]           27.1 pg                         26.0 - 34.0 pg  [facility]  
   
                                                    MCHC (RBC)   
[Mass/Vol]          32.3 g/dL                               30.5 - 36.0   
g/dL                                    [facility]  
   
                                                    MCV (RBC) [Entitic   
vol]            84.0 fL                         80.0 - 100.0 fL [facility]  
   
                                                    Nucleated RBC (Bld)   
[#/Vol]                                         <0.01 k/uL      [facility]  
   
                                                    Platelet mean   
volume (Bld)   
[Entitic vol]   10.0 fL                         9.0 - 12.7 fL   [facility]  
   
                                                    Platelets (Bld)   
[#/Vol]         354 10*3/uL                     150 - 400 k/uL  [facility]  
   
                          RBC (Bld) [#/Vol] 5.61 10*6/uL High         3.90 - 5.2  
0   
m/uL                                    [facility]  
   
                          WBC (Bld) [#/Vol] 9.70 10*3/uL              3.70 - 11.  
00   
k/uL                                    [facility]  
   
                                                    Erythrocyte   
distribution width   
(RBC) [Ratio]   15.8 %          High            11.5-15.0       [facility]  
   
                                        Comment on above:   Order Comment: Speci  
men Type: BLOOD SPECIMENOrdering Facility:  
   
[facility] Address: [address] [place] [address]   
   
                                                            Performed By: #### 5  
8410-2 ####[facility]   
LABCLIA [id number] [place] [address]   
[place]   
   
                                                    Hematocrit (Bld)   
[Volume fraction] 47.1 %          High            36.0-46.0       [facility]  
   
                                        Comment on above:   Order Comment: Speci  
men Type: BLOOD SPECIMENOrdering Facility:  
  
[facility] Address: [address] [place] [address]   
   
                                                            Performed By: #### 5  
8410-2 ####[facility]   
LABCLIA [id number] [place] [address]   
[place]   
   
                                                    Hemoglobin (Bld)   
[Mass/Vol]      15.2 g/dL       Normal          11.5-15.5       [facility]  
   
                                        Comment on above:   Order Comment: Speci  
men Type: BLOOD SPECIMENOrdering Facility:  
   
[facility] Address: [address] [place] [address]   
   
                                                            Performed By: #### 5  
8410-2 ####[facility]   
LABCLIA [id number] [place] [address]   
[place]   
   
                                                    MCH (RBC) [Entitic   
mass]           27.1 pg         Normal          26.0-34.0       [facility]  
   
                                        Comment on above:   Order Comment: Speci  
men Type: BLOOD SPECIMENOrdering Facility:  
  
[facility] Address: [address] [place] [address]   
   
                                                            Performed By: #### 5  
8410-2 ####[facility]   
LABCLIA [id number] [place] [address]   
[place]   
   
                                                    MCHC (RBC)   
[Mass/Vol]      32.3 g/dL       Normal          30.5-36.0       [facility]  
   
                                        Comment on above:   Order Comment: Speci  
men Type: BLOOD SPECIMENOrdering Facility:  
   
[facility] Address: [address] [place] [address]   
   
                                                            Performed By: #### 5  
8410-2 ####[facility]   
LABCLIA [id number] [place] [address]   
[place]   
   
                                                    MCV (RBC) [Entitic   
vol]            84.0 fL         Normal          80.0-100.0      [facility]  
   
                                        Comment on above:   Order Comment: Speci  
men Type: BLOOD SPECIMENOrdering Facility:  
  
[facility] Address: [address] [place] [address]   
   
                                                            Performed By: #### 5  
8410-2 ####[facility]   
LABCLIA [id number] [place] [address]   
[place]   
   
                                                    Nucleated RBC (Bld)   
[#/Vol]         10*3/uL         Normal          <0.01           [facility]  
   
                                        Comment on above:   Order Comment: Speci  
men Type: BLOOD SPECIMENOrdering Facility:  
   
[facility] Address: [address] [place] [address]   
   
                                                            Performed By: #### 5  
8410-2 ####[facility]   
LABCLIA [id number] [place] [address]   
[place]   
   
                                                    Platelet mean   
volume (Bld)   
[Entitic vol]   10.0 fL         Normal          9.0-12.7        [facility]  
   
                                        Comment on above:   Order Comment: Speci  
men Type: BLOOD SPECIMENOrdering Facility:  
  
[facility] Address: [address] [place] [address]   
   
                                                            Performed By: #### 5  
8410-2 ####[facility]   
LABCLIA [id number] [place] [address]   
[place]   
   
                                                    Platelets (Bld)   
[#/Vol]         354 10*3/uL     Normal          150-400         [facility]  
   
                                        Comment on above:   Order Comment: Speci  
men Type: BLOOD SPECIMENOrdering Facility:  
   
[facility] Address: [address] [place] [address]   
   
                                                            Performed By: #### 5  
8410-2 ####[facility]   
LABCLIA [id number] [place] [address]   
[place]   
   
                      RBC (Bld) [#/Vol] 5.61 10*6/uL High       3.90-5.20  [facility]  
   
                                        Comment on above:   Order Comment: Speci  
men Type: BLOOD SPECIMENOrdering Facility:  
   
[facility] Address: [address] [place] [address]   
   
                                                            Performed By: #### 5  
8410-2 ####[facility]   
LABCLIA [id number] [place] [address]   
[place]   
   
                      WBC (Bld) [#/Vol] 9.70 10*3/uL Normal     3.70-11.00 [facility]  
   
                                        Comment on above:   Order Comment: Speci  
men Type: BLOOD SPECIMENOrdering Facility:  
   
[facility] Address: [address] [place] [address]   
   
                                                            Performed By: #### 5  
8410-2 ####[facility]   
LABIA [id number] [place] [address]   
[place]   
   
                                                    CNOVon 2023   
   
                                        CNOV                Office Visit (INTMWS  
)  
----------------------  
--------------  
NAVDEEP GUILLEN   
([id number]) 1960   
F  
Date Time Provider   
Department  
23 10:40 AM   
SHERLY AN INTHARISH  
During your visit   
today, we recorded the   
following information   
about you:  
Pulse Respiration   
Blood pressure Weight  
68/minute 16/minute   
122/76 137.5 kg  
Sherly An APRN.CNP   
2023 4:01 PM   
Signed  
CC: Patient presents   
with:  
F/U 6 months  
HPI  
Navdeep Guillen is a   
63 year old female who   
presents today for   
routine follow  
up.  
DIABETES MELLITUS: Ms. Guillen denies   
excessive thirst or   
increased frequency  
of urination, chest   
pain or dyspnea ,   
numbness, tingling or   
pain in  
extremities, new or   
unusual visual   
symptoms, low   
sugar/hypoglycemic   
reactions,  
weight loss/gain,   
lightheadedness/dizzin  
ess. Follows a   
diabetic diet most of  
the time. She is   
compliant with   
medication(s) and is   
tolerating med(s)   
without  
any side effects. She   
reports checking her   
glucose on a twice a   
day schedule  
with sugars in the   
fasting 120s-140s and   
postprandial 70s-90s   
range.  
Patient's last HgA1C   
was  
Hemoglobin A1C (%)  
Date Value  
2023 6.1  
2023 6.8  
07/15/2020 7.7  
2020 7.6  
Hemoglobin A1C (POCT)   
(%)  
Date Value  
10/26/2021 7.1  
)  
Last Ophthalmology   
exam was over a year   
ago.  
HTN: Ms. Guillen   
indicates that she is   
feeling well and   
denies any symptoms  
referable to elevated   
blood pressure.   
Specifically denies   
headache, chest pain,  
palpitations, dyspnea,   
and peripheral edema.   
Patient denies any   
side effects  
of her medication(s)   
and is compliant with   
their regimen. She   
does check BP's  
away from this office   
with average BP's in   
the 110s-120s/70s-80   
range. Navdeep  
gets sporadic   
irregular exercise   
with walking. She   
watches her diet for  
sodium, low fat and   
low cholesterol some   
of the time.  
Last 3 Encounter BP   
Readings:  
Date: BP:  
2023 122/76  
10/02/2023 103/67  
2023 118/72  
Obesity: Has   
difficulty with   
exercise because of   
arthritic pain. Will   
every  
now and then take an   
ibuprofen to help when   
she is unable to   
sleep. Does not  
use more than once a   
week or less.  
REVIEW OF SYSTEMS  
General: no fevers, no   
chills, no night   
sweats, no recurrent   
infections, no  
change in appetite, no   
change in energy, and   
no significant changes   
in weight  
Respiratory: no cough,   
no wheezing, no   
shortness of breath,   
no hemoptysis  
Cardiovascular: no   
chest pain, no chest   
pressure, no   
palpitations, and no  
swelling  
Endocrine: no fatigue,   
no polyuria, no   
polyphagia, and no   
polydipsia  
Neurologic: No   
headache, weakness,   
numbness, dizziness,   
memory loss, syncope.  
PAST MEDICAL HISTORY  
Diagnosis Date  
Abnormal mammogram   
2021  
Achilles tendon pain   
2015  
Ankle weakness   
2015  
Chronic pain of left   
ankle 12/15/2016  
Colon abnormality   
2014  
Thickening of the   
ascending colon seen   
on the recent CT scan.   
Patient has had  
a colonoscopy around 4   
years ago. Records   
were obtained for when   
they were  
done, 4 years ago , in   
[facility].   
her colonoscopy was   
completely  
normal, no thickening,   
and the biopsy too was   
normal except for   
lymphoid  
aggregates.  
Diverticulitis   
Treated by Dr. Patricio at [facility]  
DJD (degenerative   
joint disease), ankle   
and foot 2016  
DM (diabetes mellitus)   
(HCC)  
GERD (gastroesophageal   
reflux disease)  
Gross hematuria   
05/15/2014  
2014, had hematuria,   
investigated   
extensively along with   
cytoscopy, a stone  
was found but no signs   
of any malignancy.  
Heel pain, chronic   
12/15/2016  
Hepatic steatosis   
10/03/2017  
Hiatal hernia   
10/03/2017  
Hypertension  
Insomnia  
Kidney stone   
05/15/2014  
Morbid obesity (HCC)  
Nephrolithiasis  
Neuritis 2016  
Renal lesion   
05/15/2014  
S/P Achilles tendon   
repair 2015 sx done  
PAST SURGICAL HISTORY  
Procedure Laterality   
Date  
BREAST LUMPECTOMY HX   
Right   
BX BREAST W/DEVICE 1ST   
LESION STEREOTACTIC   
GUID Right 2021  
CHOLECYSTECTOMY   
gallbladder removal  
COLONOSCOPY   
CT ABDOMEN 2014  
PAST SURGICAL HISTORY   
OF 2014  
[facility] hysteroscopy  
PAST SURGICAL HISTORY   
OF Left 2015  
Left foot surgery  
S PK LAVH [id number]   
10/16/2014  
ALLERGIES Silvadene   
[Silver Sulfadiazine]   
and Sulfa (Sulfonamide   
Antibiotics)  
MEDICATIONS  
metFORMIN ER   
(GLUCOPHAGE XR) 500 mg   
24 hr tablet Take 1   
tablet by mouth daily  
with breakfast.  
blood sugar diagnostic   
(BLOOD GLUCOSE TEST)   
test strip Test blood   
sugars  
2daily.   
Dx:ucnontrolled   
diabetes . Insulin:   
Yes  
chlorthalidone   
(HYGROTON) 25 mg   
tablet Take 0.5   
tablets by mouth once   
daily.  
atenolol (TENORMIN) 50   
mg tablet Take 1   
tablet by mouth once   
daily.  
potassium chloride 20   
mEq TbER Take 1 tablet   
by mouth twice daily.  
glipiZIDE (GLUCOTROL   
XL) 2.5 mg 24 hr   
tablet Take 1 tablet   
by mouth once  
daily.  
exemestane (AROMASIN)   
25 mg tablet Take 1   
tablet by mouth once   
daily.  
ibuprofen (MOTRIN) 800   
mg tablet Take 1   
tablet by mouth every   
8 hours as  
needed for pain. Take   
wit (more content not   
included)...        Normal                                  [facility]  
   
                                                    HbA1c (Bld)on 2023   
   
                                                    Average glucose   
Estimated from   
glycated hemoglobin   
(Bld) [Mass/Vol] 140 mg/dL                                       [facility]  
   
                                                    HbA1c (Bld) [Mass   
fraction]       6.5 %           High            4.3 - 5.6 %     [facility]  
   
                                                    Average glucose   
Estimated from   
glycated hemoglobin   
(Bld) [Mass/Vol] 140 mg/dL       Normal                          [facility]  
   
                                        Comment on above:   Order Comment: Speci  
men Type: BLOOD SPECIMENOrdering Facility:  
   
[facility] Address: [address] [place] [address]   
   
                                                            Result Comment: eAG:  
 (Estimated average glucose) is a calculated   
value from HgbA1c and is representative of the average blood   
glucose level in the last 2-3 month period.   
   
                                                            Performed By: #### 5  
5454-3 ####[facility]   
LABIA [id number] [place] [address]   
[place]   
   
                                                    HbA1c (Bld) [Mass   
fraction]       6.5 %           High            4.3-5.6         [facility]  
   
                                        Comment on above:   Order Comment: Speci  
men Type: BLOOD SPECIMENOrdering Facility:  
  
[facility] Address: [address] [place] [address]   
   
                                                            Result Comment: Amer  
[place] Diabetes Association guidelines indicate   
that patients with HgbA1c in the range 5.7-6.4% are at increased   
risk for development of diabetes, and intervention by lifestyle   
modification may be beneficial. HgbA1c greater or equal to 6.5% is   
considered diagnostic of diabetes.   
   
                                                            Performed By: #### 5  
5454-3 ####[facility]   
LABCLIA [id number] [address] [place]   
   
                                                    CNOVSPon 10-   
   
                                        CNOVSP              Visit (SP) Office   
(HEMAWS)  
----------------------  
--------------  
NAVDEEP GUILLEN   
([id number]) 1960   
F  
Date Time Provider   
Department  
10/2/23 1:30 PM   
VIKKI POSADA  
During your visit   
today, we recorded the   
following information   
about you:  
Temperature Pulse   
Blood pressure Weight  
97.7 degrees 70/minute   
103/67 135.6 kg  
Height  
1.664 m  
Vikki Posada APRN.CNP 10/4/2023   
1:49 PM Signed  
Chief Complaint  
Patient presents with:  
Established Patient  
HPI:  
Navdeep Guillen is a   
63 year old female who   
presents here today   
for follow up  
breast cancer.  
Per Dr. Milner/Sumaya's   
previous note:  
H/o hypertension and   
borderline   
diabetes/insulin   
resistant, obesity,   
who  
presented with an   
abnormal breast exam   
at her PCP office.   
Subsequent diagnostic  
mammogram revealing a   
lobulated lesion in   
the right breast at   
the lower outer  
quadrant highly   
suspicious of   
malignancy.  
Patient had a   
mammotome breast   
biopsy on 3/27/17 that   
showed invasive ductal  
carcinoma, positive   
for estrogen and   
progesterone   
receptors, equivocal   
for  
overexpression   
HER-2/andi.  
She had surgery by Dr. Dueñas, right breast   
lumpectomy and right   
axillary  
sentinel lymph node   
biopsy on 17 for   
right breast cancer.  
Stage IA- pT1c pN0   
(3/3 sentinel lymph   
nodes negative for   
metastatic disease)  
Tumor size 1.5 cm x 1   
x 0.9 cm  
Overall grade 2 out of   
7  
Margins - 0.4 cm from   
posterior margin  
ER/CA >95%, [id number]   
not amplified by FISH  
Lymph/vasc invasion -   
none; derm/vasc/lymph   
invasion - none  
Menstrual history:   
Menarche at age 11,   
first pregnancy at age   
18, 8 pregnancy;  
surgical   
menopause-total   
abdominal hysterectomy   
age 54. No estrogen   
replacement  
therapy. Family   
history: Mother    
of ovarian cancer in   
her 40's. No family  
history of breast   
cancer. She was   
initially started on   
anastrozole, then  
subsequent switch to   
Aromasin because of   
joint pain.  
RADIATION-17 to   
17  
Right breast  
Current treatment:  
1) Aromasin 25 mg once   
daily  
No new concerns today.  
Appetite: Ok.  Energy   
level: Eh.   
Denies fevers. +sinus   
congestion/drainage  
Resp:denies cough or   
sob  
Cardiac:denies chest   
pain/palpitations  
GI:denies abd pain,   
n/v, moving bowels   
regularly  
:denies   
dysuria/hematuria  
Extrem:occ. RUE aches,   
L foot pain s/p   
surgery  
Endo:occ. hot flash at   
night  
Neuro:denies symptoms   
of neuropathy  
Skin:denies rashes  
Heme:denies bleeding  
The ROS is otherwise   
negative.  
Past medical history,   
appointments,   
medications, allergies   
reviewed. No changes.  
EXAM:  
/67   Pulse 70     
Temp 36.5 ?C (97.7 ?F)   
(Temporal)   Ht 166.4   
cm (5'  
5.5 )   Wt 135.6 kg   
(299 lb)   SpO2 96%     
BMI 49.00 kg/m?  
APPEARANCE Well   
appearing, alert, in   
no acute distress,   
well-hydrated, well  
nourished.  
HEART RRR with normal   
S1 and S2, no murmurs  
LUNG clear to   
auscultation  
BREAST FEMALE no   
mass/nodule b/l, R   
radiation changes  
LYMPH NODES No   
cervical   
lymphadenopathy, No   
supraclavicular   
lymphadenopathy,  
and No axillary   
lymphadenopathy.  
ABDOMEN bowel sounds   
normoactive, soft,   
non-tender  
EXTREMITIES No edema  
NEURO Awake, alert and   
oriented x 3, Normal   
gait, and No   
involuntary motions.  
SKIN Skin color,   
texture, turgor   
normal, no suspicious   
rashes or lesions  
ASSESSMENT/PLAN:  
1. Malignant neoplasm   
of lower-outer   
quadrant of right   
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  
estrogen receptor   
positive (HCC) - ICD9:   
174.5, V86.0, ICD10:   
C50.511, Z17.0  
(primary diagnosis)  
pT1c pN0 (3/3 sentinel   
lymph nodes negative   
for metastatic   
disease) stage IA  
infiltrating ductal   
carcinoma the right   
breast. ER/CA >95%,   
[id number]  
non-amplified by FISH.  
- No concerning   
findings on exam.  
- Overall tolerating   
aromasin well.  
- Continue aromasin.  
- Mammogram due end of   
2024.  
- Follow up in 6   
months.  
- Pt. aware to call   
office with any   
questions/concerns.  
The patient indicates   
understanding of these   
issues and agrees with   
the plan.  
All documentation from   
previous visit of   
23-Dr. Shell was   
copied and  
pasted, documentation   
has been reviewed and   
edited as necessary   
for today's  
visit.  
Vikkinabil Posada APRN.CNP  
Allergies As of Date:   
10/02/2023 Noted   
Allergy Reaction  
SILVADENE (SILVER   
SULFADIAZINE)   
2017 4 - Hives  
SULFA (SULFONAMIDE   
ANTIBIOTICS)   
2021 4 - Hives  
Date Reviewed:   
10/02/2023  
Reviewed by:   
Vikki Posada APRN.CNP - Fully   
Assessed  
Reason for Visit:  
Established Patient   
[175]  
Primary Visit   
Diagnosis:Malignant   
neoplasm of   
lower-outer quadrant   
of right  
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   
estrogen receptor   
positive (HCC)  
[C50.511, Z17.0]  
Other Visit   
Diagnoses:Encounter   
for screening   
mammogram for   
high-risk patient  
[Z12.31]  
Morbid obesity with   
BMI of 40.0-44.9,   
adult (HCC)  
[E66.01, Z68.41]  
Order(s):YUAN SCREENING   
W GALILEA [[id number]] Order   
#: [id number] FUTURE  
azithromycin   
(ZITHROMAX Z-ALLAN) 250   
mg tabletTAKE 2 TABS   
ON THE FIRST  
DAY, THEN ONE TAB   
DAILY FOR 4 DAYS.Disp:   
6 tabletRfl: 0  
Follow-up and   
Disposition History   
for Encounter  
Date Pr (more content   
not included)...    Normal                                  [facility]  
   
                                                    UA DIP, URINE (POC)on 2023   
   
                      BILIRUBIN UA (POCT) Negative              Negative   [facility]  
   
                      CLARITY UA (POCT) Clear                            [facility]  
   
                      COLOR UA (POCT) Yellow                           [facility]  
   
                      GLUCOSE UA (POCT) Negative              Negative mg/dL [facility]  
   
                                                    HEMOGLOBIN/BLOOD UA   
(POCT)          Trace-intact    Abnormal        Negative        [facility]  
   
                      KETONE UA (POCT) Negative              Negative mg/dL [facility]  
   
                                                    LEUKOCYTES UA   
(POCT)          Moderate        Abnormal        Negative        [facility]  
   
                      NITRITE UA (POCT) Negative              Negative   [facility]  
   
                      PH UA (POCT) 6.0                   4.5 - 8.0  [facility]  
   
                      Protein Ql (U) Negative              Negative mg/dL [facility]  
   
                                                    SPECIFIC GRAVITY UA   
(POCT)          1.020                           1.005 - 1.030   [facility]  
   
                                                    UROBILINOGEN UA   
(POCT)          0.2 E.U./dL                     Normal E.U./dL  [facility]  
   
                                                    UA DIP, URINE (POC)on 2023   
   
                      BILIRUBIN UA (POCT) Negative              Negative   [facility]  
   
                      CLARITY UA (POCT) Clear                            [facility]  
   
                      COLOR UA (POCT) Yellow                           [facility]  
   
                      GLUCOSE UA (POCT) Negative              Negative mg/dL [facility]  
   
                                                    HEMOGLOBIN/BLOOD UA   
(POCT)          Small           Abnormal        Negative        [facility]  
   
                      KETONE UA (POCT) Negative              [facility]  
   
                                                    LEUKOCYTES UA   
(POCT)          Large           Abnormal        Negative        [facility]  
   
                      NITRITE UA (POCT) Negative              Negative   [facility]  
   
                      PH UA (POCT) 7.0                   4.5 - 8.0  [facility]  
   
                      Protein Ql (U) 30 mg/dL   Abnormal   Negative mg[facility]dL [facility]  
   
                                                    SPECIFIC GRAVITY UA   
(POCT)          1.020                           1.005 - 1.030   [facility]  
   
                                                    UROBILINOGEN UA   
(POCT)          0.2 E.U./dL                     Normal E.U./dL  [facility]  
   
                                                    YUAN SCREENINGon 2023   
   
                                                                  [facility]  
   
                                                    XR Foot 3+ Views Righton    
   
                                                    XR Foot 3+ Views   
Right                                   Exam Date/Time:  
2019 14:15 EDT  
Reason for Exam:  
Pain, Traumatic  
Report  
STUDY:  
XR Foot 3+ Views   
Right; 2019 2:15   
pm  
INDICATION:  
Pain, Traumatic.  
COMPARISON:  
None.  
ACCESSION NUMBER(S):  
[id number]  
ORDERING CLINICIAN:  
Steve Govea  
FINDINGS:  
Three views of the   
right foot including   
AP, oblique and   
lateral projections   
were obtained. There  
is no radiographic   
evidence of acute   
fracture or   
dislocation of the   
right foot.   
Degenerative  
changes are present at   
multiple   
interphalangeal joints   
in the right great toe   
metatarsal-  
phalangeal joint with   
mild hallux valgus   
angulation. No acute   
erosive changes are   
present.  
Prominent posterior   
and plantar calcaneal   
enthesophytes are   
present off of the   
calcaneal  
tuberosity.  
IMPRESSION:  
1. No acute fracture   
or dislocation   
identified.  
2. Degenerative   
changes, as described   
above.  
***** FINAL REPORT   
*****  
Dictated: 2019   
2:25 pm Odin Yost MD  
Signed (Electronic   
Signature): 2019   
2:25 pm  
Signed by: Odin Yost MD  
Technologist: MB    [facility]  
   
                                                    PROGRESSon 2018   
   
                      [facility] NOTES             Normal                [facility]  
   
                      [facility] NOTES             Normal                [facility]  
  
  
  
Vital Signs  
  
  
                      Date Time  Vital Sign Value      Performing Clinician Yolyi  
orion  
   
                                                    2024   
16:                              Body mass index (BMI)   
[Ratio]                   49.46 kg/m2               Fabiana Ambrosio   
APRN.CNP  
Work Phone:   
[phone]                          [facility]  
   
                                                    2024   
16:          Body temperature    98.49 [degF]        Fabiana Ambrosio   
APRN.CNP  
Work Phone:   
[phone]                          [facility]  
   
                                                    2024   
16:          Body weight         136.9 kg            Fabiana Ambrosio   
APRN.CNP  
Work Phone:   
[phone]                          [facility]  
   
                                                    2024   
16:                              Diastolic blood   
pressure                  78 mm[Hg]                 Fabiana Ambrosio   
APRN.CNP  
Work Phone:   
[phone]                          [facility]  
   
                                                    2024   
16:          Heart rate          63 /min             Fabiana Ambrosio   
APRN.CNP  
Work Phone:   
[phone]                          [facility]  
   
                                                    2024   
16:          Respiratory rate    18 /min             Fabiana Ambrosio   
APRN.CNP  
Work Phone:   
[phone]                          [facility]  
   
                                                    2024   
16:                              SaO2% (BldA) [Mass   
fraction]                 97 %                      Fabiana Ambrosio   
APRN.CNP  
Work Phone:   
[phone]                          [facility]  
   
                                                    2024   
16:                              Systolic blood   
pressure                  122 mm[Hg]                Fabiana Ambrosio   
APRN.CNP  
Work Phone:   
[phone]                          [facility]  
   
                                                    2024   
09:                              Body mass index (BMI)   
[Ratio]                   49.33 kg/m2               Sherly Older APRN.CNP  
Work Phone:   
[phone]                          [facility]  
   
                                                    2024   
09:          Body weight         136.53 kg           Sherly Older APRN.CNP  
Work Phone:   
[phone]                          [facility]  
   
                                                    2024   
09:                              Diastolic blood   
pressure                  78 mm[Hg]                 Sherly Older APRN.CNP  
Work Phone:   
[phone]                          [facility]  
   
                                                    2024   
09:          Heart rate          64 /min             Sherly Older APRN.CNP  
Work Phone:   
[phone]                          [facility]  
   
                                                    2024   
09:          Respiratory rate    16 /min             Sherly Older APRN.CNP  
Work Phone:   
[phone]                          [facility]  
   
                                                    2024   
09:                              SaO2% (BldA) [Mass   
fraction]                 97 %                      Sherly Older APRN.CNP  
Work Phone:   
[phone]                          [facility]  
   
                                                    2024   
09:                              Systolic blood   
pressure                  116 mm[Hg]                Sherly Older APRN.CNP  
Work Phone:   
[phone]                          [facility]  
   
                                                    2024   
10:          Body temperature    97.9 [degF]         Vikki Hernandezenter   
APRN.CNP  
Work Phone:   
[phone]                          [facility]  
   
                                                    2024   
10:          Body weight         136.81 kg           Vikki Posada   
APRN.CNP  
Work Phone:   
[phone]                          [facility]  
   
                                                    2024   
10:                              Diastolic blood   
pressure                  69 mm[Hg]                 Wheatland Posada   
APRN.CNP  
Work Phone:   
[phone]                          [facility]  
   
                                                    2024   
10:          Heart rate          67 /min             Vikki Hernandezenter   
APRN.CNP  
Work Phone:   
[phone]                          [facility]  
   
                                                    2024   
10:                              SaO2% (BldA) [Mass   
fraction]                 96 %                      Vikki Hernandezenter   
APRN.CNP  
Work Phone:   
[phone]                          [facility]  
   
                                                    2024   
10:                              Systolic blood   
pressure                  99 mm[Hg]                 Vikki Posada   
APRN.CNP  
Work Phone:   
[phone]                          [facility]  
   
                                                    2024   
14:          Body height         166.4 cm            Meera Denbow PA-C  
Work Phone:   
[phone]                          [facility]  
   
                                                    2024   
14:          Body temperature    98.71 [degF]        Meera Denbow PA-C  
Work Phone:   
[phone]                          [facility]  
   
                                                    2024   
14:          Body weight         133.36 kg           Meera Denbow PA-C  
Work Phone:   
[phone]                          [facility]  
   
                                                    2024   
14:                              Diastolic blood   
pressure                  80 mm[Hg]                 Meera Denbow PA-C  
Work Phone:   
[phone]                          [facility]  
   
                                                    2024   
14:          Heart rate          73 /min             Meera Denbow PA-C  
Work Phone:   
[phone]                          [facility]  
   
                                                    2024   
14:          Respiratory rate    16 /min             Meera Denbow PA-C  
Work Phone:   
[phone]                          [facility]  
   
                                                    2024   
14:                              SaO2% (BldA) [Mass   
fraction]                 97 %                      Meera Denbow PA-C  
Work Phone:   
[phone]                          [facility]  
   
                                                    2024   
14:                              Systolic blood   
pressure                  146 mm[Hg]                Meera Denbow PA-C  
Work Phone:   
[phone]                          [facility]  
   
                                                    2023   
10:          Body weight         137.53 kg           Sherly Older APRN.CNP  
Work Phone:   
[phone]                          [facility]  
   
                                                    2023   
10:                              Diastolic blood   
pressure                  76 mm[Hg]                 Sherly Older APRN.CNP  
Work Phone:   
[phone]                          [facility]  
   
                                                    2023   
10:          Heart rate          68 /min             Sherly Older APRN.CNP  
Work Phone:   
[phone]                          [facility]  
   
                                                    2023   
10:          Respiratory rate    16 /min             Sherly Older APRN.CNP  
Work Phone:   
[phone]                          [facility]  
   
                                                    2023   
10:                              SaO2% (BldA) [Mass   
fraction]                 97 %                      Sherly Older APRN.CNP  
Work Phone:   
[phone]                          [facility]  
   
                                                    2023   
10:                              Systolic blood   
pressure                  122 mm[Hg]                Sherly Older APRN.CNP  
Work Phone:   
[phone]                          [facility]  
   
                                                    2023   
09:          Body weight         132.9 kg            Cynthia Douglas MD  
Work Phone:   
[phone]                          [facility]  
   
                                                    2023   
09:                              Diastolic blood   
pressure                  72 mm[Hg]                 Cynthia Douglas MD  
Work Phone:   
[phone]                          [facility]  
   
                                                    2023   
09:          Heart rate          64 /min             Cynthia Douglas MD  
Work Phone:   
[phone]                          [facility]  
   
                                                    2023   
09:          Respiratory rate    16 /min             Cynthia Douglas MD  
Work Phone:   
[phone]                          [facility]  
   
                                                    2023   
09:                              SaO2% (BldA) [Mass   
fraction]                 97 %                      Cynthia Douglas MD  
Work Phone:   
[phone]                          [facility]  
   
                                                    2023   
09:                              Systolic blood   
pressure                  118 mm[Hg]                Cynthia Douglas MD  
Work Phone:   
[phone]                          [facility]  
   
                                                    2023   
12:          Body height         167.6 cm            Kajal Dueñas MD  
Work Phone:   
[phone]                          [facility]  
   
                                                    2023   
12:          Body temperature    98.6 [degF]         Kajal Dueñas MD  
Work Phone:   
[phone]                          [facility]  
   
                                                    2023   
12:          Body weight         131.09 kg           Kajal Dueñas MD  
Work Phone:   
[phone]                          [facility]  
   
                                                    2023   
12:                              Diastolic blood   
pressure                  72 mm[Hg]                 Kajal Dueñas MD  
Work Phone:   
[phone]                          [facility]  
   
                                                    2023   
12:          Heart rate          81 /min             Kajal Dueñas MD  
Work Phone:   
[phone]                          [facility]  
   
                                                    2023   
12:                              SaO2% (BldA) [Mass   
fraction]                 96 %                      Kajal Dueñas MD  
Work Phone:   
[phone]                          [facility]  
   
                                                    2023   
12:                              Systolic blood   
pressure                  118 mm[Hg]                Kajal Dueñas MD  
Work Phone:   
[phone]                          [facility]  
   
                                                    2023   
11:          Body height         167.6 cm            Julito Osborne PA-C  
Work Phone:   
[phone]                          [facility]  
   
                                                    2023   
11:          Body temperature    97.7 [degF]         Julito Osborne PA-C  
Work Phone:   
[phone]                          [facility]  
   
                                                    2023   
11:          Body weight         132.45 kg           Julito Osborne PA-C  
Work Phone:   
[phone]                          [facility]  
   
                                                    2023   
11:                              Diastolic blood   
pressure                  80 mm[Hg]                 Julito Osborne PA-C  
Work Phone:   
[phone]                          [facility]  
   
                                                    2023   
11:          Heart rate          86 /min             Julito Osborne PA-C  
Work Phone:   
[phone]                          [facility]  
   
                                                    2023   
11:                              SaO2% (BldA) [Mass   
fraction]                 98 %                      Julito Osborne PA-C  
Work Phone:   
[phone]                          [facility]  
   
                                                    2023   
11:                              Systolic blood   
pressure                  110 mm[Hg]                Julito Osborne PA-C  
Work Phone:   
[phone]                          [facility]  
   
                                                    2023   
09:          Body height         167 cm              Francis Masci DO  
Work Phone:   
[phone]                          [facility]  
   
                                                    2023   
09:          Body temperature    98.2 [degF]         Francis Masci DO  
Work Phone:   
[phone]                          [facility]  
   
                                                    2023   
09:          Body weight         132.22 kg           Francis Masci DO  
Work Phone:   
[phone]                          [facility]  
   
                                                    2023   
09:                              Diastolic blood   
pressure                  70 mm[Hg]                 Francis Masci DO  
Work Phone:   
[phone]                          [facility]  
   
                                                    2023   
09:          Heart rate          81 /min             Francis Masci DO  
Work Phone:   
[phone]                          [facility]  
   
                                                    2023   
09:                              SaO2% (BldA) [Mass   
fraction]                 98 %                      Francis Masci DO  
Work Phone:   
[phone]                          [facility]  
   
                                                    2023   
09:                              Systolic blood   
pressure                  115 mm[Hg]                Francis Masci DO  
Work Phone:   
[phone]                          [facility]  
   
                                                    2023   
15:          Body height         165.1 cm            Cynthia Douglas MD  
Work Phone:   
[phone]                          [facility]  
   
                                                    2023   
15:          Body temperature    98.29 [degF]        Cynthia Douglas MD  
Work Phone:   
[phone]                          [facility]  
   
                                                    2023   
15:          Body weight         131.54 kg           Cynthia Dougals MD  
Work Phone:   
[phone]                          [facility]  
   
                                                    2023   
15:                              Diastolic blood   
pressure                  82 mm[Hg]                 Cynthia Douglas MD  
Work Phone:   
[phone]                          [facility]  
   
                                                    2023   
15:          Heart rate          71 /min             Cynthia Douglas MD  
Work Phone:   
[phone]                          [facility]  
   
                                                    2023   
15:          Respiratory rate    16 /min             Cynthia Douglas MD  
Work Phone:   
[phone]                          [facility]  
   
                                                    2023   
15:                              SaO2% (BldA) [Mass   
fraction]                 97 %                      Cynthia Douglas MD  
Work Phone:   
[phone]                          [facility]  
   
                                                    2023   
15:                              Systolic blood   
pressure                  120 mm[Hg]                Cynthia Douglas MD  
Work Phone:   
[phone]                          [facility]  
   
                                                    2022   
12:          Body temperature    97.5 [degF]         Jasbir Jeferson   
APRN.CNP  
Work Phone:   
[phone]                          [facility]  
   
                                                    2022   
12:          Body weight         137.71 kg           Jasbir Jeferson   
APRN.CNP  
Work Phone:   
[phone]                          [facility]  
   
                                                    2022   
12:                              Diastolic blood   
pressure                  74 mm[Hg]                 Jasbir Pendlebury   
APRN.CNP  
Work Phone:   
[phone]                          [facility]  
   
                                                    2022   
12:          Heart rate          65 /min             Jasbir Pendlebury   
APRN.CNP  
Work Phone:   
[phone]                          [facility]  
   
                                                    2022   
12:          Respiratory rate    18 /min             Jasbir Pendlebury   
APRN.CNP  
Work Phone:   
[phone]                          [facility]  
   
                                                    2022   
12:                              SaO2% (BldA) [Mass   
fraction]                 97 %                      Jasbir Govea   
APRN.CNP  
Work Phone:   
[phone]                          [facility]  
   
                                                    2022   
12:                              Systolic blood   
pressure                  122 mm[Hg]                Jasbir Govea   
APRN.CNP  
Work Phone:   
[phone]                          [facility]  
   
                                                    2022   
14:          Body height         165.1 cm            Cynthia Douglas MD  
Work Phone:   
[phone]                          [facility]  
   
                                                    2022   
14:          Body temperature    98.4 [degF]         Cynthia Douglas MD  
Work Phone:   
[phone]                          [facility]  
   
                                                    2022   
14:          Body weight         137.89 kg           Cynthia Douglas MD  
Work Phone:   
[phone]                          [facility]  
   
                                                    2022   
14:                              Diastolic blood   
pressure                  76 mm[Hg]                 Cynthia Douglas MD  
Work Phone:   
[phone]                          [facility]  
   
                                                    2022   
14:          Heart rate          75 /min             Cynthia Douglas MD  
Work Phone:   
[phone]                          [facility]  
   
                                                    2022   
14:          Respiratory rate    16 /min             Cynthia Douglas MD  
Work Phone:   
[phone]                          [facility]  
   
                                                    2022   
14:                              SaO2% (BldA) [Mass   
fraction]                 94 %                      Cynthia Douglas MD  
Work Phone:   
[phone]                          [facility]  
   
                                                    2022   
14:                              Systolic blood   
pressure                  122 mm[Hg]                Cynthia Douglas MD  
Work Phone:   
[phone]                          [facility]  
  
  
  
Encounters  
  
  
                          Encounter Date Encounter Type Care Provider Facility  
   
                                                    Start: 2024  
End: 2024     ambulatory          Shayna Mai MA   [facility]   
[place]  
   
                                                    Start: 2024  
End: 2024                         Patient encounter   
procedure                 Shayna Mai MA         [facility]   
[place]  
   
                                        Comment on above:   [facility]  
vigation Outreach (/UHC, AWV, Care gaps,   
HCC,   
[facility]//)   
   
                                                    Start: 2024  
End: 2024           Refill                    Cynthia Douglas MD  
Work Phone:   
[phone]                          Internal Medicine   
[place]  
   
                                        Comment on above:   Refill Request   
   
                                                    Start: 2024  
End: 2024     ambulatory          Kerrieemily Henderson MA [facility]  
   
                                                    Start: 2024  
End: 2024                         Patient encounter   
procedure                 Kerrie Henderson MA      [facility]  
   
                                                    Start: 2024  
End: 2024     ambulatory          CYNTHIA DOUGLAS        Facility:[facility]  
   
                                                    Start: 2024  
End: 2024                         Patient encounter   
procedure                               Fabiana Ambrosio   
APRN.CNP  
Work Phone:   
[phone]                          [facility]  
   
                                        Comment on above:   Upper respiratory tr  
act infection, unspecified type (Primary   
Dx)   
   
                                Start: 2024 Telephone encounter Cynthia unger MD  
Work Phone:   
[phone]                          Internal Medicine   
[place]  
   
                                                    Start: 2024  
End: 2024     ambulatory          CYNTHIA MISAEL        Facility:[facility]  
   
                                                    Start: 2024  
End: 2024                         Patient encounter   
procedure                               Sherly An APRN.CNP  
Work Phone:   
[phone]                          Internal Medicine   
[place]  
   
                                        Comment on above:   Type 2 diabetes aruna  
itus without complication, without long-  
term   
current use of insulin (HCC) (Primary Dx);  
Primary hypertension;  
Hypercholesterolemia;  
Malignant neoplasm of lower-outer quadrant of right breast of   
female, estrogen receptor positive (HCC);  
Seasonal allergic rhinitis, unspecified trigger   
   
                                Start: 2024 Refill          Vikki Carpente  
r   
APRN.CNP  
Work Phone:   
[phone]                          Hematology/Oncology  
   
                                        Comment on above:   Refill Request   
   
                                Start: 06- Refill          Wheatland Carpente  
r   
APRN.CNP  
Work Phone:   
[phone]                          Hematology/Oncology  
   
                                        Comment on above:   Refill Request   
   
                                                    Start: 2024  
End: 2024                         Patient encounter   
procedure                               Vikki Posada   
APRN.CNP  
Work Phone:   
[phone]                          [facility]  
   
                                                    Start: 2024  
End: 2024           ambulatory                Vikki Posada   
APRN.CNP  
Work Phone:   
[phone]                          Hematology/Oncology  
   
                                        Comment on above:   Malignant neoplasm o  
f lower-outer quadrant of right breast of   
female, estrogen receptor positive (HCC) (Primary Dx);  
Morbid obesity with BMI of 40.0-44.9, adult (HCC)   
   
                                Start: 2024 ambulatory      Sherly An APRN  
.CNP  
Work Phone:   
[phone]                          Internal Medicine   
[place]  
   
                                        Comment on above:   Potassium   
   
                                Start: 2024 ambulatory      Kinza Doshi MA                               [facility]  
   
                                        Start: 2024   Patient encounter   
procedure                               Kinza Doshi MA                               [facility]   
[place]  
   
                                        Comment on above:   Population Health Na  
vigation Outreach ([facility] Annual Wellness   
Visit )   
   
                                                    Start: 2024  
End: 2024     ambulatory          CYNTHIA DOUGLAS        Facility:[facility]  
   
                                                    Start: 2024  
End: 2024                         Patient encounter   
procedure                               Meera Menchaca PA-C  
Work Phone:   
[phone]                          Internal Medicine   
[place]  
   
                                        Comment on above:   Left flank pain (Ashley  
slava Dx)   
   
                                Start: 2024 Documentation procedure Mammog  
jason   
Coordinator                             [facility]   
MAIN  
   
                                Start: 2024 Letter encounter Mammography   
Coordinator                             [facility]   
Department  
   
                                                    Start: 2024  
End: 2024     ambulatory          CYNTHIA DOUGLAS        Facility:[facility]  
   
                                                    Start: 2024  
End: 2024                         Subsequent hospital visit   
by physician                            Bone Density [facility]  
Work Phone:   
[phone]                          Radiology  
   
                                        Comment on above:   Screening for osteop  
orosis [Z13.820]   
   
                                                            Malignant neoplasm o  
f lower-outer quadrant of right breast of   
female, estrogen receptor positive (HCC) (HCC) [C50.511, Z17.0]   
   
                                Start: 2024 ambulatory      Sherly An APRN  
.CNP  
Work Phone:   
[phone]                          Internal Medicine   
[place]  
   
                                        Comment on above:   Phone message   
   
                                Start: 2024 Telephone encounter Cynthia unger MD  
Work Phone:   
[phone]                          Internal Medicine   
[place]  
   
                                        Comment on above:   Patient Question; ER  
 F/U   
   
                                Start: 2023 Refill          Lillie dejesus PA-C  
Work Phone:   
[phone]                          Internal Medicine   
[place]  
   
                                        Comment on above:   Refill Request   
   
                                                    Start: 2023  
End: 2023     ambulatory          CYNTHIA DOUGLAS        Facility:[facility]  
   
                                                    Start: 2023  
End: 2023                         Patient encounter   
procedure                               Sherly Older APRN.CNP  
Work Phone:   
[phone]                          Internal Medicine   
[place]  
   
                                        Comment on above:   Controlled type 2 di  
abetes mellitus without complication,   
without   
long-term current use of insulin (HCC) (Primary Dx);  
Primary hypertension;  
Morbid obesity with BMI of 40.0-44.9, adult ([facility])   
   
                                                    Start: 10-  
End: 10-     ambulatory          VIKKI Wakefield     Facility:[facility]  
   
                                Start: 2023 Refill          Sherly Older APRN  
.CNP  
Work Phone:   
[phone]                          Internal Medicine   
[place]  
   
                                        Comment on above:   Refill Request   
   
                                Start: 2023 Refill          Sherly Older APRN  
.CNP  
Work Phone:   
[phone]                          Internal Medicine   
[place]  
   
                                        Comment on above:   Refill Request   
   
                                Start: 2023 Refill          Sherly Older APRN  
.CNP  
Work Phone:   
[phone]                          Internal Medicine   
[place]  
   
                                        Comment on above:   Refill Request   
   
                          Start: 2023 Refill       [facility]  
/Oncology  
   
                                        Comment on above:   Refill Request   
   
                                Start: 2023 Telephone encounter Cynthia unger MD  
Work Phone:   
[phone]                          Internal Medicine   
[place]  
   
                                                    Start: 2023  
End: 2023                         Patient encounter   
procedure                               Cynthia Douglas MD  
Work Phone:   
[phone]                          Internal Medicine   
[place]  
   
                                        Comment on above:   Type 2 diabetes aruna  
itus without complication, without long-  
term   
current use of insulin (HCC) (Primary Dx);  
Screening for osteoporosis;  
Asymptomatic menopause;  
Primary hypertension;  
Hypercholesterolemia;  
Diverticulitis;  
Dysuria;  
Vaginal yeast infection;  
Morbid obesity with BMI of 40.0-44.9, adult ([facility])   
   
                                                    Start: 2023  
End: 2023           ambulatory                Kajal Dueñas MD  
Work Phone:   
[phone]                          General Surgery  
   
                                        Comment on above:   Calcification of rig  
ht breast on mammography (Primary Dx)   
   
                                                    Start: 2023  
End: 2023                         Telemedicine consultation   
with patient                            Kajal Dueñas MD  
Work Phone:   
[phone]                          [facility]  
   
                                Start: 2023 Telephone encounter Francis miramontes DO  
Work Phone:   
[phone]                          Hematology/Oncology  
   
                                        Comment on above:   Patient Question   
   
                                Start: 2023 Telephone encounter Kajal Padilla MD  
Work Phone:   
[phone]                          General Surgery  
   
                                        Comment on above:   Results (Right breas  
t biopsy results)   
   
                          Start: 2023 ambulatory   Stephanie Hoskins MA Navigat  
e Clinic   
[place]  
   
                                        Comment on above:   Population Health Na  
vigation Outreach ([facility] Care Gaps)   
   
                                Start: 2023 Telephone encounter Frannie cisneros PA-C  
Work Phone:   
[phone]                          [facility]  
   
                                        Comment on above:   Results   
   
                                                    Start: 2023  
End: 2023                         Patient encounter   
procedure                               Kajal Dueñas MD  
Work Phone:   
[phone]                          General Surgery  
   
                                        Comment on above:   Calcification of rig  
ht breast on mammography;  
Diverticulosis of large intestine without perforation or abscess   
without bleeding;  
Abnormal CT scan, gastrointestinal tract   
   
                                                    Start: 2023  
End: 2023                         Patient encounter   
procedure                               Julito Osborne PA-C  
Work Phone:   
[phone]                          Urology  
   
                                        Comment on above:   Gross hematuria   
   
                                Start: 2023 Telephone encounter Tasneem morales MD  
Work Phone:   
[phone]                          Mammography  
   
                                        Comment on above:   Mammogram Result Alex  
l Back   
   
                                                            Follow Up (Referral   
to Dr. Dejesus)   
   
                                                    Start: 2023  
End: 2023           ambulatory                Francsi Shell DO  
Work Phone:   
[phone]                          Hematology/Oncology  
   
                                        Comment on above:   Malignant neoplasm o  
f lower-outer quadrant of right breast of   
female, estrogen receptor positive (HCC) (Primary Dx);  
Abnormal mammogram;  
Gross hematuria   
   
                                                    Start: 2023  
End: 2023                         Patient encounter   
procedure                               Francis Shell DO  
Work Phone:   
[phone]                          [facility]  
   
                                Start: 2023 Documentation procedure Mammog  
jason   
Coordinator                             [facility]   
MAIN  
   
                                Start: 2023 Letter encounter Mammography   
Coordinator                             [facility]   
Department  
   
                                                    Start: 2023  
End: 2023                         Subsequent hospital visit   
by physician              Screen Mammo [facility]     Mammogram  
   
                                        Comment on above:   Primary cancer of lo  
wer outer quadrant of right female breast   
(HCC)   
[C50.511]   
   
                                                    Start: 2023  
End: 2023                         Patient encounter   
procedure                               Cynthia Douglas MD  
Work Phone:   
[phone]                          Internal Medicine   
[place]  
   
                                        Comment on above:   Primary hypertension  
 (Primary Dx);  
Controlled type 2 diabetes mellitus without complication, without   
long-term current use of insulin (HCC);  
Diverticulitis;  
Candida infection   
   
                                                            Primary cancer of lo  
wer outer quadrant of right female breast (HCC)   
(Primary Dx);  
Carcinoma of right breast, estrogen and progesterone receptor   
positive (HCC)   
   
                                Start: 2023 Refill          Sherly SousaCNP  
Work Phone:   
[phone]                          Internal Medicine   
[place]  
   
                                        Comment on above:   Refill Request   
   
                                Start: 2023 Telephone encounter Cynthia unger MD  
Work Phone:   
[phone]                          Internal Medicine   
[place]  
   
                                        Comment on above:   Constipation   
   
                                Start: 2023 Telephone encounter Cynthia unger MD  
Work Phone:   
[phone]                          Internal Medicine   
[place]  
   
                                        Comment on above:   Results   
   
                                Start: 2022 Refill          Cynthia STRINGER  
Work Phone:   
[phone]                          Family Medicine   
[place]  
   
                                        Comment on above:   Refill Request   
   
                                                    Start: 2022  
End: 2022                         Office outpatient visit 25   
minutes                                 Jasbir RAMOSCNP  
Work Phone:   
[phone]                          [place] [facility]  
   
                                        Comment on above:   Sinobronchitis (Prim  
david Dx)   
   
                          Start: 2022 ambulatory   Itzel Stone MA Navigat  
e Clinic   
[place]  
   
                                        Comment on above:   Population Health Na  
vigation Outreach ([facility])   
   
                                Start: 2022 Telephone encounter Cynthia unger MD  
Work Phone:   
[phone]                          Internal Medicine   
[place]  
   
                                        Comment on above:   Medication concern   
   
                                Start: 10- Telephone encounter Sherly RAMOSCNP  
Work Phone:   
[phone]                          Internal Medicine   
[place]  
   
                                        Comment on above:   Results   
   
                                Start: 2022 Refill          Jacquelyn Milner MD  
Work Phone:   
[phone]                          Hematology/Oncology  
   
                                        Comment on above:   Refill Request   
   
                                                            Results   
   
                                Start: 2022 Telephone encounter Cynthia unger MD  
Work Phone:   
[phone]                          Family Medicine   
[place]  
   
                                        Comment on above:   Lab Orders; Orders   
   
                          Start: 2022 ambulatory   Shayna Mai MA [facility]  
ate Clinic   
[place]  
   
                                        Comment on above:   Population Health Na  
vigation Outreach ([facility] care gaps)   
   
                                Start: 2022 Refill          Francis STRINGER  
O  
Work Phone:   
[phone]                          Hematology/Oncology  
   
                                        Comment on above:   Refill Request   
   
                                Start: 2022 Refill          Haley An APRMO SousaCNP  
Work Phone:   
[phone]                          Internal Medicine   
[place]  
   
                                        Comment on above:   Refill Request   
   
                                Start: 2022 Refill          Francis STRINGER  
O  
Work Phone:   
[phone]                          Hematology/Oncology  
   
                                        Comment on above:   Refill Request   
   
                                                    Start: 2022  
End: 2022                         Patient encounter   
procedure                               Cynthia Douglas MD  
Work Phone:   
[phone]                          Internal Medicine   
[place]  
   
                                        Comment on above:   Morbid obesity with   
BMI of 40.0-44.9, adult (HCC) (Primary   
Dx);  
Primary hypertension;  
Hypercholesterolemia;  
Controlled type 2 diabetes mellitus without complication, without   
long-term current use of insulin (HCC)   
   
                                                    Start: 2019  
End: 2019                         Patient encounter   
procedure                 Steve Govea      Facility:[facility]  
   
                                        Start: 2019   Patient encounter   
procedure                                           Facility:[id number]  
   
                                                    Start: 2018  
End: 2018                         Patient encounter   
procedure                 Maricruz Ravi           Facility:[facility]  
   
                          Start: 2018 Ambulatory   MARICRUZMELODY RAVI [facility]  
   
                          Start: 2017 Ambulatory   [facility]  
   
                          Start: 07- [facility]  
  
  
  
Procedures  
  
  
                          Date         Procedure    Procedure Detail Performing   
Clinician  
   
                          Start: 2024 STREP MUNIR MOLECULAR (POC)               
 Fabiana Ambrosio APRN.CNP  
Work Phone:   
[phone]  
   
                                        Start: 2024   Urnls dip stick/tabl  
et   
rgnt auto w/o microscopy                            Meera Menchaca PA-C  
Work Phone:   
[phone]  
   
                                        Start: 2024   Dxa bone density donis  
dy 1/>   
sites axial skel                                    Cynthia Douglas MD  
Work Phone:   
[phone]  
   
                          Start: 2023 Colonoscopy               [facility]  
er  
   
                                        Start: 2023   Urnls dip stick/tabl  
et   
rgnt auto w/o microscopy                            Cynthia Douglas MD  
Work Phone:   
[phone]  
   
                                        Start: 2023   Urnls dip stick/tabl  
et   
rgnt auto w/o microscopy                            Julito Osborne PA-C  
Work Phone:   
[phone]  
   
                                                    Start: 2023  
End: 2023     Mammography                             Jacquelyn Milner MD  
Work Phone:   
[phone]  
   
                                        Start: 2022   Adult depression scr  
eening   
assessment                                          Cynthia Douglas MD  
Work Phone:   
[phone]  
   
                          Start: 2022 Mammography               Cynthia unger MD  
Work Phone:   
[phone]  
   
                          Start: 2011 Colonoscopy               Cynthia unger MD  
Work Phone:   
[phone]  
  
  
  
Plan of Treatment  
  
  
                          Date         Care Activity Detail       Author  
   
                                                    Start:   
2028          Colonoscopy         COLONOSCOPY         [facility]  
   
                                                    Start:   
2028                              COLORECTAL CANCER   
SCREENING                 COLORECTAL CANCER SCREENING [facility]  
   
                                                    Start:   
2028                              Screening for   
malignant neoplasm of   
colon                                               [facility]  
   
                                                    Start:   
2025                              BP Controlled   
(<130/80)                 BP Controlled (<130/80)   [facility]  
   
                                                    Start:   
2025                              Annual PCP Team   
Chronic Disease Visit                   Annual PCP Team Chronic   
Disease Visit                           [facility]  
   
                                                    Start:   
2025                              BP Controlled   
(<130/80)                 BP Controlled (<130/80)   [facility]  
   
                                                    Start:   
2025                              Covid-19 Vaccine ( season)                         Covid-19 Vaccine (1 - 2023-24   
season)                                 [facility]  
   
                                                    Comment on   
above:                                  Postponed from 2023 (Declined at t  
his time)   
   
                                                    Start:   
2025                              Pneumococcal Vaccine:   
65+ (1 of 2 - PCV)                      Pneumococcal Vaccine: 65+ (1   
of 2 - PCV)                             [facility]  
   
                                                    Comment on   
above:                                  Postponed from 1964 (Declined at t  
his time)   
   
                                                    Start:   
2025                              BP Controlled   
(<130/80)                 BP Controlled (<130/80)   [facility]  
   
                                                    Start:   
2025                              Hepatitis B surface   
antibody level            LDL Cholesterol           [facility]  
   
                                                    Start:   
2025                              Annual PCP Team   
Chronic Disease Visit                   Annual PCP Team Chronic   
Disease Visit                           [facility]  
   
                                                    Start:   
2025                              Screening for   
malignant neoplasm of   
breast                    Mammogram Screening       [facility] [facility]  
   
                                                    Start:   
2024  
End: 2024                         Patient encounter   
procedure                                           Internal Medicine   
[facility]  
   
                                                    Comment on   
above:                                  6 month follow up   
   
                                                            Medicare wellnss/ 6   
month follow up   
   
                                                    Start:   
2024  
End: 2025                         CBC panel - Blood by   
Automated count                         COMPLETE BLOOD COUNT Lab   
Routine Type 2 diabetes   
mellitus without   
complication, without   
long-term current use of   
insulin (HCC) Primary   
hypertension Expected:   
2024 (Approximate),   
Expires: 2025                     [facility]  
   
                                                    Comment on   
above:                                  Expected: 2024 (Approximate), Expi  
res: 2025   
   
                                                    Start:   
2024  
End: 2025                         Comprehensive   
metabolic 2000 panel -   
Serum or Plasma                         COMPREHENSIVE METABOLIC PANEL   
Lab Routine Type 2 diabetes   
mellitus without   
complication, without   
long-term current use of   
insulin (HCC) Primary   
hypertension   
Hypercholesterolemia   
Expected: 2024   
(Approximate), Expires:   
2025                              [facility]  
   
                                                    Comment on   
above:                                  Expected: 2024 (Approximate), Expi  
res: 2025   
   
                                                    Start:   
2024  
End: 2025                         Hemoglobin A1c in   
Blood                                   HEMOGLOBIN A1C Lab Routine   
Type 2 diabetes mellitus   
without complication, without   
long-term current use of   
insulin (HCC) Expected:   
2024 (Approximate),   
Expires: 2025                     [facility]  
   
                                                    Comment on   
above:                                  Expected: 2024 (Approximate), Expi  
res: 2025   
   
                                                    Start:   
2024  
End: 2025                         Lipid 1996 panel -   
Serum or Plasma                         LIPID PANEL BASIC Lab Routine   
Type 2 diabetes mellitus   
without complication, without   
long-term current use of   
insulin (HCC)   
Hypercholesterolemia   
Expected: 2024   
(Approximate), Expires:   
2025                              [facility]  
Work Phone:   
[phone]  
   
                                                    Comment on   
above:                                  Expected: 2024 (Approximate), Expi  
res: 2025   
   
                                                    Start:   
2024                              Annual PCP Team   
Chronic Disease Visit                   Annual PCP Team Chronic   
Disease Visit                           [facility]  
   
                                                    Start:   
2024                              BP Controlled   
(<130/80)                 BP Controlled (<130/80)   [facility]  
   
                                                    Start:   
2024                Hepatitis B screening     Urine Albumin:Creatinine   
Ratio                                   [facility]  
   
                                                    Start:   
2024                              Hepatitis B Vaccine (1   
of 3 - Risk 3-dose   
series)                                 Hepatitis B Vaccine (1 of 3 -   
Risk 3-dose series)                     [facility]  
   
                                                    Comment on   
above:                                  Postponed from 2018 (Declined at t  
his time)   
   
                                                    Start:   
2024          HIV Screening       HIV Screening       [facility]  
   
                                                    Comment on   
above:                                  Postponed from 1976 (Declined at t  
his time)   
   
                                                    Start:   
2024          HIV screening       HIV Screening       [facility]  
   
                                                    Comment on   
above:                                  Postponed from 1976 (Declined at t  
his time)   
   
                                                    Start:   
2024                              RSV Vaccine (1 -   
1-dose 60+ series)                      RSV Vaccine (1 - 1-dose 60+   
series)                                 [facility]  
   
                                                    Comment on   
above:                                  Postponed from 2018 (Declined at t  
his time)   
   
                                                    Start:   
2024                              Shingrix Vaccine (1 of   
2)                        Shingrix Vaccine (1 of 2) [facility]  
   
                                                    Comment on   
above:                                  Postponed from 2008 (Declined at t  
his time)   
   
                                                    Start:   
10-                              BP Controlled   
(<130/80)                 BP Controlled (<130/80)   [facility]  
   
                                                    Start:   
10-                              Hemoglobin A1c   
measurement               HbA1C                     [facility]  
   
                                                    Start:   
10-  
End: 10-           ambulatory                10/02/2024 10:30 AM EDT Deann tarango   
(SP) Office   
Hematology/Oncology [address]   
[place] [address]   
[phone] Vikki Posada APRN. E   
[place] [id number] [phone] (Work)   
[phone] (Fax) 6 MO OV*             Hematology/Oncolog  
y  
   
                                                    Comment on   
above:                                  6 MO OV*   
   
                                                    Start:   
2024                              Covid-19 Vaccine (1 -   
2023-24 season)                         Covid-19 Vaccine (1 - 2023-24   
season)                                 [facility]  
   
                                                    Start:   
2024          Influenza vaccination                     [facility]  
   
                                                    Start:   
2024                              Urine microalbumin   
profile                                             [facility]  
   
                                                    Start:   
2024          Influenza vaccination Influenza Vaccine (#1) [facility]  
   
                                                    Comment on   
above:                                  Postponed from 2023 (Declined at t  
his time)   
   
                                                    Start:   
2024  
End: 2024                         Patient encounter   
procedure                               2024 9:40 AM EDT Office   
Visit Internal Medicine   
[facility] [address]   
[place], [place] [address] [phone] Sherly An APRN.CNP [address] [place] [address]   
[phone] (Work)   
[phone] (Fax) 6 Month   
follow up                               Internal Medicine   
[facility]  
   
                                                    Comment on   
above:                                  6 Month follow up   
   
                                                    Start:   
2024                              Hemoglobin A1c   
measurement               HbA1C                     [facility]  
   
                                                    Start:   
2024                              Hemoglobin   
A1c/Hemoglobin.total   
in Blood                  HbA1C                     [facility]  
   
                                                    Start:   
2024  
End: 2024                         CBC panel - Blood by   
Automated count                         CBC Lab Routine Controlled   
type 2 diabetes mellitus   
without complication, without   
long-term current use of   
insulin (HCC) Primary   
hypertension Expected:   
2024 (Approximate),   
Expires: 2024                     [facility]  
Work Phone:   
[phone]  
   
                                                    Comment on   
above:                                  Expected: 2024 (Approximate), Expi  
res: 2024   
   
                                                    Start:   
2024  
End: 2024                         Comprehensive   
metabolic 2000 panel -   
Serum or Plasma                         COMP METABOLIC PANEL Lab   
Routine Controlled type 2   
diabetes mellitus without   
complication, without   
long-term current use of   
insulin (HCC) Primary   
hypertension Expected:   
2024 (Approximate),   
Expires: 2024                     [facility]  
Work Phone:   
[phone]  
   
                                                    Comment on   
above:                                  Expected: 2024 (Approximate), Expi  
res: 2024   
   
                                                    Start:   
2024  
End: 2024                         Hemoglobin A1c in   
Blood                                   HGB A1C Lab Routine   
Controlled type 2 diabetes   
mellitus without   
complication, without   
long-term current use of   
insulin (HCC) Expected:   
2024 (Approximate),   
Expires: 2024                     [facility]  
Work Phone:   
[phone]  
   
                                                    Comment on   
above:                                  Expected: 2024 (Approximate), Expi  
res: 2024   
   
                                                    Start:   
2024  
End: 2024                         Lipid 1996 panel -   
Serum or Plasma                         LIPID PANEL BASIC Lab Routine   
Controlled type 2 diabetes   
mellitus without   
complication, without   
long-term current use of   
insulin (HCC) Primary   
hypertension Expected:   
2024 (Approximate),   
Expires: 2024                     [facility]  
Work Phone:   
[phone]  
   
                                                    Comment on   
above:                                  Expected: 2024 (Approximate), Expi  
res: 2024   
   
                                                    Start:   
2024                              ANNUAL PCP TEAM   
CHRONIC DISEASE VISIT                   ANNUAL PCP TEAM CHRONIC   
DISEASE VISIT                           [facility]  
   
                                                    Start:   
2024                              BP CONTROLLED   
(<130/80)                 BP CONTROLLED (<130/80)   [facility]  
   
                                                    Start:   
2024          COVID-19 VACCINE (#1) COVID-19 VACCINE (#1) [facility]  
   
                                                    Comment on   
above:                                  Postponed from 1958 (Declined at t  
his time)   
   
                                                    Start:   
2024                              Pneumococcal Vaccine:   
65+ (1 - PCV)                           Pneumococcal Vaccine: 65+ (1   
- PCV)                                  [facility]  
   
                                                    Comment on   
above:                                  Postponed from 1964 (Declined at t  
his time)   
   
                                                    Start:   
2024                              Pneumococcal Vaccine:   
65+ (1 of 2 - PCV)                      Pneumococcal Vaccine: 65+ (1   
of 2 - PCV)                             [facility]  
   
                                                    Comment on   
above:                                  Postponed from 1964 (Declined at t  
his time)   
   
                                                    Start:   
2024                              PNEUMOCOCCAL: 65+ (1 -   
PCV)                      PNEUMOCOCCAL: 65+ (1 - PCV) [facility]  
   
                                                    Comment on   
above:                                  Postponed from 1964 (Declined at t  
his time)   
   
                                                    Start:   
2024                              Hepatitis B surface   
antibody level            LDL CHOLESTEROL           [facility]  
   
                                                    Start:   
2024                              BP CONTROLLED   
(<130/80)                 BP CONTROLLED (<130/80)   [facility]  
   
                                                    Start:   
2024                              BP CONTROLLED   
(<130/80)                 BP CONTROLLED (<130/80)   [facility]  
   
                                                    Start:   
2024          Mammography                             [facility]  
   
                                                    Start:   
2024                              Screening for   
malignant neoplasm of   
breast                    Mammogram Screening       [facility]  
   
                                                    Start:   
2024                              ANNUAL PCP TEAM   
CHRONIC DISEASE VISIT                   ANNUAL PCP TEAM CHRONIC   
DISEASE VISIT                           [facility]  
   
                                                    Start:   
2024                              Hepatitis B surface   
antibody level            LDL CHOLESTEROL           [facility]  
   
                                                    Start:   
2024                              Advance Directive   
Discussion                Advance Directive Discussion [facility]  
   
                                                    Start:   
2024                              Behavioral Health   
Screening                 Behavioral Health Screening [facility]  
   
                                                    Start:   
2024          Depression Assessment Depression Assessment [facility]  
   
                                                    Start:   
2023                              BP CONTROLLED   
(<130/80)                 BP CONTROLLED (<130/80)   [facility]  
   
                                                    Start:   
2023  
End: 2024                         ALBUMIN/CREAT RATIO   
RND UR                                              [facility]  
Work Phone:   
[phone]  
   
                                                    Comment on   
above:                                  Expected: 2023, Expires:   
4   
   
                                                    Start:   
2023  
End: 2024                         Basic metabolic 2000   
panel - Serum or   
Plasma                                              [facility]  
Work Phone:   
[phone]  
   
                                                    Comment on   
above:                                  Expected: 2023, Expires:    
   
                                                    Start:   
10-                              Hemoglobin   
A1c/Hemoglobin.total   
in Blood                  HBA1C                     [facility]  
   
                                                    Start:   
2023                              3 comp foot exam   
completed                 DIABETIC FOOT EXAM        [facility]  
   
                                                    Start:   
2023                              ANNUAL PCP TEAM   
CHRONIC DISEASE VISIT                   ANNUAL PCP TEAM CHRONIC   
DISEASE VISIT                           [facility]  
   
                                                    Start:   
2023                              Hepatitis B surface   
antibody level            LDL CHOLESTEROL           [facility]  
   
                                                    Start:   
2023                              Covid-19 Vaccine ( season)                         Covid-19 Vaccine (1 - 2023-24   
season)                                 [facility]  
   
                                                    Start:   
2023          Influenza vaccination                     [facility]  
   
                                                    Start:   
2023                              Hemoglobin   
A1c/Hemoglobin.total   
in Blood                  HBA1C                     [facility]  
   
                                                    Start:   
2023          Influenza vaccination INFLUENZA (#1)      [facility]  
   
                                                    Comment on   
above:                                  Postponed from 2022 (Declined at t  
his time)   
   
                                                    Start:   
2023  
End: 2023                         ALBUMIN/CREAT RATIO   
RND UR                                  ALBUMIN/CREAT RATIO RND UR   
Lab Routine Type 2 diabetes   
mellitus without   
complication, without   
long-term current use of   
insulin (HCC) Expected:   
2023, Expires:   
2023                              [facility]  
Work Phone:   
[phone]  
   
                                                    Comment on   
above:                                  Expected: 2023, Expires:   
3   
   
                                                    Start:   
2023                              ANNUAL PCP TEAM   
CHRONIC DISEASE VISIT                   ANNUAL PCP TEAM CHRONIC   
DISEASE VISIT                           [facility]  
   
                                                    Start:   
2023                              BP CONTROLLED   
(<130/80)                 BP CONTROLLED (<130/80)   [facility]  
   
                                                    Start:   
2023                              Hemoglobin   
A1c/Hemoglobin.total   
in Blood                  HBA1C                     [facility]  
   
                                                    Start:   
2023                              ADVANCE DIRECTIVE   
DISCUSSION                ADVANCE DIRECTIVE DISCUSSION [facility]  
   
                                                    Start:   
2023          BONE DENSITY        BONE DENSITY        [facility]  
   
                                                    Start:   
2023          Bone Density Screening Bone Density Screening [facility]  
   
                                                    Start:   
2023                              Screening for   
osteoporosis              Bone Density Screening    [facility]  
   
                                                    Start:   
2023  
End: 2023                         CBC W Auto   
Differential panel -   
Blood                                   CBC + DIFF Lab STAT Primary   
cancer of lower outer   
quadrant of right female   
breast (HCC) Carcinoma of   
right breast, estrogen and   
progesterone receptor   
positive (HCC) Expected:   
2023, Expires:   
2023                              [facility]  
Work Phone:   
[phone]  
   
                                                    Comment on   
above:                                  Expected: 2023, Expires:   
3   
   
                                                    Start:   
2023  
End: 2023                         Comprehensive   
metabolic 2000 panel -   
Serum or Plasma                         COMP METABOLIC PANEL Lab   
Routine Primary cancer of   
lower outer quadrant of right   
female breast (HCC) Carcinoma   
of right breast, estrogen and   
progesterone receptor   
positive (HCC) Expected:   
2023, Expires:   
2023                              [facility]  
Work Phone:   
[phone]  
   
                                                    Comment on   
above:                                  Expected: 2023, Expires:   
3   
   
                                                    Start:   
2023                              Adult depression   
screening assessment      DEPRESSION SCREENING      [facility]  
   
                                                    Start:   
2023                Hepatitis B screening     URINE ALBUMIN:CREATININE   
RATIO                                   [facility]  
   
                                                    Start:   
2023                              Hepatitis B surface   
antibody level            LDL CHOLESTEROL           [facility]  
   
                                                    Start:   
2023          Mammography         MAMMOGRAM           [facility]  
   
                                                    Start:   
2023          DEPRESSION ASSESSMENT DEPRESSION ASSESSMENT [facility]  
   
                                                    Start:   
2022          Glaucoma screening  Dilated Retinal Exam [facility]  
   
                                                    Start:   
2022                              Hepatitis C antibody,   
confirmatory test         DILATED RETINAL EXAM      [facility]  
   
                                                    Start:   
2022  
End: 2023                         Basic metabolic 2000   
panel - Serum or   
Plasma                                  BASIC METABOLIC PNL Lab   
Routine Hypokalemia Expected:   
2022 (Approximate),   
Expires: 2023                     [facility]  
Work Phone:   
[phone]  
   
                                                    Comment on   
above:                                  Expected: 2022 (Approximate), Expi  
res: 2023   
   
                                                    Start:   
10-                              3 comp foot exam   
completed                 DIABETIC FOOT EXAM        [facility]  
   
                                                    Start:   
10-                              BP CONTROLLED   
(<130/80)                 BP CONTROLLED (<130/80)   [facility]  
   
                                                    Start:   
10-          COVID-19 VACCINE (#1) COVID-19 VACCINE (#1) [facility]  
   
                                                    Comment on   
above:                                  Postponed from 1963 (Declined at t  
his time)   
   
                                                            Postponed from  (Declined at this time)   
   
                                                    Start:   
10-          COVID-19 VACCINE (1) COVID-19 VACCINE (1) [facility]  
   
                                                    Comment on   
above:                                  Postponed from 1970 (Declined at t  
his time)   
   
                                                    Start:   
10-                              SHINGRIX VACCINE (1 of   
2)                        SHINGRIX VACCINE (1 of 2) [facility]  
   
                                                    Comment on   
above:                                  Postponed from 2008 (Declined at t  
his time)   
   
                                                            Postponed from  (Declined at this time)   
   
                                                    Start:   
10-  
End: 2022           POTASSIUM BLD             POTASSIUM BLD Lab Routine   
Hypokalemia Expected:   
10/05/2022 (Approximate),   
Expires: 2022                     [facility]  
Work Phone:   
[phone]  
   
                                                    Comment on   
above:                                  Expected: 10/05/2022 (Approximate), Expi  
res: 2022   
   
                                                    Start:   
2022                              Hemoglobin   
A1c/Hemoglobin.total   
in Blood                  HBA1C                     [facility]  
   
                                                    Start:   
2022          Influenza vaccination                     [facility]  
   
                                                    Start:   
2022          Influenza vaccination INFLUENZA (#1)      [facility]  
   
                                                    Comment on   
above:                                  Postponed from 2021 (Declined at t  
his time)   
   
                                                    Start:   
2022          DEPRESSION ASSESSMENT DEPRESSION ASSESSMENT [facility]  
   
                                                    Start:   
2021          Colonoscopy         COLONOSCOPY         [facility]  
   
                                                    Start:   
2021                              COLORECTAL CANCER   
SCREENING                 COLORECTAL CANCER SCREENING [facility]  
   
                                                    Start:   
2019          HPV TESTING         HPV TESTING         [facility]  
   
                                                    Start:   
2019          PAP TESTING         PAP TESTING         [facility]  
   
                                                    Start:   
2018                              Hepatitis B Vaccine (1   
of 3 - Risk 3-dose   
series)                                 Hepatitis B Vaccine (1 of 3 -   
Risk 3-dose series)                     [facility]  
   
                                                    Start:   
2018                              RSV Vaccine (1 -   
1-dose 60+ series)                      RSV Vaccine (1 - 1-dose 60+   
series)                                 [facility]  
   
                                                    Start:   
2008                              SHINGRIX VACCINE (1 of   
2)                        SHINGRIX VACCINE (1 of 2) [facility]  
   
                                                    Start:   
2003          COLOGUARD (FIT-DNA) COLOGUARD (FIT-DNA) [facility]  
   
                                                    Start:   
2003          CT COLONOGRAPHY     CT COLONOGRAPHY     [facility]  
   
                                                    Start:   
2003          FECAL OCCULT BLOOD  FECAL OCCULT BLOOD  [facility]  
   
                                                    Start:   
2003                              Screening for   
malignant neoplasm of   
colon                                               [facility]  
   
                                                    Start:   
2003          SIGMOIDOSCOPY       SIGMOIDOSCOPY       [facility]  
   
                                                    Start:   
1976          Anxiety Screening   Anxiety Screening   [facility]  
   
                                                    Start:   
1976          Depression Screening Depression Screening [facility]  
   
                                                    Start:   
1976          HIV SCREENING       HIV SCREENING       [facility]  
   
                                                    Start:   
1964          PNEUMOCOCCAL (1 - PCV) PNEUMOCOCCAL (1 - PCV) [facility]  
   
                                                    Start:   
1964                              Pneumococcal Vaccine:   
65+ (1 of 2 - PCV)                      Pneumococcal Vaccine: 65+ (1   
of 2 - PCV)                             [facility]  
   
                                                    Start:   
1964                              PNEUMOCOCCAL: 65+ (1 -   
PCV)                      PNEUMOCOCCAL: 65+ (1 - PCV) [facility]  
   
                                                    Start:   
1958          COVID-19 VACCINE (#1) COVID-19 VACCINE (#1) [facility]  
   
                                                            Bacteria identified   
in   
Urine by Culture                        URINE CULTURE Microbiology   
Routine Gross hematuria   
2023 1:07 PM EST                  [facility]  
Work Phone:   
[phone]  
   
                                                            Bacteria identified   
in   
Urine by Culture                        URINE CULTURE Microbiology   
Routine Dysuria 2023   
10:34 AM EDT                            [facility]  
Work Phone:   
[phone]  
   
                                                            Bacteria identified   
in   
Urine by Culture                        URINE CULTURE Microbiology   
Routine Left flank pain   
2024 3:35 PM EST                  [facility]  
Work Phone:   
[phone]  
   
                                                      
End: 2023                         CBC panel - Blood by   
Automated count                         CBC Lab Routine Controlled   
type 2 diabetes mellitus   
without complication, without   
long-term current use of   
insulin (HCC) Every 6 months   
for 12 Occurrences starting   
2022 until 2023             [facility]  
Work Phone:   
[phone]  
   
                                                    Comment on   
above:                                  Every 6 months for 12 Occurrences starti  
ng 2022 until   
2023   
   
                                                      
End: 2023                         Comprehensive   
metabolic 2000 panel -   
Serum or Plasma                         COMP METABOLIC PANEL Lab   
Routine Controlled type 2   
diabetes mellitus without   
complication, without   
long-term current use of   
insulin (HCC) Every 6 months   
for 12 Occurrences starting   
2022 until 2023             [facility]  
Work Phone:   
[phone]  
   
                                                    Comment on   
above:                                  Every 6 months for 12 Occurrences starti  
ng 2022 until   
2023   
   
                                                            DBT Breast - bilater  
al   
screening                               [facility]   
Radiology Routine Malignant   
neoplasm of lower-outer   
quadrant of right breast of   
female, estrogen receptor   
positive (HCC) (HCC)   
Encounter for screening   
mammogram for high-risk   
patient 2024 11:26 AM   
EST                                     [facility]  
Work Phone:   
[phone]  
   
                                                      
End: 2024                         Diagnostic mammography   
computer-aided detcj   
uni                                     [facility]   
Routine Abnormal mammogram 1   
Occurrences starting   
2023 until 2024             [facility]  
Work Phone:   
[phone]  
   
                                                    Comment on   
above:                                  1 Occurrences starting 2023 until   
2024   
   
                                                      
End: 2024           DXA-AXIAL SKELETON        [facility] Radiology  
   
Routine Screening for   
osteoporosis 1 Occurrences   
starting 2023 until   
2024                              [facility]  
Work Phone:   
[phone]  
   
                                                    Comment on   
above:                                  1 Occurrences starting 2023 until   
2024   
   
                                                      
End: 2023                         Hemoglobin A1c in   
Blood                                   HGB A1C Lab Routine   
Controlled type 2 diabetes   
mellitus without   
complication, without   
long-term current use of   
insulin (HCC) Every 3 months   
for 24 Occurrences starting   
2022 until 2023             [facility]  
Work Phone:   
[phone]  
   
                                                    Comment on   
above:                                  Every 3 months for 24 Occurrences starti  
ng 2022 until   
2023   
   
                                                      
End: 2023                         Lipid 1996 panel -   
Serum or Plasma                         LIPID PANEL BASIC Lab Routine   
Controlled type 2 diabetes   
mellitus without   
complication, without   
long-term current use of   
insulin (HCC) Every 6 months   
for 12 Occurrences starting   
2022 until 2023             [facility]  
Work Phone:   
[phone]  
   
                                                    Comment on   
above:                                  Every 6 months for 12 Occurrences starti  
ng 2022 until   
2023   
   
                                                POST VOID RESIDUAL POST VOID RES  
IDUAL Procedures   
Routine Gross hematuria   
Ordered: 2023                     [facility]  
Work Phone:   
[phone]  
   
                                                    Comment on   
above:                                  Ordered: 2023   
   
                                                      
End: 2023                         Thyrotropin   
[Units/volume] in   
Serum or Plasma                         TSH BLD Lab Routine   
Controlled type 2 diabetes   
mellitus without   
complication, without   
long-term current use of   
insulin (HCC) Every 3 months   
for 24 Occurrences starting   
2022 until 2023             [facility]  
Work Phone:   
[phone]  
   
                                                    Comment on   
above:                                  Every 3 months for 24 Occurrences starti  
ng 2022 until   
2023   
   
                                                      
End: 2024                         Us breast uni real   
time with image   
limited                                 [facility]   
Routine Abnormal mammogram 1   
Occurrences starting   
2023 until 2024             [facility]  
Work Phone:   
[phone]  
   
                                                    Comment on   
above:                                  1 Occurrences starting 2023 until   
2024   
   
                                                                 [facility]  
  
  
  
Immunizations  
  
  
                      Immunization Date Immunization Notes      Care Provider Fa  
[facility]  
   
                                        10-          influenza virus   
vaccine, unspecified   
formulation                             [facility]  
   
                                        2014          tetanus toxoid, redu  
xiomara   
diphtheria toxoid, and   
acellular pertussis   
vaccine, adsorbed                                   Cynthia Douglas MD  
Work Phone:   
[phone]                          [facility]  
Work Phone:   
[phone]  
  
  
  
Payers  
  
  
                          Date         Payer Category Payer        Policy ID  
   
                          2020   Unknown                   [id number]  
   
                                2020      Medicaid        MEDICAID [facility]  
   
MEDICAID [id number]   
2020-Present   
[phone] [address]   
[address] [place] [address]   
Medicaid                                [id number]   
[id number]  
[id number]  
   
                          2020   Medicaid                  [id number]  
   
                                2019      Medicare        [facility] MEDICARE [facility]  
 DUAL   
COMPLETE HMO SNP   
[id number]   
2019-Present   
[phone] [address] [place], [place]   
[address] Medicare                     [id number]   
[id number]  
[id number]  
   
                          2019   Medicaid                    
   
                          2019   Medicare                    
   
                          2018   Private Health Insurance                
   
                          1960   Unknown                   [id number]   
[id number]  
[id number]  
   
                          1958   Unknown                   [id number]   
[id number]  
[id number]  
   
                          1960   Unknown                   [id number]   
[id number]  
[id number]  
   
                          1960   Unknown                   [id number]   
[id number]  
[id number]  
   
                                       Medicare                  [id number]  
  
  
  
Social History  
  
  
                          Date         Type         Detail       Facility  
   
                                                    Start:   
05-  
End: 2022                         Tobacco smoking status   
NHIS                      Ex-smoker                 [facility]  
Work Phone:   
[phone]  
   
                                                    Start:   
05-  
End: 05-     History of tobacco use Current smoker      [facility]  
Work Phone:   
[phone]  
   
                                                    Start:   
05-  
End: 05-     History of tobacco use Cigarette Smoker    [facility]  
Work Phone:   
[phone]  
   
                                                    Start:   
05-  
End: 10-                         Cigarettes smoked current   
(pack per day) - Reported 1                         [facility]  
   
                                                    Start:   
05-  
End: 2022           Tobacco use and exposure  Smokeless tobacco   
non-user                                [facility]  
Work Phone:   
[phone]  
   
                                                    Start:   
2022  
End: 2024           Alcohol intake            Current non-drinker of   
alcohol (finding)                       [facility]  
   
                                                    Start:   
10-  
End: 2023                         History SDOH Alcohol   
Binge                     1                         [facility]  
   
                                                    Start:   
10-  
End: 2023                         History SDOH Social   
Connections Phone         5                         [facility]  
   
                                                    Start:   
10-  
End: 2022                         History SDOH Social   
Connections Yarsani        98                        [facility]  
   
                                                    Start:   
10-  
End: 2023                         History SDOH Social   
Connections Membership    2                         [facility]  
   
                                                    Start:   
10-  
End: 2022     History SDOH Financial 3                   [facility]  
   
                                                    Start:   
2020          Education           12                  [facility]  
   
                                                    Start:   
1960          Sex Assigned At Birth Female              [facility]  
   
                                                    Start:   
2022  
End: 2022                         Exposure to SARS-CoV-2   
(event)                   Not sure                  [facility]  
Work Phone:   
[phone]  
   
                                                    Start:   
2022  
End: 2023                         History SDOH Alcohol Std   
Drinks                    0                         [facility]  
   
                                                    Start:   
2023  
End: 10-                         Social connection and   
isolation panel                                     [facility]  
   
                                                            Do you belong to any  
   
clubs or organizations   
such as [employer] groups,   
unions, fraternal or   
athletic groups, or   
school groups?            No                        [facility]  
   
                                                            Are you now ,  
   
, ,   
, never    
or living with a partner?                   [facility]  
   
                                                            How often to you hav  
e a   
drink containing alcohol? Never                     [facility]  
   
                                                            How many standard dr  
inks   
containing alcohol do you   
have on a typical day?    Patient does not drink    [facility]  
   
                                                            Do you feel stress -  
   
tense, restless, nervous,   
or anxious, or unable to   
sleep at night because   
your mind is troubled all   
the time - these days   
[OSQ]                     Not at all                [facility]  
   
                                                            (I/We) worried wheth  
er   
(my/our) food would run   
out before (I/we) got   
money to buy more.        Never true                [facility]  
   
                                                    Start:   
10-                Gender identity           Identifies as female   
gender (finding)                        [facility]  
   
                                                    Start:   
10-          Sexual orientation  Heterosexual (finding) [facility]  
   
                                                            How hard is it for y  
ou to   
pay for the very basics   
like food, housing,   
medical care, and heating Not very hard             [facility]  
   
                                                            Do you feel stress -  
   
tense, restless, nervous,   
or anxious, or unable to   
sleep at night because   
your mind is troubled all   
the time - these days   
[OSQ]                     Only a little             [facility]  
  
  
  
Medical Equipment  
  
  
                                Procedure Code  Equipment Code  Equipment Origin  
al   
Text                                    Equipment   
Identifier                              Dates  
   
                                                                Sys Endscp Fix   
[employer] -   
[id number]                [id number]                Start:   
2015  
   
                                                                 [id number],   
[id number],   
[id number],   
[id number]                              Start:   
2020  
End:   
2023  
   
                                        Comment on above:   Test blood sugars 2d  
aily. Dx:ucnontrolled diabetes . Insulin:   
Yes   
   
                                                            Test blood sugar(s)   
2 daily. Dx: Type 2 DM - Controlled E11.9   
Insulin: Yes   
  
  
  
Clinical Notes 2016 to 2024  
   Shayna Mai MA - 2024 12:18 PM EDTTelephone Encounter - Joie Hardwick RN - 2024 4:48 PM EDTTelephone Encounter - Joie Hardwick RN - 
2024 4:48 PM EDTPatient Instructions  
  
                                Note Date & Type Note            Facility  
   
                                        2024 Note     HNO ID: [id number]  
Author: SHAYNA MAI MA  
Service: ?  
Author Type: Medical Assistant  
Type: Progress Notes  
Filed: 2024 12:24  
Note Text:  
POPULATION HEALTH NAVIGATION   
OUTREACH  
Action/FYI  
Called and left a message to call   
[phone], to discuss health   
maintenance  
items that are due.  
Sent My Chart message.  
PCP appt: Flipped follow up on   
 to wellness  
HM due:  
HCC gaps  
DILATED RETINAL EXAM  
BREAST CANCER SCREENING- due in   
Feb  
Flu  
Reason for Outreach  
Care Gap/HCC or Scheduling   
Wellness Visits  
Care Gaps due:  
Medicare Annual Wellness Visit  
Breast Cancer Screening  
Diabetic Eye Exam  
Flu Vaccine  
Patient Contacted:  
Unable or unnecessary to reach   
patient:  
Left message  
[employer] message sent  
HCC related  
Flipped existing appointment  
Navigation Signature:  
Shayna Mai MA  
2024 12:22 PM             [facility]  
   
                                                    2024 History of   
Present illness Narrative               Formatting of this note is   
different from the original.  
POPULATION HEALTH NAVIGATION   
OUTREACH  
  
Action/FYI  
Called and left a message to call   
[phone], to discuss health   
maintenance items that are due.  
Sent My Chart message.  
PCP appt: Flipped follow up on   
 to wellness  
 due:  
HCC gaps  
DILATED RETINAL EXAM  
BREAST CANCER SCREENING- due in   
Feb  
Flu  
  
  
  
Reason for Outreach  
Care Gap/HCC or Scheduling   
Wellness Visits  
  
Care Gaps due:  
Medicare Annual Wellness Visit  
Breast Cancer Screening  
Diabetic Eye Exam  
Flu Vaccine  
  
Patient Contacted:  
Unable or unnecessary to reach   
patient:  
Left message  
[employer] message sent  
HCC related  
Flipped existing appointment  
  
Navigation Signature:  
  
Shayna Mai MA  
2024 12:22 PM  
  
  
Electronically signed by Shayna Mai MA at 2024 12:24   
PM EDT  
documented in this encounter            [facility]  
   
                                        2024 Note     Patient Outreach (NE  
TNAV)  
----------------------------------  
----------------------------------  
------------  
NAVDEEP GUILLEN ([id number])   
1960 F  
Date Time Provider Department  
24 MINOR, SHAYNA NETNAV  
During your visit today, we   
recorded the following information   
about you:  
Shayna Mai MA 2024   
12:24 PM Signed  
POPULATION HEALTH NAVIGATION   
OUTREACH  
Action/FYI  
Called and left a message to call   
[phone], to discuss health   
maintenance  
items that are due.  
Sent My Chart message.  
PCP appt: Flipped follow up on   
 to wellness  
 due:  
HCC gaps  
DILATED RETINAL EXAM  
BREAST CANCER SCREENING- due in   
Feb  
Flu  
Reason for Outreach  
Care Gap/HCC or Scheduling   
Wellness Visits  
Care Gaps due:  
Medicare Annual Wellness Visit  
Breast Cancer Screening  
Diabetic Eye Exam  
Flu Vaccine  
Patient Contacted:  
Unable or unnecessary to reach   
patient:  
Left message  
[email or web address] message sent  
[facility] related  
Flipped existing appointment  
Navigation Signature:  
Shayna CALE Mai  
2024 12:22 PM  
Allergies As of Date: 2024   
Noted Allergy Reaction  
SILVADENE (SILVER SULFADIAZINE)   
2017 4 - Hives  
SULFA (SULFONAMIDE ANTIBIOTICS)   
2021 4 - Hives  
Date Reviewed: 2024  
Reviewed by: Tracy Galdamez LPN   
- Fully Assessed  
Reason for Visit:  
Population Health Navigation   
Outreach [3910] Cmt:  
[facility], AWV, Care gaps,  
HCC, [place] PCSA  
Prescriptions as of 2024  
- potassium chloride 20 mEq TbER  
Take 1 tablet by mouth two times a   
day.  
- atenolol (TENORMIN) 50 mg tablet  
Take 1 tablet by mouth once daily.  
- chlorthalidone (HYGROTON) 25 mg   
tablet  
Take 0.5 tablets by mouth once   
daily.  
- glipiZIDE (GLUCOTROL XL) 2.5 mg   
24 hr tablet  
Take 1 tablet by mouth once daily.  
- metFORMIN ER (GLUCOPHAGE XR) 500   
mg 24 hr tablet  
Take 1 tablet by mouth daily with   
breakfast.  
- blood sugar diagnostic (BLOOD   
GLUCOSE TEST) test strip  
Test blood sugar(s) 2 times daily.   
Dx: Type 2 DM - Controlled E11.9  
Insulin: No  
- Lancets  
Test blood sugar(s) 2 times daily.   
Dx: Type 2 DM - Controlled E11.9  
Insulin: No  
- exemestane (AROMASIN) 25 mg   
tablet  
Take 1 tablet by mouth once daily.  
- ibuprofen (MOTRIN) 800 mg tablet  
Take 1 tablet by mouth every 8   
hours as needed for pain. Take   
with food.  
- blood sugar diagnostic (BLOOD   
GLUCOSE TEST) test strip  
Test blood sugars 2daily.   
Dx:ucnontrolled diabetes .   
Insulin: Yes  
- lancets (ONE TOUCH DELICA) 33   
gauge misc  
Test blood sugar(s) 2 daily. Dx:   
Type 2 DM - Controlled E11.9   
Insulin: Yes  
- Blood-Glucose Meter monitoring   
kit  
Glucose Meter of Choice - Kit -   
Dx: Type 2 DM - Uncontrolled   
E11.65  
- MV-MN/FOLIC ACID/CALCIUM/VIT K   
(ONE-A-DAY WOMEN'S 50 PLUS ORAL)  
Take 1 tablet by mouth once daily.  
Meds Comments as of 2015:  
Patient only took the Etodolac for   
a few days and noticed no   
difference so she  
quit taking this medication.  
Problem List As Of Date 2024   
Noted Resolved  
Morbid obesity with BMI of   
40.0-44.9, adult (HC*2014  
Uterus disorder [N85.9] 2014  
Hypertension [I10] 2014  
Hypercholesterolemia [E78.00]   
2014  
Endometrial hyperplasia without   
atypia, complex*2014  
Uterine prolapse without mention   
of vaginal wal*2014  
Rectocele [N81.6] 2014  
Complex endometrial hyperplasia   
with atypia [N8*2014  
Follow-up examination, following   
other surgery *2015  
Bilateral low back pain with   
left-sided sciatic*2016  
Peroneal tendonitis [M76.70]   
2016  
Controlled diabetes mellitus type   
II without co*2016  
Hyperopia [H52.00] 2016  
Presbyopia [H52.4] 2016  
Benign neoplasm of skin of eyelid   
including can*2016  
Chronic bilateral low back pain   
with left-sided*2016  
Malignant neoplasm of lower-outer   
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  
ER+ CA+ carcinoma of breast (HCC)   
[C50.919, Z17*05/10/2017  
Family history of ovarian cancer   
[Z80.41] 05/10/2017  
GERD (gastroesophageal reflux   
disease) [K21.9]  
Encounter Number: [id number]  
Encounter Status:Closed by SHAYNA MAI on 24                     [facility]  
   
                                                    2024 Telephone   
encounter Note                          Formatting of this note is   
different from the original.  
The patient has been identified by   
name and date of birth: Yes  
  
Caregiver verified no other   
encounters exist for this   
prescription request: Yes  
  
Caregiver confirmed with   
patient/requestor that no other   
refills are due, in the near   
future, with this provider at this   
time: Yes  
  
The last office visit in the   
department: 2024  
  
Does the patient have a future   
office visit with this   
provider/department: Yes   
2024  
  
Requested Prescriptions  
  
Pending Prescriptions Disp Refills  
potassium chloride 20 mEq TbER 180   
tablet 1  
Sig: Take 1 tablet by mouth two   
times a day.  
  
Joie Hardwick RN  
  
  
Electronically signed by Joie Hardwick RN at 2024 5:00 PM   
EDT  
                                        [facility]  
   
                                                    2024 Miscellaneous   
Notes                                   Formatting of this note is   
different from the original.  
The patient has been identified by   
name and date of birth: Yes  
  
Caregiver verified no other   
encounters exist for this   
prescription request: Yes  
  
Caregiver confirmed with   
patient/requestor that no other   
refills are due, in the near   
future, with this provider at this   
time: Yes  
  
The last office visit in the   
department: 2024  
  
Does the patient have a future   
office visit with this   
provider/department: Yes   
2024  
  
Requested Prescriptions  
  
Pending Prescriptions Disp Refills  
potassium chloride 20 mEq TbER 180   
tablet 1  
Sig: Take 1 tablet by mouth two   
times a day.  
  
Joie Hardwick RN  
  
  
Electronically signed by Joie Hardwick RN at 2024 5:00 PM   
EDT  
documented in this encounter            [facility]  
   
                                        2024 Note     HNO ID: [id number]  
Author: KERRIE HENDERSON MA  
Service: ?  
Author Type: Medical Assistant  
Type: Progress Notes  
Filed: 2024 16:14  
Note Text:  
  
Opened in error                         [facility]  
   
                                                    2024 History of   
Present illness Narrative               Formatting of this note might be   
different from the original.  
  
Opened in error  
Electronically signed by   
Kerrie Henderson MA at   
2024 4:14 PM EDT  
documented in this encounter            [facility]  
   
                                        2024 Note     Patient Outreach (RHIANNA CANTU)  
----------------------------------  
----------------------------------  
------------  
NAVDEEP GUILLEN ([id number])   
1960 F  
Date Time Provider Department  
24 KERRIE HENDERSON  
During your visit today, we   
recorded the following information   
about you:  
Kerrie Henderson MA 2024   
4:14 PM Signed  
Opened in error  
Allergies As of Date: 2024   
Noted Allergy Reaction  
SILVADENE (SILVER SULFADIAZINE)   
2017 4 - Hives  
SULFA (SULFONAMIDE ANTIBIOTICS)   
2021 4 - Hives  
Date Reviewed: 2024  
Reviewed by: Tracy Galdamez LPN   
- Fully Assessed  
Prescriptions as of 2024  
- atenolol (TENORMIN) 50 mg tablet  
Take 1 tablet by mouth once daily.  
- chlorthalidone (HYGROTON) 25 mg   
tablet  
Take 0.5 tablets by mouth once   
daily.  
- glipiZIDE (GLUCOTROL XL) 2.5 mg   
24 hr tablet  
Take 1 tablet by mouth once daily.  
- metFORMIN ER (GLUCOPHAGE XR) 500   
mg 24 hr tablet  
Take 1 tablet by mouth daily with   
breakfast.  
- blood sugar diagnostic (BLOOD   
GLUCOSE TEST) test strip  
Test blood sugar(s) 2 times daily.   
Dx: Type 2 DM - Controlled E11.9  
Insulin: No  
- Lancets  
Test blood sugar(s) 2 times daily.   
Dx: Type 2 DM - Controlled E11.9  
Insulin: No  
- exemestane (AROMASIN) 25 mg   
tablet  
Take 1 tablet by mouth once daily.  
- potassium chloride 20 mEq TbER  
Take 1 tablet by mouth two times a   
day.  
- ibuprofen (MOTRIN) 800 mg tablet  
Take 1 tablet by mouth every 8   
hours as needed for pain. Take   
with food.  
- blood sugar diagnostic (BLOOD   
GLUCOSE TEST) test strip  
Test blood sugars 2daily.   
Dx:ucnontrolled diabetes .   
Insulin: Yes  
- lancets (ONE TOUCH DELICA) 33   
gauge misc  
Test blood sugar(s) 2 daily. Dx:   
Type 2 DM - Controlled E11.9   
Insulin: Yes  
- Blood-Glucose Meter monitoring   
kit  
Glucose Meter of Choice - Kit -   
Dx: Type 2 DM - Uncontrolled   
E11.65  
- MV-MN/FOLIC ACID/CALCIUM/VIT K   
(ONE-A-DAY WOMEN'S 50 PLUS ORAL)  
Take 1 tablet by mouth once daily.  
Meds Comments as of 2015:  
Patient only took the Etodolac for   
a few days and noticed no   
difference so she  
quit taking this medication.  
Problem List As Of Date 2024   
Noted Resolved  
Morbid obesity with BMI of   
40.0-44.9, adult (HC*2014  
Uterus disorder [N85.9] 2014  
Hypertension [I10] 2014  
Hypercholesterolemia [E78.00]   
2014  
Endometrial hyperplasia without   
atypia, complex*2014  
Uterine prolapse without mention   
of vaginal wal*2014  
Rectocele [N81.6] 2014  
Complex endometrial hyperplasia   
with atypia [N8*2014  
Follow-up examination, following   
other surgery *2015  
Bilateral low back pain with   
left-sided sciatic*2016  
Peroneal tendonitis [M76.70]   
2016  
Controlled diabetes mellitus type   
II without co*2016  
Hyperopia [H52.00] 2016  
Presbyopia [H52.4] 2016  
Benign neoplasm of skin of eyelid   
including can*2016  
Chronic bilateral low back pain   
with left-sided*2016  
Malignant neoplasm of lower-outer   
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  
ER+ CA+ carcinoma of breast (HCC)   
[C50.919, Z17*05/10/2017  
Family history of ovarian cancer   
[Z80.41] 05/10/2017  
GERD (gastroesophageal reflux   
disease) [K21.9]  
Encounter Number: [id number]  
Encounter Status:Closed by   
KERRIE HENDERSON on 24           [facility]  
   
                                        2024 Note     HNO ID: [id number]  
Author: FABIANA AMBROSIO APRN.CNP  
Service: ?  
Author Type: Nurse Practitioner  
Type: Progress Notes  
Filed: 2024 17:24  
Note Text:  
This note was created using   
NoteWriter.  
Subjective  
Navdeep Guillen is a 64 year old   
female.  
64 year old female with PMH HTN,   
GERD, DM presents for illness.  
Acute onset yesterday  
+sore throat  
+headache  
+pain with swallowing  
+enlarged lymph nodes  
Mild cough  
Denies N/V/D  
Denies skin rash or lesions.  
+exposure to strep, citing her   
grandson was over this past   
weekend and was  
positive for strep.  
Denies using homeopathic or OTC  
Endorses   would like to be tested   
for strep   
The history is provided by the   
patient. No    
was used.  
Sore Throat  
This is a new problem. The current   
episode started yesterday. The   
problem has  
been unchanged. Neither side of   
throat is experiencing more pain   
than the other.  
There has been no fever. The pain   
is at a severity of 6/10. The pain   
is  
moderate. Associated symptoms   
include coughing, headaches and   
swollen glands.  
Pertinent negatives include no   
abdominal pain, congestion,   
diarrhea, drooling,  
ear discharge, ear pain, hoarse   
voice, plugged ear sensation, neck   
pain,  
shortness of breath, stridor,   
trouble swallowing or vomiting.   
She has had  
exposure to strep. She has had no   
exposure to mono. She has tried   
nothing for  
the symptoms. The treatment   
provided no relief.  
PAST MEDICAL HISTORY  
2021: Abnormal mammogram  
2015: Achilles tendon pain  
2015: Ankle weakness  
12/15/2016: Chronic pain of left   
ankle  
2014: Colon abnormality  
Comment: Thickening of the   
ascending colon seen on the recent   
CT  
scan. Patient has had a   
colonoscopy around 4 years ago.  
Records were obtained for when   
they were done, 4 years  
ago , in [facility]. her   
colonoscopy was  
completely normal, no thickening,   
and the biopsy too was  
normal except for lymphoid   
aggregates.  
: Diverticulitis  
Comment: Treated by Dr. Patricio   
at [facility]  
2016: DJD (degenerative   
joint disease), ankle and foot  
No date: DM (diabetes mellitus)   
(HCC)  
No date: GERD (gastroesophageal   
reflux disease)  
05/15/2014: Gross hematuria  
Comment: , had hematuria,   
investigated extensively along  
with cytoscopy, a stone was found   
but no signs of any  
malignancy.  
12/15/2016: Heel pain, chronic  
10/03/2017: Hepatic steatosis  
10/03/2017: Hiatal hernia  
No date: Hypertension  
No date: Insomnia  
05/15/2014: Kidney stone  
No date: Morbid obesity (HCC)  
No date: Nephrolithiasis  
2016: Neuritis  
05/15/2014: Renal lesion  
2015: S/P Achilles tendon   
repair  
Comment: 2015 sx done  
PAST SURGICAL HISTORY  
2017: BREAST LUMPECTOMY HX; Right  
2021: BX BREAST W/DEVICE 1ST   
LESION STEREOTACTIC GUID; Right  
2011: CHOLECYSTECTOMY  
Comment: gallbladder removal  
2011: COLONOSCOPY  
2014: CT ABDOMEN  
2014: PAST SURGICAL HISTORY   
OF  
Comment: DANGA hysteroscopy  
2015: PAST SURGICAL HISTORY   
OF; Left  
Comment: Left foot surgery  
10/16/2014: S OLESYA LAVH [id number]  
ALLERGIES Silvadene [Silver   
Sulfadiazine] and Sulfa   
(Sulfonamide Antibiotics)  
MEDICATIONS  
atenolol (TENORMIN) 50 mg tablet   
Take 1 tablet by mouth once daily.  
chlorthalidone (HYGROTON) 25 mg   
tablet Take 0.5 tablets by mouth   
once daily.  
glipiZIDE (GLUCOTROL XL) 2.5 mg 24   
hr tablet Take 1 tablet by mouth   
once daily.  
metFORMIN ER (GLUCOPHAGE XR) 500   
mg 24 hr tablet Take 1 tablet by   
mouth daily  
with breakfast.  
blood sugar diagnostic (BLOOD   
GLUCOSE TEST) test strip Test   
blood sugar(s) 2  
times daily. Dx: Type 2 DM -   
Controlled E11.9 Insulin: No  
Lancets Test blood sugar(s) 2   
times daily. Dx: Type 2 DM -   
Controlled E11.9  
Insulin: No  
exemestane (AROMASIN) 25 mg tablet   
Take 1 tablet by mouth once daily.  
potassium chloride 20 mEq TbER   
Take 1 tablet by mouth two times a   
day.  
ibuprofen (MOTRIN) 800 mg tablet   
Take 1 tablet by mouth every 8   
hours as needed  
for pain. Take with food.  
blood sugar diagnostic (BLOOD   
GLUCOSE TEST) test strip Test   
blood sugars  
2daily. Dx:ucnontrolled diabetes .   
Insulin: Yes  
lancets (ONE TOUCH DELICA) 33   
gauge misc Test blood sugar(s) 2   
daily. Dx: Type  
2 DM - Controlled E11.9 Insulin:   
Yes  
Blood-Glucose Meter monitoring kit   
Glucose Meter of Choice - Kit -   
Dx: Type 2  
DM - Uncontrolled E11.65  
MV-MN/FOLIC ACID/CALCIUM/VIT K   
(ONE-A-DAY WOMEN'S 50 PLUS ORAL)   
Take 1 tablet  
by mouth once daily.  
FAMILY HISTORY  
Problem Relation Age of Onset  
Ovarian cancer Mother 39  
Heart Father  
Hypertension Father  
Prostate Cancer Father 78  
other (kidney stones) Brother  
Hypertension Brother  
Diabetes Brother  
Seizures Son  
Breast Cancer Other 55  
Double first cousin (daughter of   
mother's sister and father's   
brother)  
Social History  
Tobacco Use  
Smoking status: Former  
Packs/day: 1.00  
Years: 10.00  
Additional pack years: 0.00  
Total pack years: 10.00  
Types: Cigarettes  
Quit date: 5/15/2007  
Years since quittin.2 (more   
content not included)...                [facility]  
   
                                                    2024 History of   
Present illness Narrative               Formatting of this note is   
different from the original.  
This note was created using   
NoteWriter.  
Subjective  
Navdeep Guillen is a 64 year old   
female.  
  
64 year old female with PMH HTN,   
GERD, DM presents for illness.  
  
Acute onset yesterday  
+sore throat  
+headache  
+pain with swallowing  
+enlarged lymph nodes  
  
Mild cough  
Denies N/V/D  
Denies skin rash or lesions.  
  
+exposure to strep, citing her   
grandson was over this past   
weekend and was positive for   
strep.  
  
Denies using homeopathic or OTC  
  
Endorses   would like to be tested   
for strep   
  
The history is provided by the   
patient. No    
was used.  
Sore Throat  
This is a new problem. The current   
episode started yesterday. The   
problem has been unchanged.   
Neither side of throat is   
experiencing more pain than the   
other. There has been no fever.   
The pain is at a severity of 6/10.   
The pain is moderate. Associated   
symptoms include coughing,   
headaches and swollen glands.   
Pertinent negatives include no   
abdominal pain, congestion,   
diarrhea, drooling, ear discharge,   
ear pain, hoarse voice, plugged   
ear sensation, neck pain,   
shortness of breath, stridor,   
trouble swallowing or vomiting.   
She has had exposure to strep. She   
has had no exposure to mono. She   
has tried nothing for the   
symptoms. The treatment provided   
no relief.  
  
PAST MEDICAL HISTORY  
2021: Abnormal mammogram  
2015: Achilles tendon pain  
2015: Ankle weakness  
12/15/2016: Chronic pain of left   
ankle  
2014: Colon abnormality  
Comment: Thickening of the   
ascending colon seen on the recent   
CT  
scan. Patient has had a   
colonoscopy around 4 years ago.  
Records were obtained for when   
they were done, 4 years  
ago , in [facility]. her   
colonoscopy was  
completely normal, no thickening,   
and the biopsy too was  
normal except for lymphoid   
aggregates.  
: Diverticulitis  
Comment: Treated by Dr. Patricio   
at [facility]  
2016: DJD (degenerative   
joint disease), ankle and foot  
No date: DM (diabetes mellitus)   
(HCC)  
No date: GERD (gastroesophageal   
reflux disease)  
05/15/2014: Gross hematuria  
Comment: , had hematuria,   
investigated extensively along  
with cytoscopy, a stone was found   
but no signs of any  
malignancy.  
12/15/2016: Heel pain, chronic  
10/03/2017: Hepatic steatosis  
10/03/2017: Hiatal hernia  
No date: Hypertension  
No date: Insomnia  
05/15/2014: Kidney stone  
No date: Morbid obesity (HCC)  
No date: Nephrolithiasis  
2016: Neuritis  
05/15/2014: Renal lesion  
2015: S/P Achilles tendon   
repair  
Comment: 2015 sx done  
  
PAST SURGICAL HISTORY  
2017: BREAST LUMPECTOMY HX; Right  
2021: BX BREAST W/DEVICE 1ST   
LESION STEREOTACTIC GUID; Right  
: CHOLECYSTECTOMY  
Comment: gallbladder removal  
: COLONOSCOPY  
2014: CT ABDOMEN  
2014: PAST SURGICAL HISTORY   
OF  
Comment: D&C hysteroscopy  
2015: PAST SURGICAL HISTORY   
OF; Left  
Comment: Left foot surgery  
10/16/2014: S PK LAVH [id number]  
  
ALLERGIES Silvadene [Silver   
Sulfadiazine] and Sulfa   
(Sulfonamide Antibiotics)  
  
MEDICATIONS  
atenolol (TENORMIN) 50 mg tablet   
Take 1 tablet by mouth once daily.  
chlorthalidone (HYGROTON) 25 mg   
tablet Take 0.5 tablets by mouth   
once daily.  
glipiZIDE (GLUCOTROL XL) 2.5 mg 24   
hr tablet Take 1 tablet by mouth   
once daily.  
metFORMIN ER (GLUCOPHAGE XR) 500   
mg 24 hr tablet Take 1 tablet by   
mouth daily with breakfast.  
blood sugar diagnostic (BLOOD   
GLUCOSE TEST) test strip Test   
blood sugar(s) 2 times daily. Dx:   
Type 2 DM - Controlled E11.9   
Insulin: No  
Lancets Test blood sugar(s) 2   
times daily. Dx: Type 2 DM -   
Controlled E11.9 Insulin: No  
exemestane (AROMASIN) 25 mg tablet   
Take 1 tablet by mouth once daily.  
potassium chloride 20 mEq TbER   
Take 1 tablet by mouth two times a   
day.  
ibuprofen (MOTRIN) 800 mg tablet   
Take 1 tablet by mouth every 8   
hours as needed for pain. Take   
with food.  
blood sugar diagnostic (BLOOD   
GLUCOSE TEST) test strip Test   
blood sugars 2daily.   
Dx:ucnontrolled diabetes .   
Insulin: Yes  
lancets (ONE TOUCH DELICA) 33   
gauge misc Test blood sugar(s) 2   
daily. Dx: Type 2 DM - Controlled   
E11.9 Insulin: Yes  
Blood-Glucose Meter monitoring kit   
Glucose Meter of Choice - Kit -   
Dx: Type 2 DM - Uncontrolled   
E11.65  
MV-MN/FOLIC ACID/CALCIUM/VIT K   
(ONE-A-DAY WOMEN'S 50 PLUS ORAL)   
Take 1 tablet by mouth once daily.  
  
  
FAMILY HISTORY  
Problem Relation Age of Onset  
Ovarian cancer Mother 39  
Heart Father  
Hypertension Father  
Prostate Cancer Father 78  
other (kidney stones) Brother  
Hypertension Brother  
Diabetes Brother  
Seizures Son  
Breast Cancer Other 55  
Double first cousin (daughter of   
mother's sister and father's   
brother)  
  
Social History  
  
Tobacco Use  
Smoking status: Former  
Packs/day: 1.00  
Years: 10.00  
Additional pack years: 0.00  
Total pack years: 10.00  
Types: Cigarettes  
Quit date: 5/15/2007  
Years since quittin.2  
Smokeless tobacco: Never  
Vaping Use  
Vaping Use: Never used  
Substance Use Topics  
Alcohol use: No  
Drug use: Not Currently  
Comment: marijuana for insomnia -   
currently not using  
  
Review of Systems  
Constitutional: Negative for   
activity change, appetite change,   
chills and fatigue.  
HENT: Positive for sore throat.   
Negative for congestion, drooling,   
ear discharge, ear pain, hoarse   
voice, rhinorrhea, sinus pain and   
trouble swallowing.  
Eyes: Negative for pain,   
discharge, redness and itching.  
Respiratory: Positive for cough.   
Negative for apnea, chest   
tightness, shortness of breath and   
stridor.  
Gastrointestinal: Negative for   
abdominal pain, diarrhea and   
vomiting.  
Musculoskeletal: Negative for   
arthralgias, back pain, gait   
problem and neck pain.  
Skin: Negative for color change,   
pallor and rash.  
Allergic/Immunologic: Negative for   
environmental allergies, food   
allergies and immunocompromised   
state.  
Neurological: Positive for   
headaches. Negative for dizziness   
and facial asymmetry.  
Hematological: Negative for   
adenopathy. Does not bruise/bleed   
easily.  
Psychiatric/Behavioral: Negative   
for agitation and behavioral   
problems.  
  
Objective  
/78   Pulse 63   Temp 36.9 C   
(98.5 F) (Tympanic)   Resp 18   Wt   
(!) 136.9 kg (301 lb 13 oz)   SpO2   
97%   BMI 49.46 kg/m  
Physical Exam  
Vitals and nursing note reviewed.  
Constitutional:  
General: She is not in acute   
distress.  
Appearance: Normal appearance. She   
is normal weight. She is not   
ill-appearing, toxic-appearing or   
diaphoretic.  
HENT:  
Head: Normocephalic and   
atraumatic.  
Right Ear: Ear canal and external   
ear normal.  
Left Ear: Ear canal and external   
ear normal.  
Nose: Congestion present. No   
rhinorrhea.  
Mouth/Throat:  
Mouth: Mucous membranes are moist.  
Pharynx: Posterior oropharyngeal   
erythema (1 + enlarged bilateral.   
Uvula midline. Handling   
secretions) present. No   
oropharyngeal exudate.  
Eyes:  
General:  
Right eye: No discharge.  
Left eye: No discharge.  
Extraocular Movements: Extraocular   
movements intact.  
Conjunctiva/sclera: Conjunctivae   
normal.  
Pupils: Pupils are equal, round,   
and reactive to light.  
Cardiovascular:  
Rate and Rhythm: Normal rate and   
regular rhythm.  
Pulses: Normal pulses.  
Heart sounds: Normal heart sounds.   
No murmur heard.  
No friction rub.  
Pulmonary:  
Effort: Pulmonary effort is   
normal. No respiratory distress.  
Breath sounds: Normal breath   
sounds. No stridor. No wheezing,   
rhonchi or rales.  
Chest:  
Chest wall: No tenderness.  
Abdominal:  
General: Abdomen is flat. There is   
no distension.  
Palpations: Abdomen is soft. There   
is no mass.  
Tenderness: There is no abdominal   
tenderness. There is no right CVA   
tenderness, left CVA tenderness,   
guarding or rebound.  
Hernia: No hernia is present.  
Musculoskeletal:  
General: No swelling, tenderness,   
deformity or signs of injury.   
Normal range of motion.  
Cervical back: Normal range of   
motion and neck supple. No   
rigidity.  
Right lower leg: No edema.  
Left lower leg: No edema.  
Lymphadenopathy:  
Cervical: Cervical adenopathy   
present.  
Skin:  
General: Skin is warm and dry.  
Coloration: Skin is not jaundiced   
or pale.  
Findings: No bruising, erythema,   
lesion or rash.  
Neurological:  
General: No focal deficit present.  
Mental Status: She is alert and   
oriented to person, place, and   
time.  
Cranial Nerves: No cranial nerve   
deficit.  
Sensory: No sensory deficit.  
Motor: No weakness.  
Coordination: Coordination normal.  
Gait: Gait normal.  
Psychiatric:  
Mood and Affect: Mood normal.  
Behavior: Behavior normal.  
Thought Content: Thought content   
normal.  
Judgment: Judgment normal.  
  
Assessment and Plan  
  
ASSESSMENT/PLAN:  
1. Upper respiratory tract   
infection, unspecified type -   
ICD9: 465.9, ICD10: J06.9  
X one day  
No red flags  
- Discussed viral etiology and   
rationale for treatment.  
- Group A strep molecular testing   
negative  
- Symptomatic treatment with prn   
analgesia  
- Supportive care with fluids and   
rest  
- The patient may also use OTC   
cough and cold meds as needed,   
warm salt water gargles, throat   
lozenges and/or OTC throat spray   
as needed, and nasal saline gtts   
and suction prn.  
- Follow up in 3-5 days if   
symptoms persist or sooner if   
worsening of symptoms  
- STREP A MOLECULAR (POC)  
  
NIC Lam.CNP  
Electronically signed by Fabiana Ambrosio APRN.CNP at 2024   
5:24 PM EDT  
documented in this encounter            [facility]  
   
                                                    2024 Telephone   
encounter Note                          Formatting of this note might be   
different from the original.  
Patient notified. Patient will fax   
over copy of letter received.  
Electronically signed by Manisha Jesus MA at 2024 10:48 AM   
EDT  
                                        [facility]  
   
                                                    2024 Miscellaneous   
Notes                                   Formatting of this note might be   
different from the original.  
Patient notified. Patient will fax   
over copy of letter received.  
Electronically signed by Manisha Jesus MA at 2024 10:48 AM   
EDT  
Formatting of this note might be   
different from the original.  
Please let them know that the   
system has to be updated, and it   
should soon be , I will still see   
her and she will remain my   
patient.  
  
Regards,  
  
Cynthia Douglas MD  
  
Electronically signed by Cynthia Douglas MD at 2024 10:14 AM   
EDT  
Formatting of this note might be   
different from the original.  
Pt called upset that she got a   
letter that instructed her Dr. Douglas was terminated from [facility]. Pt asking to please   
check into this. Pt repots she   
told them she would go with Sherly An ut would like us to check   
into this. Pt would like to stay   
with Dr. Douglas.  
  
Please advise pt of findings.  
  
Ani Lin LPN  
  
Electronically signed by Ani Lin LPN at 2024   
9:50 AM EDT  
documented in this encounter            [facility]  
   
                                                    2024 Telephone   
encounter Note                          Formatting of this note might be   
different from the original.  
Please let them know that the   
system has to be updated, and it   
should soon be , I will still see   
her and she will remain my   
patient.  
  
Regards,  
  
Cynthia Douglas MD  
  
Electronically signed by Cynthia Douglas MD at 2024 10:14 AM   
EDT  
                                        [facility]  
   
                                                    2024 Telephone   
encounter Note                          Formatting of this note might be   
different from the original.  
Pt called upset that she got a   
letter that instructed her Dr. Douglas was terminated from [facility]. Pt asking to please   
check into this. Pt repots she   
told them she would go with Sherly An ut would like us to check   
into this. Pt would like to stay   
with Dr. Douglas.  
  
Please advise pt of findings.  
  
Ani Lin LPN  
  
Electronically signed by Ani Lin LPN at 2024   
9:50 AM EDT  
                                        [facility]  
   
                                        2024 Note     HNO ID: [id number]  
Author: SHERLY AN APRN.CNP  
Service: ?  
Author Type: Nurse Practitioner  
Type: Progress Notes  
Filed: 2024 10:12  
Note Text:  
CC: Patient presents with:  
Recheck: 6 month follow up  
HPI  
Navdeep Guillen is a 64 year old   
female who presents today for   
routine follow  
up.  
DIABETES MELLITUS: Ms. Guillen   
denies excessive thirst or   
increased frequency of  
urination, chest pain or dyspnea ,   
numbness, tingling or pain in   
extremities,  
new or unusual visual symptoms,   
low sugar/hypoglycemic reactions,   
weight  
loss/gain,   
lightheadedness/dizziness, and   
bowel changes/loose stools.   
Follows  
a diabetic diet most of the time.   
She is compliant with   
medication(s) and is  
tolerating med(s) without any side   
effects. She reports checking her   
glucose  
on a twice a day schedule with   
sugars in the fasting 130s and   
postprandial  
90s-110s range. Patient's last   
HgA1C was  
Hemoglobin A1C (%)  
Date Value  
2024 6.2  
2023 6.5  
07/15/2020 7.7  
2020 7.6  
Hemoglobin A1C (POCT) (%)  
Date Value  
10/26/2021 7.1  
)  
Last Ophthalmology exam was within   
the past 6 months at [facility] in   
[place]  
HTN: Ms. Guillen indicates that she   
is feeling well and denies any   
symptoms  
referable to elevated blood   
pressure. Specifically denies   
headache, chest pain,  
palpitations, dyspnea, and   
peripheral edema. Patient denies   
any side effects of  
her medication(s) and is compliant   
with their regimen. She does check   
BP's away  
from this office with average BP's   
in the 110s/80s range. Navdeep   
works out  
regularly 7 times per week with   
walking. She watches her diet for   
sodium, low  
fat and low cholesterol most of   
the time.  
Last 3 Encounter BP Readings:  
Date: BP:  
2024 116/78  
2024 99/69  
2024 146/80  
Right Breast CA: Had lumpectomy in   
2017. Follows with oncology and   
still on  
aromasin  
Allergies starting this morning   
with runny nose and itchy eyes   
with sneezing.  
Denies fever, chills, headaches,   
dizziness, cough, wheezing, or   
shortness of  
breath.  
REVIEW OF SYSTEMS  
See HPI  
PAST MEDICAL HISTORY  
Diagnosis Date  
Abnormal mammogram 2021  
Achilles tendon pain 2015  
Ankle weakness 2015  
Chronic pain of left ankle   
12/15/2016  
Colon abnormality 2014  
Thickening of the ascending colon   
seen on the recent CT scan.   
Patient has had a  
colonoscopy around 4 years ago.   
Records were obtained for when   
they were done, 4  
years ago , in [facility].   
her colonoscopy was completely   
normal, no  
thickening, and the biopsy too was   
normal except for lymphoid   
aggregates.  
Diverticulitis   
Treated by Dr. Patricio at [facility]  
DJD (degenerative joint disease),   
ankle and foot 2016  
DM (diabetes mellitus) (HCC)  
GERD (gastroesophageal reflux   
disease)  
Gross hematuria 05/15/2014  
2014, had hematuria, investigated   
extensively along with cytoscopy,   
a stone  
was found but no signs of any   
malignancy.  
Heel pain, chronic 12/15/2016  
Hepatic steatosis 10/03/2017  
Hiatal hernia 10/03/2017  
Hypertension  
Insomnia  
Kidney stone 05/15/2014  
Morbid obesity (HCC)  
Nephrolithiasis  
Neuritis 2016  
Renal lesion 05/15/2014  
S/P Achilles tendon repair   
2015 sx done  
PAST SURGICAL HISTORY  
Procedure Laterality Date  
BREAST LUMPECTOMY HX Right 2017  
BX BREAST W/DEVICE 1ST LESION   
STEREOTACTIC GUID Right 2021  
CHOLECYSTECTOMY 2011  
gallbladder removal  
COLONOSCOPY   
CT ABDOMEN 2014  
PAST SURGICAL HISTORY OF   
2014  
[facility] hysteroscopy  
PAST SURGICAL HISTORY OF Left   
2015  
Left foot surgery  
S PK LAVH [id number] 10/16/2014  
ALLERGIES Silvadene [Silver   
Sulfadiazine] and Sulfa   
(Sulfonamide Antibiotics)  
MEDICATIONS  
exemestane (AROMASIN) 25 mg tablet   
Take 1 tablet by mouth once daily.  
potassium chloride 20 mEq TbER   
Take 1 tablet by mouth two times a   
day.  
ibuprofen (MOTRIN) 800 mg tablet   
Take 1 tablet by mouth every 8   
hours as needed  
for pain. Take with food.  
metFORMIN ER (GLUCOPHAGE XR) 500   
mg 24 hr tablet Take 1 tablet by   
mouth daily  
with breakfast.  
blood sugar diagnostic (BLOOD   
GLUCOSE TEST) test strip Test   
blood sugars  
2daily. Dx:ucnontrolled diabetes .   
Insulin: Yes  
chlorthalidone (HYGROTON) 25 mg   
tablet Take 0.5 tablets by mouth   
once daily.  
atenolol (TENORMIN) 50 mg tablet   
Take 1 tablet by mouth once daily.  
glipiZIDE (GLUCOTROL XL) 2.5 mg 24   
hr tablet Take 1 tablet by mouth   
once daily.  
lancets (ONE TOUCH DELICA) 33   
gauge misc Test blood sugar(s) 2   
daily. Dx: Type  
2 DM - Controlled E11.9 Insulin:   
Yes  
Blood-Glucose Meter monitoring kit   
Glucose Meter of Choice - Kit -   
Dx: Type 2  
DM - Uncontrolled E11.65  
MV-MN/FOLIC ACID/CALCIUM/VIT K   
(ONE-A-DAY WOMEN'S 50 PLUS ORAL)   
Take 1 tablet  
by mouth once daily.  
FAMILY HISTORY  
Problem Relation Age of Onset  
Ovarian cancer Mother 39  
Heart Father  
Hypertension Father  
Prostate Cancer Father 78  
other (kidney stones) Brother  
Hypertension Brother  
Diabetes Brother  
Seizures Son  
Breast Cancer Other 55 (more   
content not included)...                [facility]  
   
                                                    2024 History of   
Present illness Narrative               Formatting of this note is   
different from the original.  
CC: Patient presents with:  
Recheck: 6 month follow up  
  
HPI  
Navdeep Guillen is a 64 year old   
female who presents today for   
routine follow up.  
  
DIABETES MELLITUS: Ms. Guillen   
denies excessive thirst or   
increased frequency of urination,   
chest pain or dyspnea , numbness,   
tingling or pain in extremities,   
new or unusual visual symptoms,   
low sugar/hypoglycemic reactions,   
weight loss/gain,   
lightheadedness/dizziness, and   
bowel changes/loose stools.   
Follows a diabetic diet most of   
the time. She is compliant with   
medication(s) and is tolerating   
med(s) without any side effects.   
She reports checking her glucose   
on a twice a day schedule with   
sugars in the fasting 130s and   
postprandial 90s-110s range.   
Patient's last HgA1C was  
Hemoglobin A1C (%)  
Date Value  
2024 6.2  
2023 6.5  
07/15/2020 7.7  
2020 7.6  
  
Hemoglobin A1C (POCT) (%)  
Date Value  
10/26/2021 7.1  
)  
Last Ophthalmology exam was within   
the past 6 months at [facility] in   
[place]  
  
HTN: Ms. Guillen indicates that she   
is feeling well and denies any   
symptoms referable to elevated   
blood pressure. Specifically   
denies headache, chest pain,   
palpitations, dyspnea, and   
peripheral edema. Patient denies   
any side effects of her   
medication(s) and is compliant   
with their regimen. She does check   
BP's away from this office with   
average BP's in the 110s/80s   
range. Navdeep works out regularly   
7 times per week with walking. She   
watches her diet for sodium, low   
fat and low cholesterol most of   
the time.  
Last 3 Encounter BP Readings:  
Date: BP:  
2024 116/78  
2024 99/69  
2024 146/80  
  
Right Breast CA: Had lumpectomy in   
2017. Follows with oncology and   
still on aromasin  
  
Allergies starting this morning   
with runny nose and itchy eyes   
with sneezing.  
  
Denies fever, chills, headaches,   
dizziness, cough, wheezing, or   
shortness of breath.  
  
REVIEW OF SYSTEMS  
See HPI  
  
PAST MEDICAL HISTORY  
Diagnosis Date  
Abnormal mammogram 2021  
Achilles tendon pain 2015  
Ankle weakness 2015  
Chronic pain of left ankle   
12/15/2016  
Colon abnormality 2014  
Thickening of the ascending colon   
seen on the recent CT scan.   
Patient has had a colonoscopy   
around 4 years ago. Records were   
obtained for when they were done,   
4 years ago , in [facility]. her colonoscopy was   
completely normal, no thickening,   
and the biopsy too was normal   
except for lymphoid aggregates.  
Diverticulitis   
Treated by Dr. Patricio at [facility]  
DJD (degenerative joint disease),   
ankle and foot 2016  
DM (diabetes mellitus) (HCC)  
GERD (gastroesophageal reflux   
disease)  
Gross hematuria 05/15/2014  
2014, had hematuria, investigated   
extensively along with cytoscopy,   
a stone was found but no signs of   
any malignancy.  
Heel pain, chronic 12/15/2016  
Hepatic steatosis 10/03/2017  
Hiatal hernia 10/03/2017  
Hypertension  
Insomnia  
Kidney stone 05/15/2014  
Morbid obesity (HCC)  
Nephrolithiasis  
Neuritis 2016  
Renal lesion 05/15/2014  
S/P Achilles tendon repair   
2015 sx done  
  
  
PAST SURGICAL HISTORY  
Procedure Laterality Date  
BREAST LUMPECTOMY HX Right   
BX BREAST W/DEVICE 1ST LESION   
STEREOTACTIC GUID Right 2021  
CHOLECYSTECTOMY   
gallbladder removal  
COLONOSCOPY   
CT ABDOMEN 2014  
PAST SURGICAL HISTORY OF   
2014  
[facility] hysteroscopy  
PAST SURGICAL HISTORY OF Left   
2015  
Left foot surgery  
S PK LAVH [id number] 10/16/2014  
  
ALLERGIES Silvadene [Silver   
Sulfadiazine] and Sulfa   
(Sulfonamide Antibiotics)  
  
MEDICATIONS  
exemestane (AROMASIN) 25 mg tablet   
Take 1 tablet by mouth once daily.  
potassium chloride 20 mEq TbER   
Take 1 tablet by mouth two times a   
day.  
ibuprofen (MOTRIN) 800 mg tablet   
Take 1 tablet by mouth every 8   
hours as needed for pain. Take   
with food.  
metFORMIN ER (GLUCOPHAGE XR) 500   
mg 24 hr tablet Take 1 tablet by   
mouth daily with breakfast.  
blood sugar diagnostic (BLOOD   
GLUCOSE TEST) test strip Test   
blood sugars 2daily.   
Dx:ucnontrolled diabetes .   
Insulin: Yes  
chlorthalidone (HYGROTON) 25 mg   
tablet Take 0.5 tablets by mouth   
once daily.  
atenolol (TENORMIN) 50 mg tablet   
Take 1 tablet by mouth once daily.  
glipiZIDE (GLUCOTROL XL) 2.5 mg 24   
hr tablet Take 1 tablet by mouth   
once daily.  
lancets (ONE TOUCH DELICA) 33   
gauge misc Test blood sugar(s) 2   
daily. Dx: Type 2 DM - Controlled   
E11.9 Insulin: Yes  
Blood-Glucose Meter monitoring kit   
Glucose Meter of Choice - Kit -   
Dx: Type 2 DM - Uncontrolled   
E11.65  
MV-MN/FOLIC ACID/CALCIUM/VIT K   
(ONE-A-DAY WOMEN'S 50 PLUS ORAL)   
Take 1 tablet by mouth once daily.  
  
  
FAMILY HISTORY  
Problem Relation Age of Onset  
Ovarian cancer Mother 39  
Heart Father  
Hypertension Father  
Prostate Cancer Father 78  
other (kidney stones) Brother  
Hypertension Brother  
Diabetes Brother  
Seizures Son  
Breast Cancer Other 55  
Double first cousin (daughter of   
mother's sister and father's   
brother)  
  
Social History  
  
Tobacco Use  
Smoking status: Former  
Packs/day: 1.00  
Years: 10.00  
Additional pack years: 0.00  
Total pack years: 10.00  
Types: Cigarettes  
Quit date: 5/15/2007  
Years since quittin.1  
Smokeless tobacco: Never  
Vaping Use  
Vaping Use: Never used  
Substance Use Topics  
Alcohol use: No  
Drug use: Not Currently  
Comment: marijuana for insomnia -   
currently not using  
  
PHYSICAL EXAM  
/78   Pulse 64   Resp 16     
Wt (!) 136.5 kg (301 lb)   SpO2   
97%   BMI 49.33 kg/m  
General Appearance: well   
appearing, in no acute distress,   
alert  
Skin: Skin color, texture, turgor   
normal for age;  
Eyes: conjunctiva pink and moist,   
no icterus, sclera white,   
non-injected  
Nose/sinus: Nares normal. Septum   
midline. Mucosa normal. No   
drainage., No sinus tenderness  
Neck: Thyroid normal size and   
symmetric without palpable   
nodules, No adenopathy  
Lymph nodes: No cervical   
lymphadenopathy, No   
supraclavicular lymphadenopathy,   
and No inguinal lymphadenopathy.  
Lungs: Lungs clear to   
auscultation. No wheezing,   
rhonchi, rales.  
Heart: RRR without murmur, gallop,   
or rubs. No ectopy  
  
Health maintenance reviewed with   
patient:  
Pneumococcal Vaccine: 65+(1 of 2 -   
PCV) Never done  
Dilated Retinal Exam due on   
2022  
Covid-19 Vaccine(2023-   
season) Never done  
Advance Directive Discussion due   
on 2024  
Behavioral Health Screening Never   
done  
DTaP,Tdap,Td Vaccine(2 - Td or   
Tdap) due on 2024  
RSV Vaccine(1 - 1-dose 60+ series)   
due on 2024  
Shingrix Vaccine(1 of 2) due on   
2024  
Influenza Vaccine(Season Ended)   
due on 2024  
HbA1C due on 10/02/2024  
Urine Albumin:Creatinine Ratio due   
on 2024  
Mammogram Screening due on   
2025  
Annual PCP Team Chronic Disease   
Visit due on 2025  
LDL Cholesterol due on 2025  
BP Controlled (<130/80) due on   
2025  
Colorectal Cancer Screening due on   
2028  
Bone Density Screening Completed  
Hepatitis C Screening Completed  
HPV Vaccine Aged Out  
Diabetic Foot Exam Discontinued  
Cervical Cancer Screening   
Discontinued  
  
DATA REVIEWED: Most recent labs  
  
ASSESSMENT/PLAN:  
1. Type 2 diabetes mellitus   
without complication, without   
long-term current use of insulin   
(HCC) - ICD9: 250.00, ICD10: E11.9   
(primary diagnosis)  
- Controlled  
- Continue current medications  
- declines statin therapy at this   
time to lower risk of heart attack   
and stroke  
- METFORMIN  MG   
TABLET,EXTENDED RELEASE 24 HR  
- LIPID PANEL BASIC  
- HEMOGLOBIN A1C  
- COMPLETE BLOOD COUNT  
- COMPREHENSIVE METABOLIC PANEL  
  
2. Primary hypertension - ICD9:   
401.9, ICD10: I10  
- Controlled  
- Continue current medications  
- Recommend home blood pressure   
monitoring, to bring results to   
next visit  
- Encouraged sodium restriction,   
DASH or Mediterranean diet  
- Recommend regular aerobic   
exercise  
- COMPLETE BLOOD COUNT  
- COMPREHENSIVE METABOLIC PANEL  
  
3. Hypercholesterolemia - ICD9:   
272.0, ICD10: E78.00  
Controlled but declines statin   
therapy to decrease risk of heart   
attack and stroke  
- LIPID PANEL BASIC  
- COMPREHENSIVE METABOLIC PANEL  
The 10-year ASCVD risk score   
(Liliana STEWART, et al., 2019) is:   
13.7%  
Values used to calculate the   
score:  
Age: 64 years  
Sex: Female  
Is Non- :   
No  
Diabetic: Yes  
Tobacco smoker: No  
Systolic Blood Pressure: 116 mmHg  
Is BP treated: Yes  
HDL Cholesterol: 37 mg/dL  
Total Cholesterol: 170 mg/dL  
  
4. Malignant neoplasm of   
lower-outer quadrant of right   
breast of female, estrogen   
receptor positive (HCC) - ICD9:   
174.5, V86.0, ICD10: C50.511,   
Z17.0  
Stable  
Continue with recommendations by   
oncology  
  
5. Seasonal allergic rhinitis,   
unspecified trigger - ICD9: 477.9,   
ICD10: J30.2  
Claritin and flonase as discussed  
Follow up if no improvement or   
worsening of symptoms.  
  
Prescription instructions reviewed   
with patient as applicable.   
Potential red flag symptoms   
discussed with the patient.   
Reviewed appropriate action plan   
to take if red flag symptoms   
occur. Patient agreeable to   
treatment plan.  
NIC Barney.CNP  
Electronically signed by Sherly An APRN.CNP at 2024 10:12   
AM EDT  
documented in this encounter            [facility]  
   
                                                    2024 Telephone   
encounter Note                          Formatting of this note might be   
different from the original.  
Duplicate request.  
  
Samantha Hunter LPN  
  
Electronically signed by Samantha Hunter LPN at 2024 9:14 AM   
EDT  
                                        [facility]  
   
                                                    2024 Miscellaneous   
Notes                                   Formatting of this note might be   
different from the original.  
Duplicate request.  
  
Samantha Hunter LPN  
  
Electronically signed by Samantha Hunter LPN at 2024 9:14 AM   
EDT  
documented in this encounter            [facility]  
   
                                                    06- Telephone   
encounter Note                          Formatting of this note is   
different from the original.  
Prescription Refill Information  
  
The patient has been identified by   
name and date of birth: Yes  
  
Caregiver verified no other   
encounters exist for this   
prescription request: Yes  
  
Caregiver confirmed with   
patient/requestor that no other   
refills are due, in the near   
future, with this provider at this   
time: Yes  
  
The last office visit in the   
department: 2024  
  
Does the patient have a future   
office visit with this   
provider/department: Yes  
  
Requested Prescriptions  
  
Pending Prescriptions Disp Refills  
exemestane (AROMASIN) 25 mg tablet   
90 tablet 3  
Sig: Take 1 tablet by mouth once   
daily.  
  
Yoko Mills LPN  
Ila 10, 2024 4:38 PM  
  
  
Electronically signed by Yoko Mills LPN at 06/10/2024 4:39   
PM EDT  
                                        [facility]  
   
                                                    06- Miscellaneous   
Notes                                   Formatting of this note is   
different from the original.  
Prescription Refill Information  
  
The patient has been identified by   
name and date of birth: Yes  
  
Caregiver verified no other   
encounters exist for this   
prescription request: Yes  
  
Caregiver confirmed with   
patient/requestor that no other   
refills are due, in the near   
future, with this provider at this   
time: Yes  
  
The last office visit in the   
department: 2024  
  
Does the patient have a future   
office visit with this   
provider/department: Yes  
  
Requested Prescriptions  
  
Pending Prescriptions Disp Refills  
exemestane (AROMASIN) 25 mg tablet   
90 tablet 3  
Sig: Take 1 tablet by mouth once   
daily.  
  
Yoko Mills LPN  
Ila 10, 2024 4:38 PM  
  
  
Electronically signed by Yoko Mills LPN at 06/10/2024 4:39   
PM EDT  
documented in this encounter            [facility]  
   
                                        2024 Note     HNO ID: [id number]  
Author: VIKKI POSADA APRN.CNP  
Service: ?  
Author Type: Nurse Practitioner  
Type: Progress Notes  
Filed: 2024 10:34  
Note Text:  
Chief Complaint  
Patient presents with:  
Established Patient  
HPI:  
Navdeep Guillen is a 64 year old   
female who presents here today for   
follow up  
breast cancer.  
Per Dr. Milner/Sumaya's previous note:  
H/o hypertension and borderline   
diabetes/insulin resistant,   
obesity, who  
presented with an abnormal breast   
exam at her PCP office. Subsequent   
diagnostic  
mammogram revealing a lobulated   
lesion in the right breast at the   
lower outer  
quadrant highly suspicious of   
malignancy.  
Patient had a mammotome breast   
biopsy on 3/27/17 that showed   
invasive ductal  
carcinoma, positive for estrogen   
and progesterone receptors,   
equivocal for  
overexpression HER-2/andi.  
She had surgery by Dr. Dueñas, right   
breast lumpectomy and right   
axillary sentinel  
lymph node biopsy on 17 for   
right breast cancer.  
Stage IA- pT1c pN0 (3/3 sentinel   
lymph nodes negative for   
metastatic disease)  
Tumor size 1.5 cm x 1 x 0.9 cm  
Overall grade 2 out of 7  
Margins - 0.4 cm from posterior   
margin  
ER/CA >95%, Vca2lly not amplified   
by FISH  
Lymph/vasc invasion - none;   
derm/vasc/lymph invasion - none  
Menstrual history: Menarche at age   
11, first pregnancy at age 18, 8   
pregnancy;  
surgical menopause-total abdominal   
hysterectomy age 54. No estrogen   
replacement  
therapy. Family history: Mother   
 of ovarian cancer in her   
40's. No family  
history of breast cancer. She was   
initially started on anastrozole,   
then  
subsequent switch to Aromasin   
because of joint pain.  
RADIATION-17 to 17  
Right breast  
Current treatment:  
1) Aromasin 25 mg once daily  
No new concerns today.  
Appetite: Ok.  Wt. stable. Energy   
level: It's good.   
Denies fevers or recent illness.  
Resp:denies cough or sob,   
+seasonal allergies  
Cardiac:denies chest   
pain/palpitations  
GI:denies abd pain, n/v, moving   
bowels regularly h/o   
diverticulitis  
:denies dysuria/hematuria  
Extrem:denies new pain, L foot   
pain s/p surgery  
Endo:denies hot flash  
Neuro:denies symptoms of   
neuropathy  
Skin:denies rashes/lesions  
Heme:denies bleeding  
The ROS is otherwise negative.  
Past medical history,   
appointments, medications,   
allergies reviewed. No changes.  
EXAM:  
BP 99/69   Pulse 67   Temp 36.6 ?C   
(97.9 ?F) (Temporal)   Wt (!)   
136.8 kg  
(301 lb 9.6 oz)   SpO2 96%   BMI   
49.43 kg/m?  
APPEARANCE Well appearing, alert,   
in no acute distress,   
well-hydrated, well  
nourished.  
HEART RRR with normal S1 and S2,   
no murmurs  
LUNG clear to auscultation  
BREAST FEMALE no mass/nodule b/l,   
R radiation changes  
LYMPH NODES No cervical   
lymphadenopathy, No   
supraclavicular lymphadenopathy,   
and  
No axillary lymphadenopathy.  
ABDOMEN bowel sounds normoactive,   
soft, non-tender  
EXTREMITIES No edema  
NEURO Awake, alert and oriented x   
3, Normal gait, and No involuntary   
motions.  
SKIN Skin color, texture, turgor   
normal, no suspicious rashes or   
lesions  
RADIOLOGY:  
Mammogram 24:  
IMPRESSION: BENIGN FINDING  
There is no mammographic evidence   
of malignancy. A 1 year screening  
mammogram is recommended.  
ASSESSMENT/PLAN:  
1. Malignant neoplasm of   
lower-outer quadrant of right   
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  
estrogen receptor positive (HCC) -   
ICD9: 174.5, V86.0, ICD10:   
C50.511, Z17.0  
pT1c pN0 (3/3 sentinel lymph nodes   
negative for metastatic disease)   
stage IA  
infiltrating ductal carcinoma the   
right breast. ER/CA >95%, Hgw7yzs  
non-amplified by FISH.  
- No concerning findings on exam.  
- Overall tolerating aromasin   
well.  
- Reviewed mammogram with pt.  
- Continue aromasin.  
- Mammogram due mid 2025.  
- Follow up in 6 months.  
- Pt. aware to call office with   
any questions/concerns.  
The patient indicates   
understanding of these issues and   
agrees with the plan.  
All documentation from previous   
visit of 10/2/23-Dr. Shell/myself   
was copied and  
pasted, documentation has been   
reviewed and edited as necessary   
for today's  
visit.  
[facility]  
   
                                                    2024 History of   
Present illness Narrative               Formatting of this note might be   
different from the original.  
Chief Complaint  
Patient presents with:  
Established Patient  
  
HPI:  
Navdeep Guillen is a 64 year old   
female who presents here today for   
follow up breast cancer.  
  
Per Dr. Milner/Sumaya's previous note:  
H/o hypertension and borderline   
diabetes/insulin resistant,   
obesity, who presented with an   
abnormal breast exam at her PCP   
office. Subsequent diagnostic   
mammogram revealing a lobulated   
lesion in the right breast at the   
lower outer quadrant highly   
suspicious of malignancy.  
  
Patient had a mammotome breast   
biopsy on 3/27/17 that showed   
invasive ductal carcinoma,   
positive for estrogen and   
progesterone receptors, equivocal   
for overexpression HER-2/andi.  
  
She had surgery by Dr. Dueñas, right   
breast lumpectomy and right   
axillary sentinel lymph node   
biopsy on 17 for right breast   
cancer.  
  
Stage IA- pT1c pN0 (3/3 sentinel   
lymph nodes negative for   
metastatic disease)  
Tumor size 1.5 cm x 1 x 0.9 cm  
Overall grade 2 out of 7  
Margins - 0.4 cm from posterior   
margin  
ER/CA >95%, [id number] not amplified   
by FISH  
Lymph/vasc invasion - none;   
derm/vasc/lymph invasion - none  
  
Menstrual history: Menarche at age   
11, first pregnancy at age 18, 8   
pregnancy; surgical   
menopause-total abdominal   
hysterectomy age 54. No estrogen   
replacement therapy. Family   
history: Mother  of ovarian   
cancer in her 40's. No family   
history of breast cancer. She was   
initially started on anastrozole,   
then subsequent switch to Aromasin   
because of joint pain.  
  
  
RADIATION-17 to 17  
Right breast  
  
  
Current treatment:  
1) Aromasin 25 mg once daily  
  
  
  
  
  
  
  
  
  
  
  
  
No new concerns today.  
  
  
Appetite: Ok.  Wt. stable. Energy   
level: It's good.   
Denies fevers or recent illness.  
Resp:denies cough or sob,   
+seasonal allergies  
Cardiac:denies chest   
pain/palpitations  
GI:denies abd pain, n/v, moving   
bowels regularly h/o   
diverticulitis  
:denies dysuria/hematuria  
Extrem:denies new pain, L foot   
pain s/p surgery  
Endo:denies hot flash  
Neuro:denies symptoms of   
neuropathy  
Skin:denies rashes/lesions  
Heme:denies bleeding  
  
The ROS is otherwise negative.  
  
Past medical history,   
appointments, medications,   
allergies reviewed. No changes.  
  
EXAM:  
BP 99/69   Pulse 67   Temp 36.6 C   
(97.9 F) (Temporal)   Wt (!) 136.8   
kg (301 lb 9.6 oz)   SpO2 96%     
BMI 49.43 kg/m  
  
APPEARANCE Well appearing, alert,   
in no acute distress,   
well-hydrated, well nourished.  
HEART RRR with normal S1 and S2,   
no murmurs  
LUNG clear to auscultation  
BREAST FEMALE no mass/nodule b/l,   
R radiation changes  
LYMPH NODES No cervical   
lymphadenopathy, No   
supraclavicular lymphadenopathy,   
and No axillary lymphadenopathy.  
ABDOMEN bowel sounds normoactive,   
soft, non-tender  
EXTREMITIES No edema  
NEURO Awake, alert and oriented x   
3, Normal gait, and No involuntary   
motions.  
SKIN Skin color, texture, turgor   
normal, no suspicious rashes or   
lesions  
  
RADIOLOGY:  
Mammogram 24:  
IMPRESSION: BENIGN FINDING  
There is no mammographic evidence   
of malignancy. A 1 year screening  
mammogram is recommended.  
  
ASSESSMENT/PLAN:  
1. Malignant neoplasm of   
lower-outer quadrant of right   
breast of female, estrogen   
receptor positive (HCC) - ICD9:   
174.5, V86.0, ICD10: C50.511,   
Z17.0  
pT1c pN0 (3/3 sentinel lymph nodes   
negative for metastatic disease)   
stage IA infiltrating ductal   
carcinoma the right breast. ER/CA   
>95%, Avt2iet non-amplified by   
FISH.  
- No concerning findings on exam.  
- Overall tolerating aromasin   
well.  
- Reviewed mammogram with pt.  
- Continue aromasin.  
- Mammogram due mid 2025.  
- Follow up in 6 months.  
- Pt. aware to call office with   
any questions/concerns.  
  
  
The patient indicates   
understanding of these issues and   
agrees with the plan.  
  
  
All documentation from previous   
visit of 10/2/23-Dr. Shell/myself   
was copied and pasted,   
documentation has been reviewed   
and edited as necessary for   
today's visit.  
  
  
  
[facility]  
  
Electronically signed by   
Vikki Posada APRN.CNP at   
2024 10:34 AM EDT  
documented in this encounter            [facility]  
   
                                        2024 Note     HNO ID: [id number]  
Author: KINZA VALLECILLO MA  
Service: ?  
Author Type: Medical Assistant  
Type: Progress Notes  
Filed: 2024 09:40  
Note Text:  
POPULATION HEALTH NAVIGATION   
OUTREACH  
Action/FYI  
Last Wellness exam 10/2021  
Reason for Outreach  
Care Gap/HCC or Scheduling   
Wellness Visits  
Care Gaps due:  
Medicare Annual Wellness Visit  
Diabetic Eye Exam  
Patient Contacted:  
Unable or unnecessary to reach   
patient:  
Left message  
[email or web address] message sent  
Navigation Signature:  
Kinza Doshi MA  
2024 9:36 AM                   [facility]  
   
                                                    2024 History of   
Present illness Narrative               Formatting of this note is   
different from the original.  
POPULATION HEALTH NAVIGATION   
OUTREACH  
  
Action/FYI  
Last Wellness exam 10/2021  
  
  
Reason for Outreach  
Care Gap/HCC or Scheduling   
Wellness Visits  
  
Care Gaps due:  
Medicare Annual Wellness Visit  
Diabetic Eye Exam  
  
Patient Contacted:  
Unable or unnecessary to reach   
patient:  
Left message  
[employer] message sent  
  
Navigation Signature:  
  
Kinza Doshi MA  
2024 9:36 AM  
  
  
Electronically signed by Kinza Vallecillo MA at 2024   
9:40 AM EST  
documented in this encounter            [facility]  
   
                                        2024 Note     Patient Outreach (NE  
TNAV)  
----------------------------------  
----------------------------------  
------------  
NAVDEEP GUILLEN ([id number])   
1960 F  
Date Time Provider Department  
3/8/24 KINZA VALLECILLO   
NETNAV  
During your visit today, we   
recorded the following information   
about you:  
Kinza Vallecillo MA   
3/8/2024 9:40 AM Signed  
POPULATION HEALTH NAVIGATION   
OUTREACH  
Action/  
Last Wellness exam 10/2021  
Reason for Outreach  
Care Gap/HCC or Scheduling   
Wellness Visits  
Care Gaps due:  
Medicare Annual Wellness Visit  
Diabetic Eye Exam  
Patient Contacted:  
Unable or unnecessary to reach   
patient:  
Left message  
[employer] message sent  
Navigation Signature:  
Kinza Doshi MA  
2024 9:36 AM  
Allergies As of Date: 2024   
Noted Allergy Reaction  
SILVADENE (SILVER SULFADIAZINE)   
2017 4 - Hives  
SULFA (SULFONAMIDE ANTIBIOTICS)   
2021 4 - Hives  
Date Reviewed: 2024  
Reviewed by: Kellie Ahmadi LPN -   
Fully Assessed  
Reason for Visit:  
Population Health Navigation   
Outreach [3910] Cmt: [facility] Annual   
Wellness Visit  
Prescriptions as of 2024  
- ciprofloxacin HCl (CIPRO) 500 mg   
tablet  
Take 1 tablet by mouth two times a   
day for 7 days.  
- ibuprofen (MOTRIN) 800 mg tablet  
Take 1 tablet by mouth every 8   
hours as needed for pain. Take   
with food.  
- metFORMIN ER (GLUCOPHAGE XR) 500   
mg 24 hr tablet  
Take 1 tablet by mouth daily with   
breakfast.  
- blood sugar diagnostic (BLOOD   
GLUCOSE TEST) test strip  
Test blood sugars 2daily.   
Dx:ucnontrolled diabetes .   
Insulin: Yes  
- chlorthalidone (HYGROTON) 25 mg   
tablet  
Take 0.5 tablets by mouth once   
daily.  
- atenolol (TENORMIN) 50 mg tablet  
Take 1 tablet by mouth once daily.  
- potassium chloride 20 mEq TbER  
Take 1 tablet by mouth twice   
daily.  
- glipiZIDE (GLUCOTROL XL) 2.5 mg   
24 hr tablet  
Take 1 tablet by mouth once daily.  
- exemestane (AROMASIN) 25 mg   
tablet  
Take 1 tablet by mouth once daily.  
- lancets (ONE TOUCH DELICA) 33   
gauge misc  
Test blood sugar(s) 2 daily. Dx:   
Type 2 DM - Controlled E11.9   
Insulin: Yes  
- Blood-Glucose Meter monitoring   
kit  
Glucose Meter of Choice - Kit -   
Dx: Type 2 DM - Uncontrolled   
E11.65  
- MV-MN/FOLIC ACID/CALCIUM/VIT K   
(ONE-A-DAY WOMEN'S 50 PLUS ORAL)  
Take 1 tablet by mouth once daily.  
Meds Comments as of 2015:  
Patient only took the Etodolac for   
a few days and noticed no   
difference so she  
quit taking this medication.  
Problem List As Of Date 2024   
Noted Resolved  
Morbid obesity with BMI of   
40.0-44.9, adult (HC*2014  
Uterus disorder [N85.9] 2014  
Hypertension [I10] 2014  
Hypercholesterolemia [E78.00]   
2014  
Endometrial hyperplasia without   
atypia, complex*2014  
Uterine prolapse without mention   
of vaginal wal*2014  
Rectocele [N81.6] 2014  
Complex endometrial hyperplasia   
with atypia [N8*2014  
Follow-up examination, following   
other surgery *2015  
Bilateral low back pain with   
left-sided sciatic*2016  
Peroneal tendonitis [M76.70]   
2016  
Controlled diabetes mellitus type   
II without co*2016  
Hyperopia [H52.00] 2016  
Presbyopia [H52.4] 2016  
Benign neoplasm of skin of eyelid   
including can*2016  
Chronic bilateral low back pain   
with left-sided*2016  
Malignant neoplasm of lower-outer   
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  
ER+ CA+ carcinoma of breast (HCC)   
[C50.919, Z17*05/10/2017  
Family history of ovarian cancer   
[Z80.41] 05/10/2017  
GERD (gastroesophageal reflux   
disease) [K21.9]  
Encounter Number: [id number]  
Encounter Status:Closed by KINZA VALLECILLO on 3/8/24               [facility]  
   
                                        2024 Note     HNO ID: [id number]  
Author: MEERA MENCHACA PA-C  
Service: ?  
Author Type: Physician Assistant  
Type: Progress Notes  
Filed: 2024 18:16  
Note Text:  
CC: Patient presents with:  
Same Day Appointment: abdomen   
cramping, left flank pain, pinkish   
when wipes,  
burning at times  
HPI  
Navdeep Guillen is a 63 year old   
female who presents with complaint   
of  
increased frequency, blood in   
urine, and L flank discomfort and   
left abdominal  
discomfort for 1 days. Other   
symptoms include lower back pain   
(right sided) and  
nausea. The patient denies fever,   
vomiting, and vaginal discharge.   
She has  
tried nothing to help to alleviate   
her symptoms. Ms. Guillen has   
history of  
previous UTIs and kidney stones.  
She called her surgeon, Dr. Patricio (gen surg at [facility]) because   
of her hx of  
diverticulitis, for which she just   
had a bout and was txed end of   
January.  
REVIEW OF SYSTEMS  
GI: Positive for abdominal   
discomfort looser stools, Denies   
any melena or  
hematochezia  
All other systems negative.  
PAST MEDICAL HISTORY  
Diagnosis Date  
Abnormal mammogram 2021  
Achilles tendon pain 2015  
Ankle weakness 2015  
Chronic pain of left ankle   
12/15/2016  
Colon abnormality 2014  
Thickening of the ascending colon   
seen on the recent CT scan.   
Patient has had a  
colonoscopy around 4 years ago.   
Records were obtained for when   
they were done, 4  
years ago , in [facility].   
her colonoscopy was completely   
normal, no  
thickening, and the biopsy too was   
normal except for lymphoid   
aggregates.  
Diverticulitis   
Treated by Dr. Patricio at [facility]  
DJD (degenerative joint disease),   
ankle and foot 2016  
DM (diabetes mellitus) (HCC)  
GERD (gastroesophageal reflux   
disease)  
Gross hematuria 05/15/2014  
2014, had hematuria, investigated   
extensively along with cytoscopy,   
a stone  
was found but no signs of any   
malignancy.  
Heel pain, chronic 12/15/2016  
Hepatic steatosis 10/03/2017  
Hiatal hernia 10/03/2017  
Hypertension  
Insomnia  
Kidney stone 05/15/2014  
Morbid obesity (HCC)  
Nephrolithiasis  
Neuritis 2016  
Renal lesion 05/15/2014  
S/P Achilles tendon repair   
2015 sx done  
PAST SURGICAL HISTORY  
Procedure Laterality Date  
BREAST LUMPECTOMY HX Right   
BX BREAST W/DEVICE 1ST LESION   
STEREOTACTIC GUID Right 2021  
CHOLECYSTECTOMY   
gallbladder removal  
COLONOSCOPY   
CT ABDOMEN 2014  
PAST SURGICAL HISTORY OF   
2014  
DANGA hysteroscopy  
PAST SURGICAL HISTORY OF Left   
2015  
Left foot surgery  
S PK LAVH [id number] 10/16/2014  
ALLERGIES Silvadene [Silver   
Sulfadiazine] and Sulfa   
(Sulfonamide Antibiotics)  
MEDICATIONS  
ibuprofen (MOTRIN) 800 mg tablet   
Take 1 tablet by mouth every 8   
hours as needed  
for pain. Take with food.  
metFORMIN ER (GLUCOPHAGE XR) 500   
mg 24 hr tablet Take 1 tablet by   
mouth daily  
with breakfast.  
blood sugar diagnostic (BLOOD   
GLUCOSE TEST) test strip Test   
blood sugars  
2daily. Dx:ucnontrolled diabetes .   
Insulin: Yes  
chlorthalidone (HYGROTON) 25 mg   
tablet Take 0.5 tablets by mouth   
once daily.  
atenolol (TENORMIN) 50 mg tablet   
Take 1 tablet by mouth once daily.  
potassium chloride 20 mEq TbER   
Take 1 tablet by mouth twice   
daily.  
glipiZIDE (GLUCOTROL XL) 2.5 mg 24   
hr tablet Take 1 tablet by mouth   
once daily.  
exemestane (AROMASIN) 25 mg tablet   
Take 1 tablet by mouth once daily.  
azithromycin (ZITHROMAX Z-ALLAN) 250   
mg tablet TAKE 2 TABS ON THE FIRST   
DAY, THEN  
ONE TAB DAILY FOR 4 DAYS.  
lancets (ONE TOUCH DELICA) 33   
gauge misc Test blood sugar(s) 2   
daily. Dx: Type  
2 DM - Controlled E11.9 Insulin:   
Yes  
Blood-Glucose Meter monitoring kit   
Glucose Meter of Choice - Kit -   
Dx: Type 2  
DM - Uncontrolled E11.65  
MV-MN/FOLIC ACID/CALCIUM/VIT K   
(ONE-A-DAY WOMEN'S 50 PLUS ORAL)   
Take 1 tablet  
by mouth once daily.  
FAMILY HISTORY  
Problem Relation Age of Onset  
Ovarian cancer Mother 39  
Heart Father  
Hypertension Father  
Prostate Cancer Father 78  
other (kidney stones) Brother  
Hypertension Brother  
Diabetes Brother  
Seizures Son  
Breast Cancer Other 55  
Double first cousin (daughter of   
mother's sister and father's   
brother)  
Social History  
Tobacco Use  
Smoking status: Former  
Packs/day: 1.00  
Years: 10.00  
Additional pack years: 0.00  
Total pack years: 10.00  
Types: Cigarettes  
Quit date: 5/15/2007  
Years since quittin.8  
Smokeless tobacco: Never  
Vaping Use  
Vaping Use: Never used  
Substance Use Topics  
Alcohol use: No  
Drug use: Not Currently  
Comment: marijuana for insomnia -   
currently not using  
PHYSICAL EXAM  
Pulse 73   Temp 37.1 ?C (98.7 ?F)   
  Resp 16   Ht 166.4 cm (5' 5.5 )   
  Wt  
133.4 kg (294 lb)   SpO2 97%   BMI   
48.18 kg/m?  
General Appearance: well   
appearing, in no acute distress,   
alert, morbidly obese  
Pysch: mood and affect broad and   
appropriate  
Skin: Skin color, texture, turgor   
normal for age; no rashes noted  
Eyes: conjunctiva pink and moist,   
no icterus, sclera white,   
non-injected  
Oropharynx: Moist mucous membranes  
Lymph nodes (more content not   
included)...                            [facility]  
   
                                                    2024 History of   
Present illness Narrative               Formatting of this note is   
different from the original.  
CC: Patient presents with:  
Same Day Appointment: abdomen   
cramping, left flank pain, pinkish   
when wipes, burning at times  
  
HPI  
Navdeep Guillen is a 63 year old   
female who presents with complaint   
of increased frequency, blood in   
urine, and L flank discomfort and   
left abdominal discomfort for 1   
days. Other symptoms include lower   
back pain (right sided) and   
nausea. The patient denies fever,   
vomiting, and vaginal discharge.   
She has tried nothing to help to   
alleviate her symptoms. Ms. Guillen   
has history of previous UTIs and   
kidney stones.  
  
She called her surgeon, Dr. Patricio (gen surg at [facility]) because   
of her hx of diverticulitis, for   
which she just had a bout and was   
txed end of January.  
  
REVIEW OF SYSTEMS  
GI: Positive for abdominal   
discomfort looser stools, Denies   
any melena or hematochezia  
All other systems negative.  
  
PAST MEDICAL HISTORY  
Diagnosis Date  
Abnormal mammogram 2021  
Achilles tendon pain 2015  
Ankle weakness 2015  
Chronic pain of left ankle   
12/15/2016  
Colon abnormality 2014  
Thickening of the ascending colon   
seen on the recent CT scan.   
Patient has had a colonoscopy   
around 4 years ago. Records were   
obtained for when they were done,   
4 years ago , in [facility]. her colonoscopy was   
completely normal, no thickening,   
and the biopsy too was normal   
except for lymphoid aggregates.  
Diverticulitis   
Treated by Dr. Patricio at [facility]  
DJD (degenerative joint disease),   
ankle and foot 2016  
DM (diabetes mellitus) (HCC)  
GERD (gastroesophageal reflux   
disease)  
Gross hematuria 05/15/2014  
2014, had hematuria, investigated   
extensively along with cytoscopy,   
a stone was found but no signs of   
any malignancy.  
Heel pain, chronic 12/15/2016  
Hepatic steatosis 10/03/2017  
Hiatal hernia 10/03/2017  
Hypertension  
Insomnia  
Kidney stone 05/15/2014  
Morbid obesity (HCC)  
Nephrolithiasis  
Neuritis 2016  
Renal lesion 05/15/2014  
S/P Achilles tendon repair   
2015 sx done  
  
  
PAST SURGICAL HISTORY  
Procedure Laterality Date  
BREAST LUMPECTOMY HX Right   
BX BREAST W/DEVICE 1ST LESION   
STEREOTACTIC GUID Right 2021  
CHOLECYSTECTOMY   
gallbladder removal  
COLONOSCOPY   
CT ABDOMEN 2014  
PAST SURGICAL HISTORY OF   
2014  
D&C hysteroscopy  
PAST SURGICAL HISTORY OF Left   
2015  
Left foot surgery  
S PK LAVH [id number] 10/16/2014  
  
ALLERGIES Silvadene [Silver   
Sulfadiazine] and Sulfa   
(Sulfonamide Antibiotics)  
  
MEDICATIONS  
ibuprofen (MOTRIN) 800 mg tablet   
Take 1 tablet by mouth every 8   
hours as needed for pain. Take   
with food.  
metFORMIN ER (GLUCOPHAGE XR) 500   
mg 24 hr tablet Take 1 tablet by   
mouth daily with breakfast.  
blood sugar diagnostic (BLOOD   
GLUCOSE TEST) test strip Test   
blood sugars 2daily.   
Dx:ucnontrolled diabetes .   
Insulin: Yes  
chlorthalidone (HYGROTON) 25 mg   
tablet Take 0.5 tablets by mouth   
once daily.  
atenolol (TENORMIN) 50 mg tablet   
Take 1 tablet by mouth once daily.  
potassium chloride 20 mEq TbER   
Take 1 tablet by mouth twice   
daily.  
glipiZIDE (GLUCOTROL XL) 2.5 mg 24   
hr tablet Take 1 tablet by mouth   
once daily.  
exemestane (AROMASIN) 25 mg tablet   
Take 1 tablet by mouth once daily.  
azithromycin (ZITHROMAX Z-ALLAN) 250   
mg tablet TAKE 2 TABS ON THE FIRST   
DAY, THEN ONE TAB DAILY FOR 4   
DAYS.  
lancets (ONE TOUCH DELICA) 33   
gauge misc Test blood sugar(s) 2   
daily. Dx: Type 2 DM - Controlled   
E11.9 Insulin: Yes  
Blood-Glucose Meter monitoring kit   
Glucose Meter of Choice - Kit -   
Dx: Type 2 DM - Uncontrolled   
E11.65  
MV-MN/FOLIC ACID/CALCIUM/VIT K   
(ONE-A-DAY WOMEN'S 50 PLUS ORAL)   
Take 1 tablet by mouth once daily.  
  
  
FAMILY HISTORY  
Problem Relation Age of Onset  
Ovarian cancer Mother 39  
Heart Father  
Hypertension Father  
Prostate Cancer Father 78  
other (kidney stones) Brother  
Hypertension Brother  
Diabetes Brother  
Seizures Son  
Breast Cancer Other 55  
Double first cousin (daughter of   
mother's sister and father's   
brother)  
  
Social History  
  
Tobacco Use  
Smoking status: Former  
Packs/day: 1.00  
Years: 10.00  
Additional pack years: 0.00  
Total pack years: 10.00  
Types: Cigarettes  
Quit date: 5/15/2007  
Years since quittin.8  
Smokeless tobacco: Never  
Vaping Use  
Vaping Use: Never used  
Substance Use Topics  
Alcohol use: No  
Drug use: Not Currently  
Comment: marijuana for insomnia -   
currently not using  
  
PHYSICAL EXAM  
Pulse 73   Temp 37.1 C (98.7 F)     
Resp 16   Ht 166.4 cm (5' 5.5 )     
Wt 133.4 kg (294 lb)   SpO2 97%     
BMI 48.18 kg/m  
  
General Appearance: well   
appearing, in no acute distress,   
alert, morbidly obese  
Pysch: mood and affect broad and   
appropriate  
Skin: Skin color, texture, turgor   
normal for age; no rashes noted  
Eyes: conjunctiva pink and moist,   
no icterus, sclera white,   
non-injected  
Oropharynx: Moist mucous membranes  
Lymph nodes: No cervical   
lymphadenopathy  
Lungs: Lungs clear to   
auscultation. No wheezing,   
rhonchi, rales.  
Heart: RRR without murmur, gallop,   
or rubs.  
Abdomen: Protuberant abdomen. Mild   
TTP L periumbilical region,   
extending down into LLQ. Bowel   
sounds normal. No masses,   
organomegaly. No rigidity,   
guarding, or other evidence of   
acute abdomen. No CVA tenderness   
bilaterally. Extremities: No   
deformities, edema, skin   
discoloration, clubbing or   
cyanosis.  
Neurological: Gait normal. No   
focal neurological deficits.   
Sensation grossly intact.  
  
ASSESSMENT/PLAN:  
1. Left flank pain - ICD9: 789.09,   
ICD10: R10.9  
UA findings suggestive of UTI, so   
will go ahead and empirically   
treat w/ anitibiotics (cipro in   
the case UTI is negative, and   
potentially a diverticulitis   
component). Recommend plenty of   
fluids and rest. Will send out for   
culture and adjust treatment if   
necessary pending results  
  
- URINE CULTURE  
  
Prescription instructions reviewed   
with patient as applicable.   
Potential red flag symptoms   
discussed with the patient.   
Reviewed appropriate action plan   
to take if red flag symptoms   
occur. Patient agreeable to   
treatment plan.  
  
Meera Menchaca PA-C  
Electronically signed by Meera Menchaca PA-C at 2024 6:16 PM   
EST  
documented in this encounter            [facility]  
   
                                                    2024 Miscellaneous   
Notes                                   Formatting of this note might be   
different from the original.  
2024  
  
PID: [id number] Navdeep Guillen  
[facility] [address] [place] [id number]  
  
Dear Ms. Guillen,  
  
We are pleased to inform you that   
the results of your recent breast   
imaging exam on 2024 are   
normal.  
  
Early detection of cancer is very   
important. We also understand   
recommendations regarding breast   
cancer screening are   
controversial. Please discuss with   
your primary care provider which   
strategy is best for you and   
whether a mammogram is right for   
you.  
  
Your imaging studies and report   
will be kept on file at [facility] as part of your permanent   
medical record and are available   
for your continuing care.  
  
Thank you for allowing us to help   
in meeting your health care needs.  
  
Sincerely,  
  
Dr. Mcdermott  
Interpreting Radiologist  
[facility]  
  
(Normal over 40)  
Electronically signed by   
Coordinator, Mammography at   
2024 10:52 AM EST  
documented in this encounter            [facility]  
   
                                                    2024 History of   
Present illness Narrative               Formatting of this note might be   
different from the original.  
Radiology Service Progress Note  
  
PATIENT NAME: Navdeep Guillen  
MRN: [id number]  
  
DATE OF SERVICE: 2024  
TIME: 11:01 AM  
PATIENT IDENTITY VERIFICATION   
COMPLETED USING TWO (2)   
IDENTIFIERS: Name and Date of   
Birth confirmed by patient   
verbally.  
FALL SCREENING: Has the patient   
had 2 falls in the last year or 1   
fall with injury or currently   
using an Ambulatory Assistive   
Device (Walker, Cane, Wheelchair,   
Crutches, etc.)? No  
PATIENT GENDER DATA: Female.   
Pregnancy status: Pregnant: No   
Breastfeeding status: NO.  
PATIENT RELEVANT IMPLANT DATA   
REVIEWED: Not Applicable  
PATIENT PRESENTS WITH AN   
IMPLANTABLE OR ATTACHED MEDICAL   
DEVICE: No  
  
RADIOLOGY DEPARTMENT: Mammography  
  
PERIPHERAL IV DATA: Not applicable  
  
SIGNED BY: RT Que(ADAIR)  
2024 11:01 AM  
  
  
Electronically signed by Italia Barfield RT(R) at 2024 11:01   
AM EST  
documented in this encounter            [facility]  
   
                                        2024 Note     HNO ID: [id number]  
Author: ITALIA BARFIELD RT(R)  
Service: ?  
Author Type: Technologist  
Type: Progress Notes  
Filed: 2024 11:01  
Note Text:  
Radiology Service Progress Note  
PATIENT NAME: Navdeep Guillen  
MRN: [id number]  
DATE OF SERVICE: 2024  
TIME: 11:01 AM  
PATIENT IDENTITY VERIFICATION   
COMPLETED USING TWO (2)   
IDENTIFIERS: Name and  
Date of Birth confirmed by patient   
verbally.  
FALL SCREENING: Has the patient   
had 2 falls in the last year or 1   
fall with  
injury or currently using an   
Ambulatory Assistive Device   
(Walker, Cane,  
Wheelchair, Crutches, etc.)? No  
PATIENT GENDER DATA: Female.   
Pregnancy status: Pregnant: No   
Breastfeeding  
status: NO.  
PATIENT RELEVANT IMPLANT DATA   
REVIEWED: Not Applicable  
PATIENT PRESENTS WITH AN   
IMPLANTABLE OR ATTACHED MEDICAL   
DEVICE: No  
RADIOLOGY DEPARTMENT: Mammography  
PERIPHERAL IV DATA: Not applicable  
SIGNED BY: RT Que(ADAIR)  
2024 11:01 AM              [facility]  
   
                                                    2024 History of   
Present illness Narrative               Formatting of this note might be   
different from the original.  
Radiology Service Progress Note  
  
PATIENT NAME: Navdeep Guillen  
MRN: [id number]  
  
DATE OF SERVICE: 2024  
TIME: 10:23 AM  
PATIENT IDENTITY VERIFICATION   
COMPLETED USING TWO (2)   
IDENTIFIERS: Name and Date of   
Birth confirmed by patient   
verbally.  
FALL SCREENING: Has the patient   
had 2 falls in the last year or 1   
fall with injury or currently   
using an Ambulatory Assistive   
Device (Walker, Cane, Wheelchair,   
Crutches, etc.)? Yes, Patient High   
Risk for Falls  
What interventions were put in   
place to prevent falls during this   
visit? Increased Observations by   
Caregivers  
PATIENT GENDER DATA: Female.   
Pregnancy status: Pregnant: No   
Breastfeeding status: NO.  
PATIENT RELEVANT IMPLANT DATA   
REVIEWED: Not Applicable  
PATIENT PRESENTS WITH AN   
IMPLANTABLE OR ATTACHED MEDICAL   
DEVICE: No  
  
RADIOLOGY DEPARTMENT: Bone Density  
  
PERIPHERAL IV DATA: Not applicable  
  
SIGNED BY: RT Imelda(ADAIR)  
2024 10:23 AM  
  
  
Electronically signed by Billy Knox RT(R) at 2024   
10:34 AM EST  
documented in this encounter            [facility]  
   
                                        2024 Note     HNO ID: [id number]  
Author: BILLY KNOX RT(R)  
Service: ?  
Author Type: Technologist  
Type: Progress Notes  
Filed: 2024 10:34  
Note Text:  
Radiology Service Progress Note  
PATIENT NAME: Navdeep Guillen  
MRN: [id number]  
DATE OF SERVICE: 2024  
TIME: 10:23 AM  
PATIENT IDENTITY VERIFICATION   
COMPLETED USING TWO (2)   
IDENTIFIERS: Name and  
Date of Birth confirmed by patient   
verbally.  
FALL SCREENING: Has the patient   
had 2 falls in the last year or 1   
fall with  
injury or currently using an   
Ambulatory Assistive Device   
(Walker, Cane,  
Wheelchair, Crutches, etc.)? Yes,   
Patient High Risk for Falls  
What interventions were put in   
place to prevent falls during this   
visit?  
Increased Observations by   
Caregivers  
PATIENT GENDER DATA: Female.   
Pregnancy status: Pregnant: No   
Breastfeeding  
status: NO.  
PATIENT RELEVANT IMPLANT DATA   
REVIEWED: Not Applicable  
PATIENT PRESENTS WITH AN   
IMPLANTABLE OR ATTACHED MEDICAL   
DEVICE: No  
RADIOLOGY DEPARTMENT: Bone Density  
PERIPHERAL IV DATA: Not applicable  
SIGNED BY: RT Imelda(R)  
2024 10:23 AM              [facility]  
   
                                                    2024 Miscellaneous   
Notes                                   Formatting of this note might be   
different from the original.  
Pt returned call and given Sherly An's instructions and   
recommendations. Pt states she   
will call Dr. Patricio's office to   
set up her follow up. She was   
waiting to hear back from Sherly   
about who she should f/u with.   
Notified to call us back to repeat   
labs when feeling better  
Electronically signed by Amanuel Abreu RN at 2024 10:50 AM   
EST  
Formatting of this note might be   
different from the original.  
Called and left a voicemail for   
the Patient to call back and ask   
for a nurse to receive the   
providers message.  
  
Olimpia Jean RN  
  
Electronically signed by Olimpia Jean RN at 2024 12:48 PM   
EST  
Formatting of this note might be   
different from the original.  
If patient is following closely   
with general surgery then no need   
to follow up with me. Her kidney   
function was slightly decreased in   
ER so needs rechecked after after   
she is feeling better and   
diverticulitis has resolved.  
Thank you  
NIC Barney.LUIS ENRIQUE  
  
Electronically signed by Sherly An APRN.CNP at 2024 12:29   
PM EST  
Formatting of this note might be   
different from the original.  
Records printed and placed on   
provider desk for review.  
Electronically signed by Manisha Jesus Ma at 2024 10:44 AM EST  
Formatting of this note might be   
different from the original.  
Please get [facility] records so I   
can review them  
Thank you  
NIC Barney.LUIS ENRIQUE  
  
Electronically signed by Sherly An APRN.CNP at 2024 8:40   
AM EST  
Formatting of this note is   
different from the original.  
Patient reports she went to [facility] ER   
on  and was seen for   
diverticulitis.  
  
She was prescribed:  
Amoxicillin-Pot Clavulanate Active   
1 TABLET PO TWICE A DAY  12:00am  
  
Pt states she has not improved   
much because she has only taken   
the antibiotics for 2 days so far.  
  
Patient was recommended to follow   
up with PCP Office and Dr. Patricio. Pt states she sees Dr. Patricio for her diverticulitis.  
  
Pt asking if she should follow-up   
with PCP or not? She does not wish   
to follow up with both PCP and Dr. Patricio, if not necessary.  
  
Please advise patient, if able.   
[phone]  
  
Thank you.  
Electronically signed by Joie Hardwick RN at 2024 2:33 PM   
EST  
documented in this encounter            [facility]  
   
                                        2023 Note     HNO ID: [id number]  
Author: Sherly An APRN.CNP  
Service: ?  
Author Type: Nurse Practitioner  
Type: Progress Notes  
Filed: 2023 4:01 PM  
Note Text:  
CC: Patient presents with:  
F/U 6 months  
HPI  
Navdeep Guillen is a 63 year old   
female who presents today for   
routine  
follow up.  
DIABETES MELLITUS: Ms. Guillen   
denies excessive thirst or   
increased  
frequency of urination, chest pain   
or dyspnea , numbness, tingling or   
pain  
in extremities, new or unusual   
visual symptoms, low   
sugar/hypoglycemic  
reactions, weight loss/gain,   
lightheadedness/dizziness. Follows   
a diabetic  
diet most of the time. She is   
compliant with medication(s) and   
is  
tolerating med(s) without any side   
effects. She reports checking her  
glucose on a twice a day schedule   
with sugars in the fasting   
120s-140s and  
postprandial 70s-90s range.   
Patient's last HgA1C was  
Hemoglobin A1C (%)  
Date Value  
2023 6.1  
2023 6.8  
07/15/2020 7.7  
2020 7.6  
Hemoglobin A1C (POCT) (%)  
Date Value  
10/26/2021 7.1  
)  
Last Ophthalmology exam was over a   
year ago.  
HTN: Ms. Guillen indicates that she   
is feeling well and denies any  
symptoms referable to elevated   
blood pressure. Specifically   
denies  
headache, chest pain,   
palpitations, dyspnea, and   
peripheral edema.  
Patient denies any side effects of   
her medication(s) and is compliant   
with  
their regimen. She does check BP's   
away from this office with average  
BP's in the 110s-120s/70s-80   
range. Navdeep gets sporadic   
irregular  
exercise with walking. She watches   
her diet for sodium, low fat and   
low  
cholesterol some of the time.  
Last 3 Encounter BP Readings:  
Date: BP:  
2023 122/76  
10/02/2023 103/67  
2023 118/72  
Obesity: Has difficulty with   
exercise because of arthritic   
pain. Will  
every now and then take an   
ibuprofen to help when she is   
unable to sleep.  
Does not use more than once a week   
or less.  
REVIEW OF SYSTEMS  
General: no fevers, no chills, no   
night sweats, no recurrent   
infections,  
no change in appetite, no change   
in energy, and no significant   
changes in  
weight  
Respiratory: no cough, no   
wheezing, no shortness of breath,   
no hemoptysis  
Cardiovascular: no chest pain, no   
chest pressure, no palpitations,   
and no  
swelling  
Endocrine: no fatigue, no   
polyuria, no polyphagia, and no   
polydipsia  
Neurologic: No headache, weakness,   
numbness, dizziness, memory loss,  
syncope.  
PAST MEDICAL HISTORY  
Diagnosis Date  
Abnormal mammogram 2021  
Achilles tendon pain 2015  
Ankle weakness 2015  
Chronic pain of left ankle   
12/15/2016  
Colon abnormality 2014  
Thickening of the ascending colon   
seen on the recent CT scan.   
Patient has  
had a colonoscopy around 4 years   
ago. Records were obtained for   
when they  
were done, 4 years ago , in   
[facility]. her   
colonoscopy was  
completely normal, no thickening,   
and the biopsy too was normal   
except for  
lymphoid aggregates.  
Diverticulitis   
Treated by Dr. Patricio at [facility]  
DJD (degenerative joint disease),   
ankle and foot 2016  
DM (diabetes mellitus) (HCC)  
GERD (gastroesophageal reflux   
disease)  
Gross hematuria 05/15/2014  
2014, had hematuria, investigated   
extensively along with cytoscopy,   
a  
stone was found but no signs of   
any malignancy.  
Heel pain, chronic 12/15/2016  
Hepatic steatosis 10/03/2017  
Hiatal hernia 10/03/2017  
Hypertension  
Insomnia  
Kidney stone 05/15/2014  
Morbid obesity (HCC)  
Nephrolithiasis  
Neuritis 2016  
Renal lesion 05/15/2014  
S/P Achilles tendon repair   
2015 sx done  
PAST SURGICAL HISTORY  
Procedure Laterality Date  
BREAST LUMPECTOMY HX Right   
BX BREAST W/DEVICE 1ST LESION   
STEREOTACTIC GUID Right 2021  
CHOLECYSTECTOMY   
gallbladder removal  
COLONOSCOPY   
CT ABDOMEN 2014  
PAST SURGICAL HISTORY OF   
2014  
[facility] hysteroscopy  
PAST SURGICAL HISTORY OF Left   
2015  
Left foot surgery  
S [facility] [id number] 10/16/2014  
ALLERGIES Silvadene [Silver   
Sulfadiazine] and Sulfa   
(Sulfonamide  
Antibiotics)  
MEDICATIONS  
metFORMIN ER (GLUCOPHAGE XR) 500   
mg 24 hr tablet Take 1 tablet by   
mouth  
daily with breakfast.  
blood sugar diagnostic (BLOOD   
GLUCOSE TEST) test strip Test   
blood sugars  
2daily. Dx:ucnontrolled diabetes .   
Insulin: Yes  
chlorthalidone (HYGROTON) 25 mg   
tablet Take 0.5 tablets by mouth   
once  
daily.  
atenolol (TENORMIN) 50 mg tablet   
Take 1 tablet by mouth once daily.  
potassium chloride 20 mEq TbER   
Take 1 tablet by mouth twice   
daily.  
glipiZIDE (GLUCOTROL XL) 2.5 mg 24   
hr tablet Take 1 tablet by mouth   
once  
daily.  
exemestane (AROMASIN) 25 mg tablet   
Take 1 tablet by mouth once daily.  
ibuprofen (MOTRIN) 800 mg tablet   
Take 1 tablet by mouth every 8   
hours as  
needed for pain. Take with food.  
lancets (ONE TOUCH DELICA) 33   
gauge misc Test blood sugar(s) 2   
daily.  
Dx: Type 2 DM - Controlled E11.9   
Insulin: Yes  
Blood-Glucose Meter monitoring kit   
Glucose Meter of Choice - Kit -   
Dx:  
Type 2 DM - Uncontrolled E11.65  
MV-MN/FOLIC A (more content not   
included)...                            [facility]  
   
                                                    2023 History of   
Present illness Narrative               Formatting of this note is   
different from the original.  
CC: Patient presents with:  
F/U 6 months  
  
HPI  
Navdeep Guillen is a 63 year old   
female who presents today for   
routine follow up.  
  
DIABETES MELLITUS: Ms. Guillen   
denies excessive thirst or   
increased frequency of urination,   
chest pain or dyspnea , numbness,   
tingling or pain in extremities,   
new or unusual visual symptoms,   
low sugar/hypoglycemic reactions,   
weight loss/gain,   
lightheadedness/dizziness. Follows   
a diabetic diet most of the time.   
She is compliant with   
medication(s) and is tolerating   
med(s) without any side effects.   
She reports checking her glucose   
on a twice a day schedule with   
sugars in the fasting 120s-140s   
and postprandial 70s-90s range.   
Patient's last HgA1C was  
Hemoglobin A1C (%)  
Date Value  
2023 6.1  
2023 6.8  
07/15/2020 7.7  
2020 7.6  
  
Hemoglobin A1C (POCT) (%)  
Date Value  
10/26/2021 7.1  
)  
Last Ophthalmology exam was over a   
year ago.  
  
HTN: Ms. Guillen indicates that she   
is feeling well and denies any   
symptoms referable to elevated   
blood pressure. Specifically   
denies headache, chest pain,   
palpitations, dyspnea, and   
peripheral edema. Patient denies   
any side effects of her   
medication(s) and is compliant   
with their regimen. She does check   
BP's away from this office with   
average BP's in the   
110s-120s/70s-80 range. Navdeep   
gets sporadic irregular exercise   
with walking. She watches her diet   
for sodium, low fat and low   
cholesterol some of the time.  
Last 3 Encounter BP Readings:  
Date: BP:  
2023 122/76  
10/02/2023 103/67  
2023 118/72  
  
Obesity: Has difficulty with   
exercise because of arthritic   
pain. Will every now and then take   
an ibuprofen to help when she is   
unable to sleep. Does not use more   
than once a week or less.  
  
REVIEW OF SYSTEMS  
General: no fevers, no chills, no   
night sweats, no recurrent   
infections, no change in appetite,   
no change in energy, and no   
significant changes in weight  
Respiratory: no cough, no   
wheezing, no shortness of breath,   
no hemoptysis  
Cardiovascular: no chest pain, no   
chest pressure, no palpitations,   
and no swelling  
Endocrine: no fatigue, no   
polyuria, no polyphagia, and no   
polydipsia  
Neurologic: No headache, weakness,   
numbness, dizziness, memory loss,   
syncope.  
  
PAST MEDICAL HISTORY  
Diagnosis Date  
Abnormal mammogram 2021  
Achilles tendon pain 2015  
Ankle weakness 2015  
Chronic pain of left ankle   
12/15/2016  
Colon abnormality 2014  
Thickening of the ascending colon   
seen on the recent CT scan.   
Patient has had a colonoscopy   
around 4 years ago. Records were   
obtained for when they were done,   
4 years ago , in [facility]. her colonoscopy was   
completely normal, no thickening,   
and the biopsy too was normal   
except for lymphoid aggregates.  
Diverticulitis   
Treated by Dr. Patricio at [facility]  
DJD (degenerative joint disease),   
ankle and foot 2016  
DM (diabetes mellitus) (HCC)  
GERD (gastroesophageal reflux   
disease)  
Gross hematuria 05/15/2014  
2014, had hematuria, investigated   
extensively along with cytoscopy,   
a stone was found but no signs of   
any malignancy.  
Heel pain, chronic 12/15/2016  
Hepatic steatosis 10/03/2017  
Hiatal hernia 10/03/2017  
Hypertension  
Insomnia  
Kidney stone 05/15/2014  
Morbid obesity (HCC)  
Nephrolithiasis  
Neuritis 2016  
Renal lesion 05/15/2014  
S/P Achilles tendon repair   
2015 sx done  
  
  
PAST SURGICAL HISTORY  
Procedure Laterality Date  
BREAST LUMPECTOMY HX Right 2017  
BX BREAST W/DEVICE 1ST LESION   
STEREOTACTIC GUID Right 2021  
CHOLECYSTECTOMY 2011  
gallbladder removal  
COLONOSCOPY   
CT ABDOMEN 2014  
PAST SURGICAL HISTORY OF   
2014  
[facility] hysteroscopy  
PAST SURGICAL HISTORY OF Left   
2015  
Left foot surgery  
S PK [facility] [id number] 10/16/2014  
  
ALLERGIES Silvadene [Silver   
Sulfadiazine] and Sulfa   
(Sulfonamide Antibiotics)  
  
MEDICATIONS  
metFORMIN ER (GLUCOPHAGE XR) 500   
mg 24 hr tablet Take 1 tablet by   
mouth daily with breakfast.  
blood sugar diagnostic (BLOOD   
GLUCOSE TEST) test strip Test   
blood sugars 2daily.   
Dx:ucnontrolled diabetes .   
Insulin: Yes  
chlorthalidone (HYGROTON) 25 mg   
tablet Take 0.5 tablets by mouth   
once daily.  
atenolol (TENORMIN) 50 mg tablet   
Take 1 tablet by mouth once daily.  
potassium chloride 20 mEq TbER   
Take 1 tablet by mouth twice   
daily.  
glipiZIDE (GLUCOTROL XL) 2.5 mg 24   
hr tablet Take 1 tablet by mouth   
once daily.  
exemestane (AROMASIN) 25 mg tablet   
Take 1 tablet by mouth once daily.  
ibuprofen (MOTRIN) 800 mg tablet   
Take 1 tablet by mouth every 8   
hours as needed for pain. Take   
with food.  
lancets (ONE TOUCH DELICA) 33   
gauge misc Test blood sugar(s) 2   
daily. Dx: Type 2 DM - Controlled   
E11.9 Insulin: Yes  
Blood-Glucose Meter monitoring kit   
Glucose Meter of Choice - Kit -   
Dx: Type 2 DM - Uncontrolled   
E11.65  
MV-MN/FOLIC ACID/CALCIUM/VIT K   
(ONE-A-DAY WOMEN'S 50 PLUS ORAL)   
Take 1 tablet by mouth once daily.  
  
azithromycin (ZITHROMAX Z-ALLAN) 250   
mg tablet TAKE 2 TABS ON THE FIRST   
DAY, THEN ONE TAB DAILY FOR 4   
DAYS.  
  
FAMILY HISTORY  
Problem Relation Age of Onset  
Ovarian cancer Mother 39  
Heart Father  
Hypertension Father  
Prostate Cancer Father 78  
other (kidney stones) Brother  
Hypertension Brother  
Diabetes Brother  
Seizures Son  
Breast Cancer Other 55  
Double first cousin (daughter of   
mother's sister and father's   
brother)  
  
Social History  
  
Tobacco Use  
Smoking status: Former  
Packs/day: 1.00  
Years: 10.00  
Additional pack years: 0.00  
Total pack years: 10.00  
Types: Cigarettes  
Quit date: 5/15/2007  
Years since quittin.5  
Smokeless tobacco: Never  
Vaping Use  
Vaping Use: Never used  
Substance Use Topics  
Alcohol use: No  
Drug use: Not Currently  
Comment: marijuana for insomnia -   
currently not using  
  
PHYSICAL EXAM  
/76 (BP Site: Left Arm, BP   
Position: Sitting, BP Cuff Size:   
Regular Adult)   Pulse 68   Resp   
16   Wt (!) 137.5 kg (303 lb 3.2   
oz)   SpO2 97%   BMI 49.69 kg/m  
General Appearance: well   
appearing, in no acute distress,   
alert  
Pysch: mood and affect broad and   
appropriate  
Skin: Skin color, texture, turgor   
normal for age;  
Eyes: conjunctiva pink and moist,   
no icterus, sclera white,   
non-injected  
Neck: Thyroid normal size and   
symmetric without palpable   
nodules, Neck supple, No   
adenopathy  
Lymph nodes: No cervical   
lymphadenopathy and No   
supraclavicular lymphadenopathy  
Lungs: Lungs clear to   
auscultation. No wheezing,   
rhonchi, rales.  
Heart: RRR without murmur, gallop,   
or rubs. No ectopy  
  
Health maintenance reviewed with   
patient:  
HIV Screening Never done  
Shingrix Vaccine(1 of 2) Never   
done  
Hepatitis B Vaccine(1 of 3 - Risk   
3-dose series) Never done  
RSV Vaccine(1 - 1-dose 60+ series)   
Never done  
Dilated Retinal Exam due on   
2022  
Urine Albumin:Creatinine Ratio due   
on 2023  
Bone Density Screening due on   
2023  
Advance Directive Discussion Never   
done  
Influenza Vaccine(1) due on   
2023  
Diabetic Foot Exam due on   
2023  
HbA1C due on 10/03/2023  
Mammogram Screening due on   
2024  
Covid-19 Vaccine(1) due on   
2024  
Pneumococcal Vaccine: 65+(1 - PCV)   
due on 2024  
LDL Cholesterol due on 2024  
Annual PCP Team Chronic Disease   
Visit due on 2024  
DTaP,Tdap,Td Vaccine(2 - Td or   
Tdap) due on 2024  
BP Controlled (<130/80) due on   
10/02/2024  
Colorectal Cancer Screening due on   
2028  
Depression Assessment Completed  
Hepatitis C Screening Completed  
HPV Vaccine Aged Out  
Pap Testing Discontinued  
  
DATA REVIEWED: No new labs  
  
ASSESSMENT/PLAN:  
1. Controlled type 2 diabetes   
mellitus without complication,   
without long-term current use of   
insulin (HCC) - ICD9: 250.00,   
ICD10: E11.9 (primary diagnosis)  
- Control undetermined, due for   
labs  
- Continue current medications  
- Blood glucose monitoring on a   
once daily schedule  
- Counseled on healthy diet and   
regular exercise  
- Discussed need for and benefit   
of weight loss. BMI 49.69 kg/(m^2)  
- CONSULT TO OPHTHALMOLOGY  
- ALBUMIN/CREAT RATIO RND UR  
- HGB A1C  
- BASIC METABOLIC PNL  
- CBC  
- LIPID PANEL BASIC  
- CBC  
- COMP METABOLIC PANEL  
- HGB A1C  
  
2. Primary hypertension - ICD9:   
401.9, ICD10: I10  
- Controlled  
- Continue current medications  
- Recommend home blood pressure   
monitoring, to bring results to   
next visit  
- Encouraged sodium restriction,   
DASH or Mediterranean diet  
- Recommend regular aerobic   
exercise  
- CBC  
- LIPID PANEL BASIC  
- CBC  
- COMP METABOLIC PANEL  
  
3. Morbid obesity with BMI of   
40.0-44.9, adult (HCC) - ICD9:   
278.01, V85.41, ICD10: E66.01,   
Z68.41  
Weight increasing  
- Behavioral intervention  
- low fat, low sugar, well   
portioned balanced diet  
- at least 30min of aerobic   
exercise 5 days a week  
  
Prescription instructions reviewed   
with patient as applicable.   
Potential red flag symptoms   
discussed with the patient.   
Reviewed appropriate action plan   
to take if red flag symptoms   
occur. Patient agreeable to   
treatment plan.  
Sherly An APRN.CNP  
Electronically signed by Sherly An APRN.CNP at 2023 4:01   
PM EST  
documented in this encounter            [facility]  
   
                                        10- Note     HNO ID: [id number]  
Author: Vikki Posada APRN.CNP  
Service: ?  
Author Type: Nurse Practitioner  
Type: Progress Notes  
Filed: 10/4/2023 1:49 PM  
Note Text:  
Chief Complaint  
Patient presents with:  
Established Patient  
HPI:  
Navdeep Guillen is a 63 year old   
female who presents here today for   
follow  
up breast cancer.  
Per Dr. Milner/Sumaya's previous note:  
H/o hypertension and borderline   
diabetes/insulin resistant,   
obesity, who  
presented with an abnormal breast   
exam at her PCP office. Subsequent  
diagnostic mammogram revealing a   
lobulated lesion in the right   
breast at  
the lower outer quadrant highly   
suspicious of malignancy.  
Patient had a mammotome breast   
biopsy on 3/27/17 that showed   
invasive  
ductal carcinoma, positive for   
estrogen and progesterone   
receptors,  
equivocal for overexpression   
HER-2/andi.  
She had surgery by Dr. Dueñas, right   
breast lumpectomy and right   
axillary  
sentinel lymph node biopsy on   
17 for right breast cancer.  
Stage IA- pT1c pN0 (3/3 sentinel   
lymph nodes negative for   
metastatic  
disease)  
Tumor size 1.5 cm x 1 x 0.9 cm  
Overall grade 2 out of 7  
Margins - 0.4 cm from posterior   
margin  
ER/CA >95%, [id number] not amplified   
by FISH  
Lymph/vasc invasion - none;   
derm/vasc/lymph invasion - none  
Menstrual history: Menarche at age   
11, first pregnancy at age 18, 8  
pregnancy; surgical   
menopause-total abdominal   
hysterectomy age 54. No  
estrogen replacement therapy.   
Family history: Mother  of   
ovarian  
cancer in her 40's. No family   
history of breast cancer. She was   
initially  
started on anastrozole, then   
subsequent switch to Aromasin   
because of  
joint pain.  
RADIATION-17 to 17  
Right breast  
Current treatment:  
1) Aromasin 25 mg once daily  
No new concerns today.  
Appetite: Ok.  Energy level: Eh.   
Denies fevers. +sinus   
congestion/drainage  
Resp:denies cough or sob  
Cardiac:denies chest   
pain/palpitations  
GI:denies abd pain, n/v, moving   
bowels regularly  
:denies dysuria/hematuria  
Extrem:occ. RUE aches, L foot pain   
s/p surgery  
Endo:occ. hot flash at night  
Neuro:denies symptoms of   
neuropathy  
Skin:denies rashes  
Heme:denies bleeding  
The ROS is otherwise negative.  
Past medical history,   
appointments, medications,   
allergies reviewed. No  
changes.  
EXAM:  
/67   Pulse 70   Temp 36.5   
?C (97.7 ?F) (Temporal)   Ht 166.4   
cm  
(5' 5.5 )   Wt 135.6 kg (299 lb)     
SpO2 96%   BMI 49.00 kg/m?  
APPEARANCE Well appearing, alert,   
in no acute distress,   
well-hydrated,  
well nourished.  
HEART RRR with normal S1 and S2,   
no murmurs  
LUNG clear to auscultation  
BREAST FEMALE no mass/nodule b/l,   
R radiation changes  
LYMPH NODES No cervical   
lymphadenopathy, No   
supraclavicular  
lymphadenopathy, and No axillary   
lymphadenopathy.  
ABDOMEN bowel sounds normoactive,   
soft, non-tender  
EXTREMITIES No edema  
NEURO Awake, alert and oriented x   
3, Normal gait, and No involuntary  
motions.  
SKIN Skin color, texture, turgor   
normal, no suspicious rashes or   
lesions  
ASSESSMENT/PLAN:  
1. Malignant neoplasm of   
lower-outer quadrant of right   
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  
estrogen receptor positive (HCC) -   
ICD9: 174.5, V86.0, ICD10:   
C50.511,  
Z17.0 (primary diagnosis)  
pT1c pN0 (3/3 sentinel lymph nodes   
negative for metastatic disease)   
stage  
IA infiltrating ductal carcinoma   
the right breast. ER/CA >95%,   
[id number]  
non-amplified by FISH.  
- No concerning findings on exam.  
- Overall tolerating aromasin   
well.  
- Continue aromasin.  
- Mammogram due end of 2024.  
- Follow up in 6 months.  
- Pt. aware to call office with   
any questions/concerns.  
The patient indicates   
understanding of these issues and   
agrees with the  
plan.  
All documentation from previous   
visit of 23-Dr. Shell was   
copied and  
pasted, documentation has been   
reviewed and edited as necessary   
for  
today's visit.  
NIC Ugalde.[facility]  
   
                                                    2023 Miscellaneous   
Notes                                   Formatting of this note is   
different from the original.  
Patient has been identified by   
name and date of birth: No  
Patient phones for refill(s):  
Requested Prescriptions  
  
Pending Prescriptions Disp Refills  
metFORMIN ER (GLUCOPHAGE XR) 500   
mg 24 hr tablet 180 tablet 3  
Sig: Take 1 tablet by mouth daily   
with breakfast.  
blood sugar diagnostic (BLOOD   
GLUCOSE TEST) test strip 50 Strip   
11  
Sig: Test blood sugars 2daily.   
Dx:ucnontrolled diabetes .   
Insulin: Yes  
chlorthalidone (HYGROTON) 25 mg   
tablet 45 tablet 3  
Sig: Take 0.5 tablets by mouth   
once daily.  
atenolol (TENORMIN) 50 mg tablet   
90 tablet 3  
Sig: Take 1 tablet by mouth once   
daily.  
potassium chloride 20 mEq TbER 180   
tablet 1  
Sig: Take 1 tablet by mouth twice   
daily.  
glipiZIDE XL (GLUCOTROL XL) 2.5 mg   
24 hr tablet 90 tablet 3  
Sig: Take 1 tablet by mouth once   
daily.  
  
Date of last office visit in   
primary care: 23  
Last 2 Encounter Wt Readings:  
Date: Wt:  
2023 132.9 kg (293 lb)  
2023 132 kg (291 lb)  
Previous labs/tests for   
medication: Diabetes:  
Hemoglobin A1C (%)  
Date Value  
2023 6.1  
2023 6.8  
07/15/2020 7.7  
2020 7.6  
  
Hemoglobin A1C (POCT) (%)  
Date Value  
10/26/2021 7.1  
  
Blood Pressure:  
BUN (mg/dL)  
Date Value  
2023 10  
2022 15  
  
Sodium (mmol/L)  
Date Value  
2023 139  
2022 136  
Last 1 Encounter BP Readings:  
Date: BP:  
2023 118/72  
Please advise. Thank you. Kellie Ahmadi LPN  
  
Electronically signed by Kellie Ahmadi LPN at 2023 8:14 AM EDT  
documented in this encounter            [facility]  
   
                                                    2023 Miscellaneous   
Notes                                   Formatting of this note is   
different from the original.  
Patient has been identified by   
name and date of birth: No  
Patient phones for refill(s):  
Requested Prescriptions  
  
Pending Prescriptions Disp Refills  
glipiZIDE XL (GLUCOTROL XL) 2.5 mg   
24 hr tablet [Pharmacy Med Name:   
GLIPIZIDE ER 2.5 MG TB24 2.5   
Tablet] 90 tablet 3  
Sig: TAKE 1 TABLET BY MOUTH ONCE   
DAILY.  
  
Date of last office visit in   
primary care: 23  
Last 2 Encounter Wt Readings:  
Date: Wt:  
2023 132.9 kg (293 lb)  
2023 132 kg (291 lb)  
Previous labs/tests for   
medication: Diabetes:  
Hemoglobin A1C (%)  
Date Value  
2023 6.1  
2023 6.8  
07/15/2020 7.7  
2020 7.6  
  
Hemoglobin A1C (POCT) (%)  
Date Value  
10/26/2021 7.1  
  
Please advise. Thank you. Kellie Walls LPN  
  
Electronically signed by Kellie Walls LPN at 2023 10:42 AM   
EDT  
documented in this encounter            [facility]  
   
                                                    2023 Miscellaneous   
Notes                                   Formatting of this note might be   
different from the original.  
Mychart message sent  
Electronically signed by Rosemary Irvin LPN at 2023 2:12   
PM EDT  
Formatting of this note might be   
different from the original.  
Navdeep,  
  
Your urine culture showed mixed   
organisms which is more supportive   
of a contamination. If you have no   
symptoms and the fluconazole is   
helping with the itching I would   
not treat you.  
  
If you do have symptoms then   
please get back to us and we will   
send you an antibiotic.  
  
Cynthia Reyes MD  
  
Electronically signed by Cynthia Douglas MD at 2023 1:45 PM   
EDT  
documented in this encounter            [facility]  
   
                                                    2023 History of   
Present illness Narrative               Formatting of this note is   
different from the original.  
Reason for Visit  
Patient presents with:  
F/U 6 months  
  
Navdeep Guillen is a 63 year old   
female who presents here today for   
Above Complaints..  
  
Health Maintenance  
PNEUMOCOCCAL: 65+(1 - PCV)  
HIV SCREENING  
SHINGRIX VACCINE(1 of 2)  
COLORECTAL CANCER SCREENING  
DILATED RETINAL EXAM  
DEPRESSION ASSESSMENT  
URINE ALBUMIN:CREATININE RATIO  
BONE DENSITY  
ADVANCE DIRECTIVE DISCUSSION  
  
HPI  
  
Patient is here for chronic   
medical conditions follow-up and   
to date on her current   
diverticulitis condition  
  
Reviewed blood work  
  
She had diverticulitis the end of   
January. Was on abx of a couple   
courses. Patient was in the   
hospital with IV for 3 days and   
the on oral abx , one for uti and   
one for diverticulitis , total of   
3/4 courses of abx and then had   
yeast infection and took diflucan.   
Right now she still has discharge   
from the vagina. She is having a   
colonoscopy with Dr Bocanegra.  
  
Also had another breast cancer   
scare but everything is ok as of   
now.  
  
Sugars well controlled .  
  
Patient has lost 10 pounds of   
weight because of the   
diverticulitis , the low fiber is   
giving her constipation.  
  
HPL: Reviewed test results with   
patient , takes medications   
regularly , does not report side   
effects. Conscious to avoid red   
meats, full fat dairy and its by   
products. Exercising 3 to 5 times   
a week.  
  
HTN: BP controlled today. Checks   
BP at home. Compliant with   
medications. Denies any chest   
pain, palpitations, SOB, swelling   
in the feet. Careful with diet to   
avoid salt, trying to eat more   
fruits and vegetables, exercises   
regularly.  
  
Tsh is normal.  
  
No problem-specific Assessment &   
Plan notes found for this   
encounter.  
  
PAST MEDICAL HISTORY  
Diagnosis Date  
Abnormal mammogram 2021  
Achilles tendon pain 2015  
Ankle weakness 2015  
Chronic pain of left ankle   
12/15/2016  
Colon abnormality 2014  
Thickening of the ascending colon   
seen on the recent CT scan.   
Patient has had a colonoscopy   
around 4 years ago. Records were   
obtained for when they were done,   
4 years ago , in [facility]. her colonoscopy was   
completely normal, no thickening,   
and the biopsy too was normal   
except for lymphoid aggregates.  
Diverticulitis   
Treated by Dr. Patricio at [facility]  
DJD (degenerative joint disease),   
ankle and foot 2016  
DM (diabetes mellitus) (HCC)  
GERD (gastroesophageal reflux   
disease)  
Gross hematuria 05/15/2014  
2014, had hematuria, investigated   
extensively along with cytoscopy,   
a stone was found but no signs of   
any malignancy.  
Heel pain, chronic 12/15/2016  
Hepatic steatosis 10/03/2017  
Hiatal hernia 10/03/2017  
Hypertension  
Insomnia  
Kidney stone 05/15/2014  
Morbid obesity (HCC)  
Nephrolithiasis  
Neuritis 2016  
Renal lesion 05/15/2014  
S/P Achilles tendon repair   
2015 sx done  
  
  
PAST SURGICAL HISTORY  
Procedure Laterality Date  
BREAST LUMPECTOMY HX Right 2017  
BX BREAST W/DEVICE 1ST LESION   
STEREOTACTIC GUID Right 2021  
CHOLECYSTECTOMY   
gallbladder removal  
COLONOSCOPY   
CT ABDOMEN 2014  
PAST SURGICAL HISTORY OF   
2014  
D&C hysteroscopy  
PAST SURGICAL HISTORY OF Left   
2015  
Left foot surgery  
S PK LAVH [id number] 10/16/2014  
  
FAMILY HISTORY  
Problem Relation Age of Onset  
Ovarian cancer Mother 39  
Heart Father  
Hypertension Father  
Prostate Cancer Father 78  
other (kidney stones) Brother  
Hypertension Brother  
Diabetes Brother  
Seizures Son  
Breast Cancer Other 55  
Double first cousin (daughter of   
mother's sister and father's   
brother)  
  
Social History  
  
Tobacco Use  
Smoking status: Former  
Packs/day: 1.00  
Years: 10.00  
Pack years: 10.00  
Types: Cigarettes  
Quit date: 5/15/2007  
Years since quitting: 15.9  
Smokeless tobacco: Never  
Vaping Use  
Vaping Use: Never used  
Substance Use Topics  
Alcohol use: No  
Drug use: Not Currently  
Comment: marijuana for insomnia -   
currently not using  
  
Past medical history,   
appointments, medications,   
allergies reviewed.  
Pertinent Lab/Diagnostic Studies   
are reviewed and discussed today  
  
Current Outpatient Medications:  
potassium chloride 20 mEq TbER  
atenolol (TENORMIN) 50 mg tablet  
exemestane (AROMASIN) 25 mg tablet  
chlorthalidone (HYGROTON) 25 mg   
tablet  
metFORMIN ER (GLUCOPHAGE XR) 500   
mg 24 hr tablet  
glipiZIDE (GLUCOTROL XL) 2.5 mg 24   
hr tablet  
blood sugar diagnostic (BLOOD   
GLUCOSE TEST) test strip  
ibuprofen (MOTRIN) 800 mg tablet  
lancets (ONE TOUCH DELICA) 33   
gauge misc  
Blood-Glucose Meter monitoring kit  
MV-MN/FOLIC ACID/CALCIUM/VIT K   
(ONE-A-DAY WOMEN'S 50 PLUS ORAL)  
fluconazole (DIFLUCAN) 150 mg   
tablet  
ciprofloxacin HCl (CIPRO) 500 mg   
tablet  
metroNIDAZOLE (FLAGYL) 500 mg   
tablet  
ascorbic acid, vitamin C, (VITAMIN   
C) 500 mg tablet  
  
Review of Systems  
CONSTITUTIONAL: No fevers, chills   
night sweats, unintended weight   
loss  
CARDIOVASCULAR: No chest pain,   
dyspnea, palpitations, orthopnea,   
PND, ankle edema.  
PULM: No dyspnea, unexplained   
cough.  
GI: No dysphagia/odynophagia,   
problematic reflux, constipation,   
diarrhea, changes in stool habits,   
hematochezia, melena.  
: No new urinary complaints,   
including dysuria, gross hematuria   
or pyuria.  
NEURO: No new balance problems,   
peripheral weakness/paresthesias   
or numbness of concern.  
  
Physical Exam  
/72 (BP Site: Left Arm, BP   
Position: Sitting, BP Cuff Size:   
Large Adult)   Pulse 64   Resp 16   
  Wt 132.9 kg (293 lb)   SpO2 97%   
  BMI 47.29 kg/m  
General appearance: Well   
appearing, alert, in no acute   
distress, well nourished.  
Skin: Skin color, texture, turgor   
normal, no suspicious rashes or   
lesions  
Head: Normocephalic, no masses,   
lesions, tenderness or   
abnormalities  
Eyes: Anicteric sclera. Pupils are   
equally round and reactive to   
light. Extraocular movements are   
intact.  
Lungs: Lungs clear to   
auscultation. No wheezing,   
rhonchi, rales  
Heart: RRR without murmur, gallop,   
or rubs.  
Extremities: No deformities,   
edema, skin discoloration,   
clubbing or cyanosis. Good   
capillary refill.  
  
ASSESSMENT/PLAN:  
1. Type 2 diabetes mellitus   
without complication, without   
long-term current use of insulin   
(HCC) - ICD9: 250.00, ICD10: E11.9   
(primary diagnosis)  
  
- IBUPROFEN 800 MG TABLET  
- ALBUMIN/CREAT RATIO RND UR  
- DEPRESSION SCREENING/ASSESSMENT  
  
2. Screening for osteoporosis -   
ICD9: V82.81, ICD10: Z13.820  
  
- DXA-AXIAL SKELETON  
  
3. Asymptomatic menopause - ICD9:   
V49.81, ICD10: Z78.0  
  
4. Primary hypertension - ICD9:   
401.9, ICD10: I10  
- good control  
- Recommended regular aerobic   
exercise.  
- Recommend home blood pressure   
monitoring, to bring results in on   
next visit  
- Goal of BP <130/80  
  
5. Hypercholesterolemia - ICD9:   
272.0, ICD10: E78.00  
  
6. Diverticulitis - ICD9: 562.11,   
ICD10: K57.92  
We will see what the colonoscopy   
has to say  
  
7. Dysuria - ICD9: 788.1, ICD10:   
R30.0  
acute  
- Patient education for prevention   
given  
- URINE CULTURE  
  
Cynthia Douglas MD  
  
  
Electronically signed by Cynthia Douglas MD at 2023 1:42 PM   
EDT  
documented in this encounter            [facility]  
   
                                                    2023 History of   
Present illness Narrative               Formatting of this note is   
different from the original.  
TELEPHONE FOLLOW-UP ENCOUNTER  
  
Navdeep Guillen [id number]   
has requested a telemedicine   
follow-up visit. Navdeep Guillen   
verbalized informed consent to   
proceed with the telemedicine   
follow-up visit. Navdeep Guillen   
was informed that the details of   
this telephone visit would be   
recorded as part of their   
electronic medical record.  
  
I had a telephone visit with Ms. Guillen today for follow up of   
right stereotactic breast biopsy   
done at [facility] on 3/28/2023.  
Pathology reveals  fat necrosis,   
focal clustered banal   
microcalcifications, no evidence   
of malignancy   
Patient states that she has no   
problems from her biopsy.  
  
PAST MEDICAL HISTORY  
Diagnosis Date  
Abnormal mammogram 2021  
Achilles tendon pain 2015  
Ankle weakness 2015  
Chronic pain of left ankle   
12/15/2016  
Colon abnormality 2014  
Thickening of the ascending colon   
seen on the recent CT scan.   
Patient has had a colonoscopy   
around 4 years ago. Records were   
obtained for when they were done,   
4 years ago , in [facility]. her colonoscopy was   
completely normal, no thickening,   
and the biopsy too was normal   
except for lymphoid aggregates.  
Diverticulitis   
Treated by Dr. Patricio at [facility]  
DJD (degenerative joint disease),   
ankle and foot 2016  
DM (diabetes mellitus) (HCC)  
GERD (gastroesophageal reflux   
disease)  
Gross hematuria 05/15/2014  
2014, had hematuria, investigated   
extensively along with cytoscopy,   
a stone was found but no signs of   
any malignancy.  
Heel pain, chronic 12/15/2016  
Hepatic steatosis 10/03/2017  
Hiatal hernia 10/03/2017  
Hypertension  
Insomnia  
Kidney stone 05/15/2014  
Morbid obesity (HCC)  
Nephrolithiasis  
Neuritis 2016  
Renal lesion 05/15/2014  
S/P Achilles tendon repair   
2015 sx done  
  
PAST SURGICAL HISTORY  
Procedure Laterality Date  
BREAST LUMPECTOMY HX Right 2017  
BX BREAST W/DEVICE 1ST LESION   
STEREOTACTIC GUID Right 2021  
CHOLECYSTECTOMY   
gallbladder removal  
COLONOSCOPY   
CT ABDOMEN 2014  
PAST SURGICAL HISTORY OF   
2014  
D&C hysteroscopy  
PAST SURGICAL HISTORY OF Left   
2015  
Left foot surgery  
S PK LAVH [id number] 10/16/2014  
  
FAMILY HISTORY  
Problem Relation Age of Onset  
Ovarian cancer Mother 39  
Heart Father  
Hypertension Father  
Prostate Cancer Father 78  
other (kidney stones) Brother  
Hypertension Brother  
Diabetes Brother  
Seizures Son  
Breast Cancer Other 55  
Double first cousin (daughter of   
mother's sister and father's   
brother)  
  
Social History  
  
Tobacco Use  
Smoking status: Former  
Packs/day: 1.00  
Years: 10.00  
Pack years: 10.00  
Types: Cigarettes  
Quit date: 5/15/2007  
Years since quitting: 15.9  
Smokeless tobacco: Never  
Vaping Use  
Vaping Use: Never used  
Substance Use Topics  
Alcohol use: No  
Drug use: Not Currently  
Comment: marijuana for insomnia -   
currently not using  
  
Current Outpatient Medications  
Medication Sig Dispense Refill  
fluconazole (DIFLUCAN) 150 mg   
tablet (Patient not taking:   
Reported on 2023)  
ciprofloxacin HCl (CIPRO) 500 mg   
tablet Take 1 tablet by mouth   
twice daily. 20 tablet 0  
metroNIDAZOLE (FLAGYL) 500 mg   
tablet Take 500 mg by mouth three   
times daily. TAKE ONE(2) TABLET   
TWO(2) TIMES DAILY FOR (1) DAY. 0  
potassium chloride 20 mEq TbER   
Take 1 tablet by mouth twice   
daily. 180 tablet 1  
atenolol (TENORMIN) 50 mg tablet   
Take 1 tablet by mouth once daily.   
90 tablet 3  
exemestane (AROMASIN) 25 mg tablet   
TAKE 1 TABLET BY MOUTH ONCE DAILY.   
90 tablet 3  
chlorthalidone (HYGROTON) 25 mg   
tablet Take 0.5 tablets by mouth   
once daily. 45 tablet 3  
metFORMIN ER (GLUCOPHAGE XR) 500   
mg 24 hr tablet Take 1 tablet by   
mouth daily with breakfast. 180   
tablet 3  
glipiZIDE (GLUCOTROL XL) 2.5 mg 24   
hr tablet Take 1 tablet by mouth   
once daily. 90 tablet 3  
blood sugar diagnostic (BLOOD   
GLUCOSE TEST) test strip Test   
blood sugars 2daily.   
Dx:ucnontrolled diabetes .   
Insulin: Yes 50 Strip 11  
ibuprofen (MOTRIN) 800 mg tablet   
Take 1 tablet by mouth every 8   
hours as needed for pain. Take   
with food. 45 tablet 1  
ascorbic acid, vitamin C, (VITAMIN   
C) 500 mg tablet Take 500 mg by   
mouth once daily.  
lancets (ONE TOUCH DELICA) 33   
gauge misc Test blood sugar(s) 2   
daily. Dx: Type 2 DM - Controlled   
E11.9 Insulin: Yes 1 Each 11  
Blood-Glucose Meter monitoring kit   
Glucose Meter of Choice - Kit -   
Dx: Type 2 DM - Uncontrolled   
E11.65 1 Each 0  
MV-MN/FOLIC ACID/CALCIUM/VIT K   
(ONE-A-DAY WOMEN'S 50 PLUS ORAL)   
Take 1 tablet by mouth once daily.  
  
  
No current facility-administered   
medications for this visit.  
  
ALLERGIES  
Allergen Reactions  
Silvadene [Silver S* Hives  
Sulfa (Sulfonamide * Hives  
  
No physical exam performed due to   
telephone encounter.  
  
Assessment  
IMPRESSION  
No evidence of breast cancer from   
biopsy  
  
PLAN  
Reassurance to patient that there   
is no clinical evidence of breast   
malignancy.  
Patient to continue routine breast   
cancer screening - yearly   
mammograms.  
Patient will continue follow up   
with Hem/onc.  
  
I spent 5 minutes in the telephone   
visit.  
I have confirmed and edited as   
necessary, the PFSH and ROS   
obtained by others.  
  
Unrelated to E/M, telemedicine, or   
virtual visit service provided   
within previous 7 days.  
No E/M service or procedure   
anticipated within next 24 hours.  
  
  
Kajal Dueñas MD  
2023  
14:39 PM  
  
  
  
Electronically signed by Kajal Dueñas MD at 2023 4:16   
PM EDT  
documented in this encounter            [facility]  
   
                                                    2023 Miscellaneous   
Notes                                   Formatting of this note might be   
different from the original.  
Navdeep is scheduled for a provider   
specialty phone call on 2023.  
  
Madisyn Parikh RN  
  
Electronically signed by Madisyn Parikh RN at 2023 2:03   
PM EDT  
Formatting of this note might be   
different from the original.  
[facility] - Pathology [ID   
[id number]]  
Electronically signed by Madisyn Parikh RN at 2023 1:09   
PM EDT  
documented in this encounter            [facility]  
   
                                                    2023 Miscellaneous   
Notes                                   Formatting of this note might be   
different from the original.  
Spoke with patient. Patient was   
driving, unable to check schedule.   
Informed to schedule midday and   
mail reminder. Completed.  
Electronically signed by Shirley Aguilera at 2023 4:06 PM   
EDT  
Formatting of this note might be   
different from the original.  
PSS- OV with Vikki in about 6   
months.  
Electronically signed by Samantha Hunter LPN at 2023 3:54 PM   
EDT  
Formatting of this note might be   
different from the original.  
Left detailed message on   
identified voicemail also sent   
information via my chart.  
  
Yoko Avendano LPN  
  
Electronically signed by Yoko Avendano LPN at 2023   
10:19 AM EDT  
Formatting of this note might be   
different from the original.  
If she is tolerating it then I   
would advise 7 to 10 years of   
therapy. OV with Vikki in about 6   
months.  
  
Francis Shell DO  
Electronically signed by Francis Shell DO at 2023 10:02 AM   
EDT  
Formatting of this note might be   
different from the original.  
Patient called stating biopsy was   
benign and that she has been on   
cancer medication for 6 years,   
asking if she is able to   
discontinue. Please advise.  
Electronically signed by Shirley Jiménez Pss at 2023 8:36 AM   
EDT  
documented in this encounter            [facility]  
   
                                                    2023 Miscellaneous   
Notes                                   Formatting of this note might be   
different from the original.  
Patient returned call and went   
over results, notes from express   
care provider with understanding.  
Electronically signed by Saige Garcia LPN at 2023   
10:17 AM EST  
Formatting of this note might be   
different from the original.  
Left message for pt to call back.  
Tami Tolentino MA  
  
Electronically signed by Tami Tolentino MA at 2023 10:05 AM   
EST  
Formatting of this note might be   
different from the original.  
----- Message from NIC Tubbs.CNP sent at   
3/2/2023 7:18 AM EST -----  
Urine culture did not show clear   
evidence of infection. Recommend   
follow up with PCP to ensure   
hematuria has resolved. Kerrie Don CNP  
  
Electronically signed by Tami Tolentino MA at 2023 10:02 AM   
EST  
documented in this encounter            [facility]  
   
                                                    2023 History of   
Present illness Narrative               Formatting of this note is   
different from the original.  
POPULATION HEALTH NAVIGATION   
OUTREACH  
  
Action/FYI  
  
Left message on patient's voice   
mail to return my call. MyChart   
message sent.  
  
Patient is on [place] [facility]   
following HM care gaps:  
  
DILATED RETINAL EXAM  
  
COLORECTAL CANCER SCREENING  
  
Advance Directives  
  
  
Patient Identified by Name and   
: NO  
  
Outreach Outcome/Action  
Unable to reach patient: Left   
message  
GPalhart message sent  
  
Did you use a PCP flex slot to   
schedule this appointment?  
N/A  
  
Reason for Outreach  
Care Gap or Scheduling/Wellness   
visits  
  
Payer:  
Payor: [facility] MEDICARE / Plan: [facility]   
DUAL COMPLETE HMO SNP / Product   
Type: Medicare /  
  
Care Gap Reviewed::  
Colorectal Cancer Screening  
Diabetic Eye Exam  
  
Reminder: Reminder note to check   
Health Maintenance for items below  
  
Health Maintenance items due:  
COVID-19 VACCINE(1) Never done  
PNEUMOCOCCAL(1 - PCV) Never done  
HIV SCREENING Never done  
SHINGRIX VACCINE(1 of 2) Never   
done  
PAP TESTING due on 2019  
HPV TESTING due on 2019  
COLORECTAL CANCER SCREENING due on   
2021  
DILATED RETINAL EXAM due on   
2022  
DEPRESSION ASSESSMENT Never done  
URINE ALBUMIN:CREATININE RATIO due   
on 2023  
  
Navigation Signature:  
  
Stephanie Hoskins MA  
2023 7:27 AM  
  
  
Electronically signed by Stephanie Hoskins MA at 2023 9:35 AM   
EST  
documented in this encounter            [facility]  
   
                                                    2023 History of   
Present illness Narrative               Formatting of this note is   
different from the original.  
Navdeep Guillen  
1960  
  
REFERRING PHYSICIAN: Francis Shell DO  
  
CHIEF COMPLAINT: Consult (Abnormal   
mammogram)  
  
HPI: The patient is a 62 year old   
female presents with abnormal   
right breast radiographs.  
She has a history of right breast   
cancer. She is s/p right breast   
lumpectomy/SLNBBx and XRT in 2017  
There are pleomorphic scattered   
calcifications that are noted and   
they are in the vicinity of the   
previous surgery.  
She denies palpable breast masses.  
She denies nipple discharge.  
She denies chronic pain to the   
right breast, she notes twinges of   
discomfort in the area,   
intermittently.  
  
She also presents s/p episode of   
acute diverticulitis for which she   
was hospitalized at [facility]. I have   
reviewed the CT scan findings   
obtained there. She is scheduled   
for colonoscopy in the near   
future. She asks about the disease   
progression of diverticular   
disease and why the need for   
colonscopy.  
  
PAST MEDICAL HISTORY  
Diagnosis Date  
Abnormal mammogram 2021  
Achilles tendon pain 2015  
Ankle weakness 2015  
Chronic pain of left ankle   
12/15/2016  
Colon abnormality 2014  
Thickening of the ascending colon   
seen on the recent CT scan.   
Patient has had a colonoscopy   
around 4 years ago. Records were   
obtained for when they were done,   
4 years ago , in [facility]. her colonoscopy was   
completely normal, no thickening,   
and the biopsy too was normal   
except for lymphoid aggregates.  
Diverticulitis   
Treated by Dr. Patricio at [facility]  
DJD (degenerative joint disease),   
ankle and foot 2016  
DM (diabetes mellitus) (HCC)  
GERD (gastroesophageal reflux   
disease)  
Gross hematuria 05/15/2014  
2014, had hematuria, investigated   
extensively along with cytoscopy,   
a stone was found but no signs of   
any malignancy.  
Heel pain, chronic 12/15/2016  
Hepatic steatosis 10/03/2017  
Hiatal hernia 10/03/2017  
Hypertension  
Insomnia  
Kidney stone 05/15/2014  
Morbid obesity (HCC)  
Nephrolithiasis  
Neuritis 2016  
Renal lesion 05/15/2014  
S/P Achilles tendon repair   
2015 sx done  
  
  
PAST SURGICAL HISTORY  
Procedure Laterality Date  
BREAST LUMPECTOMY HX Right 2017  
BX BREAST W/DEVICE 1ST LESION   
STEREOTACTIC GUID Right 2021  
CHOLECYSTECTOMY   
gallbladder removal  
COLONOSCOPY   
CT ABDOMEN 2014  
PAST SURGICAL HISTORY OF   
2014  
D&C hysteroscopy  
PAST SURGICAL HISTORY OF Left   
2015  
Left foot surgery  
S PK [facility] [id number] 10/16/2014  
  
Current Outpatient Medications  
Medication Sig  
potassium chloride 20 mEq TbER   
Take 1 tablet by mouth twice   
daily.  
atenolol (TENORMIN) 50 mg tablet   
Take 1 tablet by mouth once daily.  
exemestane (AROMASIN) 25 mg tablet   
TAKE 1 TABLET BY MOUTH ONCE DAILY.  
chlorthalidone (HYGROTON) 25 mg   
tablet Take 0.5 tablets by mouth   
once daily.  
metFORMIN ER (GLUCOPHAGE XR) 500   
mg 24 hr tablet Take 1 tablet by   
mouth daily with breakfast.  
glipiZIDE (GLUCOTROL XL) 2.5 mg 24   
hr tablet Take 1 tablet by mouth   
once daily.  
blood sugar diagnostic (BLOOD   
GLUCOSE TEST) test strip Test   
blood sugars 2daily.   
Dx:ucnontrolled diabetes .   
Insulin: Yes  
ibuprofen (MOTRIN) 800 mg tablet   
Take 1 tablet by mouth every 8   
hours as needed for pain. Take   
with food.  
lancets (ONE TOUCH DELICA) 33   
gauge misc Test blood sugar(s) 2   
daily. Dx: Type 2 DM - Controlled   
E11.9 Insulin: Yes  
Blood-Glucose Meter monitoring kit   
Glucose Meter of Choice - Kit -   
Dx: Type 2 DM - Uncontrolled   
E11.65  
MV-MN/FOLIC ACID/CALCIUM/VIT K   
(ONE-A-DAY WOMEN'S 50 PLUS ORAL)   
Take 1 tablet by mouth once daily.  
  
fluconazole (DIFLUCAN) 150 mg   
tablet (Patient not taking:   
Reported on 2023)  
ciprofloxacin HCl (CIPRO) 500 mg   
tablet Take 1 tablet by mouth   
twice daily.  
metroNIDAZOLE (FLAGYL) 500 mg   
tablet Take 500 mg by mouth three   
times daily. TAKE ONE(2) TABLET   
TWO(2) TIMES DAILY FOR (1) DAY.  
ascorbic acid, vitamin C, (VITAMIN   
C) 500 mg tablet Take 500 mg by   
mouth once daily.  
  
ALLERGIES: Silvadene [Silver   
Sulfadiazine] and Sulfa   
(Sulfonamide Antibiotics)  
  
PERSONAL HISTORY:  
Social History  
  
Tobacco Use  
Smoking status: Former  
Packs/day: 1.00  
Years: 10.00  
Pack years: 10.00  
Types: Cigarettes  
Quit date: 5/15/2007  
Years since quitting: 15.8  
Smokeless tobacco: Never  
Vaping Use  
Vaping Use: Never used  
Substance Use Topics  
Alcohol use: No  
Drug use: Not Currently  
Comment: marijuana for insomnia -   
currently not using  
  
  
FAMILY HISTORY  
Problem Relation Age of Onset  
Ovarian cancer Mother 39  
Heart Father  
Hypertension Father  
Prostate Cancer Father 78  
other (kidney stones) Brother  
Hypertension Brother  
Diabetes Brother  
Seizures Son  
Breast Cancer Other 55  
Double first cousin (daughter of   
mother's sister and father's   
brother)  
  
The review of systems data was   
entered by the nurse and reviewed   
by me  
  
Nursing Notes:  
Madisyn Parikh RN 2023   
12:50 PM Signed  
REVIEW OF SYSTEMS:  
General: The patient denies   
fatigue, denies weight loss,   
denies weight gain, denies feeling   
hot, and denies feelings of cold.  
Eyes: The patient denies glaucoma,   
denies eye injury/surgery, wears   
glasses or contacts.  
Ear/Nose/Throat: The patient NOTES   
allergies, denies hayfever, denies   
ear infections, and denies bloody   
noses.  
Cardiovascular: The patient denies   
chest pain, denies heart disease,   
NOTES high blood pressure,denies   
cardiac stent, denies prior heart   
attack, denies irregular heart   
beat, denies high cholesterol,   
denies poor circulation, denies   
heart failure, other cardiac   
issues, denies claudication,   
denies cold feet, denies   
peripheral arterial stent.  
Respiratory: The patient denies   
tuberculosis, denies pneumonia,   
denies frequent cough, denies   
pulmonary embolism, denies   
shortness of breath, and denies   
coughing up blood.  
Gastrointestinal: The patient   
denies difficulty swallowing,   
denies acid reflux, denies ulcers,   
denies vomiting, denies   
jaundice/hepatitis, NOTES   
gallbladder problems, denies black   
or tarry stools, denies   
hemorrhoids, denies bleeding from   
rectum, NOTES diverticulitis,   
denies constipation, denies   
diarrhea, denies loss of stool   
control, and denies hernias.  
Kidney/Bladder: The patient NOTES   
kidney stones, NOTES urine   
infections, and NOTES bloody   
urine.  
Skin: The patient denies a history   
of skin cancer, denies   
bleeding/changing moles, and NOTES   
a history of skin rash.  
Neurologic: The patient denies a   
history of epilepsy/convulsions,   
denies headaches, denies   
head/spinal injuries, and denies   
stroke/TIA.  
Psychiatric: The patient denies   
psychiatric medications, denies   
depression, and denies voices,   
denies substance abuse.  
Endocrine: The patient denies   
thyroid disorders, NOTES diabetes,   
and denies hormonal problems.  
Hematologic: The patient denies a   
history of bruising, denies   
bleeding, and denies anemia,   
denies blood clots.  
Infections: The patient denies a   
history of measles and mumps,   
denies rheumatic fever, and denies   
sexually transmitted diseases.  
Musculoskeletal: The patient   
denies back pain/injury, denies   
back problems, denies sciatica,   
NOTES knee/foot trouble, NOTES   
arthritis, or denies gout.  
When was patient's last Mammogram   
screening? 23 and 23  
Last Colonoscopy: 2011  
Madisyn Parikh RN  
  
  
PHYSICAL EXAMINATION:  
General: The patient is 62 year   
old female, well nourished, well   
hydrated in no acute distress. The   
patient is oriented to time,   
place, and person.  
VITALS: Blood pressure 118/72,   
pulse 81, temperature 37 C (98.6   
F), height 167.6 cm (5' 6 ),   
weight 131.1 kg (289 lb), SpO2 96   
%. Body mass index is 46.65 kg/m .  
Head - Normocephalic. EOM intact   
with sclera clear and no icterus   
noted.  
Neck - supple with no jugular   
venous distention noted. Trachea   
is midline. No thyroid enlargement   
or thyroid nodules detected. No   
masses noted.  
Chest/breast - no asymmetry of   
breasts noted, no suspicious skin   
lesions noted, - healed incisional   
sites of upper outer quadrant and   
inferior aspect of right breast,   
no nipple discharge and both   
nipples everted, no breast masses   
noted  
Lungs - clear to auscultation.   
Normal breath sounds. No   
rales/rhonchi/wheezing noted. No   
labored breathing noted, such as   
retractions. No cough heard.  
Heart - normal S1 and S2   
auscultated. No   
rubs/clicks/murmurs noted. Regular   
rate.  
Abdomen - soft and benign.   
Difficult to determine if any   
masses or organomegaly due to body   
habitus.  
Extremities - no calf tenderness   
noted. No pitting edema noted.  
Skin - normal skin integrity.  
Lymph - no cervical adenopathy   
detected, no supraclavicular   
adenopathy detected, no axillary   
adenopathy detected  
Neurological - gait normal, no   
focal deficits noted  
Psych - calm and appropriate  
  
RADIOLOGIC STUDIES: As Noted  
  
Assessment  
IMPRESSION: abnormal   
calcifications on right breast   
mammograms, history of right   
breast cancer  
  
PLAN: I have discussed the above   
with the patient.  
I have reviewed the abnormal   
breast radiographs with the   
patient.  
I have offered right stereotactic   
breast biopsy  
I have explained the procedure to   
the patient.  
I have counseled the patient as to   
the risks of the procedure,   
including but not limited to:  
Infection (increased risk due to   
history of surgery and XRT),   
bleeding, injury to any blood   
vessels/nerves, scar tissue, wound   
infections, complications of  
anesthesia, etc. - the patient   
understands.  
The patient wishes to proceed.  
I have also explained diverticular   
disease to the patient and   
indications for colonoscopy   
(findings of wall thickening on CT   
scan). She was concerned about   
requiring surgery and I have   
explained the indications for   
surgery to the patient. She feels   
better informed and more   
comfortable with her course of   
testing.  
The patient acknowledges above.  
  
I have answered all questions to   
the patient s satisfaction and the   
patient has no further questions.  
  
I have confirmed and edited as   
necessary, the PFSH and ROS   
obtained by others.  
  
Consultation requested by Dr. Francis Shell for an opinion regarding   
patient's abnormal breast   
radiographs. My final   
recommendations will be   
communicated back to the   
requesting physician by way of   
shared Medical record or letter to   
requesting physician via US mail.  
  
.  
Diagnoses: (R92.1) Calcification   
of right breast on mammography  
(K57.30) Diverticulosis of large   
intestine without perforation or   
abscess without bleeding  
(R93.3) Abnormal CT scan,   
gastrointestinal tract  
  
Return to Clinic: The patient will   
be scheduled for stereotactic   
breast biopsy at [facility].  
  
I spent a total of 41 minutes on   
the date of the service which   
included preparing to see the   
patient with review of any   
pertinent laboratory   
studies/radiological   
imaging/medical records from other   
medical facilities such as [facility], face-to-face   
patient care, obtaining oral   
medical history from the patient   
in this encounter, performing a   
medically appropriate examination,   
counseling and educating the   
patient/family/caregiver, and   
ordering and/or scheduling of   
medications/tests/procedures, and   
completing appropriate medical   
documentation.  
  
_____________________________  
Kajal Dueñas MD  
Electronically signed by Kajal Dueñas MD at 2023 7:49   
AM EST  
documented in this encounter            [facility]  
   
                                        2023 Nurse Note Formatting of this  
 note might be   
different from the original.  
REVIEW OF SYSTEMS:  
General: The patient denies   
fatigue, denies weight loss,   
denies weight gain, denies feeling   
hot, and denies feelings of cold.  
Eyes: The patient denies glaucoma,   
denies eye injury/surgery, wears   
glasses or contacts.  
Ear/Nose/Throat: The patient NOTES   
allergies, denies hayfever, denies   
ear infections, and denies bloody   
noses.  
Cardiovascular: The patient denies   
chest pain, denies heart disease,   
NOTES high blood pressure,denies   
cardiac stent, denies prior heart   
attack, denies irregular heart   
beat, denies high cholesterol,   
denies poor circulation, denies   
heart failure, other cardiac   
issues, denies claudication,   
denies cold feet, denies   
peripheral arterial stent.  
Respiratory: The patient denies   
tuberculosis, denies pneumonia,   
denies frequent cough, denies   
pulmonary embolism, denies   
shortness of breath, and denies   
coughing up blood.  
Gastrointestinal: The patient   
denies difficulty swallowing,   
denies acid reflux, denies ulcers,   
denies vomiting, denies   
jaundice/hepatitis, NOTES   
gallbladder problems, denies black   
or tarry stools, denies   
hemorrhoids, denies bleeding from   
rectum, NOTES diverticulitis,   
denies constipation, denies   
diarrhea, denies loss of stool   
control, and denies hernias.  
Kidney/Bladder: The patient NOTES   
kidney stones, NOTES urine   
infections, and NOTES bloody   
urine.  
Skin: The patient denies a history   
of skin cancer, denies   
bleeding/changing moles, and NOTES   
a history of skin rash.  
Neurologic: The patient denies a   
history of epilepsy/convulsions,   
denies headaches, denies   
head/spinal injuries, and denies   
stroke/TIA.  
Psychiatric: The patient denies   
psychiatric medications, denies   
depression, and denies voices,   
denies substance abuse.  
Endocrine: The patient denies   
thyroid disorders, NOTES diabetes,   
and denies hormonal problems.  
Hematologic: The patient denies a   
history of bruising, denies   
bleeding, and denies anemia,   
denies blood clots.  
Infections: The patient denies a   
history of measles and mumps,   
denies rheumatic fever, and denies   
sexually transmitted diseases.  
Musculoskeletal: The patient   
denies back pain/injury, denies   
back problems, denies sciatica,   
NOTES knee/foot trouble, NOTES   
arthritis, or denies gout.  
  
When was patient's last Mammogram   
screening? 23 and 23  
  
Last Colonoscopy: 2011  
  
Madisyn Parikh RN  
Electronically signed by Madisyn Parikh RN at 2023 12:50   
PM EST  
documented in this encounter            [facility]  
   
                                                    2023 History of   
Present illness Narrative               Formatting of this note is   
different from the original.  
Images from the original note were   
not included.  
  
[facility]   
[facility]IT[facility]  
[place] [facility]  
NEW PATIENT CLINIC NOTE  
  
  
SERVICE DATE: 2023  
SERVICE TIME: 12:51 PM  
NAME: Navdeep Guillen  
MRN: [id number]  
  
CHIEF COMPLAINT: Hematuria  
  
HISTORY OF PRESENT ILLNESS:  
Navdeep Guillen is a 62 year old   
female presenting with 2 episodes   
of hematuria , she was found to   
have a UTI with E Coli and was   
given Cipro 500 mg  
The patient reports UTI improved,   
and has not seen blood in urine   
for more than a week. We discussed   
common causes of hematuria and   
reviewed tests  
She had CT and urine culture with   
E coli  
  
LUTS:  
DYSURIA: yes  
URGENCY: Yes  
FREQUENCY:5 per day  
NOCTURIA: 0 per night  
STRAINING TO VOID: No  
EMPTIES COMPLETELY: Yes  
UTI: 1 past 12 months  
GROSS HEMATURIA: yes  
UA DIPSTICK POSITIVE ONLY: yes  
  
Other symptoms:  
  
LABS:  
No results found for: TESTOST  
No results found for: TESTFREE  
No results found for: PSA  
Hematocrit (%)  
Date Value  
2023 45.0  
2023 42.6  
2022 44.1  
2022 45.9  
2021 43.1  
07/15/2020 44.4  
  
No results found for: PSA  
  
Creatinine  
Date Value Ref Range Status  
2023 0.84 0.58 - 0.96 mg/dL   
Final  
2023 0.72 0.58 - 0.96 mg/dL   
Final  
2022 0.70 0.58 - 0.96 mg/dL   
Final  
2022 0.74 0.58 - 0.96 mg/dL   
Final  
  
  
MEDICATIONS:  
  
ciprofloxacin HCl (CIPRO) 500 mg   
tablet Take 1 tablet by mouth   
twice daily.  
metroNIDAZOLE (FLAGYL) 500 mg   
tablet Take 500 mg by mouth three   
times daily. TAKE ONE(2) TABLET   
TWO(2) TIMES DAILY FOR (1) DAY.  
potassium chloride 20 mEq TbER   
Take 1 tablet by mouth twice   
daily.  
atenolol (TENORMIN) 50 mg tablet   
Take 1 tablet by mouth once daily.  
exemestane (AROMASIN) 25 mg tablet   
TAKE 1 TABLET BY MOUTH ONCE DAILY.  
chlorthalidone (HYGROTON) 25 mg   
tablet Take 0.5 tablets by mouth   
once daily.  
metFORMIN ER (GLUCOPHAGE XR) 500   
mg 24 hr tablet Take 1 tablet by   
mouth daily with breakfast.  
glipiZIDE (GLUCOTROL XL) 2.5 mg 24   
hr tablet Take 1 tablet by mouth   
once daily.  
blood sugar diagnostic (BLOOD   
GLUCOSE TEST) test strip Test   
blood sugars 2daily.   
Dx:ucnontrolled diabetes .   
Insulin: Yes  
ibuprofen (MOTRIN) 800 mg tablet   
Take 1 tablet by mouth every 8   
hours as needed for pain. Take   
with food.  
lancets (ONE TOUCH DELICA) 33   
gauge misc Test blood sugar(s) 2   
daily. Dx: Type 2 DM - Controlled   
E11.9 Insulin: Yes  
Blood-Glucose Meter monitoring kit   
Glucose Meter of Choice - Kit -   
Dx: Type 2 DM - Uncontrolled   
E11.65  
MV-MN/FOLIC ACID/CALCIUM/VIT K   
(ONE-A-DAY WOMEN'S 50 PLUS ORAL)   
Take 1 tablet by mouth once daily.  
  
fluconazole (DIFLUCAN) 150 mg   
tablet (Patient not taking:   
Reported on 2023)  
ascorbic acid, vitamin C, (VITAMIN   
C) 500 mg tablet Take 500 mg by   
mouth once daily.  
  
PAST MEDICAL HISTORY:  
  
PAST MEDICAL HISTORY  
Diagnosis Date  
Abnormal mammogram 2021  
Achilles tendon pain 2015  
Ankle weakness 2015  
Chronic pain of left ankle   
12/15/2016  
Colon abnormality 2014  
Thickening of the ascending colon   
seen on the recent CT scan.   
Patient has had a colonoscopy   
around 4 years ago. Records were   
obtained for when they were done,   
4 years ago , in [facility]. her colonoscopy was   
completely normal, no thickening,   
and the biopsy too was normal   
except for lymphoid aggregates.  
DJD (degenerative joint disease),   
ankle and foot 2016  
DM (diabetes mellitus) (HCC)  
GERD (gastroesophageal reflux   
disease)  
Gross hematuria 05/15/2014  
2014, had hematuria, investigated   
extensively along with cytoscopy,   
a stone was found but no signs of   
any malignancy.  
Heel pain, chronic 12/15/2016  
Hepatic steatosis 10/03/2017  
Hiatal hernia 10/03/2017  
Hypertension  
Insomnia  
Kidney stone 05/15/2014  
Morbid obesity (HCC)  
Nephrolithiasis  
Neuritis 2016  
Renal lesion 05/15/2014  
S/P Achilles tendon repair   
2015 sx done  
  
PAST SURGICAL HISTORY:  
  
PAST SURGICAL HISTORY  
Procedure Laterality Date  
BREAST LUMPECTOMY HX Right 2017  
BX BREAST W/DEVICE 1ST LESION   
STEREOTACTIC GUID Right 2021  
CHOLECYSTECTOMY   
gallbladder removal  
COLONOSCOPY   
CT ABDOMEN 2014  
PAST SURGICAL HISTORY OF   
2014  
D&C hysteroscopy  
PAST SURGICAL HISTORY OF Left   
2015  
Left foot surgery  
S PK [facility]AV[facility] [id number] 10/16/2014  
  
FAMILY HISTORY:  
  
FAMILY HISTORY  
Problem Relation Age of Onset  
Ovarian cancer Mother 39  
Heart Father  
Hypertension Father  
Prostate Cancer Father 78  
other (kidney stones) Brother  
Hypertension Brother  
Diabetes Brother  
Seizures Son  
Breast Cancer Other 55  
Double first cousin (daughter of   
mother's sister and father's   
brother)  
  
SOCIAL HISTORY:  
Social Connections: Socially   
Isolated  
Frequency of Communication with   
Friends and Family: More than   
three times a week  
Frequency of Social Gatherings   
with Friends and Family: Once a   
week  
Attends Evangelical Services: Never  
Active Member of Clubs or   
Organizations: No  
Attends Club or Organization   
Meetings: Never  
Marital Status:   
  
REVIEW OF SYSTEMS:  
GENERAL: No fever, chills, weight   
loss, or fatigue.  
ENMT: Negative  
CARDIOVASCULAR:NO CHEST PAIN,   
PALPITATIONS, ANKLE EDEMA  
RESPIRATORY: No chronic cough,   
wheezing, dyspnea, hemoptysis.  
GENITOURINARY: SEE HPI  
MUSCULOSKELETAL:NO CHRONIC BACK   
PAIN, ARTHRITIS, CHRONIC NECK PAIN  
SKIN: NO VARICOSE VEINS, RASH,   
ABNORMAL ITCHING  
HEME/LYMPH/IMMUNE:Negative for   
prolonged bleeding, bruising   
easily or swollen nodes  
NEUROLOGICAL: NO HEADACHES,   
NUMBNESS, SEIZURES, STROKE  
DIABETES: Yes  
All other systems reviewed and are   
negative  
  
PHYSICAL EXAMINATION:  
Blood pressure 110/80, pulse 86,   
temperature 36.5 C (97.7 F),   
temperature source Temporal,   
height 167.6 cm (5' 6 ), weight   
132.5 kg (292 lb), SpO2 98 %.  
GENERAL: WNL nutrition, no   
deformities, healthy appearing  
NEURO: Awake, alert and oriented x   
3 and Normal gait  
PSYCH: No signs of depression,   
anxiety, or agitation  
ENMT (Ear, Nose, Mouth, Throat):   
No masses, adenopathy, icterus.   
Thyroid nonpalpable  
RESP: NL effort, no retractions or   
purse-lip breathing.  
CV: No extremity swelling,   
varices, edema, pallor, erythema  
GASTROINTESTINAL: Soft, nontender,   
nondistended, no masses.  
HERNIAS: None  
SKIN: No rash, lesions  
No palpable lymphadenopathy  
MUSCULOSKELETAL: Extremities   
normal. No deformities, edema,   
clubbing or skin discoloration.  
  
PROBLEM LIST REVIEW:  
Yes  
  
LABS:  
  
Results for orders placed or   
performed in visit on 23  
UA DIP, URINE (POC)  
Result Value Ref Range  
GLUCOSE UA (POCT) Negative   
Negative mg/dL  
BILIRUBIN UA (POCT) Negative   
Negative  
KETONE UA (POCT) Negative Negative   
mg/dL  
SPECIFIC GRAVITY UA (POCT) 1.020   
1.005 - 1.030  
HEMOGLOBIN/BLOOD UA (POCT) Small   
(A) Negative  
PH UA (POCT) 7.0 4.5 - 8.0  
PROTEIN UA (POCT) 30 (A) Negative   
mg/dL  
UROBILINOGEN UA (POCT) 0.2 Normal   
E.U./dL  
NITRITE UA (POCT) Negative   
Negative  
LEUKOCYTES UA (POCT) Large (A)   
Negative  
COLOR UA (POCT) Yellow  
CLARITY UA (POCT) Clear  
  
Urine Culture: Pending  
  
PROCEDURES:  
  
PVR: 0 ml  
  
IMAGING:  
  
CT Urogram - Scanned into Epic -   
[facility]  
> Normal Kidney and bladder  
  
IMPRESSION/PLAN:  
62 year old female with  
1. Gross hematuria - ICD9: 599.71,   
ICD10: R31.0  
  
> Urine culture today  
> Finish Cipro and Flagyl  
  
I spent a total of 30 minutes on   
the date of the service which   
included preparing to see the   
patient, face to face patient   
care, completing clinical   
documentation, obtaining and/or   
reviewing separately obtained   
history, performing a medically   
appropriate examination,   
counseling and educating the   
patient/family/caregiver, ordering   
medications, tests, or procedures,   
and care coordination.  
  
SHANE Delaney MT, PA-C  
  
  
Electronically signed by Julito Osborne PA-C at 2023 1:05 PM   
EST  
Formatting of this note might be   
different from the original.  
Verified name and date of birth.  
  
CC Post Void Residual  
  
HPI:  
Navdeep Guillen is a 62 year old   
female.  
The patient is here now for an   
appointment with SHANE Delaney MT, PA-COV.  
  
Procedure:  
Explained procedure to patient and   
verbalizes understanding.   
Performed a PVR.  
Patient urinated and instructed to   
empty bladder as much as possible   
just prior to having PVR done   
using bladder ultrasound scanner.  
Results of scan: 0 mL  
  
The patient tolerated the   
procedure well.  
  
Plan:  
Appointment with Julito.  
  
  
Electronically signed by Shayna Grady LPN at 2023 1:05 PM EST  
documented in this encounter            [facility]  
   
                                                    2023 Miscellaneous   
Notes                                   Formatting of this note might be   
different from the original.  
Called pt and warmed transferred   
her as directed by scheduling tree   
to [phone]  
Electronically signed by Michelle Varela at 2023 1:48 PM EST  
Formatting of this note might be   
different from the original.  
PSS- please reach out to schedule   
with urology at [facility] and notify   
patient.  
  
Samantha Hunter LPN  
  
Electronically signed by Samantha Hunter LPN at 2023 1:23 PM   
EST  
Addended by: FRANCIS SHELL on:   
2023 01:18 PM  
  
Modules accepted: Orders  
  
  
Electronically signed by Francis Shell DO at 2023 1:18 PM   
EST  
Formatting of this note might be   
different from the original.  
Any of the urologists at [facility].  
  
Francis Shell DO  
Electronically signed by Francis Shell DO at 2023 1:18 PM   
EST  
Formatting of this note might be   
different from the original.  
Dr. Dejesus's office called to say   
they do not accept patient's   
insurance (Medicaid).  
  
Who would you like patient   
referred to instead?  
  
Samantha Hunter LPN  
  
Electronically signed by Samantha Hunter LPN at 2023 1:14 PM   
EST  
Formatting of this note might be   
different from the original.  
Patient is aware of all   
information and verbalized   
understanding.  
  
Referral faxed to Dr. Dejesus's   
office asking them to reach out to   
patient to schedule.  
  
Samantha Hunter LPN  
  
Electronically signed by Samantha Hunter LPN at 2023 11:34 AM   
EST  
Formatting of this note might be   
different from the original.  
Let her know that I reviewed her   
hospital records. As I discussed   
during the OV I recommend urology   
evaluation for the gross hematuria   
she had. Please make a referral to   
Dr. Dejesus at [facility]. Can let her   
know I talked with Dr. Patricio   
who concurs. Keep appointment with   
Dr. Patricio as well.  
  
Francis Shell DO  
Electronically signed by Francis Shell DO at 2023 11:11 AM   
EST  
documented in this encounter            [facility]  
   
                                                    2023 History of   
Present illness Narrative               Formatting of this note is   
different from the original.  
Per Dr. Milner's most recent OV note.   
Personally reviewed and updated by   
me.DIAGNOSIS: Breast cancer  
  
HPI: The patient is a 62 year-old   
female with history of   
hypertension and borderline   
diabetes/insulin resistant,   
obesity, who presented with an   
abnormal breast exam at her PCP   
office. Subsequent diagnostic   
mammogram revealing a lobulated   
lesion in the right breast at the   
lower outer quadrant highly   
suspicious of malignancy.  
  
Patient had a mammotome breast   
biopsy on 3/27/17 that showed   
invasive ductal carcinoma,   
positive for estrogen and   
progesterone receptors, equivocal   
for overexpression HER-2/andi.  
  
She had surgery by Dr. Dueñas, right   
breast lumpectomy and right   
axillary sentinel lymph node   
biopsy on 17 for right breast   
cancer.  
  
Stage IA- pT1c pN0 (3/3 sentinel   
lymph nodes negative for   
metastatic disease)  
Tumor size 1.5 cm x 1 x 0.9 cm  
Overall grade 2 out of 7  
Margins - 0.4 cm from posterior   
margin  
ER/CA >95%, Dyo7wma not amplified   
by FISH  
Lymph/vasc invasion - none;   
derm/vasc/lymph invasion - none  
  
Menstrual history: Menarche at age   
11, first pregnancy at age 18, 8   
pregnancy; surgical   
menopause-total abdominal   
hysterectomy age 54. No estrogen   
replacement therapy. Family   
history: Mother  of ovarian   
cancer in her 40's. No family   
history of breast cancer. She was   
initially started on anastrozole,   
then subsequent switch to Aromasin   
because of joint pain.  
  
Current treatment:  
1) Aromasin 25 mg once daily  
  
Interim history:  
Tolerating Aromasin well.  
Occasional ache in hands.  
Arthritis pain chronic both knees.  
  
Currently on antibiotics for   
diverticulitis and UTI. Went to ED   
with gross hematuria with clots.   
Was also having pain LLQ. Wasn't   
clearly having dysuria. Had no   
fever. CT of the abdomen pelvis on   
2023 demonstrated numerous   
diverticula projecting from the   
descending and sigmoid colon.   
Along the segment of the colon   
extending from the distal   
descending through the mid to   
distal sigmoid showed mild to   
moderate mural thickening with   
extensive pericolonic stranding.   
There is a broad band of soft   
tissue density containing air   
bubbles extending anteriorly and   
inferiorly from the proximal   
sigmoid colon indicating   
microperforation. No fluid   
collection was observed. Urinary   
bladder was noted to tilt toward   
the soft tissue density but no air   
bubbles were observed within the   
bladder to indicate fistula.   
However the bladder was noted to   
be nearly empty and the bladder   
wall was thickened with   
perivesicular stranding which was   
attributed to either cystitis   
and/or reactive edema from   
adjacent sigmoid inflammation.  
  
Urine culture demonstrated   
11-25,000 CFU's presumptive E.   
coli.  
  
2 sets of blood cultures drawn   
 remain negative through 5   
days.  
  
Gross hematuria resolved and pain   
subsided.  
  
PMH, medications and allergies   
personally reviewed by me today.   
Any changes documented in   
appropriate section.  
  
ROS:  
Constitutional: Denies episodes of   
fever and night sweats.  
Neuro: Denies HA, vertigo,   
dizziness and imbalance. Denies   
symptoms of neuropathy.  
HEENT: No recent change in voice,   
vision or hearing.  
Resp: Denies cough, wheeze and   
hemoptysis. Denies shortness of   
breath at rest. Denies DUBOIS.  
CVS: Denies exertional chest pain,   
PND, orthopnea and LE edema.  
GI: Denies dysgeusia. Denies   
symptoms of stomatitis. Denies   
dysphagia and odynophagia. Some   
nausea due to antibiotics.  
: See above.  
Endo: Denies hot flashes. Denies   
polyuria and polydipsia. Denies   
heat and cold intolerance.  
Musculoskeletal: See above.  
Derm: Denies rash. Denies jaundice   
and diffuse pruritis.  
Heme: Denies unusual bleeding and   
unexplained bruising.  
Psych: Normal mood.  
  
PHYSICAL EXAM:  
Vitals: Blood pressure 115/70,   
pulse 81, temperature 36.8 C (98.2   
F), height 167 cm (5' 5.75 ),   
weight 132.2 kg (291 lb 8 oz),   
SpO2 98 %.  
Well-appearing and in no acute   
distress.  
EYES: Sclerae are anicteric   
bilaterally.  
LYMPHATIC: There is no palpable   
cervical, supraclavicular or   
axillary adenopathy.  
RESPIRATORY: Inspiratory breath   
sounds are of normal intensity in   
all fields. No rales, wheezes or   
rhonchi.  
CARDIOVASCULAR: Rhythm is regular.  
BREAST: Declined chaperone. There   
is some contraction of the right   
breast secondary to radiation. In   
the lower inner quadrant there is   
chronic swelling/edema of the   
skin. No discrete nodule   
appreciated in the breast tissue   
itself although palpatory exam is   
difficult secondary to radiation   
changes. Left breast no concerning   
mass or nodule.  
ABDOMEN: The abdomen is   
nondistended.  
Extremities: No swelling or edema.  
SKIN: No jaundice or rash. No   
petechiae.  
NEUROLOGIC: CNs II-XII are grossly   
intact.  
  
LABS:  
Component Latest Ref Rng & Units   
2023  
WBC 3.70 - 11.00 k/uL 11.42 (H)  
RBC 3.90 - 5.20 m/uL 5.56 (H)  
Hemoglobin 11.5 - 15.5 g/dL 14.7  
Hematocrit 36.0 - 46.0 % 45.0  
MCV 80.0 - 100.0 fL 80.9  
MCH 26.0 - 34.0 pg 26.4  
MCHC 30.5 - 36.0 g/dL 32.7  
RDW-CV 11.5 - 15.0 % 17.6 (H)  
Platelet Count 150 - 400 k/uL 531   
(H)  
MPV 9.0 - 12.7 fL 8.9 (L)  
Neut% % 71.6  
Abs Neut (ANC) 1.45 - 7.50 k/uL   
8.17 (H)  
Lymph% % 17.4  
Abs Lymph 1.00 - 4.00 k/uL 1.99  
Mono% % 6.1  
Abs Mono <0.87 k/uL 0.70  
Eosin% % 3.4  
Abs Eosin <0.46 k/uL 0.39  
Baso% % 0.5  
Abs Baso <0.11 k/uL 0.06  
Immature Gran % % 1.0  
IMMATURE GRANS (ABS) <0.10 k/uL   
0.11 (H)  
NRBC /100 WBC 0.0  
Absolute nRBC <0.01 k/uL <0.01  
DTYPE Auto  
Protein, Total 6.3 - 8.0 g/dL 7.3  
Albumin 3.9 - 4.9 g/dL 3.7 (L)  
Calcium 8.5 - 10.2 mg/dL 9.5  
Bilirubin, Total 0.2 - 1.3 mg/dL   
0.3  
Alkaline Phosphatase 34 - 123 U/L   
70  
AST 13 - 35 U/L 40 (H)  
ALT 7 - 38 U/L 47 (H)  
Glucose 74 - 99 mg/dL 225 (H)  
BUN 7 - 21 mg/dL 9  
Creatinine 0.58 - 0.96 mg/dL 0.84  
Sodium 136 - 144 mmol/L 137  
Potassium 3.7 - 5.1 mmol/L 3.9  
Chloride 97 - 105 mmol/L 101  
CO2 22 - 30 mmol/L 25  
Anion Gap 9 - 18 mmol/L 11  
eGFR >=60 mL/min/1.73m 78  
  
ASSESSMENT/PLAN:  
(C50.511, Z17.0) Malignant   
neoplasm of lower-outer quadrant   
of right breast of female,   
estrogen receptor positive (HCC)   
(primary encounter diagnosis)  
(R92.8) Abnormal mammogram  
Assessment:  
-pT1c pN0 (3/3 sentinel lymph   
nodes negative for metastatic   
disease) stage IA infiltrating   
ductal carcinoma the right breast.   
ER/CA >95%, Egn7pas non-amplified   
by FISH.  
-Has been on exemestane  
-Reviewed mammogram. New   
calcifications lower inner   
quadrant right breast.  
-Discussed with Dr. Patricio.  
Plan:  
-Continue exemestane for now.  
-Diagnostic mammogram right breast   
with ultrasound.  
-Further plan pending those   
results.  
  
(R31.0) Gross hematuria  
Assessment:  
-She had painless gross hematuria   
with passing of clots on   
presentation to the ED. UA   
demonstrated low-level E. coli.   
She is a former smoker. I   
discussed and recommended urology   
evaluation for opinion on   
cystoscopy.  
Plan:  
-Referral to Dr. Dejesus.  
  
Portions of this documentation   
were copied and pasted from   
previous office visit notes in   
order to provide a cohesive   
continuity of the history. The   
note has been reviewed and edited   
and updated as necessary.  
  
I spent a total of 50 minutes on   
the date of the service which   
included preparing to see the   
patient, face-to-face patient   
care, completing clinical   
documentation, obtaining and/or   
reviewing separately obtained   
history, performing a medically   
appropriate examination,   
counseling and educating the   
patient/family/caregiver, ordering   
medications, tests, or procedures,   
communicating with other HCPs (not   
separately reported),   
independently interpreting results   
(not separately reported),   
communicating results to the   
patient/family/caregiver, and care   
coordination (not separately   
reported).  
  
Francis Shell DO  
  
Cc: Cynthia Douglas MD  
Electronically signed by Francis Shell DO at 2023 11:10 AM   
EST  
documented in this encounter            [facility]  
   
                                                    2023 Miscellaneous   
Notes                                   Formatting of this note might be   
different from the original.  
2023  
  
PID: [id number] Navdeep Guillen  
[address] [place], [place] [id number]  
  
Dear Ms. Guillen,  
  
Your recent breast imaging exam on   
2023 showed a possible   
finding that requires additional   
imaging studies for a complete   
evaluation. Most such findings are   
probably benign (not cancer).  
  
If you have a healthcare provider   
who ordered/prescribed your   
screening mammogram: Please call   
[phone] or [phone]   
EXT: [phone] to schedule an   
appointment for your additional   
imaging (if you have not already   
done so).  
  
If you DO NOT have a healthcare   
provider (ie you did not have an   
order/prescription for your   
screening mammogram):  
Please call [phone] to   
schedule an appointment for your   
additional imaging (if you have   
not already done so).  
  
You must have an   
order/prescription from your   
physician when calling to schedule   
your appointment. If your   
order/prescription is not   
electronic, you must bring the   
hard copy with you on the day of   
your exam to avoid delays.  
Your imaging studies and reports   
are kept on file at [facility] as part of your permanent   
medical record, and are available   
for your continuing care.  
  
Thank you for allowing us to help   
in meeting your health care needs.  
  
Sincerely,  
  
Dr. Delgadillo  
Interpreting Radiologist  
[facility]  
  
(Additional imaging)  
Electronically signed by   
Mammography Coordinator at   
2023 9:48 AM EST  
documented in this encounter            [facility]  
   
                                                    2023 History of   
Present illness Narrative               Formatting of this note might be   
different from the original.  
Radiology Service Progress Note  
  
PATIENT NAME: Navdeep Guillen  
MRN: [id number]  
  
DATE OF SERVICE: 2023  
TIME: 10:48 AM  
PATIENT IDENTITY VERIFICATION   
COMPLETED USING TWO (2)   
IDENTIFIERS: Name and Date of   
Birth confirmed by patient   
verbally.  
FALL SCREENING: Has the patient   
had 2 falls in the last year or 1   
fall with injury or currently   
using an Ambulatory Assistive   
Device (Walker, Cane, Wheelchair,   
Crutches, etc.)? No  
PATIENT GENDER DATA: Female.   
Pregnancy status: Pregnant: No   
Breastfeeding status: NO.  
PATIENT RELEVANT IMPLANT DATA   
REVIEWED: Not Applicable  
  
RADIOLOGY DEPARTMENT: Mammography  
  
PERIPHERAL IV DATA: Not applicable  
  
SIGNED BY: RT Que(R)  
2023 10:48 AM  
  
  
Electronically signed by Italia Barfield RT(R) at 2023 10:48   
AM EST  
documented in this encounter            [facility]  
   
                                                    2023 History of   
Present illness Narrative               Formatting of this note is   
different from the original.  
Reason for Visit  
Patient presents with:  
Same Day Appointment: right upper   
thigh bump size of pea  
  
Navdeep Guillen is a 62 year old   
female who presents here today for   
Above Complaints.  
  
Health Maintenance  
COVID-19 VACCINE(1)  
PNEUMOCOCCAL(1 - PCV)  
HIV SCREENING  
SHINGRIX VACCINE(1 of 2)  
PAP TESTING  
HPV TESTING  
COLORECTAL CANCER SCREENING  
DILATED RETINAL EXAM  
DEPRESSION ASSESSMENT  
MAMMOGRAM  
URINE ALBUMIN:CREATININE RATIO  
  
HPI  
  
Being treated for diverticulitis   
currently with cipro and flagyl.   
She has been to the emergency   
room, they had her on IV abx and  
On clear liquids, 2 ct scans were   
done. Was there for 3 days, there   
they noted it to be subacute and   
discussed surgery for her. She has   
a follow up with surgeon.. since   
taking abx her sugars are on the   
lower side.She has not taken   
metformin and her sugar was a   
little on the lower side. Still on   
the glipizide. Patient is eating   
different, but not eating lesser   
than normal. She is eating soft   
foods. Patient has intentional   
weight loss. She was not eating   
all that much. She feels bloated   
and gassy with medications.   
Patient does have a follow up   
appointment.  
Has developed fungal infection  
  
She is here today also for a bump   
on the upper thigh, It is actually   
in the lower thing and very   
superficial , around 0.8 cms and   
hard like a cyst, she was   
concerned about dvt but we   
reassure her that it is not likely   
to be from dvt.  
  
No problem-specific Assessment &   
Plan notes found for this   
encounter.  
  
PAST MEDICAL HISTORY  
Diagnosis Date  
Abnormal mammogram 2021  
Achilles tendon pain 2015  
Ankle weakness 2015  
Chronic pain of left ankle   
12/15/2016  
Colon abnormality 2014  
Thickening of the ascending colon   
seen on the recent CT scan.   
Patient has had a colonoscopy   
around 4 years ago. Records were   
obtained for when they were done,   
4 years ago , in [facility]. her colonoscopy was   
completely normal, no thickening,   
and the biopsy too was normal   
except for lymphoid aggregates.  
DJD (degenerative joint disease),   
ankle and foot 2016  
DM (diabetes mellitus) (HCC)  
GERD (gastroesophageal reflux   
disease)  
Gross hematuria 05/15/2014  
2014, had hematuria, investigated   
extensively along with cytoscopy,   
a stone was found but no signs of   
any malignancy.  
Heel pain, chronic 12/15/2016  
Hepatic steatosis 10/03/2017  
Hiatal hernia 10/03/2017  
Hypertension  
Insomnia  
Kidney stone 05/15/2014  
Morbid obesity (HCC)  
Nephrolithiasis  
Neuritis 2016  
Renal lesion 05/15/2014  
S/P Achilles tendon repair   
2015 sx done  
  
  
PAST SURGICAL HISTORY  
Procedure Laterality Date  
BREAST LUMPECTOMY HX Right 2017  
BX BREAST W/DEVICE 1ST LESION   
STEREOTACTIC GUID Right 2021  
CHOLECYSTECTOMY 2011  
gallbladder removal  
COLONOSCOPY   
CT ABDOMEN 2014  
PAST SURGICAL HISTORY OF   
2014  
D&C hysteroscopy  
PAST SURGICAL HISTORY OF Left   
2015  
Left foot surgery  
S PK LAVH [id number] 10/16/2014  
  
FAMILY HISTORY  
Problem Relation Age of Onset  
Ovarian cancer Mother 39  
Heart Father  
Hypertension Father  
Prostate Cancer Father 78  
other (kidney stones) Brother  
Hypertension Brother  
Diabetes Brother  
Seizures Son  
Breast Cancer Other 55  
Double first cousin (daughter of   
mother's sister and father's   
brother)  
  
Social History  
  
Tobacco Use  
Smoking status: Former  
Packs/day: 1.00  
Years: 10.00  
Pack years: 10.00  
Types: Cigarettes  
Quit date: 5/15/2007  
Years since quitting: 15.7  
Smokeless tobacco: Never  
Substance Use Topics  
Alcohol use: No  
Drug use: Yes  
Comment: marijuana for insomnia -   
currently not using  
  
Past medical history,   
appointments, medications,   
allergies reviewed.  
Pertinent Lab/Diagnostic Studies   
are reviewed and discussed today  
  
Current Outpatient Medications:  
potassium chloride 20 mEq TbER  
atenolol (TENORMIN) 50 mg tablet  
exemestane (AROMASIN) 25 mg tablet  
benzonatate (TESSALON PERLES) 100   
mg capsule  
albuterol HFA (PROAIR HFA) 90   
mcg/actuation inhaler  
chlorthalidone (HYGROTON) 25 mg   
tablet  
metFORMIN ER (GLUCOPHAGE XR) 500   
mg 24 hr tablet  
glipiZIDE (GLUCOTROL XL) 2.5 mg 24   
hr tablet  
blood sugar diagnostic (BLOOD   
GLUCOSE TEST) test strip  
ibuprofen (MOTRIN) 800 mg tablet  
ascorbic acid, vitamin C, (VITAMIN   
C) 500 mg tablet  
lancets (ONE TOUCH DELICA) 33   
gauge misc  
Blood-Glucose Meter monitoring kit  
MV-MN/FOLIC ACID/CALCIUM/VIT K   
(ONE-A-DAY WOMEN'S 50 PLUS ORAL)  
  
Review of Systems  
CONSTITUTIONAL: No fevers, chills   
night sweats, unintended weight   
loss  
CARDIOVASCULAR: No chest pain,   
dyspnea, palpitations, orthopnea,   
PND, ankle edema.  
PULM: No dyspnea, unexplained   
cough.  
GI: No dysphagia/odynophagia,   
problematic reflux, constipation,   
diarrhea, changes in stool habits,   
hematochezia, melena.  
: No new urinary complaints,   
including dysuria, gross hematuria   
or pyuria.  
NEURO: No new balance problems,   
peripheral weakness/paresthesias   
or numbness of concern.  
  
Physical Exam  
/82 (BP Site: Left Arm, BP   
Position: Sitting, BP Cuff Size:   
Large Adult)   Pulse 71   Temp   
36.8 C (98.3 F)   Resp 16   Ht   
165.1 cm (5' 5 )   Wt 131.5 kg   
(290 lb)   SpO2 97%   BMI 48.26   
kg/m  
General appearance: Well   
appearing, alert, in no acute   
distress, well nourished.  
Skin: Skin color, texture, turgor   
normal, no suspicious rashes or   
lesions  
Head: Normocephalic, no masses,   
lesions, tenderness or   
abnormalities  
Eyes: Anicteric sclera. Pupils are   
equally round and reactive to   
light. Extraocular movements are   
intact.  
Lungs: Lungs clear to   
auscultation. No wheezing,   
rhonchi, rales  
Heart: RRR without murmur, gallop,   
or rubs.  
Extremities: No deformities,   
edema, skin discoloration,   
clubbing or cyanosis. Good   
capillary refill.  
  
ASSESSMENT/PLAN:  
1. Primary hypertension - ICD9:   
401.9, ICD10: I10 (primary   
diagnosis)  
- good control  
- Recommended regular aerobic   
exercise.  
- Recommend home blood pressure   
monitoring, to bring results in on   
next visit  
- Goal of BP <130/80  
  
2. Controlled type 2 diabetes   
mellitus without complication,   
without long-term current use of   
insulin (HCC) - ICD9: 250.00,   
ICD10: E11.9  
Advised to check sugars more often  
  
3. Diverticulitis - ICD9: 562.11,   
ICD10: K57.92  
She has been told that this is   
subacute and she might need   
surgery in the future. She is   
going to follow-up with surgeon to   
call team. Developed candidiasis   
for which I gave her Diflucan to   
take after her antibiotics.  
Cynthia Douglas MD  
  
  
Electronically signed by Cynthia Douglas MD at 2023 5:52 PM   
EST  
documented in this encounter            [facility]  
   
                                                    2023 Miscellaneous   
Notes                                   Formatting of this note is   
different from the original.  
Patient has been identified by   
name and date of birth: NO  
Patient phones for refill(s):  
Requested Prescriptions  
  
Pending Prescriptions Disp Refills  
potassium chloride 20 mEq TbER 180   
tablet 1  
Sig: Take 1 tablet by mouth twice   
daily.  
  
Date of last office visit in   
primary care: 2022  
Last 2 Encounter Wt Readings:  
Date: Wt:  
2022 137.7 kg (303 lb 9.6   
oz)  
2022 132.9 kg (293 lb)  
Previous labs/tests for   
medication: Not applicable  
Please advise. Thank you. Kellie Walls LPN  
  
Electronically signed by Kellie Walls LPN at 2023 2:21 PM   
EST  
documented in this encounter            [facility]  
   
                                                    2023 Miscellaneous   
Notes                                   Formatting of this note might be   
different from the original.  
Patient calling, she has noticed   
since doubling her potassium she   
is having some constipation.   
States that she bought miralax but   
has not taken any. Wondering if it   
is ok to start with that. She is   
having small bowel movements and   
passing gas. Please advise.  
Electronically signed by Gayla Skaggs LPN at 2023 11:10 AM   
EST  
documented in this encounter            [facility]  
   
                                                    2023 Miscellaneous   
Notes                                   Formatting of this note might be   
different from the original.  
Left below results on identified   
vm.  
Helen Zollinger LPN  
  
Electronically signed by Helen E Zollinger LPN at 2023 1:09   
PM EST  
Formatting of this note might be   
different from the original.  
----- Message from Cynthia Douglas MD sent at 2023 5:43 PM EST   
-----  
Hba1c is a little better navdeep   
and the lipids and thyroid are   
stable  
Electronically signed by Helen E Zollinger LPN at 2023 1:07   
PM EST  
documented in this encounter            [facility]  
   
                                                    2022 Miscellaneous   
Notes                                   Formatting of this note might be   
different from the original.  
Pt is calling for a refill for   
atenolol  
  
LOV: 22  
NOV: 3/27/23  
Last Refill: 22 #90 3   
refills--this order was canceled   
bc pt was going to increase med to   
100mg then her BP's were in normal   
range so she continued at 50mg but   
order was already canceled at   
pharmacy so now pt needs a refill.  
  
Pt reports she is still getting   
good readings with her BP, states   
systolic has not been over 120.  
  
Casie Arellano LPN  
  
  
Electronically signed by Casie Arellano LPN at 2022 12:19 PM   
EST  
documented in this encounter            [facility]  
   
                                        2022 Instructions   
  
  
NIC Tyson.CNP -   
2022 12:25 PM ESTFormatting   
of this note might be different   
from the original.  
COLD AND SINUS PATIENT   
INSTRUCTIONS  
  
AS A DECONGESTANT  
Use a nasal saline 3 times daily  
Directions; 1-2 squirts in each   
nostril  
Saline suggestions; Ocean Spray or   
Little Noses or Salt and Water  
  
AT BEDTIME TO HELP WITH CONGESTION  
Use a cool mist vaporizer  
May use Vicks Vapor Rub on the   
chest  
Elevate the head to aid with   
coughing post nasal drip  
  
ACUTE SINUSITIS OVERVIEW  
Rhinosinusitis, or more commonly   
sinusitis, is the medical term for   
inflammation (swelling) of the   
lining of the sinuses and nose.   
The sinuses are the hollow areas   
within the facial bones that are   
connected to the nasal openings.   
The sinuses are lined with mucous   
membranes, similar to the inside   
of the nose.  
There are two main types of   
sinusitis: acute and chronic.   
Acute sinusitis is inflammation   
that lasts for less than four   
weeks while chronic sinusitis   
lasts for more than 12 weeks.   
Acute sinusitis is common,   
affecting approximately one   
million people per year in the   
[place].  
  
ACUTE SINUSITIS CAUSES  
The most common cause of acute   
sinusitis is a viral infection   
associated with the common cold.   
Bacterial sinusitis occurs much   
less commonly, in only 0.5 to 2   
percent of cases, usually as a   
complication of viral sinusitis.  
Because antibiotics are effective   
only against bacterial, and not   
viral, infections, most people do   
not need antibiotics for acute   
sinusitis.  
  
ACUTE SINUSITIS SYMPTOMS  
Symptoms of acute sinusitis   
include:  
Nasal congestion or blockage  
Thick, yellow to green discharge   
from the nose  
Pain in the teeth  
Pain or pressure in the face that   
is worse when bending forwards  
Other acute sinusitis symptoms can   
include fever (temperature greater   
than 100.4 F or 38 C), fatigue,   
cough, difficulty or inability to   
smell, ear pressure or fullness,   
headache, and bad breath.  
In most cases, these symptoms   
develop over the course of one day   
and begin to improve within seven   
to 10 days.  
  
DO I NEED TO BE EXAMINED?  
It is difficult to know if you   
have a viral or bacterial sinus   
infection initially. However, most   
people with a viral infection   
improve without treatment within   
seven to 10 days after symptoms   
begin. Bacterial sinusitis also   
sometimes improves without   
treatment, although it can also   
worsen and require treatment.  
If one or more of the following   
bothersome symptoms last more than   
seven days, an examination by a   
healthcare provider is   
recommended:  
Thick, yellow to green discharge   
from the nose  
Face or tooth pain, especially if   
it is only on one side  
Tenderness over the maxillary   
sinuses (located on the left and   
right side of the nose, inside the   
cheekbones)  
Symptoms that initially improve   
and then worsen  
  
WHEN TO SEEK IMMEDIATE HELP  
If you have one or more of the   
following symptoms, you should   
seek medical attention immediately   
(even if symptoms have been   
present for less than seven days):  
High fever (>102.5 F or 39.2 C)  
Sudden, severe pain in the face or   
head  
Double vision or difficulty seeing  
Confusion or difficulty thinking   
clearly  
Swelling or redness around one or   
both eyes  
Stiff neck, shortness of breath  
  
ACUTE SINUSITIS TREATMENT  
Initial treatment of a sinus   
infection aims to relieve symptoms   
since almost everyone will improve   
within the first seven to 10 days.   
Experts recommend avoiding   
antibiotics during this time   
unless there is clear evidence of   
a severe bacterial infection.  
  
INITIAL TREATMENT  
Pain relief -- Non-prescription   
pain medications, such as   
acetaminophen (eg, Tylenol ) or   
ibuprofen (eg, Motrin , Advil )   
are recommended for pain.  
Nasal irrigation and saline sprays   
-- Rinsing the nose with a   
salt-water (saline) solution is   
called nasal irrigation or nasal   
lavage. Saline is also available   
in a standard nasal spray,   
although this is not as effective   
as using larger amounts of water   
in an irrigation.  
Nasal irrigation is particularly   
useful for treating drainage down   
the back of the throat, sneezing,   
nasal dryness, and congestion. The   
treatment helps by rinsing out   
allergens and irritants from the   
nose. Saline rinses also clean the   
nasal lining and can be used   
before applying sprays containing   
medications, to get a better   
effect from the medication.  
Nasal lavage with warmed saline   
can be performed as needed, once   
per day, or twice daily for   
increased symptoms. Nasal lavage   
carries few risks when performed   
correctly. Saline nasal sprays and   
irrigation kits can be purchased   
over-the-counter. Saline mixes can   
also be purchased or patients can   
make their own solution.  
A variety of devices, including   
bulb syringes, Neti pots, and   
bottle sprayers, may be used to   
perform nasal lavage; instructions   
for nasal lavage are provided in   
the table. At least 200 mL (about   
3/4 cup) of fluid is recommended   
for each nostril.  
Nasal decongestants -- Nasal   
decongestant sprays, including   
oxymetazoline (Afrin ) and   
phenylephrine (Jesse-synephrine )   
can be used to temporarily treat   
congestion. However, these sprays   
should not be used for more than   
two to three days due to the risk   
of rebound congestion (when the   
nose is congested constantly   
unless the medication is used   
repeatedly).  
Other treatments -- Other   
treatments for congestion, such as   
oral antihistamines (such as   
diphenhydramine/Benadryl ) or zinc   
supplements are not proven to   
improve symptoms of sinusitis and   
can have unwanted side effects.   
Medications to thin secretions   
(such as guaifenesin) may help to   
clear mucus.  
Secondline treatment -- If   
symptoms have not improved in   
seven to ten days, you should   
arrange for medical evaluation.   
You may need further treatment.  
Nasal glucocorticoids -- Nasal   
glucocorticoids (steroids   
delivered by a nasal spray) can   
help to reduce swelling inside the   
nose, usually within two to three   
days. These drugs have few side   
effects and dramatically relieve   
symptoms in most people.  
There are a number of nasal   
glucocorticoids available by   
prescription. These drugs are all   
effective, but differ in how   
frequently they must be used and   
how much they cost.  
You may need to use a nasal   
decongestant for a few days before   
starting a nasal glucocorticoid to   
reduce nasal swelling; this will   
allow the nasal glucocorticoid to   
reach more areas of the nasal   
passages  
Do I need an antibiotic? -- If   
bothersome symptoms of sinusitis   
persist for 10 or more days, it is   
possible that you have bacterial   
sinusitis. The need for   
antibiotics depends upon the   
severity of your symptoms.  
Mild symptoms -- There are two   
possible treatment options if you   
have mild sinusitis symptoms:   
treat with antibiotics or continue   
to watch and wait for one week.  
Watching and waiting is a   
reasonable option because up to 75   
percent of people with bacterial   
sinusitis improve within one month   
without antibiotics. During the   
watch and wait period, treatments   
to improve symptoms are   
recommended.  
If symptoms worsen or do not   
improve after watching and   
waiting, treatment with an   
antibiotic is usually recommended.   
Treatments to relieve symptoms are   
recommended while using   
antibiotics.  
Moderate or severe symptoms --   
Most healthcare providers will   
prescribe an antibiotic for   
moderate to severe symptoms   
(temperature >38.3 C or 101 F   
and/or severe pain that interferes   
with usual activities).  
Treatments to relieve symptoms are   
also recommended during antibiotic   
treatment.  
One of the least expensive and   
most effective antibiotics for   
sinusitis is amoxicillin. An   
alternate antibiotic will be   
prescribed if you are allergic to   
penicillin. Regardless of which   
antibiotic is prescribed, it is   
important to follow the dosing   
instructions carefully and to   
finish the entire course of   
treatment. Taking the medication   
less often than prescribed or   
stopping the medication early can   
lead to complications, such as a   
recurrent infection.  
What if I do not improve with   
treatment? -- If you do not   
improve or worsen after a course   
of antibiotics, you should be   
re-examined.  
In some cases, symptoms of   
sinusitis improve but then recur.   
This is usually because the   
infection was not completely   
eliminated by the antibiotic. An   
alternate antibiotic, extended   
antibiotic treatment, and/or   
further testing may be   
recommended, depending upon your   
individual situation.  
Electronically signed by Jasbir Govea APRN.CNP at 2022   
12:25 PM EST  
  
documented in this encounter            [facility]  
   
                                                    12- History of   
Present illness Narrative               Formatting of this note is   
different from the original.  
Subjective  
HPI  
Nontoxic-appearing female presents   
urgent care chief complaint cough   
and chest congestion. Duration of   
symptoms 10 days. Associated   
symptoms cough chest congestion   
sinus pressure. States initially   
she did have body aches chills   
sore throat headache. Son had   
similar signs and symptoms. Fever   
body aches and chills headache and   
sore throat has since subsided.   
Presents today with new worsening   
sinus pressure. No OTC medications   
used today. Denies any fever body   
aches chills productive cough   
chest pain shortness of breath   
pleuritic pain hemoptysis nausea   
vomiting abdominal pain change in   
bowel or bladder habits. Past   
medical history prescription   
medication use and allergies   
reviewed.  
  
.Patient presents with:  
Cough: Cough, chest congestion and   
scratchy throat x 10 days  
  
PAST MEDICAL HISTORY  
Diagnosis Date  
Abnormal mammogram 2021  
Achilles tendon pain 2015  
Ankle weakness 2015  
Chronic pain of left ankle   
12/15/2016  
Colon abnormality 2014  
Thickening of the ascending colon   
seen on the recent CT scan.   
Patient has had a colonoscopy   
around 4 years ago. Records were   
obtained for when they were done,   
4 years ago , in [facility]. her colonoscopy was   
completely normal, no thickening,   
and the biopsy too was normal   
except for lymphoid aggregates.  
DJD (degenerative joint disease),   
ankle and foot 2016  
DM (diabetes mellitus) (HCC)  
GERD (gastroesophageal reflux   
disease)  
Gross hematuria 05/15/2014  
2014, had hematuria, investigated   
extensively along with cytoscopy,   
a stone was found but no signs of   
any malignancy.  
Heel pain, chronic 12/15/2016  
Hepatic steatosis 10/03/2017  
Hiatal hernia 10/03/2017  
Hypertension  
Insomnia  
Kidney stone 05/15/2014  
Morbid obesity (HCC)  
Nephrolithiasis  
Neuritis 2016  
Renal lesion 05/15/2014  
S/P Achilles tendon repair   
2015 sx done  
  
  
PAST SURGICAL HISTORY  
Procedure Laterality Date  
BREAST LUMPECTOMY HX Right 2017  
BX BREAST W/DEVICE 1ST LESION   
STEREOTACTIC GUID Right 2021  
CHOLECYSTECTOMY 2011  
gallbladder removal  
COLONOSCOPY   
CT ABDOMEN 2014  
PAST SURGICAL HISTORY OF   
2014  
D&C hysteroscopy  
PAST SURGICAL HISTORY OF Left   
2015  
Left foot surgery  
S PK LAVH [id number] 10/16/2014  
  
ALLERGIES Silvadene [Silver   
Sulfadiazine] and Sulfa   
(Sulfonamide Antibiotics)  
  
MEDICATIONS  
chlorthalidone (HYGROTON) 25 mg   
tablet Take 0.5 tablets by mouth   
once daily.  
potassium chloride 20 mEq TbER   
Take 1 tablet by mouth twice   
daily.  
metFORMIN ER (GLUCOPHAGE XR) 500   
mg 24 hr tablet Take 1 tablet by   
mouth daily with breakfast.  
glipiZIDE (GLUCOTROL XL) 2.5 mg 24   
hr tablet Take 1 tablet by mouth   
once daily.  
blood sugar diagnostic (BLOOD   
GLUCOSE TEST) test strip Test   
blood sugars 2daily.   
Dx:ucnontrolled diabetes .   
Insulin: Yes  
ibuprofen (MOTRIN) 800 mg tablet   
Take 1 tablet by mouth every 8   
hours as needed for pain. Take   
with food.  
exemestane (AROMASIN) 25 mg tablet   
Take 1 tablet by mouth once daily.  
ascorbic acid, vitamin C, (VITAMIN   
C) 500 mg tablet Take 500 mg by   
mouth once daily.  
lancets (ONE TOUCH DELICA) 33   
gauge misc Test blood sugar(s) 2   
daily. Dx: Type 2 DM - Controlled   
E11.9 Insulin: Yes  
Blood-Glucose Meter monitoring kit   
Glucose Meter of Choice - Kit -   
Dx: Type 2 DM - Uncontrolled   
E11.65  
MV-MN/FOLIC ACID/CALCIUM/VIT K   
(ONE-A-DAY WOMEN'S 50 PLUS ORAL)   
Take 1 tablet by mouth once daily.  
  
atenolol (TENORMIN) 100 mg tablet   
Take 1 tablet by mouth once daily.  
Vitamin E, dl, acetate, (VITAMIN   
E) 100 unit capsule Take 100 Units   
by mouth once daily.  
polyethylene glycol 3350 (MIRALAX,   
GLYCOLAX) 17 gram/dose powder Use   
as directed for Miralax / Gatorade   
Bowel Prep Kit  
Gatorade Sports Drink Use as   
directed for Miralax / Gatorade   
Bowel Prep Kit  
cholecalciferol, vitamin D3,   
(VITAMIN D3 ORAL) Take 1 tablet by   
mouth once daily.  
  
FAMILY HISTORY  
Problem Relation Age of Onset  
Ovarian cancer Mother 39  
Heart Father  
Hypertension Father  
Prostate Cancer Father 78  
other (kidney stones) Brother  
Hypertension Brother  
Diabetes Brother  
Seizures Son  
Breast Cancer Other 55  
Double first cousin (daughter of   
mother's sister and father's   
brother)  
  
Social History  
  
Tobacco Use  
Smoking status: Former  
Packs/day: 1.00  
Years: 10.00  
Pack years: 10.00  
Types: Cigarettes  
Quit date: 5/15/2007  
Years since quitting: 15.5  
Smokeless tobacco: Never  
Substance Use Topics  
Alcohol use: No  
Drug use: Yes  
Comment: marijuana for insomnia -   
currently not using  
  
/74   Pulse 65   Temp 36.4 C   
(97.5 F) (Tympanic)   Resp 18   Wt   
(!) 137.7 kg (303 lb 9.6 oz)     
SpO2 97%   BMI 50.52 kg/m  
  
Review of Systems  
Constitutional: Negative for   
chills, fever and malaise/fatigue.  
HENT: Positive for congestion and   
sinus pain. Negative for ear   
discharge, ear pain and sore   
throat.  
Eyes: Negative for blurred vision,   
pain, discharge and redness.  
Respiratory: Positive for cough.   
Negative for hemoptysis, sputum   
production, shortness of breath,   
wheezing and stridor.  
Cardiovascular: Negative for chest   
pain.  
Gastrointestinal: Negative for   
abdominal pain, diarrhea, nausea   
and vomiting.  
Musculoskeletal: Negative for   
myalgias.  
Skin: Negative for itching and   
rash.  
Neurological: Negative for   
dizziness and headaches.  
  
  
Objective  
Physical Exam  
Constitutional:  
General: She is not in acute   
distress.  
Appearance: She is not   
diaphoretic.  
HENT:  
Head: Normocephalic.  
Right Ear: Tympanic membrane, ear   
canal and external ear normal.  
Left Ear: Tympanic membrane, ear   
canal and external ear normal.  
Nose:  
Right Sinus: Frontal sinus   
tenderness present.  
Left Sinus: Frontal sinus   
tenderness present.  
Mouth/Throat:  
Lips: Pink.  
Mouth: Mucous membranes are moist.  
Pharynx: Oropharynx is clear. No   
pharyngeal swelling, oropharyngeal   
exudate, posterior oropharyngeal   
erythema or uvula swelling.  
Eyes:  
Conjunctiva/sclera: Conjunctivae   
normal.  
Pupils: Pupils are equal, round,   
and reactive to light.  
Cardiovascular:  
Rate and Rhythm: Normal rate and   
regular rhythm.  
Heart sounds: Normal heart sounds.  
Pulmonary:  
Effort: Pulmonary effort is   
normal. No tachypnea, accessory   
muscle usage or respiratory   
distress.  
Breath sounds: Normal breath   
sounds. No stridor.  
Abdominal:  
Palpations: Abdomen is soft.  
Tenderness: There is no abdominal   
tenderness.  
Musculoskeletal:  
Cervical back: Normal range of   
motion and neck supple. No   
rigidity or tenderness.  
Lymphadenopathy:  
Cervical: No cervical adenopathy.  
Skin:  
General: Skin is warm and dry.  
Neurological:  
Mental Status: She is alert and   
oriented to person, place, and   
time.  
  
ASSESSMENT/PLAN:  
1. Sinobronchitis - ICD9: 473.9,   
490, ICD10: J32.9, J40  
  
Patient was diagnosed with   
sinobronchitis. Does have a cough.   
No advantageous lung sounds was   
noted. Maxillary sinus pressure.   
More pronounced frontal sinus   
pressure with palpation. Will be   
placed on doxycycline. Has   
tolerated this in the past. Red   
flags for prompt reevaluation   
discussed. Patient was educated on   
supportive therapies. Patient will   
follow up with primary care   
provider as needed. Patient was   
instructed to immediately proceed   
to emergency room for any new,   
worsening, or symptoms lasting   
longer than anticipated. The   
patient's clinical presentation is   
otherwise unremarkable at this   
time. Based on exam and clinical   
finding, the patient is stable for   
discharge. Plan of care was   
discussed with patient. Patient   
verbalizes understanding and   
agrees to plan of care. This note   
was generated using Dragon   
software. It may contain errors in   
wording, punctuation, or spelling.  
  
Jasbir Govea APRN.CNP  
  
  
Electronically signed by Jasbir Govea APRN.CNP at 2022   
12:32 PM EST  
documented in this encounter            [facility]  
   
                                                    2022 Miscellaneous   
Notes                                   Formatting of this note might be   
different from the original.  
Noted.  
Sherly An APRN.CNP  
  
Electronically signed by Sherly An APRN.CNP at 2022 3:25   
PM EDT  
Formatting of this note might be   
different from the original.  
Patient notified, those BP   
readings are without doubling up   
the medication. Advised patient to   
only take one of the atenolol and   
monitor BP and update with any   
changes.  
Electronically signed by Manisha Jesus Ma at 2022 1:16 PM EDT  
Formatting of this note might be   
different from the original.  
Both of those blood pressures are   
normal and actually very good. If   
she is concerned, she can take the   
50 mg of atenolol and update us   
with how her blood pressure is   
doing.  
Thank you  
Sherly An APRN.CNP  
  
Electronically signed by Sherly An APRN.CNP at 2022   
12:44 PM EDT  
Formatting of this note might be   
different from the original.  
Patient reports she was instructed   
yesterday, by Np, to increase her   
atenolol from 50 mg to 100 mg, and   
decreased the chlorthalidone in   
half from 25 mg to 12.5 mg.   
Patient reports her BP last night   
was 120/74 (68), and this morning   
114/63 (68). She is worried the   
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  
   
                                                      
  
  
Encounter for screening mammogram for malignant neoplasm of breast  
  
  
Other screening mammogram  
  
documented in this encounter  
[facility]   
  
                                                    Diagnosis  
   
                                                      
  
  
Controlled type 2 diabetes mellitus without complication, without long-term 
current   
use of insulin (HCC)- Primary  
   
                                                      
  
  
Primary hypertension  
  
  
Unspecified essential hypertension  
   
                                                      
  
  
Morbid obesity with BMI of 40.0-44.9, adult (HCC)  
  
  
Morbid obesity  
  
documented in this encounter  
[facility] note*   
  
                                                    Diagnosis  
   
                                                      
  
  
Screening for osteoporosis  
  
  
Special screening for osteoporosis  
  
documented in this encounter  
[facility] note*   
  
                                                    Diagnosis  
   
                                                      
  
  
Malignant neoplasm of lower-outer quadrant of right breast of female, estrogen   
receptor positive (HCC) (HCC)  
   
                                                      
  
  
Encounter for screening mammogram for high-risk patient  
  
documented in this encounter  
[facility]   
  
                                                    Diagnosis  
   
                                                      
  
  
Left flank pain- Primary  
  
  
Abdominal pain, unspecified site  
  
documented in this encounter  
[facility]   
  
                                                    Diagnosis  
   
                                                      
  
  
Malignant neoplasm of lower-outer quadrant of right breast of female, estrogen   
receptor positive (HCC)- Primary  
   
                                                      
  
  
Morbid obesity with BMI of 40.0-44.9, adult (HCC)  
  
  
Morbid obesity  
  
documented in this encounter  
[facility] note*   
  
                                                    Diagnosis  
   
                                                      
  
  
Primary cancer of lower outer quadrant of right female breast (HCC)  
   
                                                      
  
  
Carcinoma of right breast, estrogen and progesterone receptor positive (HCC)  
  
documented in this encounter  
[facility] note*   
  
                                                    Diagnosis  
   
                                                      
  
  
Primary cancer of lower outer quadrant of right female breast (HCC)  
   
                                                      
  
  
Carcinoma of right breast, estrogen and progesterone receptor positive (HCC)  
  
documented in this encounter  
[facility] note*   
  
                                                    Diagnosis  
   
                                                      
  
  
Type 2 diabetes mellitus without complication, without long-term current use of   
insulin (HCC)- Primary  
   
                                                      
  
  
Primary hypertension  
  
  
Unspecified essential hypertension  
   
                                                      
  
  
Hypercholesterolemia  
  
  
Pure hypercholesterolemia  
   
                                                      
  
  
Malignant neoplasm of lower-outer quadrant of right breast of female, estrogen   
receptor positive (HCC)  
   
                                                      
  
  
Seasonal allergic rhinitis, unspecified trigger  
  
documented in this encounter  
[facility] note*   
  
                                                    Diagnosis  
   
                                                      
  
  
Upper respiratory tract infection, unspecified type- Primary  
  
documented in this encounter  
[facility] for referral (narrative)* Diagnostic Procedure Only 
  (Routine) - Pending Review  
  
                          Specialty    Diagnoses / Procedures Referred By Contac  
t Referred To Contact  
   
                                        [facility]            
  
  
Diagnoses  
  
  
Abnormal mammogram  
  
  
  
Procedures  
  
  
US BREAST LTD RT  
  
  
US BREAST UNI REAL TIME   
WITH IMAGE LIMITED                        
  
  
Francis Shell,   
  
  
[address] [place] [address]  
  
  
Phone: [phone]  
  
  
Fax: [phone]                         
  
  
[facility]  
  
  
[address] [place] [address]  
  
  
  
                          Referral ID  Status       Reason       Start   
Date                                    Expiration   
Date                                    Visits   
Requested                               Visits   
Authorized  
   
                                        [id number]            Pending   
Review                                    
  
  
Auto-Generat  
ed Referral     2023        3/3/2024        1               1  
  
  
  
  
Electronically signed by Francis Shell DO at 2023 10:13 AM EST  
  
  
* Diagnostic Procedure Only (Routine) - Authorized  
  
                          Specialty    Diagnoses / Procedures Referred By Contac  
t Referred To Contact  
   
                                        [facility]            
  
  
Diagnoses  
  
  
Abnormal mammogram  
  
  
  
Procedures  
  
  
YUAN DIAGNOSTIC RT  
  
  
DIAGNOSTIC MAMMOGRAPHY   
COMPUTER-AIDED [facility]                  
  
  
Francis Shell DO  
  
  
[address] [place] [address]  
  
  
Phone: [phone]  
  
  
Fax: [phone]                         
  
  
[facility]  
  
  
[address] [place]   
[address]  
  
  
  
                          Referral ID  Status       Reason       Start   
Date                                    Expiration   
Date                                    Visits   
Requested                               Visits   
Authorized  
   
                                [id number]        Authorized        
  
  
Auto-Generat  
ed Referral     2023        3/3/2024        1               1  
  
  
  
  
Electronically signed by Francis Shell DO at 2023 10:13 AM EST  
  
  
[facility] for referral (narrative)* Diagnostic Procedure Only 
  (Routine) - Closed  
  
                          Specialty    Diagnoses / Procedures Referred By Contac  
t Referred To Contact  
   
                                        [facility]            
  
  
Diagnoses  
  
  
Primary cancer of lower   
outer quadrant of right   
female breast (HCC)  
  
  
Carcinoma of right breast,   
estrogen and progesterone   
receptor positive (HCC)  
  
  
Encounter for screening   
mammogram for malignant   
neoplasm of breast  
  
  
  
Procedures  
  
  
YUAN SCREENING  
  
  
SCREENING MAMMOGRAPHY BI   
[facility] Jacquelyn Davis MD  
  
  
[address] [place] [address]  
  
  
Phone: [phone]  
  
  
Fax: [phone]                         
  
  
[facility]  
  
  
[address] [place]   
[address]  
  
  
  
                    Referral ID Status    Reason    Start Date Expiration Date V  
isits   
Requested                               Visits   
Authorized  
   
                                [id number]        Closed            
  
  
Auto-Generate  
d Referral      2023       3/2/2023        1               1  
  
  
  
  
Electronically signed by Jacquelyn Milner MD at 2023 10:40 AM [facility] for visit Narrative* Diagnostic Procedure Only (Routine) 
  - Closed  
  
                          Specialty    Diagnoses / Procedures Referred By Contac  
t Referred To Contact  
   
                                        [facility]            
  
  
Diagnoses  
  
  
Primary cancer of lower   
outer quadrant of right   
female breast (HCC)  
  
  
Carcinoma of right breast,   
estrogen and progesterone   
receptor positive (HCC)  
  
  
Encounter for screening   
mammogram for malignant   
neoplasm of breast  
  
  
  
Procedures  
  
  
YUAN SCREENING  
  
  
SCREENING MAMMOGRAPHY BI   
[facility] Jacquelyn Davis MD  
  
  
[address] [place] [address]  
  
  
Phone: [phone]  
  
  
Fax: [phone]                         
  
  
[facility]  
  
  
[address] [place], [place]   
[address]  
  
  
  
                    Referral ID Status    Reason    Start Date Expiration Date V  
isits   
Requested                               Visits   
Authorized  
   
                                [id number]        Closed            
  
  
Auto-Generate  
d Referral      2023       3/2/2023        1               1  
  
  
  
[facility] Diagnostic Procedure Only (Routine) 
  - Closed  
  
                          Specialty    Diagnoses / Procedures Referred By Contac  
t Referred To Contact  
   
                                        [facility]            
  
  
Diagnoses  
  
  
Malignant neoplasm of   
lower-outer quadrant of   
right breast of female,   
estrogen receptor positive   
(HCC) (HCC)  
  
  
Encounter for screening   
mammogram for high-risk   
patient  
  
  
  
Procedures  
  
  
YUAN SCREENING W GALILEA  
  
  
SCREENING DIGITAL BREAST   
TOMOSYNTHESIS BI  
  
  
SCREENING MAMMOGRAPHY BI   
[facility] [employer] CAD                     
  
  
Vikki Posada,   
APRN.CNP  
  
  
[address] [place] [address]  
  
  
Phone: [phone]  
  
  
Fax: [phone]                         
  
  
[facility]  
  
  
[address] [place]   
[address]  
  
  
  
                    Referral ID Status    Reason    Start Date Expiration Date V  
isits   
Requested                               Visits   
Authorized  
   
                                [id number]        Closed            
  
  
Auto-Generate  
d Referral      10/2/2023       10/31/2024      1               1  
  
  
  
[facility]  
  
Summary Purpose  
  
  
                                                      
  
  
  
Family History  
No Family History Records FoundNo Family History Records FoundNo Family History 
Records FoundNo Family History Records FoundNo Family History Records Found  
  
Advance Directives  
No Advanced Directives Records FoundNo Advanced Directives Records FoundNo 
Advanced Directives Records FoundNo Advanced Directives Records FoundNo Advanced
Directives Records Found  
  
Reason for Referral  
  
  
                          Specialty    Diagnoses / Procedures Referred By Contac  
t Referred To Contact  
   
                                        Urology               
  
  
Diagnoses  
  
  
Gross hematuria  
  
  
  
Procedures  
  
  
CONSULT TO UROLOGY  
  
  
OFFICE/OUTPATIENT NEW HIGH   
MDM 60-74 MINUTES                         
  
  
Francis Shell DO  
  
  
[address]  
  
  
[place] [address]  
  
  
Phone: [phone]  
  
  
Fax: [phone]                         
  
  
  
  
  
                          Referral ID  Status       Reason       Start   
Date                                    Expiration   
Date                                    Visits   
Requested                               Visits   
Authorized  
   
                                [id number]        Authorized        
  
  
PCP Requested   
Referral        2023        1               1  
  
  
  
                          Specialty    Diagnoses / Procedures Referred By Contac  
t Referred To Contact  
   
                                        Ophthalmology         
  
  
Diagnoses  
  
  
Controlled type 2 diabetes   
mellitus without   
complication, without   
long-term current use of   
insulin (HCC)  
  
  
  
Procedures  
  
  
CONSULT TO OPHTHALMOLOGY  
  
  
OFFICE/OUTPATIENT NEW HIGH   
MDM 60-74 MINUTES                         
  
  
Sherly An APRN.CNP  
  
  
[address] [place] [address]  
  
  
Phone: [phone]  
  
  
Fax: [phone]                         
  
  
  
  
  
                          Referral ID  Status       Reason       Start   
Date                                    Expiration   
Date                                    Visits   
Requested                               Visits   
Authorized  
   
                                        [id number]            Pending   
Review                                    
  
  
PCP Requested   
Referral                                  
3                   2024          1                   1  
  
  
  
Additional Source Comments  
  
  
  
                                                    INFORMATION SOURCE (unrecogn  
ized section and content)  
   
                                          
  
  
  
                                        DATE CREATED        AUTHOR  
   
                                2018                      [facility]  
  
  
  
                                DATE CREATED    AUTHOR          AUTHOR'S ORGANIZ  
ATION  
   
                                2018                      [facility]  
al  
  
  
  
                                DATE CREATED    AUTHOR          AUTHOR'S ORGANIZ  
ATION  
   
                                2019                      [facility]  
  
  
  
                                DATE CREATED    AUTHOR          AUTHOR'S ORGANIZ  
ATION  
   
                                2019                      [facility]  
  
  
  
                                DATE CREATED    AUTHOR          AUTHOR'S ORGANIZ  
ATION  
   
                                2024                      [facility]  
  
  
  
  
  
                                                    Source Comments (unrecognize  
d section and content)  
   
                                                    In the event this informatio  
n is protected by the Federal Confidentiality of   
Alcohol   
and Drug Abuse Patient Records regulations: This information has been disclosed 
to   
you from records protected by Federal confidentiality rules (42 CFR Part 2). The
  
Federal rules prohibit you from making any further disclosure of this 
information   
unless further disclosure is expressly permitted by the written consent of the 
person   
to whom it pertains or as otherwise permitted by 42 CFR Part 2. A general   
authorization for the release of medical or other information is NOT sufficient 
for   
this purpose. The Federal rules restrict any use of the information to 
criminally   
investigate or prosecute any alcohol or drug abuse patient.[facility] 
the   
event this information is protected by the Federal Confidentiality of Alcohol 
and   
Drug Abuse Patient Records regulations: This information has been disclosed to 
you   
from records protected by Federal confidentiality rules (42 CFR Part 2). The 
Federal   
rules prohibit you from making any further disclosure of this information unless
  
further disclosure is expressly permitted by the written consent of the person 
to   
whom it pertains or as otherwise permitted by 42 CFR Part 2. A general 
authorization   
for the release of medical or other information is NOT sufficient for this 
purpose.   
The Federal rules restrict any use of the information to criminally investigate 
or   
prosecute any alcohol or drug abuse patient.[facility] the event this   
information is protected by the Federal Confidentiality of Alcohol and Drug 
Abuse   
Patient Records regulations: This information has been disclosed to you from 
records   
protected by Federal confidentiality rules (42 CFR Part 2). The Federal rules   
prohibit you from making any further disclosure of this information unless 
further   
disclosure is expressly permitted by the written consent of the person to whom 
it   
pertains or as otherwise permitted by 42 CFR Part 2. A general authorization for
the   
release of medical or other information is NOT sufficient for this purpose. The   
Federal rules restrict any use of the information to criminally investigate or   
prosecute any alcohol or drug abuse patient.[facility] the event this   
information is protected by the Federal Confidentiality of Alcohol and Drug 
Abuse   
Patient Records regulations: This information has been disclosed to you from 
records   
protected by Federal confidentiality rules (42 CFR Part 2). The Federal rules   
prohibit you from making any further disclosure of this information unless 
further   
disclosure is expressly permitted by the written consent of the person to whom 
it   
pertains or as otherwise permitted by 42 CFR Part 2. A general authorization for
the   
release of medical or other information is NOT sufficient for this purpose. The   
Federal rules restrict any use of the information to criminally investigate or   
prosecute any alcohol or drug abuse patient.[facility] Hospital[facility] the event this   
information is protected by the Federal Confidentiality of Alcohol and Drug 
Abuse   
Patient Records regulations: This information has been disclosed to you from 
records   
protected by Federal confidentiality rules (42 CFR Part 2). The Federal rules   
prohibit you from making any further disclosure of this information unless 
further   
disclosure is expressly permitted by the written consent of the person to whom 
it   
pertains or as otherwise permitted by 42 CFR Part 2. A general authorization for
the   
release of medical or other information is NOT sufficient for this purpose. The   
Federal rules restrict any use of the information to criminally investigate or   
prosecute any alcohol or drug abuse patient.[facility] HospitalIn the event this   
information is protected by the Federal Confidentiality of Alcohol and Drug 
Abuse   
Patient Records regulations: This information has been disclosed to you from 
records   
protected by Federal confidentiality rules (42 CFR Part 2). The Federal rules   
prohibit you from making any further disclosure of this information unless 
further   
disclosure is expressly permitted by the written consent of the person to whom 
it   
pertains or as otherwise permitted by 42 CFR Part 2. A general authorization for
the   
release of medical or other information is NOT sufficient for this purpose. The   
Federal rules restrict any use of the information to criminally investigate or   
prosecute any alcohol or drug abuse patient.[facility] HospitalIn the event this   
information is protected by the Federal Confidentiality of Alcohol and Drug 
Abuse   
Patient Records regulations: This information has been disclosed to you from 
records   
protected by Federal confidentiality rules (42 CFR Part 2). The Federal rules   
prohibit you from making any further disclosure of this information unless 
further   
disclosure is expressly permitted by the written consent of the person to whom 
it   
pertains or as otherwise permitted by 42 CFR Part 2. A general authorization for
the   
release of medical or other information is NOT sufficient for this purpose. The   
Federal rules restrict any use of the information to criminally investigate or   
prosecute any alcohol or drug abuse patient.[facility] HospitalIn the event this   
information is protected by the Federal Confidentiality of Alcohol and Drug 
Abuse   
Patient Records regulations: This information has been disclosed to you from 
records   
protected by Federal confidentiality rules (42 CFR Part 2). The Federal rules   
prohibit you from making any further disclosure of this information unless 
further   
disclosure is expressly permitted by the written consent of the person to whom 
it   
pertains or as otherwise permitted by 42 CFR Part 2. A general authorization for
the   
release of medical or other information is NOT sufficient for this purpose. The   
Federal rules restrict any use of the information to criminally investigate or   
prosecute any alcohol or drug abuse patient.[facility] HospitalIn the event this   
information is protected by the Federal Confidentiality of Alcohol and Drug 
Abuse   
Patient Records regulations: This information has been disclosed to you from 
records   
protected by Federal confidentiality rules (42 CFR Part 2). The Federal rules   
prohibit you from making any further disclosure of this information unless 
further   
disclosure is expressly permitted by the written consent of the person to whom 
it   
pertains or as otherwise permitted by 42 CFR Part 2. A general authorization for
the   
release of medical or other information is NOT sufficient for this purpose. The   
Federal rules restrict any use of the information to criminally investigate or   
prosecute any alcohol or drug abuse patient.[facility] HospitalIn the event this   
information is protected by the Federal Confidentiality of Alcohol and Drug 
Abuse   
Patient Records regulations: This information has been disclosed to you from 
records   
protected by Federal confidentiality rules (42 CFR Part 2). The Federal rules   
prohibit you from making any further disclosure of this information unless 
further   
disclosure is expressly permitted by the written consent of the person to whom 
it   
pertains or as otherwise permitted by 42 CFR Part 2. A general authorization for
the   
release of medical or other information is NOT sufficient for this purpose. The   
Federal rules restrict any use of the information to criminally investigate or   
prosecute any alcohol or drug abuse patient.[facility] HospitalIn the event this   
information is protected by the Federal Confidentiality of Alcohol and Drug 
Abuse   
Patient Records regulations: This information has been disclosed to you from 
records   
protected by Federal confidentiality rules (42 CFR Part 2). The Federal rules   
prohibit you from making any further disclosure of this information unless 
further   
disclosure is expressly permitted by the written consent of the person to whom 
it   
pertains or as otherwise permitted by 42 CFR Part 2. A general authorization for
the   
release of medical or other information is NOT sufficient for this purpose. The   
Federal rules restrict any use of the information to criminally investigate or   
prosecute any alcohol or drug abuse patient.[facility] HospitalIn the event this   
information is protected by the Federal Confidentiality of Alcohol and Drug 
Abuse   
Patient Records regulations: This information has been disclosed to you from 
records   
protected by Federal confidentiality rules (42 CFR Part 2). The Federal rules   
prohibit you from making any further disclosure of this information unless 
further   
disclosure is expressly permitted by the written consent of the person to whom 
it   
pertains or as otherwise permitted by 42 CFR Part 2. A general authorization for
the   
release of medical or other information is NOT sufficient for this purpose. The   
Federal rules restrict any use of the information to criminally investigate or   
prosecute any alcohol or drug abuse patient.[facility] HospitalIn the event this   
information is protected by the Federal Confidentiality of Alcohol and Drug 
Abuse   
Patient Records regulations: This information has been disclosed to you from 
records   
protected by Federal confidentiality rules (42 CFR Part 2). The Federal rules   
prohibit you from making any further disclosure of this information unless 
further   
disclosure is expressly permitted by the written consent of the person to whom 
it   
pertains or as otherwise permitted by 42 CFR Part 2. A general authorization for
the   
release of medical or other information is NOT sufficient for this purpose. The   
Federal rules restrict any use of the information to criminally investigate or   
prosecute any alcohol or drug abuse patient.[facility] HospitalIn the event this   
information is protected by the Federal Confidentiality of Alcohol and Drug 
Abuse   
Patient Records regulations: This information has been disclosed to you from 
records   
protected by Federal confidentiality rules (42 CFR Part 2). The Federal rules   
prohibit you from making any further disclosure of this information unless 
further   
disclosure is expressly permitted by the written consent of the person to whom 
it   
pertains or as otherwise permitted by 42 CFR Part 2. A general authorization for
the   
release of medical or other information is NOT sufficient for this purpose. The   
Federal rules restrict any use of the information to criminally investigate or   
prosecute any alcohol or drug abuse patient.[facility] HospitalIn the event this   
information is protected by the Federal Confidentiality of Alcohol and Drug 
Abuse   
Patient Records regulations: This information has been disclosed to you from 
records   
protected by Federal confidentiality rules (42 CFR Part 2). The Federal rules   
prohibit you from making any further disclosure of this information unless 
further   
disclosure is expressly permitted by the written consent of the person to whom 
it   
pertains or as otherwise permitted by 42 CFR Part 2. A general authorization for
the   
release of medical or other information is NOT sufficient for this purpose. The   
Federal rules restrict any use of the information to criminally investigate or   
prosecute any alcohol or drug abuse patient.[facility] HospitalIn the event this   
information is protected by the Federal Confidentiality of Alcohol and Drug 
Abuse   
Patient Records regulations: This information has been disclosed to you from 
records   
protected by Federal confidentiality rules (42 CFR Part 2). The Federal rules   
prohibit you from making any further disclosure of this information unless 
further   
disclosure is expressly permitted by the written consent of the person to whom 
it   
pertains or as otherwise permitted by 42 CFR Part 2. A general authorization for
the   
release of medical or other information is NOT sufficient for this purpose. The   
Federal rules restrict any use of the information to criminally investigate or   
prosecute any alcohol or drug abuse patient.[facility] the event this   
information is protected by the Federal Confidentiality of Alcohol and Drug 
Abuse   
Patient Records regulations: This information has been disclosed to you from 
records   
protected by Federal confidentiality rules (42 CFR Part 2). The Federal rules   
prohibit you from making any further disclosure of this information unless 
further   
disclosure is expressly permitted by the written consent of the person to whom 
it   
pertains or as otherwise permitted by 42 CFR Part 2. A general authorization for
the   
release of medical or other information is NOT sufficient for this purpose. The   
Federal rules restrict any use of the information to criminally investigate or   
prosecute any alcohol or drug abuse patient.[facility]In the event this   
information is protected by the Federal Confidentiality of Alcohol and Drug 
Abuse   
Patient Records regulations: This information has been disclosed to you from 
records   
protected by Federal confidentiality rules (42 CFR Part 2). The Federal rules   
prohibit you from making any further disclosure of this information unless 
further   
disclosure is expressly permitted by the written consent of the person to whom 
it   
pertains or as otherwise permitted by 42 CFR Part 2. A general authorization for
the   
release of medical or other information is NOT sufficient for this purpose. The   
Federal rules restrict any use of the information to criminally investigate or   
prosecute any alcohol or drug abuse patient.[facility] HospitalIn the event this   
information is protected by the Federal Confidentiality of Alcohol and Drug 
Abuse   
Patient Records regulations: This information has been disclosed to you from 
records   
protected by Federal confidentiality rules (42 CFR Part 2). The Federal rules   
prohibit you from making any further disclosure of this information unless 
further   
disclosure is expressly permitted by the written consent of the person to whom 
it   
pertains or as otherwise permitted by 42 CFR Part 2. A general authorization for
the   
release of medical or other information is NOT sufficient for this purpose. The   
Federal rules restrict any use of the information to criminally investigate or   
prosecute any alcohol or drug abuse patient.[facility] HospitalIn the event this   
information is protected by the Federal Confidentiality of Alcohol and Drug 
Abuse   
Patient Records regulations: This information has been disclosed to you from 
records   
protected by Federal confidentiality rules (42 CFR Part 2). The Federal rules   
prohibit you from making any further disclosure of this information unless 
further   
disclosure is expressly permitted by the written consent of the person to whom 
it   
pertains or as otherwise permitted by 42 CFR Part 2. A general authorization for
the   
release of medical or other information is NOT sufficient for this purpose. The   
Federal rules restrict any use of the information to criminally investigate or   
prosecute any alcohol or drug abuse patient.[facility] HospitalIn the event this   
information is protected by the Federal Confidentiality of Alcohol and Drug 
Abuse   
Patient Records regulations: This information has been disclosed to you from 
records   
protected by Federal confidentiality rules (42 CFR Part 2). The Federal rules   
prohibit you from making any further disclosure of this information unless 
further   
disclosure is expressly permitted by the written consent of the person to whom 
it   
pertains or as otherwise permitted by 42 CFR Part 2. A general authorization for
the   
release of medical or other information is NOT sufficient for this purpose. The   
Federal rules restrict any use of the information to criminally investigate or   
prosecute any alcohol or drug abuse patient.[facility] HospitalIn the event this   
information is protected by the Federal Confidentiality of Alcohol and Drug 
Abuse   
Patient Records regulations: This information has been disclosed to you from 
records   
protected by Federal confidentiality rules (42 CFR Part 2). The Federal rules   
prohibit you from making any further disclosure of this information unless 
further   
disclosure is expressly permitted by the written consent of the person to whom 
it   
pertains or as otherwise permitted by 42 CFR Part 2. A general authorization for
the   
release of medical or other information is NOT sufficient for this purpose. The   
Federal rules restrict any use of the information to criminally investigate or   
prosecute any alcohol or drug abuse patient.[facility] HospitalIn the event this   
information is protected by the Federal Confidentiality of Alcohol and Drug 
Abuse   
Patient Records regulations: This information has been disclosed to you from 
records   
protected by Federal confidentiality rules (42 CFR Part 2). The Federal rules   
prohibit you from making any further disclosure of this information unless 
further   
disclosure is expressly permitted by the written consent of the person to whom 
it   
pertains or as otherwise permitted by 42 CFR Part 2. A general authorization for
the   
release of medical or other information is NOT sufficient for this purpose. The   
Federal rules restrict any use of the information to criminally investigate or   
prosecute any alcohol or drug abuse patient.[facility] HospitalIn the event this   
information is protected by the Federal Confidentiality of Alcohol and Drug 
Abuse   
Patient Records regulations: This information has been disclosed to you from 
records   
protected by Federal confidentiality rules (42 CFR Part 2). The Federal rules   
prohibit you from making any further disclosure of this information unless 
further   
disclosure is expressly permitted by the written consent of the person to whom 
it   
pertains or as otherwise permitted by 42 CFR Part 2. A general authorization for
the   
release of medical or other information is NOT sufficient for this purpose. The   
Federal rules restrict any use of the information to criminally investigate or   
prosecute any alcohol or drug abuse patient.[facility] HospitalIn the event this   
information is protected by the Federal Confidentiality of Alcohol and Drug 
Abuse   
Patient Records regulations: This information has been disclosed to you from 
records   
protected by Federal confidentiality rules (42 CFR Part 2). The Federal rules   
prohibit you from making any further disclosure of this information unless 
further   
disclosure is expressly permitted by the written consent of the person to whom 
it   
pertains or as otherwise permitted by 42 CFR Part 2. A general authorization for
the   
release of medical or other information is NOT sufficient for this purpose. The   
Federal rules restrict any use of the information to criminally investigate or   
prosecute any alcohol or drug abuse patient.[facility] HospitalIn the event this   
information is protected by the Federal Confidentiality of Alcohol and Drug 
Abuse   
Patient Records regulations: This information has been disclosed to you from 
records   
protected by Federal confidentiality rules (42 CFR Part 2). The Federal rules   
prohibit you from making any further disclosure of this information unless 
further   
disclosure is expressly permitted by the written consent of the person to whom 
it   
pertains or as otherwise permitted by 42 CFR Part 2. A general authorization for
the   
release of medical or other information is NOT sufficient for this purpose. The   
Federal rules restrict any use of the information to criminally investigate or   
prosecute any alcohol or drug abuse patient.[facility] HospitalIn the event this   
information is protected by the Federal Confidentiality of Alcohol and Drug 
Abuse   
Patient Records regulations: This information has been disclosed to you from 
records   
protected by Federal confidentiality rules (42 CFR Part 2). The Federal rules   
prohibit you from making any further disclosure of this information unless 
further   
disclosure is expressly permitted by the written consent of the person to whom 
it   
pertains or as otherwise permitted by 42 CFR Part 2. A general authorization for
the   
release of medical or other information is NOT sufficient for this purpose. The   
Federal rules restrict any use of the information to criminally investigate or   
prosecute any alcohol or drug abuse patient.[facility] HospitalIn the event this   
information is protected by the Federal Confidentiality of Alcohol and Drug 
Abuse   
Patient Records regulations: This information has been disclosed to you from 
records   
protected by Federal confidentiality rules (42 CFR Part 2). The Federal rules   
prohibit you from making any further disclosure of this information unless 
further   
disclosure is expressly permitted by the written consent of the person to whom 
it   
pertains or as otherwise permitted by 42 CFR Part 2. A general authorization for
the   
release of medical or other information is NOT sufficient for this purpose. The   
Federal rules restrict any use of the information to criminally investigate or   
prosecute any alcohol or drug abuse patient.[facility] HospitalIn the event this   
information is protected by the Federal Confidentiality of Alcohol and Drug 
Abuse   
Patient Records regulations: This information has been disclosed to you from 
records   
protected by Federal confidentiality rules (42 CFR Part 2). The Federal rules   
prohibit you from making any further disclosure of this information unless 
further   
disclosure is expressly permitted by the written consent of the person to whom 
it   
pertains or as otherwise permitted by 42 CFR Part 2. A general authorization for
the   
release of medical or other information is NOT sufficient for this purpose. The   
Federal rules restrict any use of the information to criminally investigate or   
prosecute any alcohol or drug abuse patient.[facility] HospitalIn the event this   
information is protected by the Federal Confidentiality of Alcohol and Drug 
Abuse   
Patient Records regulations: This information has been disclosed to you from 
records   
protected by Federal confidentiality rules (42 CFR Part 2). The Federal rules   
prohibit you from making any further disclosure of this information unless 
further   
disclosure is expressly permitted by the written consent of the person to whom 
it   
pertains or as otherwise permitted by 42 CFR Part 2. A general authorization for
the   
release of medical or other information is NOT sufficient for this purpose. The   
Federal rules restrict any use of the information to criminally investigate or   
prosecute any alcohol or drug abuse patient.[facility] HospitalIn the event this   
information is protected by the Federal Confidentiality of Alcohol and Drug 
Abuse   
Patient Records regulations: This information has been disclosed to you from 
records   
protected by Federal confidentiality rules (42 CFR Part 2). The Federal rules   
prohibit you from making any further disclosure of this information unless 
further   
disclosure is expressly permitted by the written consent of the person to whom 
it   
pertains or as otherwise permitted by 42 CFR Part 2. A general authorization for
the   
release of medical or other information is NOT sufficient for this purpose. The   
Federal rules restrict any use of the information to criminally investigate or   
prosecute any alcohol or drug abuse patient.[facility] HospitalIn the event this   
information is protected by the Federal Confidentiality of Alcohol and Drug 
Abuse   
Patient Records regulations: This information has been disclosed to you from 
records   
protected by Federal confidentiality rules (42 CFR Part 2). The Federal rules   
prohibit you from making any further disclosure of this information unless 
further   
disclosure is expressly permitted by the written consent of the person to whom 
it   
pertains or as otherwise permitted by 42 CFR Part 2. A general authorization for
the   
release of medical or other information is NOT sufficient for this purpose. The   
Federal rules restrict any use of the information to criminally investigate or   
prosecute any alcohol or drug abuse patient.[facility] HospitalIn the event this   
information is protected by the Federal Confidentiality of Alcohol and Drug 
Abuse   
Patient Records regulations: This information has been disclosed to you from 
records   
protected by Federal confidentiality rules (42 CFR Part 2). The Federal rules   
prohibit you from making any further disclosure of this information unless 
further   
disclosure is expressly permitted by the written consent of the person to whom 
it   
pertains or as otherwise permitted by 42 CFR Part 2. A general authorization for
the   
release of medical or other information is NOT sufficient for this purpose. The   
Federal rules restrict any use of the information to criminally investigate or   
prosecute any alcohol or drug abuse patient.[facility] HospitalIn the event this   
information is protected by the Federal Confidentiality of Alcohol and Drug 
Abuse   
Patient Records regulations: This information has been disclosed to you from 
records   
protected by Federal confidentiality rules (42 CFR Part 2). The Federal rules   
prohibit you from making any further disclosure of this information unless 
further   
disclosure is expressly permitted by the written consent of the person to whom 
it   
pertains or as otherwise permitted by 42 CFR Part 2. A general authorization for
the   
release of medical or other information is NOT sufficient for this purpose. The   
Federal rules restrict any use of the information to criminally investigate or   
prosecute any alcohol or drug abuse patient.[facility] HospitalIn the event this   
information is protected by the Federal Confidentiality of Alcohol and Drug 
Abuse   
Patient Records regulations: This information has been disclosed to you from 
records   
protected by Federal confidentiality rules (42 CFR Part 2). The Federal rules   
prohibit you from making any further disclosure of this information unless 
further   
disclosure is expressly permitted by the written consent of the person to whom 
it   
pertains or as otherwise permitted by 42 CFR Part 2. A general authorization for
the   
release of medical or other information is NOT sufficient for this purpose. The   
Federal rules restrict any use of the information to criminally investigate or   
prosecute any alcohol or drug abuse patient.[facility] HospitalIn the event this   
information is protected by the Federal Confidentiality of Alcohol and Drug 
Abuse   
Patient Records regulations: This information has been disclosed to you from 
records   
protected by Federal confidentiality rules (42 CFR Part 2). The Federal rules   
prohibit you from making any further disclosure of this information unless 
further   
disclosure is expressly permitted by the written consent of the person to whom 
it   
pertains or as otherwise permitted by 42 CFR Part 2. A general authorization for
the   
release of medical or other information is NOT sufficient for this purpose. The   
Federal rules restrict any use of the information to criminally investigate or   
prosecute any alcohol or drug abuse patient.[facility] HospitalIn the event this   
information is protected by the Federal Confidentiality of Alcohol and Drug 
Abuse   
Patient Records regulations: This information has been disclosed to you from 
records   
protected by Federal confidentiality rules (42 CFR Part 2). The Federal rules   
prohibit you from making any further disclosure of this information unless 
further   
disclosure is expressly permitted by the written consent of the person to whom 
it   
pertains or as otherwise permitted by 42 CFR Part 2. A general authorization for
the   
release of medical or other information is NOT sufficient for this purpose. The   
Federal rules restrict any use of the information to criminally investigate or   
prosecute any alcohol or drug abuse patient.[facility] HospitalIn the event this   
information is protected by the Federal Confidentiality of Alcohol and Drug 
Abuse   
Patient Records regulations: This information has been disclosed to you from 
records   
protected by Federal confidentiality rules (42 CFR Part 2). The Federal rules   
prohibit you from making any further disclosure of this information unless 
further   
disclosure is expressly permitted by the written consent of the person to whom 
it   
pertains or as otherwise permitted by 42 CFR Part 2. A general authorization for
the   
release of medical or other information is NOT sufficient for this purpose. The   
Federal rules restrict any use of the information to criminally investigate or   
prosecute any alcohol or drug abuse patient.[facility] the event this   
information is protected by the Federal Confidentiality of Alcohol and Drug 
Abuse   
Patient Records regulations: This information has been disclosed to you from 
records   
protected by Federal confidentiality rules (42 CFR Part 2). The Federal rules   
prohibit you from making any further disclosure of this information unless 
further   
disclosure is expressly permitted by the written consent of the person to whom 
it   
pertains or as otherwise permitted by 42 CFR Part 2. A general authorization for
the   
release of medical or other information is NOT sufficient for this purpose. The   
Federal rules restrict any use of the information to criminally investigate or   
prosecute any alcohol or drug abuse patient.[facility] HospitalIn the event this   
information is protected by the Federal Confidentiality of Alcohol and Drug 
Abuse   
Patient Records regulations: This information has been disclosed to you from 
records   
protected by Federal confidentiality rules (42 CFR Part 2). The Federal rules   
prohibit you from making any further disclosure of this information unless 
further   
disclosure is expressly permitted by the written consent of the person to whom 
it   
pertains or as otherwise permitted by 42 CFR Part 2. A general authorization for
the   
release of medical or other information is NOT sufficient for this purpose. The   
Federal rules restrict any use of the information to criminally investigate or   
prosecute any alcohol or drug abuse patient.[facility] HospitalIn the event this   
information is protected by the Federal Confidentiality of Alcohol and Drug 
Abuse   
Patient Records regulations: This information has been disclosed to you from 
records   
protected by Federal confidentiality rules (42 CFR Part 2). The Federal rules   
prohibit you from making any further disclosure of this information unless 
further   
disclosure is expressly permitted by the written consent of the person to whom 
it   
pertains or as otherwise permitted by 42 CFR Part 2. A general authorization for
the   
release of medical or other information is NOT sufficient for this purpose. The   
Federal rules restrict any use of the information to criminally investigate or   
prosecute any alcohol or drug abuse patient.[facility] event this   
information is protected by the Federal Confidentiality of Alcohol and Drug 
Abuse   
Patient Records regulations: This information has been disclosed to you from 
records   
protected by Federal confidentiality rules (42 CFR Part 2). The Federal rules   
prohibit you from making any further disclosure of this information unless 
further   
disclosure is expressly permitted by the written consent of the person to whom 
it   
pertains or as otherwise permitted by 42 CFR Part 2. A general authorization for
the   
release of medical or other information is NOT sufficient for this purpose. The   
Federal rules restrict any use of the information to criminally investigate or   
prosecute any alcohol or drug abuse patient.[facility] HospitalIn the event this   
information is protected by the Federal Confidentiality of Alcohol and Drug 
Abuse   
Patient Records regulations: This information has been disclosed to you from 
records   
protected by Federal confidentiality rules (42 CFR Part 2). The Federal rules   
prohibit you from making any further disclosure of this information unless 
further   
disclosure is expressly permitted by the written consent of the person to whom 
it   
pertains or as otherwise permitted by 42 CFR Part 2. A general authorization for
the   
release of medical or other information is NOT sufficient for this purpose. The   
Federal rules restrict any use of the information to criminally investigate or   
prosecute any alcohol or drug abuse patient.[facility] HospitalIn the event this   
information is protected by the Federal Confidentiality of Alcohol and Drug 
Abuse   
Patient Records regulations: This information has been disclosed to you from 
records   
protected by Federal confidentiality rules (42 CFR Part 2). The Federal rules   
prohibit you from making any further disclosure of this information unless 
further   
disclosure is expressly permitted by the written consent of the person to whom 
it   
pertains or as otherwise permitted by 42 CFR Part 2. A general authorization for
the   
release of medical or other information is NOT sufficient for this purpose. The   
Federal rules restrict any use of the information to criminally investigate or   
prosecute any alcohol or drug abuse patient.[facility] HospitalIn the event this   
information is protected by the Federal Confidentiality of Alcohol and Drug 
Abuse   
Patient Records regulations: This information has been disclosed to you from 
records   
protected by Federal confidentiality rules (42 CFR Part 2). The Federal rules   
prohibit you from making any further disclosure of this information unless 
further   
disclosure is expressly permitted by the written consent of the person to whom 
it   
pertains or as otherwise permitted by 42 CFR Part 2. A general authorization for
the   
release of medical or other information is NOT sufficient for this purpose. The   
Federal rules restrict any use of the information to criminally investigate or   
prosecute any alcohol or drug abuse patient.[facility] HospitalIn the event this   
information is protected by the Federal Confidentiality of Alcohol and Drug 
Abuse   
Patient Records regulations: This information has been disclosed to you from 
records   
protected by Federal confidentiality rules (42 CFR Part 2). The Federal rules   
prohibit you from making any further disclosure of this information unless 
further   
disclosure is expressly permitted by the written consent of the person to whom 
it   
pertains or as otherwise permitted by 42 CFR Part 2. A general authorization for
the   
release of medical or other information is NOT sufficient for this purpose. The   
Federal rules restrict any use of the information to criminally investigate or   
prosecute any alcohol or drug abuse patient.[facility] HospitalIn the event this   
information is protected by the Federal Confidentiality of Alcohol and Drug 
Abuse   
Patient Records regulations: This information has been disclosed to you from 
records   
protected by Federal confidentiality rules (42 CFR Part 2). The Federal rules   
prohibit you from making any further disclosure of this information unless 
further   
disclosure is expressly permitted by the written consent of the person to whom 
it   
pertains or as otherwise permitted by 42 CFR Part 2. A general authorization for
the   
release of medical or other information is NOT sufficient for this purpose. The   
Federal rules restrict any use of the information to criminally investigate or   
prosecute any alcohol or drug abuse patient.[facility] HospitalIn the event this   
information is protected by the Federal Confidentiality of Alcohol and Drug 
Abuse   
Patient Records regulations: This information has been disclosed to you from 
records   
protected by Federal confidentiality rules (42 CFR Part 2). The Federal rules   
prohibit you from making any further disclosure of this information unless 
further   
disclosure is expressly permitted by the written consent of the person to whom 
it   
pertains or as otherwise permitted by 42 CFR Part 2. A general authorization for
the   
release of medical or other information is NOT sufficient for this purpose. The   
Federal rules restrict any use of the information to criminally investigate or   
prosecute any alcohol or drug abuse patient.[facility] HospitalIn the event this   
information is protected by the Federal Confidentiality of Alcohol and Drug 
Abuse   
Patient Records regulations: This information has been disclosed to you from 
records   
protected by Federal confidentiality rules (42 CFR Part 2). The Federal rules   
prohibit you from making any further disclosure of this information unless 
further   
disclosure is expressly permitted by the written consent of the person to whom 
it   
pertains or as otherwise permitted by 42 CFR Part 2. A general authorization for
the   
release of medical or other information is NOT sufficient for this purpose. The   
Federal rules restrict any use of the information to criminally investigate or   
prosecute any alcohol or drug abuse patient.[facility] HospitalIn the event this   
information is protected by the Federal Confidentiality of Alcohol and Drug 
Abuse   
Patient Records regulations: This information has been disclosed to you from 
records   
protected by Federal confidentiality rules (42 CFR Part 2). The Federal rules   
prohibit you from making any further disclosure of this information unless 
further   
disclosure is expressly permitted by the written consent of the person to whom 
it   
pertains or as otherwise permitted by 42 CFR Part 2. A general authorization for
the   
release of medical or other information is NOT sufficient for this purpose. The   
Federal rules restrict any use of the information to criminally investigate or   
prosecute any alcohol or drug abuse patient.[facility] HospitalIn the event this   
information is protected by the Federal Confidentiality of Alcohol and Drug 
Abuse   
Patient Records regulations: This information has been disclosed to you from 
records   
protected by Federal confidentiality rules (42 CFR Part 2). The Federal rules   
prohibit you from making any further disclosure of this information unless 
further   
disclosure is expressly permitted by the written consent of the person to whom 
it   
pertains or as otherwise permitted by 42 CFR Part 2. A general authorization for
the   
release of medical or other information is NOT sufficient for this purpose. The   
Federal rules restrict any use of the information to criminally investigate or   
prosecute any alcohol or drug abuse patient.[facility] HospitalIn the event this   
information is protected by the Federal Confidentiality of Alcohol and Drug 
Abuse   
Patient Records regulations: This information has been disclosed to you from 
records   
protected by Federal confidentiality rules (42 CFR Part 2). The Federal rules   
prohibit you from making any further disclosure of this information unless 
further   
disclosure is expressly permitted by the written consent of the person to whom 
it   
pertains or as otherwise permitted by 42 CFR Part 2. A general authorization for
the   
release of medical or other information is NOT sufficient for this purpose. The   
Federal rules restrict any use of the information to criminally investigate or   
prosecute any alcohol or drug abuse patient.[facility] HospitalIn the event this   
information is protected by the Federal Confidentiality of Alcohol and Drug 
Abuse   
Patient Records regulations: This information has been disclosed to you from 
records   
protected by Federal confidentiality rules (42 CFR Part 2). The Federal rules   
prohibit you from making any further disclosure of this information unless 
further   
disclosure is expressly permitted by the written consent of the person to whom 
it   
pertains or as otherwise permitted by 42 CFR Part 2. A general authorization for
the   
release of medical or other information is NOT sufficient for this purpose. The   
Federal rules restrict any use of the information to criminally investigate or   
prosecute any alcohol or drug abuse patient.[facility] HospitalIn the event this   
information is protected by the Federal Confidentiality of Alcohol and Drug 
Abuse   
Patient Records regulations: This information has been disclosed to you from 
records   
protected by Federal confidentiality rules (42 CFR Part 2). The Federal rules   
prohibit you from making any further disclosure of this information unless 
further   
disclosure is expressly permitted by the written consent of the person to whom 
it   
pertains or as otherwise permitted by 42 CFR Part 2. A general authorization for
the   
release of medical or other information is NOT sufficient for this purpose. The   
Federal rules restrict any use of the information to criminally investigate or   
prosecute any alcohol or drug abuse patient.[facility] HospitalIn the event this   
information is protected by the Federal Confidentiality of Alcohol and Drug 
Abuse   
Patient Records regulations: This information has been disclosed to you from 
records   
protected by Federal confidentiality rules (42 CFR Part 2). The Federal rules   
prohibit you from making any further disclosure of this information unless 
further   
disclosure is expressly permitted by the written consent of the person to whom 
it   
pertains or as otherwise permitted by 42 CFR Part 2. A general authorization for
the   
release of medical or other information is NOT sufficient for this purpose. The   
Federal rules restrict any use of the information to criminally investigate or   
prosecute any alcohol or drug abuse patient.[facility] HospitalIn the event this   
information is protected by the Federal Confidentiality of Alcohol and Drug 
Abuse   
Patient Records regulations: This information has been disclosed to you from 
records   
protected by Federal confidentiality rules (42 CFR Part 2). The Federal rules   
prohibit you from making any further disclosure of this information unless 
further   
disclosure is expressly permitted by the written consent of the person to whom 
it   
pertains or as otherwise permitted by 42 CFR Part 2. A general authorization for
the   
release of medical or other information is NOT sufficient for this purpose. The   
Federal rules restrict any use of the information to criminally investigate or   
prosecute any alcohol or drug abuse patient.[facility]  
  
  
  
  
                                                    Reason for Visit (unrecogniz  
ed section and content)  
   
                                          
  
  
  
                                        Reason              Comments  
   
                                        Established Patient 3month follow up  
  
  
  
                          Specialty    Diagnoses / Procedures Referred By Contac  
t Referred To Contact  
   
                                                    Internal Medicine /   
INTERNAL MEDICINE                         
  
  
Diagnoses  
  
  
3 month follow up  
  
  
  
Procedures  
  
  
4C EST                                    
  
  
Self                                      
  
  
Cynthia Douglas MD  
  
  
[address] [place] [address]  
  
  
Phone: [phone]  
  
  
Fax: [phone]  
  
  
  
                    Referral ID Status    Reason    Start Date Expiration Date V  
isits   
Requested                               Visits   
Authorized  
   
                                [id number]        Closed            
  
  
Patient   
Cleared   
INN/SMCP   
Payor Auth   
Obtained        2022      1               1  
  
  
  
                                        Reason              Comments  
   
                                        Refill Request        
  
  
  
                                Reason          Onset Date      Comments  
   
                                Refill Request  2022        
  
  
  
                                Reason          Onset Date      Comments  
   
                                Refill Request  2022        
  
  
  
                                Reason          Onset Date      Comments  
   
                                Population Health Navigation Outreach 2022  
      [facility] care gaps  
  
  
  
                                        Reason              Comments  
   
                                        Lab Orders            
   
                                        Orders                
  
  
  
                                Reason          Onset Date      Comments  
   
                                Refill Request  2022        
  
  
  
                                        Reason              Comments  
   
                                        Results               
  
  
  
                                Reason          Onset Date      Comments  
   
                                [facility] Navigation Outreach 2022  
      [facility]  
  
  
  
                                        Reason              Comments  
   
                                        Cough               Cough, chest congest  
ion and scratchy throat x 10 days  
  
  
  
                                Reason          Onset Date      Comments  
   
                                Refill Request  2022        
  
  
  
                                        Reason              Comments  
   
                                        Constipation          
  
  
  
                                Reason          Onset Date      Comments  
   
                                Refill Request  2023        
  
  
  
                                        Reason              Comments  
   
                                        Same Day Appointment right upper thigh b  
ump size of pea  
  
  
  
                                        Reason              Comments  
   
                                        Established Patient   
  
  
  
                          Specialty    Diagnoses / Procedures Referred By Contac  
t Referred To Contact  
   
                                        HEMATOLOGY/ONCOLOGY   
  
  
Diagnoses  
  
  
OV  
  
  
  
Procedures  
  
  
OFFICE VISIT, EST PT.,   
LEVEL 2 TC  
  
  
OV                                        
  
  
Jacquelyn Milner MD  
  
  
[address]  
  
  
[place] [address]  
  
  
Phone: [phone]  
  
  
Fax: [phone]                         
  
  
[facility]  
  
  
[address] [place] [address]  
  
  
Phone: [phone]  
  
  
Fax: [phone]  
  
  
  
                          Referral ID  Status       Reason       Start   
Date                                    Expiration   
Date                                    Visits   
Requested                               Visits   
Authorized  
   
                                        [id number]            Pending   
Review                                    
  
  
OON/Self Pay   
Override                                  
2                   2023           1                   1  
  
  
  
                                        Reason              Comments  
   
                                        Mammogram Result Call Back   
  
  
  
                                        Reason              Comments  
   
                                        Follow Up           Referral to Dr. Cochran  
no  
  
  
  
                                        Reason              Comments  
   
                                        Consult               
   
                                        Blood In Urine        
  
  
  
                          Specialty    Diagnoses / Procedures Referred By Contac  
t Referred To Contact  
   
                                        Urology               
  
  
Diagnoses  
  
  
Gross hematuria  
  
  
  
Procedures  
  
  
CONSULT TO UROLOGY  
  
  
OFFICE/OUTPATIENT NEW HIGH   
MDM 60-74 MINUTES                         
  
  
Francis Shell DO  
  
  
[address]  
  
  
[place] [address]  
  
  
Phone: [phone]  
  
  
Fax: [phone]                         
  
  
  
  
  
                    Referral ID Status    Reason    Start Date Expiration Date V  
isits   
Requested                               Visits   
Authorized  
   
                                [id number]        Closed            
  
  
PCP Requested   
Referral        2023        1               1  
  
  
  
                                Reason          Onset Date      Comments  
   
                                Population Health Navigation Outreach 2023  
      [facility] Care Gaps  
  
  
  
                                        Reason              Comments  
   
                                        Consult             Abnormal mammogram  
  
  
  
                                        Reason              Comments  
   
                                        Patient Question      
  
  
  
                                        Reason              Comments  
   
                                        Results             Right breast biopsy   
results  
  
  
  
                                        Reason              Comments  
   
                                        Post Op Follow Up     
  
  
  
                                        Reason              Comments  
   
                                        F/U 6 months          
  
  
  
                                        Reason              Comments  
   
                                        Medication concern    
  
  
  
                                Reason          Onset Date      Comments  
   
                                Refill Request  2023        
  
  
  
                                Reason          Onset Date      Comments  
   
                                Refill Request  2023        
  
  
  
                                        Reason              Comments  
   
                                        F/U 6 months          
  
  
  
                                        Reason              Comments  
   
                                        Patient Question      
   
                                        ER F/U                
  
  
  
                                        Reason              Comments  
   
                                        Same Day Appointment abdomen cramping, l  
eft flank pain, pinkish when wipes,   
burning   
at times  
  
  
  
                                Reason          Onset Date      Comments  
   
                                Population Health Navigation Outreach 2024  
      [facility] Annual Wellness Visit  
  
  
  
                                        Reason              Comments  
   
                                        Established Patient   
  
  
  
                                Reason          Onset Date      Comments  
   
                                Refill Request  06/10/2024        
  
  
  
                                        Reason              Comments  
   
                                        Recheck             6 month follow up  
  
  
  
                                        Reason              Comments  
   
                                        Sore Throat         ST, HA x 1 day  
  
  
  
                                Reason          Onset Date      Comments  
   
                                Refill Request  2024        
  
  
  
                                Reason          Onset Date      Comments  
   
                                Population [facility] Navigation Outreach 2024  
      [facility], [facility], Care gaps, [facility],   
[employer]   
[facility]  
  
  
  
  
  
                                                    Care Teams (unrecognized sec  
tion and content)  
   
                                          
  
  
  
                      Team Member Relationship Specialty  Start Date End Date  
   
                                                      
  
  
Cynthia Douglas MD  
  
  
[address], [place] [id number] [phone] (Work)  
  
  
[phone] (Fax) PCP - General   Internal Medicine 6/3/14            
   
                                                      
  
  
Rosaline Damon MD, MD  
  
  
[address] [place], [place] [address] [phone] (Work)  
  
  
[phone] (Fax) Physician       Radiation Oncology 17           
   
                                                      
  
  
Martha Steel RN                         Specialty Care   
Coordinator         Oncology            18               
  
  
  
                      Team Member Relationship Specialty  Start Date End Date  
   
                                                      
  
  
Cynthia Douglas MD  
  
  
[address], [place] [address] [phone] (Work)  
  
  
[phone] (Fax) PCP - General   Internal Medicine 6/3/14            
   
                                                      
  
  
Rosaline Damon MD, MD  
  
  
[address] [place], [place] [address] [phone] (Work)  
  
  
[phone] (Fax) Physician       Radiation Oncology 17           
   
                                                      
  
  
Martha Steel RN                         Specialty Care   
Coordinator         Oncology            18               
  
  
  
                      Team Member Relationship Specialty  Start Date End Date  
   
                                                      
  
  
Cynthia Douglas MD  
  
  
[address] [place] [address]  
  
  
[phone] (Work)  
  
  
[phone] (Fax) PCP - General   Internal Medicine 6/3/14            
   
                                                      
  
  
Rosaline Damon MD, MD  
  
  
[address] [place] [place] [address]  
  
  
[phone] (Work)  
  
  
[phone] (Fax) Physician       Radiation Oncology 17           
   
                                                      
  
  
Martha Steel, RN                         Specialty Care   
Coordinator         Oncology            18               
  
  
  
                      Team Member Relationship Specialty  Start Date End Date  
   
                                                      
  
  
Cynthia Douglas MD  
  
  
[address] [place] [address]  
  
  
[phone] (Work)  
  
  
[phone] (Fax) PCP - General   Internal Medicine 6/3/14            
   
                                                      
  
  
Rosaline Damon MD, MD  
  
  
[address] [place] [address]  
  
  
[phone] (Work)  
  
  
[phone] (Fax) Physician       Radiation Oncology 17           
   
                                                      
  
  
Martha Steel RN                         Specialty Care   
Coordinator         Oncology            18               
  
  
  
                      Team Member Relationship Specialty  Start Date End Date  
   
                                                      
  
  
Cynthia Douglas MD  
  
  
[address] [place] [address]  
  
  
[phone] (Work)  
  
  
[phone] (Fax) PCP - General   Internal Medicine 6/3/14            
   
                                                      
  
  
Rosaline Damon MD, MD  
  
  
[address] [place] [address]  
  
  
[phone] (Work)  
  
  
[phone] (Fax) Physician       Radiation Oncology 17           
   
                                                      
  
  
Martha Steel RN                         Specialty Care   
Coordinator         Oncology            18               
  
  
  
                      Team Member Relationship Specialty  Start Date End Date  
   
                                                      
  
  
Cynthia Douglas MD  
  
  
[address] [place] [address]  
  
  
[phone] (Work)  
  
  
[phone] (Fax) PCP - General   Internal Medicine 6/3/14            
   
                                                      
  
  
Rosaline Damon MD, MD  
  
  
[address]  
  
  
[place] [address]  
  
  
[phone] (Work)  
  
  
[phone] (Fax) Physician       Radiation Oncology 17           
   
                                                      
  
  
Martha Steel RN                         Specialty Care   
Coordinator         Oncology            18               
  
  
  
                      Team Member Relationship Specialty  Start Date End Date  
   
                                                      
  
  
Cynthia Douglas MD  
  
  
[address] [place] [address]  
  
  
[phone] (Work)  
  
  
[phone] (Fax) PCP - General   Internal Medicine 6/3/14            
   
                                                      
  
  
Rosaline Damon MD, MD  
  
  
[address] [place] [place] [address]  
  
  
[phone] (Work)  
  
  
[phone] (Fax) Physician       Radiation Oncology 17           
   
                                                      
  
  
Martha Steel RN                         Specialty Care   
Coordinator         Oncology            18               
  
  
  
                      Team Member Relationship Specialty  Start Date End Date  
   
                                                      
  
  
Cynthia Douglas MD  
  
  
[address] [place] [address]  
  
  
[phone] (Work)  
  
  
[phone] (Fax) PCP - General   Internal Medicine 6/3/14            
   
                                                      
  
  
Rosaline Damon MD, MD  
  
  
[address] [place] [address]  
  
  
[phone] (Work)  
  
  
[phone] (Fax) Physician       Radiation Oncology 17           
   
                                                      
  
  
Martha Steel RN                         Specialty Care   
Coordinator         Oncology            18               
  
  
  
                      Team Member Relationship Specialty  Start Date End Date  
   
                                                      
  
  
Cynthia Douglas MD  
  
  
[address] [place] [address]  
  
  
[phone] (Work)  
  
  
[phone] (Fax) PCP - General   Internal Medicine 6/3/14            
   
                                                      
  
  
Rosaline Damon MD, MD  
  
  
[address] [place] [address]  
  
  
[phone] (Work)  
  
  
[phone] (Fax) Physician       Radiation Oncology 17           
   
                                                      
  
  
Martha Steel RN                         Specialty Care   
Coordinator         Oncology            18               
  
  
  
                      Team Member Relationship Specialty  Start Date End Date  
   
                                                      
  
  
Cynthia Douglas MD  
  
  
[address] [place] [address]  
  
  
[phone] (Work)  
  
  
[phone] (Fax) PCP - General   Internal Medicine 6/3/14            
   
                                                      
  
  
Rosaline Damon MD, MD  
  
  
[address] [facility]  
  
  
[place] [address]  
  
  
[phone] (Work)  
  
  
[phone] (Fax) Physician       Radiation Oncology 17           
   
                                                      
  
  
Martha Steel RN                         Specialty Care   
Coordinator         Oncology            18               
  
  
  
                      Team Member Relationship Specialty  Start Date End Date  
   
                                                      
  
  
Cynthia Douglas MD  
  
  
[address] [place] [address]  
  
  
[phone] (Work)  
  
  
[phone] (Fax) PCP - General   Internal Medicine 6/3/14            
   
                                                      
  
  
Rosaline Damon MD, MD  
  
  
[address] [facility] [place] [address]  
  
  
[phone] (Work)  
  
  
[phone] (Fax) Physician       Radiation Oncology 17           
   
                                                      
  
  
Martha Steel, RN                         Specialty Care   
Coordinator         Oncology            18               
  
  
  
                      Team Member Relationship Specialty  Start Date End Date  
   
                                                      
  
  
Cynthia Douglas MD  
  
  
[address] [facility] [place] [address]  
  
  
[phone] (Work)  
  
  
[phone] (Fax) PCP - General   Internal Medicine 6/3/14            
   
                                                      
  
  
Rosaline Damon MD, MD  
  
  
[address] [facility] [place] [address]  
  
  
[phone] (Work)  
  
  
[phone] (Fax) Physician       Radiation Oncology 17           
   
                                                      
  
  
Martha Steel, RN                         Specialty Care   
Coordinator         Oncology            18               
  
  
  
                      Team Member Relationship Specialty  Start Date End Date  
   
                                                      
  
  
Cynthia Douglas MD  
  
  
[address] [facility] [place] [address]  
  
  
[phone] (Work)  
  
  
[phone] (Fax) PCP - General   Internal Medicine 6/3/14            
   
                                                      
  
  
Rosaline Damon MD, MD  
  
  
[address] [facility] [place] [place] [address]  
  
  
[phone] (Work)  
  
  
[phone] (Fax) Physician       Radiation Oncology 17           
   
                                                      
  
  
Martha Steel RN                         Specialty Care   
Coordinator         Oncology            18               
  
  
  
                      Team Member Relationship Specialty  Start Date End Date  
   
                                                      
  
  
Cynthia Douglas MD  
  
  
[address] [facility] [place] [address]  
  
  
[phone] (Work)  
  
  
[phone] (Fax) PCP - General   Internal Medicine 6/3/14            
   
                                                      
  
  
Rosaline Damon MD, MD  
  
  
[address] [facility] [place] [address]  
  
  
[phone] (Work)  
  
  
[phone] (Fax) Physician       Radiation Oncology 17           
   
                                                      
  
  
Martha Steel, RN                         Specialty Care   
Coordinator         Oncology            18               
  
  
  
                      Team Member Relationship Specialty  Start Date End Date  
   
                                                      
  
  
Cynthia Douglas MD  
  
  
[address] [facility] [place] [address]  
  
  
[phone] (Work)  
  
  
[phone] (Fax) PCP - General   Internal Medicine 6/3/14            
   
                                                      
  
  
Rosaline Damon MD, MD  
  
  
[address] [facility] [place] [place] [address]  
  
  
[phone] (Work)  
  
  
[phone] (Fax) Physician       Radiation Oncology 17           
   
                                                      
  
  
Martha Steel RN                         Specialty Care   
Coordinator         Oncology            18               
  
  
  
                      Team Member Relationship Specialty  Start Date End Date  
   
                                                      
  
  
Cynthia Douglas MD  
  
  
[address] [facility] [place] [address]  
  
  
[phone] (Work)  
  
  
[phone] (Fax) PCP - General   Internal Medicine 6/3/14            
   
                                                      
  
  
Rosaline Damon MD, MD  
  
  
[address] [facility] [place] [place] [address]  
  
  
[phone] (Work)  
  
  
[phone] (Fax) Physician       Radiation Oncology 17           
   
                                                      
  
  
Martha Steel RN                         Specialty Care   
Coordinator         Oncology            18               
  
  
  
                      Team Member Relationship Specialty  Start Date End Date  
   
                                                      
  
  
Cynthia Douglas MD  
  
  
[address] [facility] [place] [address]  
  
  
[phone] (Work)  
  
  
[phone] (Fax) PCP - General   Internal Medicine 6/3/14            
   
                                                      
  
  
Rosaline Damon MD, MD  
  
  
[address] [facility] [place] [address]  
  
  
[phone] (Work)  
  
  
[phone] (Fax) Physician       Radiation Oncology 17           
   
                                                      
  
  
Martha Steel RN                         Specialty Care   
Coordinator         Oncology            18               
  
  
  
                      Team Member Relationship Specialty  Start Date End Date  
   
                                                      
  
  
Cynthia Douglas MD  
  
  
[address] [facility] [place] [address]  
  
  
[phone] (Work)  
  
  
[phone] (Fax) PCP - General   Internal Medicine 6/3/14            
   
                                                      
  
  
Rosaline Damon MD, MD  
  
  
[address] [facility] [place] [address]  
  
  
[phone] (Work)  
  
  
[phone] (Fax) Physician       Radiation Oncology 17           
   
                                                      
  
  
Martha Steel RN                         Specialty Care   
Coordinator         Oncology            18               
  
  
  
                      Team Member Relationship Specialty  Start Date End Date  
   
                                                      
  
  
Cynthia Douglas MD  
  
  
[address] [facility] [place] [address]  
  
  
[phone] (Work)  
  
  
[phone] (Fax) PCP - General   Internal Medicine 6/3/14            
   
                                                      
  
  
Rosaline Damon MD, MD  
  
  
[address] [facility] [place] [place] [address]  
  
  
[phone] (Work)  
  
  
[phone] (Fax) Physician       Radiation Oncology 17           
   
                                                      
  
  
Martha Steel RN                         Specialty Care   
Coordinator         Oncology            18               
  
  
  
                      Team Member Relationship Specialty  Start Date End Date  
   
                                                      
  
  
Cynthia Douglas MD  
  
  
[address] [place] [address]  
  
  
[phone] (Work)  
  
  
[phone] (Fax) PCP - General   Internal Medicine 6/3/14            
   
                                                      
  
  
Rosaline Damon MD, MD  
  
  
[address] [place] [address]  
  
  
[phone] (Work)  
  
  
[phone] (Fax) Physician       Radiation Oncology 17           
   
                                                      
  
  
Martha Steel RN                         Specialty Care   
Coordinator         Oncology            18               
  
  
  
                      Team Member Relationship Specialty  Start Date End Date  
   
                                                      
  
  
Cynthia Douglas MD  
  
  
NPI: [id number]  
  
  
[address] [place] [address]  
  
  
[phone] (Work)  
  
  
[phone] (Fax) PCP - General   Internal Medicine 6/3/14            
   
                                                      
  
  
Rosaline Damon MD, MD  
  
  
NPI: [id number]  
  
  
[address] [place] [address]  
  
  
[phone] (Work)  
  
  
[phone] (Fax) Physician       Radiation Oncology 17           
   
                                                      
  
  
Martha Steel RN                         Specialty Care   
Coordinator         Oncology            18               
  
  
  
                      Team Member Relationship Specialty  Start Date End Date  
   
                                                      
  
  
Cynthia Douglas MD  
  
  
NPI: [id number]  
  
  
[address] [place] [address]  
  
  
[phone] (Work)  
  
  
[phone] (Fax) PCP - General   Internal Medicine 6/3/14            
   
                                                      
  
  
Rosaline Damon MD, MD  
  
  
NPI: [id number]  
  
  
[address] [place] [address]  
  
  
[phone] (Work)  
  
  
[phone] (Fax) Physician       Radiation Oncology 17           
   
                                                      
  
  
Martha Steel RN                         Specialty Care   
Coordinator         Oncology            18               
  
  
  
                      Team Member Relationship Specialty  Start Date End Date  
   
                                                      
  
  
Cynthia Douglas MD  
  
  
NPI: [id number]  
  
  
[address] [facility]  
  
  
[place] [address]  
  
  
[phone] (Work)  
  
  
[phone] (Fax) PCP - General   Internal Medicine 6/3/14            
   
                                                      
  
  
Rosaline Damon MD, MD  
  
  
NPI: [id number]  
  
  
[address] [place] [address]  
  
  
[phone] (Work)  
  
  
[phone] (Fax) Physician       Radiation Oncology 17           
   
                                                      
  
  
Martha Steel RN                         Specialty Care   
Coordinator         Oncology            18               
  
  
  
                      Team Member Relationship Specialty  Start Date End Date  
   
                                                      
  
  
Cynthia Douglas MD  
  
  
NPI: [id number]  
  
  
[address] [place] [address]  
  
  
[phone] (Work)  
  
  
[phone] (Fax) PCP - General   Internal Medicine 6/3/14            
   
                                                      
  
  
Rosaline Damon MD, MD  
  
  
NPI: [id number]  
  
  
[address] [place] [address]  
  
  
[phone] (Work)  
  
  
[phone] (Fax) Physician       Radiation Oncology 17           
   
                                                      
  
  
Martha Steel RN                         Specialty Care   
Coordinator         Oncology            18               
  
  
  
                      Team Member Relationship Specialty  Start Date End Date  
   
                                                      
  
  
Cynthia Douglas MD  
  
  
NPI: [id number]  
  
  
[address] [place] [address]  
  
  
[phone] (Work)  
  
  
[phone] (Fax) PCP - General   Internal Medicine 6/3/14            
   
                                                      
  
  
Rosaline Damon MD, MD  
  
  
NPI: [id number]  
  
  
[address] [place] [address]  
  
  
[phone] (Work)  
  
  
[phone] (Fax) Physician       Radiation Oncology 17           
   
                                                      
  
  
Martha Steel RN                         Specialty Care   
Coordinator         Oncology            18               
  
  
  
                      Team Member Relationship Specialty  Start Date End Date  
   
                                                      
  
  
Cynthia Douglas MD  
  
  
NPI: [id number]  
  
  
[address] [place] [address]  
  
  
[phone] (Work)  
  
  
[phone] (Fax) PCP - General   Internal Medicine 6/3/14            
   
                                                      
  
  
Rosaline Damon MD, MD  
  
  
NPI: [id number]  
  
  
[address] [place] [address]  
  
  
[phone] (Work)  
  
  
[phone] (Fax) Physician       Radiation Oncology 17           
   
                                                      
  
  
Martha Steel RN                         Specialty Care   
Coordinator         Oncology            18               
  
  
  
                      Team Member Relationship Specialty  Start Date End Date  
   
                                                      
  
  
Cynthia Douglas MD  
  
  
NPI: [id number]  
  
  
[address] [place] [address]  
  
  
[phone] (Work)  
  
  
[phone] (Fax) PCP - General   Internal Medicine 6/3/14            
   
                                                      
  
  
Rosaline Damon MD, MD  
  
  
NPI: [id number]  
  
  
[address] [place] [address]  
  
  
[phone] (Work)  
  
  
[phone] (Fax) Physician       Radiation Oncology 17           
   
                                                      
  
  
Martha Steel RN                         Specialty Care   
Coordinator         Oncology            18               
  
  
  
                      Team Member Relationship Specialty  Start Date End Date  
   
                                                      
  
  
Cynthia Douglas MD  
  
  
NPI: [id number]  
  
  
[address] [place] [address]  
  
  
[phone] (Work)  
  
  
[phone] (Fax) PCP - General   Internal Medicine 6/3/14            
   
                                                      
  
  
Rosaline Damon MD  
  
  
NPI: [id number]  
  
  
[address] [place] [address]  
  
  
[phone] (Work)  
  
  
[phone] (Fax) Physician       Radiation Oncology 17           
   
                                                      
  
  
Martha Steel RN                         Specialty Care   
Coordinator         Oncology            18               
  
  
  
                      Team Member Relationship Specialty  Start Date End Date  
   
                                                      
  
  
Cynthia Douglas MD  
  
  
NPI: [id number]  
  
  
[address] [place] [address]  
  
  
[phone] (Work)  
  
  
[phone] (Fax) PCP - General   Internal Medicine 6/3/14            
   
                                                      
  
  
Rosaline Damon MD  
  
  
NPI: [id number]  
  
  
[address] [place] [address]  
  
  
[phone] (Work)  
  
  
[phone] (Fax) Physician       Radiation Oncology 17           
   
                                                      
  
  
Martha Steel RN                         Specialty Care   
Coordinator         Oncology            18               
  
  
  
                      Team Member Relationship Specialty  Start Date End Date  
   
                                                      
  
  
Cynthia Douglas MD  
  
  
NPI: [id number]  
  
  
[address] [place] [address]  
  
  
[phone] (Work)  
  
  
[phone] (Fax) PCP - General   Internal Medicine 6/3/14            
   
                                                      
  
  
Rosaline Damon MD  
  
  
NPI: [id number]  
  
  
[address] [place] [address]  
  
  
[phone] (Work)  
  
  
[phone] (Fax) Physician       Radiation Oncology 17           
   
                                                      
  
  
Martha Steel RN                         Specialty Care   
Coordinator         Oncology            18               
  
  
  
                      Team Member Relationship Specialty  Start Date End Date  
   
                                                      
  
  
Cynthia Douglas MD  
  
  
NPI: [id number]  
  
  
[address] [place] [address]  
  
  
[phone] (Work)  
  
  
[phone] (Fax) PCP - General   Internal Medicine 6/3/14            
   
                                                      
  
  
Rosaline Damon MD  
  
  
NPI: [id number]  
  
  
[address] [place] [address]  
  
  
[phone] (Work)  
  
  
[phone] (Fax) Physician       Radiation Oncology 17           
   
                                                      
  
  
Martha Steel RN                         Specialty Care   
Coordinator         Oncology            18               
  
  
  
                      Team Member Relationship Specialty  Start Date End Date  
   
                                                      
  
  
Cynthia Douglas MD  
  
  
NPI: [id number]  
  
  
[address] [place] [address]  
  
  
[phone] (Work)  
  
  
[phone] (Fax) PCP - General   Internal Medicine 6/3/14            
   
                                                      
  
  
Rosaline Damon MD  
  
  
NPI: [id number]  
  
  
[address] [place] [address]  
  
  
[phone] (Work)  
  
  
[phone] (Fax) Physician       Radiation Oncology 17           
   
                                                      
  
  
Martha Steel RN                         Specialty Care   
Coordinator         Oncology            18               
  
  
  
                      Team Member Relationship Specialty  Start Date End Date  
   
                                                      
  
  
Cynthia Douglas MD  
  
  
NPI: [id number]  
  
  
[address] [place] [address]  
  
  
[phone] (Work)  
  
  
[phone] (Fax) PCP - General   Internal Medicine 6/3/14            
   
                                                      
  
  
Rosaline Damon MD  
  
  
NPI: [id number]  
  
  
[address] [place] [address]  
  
  
[phone] (Work)  
  
  
[phone] (Fax) Physician       Radiation Oncology 17           
   
                                                      
  
  
Martha Steel RN                         Specialty Care   
Coordinator         Oncology            18               
  
  
  
                      Team Member Relationship Specialty  Start Date End Date  
   
                                                      
  
  
Cynthia Douglas MD  
  
  
NPI: [id number]  
  
  
[address] [facility]  
  
  
[place] [address]  
  
  
[phone] (Work)  
  
  
[phone] (Fax) PCP - General   Internal Medicine 6/3/14            
   
                                                      
  
  
Rosaline Damon MD  
  
  
NPI: [id number]  
  
  
[address] [place] [address]  
  
  
[phone] (Work)  
  
  
[phone] (Fax) Physician       Radiation Oncology 17           
   
                                                      
  
  
Martha Steel RN                         Specialty Care   
Coordinator         Oncology            18               
  
  
  
                      Team Member Relationship Specialty  Start Date End Date  
   
                                                      
  
  
Cynthia Douglas MD  
  
  
NPI: [id number]  
  
  
[address] [place] [address]  
  
  
[phone] (Work)  
  
  
[phone] (Fax) PCP - General   Internal Medicine 6/3/14            
   
                                                      
  
  
Rosaline Damon MD  
  
  
NPI: [id number]  
  
  
[address] [place] [address]  
  
  
[phone] (Work)  
  
  
[phone] (Fax) Physician       Radiation Oncology 17           
   
                                                      
  
  
Martha Steel RN                         Specialty Care   
Coordinator         Oncology            18               
  
  
  
                      Team Member Relationship Specialty  Start Date End Date  
   
                                                      
  
  
Cynthia Douglas MD  
  
  
NPI: [id number]  
  
  
[address] [place] [address]  
  
  
[phone] (Work)  
  
  
[phone] (Fax) PCP - General   Internal Medicine 6/3/14            
   
                                                      
  
  
Rosaline Damon MD  
  
  
NPI: [id number]  
  
  
[address] [place] [address]  
  
  
[phone] (Work)  
  
  
[phone] (Fax) Physician       Radiation Oncology 17           
   
                                                      
  
  
Martha Steel RN                         Specialty Care   
Coordinator         Oncology            18               
  
  
  
                      Team Member Relationship Specialty  Start Date End Date  
   
                                                      
  
  
Cynthia Douglas MD  
  
  
NPI: [id number]  
  
  
[address] [place] [address]  
  
  
[phone] (Work)  
  
  
[phone] (Fax) PCP - General   Internal Medicine 6/3/14            
   
                                                      
  
  
Rosaline Damon MD  
  
  
NPI: [id number]  
  
  
[address] [place] [address]  
  
  
[phone] (Work)  
  
  
[phone] (Fax) Physician       Radiation Oncology 17           
   
                                                      
  
  
Martha Steel RN                         Specialty Care   
Coordinator         Oncology            18               
  
  
  
                      Team Member Relationship Specialty  Start Date End Date  
   
                                                      
  
  
Cynthia Douglas MD  
  
  
NPI: [id number]  
  
  
[address] [place] [address]  
  
  
[phone] (Work)  
  
  
[phone] (Fax) PCP - General   Internal Medicine 6/3/14            
   
                                                      
  
  
Rosaline Damon MD  
  
  
NPI: [id number]  
  
  
[address] [place] [address]  
  
  
[phone] (Work)  
  
  
[phone] (Fax) Physician       Radiation Oncology 17           
   
                                                      
  
  
Martha Steel RN                         Specialty Care   
Coordinator         Oncology            18               
  
  
  
                      Team Member Relationship Specialty  Start Date End Date  
   
                                                      
  
  
Cynthia Douglas MD  
  
  
NPI: [id number]  
  
  
[address] [place] [address]  
  
  
[phone] (Work)  
  
  
[phone] (Fax) PCP - General   Internal Medicine 6/3/14            
   
                                                      
  
  
Rosaline Damon MD  
  
  
NPI: [id number]  
  
  
[address] [place] [address]  
  
  
[phone] (Work)  
  
  
[phone] (Fax) Physician       Radiation Oncology 17           
   
                                                      
  
  
Martha Steel RN                         Specialty Care   
Coordinator         Oncology            18               
  
  
  
                      Team Member Relationship Specialty  Start Date End Date  
   
                                                      
  
  
Cynthia Douglas MD  
  
  
NPI: [id number]  
  
  
[address] [place] [address]  
  
  
[phone] (Work)  
  
  
[phone] (Fax) PCP - General   Internal Medicine 6/3/14            
   
                                                      
  
  
Rosaline Damon MD  
  
  
NPI: [id number]  
  
  
[address] [place] [address]  
  
  
[phone] (Work)  
  
  
[phone] (Fax) Physician       Radiation Oncology 17           
   
                                                      
  
  
Martha Steel RN                         Specialty Care   
Coordinator         Oncology            18               
  
  
  
                      Team Member Relationship Specialty  Start Date End Date  
   
                                                      
  
  
Cynthia Douglas MD  
  
  
NPI: [id number]  
  
  
[address] [place] [address]  
  
  
[phone] (Work)  
  
  
[phone] (Fax) PCP - General   Internal Medicine 6/3/14            
   
                                                      
  
  
Rosaline Damon MD  
  
  
NPI: [id number]  
  
  
[address] [place] [address]  
  
  
[phone] (Work)  
  
  
[phone] (Fax) Physician       Radiation Oncology 17           
   
                                                      
  
  
Martha Steel RN                         Specialty Care   
Coordinator         Oncology            18               
  
  
FOR RECORDS PERTAINING TO PATIENTS WHO ARE OR HAVE BEEN ENROLLED IN A CHEMICAL 
DEPENDENCY/SUBSTANCEABUSE PROGRAM, SOME INFORMATION MAY BE OMITTED. This 
clinical summary was aggregated from multiple sources. Caution should be 
exercised in using it in the provision of clinical care. This summary normalizes
information from multiple sources, and as a consequence, information in this 
document may materially change the coding, format and clinical context of 
patient data. In addition, data may be omitted in some cases. CLINICAL DECISIONS
SHOULD BE BASED ON THE PRIMARY CLINICAL RECORDS. [facility]. provides 
no warranty or guarantee of the accuracy or completeness of information in this 
document.

## 2024-10-01 NOTE — EDS_ITS
HPI    
History of Present Illness    
Chief Complaint: Abd Pain    
Narrative    
Narrative:     
Patient is a 64-year-old female with past medical history of recent diagnosis of  
diverticulitis, hypertension, diabetes who presented to the emergency department  
chief complaint of abdominal pain and not feeling well.  Patient states that   
early on in September she was diagnosed with diverticulitis she was given IV   
antibiotics here and was ultimately given a 7-day course of oral antibiotics   
which was then continued by her physician was given another 7-day course of   
antibiotics.  States that the past few days she has not been feeling well she   
has felt bloated with some abdominal pain nausea but now denies any vomiting.    
Patient denies any black stools or blood in her stool.  Patient states that she   
is also having some left ear pain and thinks that she may have a ear infection   
as well.    
    
[facility]    
Medical History (Updated 10/01/24 @ 22:56 by Dr. Tj Tabor DO)    
    
Kidney stones    
Diverticulitis    
Wears glasses    
Cancer    
Rash    
Diabetes    
Arthritis    
History of diverticulitis    
History of stress test    
Invasive ductal carcinoma of right breast    
Hypertension    
Former smoker    
Bone spur    
HTN (hypertension)    
    
    
                                Home Medications    
    
    
    
?Medication ?Instructions ?Recorded ?Last Taken ?Type    
     
multivit-iron 18 mg-folic acid 400 1 ea PO QODAY supplememnt 04/10/17 10/01/24   
History    
    
mcg-calcium 500 mg-minerals tablet        
    
(Women's Daily Formula)        
     
atenolol 50 mg tablet 50 mg PO DAILY blood pressure 01/22/23 10/01/24 History    
     
chlorthalidone 25 mg tablet 12.5 mg PO DAILY water pill 01/22/23 10/01/24   
History    
     
exemestane 25 mg tablet 25 mg PO DAILY breast cancer 01/22/23 10/01/24 History    
     
glipizide 2.5 mg tablet, extended 2.5 mg PO DAILY diabetes 01/22/23 10/01/24   
History    
    
release 24 hr        
     
potassium chloride 20 mEq 20 meq PO BID supplement 01/22/23 10/01/24 History    
    
tablet,extended release        
     
metformin 500 mg tablet,extended 500 mg PO DAILY 10/01/24 10/01/24 History    
    
release 24 hr        
    
    
    
                                            
    
    
    
Allergy/AdvReac Type Severity Reaction Status Date / Time    
     
silver sulfadiazine (From Allergy  Hives Verified 09/18/24 14:42    
    
Silvadene)         
    
    
    
Family History (Reviewed 10/01/24 @ 15:36 by Dr. Tj Tabor DO)    
Other   Cancer    
    
    
    
Surgical History (Reviewed 10/01/24 @ 15:36 by Dr. Tj Tabor DO)    
    
History of colonoscopy    
History of foot surgery    
S/P lumpectomy, right breast    
History of cholecystectomy    
H/O: hysterectomy    
    
    
Social History (Reviewed 10/01/24 @ 15:36 by Dr. Tj Tabor DO)    
Smoking Status:  Former smoker     
    
    
    
ROS    
ROS ED    
ROS Narrative    
Constitutional: Denies any fevers, chills, headaches, lightness, dizziness    
Eyes, ears, nose, throat: Complains of left ear pain as noted above and concern   
for ear infection    
Cardiovascular: Denies any chest pain or palpitations    
Respiratory: Denies coughing wheezing shortness of breath    
Abdomen: Complains of abdominal pain and bloating as well as nausea denies vomit  
ing and diarrhea    
: Denies any painful urination, hematuria, polyuria    
Neurological: Denies any numbness, weakness, tingling    
Musculoskeletal: Denies back pain    
Skin: Denies rashes or lesions    
    
EXAM    
Physical Exam    
Narrative    
Exam Narrative:     
General: Patient lying in bed rest comfortably did not appear to be in acute   
distress    
Head: Atraumatic, normocephalic    
Eyes ears, nose, throat: TMs visualized bilaterally no concern for otitis media   
or otitis externa    
Neck: Soft and supple, trachea midline    
Cardiovascular: Regular rate and rhythm no murmurs gallops rubs noted    
Respiratory: Clear to auscultation bilaterally no rales rhonchi or wheezes noted    
Abdomen: Soft, nondistended, diffuse tenderness palpation no rebound or guarding  
on exam, bowel sounds present x 4    
Extremities: +5/5 strength noted in the bilateral upper and lower extremities,   
no pedal edema on exam, radial pulses +2/4 in the bilateral upper extremities    
Neurological: Patient is fine commands knew that she was at [facility]   
there is 2024    
Skin: Warm, dry, intact    
Const    
Vital Signs:     
    
                                            
    
    
    
 10/01/24    
13:59 10/01/24    
15:58 10/01/24    
16:00    
     
Temperature 97.9 F  97.9 F    
     
Temperature Source Temporal  Oral    
     
Pulse Rate 64  58 L    
     
Respiratory Rate 18  16    
     
Blood Pressure 132/79 H 115/70 115/70    
     
Blood Pressure Mean 96 85 85    
     
Pulse Ox 96      
     
Oxygen Delivery Method Room Air  Room Air    
    
    
    
    
 10/01/24    
17:06 10/01/24    
17:07    
     
Temperature 97.9 F 97.9 F    
     
Temperature Source Oral     
     
Pulse Rate 57 L 57 L    
     
Respiratory Rate 18 18    
     
Blood Pressure 118/69 118/69    
     
Blood Pressure Mean 85 85    
     
Pulse Ox 98 98    
     
Oxygen Delivery Method Room Air     
    
    
    
    
    
MDM    
MDM    
MDM Narrative    
Medical decision making narrative:     
Patient is a 64-year-old female who presented to the emerged part with a chief   
complaint of abdominal pain, bloating, nausea not feeling well.  Patient well at  
work performed here on the differential diagnose includes but not limited to   
diverticulitis, diverticular abscess, UTI, pneumonia, ACS.  Once workup is   
obtained reviewed she will be reevaluated.    
    
Patient's CBC reviewed and showed no evidence leukocytosis white blood count   
normal at 8.1, hemoglobin stable 14.9, plate count normal at 310.  Patient   
sodium normal 140, potassium 3.5, creatinine normal at 0.89.  Patient lactic   
acid normal at 1.4, AST and ALT were 24 and 46 respectively, troponin was noted   
be normal at 6, EKG reviewed and showed sinus bradycardia with a rate of 60 bpm   
did have some previous mature ventricular complexes noted as well this was   
independently interpreted by myself.  Patient total bilirubin normal at 0.5.    
Patient lipase normal at 26, urinalysis showed 500 leukocyte esterase 25-50   
white cells with 2+ bacteria she is not have any urinary symptoms this was sent   
for culture.    
    
Patient CT abdomen pelvis with IV contrast showed findings consistent with   
sigmoid diverticulitis no focal fluid collection or free air.    
    
At this point time do believe the patient will warrant admission to the hospital  
for her failed outpatient treatment of her diverticulitis.  Called and discussed  
case with hospitalist Dr. youngblood who is recommending we discussed this case   
with general surgery who I discussed this with Dr. Crow who states that   
the patient can be admitted here and be treated with IV antibiotics and pain   
control and if things are to worsen can be transferred at that point time based   
on the previous referral.    
    
I called back and discussed with hospitalist Dr. Youngblood who accept patient   
for admission.  Patient was notified she is agreeable this plan all question   
concerns answered at bedside.    
Lab Data    
Labs:     
    
                         Laboratory Results - last 24 hr    
    
    
    
  10/01/24 10/01/24    
    
  14:55 15:13    
     
WBC   8.1    
     
RBC   5.56 H    
     
Hgb   14.9    
     
Hct   46.1    
     
MCV   82.9    
     
MCH   26.8 L    
     
MCHC   32.3    
     
RDW Std Deviation   48.1 H    
     
RDW Coeff of Elzbieta   15.9 H    
     
Plt Count   310    
     
MPV   9.6    
     
Immature Gran % (Auto)   0.400    
     
Neut % (Auto)   64.0    
     
Lymph % (Auto)   21.4    
     
Mono % (Auto)   11.0 H    
     
Eos % (Auto)   2.5    
     
Baso % (Auto)   0.7    
     
Absolute Neuts (auto)   5.2    
     
Absolute Lymphs (auto)   1.73    
     
Nucleated RBC %   0    
     
Sodium   140    
     
Potassium   3.5    
     
Chloride   107    
     
Carbon Dioxide   29.0    
     
Anion Gap   5    
     
BUN   8    
     
Creatinine   0.89    
     
Estim Creat Clear Calc   86.50    
     
Est GFR (MDRD) Af Amer   82    
     
Est GFR (MDRD) Non-Af   68    
     
BUN/Creatinine Ratio   9.0 L    
     
Glucose   106    
     
Lactic Acid   1.4    
     
Calcium   10.0    
     
Total Bilirubin   0.50    
     
AST   24    
     
ALT   46    
     
Alkaline Phosphatase   95    
     
Troponin I High Sens   6    
     
Total Protein   7.6    
     
Albumin   3.2    
     
Globulin   4.4 H    
     
Albumin/Globulin Ratio   0.7 L    
     
Lipase   26    
     
Urine Color  Yellow     
     
Urine Clarity  Cloudy     
     
Urine pH  7.0     
     
Ur Specific Gravity  1.005     
     
Urine Protein  15 H     
     
Urine Glucose (UA)  Normal     
     
Urine Ketones  Negative     
     
Urine Occult Blood  50 H     
     
Urine Nitrite  Negative     
     
Urine Bilirubin  Negative     
     
Urine Urobilinogen  Normal     
     
Ur Leukocyte Esterase  500 H     
     
Urine RBC  0 SEEN     
     
Urine WBC  25-50 SEEN     
     
Ur Squamous Epith Cells  0-5 SEEN     
     
Urine Bacteria  2+     
     
Urine Mucus  0 SEEN     
    
    
    
    
Radiography    
Diagnostic Testing:     
    
Clinical Impression(s) from Imaging Studies    
    
Abdomen/Pelvis CT  10/01/24 14:35    
IMPRESSION:    
     
Persistent acute sigmoid diverticulitis. No focal fluid collection or free    
air.    
     
Electronically Signed:    
Rosas Davenport MD    
2024/10/01 at 16:36 EDT    
Reading Location ID and State: [address] / [place]    
Tel [phone], Service support  [phone], Fax [phone]    
     
    
    
    
    
    
Discharge Plan    
Dx/Rx/DC Orders    
Clinical Impression:    
 Diverticulitis, Abdominal pain    
    
    
Disposition    
***Disposition***: [facility]    
    
Discharge Date/Time: 10/01/24 17:56

## 2024-10-01 NOTE — PCM.HP.STD
HPI - General
General
Date of Admission: 10/01/24
Date of Service: 10/01/24
Chief Complaint: Worsening abdominal pain
HPI Narrative
ISMAEL PIZANO, is a 64 F who presented to [facility] ED on 10/1/2024 with worsening abdominal pain.  Patient has known history of sigmoid diverticulitis, has followed with Dr. Patricio for this since January 2023.  Most recent 
episode was in early September.  CT abdomen pelvis on 9/9 showed findings consistent with mild recurrent sigmoid diverticulitis.  She was discharged home on p.o. cefdinir and Flagyl and completed course of these antibiotics.  Saw Dr. Patricio in the 
office on 9/18.  Patient reported mild left mid quadrant pain at that visit but was otherwise feeling improved from previous.  Plan was to see Dr. Patricio in the office today but patient has had worsening discomfort in the lower abdomen and is 
generally not been feeling well for the past few days so she was told to come into the ED for further evaluation.  CT abdomen pelvis showed persistent acute sigmoid diverticulitis with no focal fluid collection or free air.

I saw patient at bedside in the ED and then discussed case with Dr. Crow over the phone prior to admission.  Patient was comfortable appearing in the ED and reported only mild lower abdominal pain.  Her abdomen was soft and nontender to 
palpation.  Patient had not eaten anything yesterday and she was stating she was hungry and was asking for a clinic with diet.  Dr. Patricio noted in her recent office visit note that patient could consider elective surgery for her recurrent 
diverticulitis, but she would refer patient to colorectal surgery for this.  On discussion with Dr. Crow, patient has no urgent surgical needs and will be fine to treat here with antibiotics and escalate diet as tolerated then discharged home. 
 Patient can then consider outpatient follow-up with surgery and/or establishing with a colorectal surgeon for consideration of elective surgery at that point.  Will be admitted here for further management with general surgery consulted.

[facility]
Medical History (Updated 10/01/24 @ 17:23 by Emily Shaver)

Kidney stones
Diverticulitis
Wears glasses
Cancer
Rash
Diabetes
Arthritis
History of diverticulitis
History of stress test
Invasive ductal carcinoma of right breast
Hypertension
Former smoker
Bone spur
HTN (hypertension)


Home Medications

?Medication ?Instructions ?Recorded ?Last Taken ?Type
multivit-iron 18 mg-folic acid 400 1 ea PO QODAY supplememnt 04/10/17 10/01/24 History
mcg-calcium 500 mg-minerals tablet    
(Women's Daily Formula)    
atenolol 50 mg tablet 50 mg PO DAILY blood pressure 01/22/23 10/01/24 History
chlorthalidone 25 mg tablet 12.5 mg PO DAILY water pill 01/22/23 10/01/24 History
exemestane 25 mg tablet 25 mg PO DAILY breast cancer 01/22/23 10/01/24 History
glipizide 2.5 mg tablet, extended 2.5 mg PO DAILY diabetes 01/22/23 10/01/24 History
release 24 hr    
potassium chloride 20 mEq 20 meq PO BID supplement 01/22/23 10/01/24 History
tablet,extended release    
metformin 500 mg tablet,extended 500 mg PO DAILY 10/01/24 10/01/24 History
release 24 hr    




Allergy/AdvReac Type Severity Reaction Status Date / Time
silver sulfadiazine (From Allergy  Hives Verified 09/18/24 14:42
Silvadene)     


Family History (Reviewed 10/01/24 @ 15:36 by Dr. Tj Tabor DO)
Other
 Cancer



Surgical History (Reviewed 10/01/24 @ 15:36 by Dr. Tj Tabor DO)

History of colonoscopy
History of foot surgery
S/P lumpectomy, right breast
History of cholecystectomy
H/O: hysterectomy


Social History (Reviewed 10/01/24 @ 15:36 by Dr. Tj Tabor DO)
Smoking Status:  Former smoker 



ROS
Constitutional
Constitutional: Denies chills, fatigue, fever(s) or weakness
Eyes
Eyes: Denies change in vision
Cardiovascular
Cardiovascular: Denies chest pain
Respiratory/Chest
Respiratory/Chest: Denies shortness of breath at rest
Gastrointestinal
Gastrointestinal: Reports abdominal pain; Denies constipation, diarrhea, nausea or vomiting
Genitourinary
Genitourinary: Denies dysuria

Vital Signs
Vital Signs
Vital Signs: 


 10/01/24
13:59 10/01/24
15:58 10/01/24
16:00
Temperature 97.9 F  97.9 F
Temperature Source Temporal  Oral
Pulse Rate 64  58 L
Respiratory Rate 18  16
Blood Pressure 132/79 H 115/70 115/70
Blood Pressure Mean 96 85 85
Pulse Ox 96  
Oxygen Delivery Method Room Air  Room Air

 10/01/24
17:06 10/01/24
17:07
Temperature 97.9 F 97.9 F
Temperature Source Oral 
Pulse Rate 57 L 57 L
Respiratory Rate 18 18
Blood Pressure 118/69 118/69
Blood Pressure Mean 85 85
Pulse Ox 98 98
Oxygen Delivery Method Room Air 

Weight

Weight:                        131.36 kg                                         
Body Mass Index (BMI)          46.7                                              




Physical Exam
Const
alert, oriented x3 and no apparent distress
Constitutional Narrative: 
Elderly female, morbidly obese, mildly fatigued appearing, otherwise sitting up comfortably in bed, conversing normally, no acute distress.
General Appearance: cooperative and comfortable
HEENT
normocephalic, head/scalp atraumatic, hearing grossly normal bilaterally, nasal mucous membranes and turbinates normal and moist oral mucous membranes
Eyes
PERRL, EOMs intact bilaterally and conjunctivae normal
Neck
full ROM
Chest
inspection of chest normal
Resp
normal respiratory effort, normal air movement, no use of accessory muscles and clear to auscultation bilaterally
Cardio
regular rate, regular rhythm, no murmurs and peripheral pulses 2+ throughout
GI
normal to inspection, nondistended, normoactive bowel sounds, soft to palpation, non-tender and non-distended
Back/Spine
normal ROM
Extremity
normal to inspection, full ROM and no pedal edema
Skin
no rashes or lesions noted
Neuro
moves all extremities and no focal motor deficits
Speech: speech normal
Psych
mental status grossly normal

Results
Lab / Micro Data

10/01/24 15:13          

10/01/24 15:13          

Labs: 
Laboratory Results - last 24 hr

10/01/24 14:55: Urine Color Yellow, Urine Clarity Cloudy, Urine pH 7.0, Ur Specific Gravity 1.005, Urine Protein 15 H, Urine Glucose (UA) Normal, Urine Ketones Negative, Urine Occult Blood 50 H, Urine Nitrite Negative, Urine Bilirubin Negative, 
Urine Urobilinogen Normal, Ur Leukocyte Esterase 500 H, Urine RBC 0 SEEN, Urine WBC 25-50 SEEN, Ur Squamous Epith Cells 0-5 SEEN, Urine Bacteria 2+, Urine Mucus 0 SEEN
10/01/24 15:13: WBC 8.1, RBC 5.56 H, Hgb 14.9, Hct 46.1, MCV 82.9, MCH 26.8 L, MCHC 32.3, RDW Std Deviation 48.1 H, RDW Coeff of Elzbieta 15.9 H, Plt Count 310, MPV 9.6, Immature Gran % (Auto) 0.400, Neut % (Auto) 64.0, Lymph % (Auto) 21.4, Mono % (Auto) 
11.0 H, Eos % (Auto) 2.5, Baso % (Auto) 0.7, Absolute Neuts (auto) 5.2, Absolute Lymphs (auto) 1.73, Nucleated RBC % 0, Sodium 140, Potassium 3.5, Chloride 107, Carbon Dioxide 29.0, Anion Gap 5, BUN 8, Creatinine 0.89, Estim Creat Clear Calc 86.50, 
Est GFR (MDRD) Af Amer 82, Est GFR (MDRD) Non-Af 68, BUN/Creatinine Ratio 9.0 L, Glucose 106, Lactic Acid 1.4, Calcium 10.0, Total Bilirubin 0.50, AST 24, ALT 46, Alkaline Phosphatase 95, Troponin I High Sens 6, Total Protein 7.6, Albumin 3.2, 
Globulin 4.4 H, Albumin/Globulin Ratio 0.7 L, Lipase 26


Imaging
Radiology Impression

Abdomen/Pelvis CT  10/01/24 14:35
IMPRESSION:
 
Persistent acute sigmoid diverticulitis. No focal fluid collection or free
air.
 
Electronically Signed:
Rosas Davenport MD
2024/10/01 at 16:36 EDT
Reading Location ID and State: [address] / [place]
Tel [phone], Service support  [phone], Fax [phone]
 




Assessment & Plan
Assessment/Plan
(1) Diverticulitis: 
PLAN: 
Plan
Patient is a 64-year-old female who presented [facility] ED on 10/1/2024 with worsening abdominal pain.

1.  Recurrent mild sigmoid diverticulitis
? Admit under observation status to [facility].  General surgery consulted.  CT abdomen pelvis showed recurrent acute sigmoid diverticulitis with no perforation or abscess noted.  Patient very stable appearing on exam with minimal abdominal pain.  
Hemodynamically stable and afebrile.  Will treat with IV Zosyn for now.  Okay for clear liquid diet for now, advance diet per surgery recommendations.  

Chronic medical conditions:
? Morbid obesity: BMI 47 on admit.  Encouraged lifestyle modifications.  Complicates hospital course, care and prognosis.
? Hypertension: Stable.  Continue home atenolol and chlorthalidone.
? Type 2 diabetes mellitus: Home regimen of metformin 500 mg daily and glipizide 2.5 mg daily.  Blood glucose 106 on admit.  Okay for sliding scale insulin with meals while inpatient.
? History of breast cancer: Continue home exemestane.

DVT prophylaxis: Lovenox twice daily
CODE STATUS: Full code, verified
Expected disposition: Home, 1 to 2 days

Total clinical time spent by myself addressing the patient's medical issues, reviewing all the data, and collaborating with patient's care team: 55 minutes.

Charges/Coding
Visit Charges
Inpatient E&M: 93674 Init Hosp L2

## 2024-10-01 NOTE — EX.ED.DYSGE1
HPI
History of Present Illness
Chief Complaint: Abd Pain
Narrative
Narrative: 
Patient is a 64-year-old female with past medical history of recent diagnosis of diverticulitis, hypertension, diabetes who presented to the emergency department chief complaint of abdominal pain and not feeling well.  Patient states that early on 
in September she was diagnosed with diverticulitis she was given IV antibiotics here and was ultimately given a 7-day course of oral antibiotics which was then continued by her physician was given another 7-day course of antibiotics.  States that 
the past few days she has not been feeling well she has felt bloated with some abdominal pain nausea but now denies any vomiting.  Patient denies any black stools or blood in her stool.  Patient states that she is also having some left ear pain and 
thinks that she may have a ear infection as well.

[facility]
Medical History (Updated 10/01/24 @ 22:56 by Dr. Tj Tabor DO)

Kidney stones
Diverticulitis
Wears glasses
Cancer
Rash
Diabetes
Arthritis
History of diverticulitis
History of stress test
Invasive ductal carcinoma of right breast
Hypertension
Former smoker
Bone spur
HTN (hypertension)


Home Medications

?Medication ?Instructions ?Recorded ?Last Taken ?Type
multivit-iron 18 mg-folic acid 400 1 ea PO QODAY supplememnt 04/10/17 10/01/24 History
mcg-calcium 500 mg-minerals tablet    
(Women's Daily Formula)    
atenolol 50 mg tablet 50 mg PO DAILY blood pressure 01/22/23 10/01/24 History
chlorthalidone 25 mg tablet 12.5 mg PO DAILY water pill 01/22/23 10/01/24 History
exemestane 25 mg tablet 25 mg PO DAILY breast cancer 01/22/23 10/01/24 History
glipizide 2.5 mg tablet, extended 2.5 mg PO DAILY diabetes 01/22/23 10/01/24 History
release 24 hr    
potassium chloride 20 mEq 20 meq PO BID supplement 01/22/23 10/01/24 History
tablet,extended release    
metformin 500 mg tablet,extended 500 mg PO DAILY 10/01/24 10/01/24 History
release 24 hr    




Allergy/AdvReac Type Severity Reaction Status Date / Time
silver sulfadiazine (From Allergy  Hives Verified 09/18/24 14:42
Silvadene)     


Family History (Reviewed 10/01/24 @ 15:36 by Dr. Tj Tabor DO)
Other
 Cancer



Surgical History (Reviewed 10/01/24 @ 15:36 by Dr. Tj Tabor DO)

History of colonoscopy
History of foot surgery
S/P lumpectomy, right breast
History of cholecystectomy
H/O: hysterectomy


Social History (Reviewed 10/01/24 @ 15:36 by Dr. Tj Tabor DO)
Smoking Status:  Former smoker 



ROS
ROS ED
ROS Narrative
Constitutional: Denies any fevers, chills, headaches, lightness, dizziness
Eyes, ears, nose, throat: Complains of left ear pain as noted above and concern for ear infection
Cardiovascular: Denies any chest pain or palpitations
Respiratory: Denies coughing wheezing shortness of breath
Abdomen: Complains of abdominal pain and bloating as well as nausea denies vomiting and diarrhea
: Denies any painful urination, hematuria, polyuria
Neurological: Denies any numbness, weakness, tingling
Musculoskeletal: Denies back pain
Skin: Denies rashes or lesions

EXAM
Physical Exam
Narrative
Exam Narrative: 
General: Patient lying in bed rest comfortably did not appear to be in acute distress
Head: Atraumatic, normocephalic
Eyes ears, nose, throat: TMs visualized bilaterally no concern for otitis media or otitis externa
Neck: Soft and supple, trachea midline
Cardiovascular: Regular rate and rhythm no murmurs gallops rubs noted
Respiratory: Clear to auscultation bilaterally no rales rhonchi or wheezes noted
Abdomen: Soft, nondistended, diffuse tenderness palpation no rebound or guarding on exam, bowel sounds present x 4
Extremities: +5/5 strength noted in the bilateral upper and lower extremities, no pedal edema on exam, radial pulses +2/4 in the bilateral upper extremities
Neurological: Patient is fine commands knew that she was at [facility] there is 2024
Skin: Warm, dry, intact
Const
Vital Signs: 



 10/01/24
13:59 10/01/24
15:58 10/01/24
16:00
Temperature 97.9 F  97.9 F
Temperature Source Temporal  Oral
Pulse Rate 64  58 L
Respiratory Rate 18  16
Blood Pressure 132/79 H 115/70 115/70
Blood Pressure Mean 96 85 85
Pulse Ox 96  
Oxygen Delivery Method Room Air  Room Air

 10/01/24
17:06 10/01/24
17:07
Temperature 97.9 F 97.9 F
Temperature Source Oral 
Pulse Rate 57 L 57 L
Respiratory Rate 18 18
Blood Pressure 118/69 118/69
Blood Pressure Mean 85 85
Pulse Ox 98 98
Oxygen Delivery Method Room Air 




MDM
MDM
MDM Narrative
Medical decision making narrative: 
Patient is a 64-year-old female who presented to the emerged part with a chief complaint of abdominal pain, bloating, nausea not feeling well.  Patient well at work performed here on the differential diagnose includes but not limited to 
diverticulitis, diverticular abscess, UTI, pneumonia, ACS.  Once workup is obtained reviewed she will be reevaluated.

Patient's CBC reviewed and showed no evidence leukocytosis white blood count normal at 8.1, hemoglobin stable 14.9, plate count normal at 310.  Patient sodium normal 140, potassium 3.5, creatinine normal at 0.89.  Patient lactic acid normal at 1.4, 
AST and ALT were 24 and 46 respectively, troponin was noted be normal at 6, EKG reviewed and showed sinus bradycardia with a rate of 60 bpm did have some previous mature ventricular complexes noted as well this was independently interpreted by 
myself.  Patient total bilirubin normal at 0.5.  Patient lipase normal at 26, urinalysis showed 500 leukocyte esterase 25-50 white cells with 2+ bacteria she is not have any urinary symptoms this was sent for culture.

Patient CT abdomen pelvis with IV contrast showed findings consistent with sigmoid diverticulitis no focal fluid collection or free air.

At this point time do believe the patient will warrant admission to the hospital for her failed outpatient treatment of her diverticulitis.  Called and discussed case with hospitalist Dr. youngblood who is recommending we discussed this case with 
general surgery who I discussed this with Dr. Crow who states that the patient can be admitted here and be treated with IV antibiotics and pain control and if things are to worsen can be transferred at that point time based on the previous 
referral.

I called back and discussed with hospitalist Dr. Youngblood who accept patient for admission.  Patient was notified she is agreeable this plan all question concerns answered at bedside.
Lab Data
Labs: 

Laboratory Results - last 24 hr

  10/01/24 10/01/24
  14:55 15:13
WBC   8.1
RBC   5.56 H
Hgb   14.9
Hct   46.1
MCV   82.9
MCH   26.8 L
MCHC   32.3
RDW Std Deviation   48.1 H
RDW Coeff of Elzbieta   15.9 H
Plt Count   310
MPV   9.6
Immature Gran % (Auto)   0.400
Neut % (Auto)   64.0
Lymph % (Auto)   21.4
Mono % (Auto)   11.0 H
Eos % (Auto)   2.5
Baso % (Auto)   0.7
Absolute Neuts (auto)   5.2
Absolute Lymphs (auto)   1.73
Nucleated RBC %   0
Sodium   140
Potassium   3.5
Chloride   107
Carbon Dioxide   29.0
Anion Gap   5
BUN   8
Creatinine   0.89
Estim Creat Clear Calc   86.50
Est GFR (MDRD) Af Amer   82
Est GFR (MDRD) Non-Af   68
BUN/Creatinine Ratio   9.0 L
Glucose   106
Lactic Acid   1.4
Calcium   10.0
Total Bilirubin   0.50
AST   24
ALT   46
Alkaline Phosphatase   95
Troponin I High Sens   6
Total Protein   7.6
Albumin   3.2
Globulin   4.4 H
Albumin/Globulin Ratio   0.7 L
Lipase   26
Urine Color  Yellow 
Urine Clarity  Cloudy 
Urine pH  7.0 
Ur Specific Gravity  1.005 
Urine Protein  15 H 
Urine Glucose (UA)  Normal 
Urine Ketones  Negative 
Urine Occult Blood  50 H 
Urine Nitrite  Negative 
Urine Bilirubin  Negative 
Urine Urobilinogen  Normal 
Ur Leukocyte Esterase  500 H 
Urine RBC  0 SEEN 
Urine WBC  25-50 SEEN 
Ur Squamous Epith Cells  0-5 SEEN 
Urine Bacteria  2+ 
Urine Mucus  0 SEEN 



Radiography
Diagnostic Testing: 

Clinical Impression(s) from Imaging Studies

Abdomen/Pelvis CT  10/01/24 14:35
IMPRESSION:
 
Persistent acute sigmoid diverticulitis. No focal fluid collection or free
air.
 
Electronically Signed:
Rosas Davenport MD
2024/10/01 at 16:36 EDT
Reading Location ID and State: [id number] / [place]
Tel [phone], Service support  [phone], Fax [phone]
 





Discharge Plan
Dx/Rx/DC Orders
Clinical Impression:
 Diverticulitis, Abdominal pain


Disposition
***Disposition***: [facility]

Discharge Date/Time: 10/01/24 17:56

## 2024-10-02 VITALS
HEART RATE: 55 BPM | SYSTOLIC BLOOD PRESSURE: 114 MMHG | DIASTOLIC BLOOD PRESSURE: 66 MMHG | TEMPERATURE: 97.52 F | OXYGEN SATURATION: 100 % | RESPIRATION RATE: 18 BRPM

## 2024-10-02 VITALS
TEMPERATURE: 97.9 F | SYSTOLIC BLOOD PRESSURE: 128 MMHG | RESPIRATION RATE: 17 BRPM | OXYGEN SATURATION: 97 % | HEART RATE: 58 BPM | DIASTOLIC BLOOD PRESSURE: 64 MMHG

## 2024-10-02 VITALS
RESPIRATION RATE: 18 BRPM | DIASTOLIC BLOOD PRESSURE: 70 MMHG | HEART RATE: 61 BPM | SYSTOLIC BLOOD PRESSURE: 114 MMHG | TEMPERATURE: 98.42 F | OXYGEN SATURATION: 96 %

## 2024-10-02 VITALS
TEMPERATURE: 97.5 F | OXYGEN SATURATION: 100 % | RESPIRATION RATE: 18 BRPM | SYSTOLIC BLOOD PRESSURE: 125 MMHG | DIASTOLIC BLOOD PRESSURE: 66 MMHG | HEART RATE: 64 BPM

## 2024-10-02 VITALS — HEART RATE: 55 BPM

## 2024-10-02 VITALS — OXYGEN SATURATION: 96 %

## 2024-10-02 LAB
ANION GAP: 5 (ref 5–15)
BUN SERPL-MCNC: 7 MG/DL (ref 7–18)
BUN/CREAT RATIO: 9 RATIO (ref 10–20)
CALCIUM SERPL-MCNC: 9.6 MG/DL (ref 8.5–10.1)
CARBON DIOXIDE: 26 MMOL/L (ref 21–32)
CHLORIDE: 110 MMOL/L (ref 98–107)
DEPRECATED RDW RBC: 49.4 FL (ref 35.1–43.9)
ERYTHROCYTE [DISTWIDTH] IN BLOOD: 16.2 % (ref 11.6–14.6)
EST GLOM FILT RATE - AFR AMER: 96 ML/MIN (ref 60–?)
ESTIMATED CREATININE CLEARANCE: 96.87 ML/MIN
GLUCOSE: 85 MG/DL (ref 74–106)
HCT VFR BLD AUTO: 45.4 % (ref 37–47)
HEMOGLOBIN: 14.5 G/DL (ref 12–15)
HGB BLD-MCNC: 14.5 G/DL (ref 12–15)
MCV RBC: 84.2 FL (ref 81–99)
MEAN CORP HGB CONC: 31.9 G/DL (ref 32–36)
MEAN PLATELET VOL.: 10.2 FL (ref 6.2–12)
PLATELET # BLD: 284 K/MM3 (ref 150–450)
PLATELET COUNT: 284 K/MM3 (ref 150–450)
POTASSIUM: 3.4 MMOL/L (ref 3.5–5.1)
RBC # BLD AUTO: 5.39 M/MM3 (ref 4.2–5.4)
RBC DISTRIBUTION WIDTH CV: 16.2 % (ref 11.6–14.6)
RBC DISTRIBUTION WIDTH SD: 49.4 FL (ref 35.1–43.9)
WBC # BLD AUTO: 7.1 K/MM3 (ref 4.4–11)
WHITE BLOOD COUNT: 7.1 K/MM3 (ref 4.4–11)

## 2024-10-02 RX ADMIN — PIPERACILLIN SODIUM AND TAZOBACTAM SODIUM 12.5 GM: 3; .375 INJECTION, POWDER, LYOPHILIZED, FOR SOLUTION INTRAVENOUS at 06:10

## 2024-10-02 RX ADMIN — PIPERACILLIN SODIUM AND TAZOBACTAM SODIUM 12.5 GM: 3; .375 INJECTION, POWDER, LYOPHILIZED, FOR SOLUTION INTRAVENOUS at 15:02

## 2024-10-02 RX ADMIN — PIPERACILLIN SODIUM AND TAZOBACTAM SODIUM 12.5 GM: 3; .375 INJECTION, POWDER, LYOPHILIZED, FOR SOLUTION INTRAVENOUS at 21:35

## 2024-10-02 RX ADMIN — POTASSIUM CHLORIDE 20 MEQ: 1500 TABLET, EXTENDED RELEASE ORAL at 11:38

## 2024-10-02 RX ADMIN — POTASSIUM CHLORIDE 20 MEQ: 1500 TABLET, EXTENDED RELEASE ORAL at 08:49

## 2024-10-02 RX ADMIN — Medication 1 CAP: at 21:35

## 2024-10-02 NOTE — CON.PCM.SX_ITS
Assessment & Plan    
Assessment/Plan    
(1) Diverticulitis:     
PLAN: Patient has recurrent diverticulitis.  She was just seen last week by my   
partner and referred to a tertiary center.  She presents with worsening   
abdominal pain and CT scan reveals that she has diverticulitis in the same area.  
 I discussed conservative management with her and I will keep her on clear   
liquids until her pain resolves and continue antibiotics.  She may then follow-  
up at the tertiary center for elective resection of the segment of colon.    
    
She is still painful today so I will keep her on clear liquids.    
    
Colt Crow MD    
Pager: [phone]    
    
[facility]    
[address] [place] [address]    
[address], [address]    
[place] [address]    
Office: [phone]    
Fax: [phone]    
    
HPI    
Consult Data    
Date of Consult: 10/02/24    
HPI Narrative    
HPI Narrative:     
ISMAEL PIZANO, is a 64 F who presents with another flareup of her   
diverticulitis.  The patient has had multiple flares in the last month or 2. She  
was recently referred to a tertiary center due to her BMI but she has not seen   
them yet.    
    
[facility]    
Medical History (Updated 10/01/24 @ 22:56 by Dr. Tj Tabor, DO)    
    
Kidney stones    
Diverticulitis    
Wears glasses    
Cancer    
Rash    
Diabetes    
Arthritis    
History of diverticulitis    
History of stress test    
Invasive ductal carcinoma of right breast    
Hypertension    
Former smoker    
Bone spur    
HTN (hypertension)    
    
    
                                Home Medications    
    
    
    
?Medication ?Instructions ?Recorded ?Last Taken ?Type    
     
multivit-iron 18 mg-folic acid 400 1 ea PO QODAY supplememnt 04/10/17 10/01/24   
History    
    
mcg-calcium 500 mg-minerals tablet        
    
(Women's Daily Formula)        
     
atenolol 50 mg tablet 50 mg PO DAILY blood pressure 01/22/23 10/01/24 History    
     
chlorthalidone 25 mg tablet 12.5 mg PO DAILY water pill 01/22/23 10/01/24   
History    
     
exemestane 25 mg tablet 25 mg PO DAILY breast cancer 01/22/23 10/01/24 History    
     
glipizide 2.5 mg tablet, extended 2.5 mg PO DAILY diabetes 01/22/23 10/01/24   
History    
    
release 24 hr        
     
potassium chloride 20 mEq 20 meq PO BID supplement 01/22/23 10/01/24 History    
    
tablet,extended release        
     
metformin 500 mg tablet,extended 500 mg PO DAILY 10/01/24 10/01/24 History    
    
release 24 hr        
    
    
    
                                            
    
    
    
Allergy/AdvReac Type Severity Reaction Status Date / Time    
     
silver sulfadiazine (From Allergy  Hives Verified 09/18/24 14:42    
    
Silvadene)         
    
    
    
Family History (Reviewed 10/01/24 @ 15:36 by Dr. Tj Tabor DO)    
Other   Cancer    
    
    
    
Surgical History (Reviewed 10/01/24 @ 15:36 by Dr. Tj Tabor DO)    
    
History of colonoscopy    
History of foot surgery    
S/P lumpectomy, right breast    
History of cholecystectomy    
H/O: hysterectomy    
    
    
Social History (Reviewed 10/01/24 @ 15:36 by Dr. Tj Tabor DO)    
Smoking Status:  Former smoker     
    
    
    
Physical Exam    
Const    
alert and oriented x3    
HEENT    
normocephalic    
Eyes    
PERRL    
Chest    
inspection of chest normal    
Resp    
normal respiratory effort and normal air movement    
Cardio    
regular rate and regular rhythm    
GI    
soft to palpation    
Palpation: tender LLQ and LUQ    
Extremity    
normal to inspection    
Neuro    
CN's II-XII intact bilaterally    
    
Lab / Micro Data    
    
                                                        10/02/24 06:54              
    
                                                        10/02/24 05:53              
    
Labs:     
                         Laboratory Results - last 24 hr    
    
10/01/24 14:55: Urine Color Yellow, Urine Clarity Cloudy, Urine pH 7.0, Ur   
Specific Gravity 1.005, Urine Protein 15 H, Urine Glucose (UA) Normal, Urine   
Ketones Negative, Urine Occult Blood 50 H, Urine Nitrite Negative, Urine   
Bilirubin Negative, Urine Urobilinogen Normal, Ur Leukocyte Esterase 500 H,   
Urine RBC 0 SEEN, Urine WBC 25-50 SEEN, Ur Squamous Epith Cells 0-5 SEEN, Urine   
Bacteria 2+, Urine Mucus 0 SEEN    
10/01/24 15:13: WBC 8.1, RBC 5.56 H, Hgb 14.9, Hct 46.1, MCV 82.9, MCH 26.8 L,   
MCHC 32.3, RDW Std Deviation 48.1 H, RDW Coeff of Elzbieta 15.9 H, Plt Count 310, MPV  
9.6, Immature Gran % (Auto) 0.400, Neut % (Auto) 64.0, Lymph % (Auto) 21.4, Mono  
% (Auto) 11.0 H, Eos % (Auto) 2.5, Baso % (Auto) 0.7, Absolute Neuts (auto) 5.2,  
Absolute Lymphs (auto) 1.73, Nucleated RBC % 0, Sodium 140, Potassium 3.5,   
Chloride 107, Carbon Dioxide 29.0, Anion Gap 5, BUN 8, Creatinine 0.89, Estim   
Creat Clear Calc 86.50, Est GFR (MDRD) Af Amer 82, Est GFR (MDRD) Non-Af 68, B  
UN/Creatinine Ratio 9.0 L, Glucose 106, Lactic Acid 1.4, Calcium 10.0, Total   
Bilirubin 0.50, AST 24, ALT 46, Alkaline Phosphatase 95, Troponin I High Sens 6,  
Total Protein 7.6, Albumin 3.2, Globulin 4.4 H, Albumin/Globulin Ratio 0.7 L,   
Lipase 26    
10/01/24 21:03: POC Glucose 91    
10/02/24 05:53: Sodium 141, Potassium 3.4 L, Chloride 110 H, Carbon Dioxide   
26.0, Anion Gap 5, BUN 7, Creatinine 0.77, Estim Creat Clear Calc 96.87, Est GFR  
(MDRD) Af Amer 96, Est GFR (MDRD) Non-Af 79, BUN/Creatinine Ratio 9.0 L, Glucose  
85, Calcium 9.6    
10/02/24 06:07: POC Glucose 103    
10/02/24 06:54: WBC 7.1, RBC 5.39, Hgb 14.5, Hct 45.4, MCV 84.2, MCH 26.9 L, M  
CHC 31.9 L, RDW Std Deviation 49.4 H, RDW Coeff of Elzbieta 16.2 H, Plt Count 284,   
MPV 10.2    
    
    
Imaging    
                              Radiology Impression    
    
Abdomen/Pelvis CT  10/01/24 14:35    
IMPRESSION:    
     
Persistent acute sigmoid diverticulitis. No focal fluid collection or free    
air.    
     
Electronically Signed:    
Rosas Davenport MD    
2024/10/01 at 16:36 EDT    
Reading Location ID and State: [id number] / [place]    
Tel [phone], Service support  [phone], Fax [phone]

## 2024-10-02 NOTE — CASEMGMT
Met with patient to complete MOON form. MOON form explained to patient who voiced understanding and signed form. Original form placed in pt?s chart and copy provided to patient.
Casie Avelar, Discharge Planning Asst

## 2024-10-02 NOTE — EX.PCM.CON.S
Assessment & Plan
Assessment/Plan
(1) Diverticulitis: 
PLAN: Patient has recurrent diverticulitis.  She was just seen last week by my partner and referred to a tertiary center.  She presents with worsening abdominal pain and CT scan reveals that she has diverticulitis in the same area.  I discussed 
conservative management with her and I will keep her on clear liquids until her pain resolves and continue antibiotics.  She may then follow-up at the tertiary center for elective resection of the segment of colon.

She is still painful today so I will keep her on clear liquids.

Colt Crow MD
Pager: [phone]

[facility]
[address] [place] [address]
[address], [address]
[place] [address]
Office: [phone]
Fax: [phone]

HPI
Consult Data
Date of Consult: 10/02/24
HPI Narrative
HPI Narrative: 
ISMAEL PIZANO, is a 64 F who presents with another flareup of her diverticulitis.  The patient has had multiple flares in the last month or 2. She was recently referred to a tertiary center due to her BMI but she has not seen them yet.

[facility]
Medical History (Updated 10/01/24 @ 22:56 by Dr. Tj Tabor, DO)

Kidney stones
Diverticulitis
Wears glasses
Cancer
Rash
Diabetes
Arthritis
History of diverticulitis
History of stress test
Invasive ductal carcinoma of right breast
Hypertension
Former smoker
Bone spur
HTN (hypertension)


Home Medications

?Medication ?Instructions ?Recorded ?Last Taken ?Type
multivit-iron 18 mg-folic acid 400 1 ea PO QODAY supplememnt 04/10/17 10/01/24 History
mcg-calcium 500 mg-minerals tablet    
(Women's Daily Formula)    
atenolol 50 mg tablet 50 mg PO DAILY blood pressure 01/22/23 10/01/24 History
chlorthalidone 25 mg tablet 12.5 mg PO DAILY water pill 01/22/23 10/01/24 History
exemestane 25 mg tablet 25 mg PO DAILY breast cancer 01/22/23 10/01/24 History
glipizide 2.5 mg tablet, extended 2.5 mg PO DAILY diabetes 01/22/23 10/01/24 History
release 24 hr    
potassium chloride 20 mEq 20 meq PO BID supplement 01/22/23 10/01/24 History
tablet,extended release    
metformin 500 mg tablet,extended 500 mg PO DAILY 10/01/24 10/01/24 History
release 24 hr    




Allergy/AdvReac Type Severity Reaction Status Date / Time
silver sulfadiazine (From Allergy  Hives Verified 09/18/24 14:42
Silvadene)     


Family History (Reviewed 10/01/24 @ 15:36 by Dr. Tj Tabor DO)
Other
 Cancer



Surgical History (Reviewed 10/01/24 @ 15:36 by Dr. Tj Tabor DO)

History of colonoscopy
History of foot surgery
S/P lumpectomy, right breast
History of cholecystectomy
H/O: hysterectomy


Social History (Reviewed 10/01/24 @ 15:36 by Dr. Tj Tabor DO)
Smoking Status:  Former smoker 



Physical Exam
Const
alert and oriented x3
HEENT
normocephalic
Eyes
PERRL
Chest
inspection of chest normal
Resp
normal respiratory effort and normal air movement
Cardio
regular rate and regular rhythm
GI
soft to palpation
Palpation: tender LLQ and LUQ
Extremity
normal to inspection
Neuro
CN's II-XII intact bilaterally

Lab / Micro Data

10/02/24 06:54          

10/02/24 05:53          

Labs: 
Laboratory Results - last 24 hr

10/01/24 14:55: Urine Color Yellow, Urine Clarity Cloudy, Urine pH 7.0, Ur Specific Gravity 1.005, Urine Protein 15 H, Urine Glucose (UA) Normal, Urine Ketones Negative, Urine Occult Blood 50 H, Urine Nitrite Negative, Urine Bilirubin Negative, 
Urine Urobilinogen Normal, Ur Leukocyte Esterase 500 H, Urine RBC 0 SEEN, Urine WBC 25-50 SEEN, Ur Squamous Epith Cells 0-5 SEEN, Urine Bacteria 2+, Urine Mucus 0 SEEN
10/01/24 15:13: WBC 8.1, RBC 5.56 H, Hgb 14.9, Hct 46.1, MCV 82.9, MCH 26.8 L, MCHC 32.3, RDW Std Deviation 48.1 H, RDW Coeff of Elzbieta 15.9 H, Plt Count 310, MPV 9.6, Immature Gran % (Auto) 0.400, Neut % (Auto) 64.0, Lymph % (Auto) 21.4, Mono % (Auto) 
11.0 H, Eos % (Auto) 2.5, Baso % (Auto) 0.7, Absolute Neuts (auto) 5.2, Absolute Lymphs (auto) 1.73, Nucleated RBC % 0, Sodium 140, Potassium 3.5, Chloride 107, Carbon Dioxide 29.0, Anion Gap 5, BUN 8, Creatinine 0.89, Estim Creat Clear Calc 86.50, 
Est GFR (MDRD) Af Amer 82, Est GFR (MDRD) Non-Af 68, BUN/Creatinine Ratio 9.0 L, Glucose 106, Lactic Acid 1.4, Calcium 10.0, Total Bilirubin 0.50, AST 24, ALT 46, Alkaline Phosphatase 95, Troponin I High Sens 6, Total Protein 7.6, Albumin 3.2, 
Globulin 4.4 H, Albumin/Globulin Ratio 0.7 L, Lipase 26
10/01/24 21:03: POC Glucose 91
10/02/24 05:53: Sodium 141, Potassium 3.4 L, Chloride 110 H, Carbon Dioxide 26.0, Anion Gap 5, BUN 7, Creatinine 0.77, Estim Creat Clear Calc 96.87, Est GFR (MDRD) Af Amer 96, Est GFR (MDRD) Non-Af 79, BUN/Creatinine Ratio 9.0 L, Glucose 85, Calcium 
9.6
10/02/24 06:07: POC Glucose 103
10/02/24 06:54: WBC 7.1, RBC 5.39, Hgb 14.5, Hct 45.4, MCV 84.2, MCH 26.9 L, MCHC 31.9 L, RDW Std Deviation 49.4 H, RDW Coeff of Elzbieta 16.2 H, Plt Count 284, MPV 10.2


Imaging
Radiology Impression

Abdomen/Pelvis CT  10/01/24 14:35
IMPRESSION:
 
Persistent acute sigmoid diverticulitis. No focal fluid collection or free
air.
 
Electronically Signed:
Rosas Davenport MD
2024/10/01 at 16:36 EDT
Reading Location ID and State: [id number] / [place]
Tel [phone], Service support  [phone], Fax [phone]

## 2024-10-02 NOTE — PN.HOSP_ITS
Reason for Visit    
Reason for Visit:     
Diagnoses    
    
Diverticulitis of intestine, part unspecified, without perforation or abscess   
without bleeding (10/01/24)    
    
    
    
Objective Data    
Objective Data    
Vital Signs:     
Vital Signs    
    
    
    
Temp Pulse Resp BP Pulse Ox O2 Del Method    
     
 97.5 F L  64   18   125/66 H  100   Room Air     
     
 10/02/24 04:00  10/02/24 04:00  10/02/24 04:00  10/02/24 04:00  10/02/24 04:00   
10/02/24 04:00    
    
    
    
    
Oxygen Delivery Method           Room Air                                         
       
Weight:                          292 lb 12.8 oz                                   
       
Body Mass Index (BMI)            47.2                                             
       
    
    
Intake & Output:     
                       Intake and Output for Last 24 Hours    
    
    
    
 09/30/24 10/01/24 10/02/24    
    
 23:59 23:59 23:59    
     
Intake Total  1050 / 1050 150 / 150    
     
Balance  1050 / 1050 150 / 150    
    
    
    
Lab / Micro Data    
    
                                                        10/02/24 06:54              
    
                                                        10/02/24 05:53              
    
Labs:     
                         Laboratory Results - last 24 hr    
    
10/01/24 14:55: Urine Color Yellow, Urine Clarity Cloudy, Urine pH 7.0, Ur   
Specific Gravity 1.005, Urine Protein 15 H, Urine Glucose (UA) Normal, Urine   
Ketones Negative, Urine Occult Blood 50 H, Urine Nitrite Negative, Urine   
Bilirubin Negative, Urine Urobilinogen Normal, Ur Leukocyte Esterase 500 H,   
Urine RBC 0 SEEN, Urine WBC 25-50 SEEN, Ur Squamous Epith Cells 0-5 SEEN, Urine   
Bacteria 2+, Urine Mucus 0 SEEN    
10/01/24 15:13: WBC 8.1, RBC 5.56 H, Hgb 14.9, Hct 46.1, MCV 82.9, MCH 26.8 L,   
MCHC 32.3, RDW Std Deviation 48.1 H, RDW Coeff of [place]zbi[place] 15.9 H, Plt Count 310, MPV  
9.6, Immature Gran % (Auto) 0.400, Neut % (Auto) 64.0, Lymph % (Auto) 21.4, Mono  
% (Auto) 11.0 H, Eos % (Auto) 2.5, Baso % (Auto) 0.7, Absolute Neuts (auto) 5.2,  
Absolute Lymphs (auto) 1.73, Nucleated RBC % 0, Sodium 140, Potassium 3.5,   
Chloride 107, Carbon Dioxide 29.0, Anion Gap 5, BUN 8, Creatinine 0.89, Estim   
Creat Clear Calc 86.50, Est GFR (MDRD) Af Amer 82, Est GFR (MDRD) Non-Af 68, B  
UN/Creatinine Ratio 9.0 L, Glucose 106, Lactic Acid 1.4, Calcium 10.0, Total   
Bilirubin 0.50, AST 24, ALT 46, Alkaline Phosphatase 95, Troponin I High Sens 6,  
Total Protein 7.6, Albumin 3.2, Globulin 4.4 H, Albumin/Globulin Ratio 0.7 L,   
Lipase 26    
10/01/24 21:03: POC Glucose 91    
10/02/24 05:53: Sodium 141, Potassium 3.4 L, Chloride 110 H, Carbon Dioxide   
26.0, Anion Gap 5, BUN 7, Creatinine 0.77, Estim Creat Clear Calc 96.87, Est GFR  
(MDRD) Af Amer 96, Est GFR (MDRD) Non-Af 79, BUN/Creatinine Ratio 9.0 L, Glucose  
85, Calcium 9.6    
10/02/24 06:07: POC Glucose 103    
10/02/24 06:54: WBC 7.1, RBC 5.39, Hgb 14.5, Hct 45.4, MCV 84.2, MCH 26.9 L, M  
CHC 31.9 L, RDW Std Deviation 49.4 H, RDW Coeff of Elzbieta 16.2 H, Plt Count 284,   
MPV 10.2    
    
    
Radiography    
Diagnostic Testing:     
                              Radiology Impression    
    
Abdomen/Pelvis CT  10/01/24 14:35    
IMPRESSION:    
     
Persistent acute sigmoid diverticulitis. No focal fluid collection or free    
air.    
     
Electronically Signed:    
Rosas Davenport MD    
2024/10/01 at 16:36 EDT    
Reading Location ID and State: [id number] / [place]    
Tel [phone], Service support  [phone], Fax [phone]    
     
    
    
    
    
Physical Exam    
Narrative    
Seen and examined.    
  Patient has minimal soreness over side of abdomen.  Denies abdominal pain.    
No fever.    
    
Physical exam    
General: Alert, Oriented x3, Cooperative.  Morbid obesity BMI 47.3 kg/m?.    
HEENT: Atraumatic, PERRLA, EOMI, Normocephalic    
Oral: No Gingival or Mucosal Lesions/ Ulcerations    
Neck: Supple, No JVD, Negative Carotid Bruits    
Chest wall/Lungs:  Air entry diminished in bilateral lung bases.  No   
crepitation/rhonchi    
Cardiovascular: Regular rate, Regular Rhythm, Normal S1, Normal S2, No M/G/R    
Abdomen: Bowel Sounds Present, Soft, Non Tender, Non-Distended.  No palpable   
mass    
: No dysuria. No renal angle tenderness.  No suprapubic tenderness.    
Extremities: No edema, Capillary Refill Less than 3 Seconds    
Skin: No rashes, No breakdown    
Musculoskeletal: No Tenderness to Palpation of Joints or Extremities.  ROM   
restricted.    
Neurological: Cranial nerves II-XII grossly intact, DTR  2+/4.  No acute focal   
neurological deficit.    
Psych/Mental Status: Normal Affect, Appropriate.     
    
Assessment & Plan    
Assessment/Plan    
(1) Diverticulitis:     
PLAN:     
Plan    
Patient is a 64-year-old female who presented [facility] ED on   
10/1/2024 with worsening abdominal pain.  Patient was diagnosed with   
diverticulitis in early September and completed 2 weeks of antibiotics    
    
1.  Recurrent mild sigmoid diverticulitis    
? Admit under observation status to [facility].  General surgery consulted.  CT   
abdomen pelvis showed recurrent acute sigmoid diverticulitis with no perforation  
or abscess noted.  Patient very stable appearing on exam with minimal abdominal   
pain.  Hemodynamically stable and afebrile.  IV Zosyn for now.  Advised the diet  
as needed.    
Patient had 2 weeks of antibiotics.  Currently not having diarrhea but soft   
bowel movement with mucus.  No blood.    
    
Chronic medical conditions:    
? Morbid obesity: BMI 47 on admit.  Encouraged lifestyle modifications.    
Complicates hospital course, care and prognosis.    
? Hypertension: Stable.  Continue home atenolol and chlorthalidone.    
? Type 2 diabetes mellitus: Home regimen of metformin 500 mg daily and glipizide  
2.5 mg daily.  Blood glucose 106 on admit.  Okay for sliding scale insulin with   
meals while inpatient.    
? History of breast cancer: Continue home exemestane.    
    
DVT prophylaxis: Lovenox twice daily    
CODE STATUS: Full code, verified    
    
    
Charges/Coding    
Visit Charges    
Inpatient E&M: 69693 Subs Hosp L2

## 2024-10-03 VITALS
RESPIRATION RATE: 18 BRPM | OXYGEN SATURATION: 99 % | SYSTOLIC BLOOD PRESSURE: 103 MMHG | TEMPERATURE: 98.78 F | HEART RATE: 64 BPM | DIASTOLIC BLOOD PRESSURE: 63 MMHG

## 2024-10-03 VITALS
DIASTOLIC BLOOD PRESSURE: 72 MMHG | OXYGEN SATURATION: 100 % | TEMPERATURE: 97.4 F | SYSTOLIC BLOOD PRESSURE: 110 MMHG | RESPIRATION RATE: 18 BRPM | HEART RATE: 55 BPM

## 2024-10-03 VITALS
TEMPERATURE: 97.88 F | RESPIRATION RATE: 18 BRPM | OXYGEN SATURATION: 98 % | SYSTOLIC BLOOD PRESSURE: 135 MMHG | HEART RATE: 58 BPM | DIASTOLIC BLOOD PRESSURE: 61 MMHG

## 2024-10-03 VITALS — HEART RATE: 63 BPM

## 2024-10-03 LAB
ANION GAP: 6 (ref 5–15)
BUN SERPL-MCNC: 6 MG/DL (ref 7–18)
BUN/CREAT RATIO: 6.8 RATIO (ref 10–20)
CALCIUM SERPL-MCNC: 9.6 MG/DL (ref 8.5–10.1)
CARBON DIOXIDE: 23 MMOL/L (ref 21–32)
CHLORIDE: 111 MMOL/L (ref 98–107)
DEPRECATED RDW RBC: 51 FL (ref 35.1–43.9)
ERYTHROCYTE [DISTWIDTH] IN BLOOD: 16.5 % (ref 11.6–14.6)
EST GLOM FILT RATE - AFR AMER: 83 ML/MIN (ref 60–?)
ESTIMATED CREATININE CLEARANCE: 88.06 ML/MIN
GLUCOSE: 96 MG/DL (ref 74–106)
HCT VFR BLD AUTO: 48.4 % (ref 37–47)
HEMOGLOBIN: 15.4 G/DL (ref 12–15)
HGB BLD-MCNC: 15.4 G/DL (ref 12–15)
IMMATURE GRANULOCYTES COUNT: 0.02 X10^3/UL (ref 0–0)
MCV RBC: 85.5 FL (ref 81–99)
MEAN CORP HGB CONC: 31.8 G/DL (ref 32–36)
MEAN PLATELET VOL.: 9.8 FL (ref 6.2–12)
NRBC FLAGGED BY ANALYZER: 0 % (ref 0–5)
PLATELET # BLD: 259 K/MM3 (ref 150–450)
PLATELET COUNT: 259 K/MM3 (ref 150–450)
POTASSIUM: 3.6 MMOL/L (ref 3.5–5.1)
RBC # BLD AUTO: 5.66 M/MM3 (ref 4.2–5.4)
RBC DISTRIBUTION WIDTH CV: 16.5 % (ref 11.6–14.6)
RBC DISTRIBUTION WIDTH SD: 51 FL (ref 35.1–43.9)
WBC # BLD AUTO: 6 K/MM3 (ref 4.4–11)
WHITE BLOOD COUNT: 6 K/MM3 (ref 4.4–11)

## 2024-10-03 RX ADMIN — POTASSIUM CHLORIDE 20 MEQ: 1500 TABLET, EXTENDED RELEASE ORAL at 10:41

## 2024-10-03 RX ADMIN — POTASSIUM CHLORIDE 40 MEQ: 1500 TABLET, EXTENDED RELEASE ORAL at 08:42

## 2024-10-03 RX ADMIN — Medication 1 CAP: at 08:42

## 2024-10-03 RX ADMIN — PIPERACILLIN SODIUM AND TAZOBACTAM SODIUM 12.5 GM: 3; .375 INJECTION, POWDER, LYOPHILIZED, FOR SOLUTION INTRAVENOUS at 06:20

## 2024-10-03 RX ADMIN — SODIUM CHLORIDE 15 ML: 9 INJECTION, SOLUTION INTRAVENOUS at 04:44

## 2024-10-03 RX ADMIN — SODIUM CHLORIDE, PRESERVATIVE FREE 0 ML: 5 INJECTION INTRAVENOUS at 04:44

## 2024-10-03 NOTE — CASEMGMT
RN CM into pt room, pt states she lives at home and her son and his girlfriend are staying at her house and children. Pt reports she has dogs and emotional support ducks that are important to her. Pt states she is indep at home and denies any 
homegoing needs other than diet information. Pt is aware that this will be on her dc instructions. Pt verbalizes understanding.

## 2024-10-03 NOTE — DCINST_ITS
Discharge Instructions    
Diet    
Discharge Diet: - (Low fiber diet. See attached. )    
Activity    
Discharge Activity: No Restrictions    
Follow Up Care    
Please Follow Up With: Eun Patricio MD    
When: Please contact our office to follow-up with Dr. Patricio in 10-14 days   
following discharge    
Test Results:     
Test results from this visit will be discussed in further detail at your follow-  
up appointment, if applicable.    
    
    
Discharge Plan    
Admission    
Admit Date/Time: 10/01/24 17:32    
    
Primary Reason for Your Visit: Acute recurrent diverticulitis    
    
Attending Provider: Tom Aparicio    
    
Primary Care Provider: Estela Okeefe    
    
Consulting Providers: Colt Crow; Willard Youngblood    
    
Instructions    
Additional Instructions / Restrictions:    
    
What are low-fiber foods?    
    
If your doctor tells you to follow a low-fiber diet, here are low-fiber foods   
you can eat and higher-fiber foods you should avoid. Remember to always choose   
foods that you would normally eat. Do not try any foods that caused you   
discomfort or allergic reactions in the past.    
    
If you are on a ?low-residue diet,? your food choices are even more restricted   
than those listed below.    
    
Talk with your cancer care team or dietitian if you have questions about certain  
foods or amounts.    
    
Meat, fish, poultry, and protein    
    
Eat:    
    
Tender cuts of meat    
    
Ground meat    
    
Tofu    
    
Fish and shellfish    
    
Smooth peanut butter    
    
Eggs    
    
Bake, broil, or poach meats, and use mild seasonings. Try preparing meats as   
stews, roasts, meatloaves, casseroles, sandwiches, and soups using ingredients   
on the approved lists.    
    
Scramble, poach, or boil eggs; or make omelets, souffl?s, custard, puddings, and  
casseroles, using ingredients noted below. You might want to ask your doctor,   
nurse, or dietitian about other foods may be OK for you to eat, and find out   
when you can go back to your normal diet.    
    
Avoid:    
    
All beans, nuts, peas, lentils, and legumes    
    
Processed meats, hot dogs, sausage, and cold cuts    
    
Tough meats with gristle    
    
Dairy: Milk and cheese    
    
Eat:    
    
Only in small to medium amounts and only if they don?t cause problems for you    
    
    
    
Milk, chocolate milk, buttermilk, and milk drinks    
    
Yogurt without seeds or granola    
    
Sour cream    
    
Cheese    
    
Cottage cheese    
    
Custard or pudding    
    
Ice cream or frozen desserts (without nuts)    
    
Cream sauces, soups, and casseroles    
    
You can use these items in desserts, snacks, or breads.    
    
    
    
Bread, cereals, and grains    
    
Eat:    
    
White breads, waffles, Urdu toast, plain white rolls, or white bread toast    
    
Pretzels    
    
Plain pasta or noodles    
    
White rice    
    
Crackers, zwieback, edna, and matzoh (no cracked wheat or whole grains)    
    
Cereals without whole grains, added fiber, seeds, raisins, or other dried fruit    
    
Use white flour for baking and making sauces. Grains, such as white rice, Cream   
of Wheat, or grits, should be well-cooked.    
    
Include the above grains in casseroles, dumplings, souffl?s, cheese strata,   
kugels, and pudding.    
    
Avoid any food that contains:    
    
Brown or wild rice    
    
Whole grains, cracked grains, or whole wheat products    
    
Kasha (buckwheat)    
    
Cornbread or cornmeal    
    
Gavino crackers    
    
Bran    
    
Wheat germ    
    
Nuts    
    
Granola    
    
Coconut    
    
Dried fruit    
    
Seeds    
    
    
    
Vegetables and potatoes    
    
Eat:    
    
Tender, well-cooked fresh or canned vegetables without seeds, stems, or skins    
    
Cooked sweet or white potatoes without skins    
    
Strained vegetable juices without pulp or spices    
    
You can also eat these with cream sauces, or in soups, souffl?s, kugels, and   
casseroles.    
    
Avoid:    
    
All raw or steamed vegetables    
    
All types of beans    
    
Potatoes with skin    
    
Peas    
    
[place]    
    
Cabbage, broccoli, cauliflower, [place] sprouts, and greens    
    
Sauerkraut    
    
Onions    
    
    
    
Fruits and desserts    
    
Eat:    
    
Soft canned or cooked fruit without seeds or skins (small amounts)    
    
Small amounts of well-ripened banana    
    
Strained or clear juices    
    
Small amounts of soft cantaloupe or honeydew melon    
    
Cookies and other desserts without whole grains, dried fruit, berries, nuts, or   
coconut    
    
Sherbet and popsicles    
    
Serving suggestions include gelatins, milk shakes, frozen desserts, puddings,   
tapioca, cakes, and sauces.    
    
Avoid:    
    
All raw or dried fruits    
    
Berries    
    
Prune juice, prunes, and raisins    
    
    
    
Other foods    
    
Eat:    
    
Mayonnaise and mild salad dressings    
    
Margarine, butter, cream, and oils in small amounts    
    
Plain gravies    
    
Plain bouillon and broth    
    
Ketchup and mild mustard    
    
Spices, cooked herbs, and salt    
    
Sugar, honey, and syrup    
    
Clear jellies    
    
Hard candy and marshmallows    
    
Plain chocolate    
    
Avoid:    
    
Marmalade    
    
Pickles, olives, relish, and horseradish    
    
Popcorn    
    
Potato chips    
    
    
    
Liquids    
    
Keep in mind that low-fiber foods cause fewer bowel movements and smaller   
stools. You may need to drink extra fluids to help prevent constipation while   
you are on a low-fiber diet. Drink plenty of water unless your doctor tells you   
otherwise, and use juices and milk as noted above.    
    
Discharge Orders/Prescriptions    
Prescriptions:    
New    
  L.acidoph,saliva-B.bif-S.therm 175 mg Capsule     
   1 cap PO BID 30 Days Qty: 60 0RF    
  amoxicillin-pot clavulanate 875-125 mg tablet     
   1 tab PO BID 10 Days Qty: 20 0RF    
    
Continued    
  Women's Daily Formula 1 EACH tablet     
   1 ea PO QODAY     
  exemestane 25 mg tablet     
   25 mg PO DAILY     
  atenolol 50 mg tablet     
   50 mg PO DAILY     
  chlorthalidone 25 mg tablet     
   12.5 mg PO DAILY     
  glipizide 2.5 mg tablet extended release 24hr     
   2.5 mg PO DAILY     
  potassium chloride 20 mEq tablet extended release     
   20 meq PO BID     
  metformin 500 mg tablet extended release 24 hr     
   500 mg PO DAILY     
    
Referrals / Follow Up:    
Estela Okeefe MD [Primary Care Provider] -     
Eun Patricio MD [Med Staff - Active Staff] -  (Please contact our office to   
follow-up in 10-14 days unless appointment is scheduled with colorectal at   
[facility])    
    
Disposition    
Disposition (needs filled in before D/C Order can be placed): Home, Self Care

## 2024-10-03 NOTE — PCM.PN.SRG
Subjective
Subjective
Patient is evaluated resting comfortably in bed. She denies any current abdominal pain. She is frustrated with the continued flare ups of the diverticulitis. She denies any nausea, vomiting, fever. She states she is attempting to figure out what 
diet is best for her situation. She notes passing flatus. She notes when passing flatus there is some minimal pain noted. 

Objective Data
Objective Data
Vital Signs: 
Vital Signs

Temp Pulse Resp BP Pulse Ox O2 Del Method
 98 F   58 L  18   135/61 H  98   Room Air 
 10/03/24 08:10  10/03/24 08:10  10/03/24 08:10  10/03/24 08:10  10/03/24 08:10  10/03/24 08:14



Oxygen Delivery Method         Room Air                                         
Weight:                        292 lb 12.8 oz                                   
Body Mass Index (BMI)          47.2                                             


Intake & Output: 
Intake and Output for Last 24 Hours

 10/01/24 10/02/24 10/03/24
 23:59 23:59 23:59
Intake Total 1050 / 1050 900 / 1550 800 / 800
Balance 1050 / 1050 900 / 1550 800 / 800


Lab / Micro Data

10/03/24 06:32          

10/03/24 06:32          

Labs: 
Laboratory Results - last 24 hr

10/02/24 11:32: POC Glucose 149 H
10/02/24 16:54: POC Glucose 126 H
10/02/24 21:26: POC Glucose 82
10/03/24 06:17: POC Glucose 99
10/03/24 06:32: WBC 6.0, RBC 5.66 H, Hgb 15.4 H, Hct 48.4 H, MCV 85.5, MCH 27.2, MCHC 31.8 L, RDW Std Deviation 51.0 H, RDW Coeff of Elzbieta 16.5 H, Plt Count 259, MPV 9.8, Immature Gran % (Auto) 0.300, Neut % (Auto) 56.8, Lymph % (Auto) 27.2, Mono % 
(Auto) 10.8 H, Eos % (Auto) 4.2, Baso % (Auto) 0.7, Absolute Neuts (auto) 3.4, Absolute Lymphs (auto) 1.63, Nucleated RBC % 0, Sodium 140, Potassium 3.6, Chloride 111 H, Carbon Dioxide 23.0, Anion Gap 6, BUN 6 L, Creatinine 0.88, Estim Creat Clear 
Calc 88.06, Est GFR (MDRD) Af Amer 83, Est GFR (MDRD) Non-Af 69, BUN/Creatinine Ratio 6.8 L, Glucose 96, Calcium 9.6



Physical Exam
GI
GI Narrative: 
Abdomen- soft, nontender to palpation. Positive bowel sounds

Assessment & Plan
Assessment/Plan
(1) Diverticulitis: 
(2) Abdominal pain: 
PLAN: 
Plan
I am seeing this patient in conjunction with Dr. Crow. He has independently evaluated this patient. 
Continue IV antibiotics
Increase to transitional diet
If patient tolerated transitional diet, plan to discharge home on Augmentin x 10 days
I have discussed with the patient that I will have our office check into the referral to Dr. Newell's office for evaluation and recommendation of sigmoidectomy for recurrent diverticulitis
I will also include recommendations on a more specific diet for diverticulitis
Hopefully patient will meet with the dietitian for further recommendations on diet
We will continue to monitor this patient 


Charges/Coding
Visit Charges
Inpatient E&M: 56313 [facility] L1

## 2024-10-03 NOTE — PCM.DC
Discharge Instructions
Diet
Discharge Diet: - (Low fiber diet. See attached. )
Activity
Discharge Activity: Return to Normal Activity
Weight Bearing Status: Weight bearing as tolerated
Dressing / Incision
Call your doctor if you observe: Fever of 101 or Higher, Coldness, Increased Pain, Numbness or Tingling, Change in Color, Inability to urinate, Inability to have a bowel movement, Using more than 1 pad per hour, Shortness of breath, Dizziness, 
Fainting spells, Swelling in the ankles, Chest pain, Prolonged hiccupping, Increased palpitations (irregular heartbeat) and Calf discomfort
Follow Up Care
Please Follow Up With: Eun Patricio MD
When: IN 2 WEEKS
Test Results: 
Test results from this visit will be discussed in further detail at your follow-up appointment, if applicable.


Discharge Plan
Admission
Admit Date/Time: 10/01/24 17:32

Primary Reason for Your Visit: Acute recurrent diverticulitis

Attending Provider: Tom Aparicio

Primary Care Provider: Estela Okeefe

Consulting Providers: Colt Crow; Willard Youngblood

Instructions
Additional Instructions / Restrictions:

What are low-fiber foods?

If your doctor tells you to follow a low-fiber diet, here are low-fiber foods you can eat and higher-fiber foods you should avoid. Remember to always choose foods that you would normally eat. Do not try any foods that caused you discomfort or 
allergic reactions in the past.

If you are on a ?low-residue diet,? your food choices are even more restricted than those listed below.

Talk with your cancer care team or dietitian if you have questions about certain foods or amounts.

Meat, fish, poultry, and protein

Eat:

Tender cuts of meat

Ground meat

Tofu

Fish and shellfish

Smooth peanut butter

Eggs

Bake, broil, or poach meats, and use mild seasonings. Try preparing meats as stews, roasts, meatloaves, casseroles, sandwiches, and soups using ingredients on the approved lists.

Scramble, poach, or boil eggs; or make omelets, souffl?s, custard, puddings, and casseroles, using ingredients noted below. You might want to ask your doctor, nurse, or dietitian about other foods may be OK for you to eat, and find out when you can 
go back to your normal diet.

Avoid:

All beans, nuts, peas, lentils, and legumes

Processed meats, hot dogs, sausage, and cold cuts

Tough meats with gristle

Dairy: Milk and cheese

Eat:

Only in small to medium amounts and only if they don?t cause problems for you



Milk, chocolate milk, buttermilk, and milk drinks

Yogurt without seeds or granola

Sour cream

Cheese

Cottage cheese

Custard or pudding

Ice cream or frozen desserts (without nuts)

Cream sauces, soups, and casseroles

You can use these items in desserts, snacks, or breads.



Bread, cereals, and grains

Eat:

White breads, waffles, [place] toast, plain white rolls, or white bread toast

Pretzels

Plain pasta or noodles

White rice

Crackers, zwieback, edna, and matzoh (no cracked wheat or whole grains)

Cereals without whole grains, added fiber, seeds, raisins, or other dried fruit

Use white flour for baking and making sauces. Grains, such as white rice, Cream of Wheat, or grits, should be well-cooked.

Include the above grains in casseroles, dumplings, souffl?s, cheese strata, kugels, and pudding.

Avoid any food that contains:

Brown or wild rice

Whole grains, cracked grains, or whole wheat products

Kasha (buckwheat)

Cornbread or cornmeal

Gavino crackers

Bran

Wheat germ

Nuts

Granola

Coconut

Dried fruit

Seeds



Vegetables and potatoes

Eat:

Tender, well-cooked fresh or canned vegetables without seeds, stems, or skins

Cooked sweet or white potatoes without skins

Strained vegetable juices without pulp or spices

You can also eat these with cream sauces, or in soups, souffl?s, kugels, and casseroles.

Avoid:

All raw or steamed vegetables

All types of beans

Potatoes with skin

Peas

Corn

Cabbage, broccoli, cauliflower, [place] sprouts, and greens

Sauerkraut

Onions



Fruits and desserts

Eat:

Soft canned or cooked fruit without seeds or skins (small amounts)

Small amounts of well-ripened banana

Strained or clear juices

Small amounts of soft cantaloupe or honeydew melon

Cookies and other desserts without whole grains, dried fruit, berries, nuts, or coconut

Sherbet and popsicles

Serving suggestions include gelatins, milk shakes, frozen desserts, puddings, tapioca, cakes, and sauces.

Avoid:

All raw or dried fruits

Berries

Prune juice, prunes, and raisins



Other foods

Eat:

Mayonnaise and mild salad dressings

Margarine, butter, cream, and oils in small amounts

Plain gravies

Plain bouillon and broth

Ketchup and mild mustard

Spices, cooked herbs, and salt

Sugar, honey, and syrup

Clear jellies

Hard candy and marshmallows

Plain chocolate

Avoid:

Marmalade

Pickles, olives, relish, and horseradish

Popcorn

Potato chips



Liquids

Keep in mind that low-fiber foods cause fewer bowel movements and smaller stools. You may need to drink extra fluids to help prevent constipation while you are on a low-fiber diet. Drink plenty of water unless your doctor tells you otherwise, and 
use juices and milk as noted above.

Discharge Orders/Prescriptions
Prescriptions:
New
  L.acidoph,saliva-BMerrybif-S.therm 175 mg Capsule 
   1 cap PO BID 30 Days Qty: 60 0RF
  amoxicillin-pot clavulanate 875-125 mg tablet 
   1 tab PO BID 10 Days Qty: 20 0RF

Continued
  Women's Daily Formula 1 EACH tablet 
   1 ea PO QODAY 
  exemestane 25 mg tablet 
   25 mg PO DAILY 
  atenolol 50 mg tablet 
   50 mg PO DAILY 
  chlorthalidone 25 mg tablet 
   12.5 mg PO DAILY 
  glipizide 2.5 mg tablet extended release 24hr 
   2.5 mg PO DAILY 
  potassium chloride 20 mEq tablet extended release 
   20 meq PO BID 
  metformin 500 mg tablet extended release 24 hr 
   500 mg PO DAILY 

Referrals / Follow Up:
Estela Okeefe MD [Primary Care Provider] - Within 2 Weeks
Eun Patricio MD [Med Staff - Active Staff] -  (Please contact our office to follow-up in 10-14 days unless appointment is scheduled with colorectal at [facility])

Disposition
Disposition (needs filled in before D/C Order can be placed): Home, Self Care

## 2024-10-03 NOTE — PCM.DC.SUM
Providers
Date of Admission: 10/01/24
Date of Discharge: 10/03/24
Primary Care Physician: 
Dr. Estela Okeefe MD

Consultations

10/01/24 18:04
Consult: General Surgery Routine 
 Consulting Provider: Colt Crow
 Reason for Consult: recurrent sigmoid diverticulitis
 EMERGENT Consult: No
 MD Notified: Yes
 Date Notified: 10/01/24
 Time Notified: 17:36
 Method of Notification: Verbal



Reason For Visit: RECURRENT SIGMOID DIVERTICULITIS

Diagnosis
Discharge Diagnosis
(1) Diverticulitis: 
      Status: Acute
      Code(s):
K57.92 - Diverticulitis of intestine, part unspecified, without perforation or abscess without bleeding
(2) Abdominal pain: 
      Status: Acute
      Code(s):
R10.9 - Unspecified abdominal pain

Plan
Patient is a 64-year-old female who presented [facility] ED on 10/1/2024 with worsening abdominal pain.  Patient was diagnosed with diverticulitis in early September and completed 2 weeks of antibiotics

1.  Recurrent mild sigmoid diverticulitis
? Admit under observation status to [facility].  General surgery consulted.  CT abdomen pelvis showed recurrent acute sigmoid diverticulitis with no perforation or abscess noted.  Patient very stable appearing on exam with minimal abdominal pain.  
Hemodynamically stable and afebrile.  IV Zosyn for now.  Advised the diet as needed.
Patient had 2 weeks of antibiotics.  Currently not having diarrhea but soft bowel movement with mucus.  No blood.
10/3: Patient was seen and examined.  Does not have abdominal tenderness or pain.  I agree with the surgical plan of discharged on 10 more days of Augmentin.  She has been referred to tertiary care to Dr. Newell's office to evaluate for 
sigmoidectomy for recurrent diverticulitis as she is high risk for surgery.

Chronic medical conditions:
? Morbid obesity: BMI 47 on admit.  Encouraged lifestyle modifications.  Complicates hospital course, care and prognosis.
? Hypertension: 
10/2: BP is controlled.  Heart rate 55 to 58/min.  Advised to hold atenolol and may take it in afternoon when heart rate is more than 60/min.  Continue home atenolol and chlorthalidone.
? Type 2 diabetes mellitus: Home regimen of metformin 500 mg daily and glipizide 2.5 mg daily.  Blood glucose 106 on admit.  Patient was on sliding scale insulin with meals while inpatient.
? History of breast cancer: Continue home exemestane.

DVT prophylaxis: Lovenox twice daily
CODE STATUS: Full code, verified

Discharge medication reconciliation done.  Discharge follow-up instructions completed.  Discharge process discussed with the patient and  all questions were answered to patient's satisfaction.  Follow with PCP in 1 to 2 weeks
Total time spent, exact 35 minutes on discharge meds reconciliation, examination,  coordination of care with nurses and ancillary staff, review of imaging and blood test and discussion with the patient  on follow-up instructions.

Medications at Discharge
Home Medications

multivit-iron 18 mg-folic acid 400 mcg-calcium 500 mg-minerals tablet (Women's Daily Formula) 1 ea PO QODAY supplememnt 04/10/17 
atenolol 50 mg tablet 50 mg PO DAILY blood pressure 01/22/23 
chlorthalidone 25 mg tablet 12.5 mg PO DAILY water pill 01/22/23 
exemestane 25 mg tablet 25 mg PO DAILY breast cancer 01/22/23 
glipizide 2.5 mg tablet, extended release 24 hr 2.5 mg PO DAILY diabetes 01/22/23 
potassium chloride 20 mEq tablet,extended release 20 meq PO BID supplement 01/22/23 
metformin 500 mg tablet,extended release 24 hr 500 mg PO DAILY 10/01/24 
L.acidophil,salivari-Bifido bifidum-Strep thermoph 175 mg capsule 1 cap PO BID 30 days #60 caps 10/03/24 
amoxicillin 875 mg-potassium clavulanate 125 mg tablet 1 tab PO BID 10 days #20 tabs 10/03/24 



Physical Exam
Narrative
Seen and examined.
Denies pain nausea vomiting.  Patient tolerated soft diet.  Patient is little frustrated with recurrent diverticulitis.  Discussed with the surgeon and his referral further tertiary care for evaluation of sigmoidectomy
No fever.

Physical exam
General: Alert, Oriented x3, Cooperative.  Morbid obesity BMI 47.3 kg/m?.
HEENT: Atraumatic, PERRLA, EOMI, Normocephalic
Oral: No Gingival or Mucosal Lesions/ Ulcerations
Neck: Supple, No JVD, Negative Carotid Bruits
Chest wall/Lungs:  Air entry diminished in bilateral lung bases.  No crepitation/rhonchi
Cardiovascular: Regular rate, Regular Rhythm, Normal S1, Normal S2, No M/G/R
Abdomen: Bowel Sounds Present, Soft, Non Tender, Non-Distended.  No palpable mass
: No dysuria. No renal angle tenderness.  No suprapubic tenderness.
Extremities: No edema, Capillary Refill Less than 3 Seconds
Skin: No rashes, No breakdown
Musculoskeletal: No Tenderness to Palpation of Joints or Extremities.  ROM restricted.
Neurological: Cranial nerves II-XII grossly intact, DTR  2+/4.  No acute focal neurological deficit.
Psych/Mental Status: Normal Affect, Appropriate. 

Weight / BMI
Weight

Weight:                        292 lb 12.8 oz                                    
Body Mass Index (BMI)          47.2                                              



ABG / Lab / Microbiology Data

10/03/24 06:32          

10/03/24 06:32          

Laboratory: 
Laboratory Results - last 24 hr

10/02/24 16:54: POC Glucose 126 H
10/02/24 21:26: POC Glucose 82
10/03/24 06:17: POC Glucose 99
10/03/24 06:32: WBC 6.0, RBC 5.66 H, Hgb 15.4 H, Hct 48.4 H, MCV 85.5, MCH 27.2, MCHC 31.8 L, RDW Std Deviation 51.0 H, RDW Coeff of Elzbieta 16.5 H, Plt Count 259, MPV 9.8, Immature Gran % (Auto) 0.300, Neut % (Auto) 56.8, Lymph % (Auto) 27.2, Mono % 
(Auto) 10.8 H, Eos % (Auto) 4.2, Baso % (Auto) 0.7, Absolute Neuts (auto) 3.4, Absolute Lymphs (auto) 1.63, Nucleated RBC % 0, Sodium 140, Potassium 3.6, Chloride 111 H, Carbon Dioxide 23.0, Anion Gap 6, BUN 6 L, Creatinine 0.88, Estim Creat Clear 
Calc 88.06, Est GFR (MDRD) Af Amer 83, Est GFR (MDRD) Non-Af 69, BUN/Creatinine Ratio 6.8 L, Glucose 96, Calcium 9.6
10/03/24 11:37: POC Glucose 147 H


Microbiology: 
Microbiology

10/01/24 14:55   Urine, Clean Catch   Urine Culture - Preliminary
                            GPC Poss Enterococcus sp
                            Mixed Gram Pos & Gram Neg Org



D/C Instructions
Discharge Diet: - (Low fiber diet. See attached. )
Weight Bearing Status: Weight bearing as tolerated
Call your doctor if you observe: Fever of 101 or Higher, Coldness, Increased Pain, Numbness or Tingling, Change in Color, Inability to urinate, Inability to have a bowel movement, Using more than 1 pad per hour, Shortness of breath, Dizziness, 
Fainting spells, Swelling in the ankles, Chest pain, Prolonged hiccupping, Increased palpitations (irregular heartbeat) and Calf discomfort
Please Follow Up With: Eun Patricio MD
When: IN 2 WEEKS

Meaningful Use Info
Meaningful Use
Meaningful Use Diagnoses (Choose all that apply): None applicable
Ischemic Stroke
Statin Dosing Therapy Reference: 
STATIN DOSE THERAPY REFERENCE:

* Patients > 75 years receive moderate or high dose statin therapy. 

* Patients 75 years or YOUNGER should receive HIGH intensity statin dose unless contraindicated. You will be required to document reason for non-treatment if statin daily dose does not meet guidelines.

 

HIGH DOSE STATIN THERAPY DAILY

Atorvastatin > than or = to 40 mg
Rosuvastatin > than or = to 20 mg
Amlodipine + Atorvastatin > than or = to 2.5/40 mg
Ezetimibe + Simvastatin  10/80 mg  
Simvastatin  80mg



Discharge Plan
Admission
Admit Date/Time: 10/01/24 17:32

Primary Reason for Your Visit: Acute recurrent diverticulitis

Attending Provider: Tom Aparicio

Primary Care Provider: Estela Okeefe

Consulting Providers: Colt Crow; Willard Youngblood

Instructions
Additional Instructions / Restrictions:

What are low-fiber foods?

If your doctor tells you to follow a low-fiber diet, here are low-fiber foods you can eat and higher-fiber foods you should avoid. Remember to always choose foods that you would normally eat. Do not try any foods that caused you discomfort or 
allergic reactions in the past.

If you are on a ?low-residue diet,? your food choices are even more restricted than those listed below.

Talk with your cancer care team or dietitian if you have questions about certain foods or amounts.

Meat, fish, poultry, and protein

Eat:

Tender cuts of meat

Ground meat

Tofu

Fish and shellfish

Smooth peanut butter

Eggs

Bake, broil, or poach meats, and use mild seasonings. Try preparing meats as stews, roasts, meatloaves, casseroles, sandwiches, and soups using ingredients on the approved lists.

Scramble, poach, or boil eggs; or make omelets, souffl?s, custard, puddings, and casseroles, using ingredients noted below. You might want to ask your doctor, nurse, or dietitian about other foods may be OK for you to eat, and find out when you can 
go back to your normal diet.

Avoid:

All beans, nuts, peas, lentils, and legumes

Processed meats, hot dogs, sausage, and cold cuts

Tough meats with gristle

Dairy: Milk and cheese

Eat:

Only in small to medium amounts and only if they don?t cause problems for you



Milk, chocolate milk, buttermilk, and milk drinks

Yogurt without seeds or granola

Sour cream

Cheese

Cottage cheese

Custard or pudding

Ice cream or frozen desserts (without nuts)

Cream sauces, soups, and casseroles

You can use these items in desserts, snacks, or breads.



Bread, cereals, and grains

Eat:

White breads, waffles, French toast, plain white rolls, or white bread toast

Pretzels

Plain pasta or noodles

White rice

Crackers, zwieback, edna, and matzoh (no cracked wheat or whole grains)

Cereals without whole grains, added fiber, seeds, raisins, or other dried fruit

Use white flour for baking and making sauces. Grains, such as white rice, Cream of Wheat, or grits, should be well-cooked.

Include the above grains in casseroles, dumplings, souffl?s, cheese strata, kugels, and pudding.

Avoid any food that contains:

Brown or wild rice

Whole grains, cracked grains, or whole wheat products

Kasha (buckwheat)

Cornbread or cornmeal

Gavino crackers

Bran

Wheat germ

Nuts

Granola

Coconut

Dried fruit

Seeds



Vegetables and potatoes

Eat:

Tender, well-cooked fresh or canned vegetables without seeds, stems, or skins

Cooked sweet or white potatoes without skins

Strained vegetable juices without pulp or spices

You can also eat these with cream sauces, or in soups, souffl?s, kugels, and casseroles.

Avoid:

All raw or steamed vegetables

All types of beans

Potatoes with skin

Peas

Corn

Cabbage, broccoli, cauliflower, [place] sprouts, and greens

Sauerkraut

Onions



Fruits and desserts

Eat:

Soft canned or cooked fruit without seeds or skins (small amounts)

Small amounts of well-ripened banana

Strained or clear juices

Small amounts of soft cantaloupe or honeydew melon

Cookies and other desserts without whole grains, dried fruit, berries, nuts, or coconut

Sherbet and popsicles

Serving suggestions include gelatins, milk shakes, frozen desserts, puddings, tapioca, cakes, and sauces.

Avoid:

All raw or dried fruits

Berries

Prune juice, prunes, and raisins



Other foods

Eat:

Mayonnaise and mild salad dressings

Margarine, butter, cream, and oils in small amounts

Plain gravies

Plain bouillon and broth

Ketchup and mild mustard

Spices, cooked herbs, and salt

Sugar, honey, and syrup

Clear jellies

Hard candy and marshmallows

Plain chocolate

Avoid:

Marmalade

Pickles, olives, relish, and horseradish

Popcorn

Potato chips



Liquids

Keep in mind that low-fiber foods cause fewer bowel movements and smaller stools. You may need to drink extra fluids to help prevent constipation while you are on a low-fiber diet. Drink plenty of water unless your doctor tells you otherwise, and 
use juices and milk as noted above.

Discharge Orders/Prescriptions
Prescriptions:
New
  L.acidoph,saliva-B.bif-S.therm 175 mg Capsule 
   1 cap PO BID 30 Days Qty: 60 0RF
  amoxicillin-pot clavulanate 875-125 mg tablet 
   1 tab PO BID 10 Days Qty: 20 0RF

Continued
  Women's Daily Formula 1 EACH tablet 
   1 ea PO QODAY 
  exemestane 25 mg tablet 
   25 mg PO DAILY 
  atenolol 50 mg tablet 
   50 mg PO DAILY 
  chlorthalidone 25 mg tablet 
   12.5 mg PO DAILY 
  glipizide 2.5 mg tablet extended release 24hr 
   2.5 mg PO DAILY 
  potassium chloride 20 mEq tablet extended release 
   20 meq PO BID 
  metformin 500 mg tablet extended release 24 hr 
   500 mg PO DAILY 

Referrals / Follow Up:
Estela Okeefe MD [Primary Care Provider] - Within 2 Weeks
Eun Patricio MD [Med Staff - Active Staff] -  (Please contact our office to follow-up in 10-14 days unless appointment is scheduled with colorectal at [facility])

Disposition
Disposition (needs filled in before D/C Order can be placed): Home, Self Care


Charges/Coding
Visit Charges
Inpatient E&M: 38043 Disch Hosp >30min

## 2024-10-03 NOTE — PCM.DC
Discharge Instructions
Diet
Discharge Diet: - (Low fiber diet. See attached. )
Activity
Discharge Activity: No Restrictions
Follow Up Care
Please Follow Up With: Eun Patricio MD
When: Please contact our office to follow-up with Dr. Patricio in 10-14 days following discharge
Test Results: 
Test results from this visit will be discussed in further detail at your follow-up appointment, if applicable.


Discharge Plan
Admission
Admit Date/Time: 10/01/24 17:32

Primary Reason for Your Visit: Acute recurrent diverticulitis

Attending Provider: Tom Aparicio

Primary Care Provider: Estela Okeefe

Consulting Providers: Colt Crow; Willard Youngblood

Instructions
Additional Instructions / Restrictions:

What are low-fiber foods?

If your doctor tells you to follow a low-fiber diet, here are low-fiber foods you can eat and higher-fiber foods you should avoid. Remember to always choose foods that you would normally eat. Do not try any foods that caused you discomfort or 
allergic reactions in the past.

If you are on a ?low-residue diet,? your food choices are even more restricted than those listed below.

Talk with your cancer care team or dietitian if you have questions about certain foods or amounts.

Meat, fish, poultry, and protein

Eat:

Tender cuts of meat

Ground meat

Tofu

Fish and shellfish

Smooth peanut butter

Eggs

Bake, broil, or poach meats, and use mild seasonings. Try preparing meats as stews, roasts, meatloaves, casseroles, sandwiches, and soups using ingredients on the approved lists.

Scramble, poach, or boil eggs; or make omelets, souffl?s, custard, puddings, and casseroles, using ingredients noted below. You might want to ask your doctor, nurse, or dietitian about other foods may be OK for you to eat, and find out when you can 
go back to your normal diet.

Avoid:

All beans, nuts, peas, lentils, and legumes

Processed meats, hot dogs, sausage, and cold cuts

Tough meats with gristle

Dairy: Milk and cheese

Eat:

Only in small to medium amounts and only if they don?t cause problems for you



Milk, chocolate milk, buttermilk, and milk drinks

Yogurt without seeds or granola

Sour cream

Cheese

Cottage cheese

Custard or pudding

Ice cream or frozen desserts (without nuts)

Cream sauces, soups, and casseroles

You can use these items in desserts, snacks, or breads.



Bread, cereals, and grains

Eat:

White breads, waffles, [place] toast, plain white rolls, or white bread toast

Pretzels

Plain pasta or noodles

White rice

Crackers, zwieback, edna, and matzoh (no cracked wheat or whole grains)

Cereals without whole grains, added fiber, seeds, raisins, or other dried fruit

Use white flour for baking and making sauces. Grains, such as white rice, Cream of Wheat, or grits, should be well-cooked.

Include the above grains in casseroles, dumplings, souffl?s, cheese strata, kugels, and pudding.

Avoid any food that contains:

Brown or wild rice

Whole grains, cracked grains, or whole wheat products

Kasha (buckwheat)

Cornbread or cornmeal

Gavino crackers

Bran

Wheat germ

Nuts

Granola

Coconut

Dried fruit

Seeds



Vegetables and potatoes

Eat:

Tender, well-cooked fresh or canned vegetables without seeds, stems, or skins

Cooked sweet or white potatoes without skins

Strained vegetable juices without pulp or spices

You can also eat these with cream sauces, or in soups, souffl?s, kugels, and casseroles.

Avoid:

All raw or steamed vegetables

All types of beans

Potatoes with skin

Peas

Corn

Cabbage, broccoli, cauliflower, [place] sprouts, and greens

Sauerkraut

Onions



Fruits and desserts

Eat:

Soft canned or cooked fruit without seeds or skins (small amounts)

Small amounts of well-ripened banana

Strained or clear juices

Small amounts of soft cantaloupe or honeydew melon

Cookies and other desserts without whole grains, dried fruit, berries, nuts, or coconut

Sherbet and popsicles

Serving suggestions include gelatins, milk shakes, frozen desserts, puddings, tapioca, cakes, and sauces.

Avoid:

All raw or dried fruits

Berries

Prune juice, prunes, and raisins



Other foods

Eat:

Mayonnaise and mild salad dressings

Margarine, butter, cream, and oils in small amounts

Plain gravies

Plain bouillon and broth

Ketchup and mild mustard

Spices, cooked herbs, and salt

Sugar, honey, and syrup

Clear jellies

Hard candy and marshmallows

Plain chocolate

Avoid:

Marmalade

Pickles, olives, relish, and horseradish

Popcorn

Potato chips



Liquids

Keep in mind that low-fiber foods cause fewer bowel movements and smaller stools. You may need to drink extra fluids to help prevent constipation while you are on a low-fiber diet. Drink plenty of water unless your doctor tells you otherwise, and 
use juices and milk as noted above.

Discharge Orders/Prescriptions
Prescriptions:
New
  L.acidoph,saliva-B.bif-S.therm 175 mg Capsule 
   1 cap PO BID 30 Days Qty: 60 0RF
  amoxicillin-pot clavulanate 875-125 mg tablet 
   1 tab PO BID 10 Days Qty: 20 0RF

Continued
  Women's Daily Formula 1 EACH tablet 
   1 ea PO QODAY 
  exemestane 25 mg tablet 
   25 mg PO DAILY 
  atenolol 50 mg tablet 
   50 mg PO DAILY 
  chlorthalidone 25 mg tablet 
   12.5 mg PO DAILY 
  glipizide 2.5 mg tablet extended release 24hr 
   2.5 mg PO DAILY 
  potassium chloride 20 mEq tablet extended release 
   20 meq PO BID 
  metformin 500 mg tablet extended release 24 hr 
   500 mg PO DAILY 

Referrals / Follow Up:
Estela Okeefe MD [Primary Care Provider] - 
Eun Patricio MD [Med Staff - Active Staff] -  (Please contact our office to follow-up in 10-14 days unless appointment is scheduled with colorectal at [facility])

Disposition
Disposition (needs filled in before D/C Order can be placed): Home, Self Care

## 2024-10-03 NOTE — DCINST_ITS
Discharge Instructions    
Diet    
Discharge Diet: - (Low fiber diet. See attached. )    
Activity    
Discharge Activity: Return to Normal Activity    
Weight Bearing Status: Weight bearing as tolerated    
Dressing / Incision    
Call your doctor if you observe: Fever of 101 or Higher, Coldness, Increased   
Pain, Numbness or Tingling, Change in Color, Inability to urinate, Inability to   
have a bowel movement, Using more than 1 pad per hour, Shortness of breath,   
Dizziness, Fainting spells, Swelling in the ankles, Chest pain, Prolonged   
hiccupping, Increased palpitations (irregular heartbeat) and Calf discomfort    
Follow Up Care    
Please Follow Up With: Eun Patricio MD    
When: IN 2 WEEKS    
Test Results:     
Test results from this visit will be discussed in further detail at your follow-  
up appointment, if applicable.    
    
    
Discharge Plan    
Admission    
Admit Date/Time: 10/01/24 17:32    
    
Primary Reason for Your Visit: Acute recurrent diverticulitis    
    
Attending Provider: Tom Aparicio    
    
Primary Care Provider: Estela Okeefe    
    
Consulting Providers: Colt Crow; Willard Youngblood    
    
Instructions    
Additional Instructions / Restrictions:    
    
What are low-fiber foods?    
    
If your doctor tells you to follow a low-fiber diet, here are low-fiber foods   
you can eat and higher-fiber foods you should avoid. Remember to always choose   
foods that you would normally eat. Do not try any foods that caused you   
discomfort or allergic reactions in the past.    
    
If you are on a ?low-residue diet,? your food choices are even more restricted   
than those listed below.    
    
Talk with your cancer care team or dietitian if you have questions about certain  
foods or amounts.    
    
Meat, fish, poultry, and protein    
    
Eat:    
    
Tender cuts of meat    
    
Ground meat    
    
Tofu    
    
Fish and shellfish    
    
Smooth peanut butter    
    
Eggs    
    
Bake, broil, or poach meats, and use mild seasonings. Try preparing meats as   
stews, roasts, meatloaves, casseroles, sandwiches, and soups using ingredients   
on the approved lists.    
    
Scramble, poach, or boil eggs; or make omelets, souffl?s, custard, puddings, and  
casseroles, using ingredients noted below. You might want to ask your doctor,   
nurse, or dietitian about other foods may be OK for you to eat, and find out   
when you can go back to your normal diet.    
    
Avoid:    
    
All beans, nuts, peas, lentils, and legumes    
    
Processed meats, hot dogs, sausage, and cold cuts    
    
Tough meats with gristle    
    
Dairy: Milk and cheese    
    
Eat:    
    
Only in small to medium amounts and only if they don?t cause problems for you    
    
    
    
Milk, chocolate milk, buttermilk, and milk drinks    
    
Yogurt without seeds or granola    
    
Sour cream    
    
Cheese    
    
Cottage cheese    
    
Custard or pudding    
    
Ice cream or frozen desserts (without nuts)    
    
Cream sauces, soups, and casseroles    
    
You can use these items in desserts, snacks, or breads.    
    
    
    
Bread, cereals, and grains    
    
Eat:    
    
White breads, waffles, Armenian toast, plain white rolls, or white bread toast    
    
Pretzels    
    
Plain pasta or noodles    
    
White rice    
    
Crackers, zwieback, edna, and matzoh (no cracked wheat or whole grains)    
    
Cereals without whole grains, added fiber, seeds, raisins, or other dried fruit    
    
Use white flour for baking and making sauces. Grains, such as white rice, Cream   
of Wheat, or grits, should be well-cooked.    
    
Include the above grains in casseroles, dumplings, souffl?s, cheese strata,   
kugels, and pudding.    
    
Avoid any food that contains:    
    
Brown or wild rice    
    
Whole grains, cracked grains, or whole wheat products    
    
Kasha (buckwheat)    
    
Cornbread or cornmeal    
    
Gavino crackers    
    
Bran    
    
Wheat germ    
    
Nuts    
    
Granola    
    
Coconut    
    
Dried fruit    
    
Seeds    
    
    
    
Vegetables and potatoes    
    
Eat:    
    
Tender, well-cooked fresh or canned vegetables without seeds, stems, or skins    
    
Cooked sweet or white potatoes without skins    
    
Strained vegetable juices without pulp or spices    
    
You can also eat these with cream sauces, or in soups, souffl?s, kugels, and   
casseroles.    
    
Avoid:    
    
All raw or steamed vegetables    
    
All types of beans    
    
Potatoes with skin    
    
Peas    
    
[place]    
    
Cabbage, broccoli, cauliflower, [place] sprouts, and greens    
    
Sauerkraut    
    
Onions    
    
    
    
Fruits and desserts    
    
Eat:    
    
Soft canned or cooked fruit without seeds or skins (small amounts)    
    
Small amounts of well-ripened banana    
    
Strained or clear juices    
    
Small amounts of soft cantaloupe or honeydew melon    
    
Cookies and other desserts without whole grains, dried fruit, berries, nuts, or   
coconut    
    
Sherbet and popsicles    
    
Serving suggestions include gelatins, milk shakes, frozen desserts, puddings,   
tapioca, cakes, and sauces.    
    
Avoid:    
    
All raw or dried fruits    
    
Berries    
    
Prune juice, prunes, and raisins    
    
    
    
Other foods    
    
Eat:    
    
Mayonnaise and mild salad dressings    
    
Margarine, butter, cream, and oils in small amounts    
    
Plain gravies    
    
Plain bouillon and broth    
    
Ketchup and mild mustard    
    
Spices, cooked herbs, and salt    
    
Sugar, honey, and syrup    
    
Clear jellies    
    
Hard candy and marshmallows    
    
Plain chocolate    
    
Avoid:    
    
Marmalade    
    
Pickles, olives, relish, and horseradish    
    
Popcorn    
    
Potato chips    
    
    
    
Liquids    
    
Keep in mind that low-fiber foods cause fewer bowel movements and smaller   
stools. You may need to drink extra fluids to help prevent constipation while   
you are on a low-fiber diet. Drink plenty of water unless your doctor tells you   
otherwise, and use juices and milk as noted above.    
    
Discharge Orders/Prescriptions    
Prescriptions:    
New    
  L.acidoph,saliva-BMerrybif-S.therm 175 mg Capsule     
   1 cap PO BID 30 Days Qty: 60 0RF    
  amoxicillin-pot clavulanate 875-125 mg tablet     
   1 tab PO BID 10 Days Qty: 20 0RF    
    
Continued    
  Women's Daily Formula 1 EACH tablet     
   1 ea PO QODAY     
  exemestane 25 mg tablet     
   25 mg PO DAILY     
  atenolol 50 mg tablet     
   50 mg PO DAILY     
  chlorthalidone 25 mg tablet     
   12.5 mg PO DAILY     
  glipizide 2.5 mg tablet extended release 24hr     
   2.5 mg PO DAILY     
  potassium chloride 20 mEq tablet extended release     
   20 meq PO BID     
  metformin 500 mg tablet extended release 24 hr     
   500 mg PO DAILY     
    
Referrals / Follow Up:    
Estela Okeefe MD [Primary Care Provider] - Within 2 Weeks    
Eun Patricio MD [Med Staff - Active Staff] -  (Please contact our office to   
follow-up in 10-14 days unless appointment is scheduled with colorectal at   
[facility])    
    
Disposition    
Disposition (needs filled in before D/C Order can be placed): Home, Self Care

## 2024-11-15 NOTE — PN.SURG_ITS
V2.0    [facility] Progress Note      Name:  Betty J Closser /Age/Sex: 1945  (79 y.o. female)   MRN & CSN:  [id number] & [id number] Encounter Date/Time: 11/15/2024 11:16 AM EST   Location:  6301/6301-01 PCP: Lydia Byers APRN - CNP     Attending:Jeremie Alanis*       Hospital Day: 2    Assessment and Recommendations     79-year-old female with history of cognitive impairment, paroxysmal A-fib, dyslipidemia, hypertension, HOLLEY presenting with possible syncopal episode with associated confusion and amnesia.  This has happened before.  Admitted for syncopal workup.  Due to the nature of the patient's symptoms, neurology thinking it is likely associated with seizure-like activity.  Cardiology in case actively ruling out cardiac causes.    Possible seizures versus syncope  -Obtain EEG.  As per neurology, patient to start Keppra 500 mg p.o. twice daily.  -Rule out cardiac causes.  Check TTE, cardiac CTA.  Will need 2-week CAM at discharge.  Orthostatics within normal    Cognitive impairment  -Would benefit from repeat neurocognitive evaluation in the outpatient setting.  I did introduce the family to the concept of maybe assisted living which day have been already thinking.  As per daughter, for now they are thinking of bringing patient into live with her while they figure out long-term decisions.  Patient does have good social support.    Carotid artery disease  Coronary artery disease  -Check cardiac CTA.  After discussions with cardiology in the setting of these episodes being more consistent with seizure-like activity and patient receiving contrast for cardiac CTA, will defer carotid artery CTA for the outpatient setting.    Paroxysmal A-fib  -Continue Eliquis for stroke prophylaxis.    Azotemia  -Did actually improve back to patient's baseline.  Will get a repeat BMP for this afternoon    Dyslipidemia  -Resume home statin    Hypertension  -Resume patient's home BP meds.    HOLLEY  -CPAP at  Subjective    
Subjective    
Patient is evaluated resting comfortably in bed. She denies any current   
abdominal pain. She is frustrated with the continued flare ups of the   
diverticulitis. She denies any nausea, vomiting, fever. She states she is   
attempting to figure out what diet is best for her situation. She notes passing   
flatus. She notes when passing flatus there is some minimal pain noted.     
    
Objective Data    
Objective Data    
Vital Signs:     
Vital Signs    
    
    
    
Temp Pulse Resp BP Pulse Ox O2 Del Method    
     
 98 F   58 L  18   135/61 H  98   Room Air     
     
 10/03/24 08:10  10/03/24 08:10  10/03/24 08:10  10/03/24 08:10  10/03/24 08:10   
10/03/24 08:14    
    
    
    
    
Oxygen Delivery Method           Room Air                                         
       
Weight:                          292 lb 12.8 oz                                   
       
Body Mass Index (BMI)            47.2                                             
       
    
    
Intake & Output:     
                       Intake and Output for Last 24 Hours    
    
    
    
 10/01/24 10/02/24 10/03/24    
    
 23:59 23:59 23:59    
     
Intake Total 1050 / 1050 900 / 1550 800 / 800    
     
Balance 1050 / 1050 900 / 1550 800 / 800    
    
    
    
Lab / Micro Data    
    
                                                        10/03/24 06:32              
    
                                                        10/03/24 06:32              
    
Labs:     
                         Laboratory Results - last 24 hr    
    
10/02/24 11:32: POC Glucose 149 H    
10/02/24 16:54: POC Glucose 126 H    
10/02/24 21:26: POC Glucose 82    
10/03/24 06:17: POC Glucose 99    
10/03/24 06:32: WBC 6.0, RBC 5.66 H, Hgb 15.4 H, Hct 48.4 H, MCV 85.5, MCH 27.2,  
MCHC 31.8 L, RDW Std Deviation 51.0 H, RDW Coeff of Elzbieta 16.5 H, Plt Count 259,   
MPV 9.8, Immature Gran % (Auto) 0.300, Neut % (Auto) 56.8, Lymph % (Auto) 27.2,   
Mono % (Auto) 10.8 H, Eos % (Auto) 4.2, Baso % (Auto) 0.7, Absolute Neuts (auto)  
3.4, Absolute Lymphs (auto) 1.63, Nucleated RBC % 0, Sodium 140, Potassium 3.6,   
Chloride 111 H, Carbon Dioxide 23.0, Anion Gap 6, BUN 6 L, Creatinine 0.88,   
Estim Creat Clear Calc 88.06, Est GFR (MDRD) Af Amer 83, Est GFR (MDRD) Non-Af   
69, BUN/Creatinine Ratio 6.8 L, Glucose 96, Calcium 9.6    
    
    
    
Physical Exam    
GI    
GI Narrative:     
Abdomen- soft, nontender to palpation. Positive bowel sounds    
    
Assessment & Plan    
Assessment/Plan    
(1) Diverticulitis:     
(2) Abdominal pain:     
PLAN:     
Plan    
I am seeing this patient in conjunction with Dr. Crow. He has   
independently evaluated this patient.     
Continue IV antibiotics    
Increase to transitional diet    
If patient tolerated transitional diet, plan to discharge home on Augmentin x 10  
days    
I have discussed with the patient that I will have our office check into the   
referral to Dr. Newell's office for evaluation and recommendation of   
sigmoidectomy for recurrent diverticulitis    
I will also include recommendations on a more specific diet for diverticulitis    
Hopefully patient will meet with the dietitian for further recommendations on   
diet    
We will continue to monitor this patient     
    
    
Charges/Coding    
Visit Charges    
Inpatient E&M: 12[address] [facility] L[facility]

## 2024-12-17 ENCOUNTER — HOSPITAL ENCOUNTER (EMERGENCY)
Age: 64
Discharge: HOME | End: 2024-12-17
Payer: MEDICARE

## 2024-12-17 VITALS
TEMPERATURE: 98 F | HEART RATE: 68 BPM | OXYGEN SATURATION: 99 % | DIASTOLIC BLOOD PRESSURE: 63 MMHG | RESPIRATION RATE: 16 BRPM | SYSTOLIC BLOOD PRESSURE: 107 MMHG

## 2024-12-17 VITALS — OXYGEN SATURATION: 96 % | HEART RATE: 78 BPM

## 2024-12-17 VITALS
HEART RATE: 79 BPM | SYSTOLIC BLOOD PRESSURE: 147 MMHG | OXYGEN SATURATION: 100 % | DIASTOLIC BLOOD PRESSURE: 83 MMHG | TEMPERATURE: 96.8 F | RESPIRATION RATE: 20 BRPM

## 2024-12-17 VITALS
TEMPERATURE: 98.42 F | DIASTOLIC BLOOD PRESSURE: 78 MMHG | RESPIRATION RATE: 18 BRPM | SYSTOLIC BLOOD PRESSURE: 138 MMHG | HEART RATE: 78 BPM | OXYGEN SATURATION: 97 %

## 2024-12-17 VITALS — DIASTOLIC BLOOD PRESSURE: 63 MMHG | SYSTOLIC BLOOD PRESSURE: 107 MMHG

## 2024-12-17 VITALS — BODY MASS INDEX: 47.9 KG/M2

## 2024-12-17 DIAGNOSIS — Z90.49: ICD-10-CM

## 2024-12-17 DIAGNOSIS — E11.9: ICD-10-CM

## 2024-12-17 DIAGNOSIS — Z87.19: ICD-10-CM

## 2024-12-17 DIAGNOSIS — I10: ICD-10-CM

## 2024-12-17 DIAGNOSIS — Z87.442: ICD-10-CM

## 2024-12-17 DIAGNOSIS — K57.32: Primary | ICD-10-CM

## 2024-12-17 DIAGNOSIS — Z79.84: ICD-10-CM

## 2024-12-17 DIAGNOSIS — Z87.891: ICD-10-CM

## 2024-12-17 DIAGNOSIS — Z79.899: ICD-10-CM

## 2024-12-17 LAB
ALANINE AMINOTRANSFER ALT/SGPT: 29 U/L (ref 13–56)
ALBUMIN SERPL-MCNC: 3.1 G/DL (ref 3.2–5)
ALKALINE PHOSPHATASE: 136 U/L (ref 45–117)
ANION GAP: 7 (ref 5–15)
AST(SGOT): 22 U/L (ref 15–37)
BUN SERPL-MCNC: 9 MG/DL (ref 7–18)
BUN/CREAT RATIO: 9.3 RATIO (ref 10–20)
CALCIUM SERPL-MCNC: 10.1 MG/DL (ref 8.5–10.1)
CARBON DIOXIDE: 24 MMOL/L (ref 21–32)
CHLORIDE: 107 MMOL/L (ref 98–107)
DEPRECATED RDW RBC: 46.2 FL (ref 35.1–43.9)
ERYTHROCYTE [DISTWIDTH] IN BLOOD: 15.3 % (ref 11.6–14.6)
EST GLOM FILT RATE - AFR AMER: 74 ML/MIN (ref 60–?)
ESTIMATED CREATININE CLEARANCE: 80.61 ML/MIN
GLOBULIN: 4.8 G/DL (ref 2.2–4.2)
GLUCOSE: 136 MG/DL (ref 74–106)
HCT VFR BLD AUTO: 46.4 % (ref 37–47)
HEMOGLOBIN: 15.3 G/DL (ref 12–15)
HGB BLD-MCNC: 15.3 G/DL (ref 12–15)
IMMATURE GRANULOCYTES COUNT: 0.08 X10^3/UL (ref 0–0)
LEUKOCYTE ESTERASE UR QL STRIP: 500 /UL
MCV RBC: 83.5 FL (ref 81–99)
MEAN CORP HGB CONC: 33 G/DL (ref 32–36)
MEAN PLATELET VOL.: 10 FL (ref 6.2–12)
MUCOUS THREADS URNS QL MICRO: (no result) /HPF
NRBC FLAGGED BY ANALYZER: 0 % (ref 0–5)
PLATELET # BLD: 322 K/MM3 (ref 150–450)
PLATELET COUNT: 322 K/MM3 (ref 150–450)
POTASSIUM: 3.6 MMOL/L (ref 3.5–5.1)
RBC # BLD AUTO: 5.56 M/MM3 (ref 4.2–5.4)
RBC DISTRIBUTION WIDTH CV: 15.3 % (ref 11.6–14.6)
RBC DISTRIBUTION WIDTH SD: 46.2 FL (ref 35.1–43.9)
RBC UR QL: (no result) /HPF (ref 0–5)
RBC UR QL: 10 /UL
SP GR UR: 1 (ref 1–1.03)
SQUAMOUS URNS QL MICRO: (no result) /HPF (ref 5–10)
URINE PRESERVATIVE: (no result)
WBC # BLD AUTO: 10.3 K/MM3 (ref 4.4–11)
WHITE BLOOD COUNT: 10.3 K/MM3 (ref 4.4–11)

## 2024-12-17 PROCEDURE — 81001 URINALYSIS AUTO W/SCOPE: CPT

## 2024-12-17 PROCEDURE — A4216 STERILE WATER/SALINE, 10 ML: HCPCS

## 2024-12-17 PROCEDURE — 74177 CT ABD & PELVIS W/CONTRAST: CPT

## 2024-12-17 PROCEDURE — 99285 EMERGENCY DEPT VISIT HI MDM: CPT

## 2024-12-17 PROCEDURE — 71046 X-RAY EXAM CHEST 2 VIEWS: CPT

## 2024-12-17 PROCEDURE — 80053 COMPREHEN METABOLIC PANEL: CPT

## 2024-12-17 PROCEDURE — 85025 COMPLETE CBC W/AUTO DIFF WBC: CPT

## 2024-12-17 NOTE — EDS_ITS
HPI    
HPI - GI    
History of Present Illness    
Chief Complaint: Abd Pain    
Informant: patient    
Abdominal Pain/Flank Pain    
Onset: Today and Yesterday    
Context: Gradual Onset    
Timing: Continuous    
Location: LLQ and Left Flank    
Current Severity: Mild    
Maximum Severity: Mild    
Worsened by: Nothing    
Relieved by: Nothing    
Nausea/Vomiting/Emesis    
GI Symptom: Negative for Nausea or Vomiting    
Diarrhea/Melena/Hematochezia    
GI Symptom: Positive for Diarrhea    
Stool Quality: Positive for Loose    
Severity: Mild    
Associated Symptoms    
Associated Symptoms: Negative for Dysuria, Frequency, Hematuria or Urgency    
Narrative    
Narrative:     
Sick 4-year-old female history of diverticulitis prior kidney stones and   
hypertension.  She has had URI symptoms for about a week with green sputum.    
Multiple grandchildren with URI symptoms.  But the primary reason why she is   
here today as she is also having left lower quadrant flank pain.  History of   
diverticulitis.  She is supposed to have partial colon resection done by a   
surgeon named Dr. Osborn, at [facility] on Friday.  She is concerned she   
may have diverticulitis again.  Want to make sure she did not have pneumonia.    
Prior similar symptoms: Yes    
Recent Illness/Hospitalization: No    
    
PFSH    
PFSH    
Medical History (Reviewed 12/17/24 @ 13:26 by Dr. Carlos Enrique Cunningham MD)    
    
Diverticulitis    
Kidney stones    
Wears glasses    
Cancer    
Rash    
Diabetes    
Arthritis    
History of diverticulitis    
History of stress test    
Invasive ductal carcinoma of right breast    
Hypertension    
Former smoker    
Bone spur    
HTN (hypertension)    
    
    
                                Home Medications    
    
    
    
?Medication ?Instructions ?Recorded ?Last Taken ?Type    
     
multivit-iron 18 mg-folic acid 400 1 ea PO QODAY supplememnt 04/10/17 10/01/24   
History    
    
mcg-calcium 500 mg-minerals tablet        
    
(Women's Daily Formula)        
     
atenolol 50 mg tablet 50 mg PO DAILY blood pressure 01/22/23 10/01/24 History    
     
chlorthalidone 25 mg tablet 12.5 mg PO DAILY water pill 01/22/23 10/01/24   
History    
     
exemestane 25 mg tablet 25 mg PO DAILY breast cancer 01/22/23 10/01/24 History    
     
glipizide 2.5 mg tablet, extended 2.5 mg PO DAILY diabetes 01/22/23 10/01/24   
History    
    
release 24 hr        
     
potassium chloride 20 mEq 20 meq PO BID supplement 01/22/23 10/01/24 History    
    
tablet,extended release        
     
metformin 500 mg tablet,extended 500 mg PO DAILY 10/01/24 10/01/24 History    
    
release 24 hr        
     
L.acidophil,salivari-Bifido 1 cap PO BID 30 days #60 caps 10/03/24 Unknown Rx    
    
bifidum-Strep thermoph 175 mg        
    
capsule        
     
amoxicillin 875 mg-potassium 1 tab PO BID 10 days #20 tabs 10/03/24 Unknown Rx    
    
clavulanate 125 mg tablet        
     
fluconazole 150 mg tablet 150 mg PO ONCE #1 TAB 10/23/24 Unknown Rx    
    
    
    
                                            
    
    
    
Allergy/AdvReac Type Severity Reaction Status Date / Time    
     
silver sulfadiazine (From Allergy  Hives Verified 09/18/24 14:42    
    
Silvadene)         
    
    
    
Family History (Reviewed 12/17/24 @ 13:26 by Dr. Carlos Enrique Cunningham MD)    
Other   Cancer    
    
    
    
Surgical History (Reviewed 12/17/24 @ 13:26 by Dr. Carlos Enrique Cunningham MD)    
    
History of colonoscopy    
History of foot surgery    
S/P lumpectomy, right breast    
History of cholecystectomy    
H/O: hysterectomy    
    
    
Social History (Reviewed 12/17/24 @ 13:26 by Dr. Carlos Enrique Cunningham MD)    
Smoking Status:  Former smoker     
    
    
    
ROS    
ROS ED    
ROS Narrative    
Left lower quadrant flank abdominal pain.  URI with cough of green sputum.    
Constitutional    
Constitutional ED: Denies chills or fever(s)    
ENT    
ENT ED: Denies ear pain    
Cardiovascular    
Cardiovascular: Denies chest pain    
Respiratory/Chest    
Respiratory/Chest: Reports cough; Denies dyspnea    
Gastrointestinal    
Gastrointestinal: Reports abdominal pain and diarrhea; Denies constipation, me  
jose alfredo, nausea or vomiting    
Genitourinary    
Genitourinary ED: Denies dysuria or hematuria    
Musculoskeletal    
Musculoskeletal: Denies arthralgias or back pain    
Integumentary    
Denies abscess or Abrasions    
Neurologic    
Neurologic: Denies headache(s)    
Psychiatric    
Psychiatric: Denies anxiety or depression    
Endocrine    
Endocrinology: Denies polydipsia    
Hematologic/Lymphatic    
Hematologic/Lymphatic: Denies easy bleeding, easy bruising or lymphadenopathy    
Allergic/Immunologic    
Allergic/Immunologic ED: Denies mouth swelling, tongue swelling or urticaria    
    
EXAM    
Physical Exam    
Narrative    
Exam Narrative:     
Well-appearing 64-year-old female.  Vital signs are stable afebrile.  Pulse ox   
100% on room air no signs of hypoxia.  She is in no distress.  H EENT exam   
unremarkable.  Mytrex membranes.  Pupils round reactive to light.  Neck   
nontender no lymphadenopathy.  No meningismus.  Lungs clear to auscultation   
bilaterally.  No rales, rhonchi or wheezing.  Dry cough.  Heart regular rate and  
rhythm rate about 80 no murmur.  Chest wall ribs nontender.  Abdomen soft   
nondistended normal bowel sounds without peritoneal signs.  No obstruction.    
Mild tenderness left lower quadrant.  No hernia or mass.  No pulsatile mass.  No  
peritoneal signs.  Moving all 4 extremities.  Nontender no edema.  Back   
nontender.  Neurologically she is awake alert no focal motor deficits.    
Const    
Vital Signs:     
    
                                            
    
    
    
 12/17/24    
12:45 12/17/24    
13:15 12/17/24    
13:18    
     
Temperature 96.8 F L  98.4 F    
     
Temperature Source Temporal  Oral    
     
Pulse Rate 79  78    
     
Respiratory Rate 20 H  18    
     
Respiratory Effort  Normal    
Non-Labored     
     
Respiratory Pattern  Normal     
     
Blood Pressure 147/83 H  138/78 H    
     
Blood Pressure Mean 104  98    
     
Pulse Ox 100  97    
     
Oxygen Delivery Method Room Air  Room Air    
    
    
    
    
 12/17/24    
14:45 12/17/24    
16:00    
     
Temperature      
     
Temperature Source      
     
Pulse Rate 78     
     
Respiratory Rate      
     
Respiratory Effort      
     
Respiratory Pattern      
     
Blood Pressure  107/63    
     
Blood Pressure Mean  77    
     
Pulse Ox 96     
     
Oxygen Delivery Method      
    
    
    
    
Positive well nourished and well developed; Negative for cachectic, contractures  
or unkempt    
General Appearance ED: well developed and NAD; Negative for unkempt, cachectic,   
contractures or pallor    
Nutritional Appearance: Negative for cachectic    
HEENT    
Reports moist mucous membranes    
normocephalic and atraumatic; Negative for trauma or tenderness    
Eyes    
PERRL and EOMs intact bilaterally    
General Eye ED: Negative for pale conjunctiva or scleral icterus    
Neck    
no lymphadenopathy, supple and no JVD    
General: Negative for tenderness    
Carotids: Negative for other    
Lymph    
Lymphatic: Negative for other    
Resp    
normal respiratory effort and clear to auscultation bilaterally    
Effort and Inspection: Negative for respiratory distress    
Auscultation: Negative for rales, rhonchi, wheezes or diminished lung sounds    
Cardio    
regular rate, regular rhythm, S1 normal heart sound, S2 normal heart sound and   
no murmurs    
Rate: Negative for bradycardia or tachycardic    
Rhythm: Negative for abnormal rhythm    
GI    
non-distended and no masses; Negative for non-tender    
GI Narrative:     
Mild left lower quadrant tenderness.    
Inspection: Negative for abdominal distention    
Auscultation: normoactive bowel sounds    
Palpation: soft and tender; Negative for guarding, rigid, pulsatile mass or   
rebound tenderness present    
Back/Spine    
no CVA tenderness    
General Back: Negative for CVA tenderness    
Cervical Spine: Negative for cervical spine tenderness    
Thoracic Spine / Upper Back: Negative for thoracic spinal tenderness    
Lumbar Spine / Lower Back: Negative for lumbar spinal tenderness    
Extremity    
full ROM    
General Extremety ED: Negative for edema or tenderness    
General Extremity: Negative for edema    
Neuro    
CN's II-XII intact bilaterally and moves all extremities    
Sensorium / Orientation: alert, oriented to person, oriented to place and   
oriented to time    
Motor Exam: strength 5/5 throughout    
Psych    
mental status grossly normal and thought process normal    
Appearance: Negative for unkempt    
Mood & Affect: Negative for depressed, anxious or tearful    
Skin    
no wounds    
General Skin Exam: Negative for jaundice or pallor    
Lesions: no lesions    
Rashes: no rashes    
Trauma: Negative for abrasion    
Nails: Negative for discolored    
    
MDM    
MDM    
MDM Narrative    
Medical decision making narrative:     
64-year-old female with a living require abdominal pain with a history of   
diverticulitis CAT scan the labs Arad acute  exacerbation diverticulitis versus   
abscess versus for versus UTI.  Chest x-ray to rule out pneumonia.  Clinically I  
do not feel she has pneumonia.    
    
Repeat exam patient is doing well at 4:50 PM.  She and I went over her test   
results including her chest x-ray and CAT scan.  She understands that she is in   
acute diverticulitis.  She has been treated with Cipro and Flagyl in the past.    
She will be given a prescription for both for 2 weeks.  She did not waiting for   
pain.  Her CAT scan will also be sent electronically to [facility]   
because she has surgery later this week for partial colon resection and her   
surgeon at [facility] can decide if he wants to do the surgery or delay it.  She will  
follow-up with her office tomorrow also.    
History & Record Review    
Discussion w/independent historian: Patient    
Additional record(s) reviewed:: Prior inpatient record, Prior outpatient record,  
Prior ED visit and Prior labs    
Lab Data    
Attestation: I reviewed the patient's lab results.    
Lab results narrative:     
White count of 10.  H&H of 15 and 46.  Platelets 322.    
Electrolytes show a gap of 7.  BUN and creatinine 9 and 0.9.    
Liver enzymes are unremarkable.  Glucose 136.  Yes    
Urinalysis shows no nitrates.  0-5 red cells.  5-10 white cells 1+ bacteria.    
Urine culture sent.    
Labs:     
    
                         Laboratory Results - last 24 hr    
    
    
    
  12/17/24    
    
  13:09    
     
WBC  10.3    
     
RBC  5.56 H    
     
Hgb  15.3 H    
     
Hct  46.4    
     
MCV  83.5    
     
MCH  27.5    
     
MCHC  33.0    
     
RDW Std Deviation  46.2 H    
     
RDW Coeff of Elzbieta  15.3 H    
     
Plt Count  322    
     
MPV  10.0    
     
Immature Gran % (Auto)  0.800    
     
Neut % (Auto)  70.2 H    
     
Lymph % (Auto)  15.8 L    
     
Mono % (Auto)  8.2    
     
Eos % (Auto)  4.4    
     
Baso % (Auto)  0.6    
     
Absolute Neuts (auto)  7.2    
     
Absolute Lymphs (auto)  1.62    
     
Nucleated RBC %  0    
     
Sodium  137    
     
Potassium  3.6    
     
Chloride  107    
     
Carbon Dioxide  24.0    
     
Anion Gap  7    
     
BUN  9    
     
Creatinine  0.97    
     
Estim Creat Clear Calc  80.61    
     
Est GFR (MDRD) Af Amer  74    
     
Est GFR (MDRD) Non-Af  61    
     
BUN/Creatinine Ratio  9.3 L    
     
Glucose  136 H    
     
Calcium  10.1    
     
Total Bilirubin  0.40    
     
AST  22    
     
ALT  29    
     
Alkaline Phosphatase  136 H    
     
Total Protein  7.9    
     
Albumin  3.1 L    
     
Globulin  4.8 H    
     
Albumin/Globulin Ratio  0.6 L    
     
Urine Color  Straw    
     
Urine Clarity  Sl. Cloudy    
     
Urine pH  7.0    
     
Ur Specific Gravity  1.005    
     
Urine Protein  Negative    
     
Urine Glucose (UA)  Normal    
     
Urine Ketones  Negative    
     
Urine Occult Blood  10 H    
     
Urine Nitrite  Negative    
     
Urine Bilirubin  Negative    
     
Urine Urobilinogen  Normal    
     
Ur Leukocyte Esterase  500 H    
     
Urine RBC  0-5 SEEN    
     
Urine WBC  5-10 SEEN    
     
Ur Squamous Epith Cells  0-5 SEEN    
     
Urine Bacteria  1+    
     
Urine Mucus  0 SEEN    
    
    
    
    
Radiography    
Chest X-Ray - ED: 2 View, Read by ED Physician, Read by Radiologist, Heart,   
Lungs, Mediastinum, Bony Structures, No Acute Disease and Chronic Changes    
Diagnostic Testing:     
    
Clinical Impression(s) from Imaging Studies    
    
Abdomen/Pelvis CT  12/17/24 13:23    
IMPRESSION:    
Mild degree of sigmoid diverticulitis. No evidence of fluid or abscess    
collection at this time.    
Fatty infiltration of the liver.    
Stable 7 mm nonobstructive left intrarenal calculus.    
     
Electronically Signed:    
Casey Melendez MD    
2024/12/17 at 14:44 EST    
Reading Location ID and State: [address] / [place]    
Tel [phone], Service support  [phone], Fax [phone]    
     
    
    
Chest X-Ray  12/17/24 13:23    
IMPRESSION:    
1.7 cm x 1.5 cm nodule in the left midlung. Questionable calcification    
suggestive of a granuloma. No prior study available for comparison.    
     
Electronically Signed:    
Casey Melendez MD    
2024/12/17 at 14:55 EST    
Reading Location ID and State: [address] / [place]    
Tel [phone], Service support  [phone], Fax [phone]    
     
    
    
    
Chest x-ray, 2 views, AP lateral, interpreted by myself   And the radiologist.    
Shows chronic changes.  Left midlung nodule.  No acute pneumonia.

## 2024-12-17 NOTE — ED.VIS.GI
HPI
HPI - GI
History of Present Illness
Chief Complaint: Abd Pain
Informant: patient
Abdominal Pain/Flank Pain
Onset: Today and Yesterday
Context: Gradual Onset
Timing: Continuous
Location: LLQ and Left Flank
Current Severity: Mild
Maximum Severity: Mild
Worsened by: Nothing
Relieved by: Nothing
Nausea/Vomiting/Emesis
GI Symptom: Negative for Nausea or Vomiting
Diarrhea/Melena/Hematochezia
GI Symptom: Positive for Diarrhea
Stool Quality: Positive for Loose
Severity: Mild
Associated Symptoms
Associated Symptoms: Negative for Dysuria, Frequency, Hematuria or Urgency
Narrative
Narrative: 
Sick 4-year-old female history of diverticulitis prior kidney stones and hypertension.  She has had URI symptoms for about a week with green sputum.  Multiple grandchildren with URI symptoms.  But the primary reason why she is here today as she is 
also having left lower quadrant flank pain.  History of diverticulitis.  She is supposed to have partial colon resection done by a surgeon named Dr. Osborn, at [facility] on Friday.  She is concerned she may have diverticulitis again.  Want to 
make sure she did not have pneumonia.
Prior similar symptoms: Yes
Recent Illness/Hospitalization: No

PFSH
PFSH
Medical History (Reviewed 12/17/24 @ 13:26 by Dr. Carlos Enrique Cunningham MD)

Diverticulitis
Kidney stones
Wears glasses
Cancer
Rash
Diabetes
Arthritis
History of diverticulitis
History of stress test
Invasive ductal carcinoma of right breast
Hypertension
Former smoker
Bone spur
HTN (hypertension)


Home Medications

?Medication ?Instructions ?Recorded ?Last Taken ?Type
multivit-iron 18 mg-folic acid 400 1 ea PO QODAY supplememnt 04/10/17 10/01/24 History
mcg-calcium 500 mg-minerals tablet    
(Women's Daily Formula)    
atenolol 50 mg tablet 50 mg PO DAILY blood pressure 01/22/23 10/01/24 History
chlorthalidone 25 mg tablet 12.5 mg PO DAILY water pill 01/22/23 10/01/24 History
exemestane 25 mg tablet 25 mg PO DAILY breast cancer 01/22/23 10/01/24 History
glipizide 2.5 mg tablet, extended 2.5 mg PO DAILY diabetes 01/22/23 10/01/24 History
release 24 hr    
potassium chloride 20 mEq 20 meq PO BID supplement 01/22/23 10/01/24 History
tablet,extended release    
metformin 500 mg tablet,extended 500 mg PO DAILY 10/01/24 10/01/24 History
release 24 hr    
L.acidophil,salivari-Bifido 1 cap PO BID 30 days #60 caps 10/03/24 Unknown Rx
bifidum-Strep thermoph 175 mg    
capsule    
amoxicillin 875 mg-potassium 1 tab PO BID 10 days #20 tabs 10/03/24 Unknown Rx
clavulanate 125 mg tablet    
fluconazole 150 mg tablet 150 mg PO ONCE #1 TAB 10/23/24 Unknown Rx




Allergy/AdvReac Type Severity Reaction Status Date / Time
silver sulfadiazine (From Allergy  Hives Verified 09/18/24 14:42
Silvadene)     


Family History (Reviewed 12/17/24 @ 13:26 by Dr. Carlos Enrique Cunningham MD)
Other
 Cancer



Surgical History (Reviewed 12/17/24 @ 13:26 by Dr. Carlos Enrique Cunningham MD)

History of colonoscopy
History of foot surgery
S/P lumpectomy, right breast
History of cholecystectomy
H/O: hysterectomy


Social History (Reviewed 12/17/24 @ 13:26 by Dr. Carlos Enrique Cunningham MD)
Smoking Status:  Former smoker 



ROS
ROS ED
ROS Narrative
Left lower quadrant flank abdominal pain.  URI with cough of green sputum.
Constitutional
Constitutional ED: Denies chills or fever(s)
ENT
ENT ED: Denies ear pain
Cardiovascular
Cardiovascular: Denies chest pain
Respiratory/Chest
Respiratory/Chest: Reports cough; Denies dyspnea
Gastrointestinal
Gastrointestinal: Reports abdominal pain and diarrhea; Denies constipation, melena, nausea or vomiting
Genitourinary
Genitourinary ED: Denies dysuria or hematuria
Musculoskeletal
Musculoskeletal: Denies arthralgias or back pain
Integumentary
Denies abscess or Abrasions
Neurologic
Neurologic: Denies headache(s)
Psychiatric
Psychiatric: Denies anxiety or depression
Endocrine
Endocrinology: Denies polydipsia
Hematologic/Lymphatic
Hematologic/Lymphatic: Denies easy bleeding, easy bruising or lymphadenopathy
Allergic/Immunologic
Allergic/Immunologic ED: Denies mouth swelling, tongue swelling or urticaria

EXAM
Physical Exam
Narrative
Exam Narrative: 
Well-appearing 64-year-old female.  Vital signs are stable afebrile.  Pulse ox 100% on room air no signs of hypoxia.  She is in no distress.  H EENT exam unremarkable.  Mytrex membranes.  Pupils round reactive to light.  Neck nontender no 
lymphadenopathy.  No meningismus.  Lungs clear to auscultation bilaterally.  No rales, rhonchi or wheezing.  Dry cough.  Heart regular rate and rhythm rate about 80 no murmur.  Chest wall ribs nontender.  Abdomen soft nondistended normal bowel 
sounds without peritoneal signs.  No obstruction.  Mild tenderness left lower quadrant.  No hernia or mass.  No pulsatile mass.  No peritoneal signs.  Moving all 4 extremities.  Nontender no edema.  Back nontender.  Neurologically she is awake alert 
no focal motor deficits.
Const
Vital Signs: 



 12/17/24
12:45 12/17/24
13:15 12/17/24
13:18
Temperature 96.8 F L  98.4 F
Temperature Source Temporal  Oral
Pulse Rate 79  78
Respiratory Rate 20 H  18
Respiratory Effort  Normal
Non-Labored 
Respiratory Pattern  Normal 
Blood Pressure 147/83 H  138/78 H
Blood Pressure Mean 104  98
Pulse Ox 100  97
Oxygen Delivery Method Room Air  Room Air

 12/17/24
14:45 12/17/24
16:00
Temperature  
Temperature Source  
Pulse Rate 78 
Respiratory Rate  
Respiratory Effort  
Respiratory Pattern  
Blood Pressure  107/63
Blood Pressure Mean  77
Pulse Ox 96 
Oxygen Delivery Method  



Positive well nourished and well developed; Negative for cachectic, contractures or unkempt
General Appearance ED: well developed and NAD; Negative for unkempt, cachectic, contractures or pallor
Nutritional Appearance: Negative for cachectic
HEENT
Reports moist mucous membranes
normocephalic and atraumatic; Negative for trauma or tenderness
Eyes
PERRL and EOMs intact bilaterally
General Eye ED: Negative for pale conjunctiva or scleral icterus
Neck
no lymphadenopathy, supple and no JVD
General: Negative for tenderness
Carotids: Negative for other
Lymph
Lymphatic: Negative for other
Resp
normal respiratory effort and clear to auscultation bilaterally
Effort and Inspection: Negative for respiratory distress
Auscultation: Negative for rales, rhonchi, wheezes or diminished lung sounds
Cardio
regular rate, regular rhythm, S1 normal heart sound, S2 normal heart sound and no murmurs
Rate: Negative for bradycardia or tachycardic
Rhythm: Negative for abnormal rhythm
GI
non-distended and no masses; Negative for non-tender
GI Narrative: 
Mild left lower quadrant tenderness.
Inspection: Negative for abdominal distention
Auscultation: normoactive bowel sounds
Palpation: soft and tender; Negative for guarding, rigid, pulsatile mass or rebound tenderness present
Back/Spine
no CVA tenderness
General Back: Negative for CVA tenderness
Cervical Spine: Negative for cervical spine tenderness
Thoracic Spine / Upper Back: Negative for thoracic spinal tenderness
Lumbar Spine / Lower Back: Negative for lumbar spinal tenderness
Extremity
full ROM
General Extremety ED: Negative for edema or tenderness
General Extremity: Negative for edema
Neuro
CN's II-XII intact bilaterally and moves all extremities
Sensorium / Orientation: alert, oriented to person, oriented to place and oriented to time
Motor Exam: strength 5/5 throughout
Psych
mental status grossly normal and thought process normal
Appearance: Negative for unkempt
Mood & Affect: Negative for depressed, anxious or tearful
Skin
no wounds
General Skin Exam: Negative for jaundice or pallor
Lesions: no lesions
Rashes: no rashes
Trauma: Negative for abrasion
Nails: Negative for discolored

MDM
MDM
MDM Narrative
Medical decision making narrative: 
64-year-old female with a living require abdominal pain with a history of diverticulitis CAT scan the labs Arad acute  exacerbation diverticulitis versus abscess versus for versus UTI.  Chest x-ray to rule out pneumonia.  Clinically I do not feel 
she has pneumonia.

Repeat exam patient is doing well at 4:50 PM.  She and I went over her test results including her chest x-ray and CAT scan.  She understands that she is in acute diverticulitis.  She has been treated with Cipro and Flagyl in the past.  She will be 
given a prescription for both for 2 weeks.  She did not waiting for pain.  Her CAT scan will also be sent electronically to [facility] because she has surgery later this week for partial colon resection and her surgeon at [facility] can decide 
if he wants to do the surgery or delay it.  She will follow-up with her office tomorrow also.
History & Record Review
Discussion w/independent historian: Patient
Additional record(s) reviewed:: Prior inpatient record, Prior outpatient record, Prior ED visit and Prior labs
Lab Data
Attestation: I reviewed the patient's lab results.
Lab results narrative: 
White count of 10.  H&H of 15 and 46.  Platelets 322.
Electrolytes show a gap of 7.  BUN and creatinine 9 and 0.9.
Liver enzymes are unremarkable.  Glucose 136.  Yes
Urinalysis shows no nitrates.  0-5 red cells.  5-10 white cells 1+ bacteria.  Urine culture sent.
Labs: 

Laboratory Results - last 24 hr

  12/17/24
  13:09
WBC  10.3
RBC  5.56 H
Hgb  15.3 H
Hct  46.4
MCV  83.5
MCH  27.5
MCHC  33.0
RDW Std Deviation  46.2 H
RDW Coeff of Elzbieta  15.3 H
Plt Count  322
MPV  10.0
Immature Gran % (Auto)  0.800
Neut % (Auto)  70.2 H
Lymph % (Auto)  15.8 L
Mono % (Auto)  8.2
Eos % (Auto)  4.4
Baso % (Auto)  0.6
Absolute Neuts (auto)  7.2
Absolute Lymphs (auto)  1.62
Nucleated RBC %  0
Sodium  137
Potassium  3.6
Chloride  107
Carbon Dioxide  24.0
Anion Gap  7
BUN  9
Creatinine  0.97
Estim Creat Clear Calc  80.61
Est GFR (MDRD) Af Amer  74
Est GFR (MDRD) Non-Af  61
BUN/Creatinine Ratio  9.3 L
Glucose  136 H
Calcium  10.1
Total Bilirubin  0.40
AST  22
ALT  29
Alkaline Phosphatase  136 H
Total Protein  7.9
Albumin  3.1 L
Globulin  4.8 H
Albumin/Globulin Ratio  0.6 L
Urine Color  Straw
Urine Clarity  Sl. Cloudy
Urine pH  7.0
Ur Specific Gravity  1.005
Urine Protein  Negative
Urine Glucose (UA)  Normal
Urine Ketones  Negative
Urine Occult Blood  10 H
Urine Nitrite  Negative
Urine Bilirubin  Negative
Urine Urobilinogen  Normal
Ur Leukocyte Esterase  500 H
Urine RBC  0-5 SEEN
Urine WBC  5-10 SEEN
Ur Squamous Epith Cells  0-5 SEEN
Urine Bacteria  1+
Urine Mucus  0 SEEN



Radiography
Chest X-Ray - ED: 2 View, Read by ED Physician, Read by Radiologist, Heart, Lungs, Mediastinum, Bony Structures, No Acute Disease and Chronic Changes
Diagnostic Testing: 

Clinical Impression(s) from Imaging Studies

Abdomen/Pelvis CT  12/17/24 13:23
IMPRESSION:
Mild degree of sigmoid diverticulitis. No evidence of fluid or abscess
collection at this time.
Fatty infiltration of the liver.
Stable 7 mm nonobstructive left intrarenal calculus.
 
Electronically Signed:
Casey Melendez MD
2024/12/17 at 14:44 EST
Reading Location ID and State: [address] / [place]
Tel [phone], Service support  [phone], Fax [phone]
 


Chest X-Ray  12/17/24 13:23
IMPRESSION:
1.7 cm x 1.5 cm nodule in the left midlung. Questionable calcification
suggestive of a granuloma. No prior study available for comparison.
 
Electronically Signed:
Casey Melendez MD
2024/12/17 at 14:55 EST
Reading Location ID and State: [address] / [place]
Tel [phone], Service support  [phone], Fax [phone]
 



Chest x-ray, 2 views, AP lateral, interpreted by myself   And the radiologist.  Shows chronic changes.  Left midlung nodule.  No acute pneumonia.

Discharge Plan
Triage
Chief Complaint: Abd Pain

Other Complaint:
Cold Sx

ED Provider: Carlos Enrique Cunningham

Dx/Rx/DC Orders
Prescriptions:
No Action
  Women's Daily Formula 1 EACH tablet 
   1 ea PO QODAY 
  exemestane 25 mg tablet 
   25 mg PO DAILY 
  atenolol 50 mg tablet 
   50 mg PO DAILY 
  chlorthalidone 25 mg tablet 
   12.5 mg PO DAILY 
  glipizide 2.5 mg tablet extended release 24hr 
   2.5 mg PO DAILY 
  potassium chloride 20 mEq tablet extended release 
   20 meq PO BID 
  metformin 500 mg tablet extended release 24 hr 
   500 mg PO DAILY 
  L.acidoph,saliva-B.bif-S.therm 175 mg Capsule 
   1 cap PO BID 30 Days Qty: 60 0RF
  amoxicillin-pot clavulanate 875-125 mg tablet 
   1 tab PO BID 10 Days Qty: 20 0RF
  fluconazole 150 mg tablet 
   150 mg PO ONCE Qty: 1 0RF
   Rx Instructions:
   as a single dose

Primary Care Provider: Estela Okeefe

Referrals:
Estela Okeefe MD [Primary Care Provider] - 

Print Language: English

## 2024-12-17 NOTE — RAD_ITS
REPORT-ID:[id number] 
 
STUDY:   X-RAY CHEST 
 
REASON FOR EXAM:   Female, 64 years old.  Cough 
 
TECHNIQUE:   PA and lateral views of the chest. 
 
COMPARISON:   None. 
___________________________________ 
 
FINDINGS: 
 
There is a 1.7 cm x 1.5 cm nodule in the left midlung.  Questionable faint 
calcifications within it. There is no demonstrated pleural abnormality. 
 
Normal size heart.   Normal mediastinum and manoj.  Normal visualized 
pulmonary arteries.  There is atherosclerotic calcification of the aortic 
arch with tortuosity. 
 
There are diffuse degenerative changes of the visualized thoracic spine. 
Normal visualized ribs, clavicles, and shoulders. 
 
There is no demonstrated abnormality of the visualized soft tissue 
structures of the upper abdomen. 
___________________________________ 
 
ORDER #: [id number] RAD/Chest PA and Lateral  
IMPRESSION:  
1.7 cm x 1.5 cm nodule in the left midlung. Questionable calcification  
suggestive of a granuloma. No prior study available for comparison.  
 
  
Electronically Signed:  
Casey Melendez MD  
2024/12/17 at 14:55 EST  
Reading Location ID and State: [address] / [place]  
Tel [phone], Service support  [phone], Fax [phone]

## 2024-12-17 NOTE — CT_ITS
REPORT-ID:[id number] 
 
STUDY:  CT ABDOMEN AND PELVIS WITH CONTRAST 
 
REASON FOR EXAM:   Female, 64 years old.  LLQ abd pain w/ hx of 
diverticulitis. Urinary frequency. 
 
RADIATION DOSAGE (If Supplied By Facility):  CTDIvol = ( 22.59 ) mGy, DLP = 
( 1504.59 ) mGycm 
 
TECHNIQUE:   Transaxial images were obtained from the dome of the diaphragm 
to the symphysis pubis without oral contrast. IV 100mL Isovue-[id number] was 
administered.  Sagittal and coronal images were reconstructed. 
 
Individualized dose optimization techniques were used for this CT. 
 
COMPARISON:   Comparison is made with prior study dated October 1, 2024. 
___________________________________ 
 
FINDINGS: 
Small calcifications seen in the right breast suggestive of fibroadenoma. 
 
The visualized lung bases are unremarkable.  Minimal coronary 
calcification. 
 
There is decreased attenuation of the liver consistent with steatosis. 
There are surgical clips in the gallbladder fossa consistent with a prior 
cholecystectomy.  Normal spleen.  Normal pancreas. 
 
Normal bilateral adrenal glands. 
 
Normal right kidney.  Stable 7 mm nonobstructive calculus in the lower pole 
calyx of the left kidney. 
 
There is a small hiatal hernia.  Normal small intestine.  There is 
diverticulosis, with thickening of the colon wall,  and pericolonic 
inflammation changes consistent with acute diverticulitis.  There is 
non-visualization of the appendix. 
 
Normal abdominal aorta.  Normal inferior vena cava.  Normal 
retroperitoneum. 
 
Normal urinary bladder.  There is absence of the uterus consistent with a 
prior hysterectomy. 
 
Normal abdominal wall.  Normal osseous structures. 
___________________________________ 
 
ORDER #: [id number] CT/Abdomen/Pelvis W IV Cont ONLY  
IMPRESSION:  
Mild degree of sigmoid diverticulitis. No evidence of fluid or abscess  
collection at this time.  
Fatty infiltration of the liver.  
Stable 7 mm nonobstructive left intrarenal calculus.  
 
  
Electronically Signed:  
Casey Melendez MD  
2024/12/17 at 14:44 EST  
Reading Location ID and State: [address] / [place]  
Tel [phone], Service support  [phone], Fax [phone]

## 2025-02-04 ENCOUNTER — HOSPITAL ENCOUNTER (EMERGENCY)
Age: 65
LOS: 1 days | Discharge: HOME | End: 2025-02-05
Payer: MEDICARE

## 2025-02-04 VITALS — BODY MASS INDEX: 46.6 KG/M2

## 2025-02-04 VITALS
DIASTOLIC BLOOD PRESSURE: 105 MMHG | OXYGEN SATURATION: 98 % | SYSTOLIC BLOOD PRESSURE: 153 MMHG | RESPIRATION RATE: 15 BRPM | HEART RATE: 82 BPM | TEMPERATURE: 96.8 F

## 2025-02-04 DIAGNOSIS — Z79.899: ICD-10-CM

## 2025-02-04 DIAGNOSIS — Z85.3: ICD-10-CM

## 2025-02-04 DIAGNOSIS — N39.0: Primary | ICD-10-CM

## 2025-02-04 DIAGNOSIS — I10: ICD-10-CM

## 2025-02-04 DIAGNOSIS — E66.9: ICD-10-CM

## 2025-02-04 DIAGNOSIS — Z87.19: ICD-10-CM

## 2025-02-04 DIAGNOSIS — E11.9: ICD-10-CM

## 2025-02-04 DIAGNOSIS — Z79.84: ICD-10-CM

## 2025-02-04 DIAGNOSIS — K91.872: ICD-10-CM

## 2025-02-04 DIAGNOSIS — Z90.49: ICD-10-CM

## 2025-02-04 DIAGNOSIS — Z87.891: ICD-10-CM

## 2025-02-04 LAB
LEUKOCYTE ESTERASE UR QL STRIP: 25 /UL
PROT UR QL STRIP.AUTO: 15 MG/DL
RBC UR QL: 150 /UL
SP GR UR: 1 (ref 1–1.03)

## 2025-02-04 PROCEDURE — 83605 ASSAY OF LACTIC ACID: CPT

## 2025-02-04 PROCEDURE — 96365 THER/PROPH/DIAG IV INF INIT: CPT

## 2025-02-04 PROCEDURE — A4216 STERILE WATER/SALINE, 10 ML: HCPCS

## 2025-02-04 PROCEDURE — 87086 URINE CULTURE/COLONY COUNT: CPT

## 2025-02-04 PROCEDURE — 81001 URINALYSIS AUTO W/SCOPE: CPT

## 2025-02-04 PROCEDURE — 96361 HYDRATE IV INFUSION ADD-ON: CPT

## 2025-02-04 PROCEDURE — 83690 ASSAY OF LIPASE: CPT

## 2025-02-04 PROCEDURE — 80076 HEPATIC FUNCTION PANEL: CPT

## 2025-02-04 PROCEDURE — 74177 CT ABD & PELVIS W/CONTRAST: CPT

## 2025-02-04 PROCEDURE — 80048 BASIC METABOLIC PNL TOTAL CA: CPT

## 2025-02-04 PROCEDURE — 85025 COMPLETE CBC W/AUTO DIFF WBC: CPT

## 2025-02-04 PROCEDURE — 99285 EMERGENCY DEPT VISIT HI MDM: CPT

## 2025-02-04 PROCEDURE — 83735 ASSAY OF MAGNESIUM: CPT

## 2025-02-04 NOTE — XMS RPT_ITS
Comprehensive CCD (C-CDA v2.1)  
  
                          Created on: 2025  
  
  
NAVDEEP GUILLEN  
External Reference #: CDR,PersonID:[id number]  
: 1960  
Sex: Female  
  
Demographics  
  
  
                                        Address             [address] [place]  [address]  
   
                                        Home Phone          [phone]  
   
                                        Preferred Language  en  
   
                                        Marital Status        
   
                                        Evangelical Affiliation Unknown  
   
                                        Race                Unknown  
   
                                        Ethnic Group        Not  or Lati  
no  
  
  
Author  
  
  
                                        Organization        [facility]  
  
  
Care Team Providers  
  
  
                                Care Team Member Name Role            Phone  
   
                                BARK, MARICRUZ E Unavailable     Unavailable  
   
                                BARK, MARICRUZ E Unavailable     Unavailable  
   
                                BARK, MARICRUZ E Unavailable     Unavailable  
   
                                BARK, MARICRUZ E Unavailable     Unavailable  
   
                                MAKKAR, LANI K Unavailable     Unavailable  
   
                                MAKKAR, LANI K Unavailable     Unavailable  
   
                                MAKKAR, LANI K Unavailable     Unavailable  
   
                                MAKKAR, LANI K Unavailable     Unavailable  
   
                                Steve Govea Admitting       Unavailabl  
e  
   
                                PendSteve tadeo Attending       Unavailabl  
e  
   
                                Ganta, Cynthia C Primary Care    Unavailable  
   
                                Steve Govea Admitting       Unavailabl  
e  
   
                                Steve Govea Attending       Unavailabl  
e  
   
                                Ganta, Cynthia C Primary Care    Unavailable  
   
                                Bark, Maricruz E Admitting       Unavailable  
   
                                Bark, Maricruz E Attending       Unavailable  
   
                                Ganta, Cynthia C Primary Care    Unavailable  
   
                                Cynthia Douglas MD Primary Care Provider [phone]  
[phone]  
   
                                Nelson BEVERLY MD, Rosaline Unavailable     [phone]  
[phone]  
   
                                Doup RN, Martha  Unavailable     Unavailable  
   
                                Cynthia Douglas MD Primary Care Provider [phone]  
[phone]  
   
                                Nelson BEVERLY MD, Rosaline Unavailable     [phone]  
[phone]  
   
                                Doup RN, Martha  Unavailable     Unavailable  
   
                                Cynthia Douglas MD Primary Care Provider [phone]  
[phone]  
   
                                Nelson BEVERLY MD, Rosaline Unavailable     [phone]  
[phone]  
   
                                Doup RN, Martha  Unavailable     Unavailable  
   
                                Doup RN, Martha  Unavailable     Unavailable  
   
                                Rosaline Damon MD Unavailable     [phone]  
   
                                Cynthia Douglas MD Primary Care Provider [phone]  
[phone]  
   
                                OLDER, SHERLY      Referring       Unavailable  
   
                                GANTA, CYNTHIA   Primary Care    Unavailable  
   
                                MEERA MENCHACA Attending       Unavailable  
   
                                GANTA, CYNTHIA   Primary Care    Unavailable  
   
                                VIKKI POSADA Referring       Unavailable  
   
                                GANTA, CYNTHIA   Primary Care    Unavailable  
   
                                GANTA, CYNTHIA   Referring       Unavailable  
   
                                GANTA, CYNTHIA   Primary Care    Unavailable  
   
                                OLDER, SHERLY      Referring       Unavailable  
   
                                GANTA, CYNTHIA   Primary Care    Unavailable  
   
                                OLDER, SHERLY      Attending       Unavailable  
   
                                GANTA, CYNTHIA   Primary Care    Unavailable  
   
                                POSADA, VIKKI Attending       Unavailable  
   
                                POSADA, VIKKI Referring       Unavailable  
   
                                GANTA, CYNTHIA   Primary Care    Unavailable  
   
                                GANTA, CYNTHIA   Primary Care    Unavailable  
   
                                OLDER, SHERLY      Attending       Unavailable  
   
                                GANTA, CYNTHIA   Primary Care    Unavailable  
   
                                POSADA, VIKKI Attending       Unavailable  
   
                                POSADA, VIKKI Referring       Unavailable  
   
                                GANTA, CYNTHIA   Primary Care    Unavailable  
   
                                Rickieta Cynthia BEVERLY Primary Care Provider [phone]  
  
   
                                Meera Menchaca PA-C Unavailable     [phone]  
[phone]  
   
                                Older APRN.LUIS ENRIQUE, Sherly Unavailable     [phone]  
[phone]  
   
                                Lillie Freeman PA-C Unavailable     [phone]  
[phone]  
   
                                MA, MCCOY      Attending       Unavailable  
   
                                MA, MCCOY      Admitting       Unavailable  
   
                                GANTA, CYNTHIA   Primary Care    Unavailable  
   
                                GANTA, CYNTHIA   Primary Care    Unavailable  
   
                                MA, MCCOY      Attending       Unavailable  
   
                                GANTA, CYNTHIA   Primary Care    Unavailable  
   
                                MA, MCCOY      Attending       Unavailable  
  
  
  
Allergies  
  
  
                                                    Allergy   
Classification                          Reported   
Allergen(s)               Allergy Type              Date of   
Onset                     Reaction(s)               Facility  
   
                                                      
(1 source)                              Sulfonamides   
(Antibiotic);   
Translations:   
[sulfa drugs]                           Propensity to   
adverse   
reactions to   
drug   
(disorder)                                                  [facility]   
Repository  
   
                                                      
(20 sources)                            silver   
sulfADIAZINE;   
Translations:   
[SILVER   
SULFADIAZINE]             Drug Allergy                
7                         [facility]  
   
                                                      
(20 sources)                            Sulfonamides   
(Antibiotic);   
Translations:   
[SULFA   
(SULFONAMIDE   
ANTIBIOTICS)]             Drug Allergy                
1                         [facility]  
   
                                                      
(6 sources)                             Silver   
sulfadiazine                            Allergy to   
substance                                 
7                         [facility]  
  
  
  
Medications  
Current Medications  
  
  
  
                      Medication Drug Class(es) Dates      Sig (Normalized) Sig   
(Original)  
   
                                                    [id number] 200 actuat   
albuterol 0.09   
mg/actuat metered   
dose inhaler  
(7 sources)                             beta2-Adrenergic   
Agonist                                 Start:   
2022  
End:   
2023                              take 2 puff(s) by   
inhalation every   
six hours as   
needed                                  albuterol HFA   
(PROAIR HFA) 90   
mcg/actuation   
inhaler Inhale 2   
Puffs as   
instructed every 6   
hours as needed. 1   
Each 0 2022   
Discontinued  
   
                                        Comment on above:   Inhale 2 Puffs as in  
structed every 6 hours as needed.   
   
                                                    atenolol 50 mg /   
chlorthalidone 25 mg   
oral tablet  
(6 sources)                             Thiazide-like   
Diuretic,   
beta-Adrenergic   
Blocker                                             take 1 tablet by   
mouth once daily                        atenolol-chlorthal  
idone (Tenoretic)   
50-25 MG tablet   
Take 1 tablet by   
mouth daily.   
Active  
   
                                                    benzonatate 100 mg   
oral capsule  
(7 sources)                             Non-narcotic   
Antitussive                             Start:   
2022  
End:   
2023                              take 1 capsule by   
mouth every eight   
hours as needed                         benzonatate   
(TESSALON PERLES)   
100 mg capsule   
Take 1 capsule by   
mouth three times   
daily as needed   
for cough. 12   
capsule 0   
2022   
Discontinued  
   
                                        Comment on above:   Take 1 capsule by mo  
uth three times daily as needed for cough.  
  
   
                                                    Blood-Glucose Meter   
monitoring kit  
(20 sources)                                        Start:   
2024  
End:   
2024                                          Blood-Glucose   
Meter monitoring   
kit Glucose Meter   
of Choice - Kit -   
Dx: Type 2 DM -   
Controlled E11.9 1   
Each 0 2024 Active  
  
  
  
                                Start: 2018                 Blood-Glucose   
Meter monitoring kit Indications: Type 2   
diabetes   
mellitus without complication, without long-term current use of   
insulin ([facility]) Glucose Meter of Choice - Kit - Dx: Type 2 DM -   
Uncontrolled E11.65 1 Each 2018 Active  
   
                                Start: 2018                 Blood-Glucose   
Meter monitoring kit Indications: Type 2   
diabetes   
mellitus without complication, without long-term current use of   
insulin ([facility]) Glucose Meter of Choice - Kit - Dx: Type 2 DM -   
Uncontrolled E11.65 1 Each 0 2018 Active  
  
  
  
                                        Comment on above:   Glucose Meter of Cho  
ice - Kit - Dx: Type 2 DM - Uncontrolled   
E11.65   
   
                                                    ciprofloxacin 500 mg   
oral tablet  
(20 sources)                            Quinolone   
Antimicrobial                           Start:   
20  
End:   
20                                      take 1 tablet   
by mouth twice   
daily                                   ciprofloxacin HCl   
(CIPRO) 500 mg tablet   
Take 1 tablet by   
mouth two times a day   
for 7 days. 14 tablet   
0 2024 Active  
  
  
  
                                                    Start: 2023  
End: 10-                         take 1 tablet by mouth twice   
daily                                   ciprofloxacin HCl (CIPRO) 500 mg   
tablet Take 1 tablet by mouth twice   
daily. 20 tablet 0 2023   
10/02/2023 Discontinued  
  
  
  
                                        Comment on above:   Take 1 tablet by myah  
th twice daily.   
   
                                                            Take 1 tablet by myah  
th two times a day for 7 days.   
   
                                                    docusate sodium 250   
mg oral capsule  
(3 sources)                                                 take 1 capsule by   
mouth twice daily as   
needed for   
constipation                            docusate sodium   
(Colace) 250 MG   
capsule Take 250 mg   
by mouth 2 times   
daily as needed for   
constipation.   
Active  
   
                                                    doxycycline hyclate   
100 mg oral tablet  
(1 source)                              Tetracycline-class   
Drug                                    Start:   
20  
End:   
20                                      take 1 tablet by   
mouth twice daily                       doxycycline   
(VIBRA-TABS) 100 mg   
tablet Take 1   
tablet by mouth   
twice daily for 7   
days. 14 tablet 0   
2022 Active  
   
                                        Comment on above:   Take 1 tablet by myah  
th twice daily for 7 days.   
   
                                                    fluconazole 150 mg   
oral tablet  
(10 sources)              Azole Antifungal          Start:   
20  
End:   
20                                                  fluconazole   
(DIFLUCAN) 150 mg   
tablet Indications:   
Vaginal yeast   
infection Take 1   
tablet by mouth one   
time only for 1   
dose. Repeat in 3   
days as needed. 2   
tablet 0 2023 Active  
  
  
  
                                                    Start: 2023  
End: 2023                                     fluconazole (DIFLUCAN) 150 m  
g tablet  
   
                                                    Start: 2023  
End: 2023           take 1 tablet by mouth once fluconazole (DIFLUCAN) 150  
 mg tablet  
  
Take 1 tablet by mouth one time only   
for 1 dose. 2 tablet 1 2023 Active  
  
  
  
                                        Comment on above:   Take 1 tablet by myah  
th one time only for 1 dose.   
   
                                                            Take 1 tablet by myah  
th one time only for 1 dose. Repeat in 3 days   
as needed.   
   
                                                    glipiZIDE er 2.5 mg   
24 hr extended   
release oral tablet  
(20 sources)              Sulfonylurea              Start:   
10-  
End:   
2024                              take 1 tablet by   
mouth once daily                        glipiZIDE   
(GLUCOTROL XL) 2.5   
mg 24 hr tablet   
Take 1 tablet by   
mouth once daily.   
90 tablet 3   
2024 Active  
   
                                        Comment on above:   Take 1 tablet by myah  
th once daily.   
   
                                                    ibuprofen 800 mg   
oral tablet  
(20 sources)                            Nonsteroidal   
Anti-inflammatory Drug                  Start:   
2022  
End:   
01-                              take 1 tablet by   
mouth every   
eight hours as   
needed                                  ibuprofen 800 MG   
tablet Take 800 mg   
by mouth every 8   
hours as needed.   
2023 Active  
  
  
  
                                                    Start: 10-  
End: 2022                         take 1 tablet by mouth every   
eight hours as needed for pain          ibuprofen (MOTRIN) 800 mg tablet   
Indications: Type 2 diabetes mellitus   
without complication, without   
long-term current use of insulin   
(HCC) Take 1 tablet by mouth every 8   
hours as needed for pain. Take with   
food. 45 tablet 1 10/11/2021   
2022 Discontinued  
  
  
  
                                        Comment on above:   Take 1 tablet by myah  
th every 8 hours as needed for pain. Take   
with food.   
   
                                                    24 hr metFORMIN   
hydrochloride 500 mg   
extended release oral   
tablet  
(20 sources)              Biguanide                 Start:   
10-  
1  
End:   
  
4                                       take 1 tablet by   
mouth once daily   
at breakfast                            metFORMIN ER   
(GLUCOPHAGE XR)   
500 mg 24 hr   
tablet   
Indications: Type   
2 diabetes   
mellitus without   
complication,   
without long-term   
current use of   
insulin (HCC) Take   
1 tablet by mouth   
daily with   
breakfast. 90   
tablet 3   
2024 Active  
  
  
  
                                                take 1 tablet by mouth twice maribel  
ly metFORMIN (Glucophage) 500 MG tablet Take   
500 mg   
by mouth twice a day. Active  
  
  
  
                                        Comment on above:   Take 1 tablet by myah  
th daily with breakfast.   
   
                                                    metroNIDAZOLE 500 mg   
oral tablet  
(20 sources)                            Nitroimidazole   
Antimicrobial                           Start:   
2024                                          metroNIDAZOLE (Flagyl)   
500 MG tablet Take (1)   
Flagyl at 3pm, 4pm, and   
before bedtime on the   
day prior to surgery. 3   
tablet 2024   
Active  
  
  
  
                                                    Start: 2023  
End: 10-                         take 1 tablet by mouth three   
times daily                             metroNIDAZOLE (FLAGYL) 500 mg tablet   
Take 500 mg by mouth three times   
daily. TAKE ONE(2) TABLET TWO(2)   
TIMES DAILY FOR (1) DAY. 0 2023   
10/02/2023 Discontinued  
  
  
  
                                        Comment on above:   Take 1 tablet by myah  
th three times daily. TAKE ONE(2) TABLET   
TWO(2) TIMES DAILY FOR (1) DAY.   
   
                                                            Take 500 mg by mouth  
 three times daily. TAKE ONE(2) TABLET   
TWO(2) TIMES DAILY FOR (1) DAY.   
   
                                                    MULTIPLE VITAMINS PO  
(6 sources)                                                     MULTIPLE VITAMIN  
S PO Take by   
mouth every other day. Active  
  
  
  
                                                                MULTIPLE VITAMIN  
S PO Take by mouth daily. Active  
  
  
  
                                                    MV-MN/FOLIC ACID/CALCIUM/VIT  
 K   
(ONE-A-DAY WOMEN'S 50 PLUS   
ORAL)  
(20 sources)                                                take 1 tablet by   
mouth three times   
weekly                                  MV-MN/FOLIC ACID/CALCIUM/VIT K   
(ONE-A-DAY WOMEN'S 50 PLUS   
ORAL) Take 1 tablet by mouth   
three times a week. Active  
  
  
  
                                                take 1 tablet by mouth once raul  
y MV-MN/FOLIC ACID/CALCIUM/VIT K (ONE-A-DAY   
WOMEN'S   
50 PLUS ORAL) Take 1 tablet by mouth once daily.   
Active  
   
                                                take 1 tablet by mouth once raul  
y MV-MN/FOLIC ACID/CALCIUM/VIT K (ONE-A-DAY   
WOMEN'S   
50 PLUS ORAL) Take 1 tablet by mouth once daily.   
0 Active  
  
  
  
                                        Comment on above:   Take 1 tablet by myah  
th once daily.  
   
   
                                                    neomycin sulfate 500   
mg oral tablet  
(4 sources)                             Aminoglycoside   
Antibacterial                           Start:   
20                                      take 2 tablets   
by mouth once   
daily                                   neomycin   
(Mycifradin) 500 MG   
tablet Take two   
tablets (1000 mg)   
by mouth at 3:00pm,   
4:00pm and at bed   
time on the day   
prior to surgery 6   
tablet 2024   
Active  
   
                                                    oxyCODONE   
hydrochloride 5 mg   
oral tablet  
(5 sources)               Opioid Agonist            Start:   
20  
End:   
20                                      take 1 tablet   
by mouth every   
six hours as   
needed for pain                         oxyCODONE   
(Roxicodone) 5 MG   
immediate release   
tablet Indications:   
Colovesical fistula   
Take 1 tablet (5   
mg) by mouth every   
6 hours as needed   
for severe pain   
(7-10) for up to 7   
days. 28 tablet   
2025 Active  
  
  
  
                                                    Start: 2025  
End: 2025                         take 1 tablet by mouth every   
four hours as needed for pain           oxyCODONE (Roxicodone) immediate   
release tablet 5 mg  
  
  
  
Completed/Discontinued Medications  
  
  
  
                      Medication Drug Class(es) Dates      Sig (Normalized) Sig   
(Original)  
   
                                                    acetaminophen 500 mg   
oral tablet  
(4 sources)                                         Start:   
2025  
End:   
2025                              take 1 tablet by   
mouth every eight   
hours                                   1,000 mg, Oral,   
Every 8 hours,   
First dose on 25 at 1745,   
Phase II/On Unit,   
Maximum dose of   
acetaminophen is   
4000 mg from all   
sources in 24   
hours.  
  
  
  
                                                    Start: 2025  
End: 01-                         take 1000 mg by mouth once, then   
take 4000 mg by mouth every   
twenty-four hours                       1,000 mg, Oral, Once, On 25 at 1100, For 1 dose,   
Preprocedure, Maximum dose of   
acetaminophen is 4000 mg from all   
sources in 24 hours. Do not   
administer if patient has taken   
tylenol  
  
  
  
                                                    ascorbic acid 500   
mg oral tablet  
(20 sources)              Vitamin C                   
End: 10-                         take 1 tablet   
by mouth once   
daily                                   ascorbic acid,   
vitamin C, (VITAMIN   
C) 500 mg tablet   
Take 500 mg by mouth   
once daily. 0   
10/02/2023   
Discontinued  
   
                                        Comment on above:   Take 500 mg by mouth  
 once daily.   
   
                                                    atenolol 50 mg   
oral tablet  
(20 sources)                            beta-Adrenergic   
Blocker                                 Start: 2025  
End: 2025                         take 50 mg by   
mouth once   
daily                                   50 mg, Oral, Daily,   
First dose on Sun   
1/26/25 at 1200  
  
  
  
                                                    Start: 2022  
End: 2024                         take 1 tablet by mouth once   
daily                                   atenolol (TENORMIN) 50 mg tablet   
Take 1 tablet by mouth once daily.   
90 tablet 3 2024 Active  
   
                                        Start: 2022   take 1 tablet by myah  
th once   
daily                                   atenolol (TENORMIN) 100 mg tablet   
Take 1 tablet by mouth once daily.   
90 tablet 1 2022 Active  
   
                                                    Start: 10-  
End: 2022                         take 1 tablet by mouth once   
daily                                   atenolol (TENORMIN) 50 mg tablet   
Take 1 tablet by mouth once daily.   
90 tablet 3 2022 Active  
  
  
  
                                        Comment on above:   Take 1 tablet by myah  
th once daily.   
   
                                                    azithromycin 250 mg oral   
tablet  
(8 sources)               Macrolide Antimicrobial   Start:   
10-  
End:   
2024                                          azithromycin (ZITHROMAX   
Z-ALLAN) 250 mg tablet   
TAKE 2 TABS ON THE   
FIRST DAY, THEN ONE TAB   
DAILY FOR 4 DAYS. 6   
tablet 0 10/02/2023   
2024 Discontinued  
   
                                        Comment on above:   TAKE 2 TABS ON THE F  
IRST DAY, THEN ONE TAB DAILY FOR 4 DAYS.   
   
                                                    calcium chloride 0.0014   
meq/ml / potassium   
chloride 0.004 meq/ml /   
sodium chloride 0.103   
meq/ml / sodium lactate   
0.028 meq/ml injectable   
solution  
(6 sources)                                         Start:   
2025  
End:   
2025                                          1,000 mL, IntraVENous,   
at 500 mL/hr,   
Administer over 2   
Hours, Once, On Sun   
25 at 1545, For 1   
dose  
  
  
  
                                                    Start: 2025  
End: 2025                         take 50 mL intravenously every   
hour                                    50 mL/hr, IntraVENous,   
Continuous, Starting on 25 at 1745, Phase II/On Unit  
  
  
  
                                                    chlorthalidone 25 mg   
oral tablet  
(20 sources)                            Thiazide-like   
Diuretic                                Start: 2025  
End: 2025                         take 12.5 mg   
by mouth   
once daily                              12.5 mg, Oral,   
Daily, First   
dose on Sun   
25 at   
1015  
  
  
  
                                                    Start: 2022  
End: 2024                         take 0.5 tablet by mouth once   
daily                                   chlorthalidone (HYGROTON) 25 mg   
tablet Take 0.5 tablets by mouth once   
daily. 45 tablet 3 2024 Active  
   
                                                    Start: 10-  
End: 2022                         take 1 tablet by mouth once   
daily                                   chlorthalidone (HYGROTON) 25 mg   
tablet Take 1 tablet by mouth once   
daily. 90 tablet 3 2022 Active  
   
                                                                chlorthalidone (  
Hygroton) 25 MG   
tablet Take 12.5 mg by mouth daily.   
Active  
  
  
  
                                        Comment on above:   Take 1 tablet by myah  
th once daily.   
   
                                                            Take 0.5 tablets by   
mouth once daily.   
   
                                                    cholecalciferol 9.52   
unt/ml / glucose 357   
mg/ml oral gel  
(2 sources)               Vitamin D                 Start:   
2025  
End:   
2025                                                  
   
                                                    cholecalciferol,   
vitamin D3, (VITAMIN   
D3 ORAL)  
(12 sources)                                                take 1 tablet by   
mouth once daily                        cholecalciferol,   
vitamin D3, (VITAMIN   
D3 ORAL) Take 1   
tablet by mouth once   
daily. 0 Active  
   
                                        Comment on above:   Take 1 tablet by myah  
th once daily.   
   
                                                    0.5 ml dulaglutide   
1.5 mg/ml   
auto-injector  
(1 source)                              GLP-1 Receptor   
Agonist                                 Start:   
10-26-  
2021  
End:   
2022                                    inject 0.75 mg by   
subcutaneous   
injection every   
week                                    dulaglutide   
(TRULICITY) 0.75   
mg/0.5 mL pen   
injector Inject 0.75   
mg subcutaneously   
one time a week.   
Inject dose once per   
week. Discard Pen   
After 4 Each 4   
10/26/2021   
2022   
Discontinued  
   
                                        Comment on above:   Inject 0.75 mg subcu  
taneously one time a week. Inject dose   
once   
per week. Discard Pen After   
   
                                                    0.4 ml enoxaparin   
sodium 100 mg/ml   
prefilled syringe  
(2 sources)                             Low Molecular Weight   
Heparin                                 Start:   
2025  
End:   
2025                                    inject 40 mg by   
subcutaneous   
injection every   
twenty-four hours                       40 mg, SubCUTAneous,   
Every 24 hours   
scheduled (Daily),   
First dose on Sat   
25 at 0900,   
Phase II/On Unit,   
Indication of Use:   
Prophylaxis-DVT/PE,   
Indications:   
Prophylaxis of   
Venous   
Thromboembolism  
   
                                                    exemestane 25 mg oral   
tablet  
(20 sources)              Aromatase Inhibitor       Start:   
2024  
End:   
2024                                    take 1 tablet by   
mouth once daily                        exemestane   
(AROMASIN) 25 mg   
tablet Indications:   
Primary cancer of   
lower outer quadrant   
of right female   
breast (HCC) ,   
Carcinoma of right   
breast, estrogen and   
progesterone   
receptor positive   
(HCC) Take 1 tablet   
by mouth once daily.   
90 tablet 1   
2024   
Discontinued  
  
  
  
                                                    Start: 2022  
End: 06-                         take 1 tablet by mouth once   
daily                                   exemestane (AROMASIN) 25 mg tablet   
Indications: Primary cancer of lower   
outer quadrant of right female   
breast (HCC) , Carcinoma of right   
breast, estrogen and progesterone   
receptor positive (HCC) Take 1   
tablet by mouth once daily. 90   
tablet 3 2023 06/10/2024   
Discontinued  
   
                                                    Start: 2021  
End: 2022                         take 1 tablet by mouth once   
daily                                   exemestane (AROMASIN) 25 mg tablet   
Indications: Primary cancer of lower   
outer quadrant of right female   
breast (HCC) , Carcinoma of right   
breast, estrogen and progesterone   
receptor positive (HCC) Take 1   
tablet by mouth once daily. 80   
tablet 3 2022 Active  
  
  
  
                                        Comment on above:   Take 1 tablet by myah  
th once daily.   
   
                                                    Gatorade Sports Drink  
(12 sources)                                        Start:   
10-                                          Gatorade Sports Drink   
Indications:   
Screening for colon   
cancer Use as   
directed for Miralax   
/ Gatorade Bowel Prep   
Kit 0 10/26/2021   
Active  
   
                                        Comment on above:   Use as directed for   
Miralax / Gatorade Bowel Prep Kit   
   
                                                    glucagon (rdna) 1 mg   
injection  
(2 sources)               Antihypoglycemic Agent    Start:   
2025  
End:   
2025                                            
   
                                                    150 ml glucose 50   
mg/ml injection  
(4 sources)                                         Start:   
2025  
End:   
2025                                            
  
  
  
                                                    Start: 2025  
End: 2025                                       
  
  
  
                                                    0.5 ml heparin   
sodium, porcine   
73501 unt/ml   
prefilled syringe  
(2 sources)                             Unfractionated   
Heparin,   
Anti-coagulant                          Start:   
2025  
End:   
2025                              inject 5000 [IU]   
by subcutaneous   
injection once                          5,000 Units,   
SubCUTAneous,   
Once, On 25 at 1100,   
For 1 dose,   
Preprocedure,   
Please administer   
prior to   
procedure  
   
                                                    1 ml hydrALAZINE   
hydrochloride 20   
mg/ml injection  
(2 sources)                             Arteriolar   
Vasodilator                             Start:   
2025  
End:   
2025                              take 10 mg   
intravenously   
every four hours   
as needed for   
hypertension                              
   
                                                    1 ml HYDROmorphone   
hydrochloride 1   
mg/ml cartridge  
(4 sources)               Opioid Agonist            Start:   
2025  
End:   
2025                                          0.5 mg,   
IntraVENous,   
Every 5 min PRN,   
severe pain   
(7-10), Starting   
on 25 at   
1746, For 4   
doses, Recovery   
(only), Phase I   
and Phase II-   
Initial therapy   
for severe pain   
(7-10).   
Restricted to a   
90 minute time   
frame starting   
when the patient   
can verbally   
state their pain   
score. If after 2   
doses the pain   
score does not   
decrease by more   
than one point,   
then call the   
provider. If oral   
meds are   
utilized, do not   
return to initial   
therapy   
medications.  
  
  
  
                                                    Start: 2025  
End: 2025                         take 0.5 mg by mouth every   
three hours as needed for pain          0.5 mg, IntraVENous, Every 3 hours   
PRN, Break through pain, Starting on   
25 at 1734, Phase II/On   
Unit, If oral and IV narcotics   
ordered, use oral first and only use   
IV if oral is ineffective or cannot   
take oral. Do Not give oral and IV   
within 1 hour of each other unless   
specifically ordered.  
  
  
  
                                                    Insulin Lispro   
(Humalog) injection   
0-12 Units  
(2 sources)                                         Start:   
2025  
End:   
2025                                          Insulin Lispro   
(Humalog)   
injection 0-12   
Units  
   
                                                    labetalol   
hydrochloride 5 mg/ml   
injectable solution  
(2 sources)                             beta-Adrenergi  
c Blocker                               Start:   
2025  
End:   
2025                              take 10 mg   
intravenously   
every four hours   
as needed for   
hypertension                              
   
                                                    1 ml naloxone   
hydrochloride 0.4   
mg/ml injection  
(2 sources)                             Opioid   
Antagonist                              Start:   
2025  
End:   
2025                                          0.4 mg,   
IntraVENous, Every   
5 min PRN, opioid   
reversal,   
respiratory   
depression,   
Starting on 25 at 1845,   
+++ For RR  
   
                                                    ondansetron ODT   
(Zofran-ODT)   
disintegrating tablet   
4 mg  
(2 sources)                                         Start:   
2025  
End:   
2025                              take 1 tablet by   
mouth every eight   
hours as needed   
for nausea and   
vomiting                                ondansetron ODT   
(Zofran-ODT)   
disintegrating   
tablet 4 mg  
   
                                                    polyethylene glycol   
3350 59655 mg powder   
for oral solution  
(12 sources)                            Osmotic   
Laxative                                Start:   
10-                                          polyethylene   
glycol 3350   
(MIRALAX,   
GLYCOLAX) 17   
gram/dose powder   
Indications:   
Screening for   
colon cancer Use   
as directed for   
Miralax / Gatorade   
Bowel Prep Kit 238   
g 0 10/26/2021   
Active  
   
                                        Comment on above:   Use as directed for   
Miralax / Gatorade Bowel Prep Kit   
   
                                                    microencapsulated   
potassium chloride 10   
meq extended release   
oral tablet  
(20 sources)                                        Start:   
2025  
End:   
2025                                          20 mEq, Oral, 2   
times daily, First   
dose on Sun   
1/26/25 at 1015,   
Best given with   
food and plenty of   
water to minimize   
gastric   
irritation. Do not   
crush or chew.  
  
  
  
                                        Start: 2024   take 1 tablet by [facility] twice   
daily                                   potassium chloride CR (K-Tab) 20   
MEQ ER tablet Take 20 mEq by mouth   
twice a day. 2024 Active  
   
                                Start: 2024                 KLOR-CON M20 2  
0 MEQ ER tablet Take   
20 mEq by mouth 2 times daily.   
2024 Active  
   
                                                    Start: 10-  
End: 2024                         take 1 tablet by mouth twice   
daily                                   potassium chloride 20 mEq TbER Take   
1 tablet by mouth two times a day.   
180 tablet 1 2024   
Discontinued  
   
                                Start: 10-                 potassium chlo  
ride (K-TAB) 10 mEq   
tablet Indications: Hypokalemia   
Take 2 tablets in AM and 1 tablet   
in PM with meals 270 tablet 3   
10/06/2022 Active  
   
                                                    Start: 10-  
End: 10-                         take 1 tablet by mouth twice   
daily                                   potassium chloride (K-TAB) 10 mEq   
tablet Indications: Hypokalemia   
Take 1 tablet by mouth twice daily.   
180 tablet 1 10/03/2022 10/06/2022   
Discontinued  
   
                                        Start: 2022   take 1 tablet by myah  
th twice   
daily                                   potassium chloride (K-TAB) 10 mEq   
tablet Indications: Hypokalemia   
Take 1 tablet by mouth twice daily.   
180 tablet 1 2022 Active  
   
                                                    Start: 10-  
End: 2022                         take 1 tablet by mouth once   
daily at breakfast                      potassium chloride (K-TAB) 10 mEq   
tablet Take 1 tablet by mouth daily   
with breakfast. 90 tablet 3   
10/11/2021 2022 Discontinued  
  
  
  
                                        Comment on above:   Take 1 tablet by myah  
th daily with breakfast.   
   
                                                            Take 1 tablet by myah  
th twice daily.   
   
                                                            Take 2 tablets in AM  
 and 1 tablet in PM with meals   
   
                                                            Take 1 tablet by myah  
th two times a day.   
   
                                                    5 ml sodium   
chloride 9 mg/ml   
injection  
(6 sources)                                         Start:   
  
End:   
                                       take 5-40 mL   
intravenously every   
twelve hours                            5-40 mL,   
IntraVENous, Every   
12 hours, First   
dose on 25 at 1745,   
Phase II/On Unit,   
For Line Patency:   
Peripheral IV = 5   
mL; Midline or   
Central Line = 10   
mL/lumen. If   
following IV push   
medication,   
administer flush   
at same rate as   
the IV push. Flush   
volume is   
determined by type   
of infusion   
therapy being   
given. For   
non-viscous   
solutions use:   
Peripheral IV = 5   
mL Midline or   
Central Line = 10   
mL/lumen For   
viscous solutions   
(i.e. blood   
components,   
parenteral   
nutrition,   
contrast media, or   
after obtaining   
blood sample) use:   
Peripheral IV = 10   
mL Midline or   
Central Line = 20   
mL/lumen  
  
  
  
                                                    Start: 2025  
End: 2025                         take 100 mL intravenously every   
hour as needed, then take 20 mL   
intravenously every hour as needed      5-250 mL/hr, IntraVENous, PRN, if   
patient receiving piggyback   
infusions and maintenance fluids   
are not ordered OR KVO fluids to   
protect IV site / prevent   
frequent line interruptions /   
long duration, Starting on 25 at 1734, Phase II/On   
Unit, For piggyback infusion,   
administer at same rate as   
piggyback for a total of 25 mL.   
Enter 25 mL into dose field and   
piggyback rate into rate field of   
order. If piggyback is infusing   
at a rate less than 100 mL/hr,   
enter 25 mL into dose field and   
100 mL/hr into rate field of   
order. For KVO fluids, enter rate   
of 20 mL/hr or less into rate   
field of order.  
   
                                                    Start: 2025  
End: 2025                                     5-40 mL, IntraVENous, PRN, l  
ine   
care, After every IV line use,   
Starting on 25 at 1734,   
Phase II/On Unit, For Line   
Patency: Peripheral IV = 5 mL;   
Midline or Central Line = 10   
mL/lumen. If following IV push   
medication, administer flush at   
same rate as the IV push. Flush   
volume is determined by type of   
infusion therapy being given. For   
non-viscous solutions use:   
Peripheral IV = 5 mL Midline or   
Central Line = 10 mL/lumen For   
viscous solutions (i.e. blood   
components, parenteral nutrition,   
contrast media, or after   
obtaining blood sample) use:   
Peripheral IV = 10 mL Midline or   
Central Line = 20 mL/lumen  
  
  
  
                                                    vitamin e 100 unt oral   
capsule  
(12 sources)                                                take 1 capsule by   
mouth once daily                        Vitamin E, dl,   
acetate, (VITAMIN E)   
100 unit capsule Take   
100 Units by mouth   
once daily. 0 Active  
   
                                        Comment on above:   Take 100 Units by mo  
[facility] once daily.   
  
  
  
Problems  
Active Problems  
  
  
                                                    Problem   
Classification  Problem         Date            Documented Date Episodic/Chronic  
   
                                                    Abdominal pain  
(1 source)                              Left flank pain;   
Translations:   
[Unspecified abdominal   
pain]                                   2024          Episodic  
   
                                                    Cancer of breast  
(20 sources)                            Primary malignant   
neoplasm of lower   
outer quadrant of   
female breast;   
Translations:   
[Malignant neoplasm of   
lower-outer quadrant   
of right female   
breast]                                 Onset:   
05-                05-                Chronic  
   
                                                    Diabetes mellitus   
without complication  
(20 sources)                            Type 2 diabetes   
mellitus without   
complication;   
Translations: [Type 2   
diabetes mellitus   
without complications]                  Onset:   
2016                                          Chronic  
   
                                                    Disorders of lipid   
metabolism  
(20 sources)                            Hypercholesterolemia;   
Translations: [Pure   
hypercholesterolemia,   
unspecified]                            Onset:   
2014                                          Chronic  
   
                                                    Diverticulosis and   
diverticulitis  
(12 sources)                            Diverticulitis;   
Translations:   
[Diverticulitis of   
intestine, part   
unspecified, without   
perforation or abscess   
without bleeding]                       Onset:   
2024                                          Chronic  
   
                                                    Esophageal disorders  
(20 sources)                            Gastroesophageal   
reflux disease;   
Translations:   
[Gastro-esophageal   
reflux disease without   
esophagitis]                            05-          Chronic  
   
                                                    Essential   
hypertension  
(20 sources)                            Essential   
hypertension;   
Translations:   
[Essential (primary)   
hypertension]                           Onset:   
2014                                          Chronic  
   
                                                    Fluid and   
electrolyte   
disorders  
(2 sources)                             Hypokalemia;   
Translations:   
[Hypokalemia]                                               Episodic  
   
                                                    Genitourinary   
symptoms and   
ill-defined   
conditions  
(4 sources)                             Jose hematuria;   
Translations: [Gross   
hematuria]                                                  Episodic  
   
                                                    Mycoses  
(2 sources)                             Candidiasis;   
Translations:   
[Candidiasis,   
unspecified]                                                Episodic  
   
                                                    Nonmalignant breast   
conditions  
(2 sources)                             Mammographic   
calcification of right   
breast; Translations:   
[Mammographic   
calcification found on   
diagnostic imaging of   
breast]                                                     Episodic  
   
                                                    Other diseases of   
bladder and urethra  
(11 sources)                            Vesicocolic fistula;   
Translations:   
[Vesicointestinal   
fistula]                                Onset:   
2024                Chronic  
   
                                                    Other diseases of   
bladder and urethra  
(2 sources)                             Vesicointestinal   
fistula; Translations:   
[Vesicointestinal   
fistula]                                Onset:   
2025                                          Chronic  
   
                                                    Other nutritional;   
endocrine; and   
metabolic disorders  
(20 sources)                            Body mass index 40+ -   
severely obese;   
Translations: [Morbid   
(severe) obesity due   
to excess calories]                     Onset:   
2014                                          Chronic  
   
                                                    Other upper   
respiratory disease  
(1 source)                              Seasonal allergic   
rhinitis;   
Translations: [Other   
seasonal allergic   
rhinitis]                               2024          Chronic  
   
                                                    Other upper   
respiratory   
infections  
(1 source)                              Chronic sinusitis;   
Translations: [Chronic   
sinusitis,   
unspecified]                                                Chronic  
   
                                                    Other upper   
respiratory   
infections  
(1 source)                              Upper respiratory   
infection;   
Translations: [Acute   
upper respiratory   
infection,   
unspecified]                            2024          Episodic  
   
                                                    Residual codes;   
unclassified  
(1 source)                              Menopause present;   
Translations:   
[Asymptomatic   
menopausal state]                                           Episodic  
   
                                                    Unclassified  
(1 source)                              New Patient /   
[id number]()                            Onset:   
2018                                            
   
                                                    Unclassified  
(1 source)                Unknown / UNK(Unknown)    Onset:   
07-                                            
  
  
Past or Other Problems  
  
  
                                                    Problem   
Classification  Problem         Date            Documented Date Episodic/Chronic  
   
                                                    Blindness and vision   
defects  
(20 sources)                            Hypermetropia;   
Translations:   
[Hypermetropia,   
unspecified eye]                        Onset:   
2016                Episodic  
   
                                                    Other aftercare  
(11 sources)                            Surgical follow-up;   
Translations:   
[Encounter for   
follow-up examination   
after completed   
treatment for   
conditions other than   
malignant neoplasm]                     Onset:   
2015  
Resolved:   
2016                Episodic  
   
                                                    Other and unspecified   
benign neoplasm  
(20 sources)                            Benign neoplasm of   
skin of eyelid;   
Translations: [Other   
benign neoplasm of   
skin of unspecified   
eyelid, including   
canthus]                                Onset:   
2016                Episodic  
   
                                                    Other connective   
tissue disease  
(11 sources)                            Peroneal tendinitis;   
Translations:   
[Peroneal tendinitis,   
unspecified leg]                        Onset:   
2016  
Resolved:   
2019                Episodic  
   
                                                    Other female genital   
disorders  
(11 sources)                            Complex endometrial   
hyperplasia without   
atypia; Translations:   
[Benign endometrial   
hyperplasia]                            Onset:   
2014  
Resolved:   
2014                Chronic  
   
                                                    Other female genital   
disorders  
(11 sources)                            Complex atypical   
endometrial   
hyperplasia;   
Translations:   
[Endometrial   
intraepithelial   
neoplasia [EIN]]                        Onset:   
2014  
Resolved:   
2014                Chronic  
   
                                                    Other female genital   
disorders  
(11 sources)                            Disorder of uterus;   
Translations:   
[Noninflammatory   
disorder of uterus,   
unspecified]                            Onset:   
2014  
Resolved:   
2014                Episodic  
   
                                                    Other screening for   
suspected conditions   
(not mental disorders   
or infectious   
disease)  
(9 sources)                             Mammography abnormal;   
Translations: [Other   
abnormal and   
inconclusive findings   
on diagnostic imaging   
of breast]                              Onset:   
2024                                          Episodic  
   
                                                    Prolapse of female   
genital organs  
(20 sources)                            Uterine prolapse;   
Translations:   
[Uterovaginal   
prolapse,   
unspecified]                            Onset:   
2014  
Resolved:   
2014                Chronic  
   
                                                    Residual codes;   
unclassified  
(20 sources)                            Family history of   
malignant neoplasm of   
ovary; Translations:   
[Family history of   
malignant neoplasm of   
ovary]                                  Onset:   
05-                05-                Episodic  
   
                                                    Residual codes;   
unclassified  
(1 source)                              Estrogen receptor   
positive status   
[ER+]; Translations:   
[Malignant neoplasm   
of lower-outer   
quadrant of right   
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   
estrogen receptor   
positive (HCC) (HCC)]                   Onset:   
2023                                          Episodic  
   
                                                    Spondylosis;   
intervertebral disc   
disorders; other back   
problems  
(20 sources)                            Chronic low back   
pain; Translations:   
[Lumbago with   
sciatica, left side]                    Onset:   
2016  
Resolved:   
2016                Episodic  
   
                                                    Unclassified  
(1 source)                              New Patient;   
Translations: [New   
Patient]                                Onset:   
2018                                            
  
  
  
Results  
  
  
                          Test Name    Value        Interpretation Reference   
[facility]                                   Facility  
   
                                                    [id number] 2025   
   
                                        [id number]          I spoke with Navdeep   
and she's aware.  
  
She'll call with any   
concerns or go to the   
ER over the weekend. [facility]  
   
                                                    36on 2025   
   
                                        36                  Spoke with patient   
and she's aware of   
Dr. Madsen   
recommendations.    [facility]  
   
                                        36                  Spoke with patient   
this morning.  
She had a good night   
with no other leakage   
(she reports it was a   
small amount  
last evening) after   
her call at 8pm last   
evening.  
She's emptied about   
1800ml's since 8pm   
last evening through   
her evans.  
She does have lower   
back pain but is   
unsure if this could   
be from being  
sedentary after   
surgery.  
She feels the pain   
she has in her   
abdomen is from   
surgery and not   
getting any  
worse.  
She lives 1.5 hours   
away and doesn't want   
to come up   
here-unless   
necessary.  
Afebrile.  
I advised that I will   
discuss with Dr. Madsen   
and get back with her   
as soon as I  
can.                [facility]  
   
                                                    36on 2025   
   
                                        36                  S: Patient spoke michelle SUAREZ nurse regarding   
evans cath problem  
B: Onset of   
symptoms/concern   
recently  
A: Pt states called   
last night with same   
complaint. Pt called   
the office this  
am as advised to   
follow up with staff.   
States was told to   
call back if she had  
this happen again. Pt   
sates is calling back   
and it leaked again a   
few minute  
ago, not quite as   
much as last night.   
Denies any fever/   
chills. No blood in   
the  
urine. No kinked   
tubing, bag is below   
bladder, not laying   
on the floor. Tubing  
has not been pulled   
on. Emptied 400 ml   
just prior to leaking   
episode and  
continues to have   
drainage in the evans   
bag at this time.   
Does have some lower  
abdominal   
discomfort/cramping,   
rates at 3/10. Trace   
of blood in urine bag   
at  
times, was told to   
expect that. Does   
have some occasional   
dizziness, better   
than  
when she was in the   
hospital.  
R: Secure chat to Dr Madsen. Per Dr Madsen,  as   
long as urine is   
still flowing through  
and not obstructed   
that's ok. Have her   
call back in am to   
possible have her  
come in and flush   
evans. If keeps   
leaking go to ER and   
they can interrogate   
and  
flush it. Or if pain   
gets worse ER.   
Contacted patient   
back and relayed   
above.  
Patient understands   
care advice. No   
further needs at this   
time. Patient  
instructed to call   
back with new or   
worsening symptoms. [facility]  
   
                                        36                  See previous   
encounter           [facility]  
   
                                        36                  Spoke with patient   
this morning.  
She's doing well and   
hasn't had any   
further issues with   
her catheter-leaking.  
She's emptied it   
several times since   
making the call with   
no issues. The volume  
is usually around   
800ml's.  
She was advised to   
call if this happens   
again.              [facility]  
   
                                                    36on 2025   
   
                                        36                  Additional   
Information  
? Commented on: [1]   
Caller has URGENT   
question AND [2]   
triager unable to   
answer  
question  
Surgeon paged.  
? Commented on: [1]   
Caller has NON-URGENT   
question AND [2]   
triager unable to  
answer question  
Surgeon paged.  
Protocols used:   
Post-Op Symptoms and   
Questions-ADULT-[facility]  
   
                                        36                  S-This 64 yr old   
patient of Dr. Madsen is   
calling for a leaking   
evans catheter.  
B-She had a robotic   
sigmoid colectomy for   
diverticulitis and   
colovesical fistula  
with evans placement   
on 25.  
A-States 35 minutes   
ago the crotch of her   
underwear and pants   
became wet with  
urine. She emptied   
her evans bag and it   
has drained a total   
of 200 ml since.  
States she thinks it   
is urine, not red   
blood, yellow or tan   
drainage. No further  
leaking. Afebrile.   
Patient lives 90   
minutes away.  
R-Manual call to Dr. Kirkpatrick and he   
advises her to call   
the office in the  
morning. Monitor for   
fevers.  
Reason for   
Disposition  
[1] Caller has   
NON-URGENT question   
AND [2] triager   
unable to answer   
question  
Answer Assessment -   
Initial Assessment   
Questions  
1. SYMPTOM:  What's   
the main symptom   
you're concerned   
about?  (e.g., pain,  
fever, vomiting)  
Evans catheter   
leaking  
2. ONSET:  When did   
this start?   
20 minutes ago  
3. SURGERY:  What   
surgery did you   
have?   
See note  
4. DATE of SURGERY:   
 When was the   
surgery?   
25  
5. ANESTHESIA:  What   
type of anesthesia   
did you have?  (e.g.,   
general, spinal,  
epidural, local)  
General  
6. DRAINS:  Were any   
drains place in or   
around the wound?    
(e.g., Hemovac,  
Demarco-Yañez,   
Penrose)  
no  
7. PAIN:  Is there   
any pain?  If Yes,   
ask:  How bad is it?    
(Scale 1-10; or  
mild, moderate,   
severe)  
No pain  
8. FEVER:  Do you   
have a fever?  If   
Yes, ask:  What is   
your temperature, how   
was  
it measured, and when   
did it start?   
no  
9. VOMITING:  Is   
there any vomiting?    
If Yes, ask:  How   
many times?   
no  
10. BLEEDING:  Is   
there any bleeding?    
If Yes, ask:  How   
much?  and  Where?   
no  
11. OTHER SYMPTOMS:   
 Do you have any   
other symptoms?    
(e.g., drainage from  
wound, painful   
urination,   
constipation)  
no  
Protocols used:   
Post-Op Symptoms and   
Questions-ADULT-AH  Normal                                  [facility]  
   
                                                    30on 2025   
   
                                        30                  Problem: Pain - Adul  
t  
Goal:   
Verbalizes/displays   
adequate comfort   
level or baseline   
comfort level  
Outcome: Progressing   
Problem: Safety -   
Adult  
Goal: Free from fall   
injury  
Outcome: Progressing   
Problem: Discharge   
Planning  
Goal: Discharge to   
home or other   
facility with   
appropriate resources  
Outcome: Progressing   
Problem: Chronic   
Conditions and   
Co-morbidities  
Goal: Patient's   
chronic conditions   
and co-morbidity   
symptoms are   
monitored and  
maintained or   
improved  
Outcome: Progressing Normal                                  [facility]  
   
                                                    BASIC METABOLIC PANELon    
   
                      Anion gap [Moles/Vol] 6 mmol/L   Normal     3-13       [facility]  
   
                                        Comment on above:   Performed By: #### L  
[id number] ####  
Medical Director: CASSI VASQUEZ ([id number])  
[facility]  
[address] [place] [address]   
   
                      Calcium [Mass/Vol] 9.1 mg/dL  Normal     8.8-10.0   [facility]  
   
                                        Comment on above:   Performed By: #### L  
[id number] ####  
Medical Director: CASSI VASQUEZ ([id number])  
[facility]  
[address] [place] [address]   
   
                      Chloride [Moles/Vol] 111 mmol/L High            [facility]  
   
                                        Comment on above:   Performed By: #### L  
[id number] ####  
Medical Director: CASSI VASQUEZ ([id number])  
[facility]  
[address] [place] [address]   
   
                      CO2 [Moles/Vol] 25 mmol/L  Normal     23-31      [facility]  
   
                                        Comment on above:   Performed By: #### L  
AB15 ####  
Medical Director: CASSI VASQUEZ ([id number])  
[facility])  
[address] [place] [address]   
   
                      Creatinine [Mass/Vol] 0.63 mg/dL Normal     0.57-1.11  [facility]  
   
                                        Comment on above:   Performed By: #### L  
AB15 ####  
Medical Director: CASSI VASQUEZ ([id number])  
[facility])  
[address] [place] [address]   
   
                                                    GLOMERULAR FILTRATION   
RATE ML/MIN/1.73 SQ   
M.PREDICTED     >90.0           Normal          >60.0           [facility]  
   
                                        Comment on above:   Result Comment: Calc  
ulation based on the Chronic Kidney   
Disease Epidemiology Collaboration (CKD-EPI) equation refit   
without adjustment for race   
   
                                                            Performed By: #### L  
[id number] ####  
Medical Director: CASSI VASQUEZ ([id number])  
[facility])  
[address] [place] [address]   
   
                      Glucose [Mass/Vol] 86 mg/dL   Normal          [facility]  
   
                                        Comment on above:   Performed By: #### L  
AB15 ####  
Medical Director: CASSI VASQUEZ ([id number])  
[facility])  
[address] [place] [address]   
   
                      Potassium [Moles/Vol] 3.6 mmol/L Normal     3.5-5.1    [facility]  
   
                                        Comment on above:   Result Comment: [facility] potassium values may be up to 0.5   
mmol/L lower than serum values.   
   
                                                            Performed By: #### L  
AB15 ####  
Medical Director: CASSI VASQUEZ ([id number])  
[facility]  
[address] [place] [address]   
   
                      Sodium [Moles/Vol] 142 mmol/L Normal     136-145    [facility]  
   
                                        Comment on above:   Performed By: #### L  
AB15 ####  
Medical Director: CASSI VASQUEZ ([id number])  
[facility]  
[address] [place] [address]   
   
                                                    Urea nitrogen   
[Mass/Vol]      8 mg/dL         Low             9-23            [facility]  
   
                                        Comment on above:   Performed By: #### L  
AB15 ####  
Medical Director: CASSI VASQUEZ ([id number])  
[facility]  
[address] [place] [address]   
   
                                                    Basic metabolic 1998 panelon  
 2025   
   
                      Anion gap [Moles/Vol] 6 mmol/L              3 - 13 mmol/L   
[facility]  
   
                          Calcium [Mass/Vol] 9.1 mg/dL                 8.8 - 10.  
0   
mg/dL                                   [facility]  
   
                          Chloride [Moles/Vol] 111 mmol/L   High         98 - 10  
7   
mmol/L                                  [facility]  
   
                          CO2 [Moles/Vol] 25 mmol/L                 23 - 31   
mmol/L                                  [facility]  
   
                          Creatinine [Mass/Vol] 0.63 mg/dL                0.57 -  
 1.11   
mg/dL                                   [facility]  
   
                                                    GFR/1.73 sq M.predicted   
(S/P/Bld) [Vol   
rate/Area]                                      - PINF          [facility]  
   
                                        Comment on above:   Calculation based on  
 the Chronic Kidney Disease Epidemiology   
Collaboration (CKD-EPI) equation refit without adjustment for   
race   
   
                          Glucose [Mass/Vol] 86 mg/dL                  82 - 115   
mg/dL                                   [facility]  
   
                                                    Interpretation and   
review of laboratory   
results         Abnormal                                        [facility]  
   
                          Potassium [Moles/Vol] 3.6 mmol/L                3.5 -   
5.1   
mmol/L                                  [facility]  
   
                                        Comment on above:   Plasma potassium asif  
ues may be up to 0.5 mmol/L lower than   
serum values.   
   
                          Sodium [Moles/Vol] 142 mmol/L                136 - 145  
   
mmol/L                                  [facility]  
   
                                                    Urea nitrogen   
[Mass/Vol]      8 mg/dL         Low             9 - 23 mg/dL    [facility]  
   
                                                    CBC W Auto Differential pane  
l (Bld)on 2025   
   
                          Basophils (Bld) [#/Vol] 0 10*3/uL                 0.0   
- 0.2   
10*3/uL                                 [facility]  
   
                      Basophils/100 WBC (Bld) 0.4 %                 0.0 - 2.0 %   
[facility]  
   
                                                    Eosinophils (Bld)   
[#/Vol]             0.2 10*3/uL                             0.0 - 0.5   
10*3/uL                                 [facility]  
   
                                                    Eosinophils/100 WBC   
(Bld)           2.5 %                           0.0 - 6.0 %     [facility]  
   
                                                    Erythrocyte   
distribution width   
(RBC) [Ratio]   15.6 %          High            11.5 - 15.0 %   [facility]  
   
                                                    Hematocrit (Bld)   
[Volume fraction] 38.8 %                          35.0 - 47.0 %   [facility]  
   
                                                    Hemoglobin (Bld)   
[Mass/Vol]          13 g/dL                                 11.7 - 16.0   
g/dL                                    [facility]  
   
                                                    Immature granulocytes   
(Bld) [#/Vol]       0.1 10*3/uL         High                NINF - 0.1   
10*3/uL                                 [facility]  
   
                                                    Immature   
granulocytes/100 WBC   
(Bld)           0.6 %                           0.0 - 2.0 %     [facility]  
   
                                                    Interpretation and   
review of laboratory   
results         Abnormal                                        [facility]  
   
                                                    Lymphocytes (Bld)   
[#/Vol]             2.4 10*3/uL                             1.0 - 4.3   
10*3/uL                                 [facility]  
   
                                                    Lymphocytes/100 WBC   
(Bld)           24.3 %                          15.0 - 45.0 %   [facility]  
   
                                                    MCH (RBC) [Entitic   
mass]               27.5 pg                                 26.0 - 34.0   
pg                                      [facility]  
   
                      MCHC (RBC) [Mass/Vol] 33.5 %                30.5 - 36.0 %   
[facility]  
   
                          MCV (RBC) [Entitic vol] 82.2 fL                   77.0  
 - 99.0   
fL                                      [facility]  
   
                          Monocytes (Bld) [#/Vol] 1 10*3/uL    High         0.0   
- 0.9   
10*3/uL                                 [facility]  
   
                      Monocytes/100 WBC (Bld) 9.9 %                 5.0 - 13.0 %  
 [facility]  
   
                                                    Neutrophils (Bld)   
[#/Vol]             6 10*3/uL                               1.8 - 7.5   
10*3/uL                                 [facility]  
   
                                                    Neutrophils/100 WBC   
(Bld)           62.3 %                          38.0 - 82.0 %   [facility]  
   
                                                    Nucleated RBC/100 WBC   
(Bld) [Ratio]   0 %                                             [facility]  
   
                                                    Platelet mean volume   
(Bld) [Entitic vol] 9.8 fL                          9.0 - 12.7 fL   [facility]  
   
                          Platelets (Bld) [#/Vol] 289 10*3/uL               140   
- 440   
10*3/uL                                 [facility]  
   
                          RBC (Bld) [#/Vol] 4.72 10*6/uL              3.80 - 5.2  
0   
10*6/uL                                 [facility]  
   
                          WBC (Bld) [#/Vol] 9.7 10*3/uL               3.6 - 10.7  
   
10*3/uL                                 [facility]  
   
                                                    CBC WITH AUTO DIFFERENTIALon  
 2025   
   
                      Basophils (Bld) [#/Vol] 0.0 10*3/uL Normal     0.0-0.2      
[facility]  
   
                                        Comment on above:   Performed By: #### L  
[id number] ####Medical Director: CASSI VASQUEZ   
([id number])[facility])[address] [place] [address]   
   
                      Basophils/100 WBC (Bld) 0.4 %      Normal     0.0-2.0    S  
[facility]  
   
                                        Comment on above:   Performed By: #### L  
[id number] ####Medical Director: CASSI VASQUEZ   
([id number])[facility])[address] [place] [address]   
   
                                                    Eosinophils (Bld)   
[#/Vol]         0.2 10*3/uL     Normal          0.0-0.5         [facility]  
   
                                        Comment on above:   Performed By: #### L  
[id number] ####Medical Director: CASSI VASQUEZ   
([id number])[facility])[address] [place] [address]   
   
                                                    Eosinophils/100 WBC   
(Bld)           2.5 %           Normal          0.0-6.0         [facility]  
   
                                        Comment on above:   Performed By: #### L  
[id number] ####Medical Director: CASSI VASQUEZ   
([id number])[facility])[address] [place] [address]   
   
                                                    Erythrocyte   
distribution width   
(RBC) [Ratio]   15.6 %          High            11.5-15.0       [facility]  
   
                                        Comment on above:   Performed By: #### L  
[id number] ####Medical Director: CASSI VASQUEZ   
([id number])[facility])[address] [place] [address] [place]   
   
                                                    Hematocrit (Bld)   
[Volume fraction] 38.8 %          Normal          35.0-47.0       [facility]  
   
                                        Comment on above:   Performed By: #### L  
[id number] ####Medical Director: CASSI VASQUEZ   
([id number])[facility])[address] [place] [address]   
   
                                                    Hemoglobin (Bld)   
[Mass/Vol]      13.0 g/dL       Normal          11.7-16.0       [facility]  
   
                                        Comment on above:   Performed By: #### L  
[id number] ####Medical Director: CASSI VASQUEZ   
([id number])[facility])[address] [place] [address]   
   
                      IMMATURE GRANS % 0.6 %      Normal     0.0-2.0    [facility]  
   
                                        Comment on above:   Performed By: #### L  
[id number] ####Medical Director: CASSI VASQUEZ   
([id number])[facility])[address] [place] [address]   
   
                      IMMATURE GRANS ABSOLUTE 0.1 10*3/uL High       <0.1         
[facility]  
   
                                        Comment on above:   Performed By: #### L  
[id number] ####Medical Director: CASSI VASQUEZ   
([id number])[facility])[address] [place] [address]   
   
                                                    Lymphocytes (Bld)   
[#/Vol]         2.4 10*3/uL     Normal          1.0-4.3         [facility]  
   
                                        Comment on above:   Performed By: #### L  
[id number] ####Medical Director: CASSI VASQUEZ   
([id number])[facility])[address] [place] [address]   
   
                                                    Lymphocytes/100 WBC   
(Bld)           24.3 %          Normal          15.0-45.0       [facility]  
   
                                        Comment on above:   Performed By: #### L  
[id number] ####Medical Director: CASSI VASQUEZ   
([id number])[facility])[address] [place] [address]   
   
                                                    MCH (RBC) [Entitic   
mass]           27.5 pg         Normal          26.0-34.0       [facility]  
   
                                        Comment on above:   Performed By: #### L  
[id number] ####Medical Director: CASSI VASQUEZ   
([id number])[facility] [place] [address]   
   
                      MCHC       33.5 %     Normal     30.5-36.0  [facility]  
   
                                        Comment on above:   Performed By: #### L  
[id number] ####Medical Director: CASSI VASQUEZ   
([id number])[facility])[address] [place] [address]   
   
                      MCV (RBC) [Entitic vol] 82.2 fL    Normal     77.0-99.0  S  
[facility]  
   
                                        Comment on above:   Performed By: #### L  
[id number] ####Medical Director: CASSI VASQUEZ   
([id number])[facility])[address] [place] [address]   
   
                      Monocytes (Bld) [#/Vol] 1.0 10*3/uL High       0.0-0.9      
[facility]  
   
                                        Comment on above:   Performed By: #### L  
[id number] ####Medical Director: CASSI VASQUEZ   
([id number])[facility])[address] [place] [address]   
   
                      Monocytes/100 WBC (Bld) 9.9 %      Normal     5.0-13.0   S  
[facility]  
   
                                        Comment on above:   Performed By: #### L  
[id number] ####Medical Director: CASSI VASQUEZ   
([id number])[facility])[address] [place] [address]   
   
                      NEUTROPHILS ABSOLUTE 6.0 10*3/uL Normal     1.8-7.5    [facility]  
   
                                        Comment on above:   Performed By: #### L  
[id number] ####Medical Director: CASSI VASQUEZ   
([id number])[facility])[address] [place] [address]   
   
                                                    Neutrophils/100 WBC   
(Bld)           62.3 %          Normal          38.0-82.0       [facility]  
   
                                        Comment on above:   Performed By: #### L  
[id number] ####Medical Director: CASSI VASQUEZ   
([id number])[facility])[address] [place] [address]   
   
                      NRBC       0.0 /100 WBCs Normal     0.0-2.0    [facility]  
   
                                        Comment on above:   Performed By: #### L  
[id number] ####Medical Director: CASSI VASQUEZ   
([id number])[facility])[address] [place] [address]   
   
                                                    Platelet mean volume   
(Bld) [Entitic vol] 9.8 fL          Normal          9.0-12.7        [facility]  
   
                                        Comment on above:   Performed By: #### L  
[id number] ####Medical Director: CASSI VASQUEZ   
([id number])[facility])[address] [place] [address]   
   
                      Platelets (Bld) [#/Vol] 289 10*3/uL Normal     140-440      
[facility]  
   
                                        Comment on above:   Performed By: #### L  
[id number] ####Medical Director: CASSI VASQUEZ   
([id number])[facility])[address] [place] [address]   
   
                      RBC (Bld) [#/Vol] 4.72 10*6/uL Normal     3.80-5.20  [facility]  
   
                                        Comment on above:   Performed By: #### L  
[id number] ####Medical Director: CASSI VASQUEZ   
([id number])[facility])[address] [place] [address]   
   
                      WBC (Bld) [#/Vol] 9.7 10*3/uL Normal     3.6-10.7   [facility]  
   
                                        Comment on above:   Performed By: #### L  
[id number] ####Medical Director: CASSI VASQUEZ   
([id number])[facility])[address] [place] [address]   
   
                                                    COMPLETE URINALYSISon 2025   
   
                                                    BACTERIA (#/HPF) IN   
URINE           Negative        Normal          Negative        [facility]  
   
                                        Comment on above:   Performed By: #### L  
 ####Medical Director: CASSI VASQUEZ   
([id number])[facility])[address] [place] [address]   
   
                                                    BILIRUBIN, TOTAL   
PRESENCE IN URINE Negative        Normal          Negative        [facility]  
   
                                        Comment on above:   Performed By: #### L  
 ####Medical Director: CASSI VASQUEZ   
([id number])[facility])[address] [place] [address]   
   
                      Clarity (U) Clear      Normal     Clear      [facility]  
   
                                        Comment on above:   Performed By: #### L  
 ####Medical Director: CASSI VASQUEZ   
([id number])[facility])[address] [place] [address]   
   
                      Color (U)  Colorless  Normal     Lt. Yellow [facility]   
[facility]  
   
                                        Comment on above:   Performed By: #### L  
 ####Medical Director: CASSI VASQUEZ   
([id number])[facility])[address] [place] [address]   
   
                                                    GLUCOSE (MG/DL) IN   
URINE           Normal          Normal          Normal (<70)    [facility]  
   
                                        Comment on above:   Performed By: #### L  
 ####Medical Director: CASSI VASQUEZ   
([id number])[facility])[address] [place] [address]   
   
                                                    HEMOGLOBIN PRESENCE IN   
URINE           0.5 mg/dL       Abnormal        Negative        [facility]  
   
                                        Comment on above:   Performed By: #### L  
 ####Medical Director: CASSI VASQUEZ   
([id number])[facility])[address] [place] [address]   
   
                                                    HYALINE CASTS (#/LPF)   
IN URINE SEDIMENT BY   
MICROSCOPY      Negative        Normal          Negative        [facility]  
   
                                        Comment on above:   Performed By: #### L  
 ####Medical Director: CASSI VASQUEZ   
([id number])[facility])[address] [place] [address]   
   
                      Ketones Ql (U) Negative   Normal     Negative   [facility]   
[facility]  
   
                                        Comment on above:   Performed By: #### L  
 ####Medical Director: CASSI VASQUEZ   
([id number])[facility])[address] [place] [address]   
   
                                                    LEUKOCYTE ESTERASE   
PRESENCE IN URINE BY   
TEST STRIP      Negative        Normal          Negative        [facility]  
   
                                        Comment on above:   Performed By: #### L  
 ####Medical Director: CASSI VASQUEZ   
([id number])[facility])[address] [place] [address]   
   
                                                    NITRITE PRESENCE IN   
URINE           Negative        Normal          Negative        [facility]  
   
                                        Comment on above:   Performed By: #### L  
 ####Medical Director: CASSI VASQUEZ   
([id number])[facility])[address] [place] [address]   
   
                      pH (U)     7.5 [pH]   Normal     5.0-8.0    [facility]  
   
                                        Comment on above:   Performed By: #### L  
 ####Medical Director: CASSI VASQUEZ   
([id number])[facility])[address] [place] [address]   
   
                      Protein (U) [Mass/Vol] Negative   Normal     Negative   [facility]  
   
                                        Comment on above:   Performed By: #### L  
 ####Medical Director: CASSI VASQUEZ   
([id number])[facility])[address] [place] [address]   
   
                                                    RBC (#/HPF) IN URINE   
SEDIMENT        3-5             Abnormal        0-2             [facility]  
   
                                        Comment on above:   Performed By: #### L  
 ####Medical Director: CASSI VASQUEZ   
([id number])[facility])[address] [place] [address]   
   
                                                    Specific gravity (U)   
[Rel density]   1.006           Normal          1.005-1.030     [facility]  
   
                                        Comment on above:   Performed By: #### L  
 ####Medical Director: CASSI VASQUEZ   
([id number])[facility])[address] [place] [address]   
   
                                                    SQUAMOUS EPITHELIAL   
CELLS (#/HPF) IN URINE   
SEDIMENT        Negative        Normal          3-5             [facility]  
   
                                        Comment on above:   Performed By: #### L  
 ####Medical Director: CASSI VASQUEZ   
([id number])[facility])[address] [place] [address]   
   
                                                    UROBILINOGEN (MG/DL) IN   
URINE           Normal          Normal          Normal (0-1)    [facility]  
   
                                        Comment on above:   Performed By: #### L  
 ####Medical Director: CASSI VASQUEZ   
([id number])[facility] [place] [address]   
   
                                                    WBC (LEUKOCYTE) (#/HPF)   
IN URINE SEDIMENT 0-2             Normal          0-5             [facility]  
   
                                        Comment on above:   Performed By: #### L  
 ####Medical Director: CASSI VASQUEZ   
([id number])[facility] ([facility])[address] [place] [address]   
   
                                                    Laboratory - Chemistry and C  
hemistry - challengeon 2025   
   
                          Glucose [Mass/Vol] 94 mg/dL                  70 - 100   
mg/dL                                   [facility]  
   
                          Glucose [Mass/Vol] 118 mg/dL    High         70 - 100   
mg/dL                                   [facility]  
   
                                                    No Panel Informationon    
   
                                                    Interpretation and   
review of laboratory   
results         Normal                                          [facility]  
   
                                                            Performed by: [facility], [address] [place] [address]  
  
CLIA ID: [id number]  
  
                                                            [facility]  
   
                                                    Interpretation and   
review of laboratory   
results         Abnormal                                        [facility]  
   
                                                            Performed by: [facility], [address] [place] [address]  
  
CLIA ID: [id number]  
  
                                                            [facility]  
   
                                                    Urinalysis complete panel (U  
)Ordered By: Colt Santana on 2025   
   
                                                    Bacteria LM.HPF (Urine   
sed) [#/Area]   Negative                        Negative /HPF   [facility]  
   
                          Bilirubin Ql (U) Negative                  Negative   
mg/dL                                   [facility]  
   
                      Clarity (U) Clear                 Clear      [facility]  
   
                      Color (U)  Colorless             Lt. Yellow [facility]  
   
                                                    Epithelial   
cells.squamous LM.HPF   
(Urine sed) [#/Area] Negative                                        [facility]  
h  
   
                          Glucose Ql (U) Normal                    Normal (<70)   
mg/dL                                   [facility]  
   
                      Hemoglobin Ql (U) 0.5 mg/dL  Abnormal   Negative   [facility]  
   
                                                    Hyaline casts Auto   
(Urine sed) [#/Area] Negative                        Negative /LPF   [facility]  
h  
   
                                                    Interpretation and   
review of laboratory   
results         Abnormal                                        [facility]  
   
                          Ketones (U) [Mass/Vol] Negative                  Negat  
kala   
mg/dL                                   [facility]  
   
                                                    Leukocyte esterase Test   
strip Ql (U)        Negative                                Negative   
Russ/uL                                  [facility]  
   
                      Nitrite Ql (U) Negative              Negative   [facility]  
th  
   
                      pH (U)     7.5 [pH]              5.0 - 8.0 pH [facility]  
   
                          Protein (U) [Mass/Vol] Negative                  Negat  
kala   
mg/dL                                   [facility]  
   
                                                    RBC LM.HPF (Urine sed)   
[#/Area]        3-5             Abnormal                        [facility]  
   
                                                    Specific gravity (U)   
[Rel density]   1.006                           1.005 - 1.030   [facility]  
   
                                                    Urobilinogen (U)   
[Mass/Vol]          Normal                                  Normal (0-1)   
mg/dL                                   [facility]  
   
                                                    WBC LM.HPF (Urine sed)   
[#/Area]        0-2                                             [facility]  
   
                                                    30on 2025   
   
                                        30                  Problem: Pain - Adul  
t  
Goal:   
Verbalizes/displays   
adequate comfort   
level or baseline   
comfort level  
Outcome: Progressing   
Problem: Safety -   
Adult  
Goal: Free from fall   
injury  
Outcome: Progressing   
Problem: Discharge   
Planning  
Goal: Discharge to   
home or other   
facility with   
appropriate resources  
Outcome: Progressing   
Problem: Chronic   
Conditions and   
Co-morbidities  
Goal: Patient's   
chronic conditions   
and co-morbidity   
symptoms are   
monitored and  
maintained or   
improved  
Outcome: Progressing Normal                                  [facility]  
   
                                        30                  Problem: Pain - Adul  
t  
Goal:   
Verbalizes/displays   
adequate comfort   
level or baseline   
comfort level  
Outcome: Progressing   
Problem: Safety -   
Adult  
Goal: Free from fall   
injury  
Outcome: Progressing   
Problem: Discharge   
Planning  
Goal: Discharge to   
home or other   
facility with   
appropriate resources  
Outcome: Progressing   
Problem: Chronic   
Conditions and   
Co-morbidities  
Goal: Patient's   
chronic conditions   
and co-morbidity   
symptoms are   
monitored and  
maintained or   
improved  
Outcome: Progressing Normal                                  [facility]  
   
                                                    BASIC METABOLIC PANELon    
   
                      Anion gap [Moles/Vol] 8 mmol/L   Normal     3-13       [facility]  
   
                                        Comment on above:   Performed By: #### L  
, LAB15 ####Medical Director: CASSI VASQUEZ ([id number])[facility])[address] [place] [address]   
   
                      Calcium [Mass/Vol] 9.2 mg/dL  Normal     8.8-10.0   [facility]  
   
                                        Comment on above:   Performed By: #### L  
, LAB15 ####Medical Director: CASSI VASQUEZ ([id number])[facility])[address] [place] [address] [place]   
   
                      Chloride [Moles/Vol] 109 mmol/L High            [facility]  
   
                                        Comment on above:   Performed By: #### L  
, LAB15 ####Medical Director: CASSI VASQUEZ ([id number])[facility])[address] [place] [address]   
   
                      CO2 [Moles/Vol] 23 mmol/L  Normal     23-31      [facility]  
   
                                        Comment on above:   Performed By: #### L  
, LAB15 ####Medical Director: CASSI VASQUEZ ([id number])[facility])[address] [place] [address]   
   
                      Creatinine [Mass/Vol] 0.71 mg/dL Normal     0.57-1.11  [facility]  
   
                                        Comment on above:   Performed By: #### L  
, LAB15 ####Medical Director: CASSI VASQUEZ ([id number])[facility])[address] [place] [address]   
   
                                                    GLOMERULAR FILTRATION   
RATE ML/MIN/1.73 SQ   
M.PREDICTED     >90.0           Normal          >60.0           [facility]  
   
                                        Comment on above:   Result Comment: Calc  
ulation based on the Chronic Kidney   
Disease Epidemiology Collaboration (CKD-EPI) equation refit   
without adjustment for race   
   
                                                            Performed By: #### L  
, LAB15 ####Medical Director: CASSI VASQUEZ ([id number])[facility])[address] [place] [address]   
   
                      Glucose [Mass/Vol] 111 mg/dL  Normal          [facility]  
   
                                        Comment on above:   Performed By: #### L  
, LAB15 ####Medical Director: CASSI VASQUEZ ([id number])[facility])[address] [place] [address]   
   
                      Potassium [Moles/Vol] 3.5 mmol/L Normal     3.5-5.1    [facility]  
   
                                        Comment on above:   Result Comment: [facility] potassium values may be up to 0.5   
mmol/L lower than serum values.   
   
                                                            Performed By: #### L  
, LAB15 ####Medical Director: CASSI VASQUEZ ([id number])[facility] ([facility])[address] [place] [address]   
   
                      Sodium [Moles/Vol] 140 mmol/L Normal     136-145    [facility]  
   
                                        Comment on above:   Performed By: #### L  
, LAB15 ####Medical Director: CASSI VASQUEZ ([id number])[facility] [place] [address]   
   
                                                    Urea nitrogen   
[Mass/Vol]      8 mg/dL         Low             9-23            [facility]  
   
                                        Comment on above:   Performed By: #### L  
, LAB15 ####Medical Director: CASSI VSAQUEZ ([id number])[facility])[address] [place] [address]   
   
                                                    Basic metabolic 1998 panelon  
 2025   
   
                      Anion gap [Moles/Vol] 8 mmol/L              3 - 13 mmol/L   
[facility]  
   
                          Calcium [Mass/Vol] 9.2 mg/dL                 8.8 - 10.  
0   
mg/dL                                   [facility]  
   
                          Chloride [Moles/Vol] 109 mmol/L   High         98 - 10  
7   
mmol/L                                  [facility]  
   
                          CO2 [Moles/Vol] 23 mmol/L                 23 - 31   
mmol/L                                  [facility]  
   
                          Creatinine [Mass/Vol] 0.71 mg/dL                0.57 -  
 1.11   
mg/dL                                   [facility]  
   
                                                    GFR/1.73 sq M.predicted   
(S/P/Bld) [Vol   
rate/Area]                                      - PINF          [facility]  
   
                                        Comment on above:   Calculation based on  
 the Chronic Kidney Disease Epidemiology   
Collaboration (CKD-EPI) equation refit without adjustment for   
race   
   
                          Glucose [Mass/Vol] 111 mg/dL                 82 - 115   
mg/dL                                   [facility]  
   
                                                    Interpretation and   
review of laboratory   
results         Abnormal                                        [facility]  
   
                          Potassium [Moles/Vol] 3.5 mmol/L                3.5 -   
5.1   
mmol/L                                  [facility]  
   
                                        Comment on above:   Plasma potassium asif  
ues may be up to 0.5 mmol/L lower than   
serum values.   
   
                          Sodium [Moles/Vol] 140 mmol/L                136 - 145  
   
mmol/L                                  [facility]  
   
                                                    Urea nitrogen   
[Mass/Vol]      8 mg/dL         Low             9 - 23 mg/dL    [facility]  
   
                                                    CBC W Auto Differential pane  
l (Bld)Ordered By: Jerome Soriano on 01-   
   
                          Basophils (Bld) [#/Vol] 0 10*3/uL                 0.0   
- 0.2   
10*3/uL                                 [facility]  
   
                      Basophils/100 WBC (Bld) 0.2 %                 0.0 - 2.0 %   
[facility]  
   
                                                    Eosinophils (Bld)   
[#/Vol]             0.1 10*3/uL                             0.0 - 0.5   
10*3/uL                                 [facility]  
   
                                                    Eosinophils/100 WBC   
(Bld)           0.5 %                           0.0 - 6.0 %     [facility]  
   
                                                    Erythrocyte   
distribution width   
(RBC) [Ratio]   15.7 %          High            11.5 - 15.0 %   [facility]  
   
                                                    Hematocrit (Bld)   
[Volume fraction] 42.2 %                          35.0 - 47.0 %   [facility]  
   
                                                    Hemoglobin (Bld)   
[Mass/Vol]          13.7 g/dL                               11.7 - 16.0   
g/dL                                    [facility]  
   
                                                    Immature granulocytes   
(Bld) [#/Vol]       0.1 10*3/uL         High                NINF - 0.1   
10*3/uL                                 [facility]  
   
                                                    Immature   
granulocytes/100 WBC   
(Bld)           0.7 %                           0.0 - 2.0 %     [facility]  
   
                                                    Interpretation and   
review of laboratory   
results         Abnormal                                        [facility]  
   
                      IPF        4                                [facility]  
   
                                                    Lymphocytes (Bld)   
[#/Vol]             1.8 10*3/uL                             1.0 - 4.3   
10*3/uL                                 [facility]  
   
                                                    Lymphocytes/100 WBC   
(Bld)           12.1 %          Low             15.0 - 45.0 %   [facility]  
   
                                                    MCH (RBC) [Entitic   
mass]               27.5 pg                                 26.0 - 34.0   
pg                                      [facility]  
   
                      MCHC (RBC) [Mass/Vol] 32.5 %                30.5 - 36.0 %   
[facility]  
   
                          MCV (RBC) [Entitic vol] 84.6 fL                   77.0  
 - 99.0   
fL                                      [facility]  
   
                          Monocytes (Bld) [#/Vol] 1.2 10*3/uL  High         0.0   
- 0.9   
10*3/uL                                 [facility]  
   
                      Monocytes/100 WBC (Bld) 8.1 %                 5.0 - 13.0 %  
 [facility]  
   
                                                    Neutrophils (Bld)   
[#/Vol]             11.7 10*3/uL        High                1.8 - 7.5   
10*3/uL                                 [facility]  
   
                                                    Neutrophils/100 WBC   
(Bld)           78.4 %                          38.0 - 82.0 %   [facility]  
   
                                                    Nucleated RBC/100 WBC   
(Bld) [Ratio]   0 %                                             [facility]  
   
                                                    Platelet mean volume   
(Bld) [Entitic vol] 10.5 fL                         9.0 - 12.7 fL   [facility]  
   
                          Platelets (Bld) [#/Vol] 265 10*3/uL               140   
- 440   
10*3/uL                                 [facility]  
   
                          RBC (Bld) [#/Vol] 4.99 10*6/uL              3.80 - 5.2  
0   
10*6/uL                                 [facility]  
   
                          WBC (Bld) [#/Vol] 14.9 10*3/uL High         3.6 - 10.7  
   
10*3/uL                                 [facility]  
   
                                                    CBC WITH AUTO DIFFERENTIALon  
 2025   
   
                      Basophils (Bld) [#/Vol] 0.0 10*3/uL Normal     0.0-0.2      
[facility]  
   
                                        Comment on above:   Performed By: #### L  
[id number] ####Medical Director: CASSI VASQUEZ   
([id number])[facility])[address] [place] [address]   
   
                      Basophils/100 WBC (Bld) 0.2 %      Normal     0.0-2.0    [facility]  
   
                                        Comment on above:   Performed By: #### L  
[id number] ####Medical Director: CASSI VASQUEZ   
([id number])[facility])[address] [place] [address]   
   
                                                    Eosinophils (Bld)   
[#/Vol]         0.1 10*3/uL     Normal          0.0-0.5         [facility]  
   
                                        Comment on above:   Performed By: #### L  
[id number] ####Medical Director: CASSI VASQUEZ   
([id number])[facility])[address] [place] [address]   
   
                                                    Eosinophils/100 WBC   
(Bld)           0.5 %           Normal          0.0-6.0         [facility]  
   
                                        Comment on above:   Performed By: #### L  
[id number] ####Medical Director: CASSI VASQUEZ   
([id number])[facility])[address] [place] [address]   
   
                                                    Erythrocyte   
distribution width   
(RBC) [Ratio]   15.7 %          High            11.5-15.0       [facility]  
   
                                        Comment on above:   Performed By: #### L  
[id number] ####Medical Director: CASSI VASQUEZ   
([id number])[address]   
   
                                                    Hematocrit (Bld)   
[Volume fraction] 42.2 %          Normal          35.0-47.0       [facility]  
   
                                        Comment on above:   Performed By: #### L  
[id number] ####Medical Director: CASSI VASQUEZ   
([id number])[facility])[address] [place] [address]   
   
                                                    Hemoglobin (Bld)   
[Mass/Vol]      13.7 g/dL       Normal          11.7-16.0       [facility]  
   
                                        Comment on above:   Performed By: #### L  
[id number] ####Medical Director: CASSI VASQUEZ   
([id number])[address]   
   
                      IMMATURE GRANS % 0.7 %      Normal     0.0-2.0    [facility]  
   
                                        Comment on above:   Performed By: #### L  
[id number] ####Medical Director: CASSI VASQUEZ   
([id number])[address]   
   
                      IMMATURE GRANS ABSOLUTE 0.1 10*3/uL High       <0.1         
[facility]  
   
                                        Comment on above:   Performed By: #### L  
[id number] ####Medical Director: CASSI VASQUEZ   
([id number])[facility])[address] [place] [address] [place]   
   
                      IPF        4          Normal                [facility]  
   
                                        Comment on above:   Performed By: #### L  
[id number] ####Medical Director: CASSI VASQUEZ   
([id number])[address]   
   
                                                    Lymphocytes (Bld)   
[#/Vol]         1.8 10*3/uL     Normal          1.0-4.3         [facility]  
   
                                        Comment on above:   Performed By: #### L  
[id number] ####Medical Director: CASSI VASQUEZ   
([id number])[facility])[address] [place] [address]   
   
                                                    Lymphocytes/100 WBC   
(Bld)           12.1 %          Low             15.0-45.0       [facility]  
   
                                        Comment on above:   Performed By: #### L  
[id number] ####Medical Director: CASSI VASQUEZ   
([id number])[facility])[address] [place] [address]   
   
                                                    MCH (RBC) [Entitic   
mass]           27.5 pg         Normal          26.0-34.0       [facility]  
   
                                        Comment on above:   Performed By: #### L  
[id number] ####Medical Director: CASSI VASQUEZ   
([id number])[facility])[address] [place] [address]   
   
                      MCHC       32.5 %     Normal     30.5-36.0  [facility]  
   
                                        Comment on above:   Performed By: #### L  
[id number] ####Medical Director: CASSI VASQUEZ   
([id number])[facility])[address] [place] [address]   
   
                      MCV (RBC) [Entitic vol] 84.6 fL    Normal     77.0-99.0  S  
[facility]  
   
                                        Comment on above:   Performed By: #### L  
[id number] ####Medical Director: CASSI VASQUEZ   
([id number])[facility])[address] [place] [address]   
   
                      Monocytes (Bld) [#/Vol] 1.2 10*3/uL High       0.0-0.9      
[facility]  
   
                                        Comment on above:   Performed By: #### L  
[id number] ####Medical Director: CASSI VASQUEZ   
([id number])[facility])[address] [place] [address]   
   
                      Monocytes/100 WBC (Bld) 8.1 %      Normal     5.0-13.0   S  
[facility]  
   
                                        Comment on above:   Performed By: #### L  
[id number] ####Medical Director: CASSI VASQUEZ   
([id number])[facility])[address] [place] [address]   
   
                      NEUTROPHILS ABSOLUTE 11.7 10*3/uL High       1.8-7.5    [facility]  
   
                                        Comment on above:   Performed By: #### L  
[id number] ####Medical Director: CASSI VASQUEZ   
([id number])[facility] [place] [address]   
   
                                                    Neutrophils/100 WBC   
(Bld)           78.4 %          Normal          38.0-82.0       [facility]  
   
                                        Comment on above:   Performed By: #### L  
[id number] ####Medical Director: CASSI VASQUEZ   
([id number])[facility] [place] [address]   
   
                      NRBC       0.0 /100 WBCs Normal     0.0-2.0    [facility]  
   
                                        Comment on above:   Performed By: #### L  
[id number] ####Medical Director: CASSI VASQUEZ   
([id number])[facility] [place] [address]   
   
                                                    Platelet mean volume   
(Bld) [Entitic vol] 10.5 fL         Normal          9.0-12.7        [facility]  
   
                                        Comment on above:   Performed By: #### L  
[id number] ####Medical Director: CASSI VASQUEZ   
([id number])[facility] [place] [address] [place]   
   
                      Platelets (Bld) [#/Vol] 265 10*3/uL Normal     140-440      
[facility]  
   
                                        Comment on above:   Performed By: #### L  
[id number] ####Medical Director: CASSI VASQUEZ   
([id number])[facility] [place] [address]   
   
                      RBC (Bld) [#/Vol] 4.99 10*6/uL Normal     3.80-5.20  [facility]  
   
                                        Comment on above:   Performed By: #### L  
[id number] ####Medical Director: CASSI VASQUEZ   
([id number])[facility])[address] [place] [address]   
   
                      WBC (Bld) [#/Vol] 14.9 10*3/uL High       3.6-10.7   [facility]  
   
                                        Comment on above:   Performed By: #### L  
[id number] ####Medical Director: CASSI VASQUEZ   
([id number])[facility] ([facility])[address] [place] [address]   
   
                                                    Laboratory - Chemistry and C  
hemistry - challengeon 2025   
   
                          Glucose [Mass/Vol] 146 mg/dL    High         70 - 100   
mg/dL                                   [facility]  
   
                          Glucose [Mass/Vol] 114 mg/dL    High         70 - 100   
mg/dL                                   [facility]  
   
                          Glucose [Mass/Vol] 140 mg/dL    High         70 - 100   
mg/dL                                   [facility]  
   
                          Glucose [Mass/Vol] 108 mg/dL    High         70 - 100   
mg/dL                                   [facility]  
   
                          Glucose [Mass/Vol] 104 mg/dL    High         70 - 100   
mg/dL                                   [facility]  
   
                          Magnesium [Mass/Vol] 1.8 mg/dL                 1.6 - 2  
.6   
mg/dL                                   [facility]  
   
                                                    MAGNESIUMon 2025   
   
                      Magnesium [Mass/Vol] 1.8 mg/dL  Normal     1.6-2.6    [facility]   
[facility]  
   
                                        Comment on above:   Result Comment: ABRAHAN BORRERO COMMENTS:  
Higher values can be expected in females during menses.   
   
                                                            Performed By: #### L  
, [id number] ####Medical Director: CASSI VASQUEZ ([id number])[facility] ([facility])[address] [place] [address]   
   
                                                    Magnesium [Mass/Vol]on    
   
                                                    Interpretation and   
review of laboratory   
results         Normal                                          [facility]  
   
                                                            Higher values can be  
   
expected in females   
during menses.                                              [facility]  
   
                                                                  [facility]  
   
                                                    No Panel Informationon    
   
                                                    Interpretation and   
review of laboratory   
results         Abnormal                                        [facility]  
   
                                                            Performed by: [facility], [address] [place] [address]  
  
CLIA ID: [id number]  
  
                                                            [facility]  
   
                                                                  [facility]  
   
                                                    Interpretation and   
review of laboratory   
results         Abnormal                                        [facility]  
   
                                                            Performed by: [facility], [address] [place] [id number]  
  
CLIA ID: [id number]  
  
                                                            [facility]  
   
                                                                  [facility]  
   
                                                    Interpretation and   
review of laboratory   
results         Abnormal                                        [facility]  
   
                                                            Performed by: [facility], [address] [place] [id number]  
  
CLIA ID: [id number]  
  
                                                            [facility]  
   
                                                                  [facility]  
   
                                                    Interpretation and   
review of laboratory   
results         Abnormal                                        [facility]  
   
                                                            Performed by: [facility], [address] [place] [address]  
  
CLIA ID: [id number]  
  
                                                            [facility]  
   
                                                    Progress Noteon 2025   
   
                                        Progress Note       .Nutrition rescreen   
completed. Chart   
reviewed. Patient to   
be monitored and  
followed by the diet   
technician. ELEUTERIO Villanueva          Normal                                  [facility]  
   
                                                    30on 2025   
   
                                        30                  Problem: Pain - Adul  
t  
Goal:   
Verbalizes/displays   
adequate comfort   
level or baseline   
comfort level  
2025 by   
Jewell Chacon RN  
Outcome: Progressing   
2025 by   
Jewell Chacon RN  
Outcome: Progressing   
Problem: Safety -   
Adult  
Goal: Free from fall   
injury  
2025 by   
Jewell Chacon RN  
Outcome: Progressing   
2025 by   
Jewell Chacon RN  
Outcome: Progressing   
Problem: Discharge   
Planning  
Goal: Discharge to   
home or other   
facility with   
appropriate resources  
2025 by   
Jewell Chacon RN  
Outcome: Progressing   
2025 by   
Jewell Chacon RN  
Outcome: Progressing   
Problem: Chronic   
Conditions and   
Co-morbidities  
Goal: Patient's   
chronic conditions   
and co-morbidity   
symptoms are   
monitored and  
maintained or   
improved  
2025 by   
Jewell Chacon RN  
Outcome: Progressing   
2025 by   
Jewell Chacon RN  
Outcome: Progressing Normal                                  [facility]  
   
                                                    BASIC METABOLIC PANELon    
   
                      Anion gap [Moles/Vol] 10 mmol/L  Normal     3-13       [facility]  
   
                                        Comment on above:   Performed By: #### L  
[id number] ####Medical Director: CASSI VASQUEZ   
([id number])[facility] [place] [address] [place]   
   
                      Calcium [Mass/Vol] 9.1 mg/dL  Normal     8.8-10.0   [facility]  
   
                                        Comment on above:   Performed By: #### L  
[id number] ####Medical Director: CASSI VASQUEZ   
([id number])[facility] [place] [address] [place]   
   
                      Chloride [Moles/Vol] 106 mmol/L Normal          [facility]  
   
                                        Comment on above:   Performed By: #### L  
[id number] ####Medical Director: CASSI VASQUEZ   
([id number])[facility] [place] [address]   
   
                      CO2 [Moles/Vol] 20 mmol/L  Low        23-31      [facility]  
   
                                        Comment on above:   Performed By: #### L  
[id number] ####Medical Director: CASSI VASQUEZ   
([id number])[facility] [place] [address]   
   
                      Creatinine [Mass/Vol] 0.68 mg/dL Normal     0.57-1.11  [facility]  
   
                                        Comment on above:   Performed By: #### L  
[id number] ####Medical Director: CASSI VASQUEZ   
([id number])[facility] [place] [address]   
   
                                                    GLOMERULAR FILTRATION   
RATE ML/MIN/1.73 SQ   
M.PREDICTED     >90.0           Normal          >60.0           [facility]  
   
                                        Comment on above:   Result Comment: Calc  
ulation based on the Chronic Kidney   
Disease Epidemiology Collaboration (CKD-EPI) equation refit   
without adjustment for race   
   
                                                            Performed By: #### L  
[id number] ####Medical Director: CASSI VASQUEZ   
([id number])[facility] [place] [address]   
   
                      Glucose [Mass/Vol] 159 mg/dL  High            [facility]  
   
                                        Comment on above:   Performed By: #### L  
[id number] ####Medical Director: CASSI VASQUEZ   
([id number])[facility] [place] [address]   
   
                      Potassium [Moles/Vol] 3.6 mmol/L Normal     3.5-5.1    [facility]  
   
                                        Comment on above:   Result Comment: [facility] potassium values may be up to 0.5   
mmol/L lower than serum values.   
   
                                                            Performed By: #### L  
[id number] ####Medical Director: CASSI VASQUEZ   
([id number])[facility] [place] [address]   
   
                      Sodium [Moles/Vol] 136 mmol/L Normal     136-145    [facility]  
   
                                        Comment on above:   Performed By: #### L  
[id number] ####Medical Director: CASSI VASQUEZ   
([id number])[facility] [place] [address]   
   
                                                    Urea nitrogen   
[Mass/Vol]      8 mg/dL         Low             9-23            [facility]   
[facility]  
   
                                        Comment on above:   Performed By: #### L  
AB15 ####Medical Director: CASSI VASQUEZ   
([id number])[facility])[address] [place] [address]   
   
                                                    Basic metabolic 1998 panelon  
 2025   
   
                      Anion gap [Moles/Vol] 10 mmol/L             3 - 13 mmol/L   
[facility]  
   
                          Calcium [Mass/Vol] 9.1 mg/dL                 8.8 - 10.  
0   
mg/dL                                   [facility]  
   
                          Chloride [Moles/Vol] 106 mmol/L                98 - 10  
7   
mmol/L                                  [facility]  
   
                          CO2 [Moles/Vol] 20 mmol/L    Low          23 - 31   
mmol/L                                  [facility]  
   
                          Creatinine [Mass/Vol] 0.68 mg/dL                0.57 -  
 1.11   
mg/dL                                   [facility]  
   
                                                    GFR/1.73 sq M.predicted   
(S/P/Bld) [Vol   
rate/Area]                                      - PINF          [facility]  
   
                                        Comment on above:   Calculation based on  
 the Chronic Kidney Disease Epidemiology   
Collaboration (CKD-EPI) equation refit without adjustment for   
race   
   
                          Glucose [Mass/Vol] 159 mg/dL    High         82 - 115   
mg/dL                                   [facility]  
   
                                                    Interpretation and   
review of laboratory   
results         Abnormal                                        [facility]  
   
                          Potassium [Moles/Vol] 3.6 mmol/L                3.5 -   
5.1   
mmol/L                                  [facility]  
   
                                        Comment on above:   Plasma potassium asif  
ues may be up to 0.5 mmol/L lower than   
serum values.   
   
                          Sodium [Moles/Vol] 136 mmol/L                136 - 145  
   
mmol/L                                  [facility]  
   
                                                    Urea nitrogen   
[Mass/Vol]      8 mg/dL         Low             9 - 23 mg/dL    [facility]  
   
                                                    CBC W Auto Differential pane  
l (Bld)Ordered By: Vicki Sarah on 2025   
   
                                                    Erythrocyte   
distribution width   
(RBC) [Ratio]   15.2 %          High            11.5 - 15.0 %   [facility]  
   
                                                    Hematocrit (Bld)   
[Volume fraction] 42.8 %                          35.0 - 47.0 %   [facility]  
   
                                                    Hemoglobin (Bld)   
[Mass/Vol]          14.5 g/dL                               11.7 - 16.0   
g/dL                                    [facility]  
   
                                                    Interpretation and   
review of laboratory   
results         Abnormal                                        [facility]  
   
                      IPF        2                                [facility]  
   
                                                    MCH (RBC) [Entitic   
mass]               27.4 pg                                 26.0 - 34.0   
pg                                      [facility]  
   
                      MCHC (RBC) [Mass/Vol] 33.9 %                30.5 - 36.0 %   
[facility]  
   
                          MCV (RBC) [Entitic vol] 80.9 fL                   77.0  
 - 99.0   
fL                                      [facility]  
   
                                                    Platelet mean volume   
(Bld) [Entitic vol] 9.8 fL                          9.0 - 12.7 fL   [facility]  
   
                          Platelets (Bld) [#/Vol] 287 10*3/uL               140   
- 440   
10*3/uL                                 [facility]  
   
                          RBC (Bld) [#/Vol] 5.29 10*6/uL High         3.80 - 5.2  
0   
10*6/uL                                 [facility]  
   
                          WBC (Bld) [#/Vol] 15.3 10*3/uL High         3.6 - 10.7  
   
10*3/uL                                 [facility]  
   
                                                    CBC WITH AUTO DIFFERENTIALon  
 2025   
   
                                                    Erythrocyte   
distribution width   
(RBC) [Ratio]   15.2 %          High            11.5-15.0       [facility]  
   
                                        Comment on above:   Performed By: #### JONA  
[id number], [id number] ####Medical Director:   
CASSI VASQUEZ ([id number])[address]   
   
                                                    Hematocrit (Bld)   
[Volume fraction] 42.8 %          Normal          35.0-47.0       [facility]  
   
                                        Comment on above:   Performed By: #### JONA  
[id number], [id number] ####Medical Director:   
CASSI VASQUEZ ([id number])[facility] [place] [address]   
   
                                                    Hemoglobin (Bld)   
[Mass/Vol]      14.5 g/dL       Normal          11.7-16.0       [facility]  
   
                                        Comment on above:   Performed By: #### L  
[id number], [id number] ####Medical Director:   
CASSI VASQUEZ ([id number])[facility] [place] [address] [place]   
   
                      IPF        2          Normal                [facility]  
   
                                        Comment on above:   Performed By: #### JONA  
[id number], [id number] ####Medical Director:   
CASSI VASQUEZ ([id number])[facility])[address] [place] [address]   
   
                                                    MCH (RBC) [Entitic   
mass]           27.4 pg         Normal          26.0-34.0       [facility]  
   
                                        Comment on above:   Performed By: #### JONA  
[id number], [id number] ####Medical Director:   
CASSI VASQUEZ ([id number])[facility] [place] [address]   
   
                      MCHC       33.9 %     Normal     30.5-36.0  [facility]  
   
                                        Comment on above:   Performed By: #### JONA  
[id number], [id number] ####Medical Director:   
CASSI VASQUEZ ([id number])[facility] [place] [address]   
   
                      MCV (RBC) [Entitic vol] 80.9 fL    Normal     77.0-99.0  S  
[facility]  
   
                                        Comment on above:   Performed By: #### JONA  
[id number], [id number] ####Medical Director:   
CASSI VASQUEZ ([id number])[facility])[address] [place] [address]   
   
                                                    Platelet mean volume   
(Bld) [Entitic vol] 9.8 fL          Normal          9.0-12.7        [facility]  
   
                                        Comment on above:   Performed By: #### JONA  
[id number], [id number] ####Medical Director:   
CASSI VASQUEZ ([id number])[facility] [place] [address]   
   
                      Platelets (Bld) [#/Vol] 287 10*3/uL Normal     140-440      
[facility]  
   
                                        Comment on above:   Performed By: #### L  
[id number], [id number] ####Medical Director:   
CASSI VASQUEZ ([id number])[facility])[address] [place] [address]   
   
                      RBC (Bld) [#/Vol] 5.29 10*6/uL High       3.80-5.20  [facility]  
   
                                        Comment on above:   Performed By: #### L  
[id number], [id number] ####Medical Director:   
CASSI VASQUEZ ([id number])[facility] [place] [address]   
   
                      WBC (Bld) [#/Vol] 15.3 10*3/uL High       3.6-10.7   [facility]  
   
                                        Comment on above:   Performed By: #### L  
[id number], [id number] ####Medical Director:   
CASSI VASQUEZ ([id number])[facility] ([facility])[address] [place] [address]   
   
                                                    ECG 12-LEADon 2025   
   
                                        ECG 12-LEAD         IMPRESSION:  
Sinus rhythm  
LVH by voltage  
Anterior Q waves,   
possibly due to LVH  
Electronically Signed   
On 2025   
11:47:51 EST by   
Carolynn Hines                                  [facility]  
   
                                                    Laboratory - Chemistry and C  
hemistry - challengeon 2025   
   
                          Glucose [Mass/Vol] 101 mg/dL    High         70 - 100   
mg/dL                                   [facility]  
   
                          Glucose [Mass/Vol] 148 mg/dL    High         70 - 100   
mg/dL                                   [facility]  
   
                          Glucose [Mass/Vol] 131 mg/dL    High         70 - 100   
mg/dL                                   [facility]  
   
                          Glucose [Mass/Vol] 157 mg/dL    High         70 - 100   
mg/dL                                   [facility]  
   
                          Glucose [Mass/Vol] 179 mg/dL    High         70 - 100   
mg/dL                                   [facility]  
   
                                                    Laboratory - Hematology and   
Cell countson 2025   
   
                                                    Band form neutrophils   
(Bld) [#/Vol]       0.5 10*3/uL         High                NINF - 0.0   
10*3/uL                                 [facility]  
   
                                                    Band form   
neutrophils/100 WBC   
(Bld)           3 %             High            NINF - 0 %      [facility]  
   
                                                    Lymphocytes (Bld)   
[#/Vol]             0.6 10*3/uL         Low                 1.0 - 4.3   
10*3/uL                                 [facility]  
   
                                                    Lymphocytes/100 WBC   
(Bld)           4 %             Low             15 - 45 %       [facility]  
   
                          Monocytes (Bld) [#/Vol] 0.3 10*3/uL               0.0   
- 0.9   
10*3/uL                                 [facility]  
   
                      Monocytes/100 WBC (Bld) 2 %        Low        5 - 13 %   [facility]  
   
                                                    Neutrophils (Bld)   
[#/Vol]             14.4 10*3/uL        High                1.8 - 7.5   
10*3/uL                                 [facility]  
   
                                                    RBC morphology finding   
Nom (Bld)       Normal                                          [facility]  
   
                                                    Segmented   
neutrophils/100 WBC   
(Bld)           91 %            High            38 - 82 %       [facility]  
   
                                                    MANUAL DIFFERENTIAL (CELLAVI  
MARTHA)on 2025   
   
                                                    BAND NEUTROPHILS TOTAL   
PER COUNTED LEUKOCYTES   
BY MANUAL COUNT 3               Normal                          [facility]  
   
                                        Comment on above:   Performed By: #### L  
[id number], [id number] ####Medical Director:   
CASSI VASQUEZ ([id number])[facility] [place] [address] [place]   
   
                                                    BANDS (10*3/UL) IN   
BLOOD-CELLAVISION 0.5 10*3/uL     High            <=0.0           [facility]  
   
                                        Comment on above:   Performed By: #### JONA  
[id number], [id number] ####Medical Director:   
CASSI VASQUEZ ([id number])[facility] [place] [address] [place]   
   
                                                    BASOPHILS TOTAL PER   
COUNTED LEUKOCYTES BY   
MANUAL COUNT                    Normal                          [facility]  
   
                                        Comment on above:   Performed By: #### JONA  
[id number], [id number] ####Medical Director:   
CASSI VASQUEZ ([id number])[facility])[address] [place] [address] [place]   
   
                                                    BLASTS TOTAL PER   
COUNTED LEUKOCYTES BY   
MANUAL COUNT                    [facility]  
   
                                        Comment on above:   Performed By: #### L  
[id number], [id number] ####Medical Director:   
CASSI VASQUEZ ([id number])[facility])[address] [place] [address] [place]   
   
                                                    EOSINOPHILS TOTAL PER   
COUNTED LEUKOCYTES BY   
MANUAL COUNT                    Normal                          [facility]  
   
                                        Comment on above:   Performed By: #### L  
[id number], [id number] ####Medical Director:   
CASSI VASQUEZ ([id number])[facility])[address] [place] [address] [place]   
   
                                                    LYMPHOCYTES (10*3/UL)   
IN BLOOD-CELLAVISION 0.6 10*3/uL     Low             1.0-4.3         [facility]  
[facility]   
[facility]  
   
                                        Comment on above:   Performed By: #### L  
[id number], [id number] ####Medical Director:   
CASSI VASQUEZ ([id number])[facility] [place] [address] [place]   
   
                                                    LYMPHOCYTES TOTAL PER   
COUNTED LEUKOCYTES BY   
MANUAL COUNT    4               Normal                          [facility]  
   
                                        Comment on above:   Performed By: #### L  
[id number], [id number] ####Medical Director:   
CASSI VASQUEZ ([id number])[facility] [place] [address]   
   
                                                    LYMPHOCYTES/100   
LEUKOCYTES IN   
BLOOD-CELLAVISION 4 %             Low             15-45           [facility]  
   
                                        Comment on above:   Performed By: #### L  
[id number], [id number] ####Medical Director:   
CASSI VASQUEZ ([id number])[facility] [place] [address]   
   
                                                    METAMYELOCYTES TOTAL   
PER COUNTED LEUKOCYTES   
BY MANUAL COUNT                 Normal                          [facility]  
   
                                        Comment on above:   Performed By: #### L  
[id number], [id number] ####Medical Director:   
CASSI VASQUEZ ([id number])[facility] [place] [address] [place]   
   
                                                    MONOCYTES (10*3/UL) IN   
BLOOD-CELLAVISION 0.3 10*3/uL     Normal          0.0-0.9         [facility]  
   
                                        Comment on above:   Performed By: #### L  
[id number], [id number] ####Medical Director:   
CASSI VASQUEZ ([id number])[facility] [place] [address] [place]   
   
                                                    MONOCYTES TOTAL PER   
COUNTED LEUKOCYTES BY   
MANUAL COUNT    2               Normal                          [facility]  
   
                                        Comment on above:   Performed By: #### L  
[id number], [id number] ####Medical Director:   
CASSI VASQUEZ ([id number])[facility] [place] [address] [place]   
   
                                                    MONOCYTES/100   
LEUKOCYTES IN   
BLOOD-SYDNIE     2 %             Low             5-13            [facility]  
   
                                        Comment on above:   Performed By: #### L  
[id number], [id number] ####Medical Director:   
CASSI VASQUEZ ([id number])[facility] [place] [address] [place]   
   
                                                    MYELOCYTES COUNTED BY   
MANUAL COUNT                    [facility]  
   
                                        Comment on above:   Performed By: #### L  
[id number], [id number] ####Medical Director:   
CASSI VASQUEZ ([phone])[facility] [place] [address] [place]   
   
                                                    NEUTROPHILS BAND   
FORM/100 LEUKOCYTES IN   
BLOOD-CELLAVISI 3 %             High            <=0             [facility]  
   
                                        Comment on above:   Performed By: #### L  
[id number], [id number] ####Medical Director:   
CASSI VASQUEZ ([id number])[facility] [place] [address] [place]   
   
                                                    NEUTROPHILS TOTAL PER   
COUNTED LEUKOCYTES BY   
MANUAL COUNT    92              [facility]  
   
                                        Comment on above:   Performed By: #### L  
[id number], [id number] ####Medical Director:   
CASSI VASQUEZ ([id number])[facility] [place] [address]   
   
                                                    PROMYELOCYTES TOTAL PER   
COUNTED LEUKOCYTES BY   
MANUAL COUNT                    [facility]  
   
                                        Comment on above:   Performed By: #### L  
[id number], [id number] ####Medical Director:   
CASSI VASQUEZ ([id number])[facility] [place] [address] [place]   
   
                      RBC MORPHOLOGY IN BLOOD Normal     Normal                [facility]  
   
                                        Comment on above:   Performed By: #### L  
[id number], [id number] ####Medical Director:   
CASSI VASQUEZ ([id number])[facility] [place] [address] [place]   
   
                                                    SEGMENTED NEUTROPHILS   
(10*3/UL) IN   
BLOOD-CELLAVISION 14.4 10*3/uL    High            1.8-7.5         [facility]  
   
                                        Comment on above:   Performed By: #### L  
[id number], [id number] ####Medical Director:   
CASSI VASQUEZ ([id number])[facility] [place] [address] [place]   
   
                                                    SEGMENTED   
NEUTROPHILS/100   
LEUKOCYTES-CE   91 %            High            38-82           [facility]  
   
                                        Comment on above:   Performed By: #### L  
[id number], [id number] ####Medical Director:   
CASSI VASQUEZ ([id number])[facility] ([facility])[address] [place] [address]   
   
                                                    UNCLASSIFIED CELLS   
TOTAL PER COUNTED   
LEUKOCYTES BY MANUAL   
COUNT                           Normal                          [facility]  
   
                                        Comment on above:   Performed By: #### L  
[id number], [id number] ####Medical Director:   
CASSI VASQUEZ ([id number])[facility] ([facility])[address] [place] [address]   
   
                                                    VARIANT LYMPHOCYTES   
TOTAL PER COUNTED   
LEUKOCYTES BY MANUAL   
COUNT                           Normal                          [facility]  
   
                                        Comment on above:   Performed By: #### L  
[id number], [id number] ####Medical Director:   
CASSI VASQUEZ ([id number])[facility] [place] [address]   
   
                                                    No Panel Informationon    
   
                                                    Interpretation and   
review of laboratory   
results         Abnormal                                        [facility]  
   
                                                            Performed by: [facility]   
[email or web address] [facility], [address] [place] [id number]  
  
CLIA ID: [id number]  
  
                                                            [facility] [employer]  
   
                                                                  [facility]  
   
                                                    Interpretation and   
review of laboratory   
results         Abnormal                                        [facility]  
   
                                                            Performed by: [facility], [address] [place] [id number]  
  
CLIA ID: [id number]  
  
                                                            [facility] [employer]  
   
                                                                  [facility]  
   
                                                    Interpretation and   
review of laboratory   
results         Abnormal                                        [facility]  
   
                                                            Performed by: [facility], [address] [place] [id number]  
  
CLIA ID: [id number]  
  
                                                            [facility] [employer]  
   
                                                                  [facility]  
   
                                                            Sinus rhythm  
LVH by voltage  
Anterior Q waves,   
possibly due to LVH  
Electronically Signed   
On 2025   
11:47:51 EST by   
Carolynn Greenberg MD -   
2025 Formatting   
of this note might be   
different from the   
original.  
IMPRESSION:  
Sinus rhythm  
LVH by voltage  
Anterior Q waves,   
possibly due to LVH  
Electronically Signed   
On 2025   
11:47:51 EST by   
Carolynn Cervantes  
                                                            [facility]  
   
                                                    Interpretation and   
review of laboratory   
results         Abnormal                                        [facility]  
   
                                                            Performed by: [facility], [address] [place] [id number]  
  
CLIA ID: [id number]  
  
                                                            [facility]  
   
                                                    Atypical Lymphocytes   
Manual                                                          [facility]  
   
                      Bands Manual 3                                [facility]  
   
                      Basophils Manual                                  [facility]  
[facility]  
   
                      Blasts Manual                                  [facility]  
h  
   
                      Eosinophils Manual                                  [facility]   
Health  
   
                                                    Interpretation and   
review of laboratory   
results         Abnormal                                        [facility]  
   
                      Lymphocytes Manual 4                                [facility]   
Health  
   
                      Metamyelocytes Manual                                  [facility]  
   
                      Monocytes Manual 2                                [facility]  
alth  
   
                      Myelocytes Manual                                  [facility]  
ealth  
   
                      Neutrophils Manual 92                               [facility]  
   
                      Promyelocytes Manual                                  [facility]  
   
                                                    Unclassified Cells,   
Manual                                                          [facility]  
   
                                                    No Panel InformationOrdered   
By: Carolynn Cervantes on 2025   
   
                      P Axis     48                    degrees    [facility]  
Work Phone:   
[phone]  
5  
   
                      UT Interval 190 ms                           [facility]  
Work Phone:   
[phone]  
5  
   
                      QRS Axis   -5                    degrees    [facility]  
Work Phone:   
[phone]  
5  
   
                      QRSD Interval 86 ms                            [facility]  
h  
Work Phone:   
[phone]  
   
                      QT Interval 388 ms                           [facility]  
Work Phone:   
[phone]  
   
                      QTC Interval 457 ms                           [facility]  
Work Phone:   
[phone]  
5  
   
                      T Wave Axis 42                    degrees    [facility]  
Work Phone:   
[phone]  
5  
   
                                                                  [facility]  
Work Phone:   
[phone]  
5  
   
                                                    Vital signsOrdered By: Dorothy Cervantes on 2025   
   
                      Heart rate 84 /min               bpm        [facility]  
Work Phone:   
[phone]  
5  
   
                                                    30on 2025   
   
                                        30                  Problem: Pain - Adul  
t  
Goal:   
Verbalizes/displays   
adequate comfort   
level or baseline   
comfort level  
Outcome: Progressing   
Problem: Safety -   
Adult  
Goal: Free from fall   
injury  
Outcome: Progressing   
Problem: Discharge   
Planning  
Goal: Discharge to   
home or other   
facility with   
appropriate resources  
Outcome: Progressing   
Problem: Chronic   
Conditions and   
Co-morbidities  
Goal: Patient's   
chronic conditions   
and co-morbidity   
symptoms are   
monitored and  
maintained or   
improved  
Outcome: Progressing Normal                                  [facility]  
   
                                        30                  Problem: Pain - Adul  
t  
Goal:   
Verbalizes/displays   
adequate comfort   
level or baseline   
comfort level  
Outcome: Progressing   
Problem: Safety -   
Adult  
Goal: Free from fall   
injury  
Outcome: Progressing   
Problem: Discharge   
Planning  
Goal: Discharge to   
home or other   
facility with   
appropriate resources  
Outcome: Progressing   
Problem: Chronic   
Conditions and   
Co-morbidities  
Goal: Patient's   
chronic conditions   
and co-morbidity   
symptoms are   
monitored and  
maintained or   
improved  
Outcome: Progressing Normal                                  [facility]  
   
                                                    ABO and Rh group Confirm Nom  
 (Bld)on 2025   
   
                      ABO group Nom (Bld) O                                [facility]  
   
                      D Ag Ql (RBC) Positive                         [facility]  
   
                                                    BASIC METABOLIC PANELon    
   
                      Anion gap [Moles/Vol] 9 mmol/L   Normal     3-13       [facility]  
   
                                        Comment on above:   Performed By: #### L  
AB15, [id number] ####Medical Director: CASSI VASQUEZ ([id number])[facility])[address] [place] [address]   
   
                      Calcium [Mass/Vol] 10.6 mg/dL High       8.8-10.0   [facility]  
   
                                        Comment on above:   Performed By: #### L  
AB15, [id number] ####Medical Director: CASSI VASQUEZ ([id number])[facility] [place] [address]   
   
                      Chloride [Moles/Vol] 103 mmol/L Normal          [facility]  
   
                                        Comment on above:   Performed By: #### L  
AB15, [id number] ####Medical Director: CASSI VASQUEZ ([id number])[facility] [place] [address]   
   
                      CO2 [Moles/Vol] 24 mmol/L  Normal     23-31      [facility]  
   
                                        Comment on above:   Performed By: #### L  
AB15, [id number] ####Medical Director: CASSI VASQUEZ ([id number])[facility])[address] [place] [address]   
   
                      Creatinine [Mass/Vol] 0.83 mg/dL Normal     0.57-1.11  [facility]  
   
                                        Comment on above:   Performed By: #### L  
AB15, [id number] ####Medical Director: CASSI VASQUEZ ([id number])[facility])[address] [place] [address] [place]   
   
                                                    GLOMERULAR FILTRATION   
RATE ML/MIN/1.73 SQ   
M.PREDICTED     77.9 mL/min/1.73m*2 Normal          >60.0           [facility]  
   
                                        Comment on above:   Result Comment: Calc  
ulation based on the Chronic Kidney   
Disease Epidemiology Collaboration (CKD-EPI) equation refit   
without adjustment for race   
   
                                                            Performed By: #### L  
AB15, [id number] ####Medical Director: CASSI VASQUEZ ([id number])[facility])[address] [place] [address]   
   
                      Glucose [Mass/Vol] 116 mg/dL  High            [facility]  
   
                                        Comment on above:   Performed By: #### L  
AB15, [id number] ####Medical Director: CASSI VASQUEZ ([id number])[facility])[address] [place] [address]   
   
                      Potassium [Moles/Vol] 3.4 mmol/L Low        3.5-5.1    [facility]  
   
                                        Comment on above:   Result Comment: [facility] potassium values may be up to 0.5   
mmol/L lower than serum values.   
   
                                                            Performed By: #### L  
AB15, [id number] ####Medical Director: CASSI VASQUEZ ([id number])[facility])[address] [place] [address]   
   
                      Sodium [Moles/Vol] 136 mmol/L Normal     136-145    [facility]  
   
                                        Comment on above:   Performed By: #### L  
AB15, [id number] ####Medical Director: CASSI VASQUEZ ([id number])[address]   
   
                                                    Urea nitrogen   
[Mass/Vol]      8 mg/dL         Low             9-23            [facility]  
   
                                        Comment on above:   Performed By: #### L  
AB15, [id number] ####Medical Director: CASSI VASQUEZ ([id number])[address]   
   
                                                    BLOOD TYPE AND SCREEN GELon   
2025   
   
                      ABO GROUPING O          Normal                [facility]  
   
                                        Comment on above:   Performed By: #### L  
 ####Medical Director: CASSI VASQUEZ   
([id number])[facility])[address] [place] [address]   
   
                      RH TYPE IN BLOOD Positive   Normal                [facility]  
   
                                        Comment on above:   Performed By: #### L  
 ####Medical Director: CASSI VASQUEZ   
([id number])[facility] ([facility])[address] [place] [address]   
   
                                                    Basic metabolic 1998 panelon  
 2025   
   
                      Anion gap [Moles/Vol] 9 mmol/L              3 - 13 mmol/L   
[facility]  
   
                          Calcium [Mass/Vol] 10.6 mg/dL   High         8.8 - 10.  
0   
mg/dL                                   [facility]  
   
                          Chloride [Moles/Vol] 103 mmol/L                98 - 10  
7   
mmol/L                                  [facility]  
   
                          CO2 [Moles/Vol] 24 mmol/L                 23 - 31   
mmol/L                                  [facility]  
   
                          Creatinine [Mass/Vol] 0.83 mg/dL                0.57 -  
 1.11   
mg/dL                                   [facility]  
   
                                                    GFR/1.73 sq M.predicted   
(S/P/Bld) [Vol   
rate/Area]      77.9 mL/min                     - PINF          [facility]  
   
                                        Comment on above:   Calculation based on  
 the Chronic Kidney Disease Epidemiology   
Collaboration (CKD-EPI) equation refit without adjustment for   
race   
   
                          Glucose [Mass/Vol] 116 mg/dL    High         82 - 115   
mg/dL                                   [facility]  
   
                                                    Interpretation and   
review of laboratory   
results         Abnormal                                        [facility]  
   
                          Potassium [Moles/Vol] 3.4 mmol/L   Low          3.5 -   
5.1   
mmol/L                                  [facility]  
   
                                        Comment on above:   Plasma potassium asif  
ues may be up to 0.5 mmol/L lower than   
serum values.   
   
                          Sodium [Moles/Vol] 136 mmol/L                136 - 145  
   
mmol/L                                  [facility]  
   
                                                    Urea nitrogen   
[Mass/Vol]      8 mg/dL         Low             9 - 23 mg/dL    [facility]  
   
                                                    Blood type and Crossmatch pa  
eliud (Bld)on 2025   
   
                      ABO group Nom (Bld) O                                [facility]  
   
                                                    Blood group antibody   
screen GEL Ql   Negative                                        [facility]  
   
                      D Ag Ql (RBC) Positive                         [facility]  
h  
   
                                                                  [facility]  
   
                                                    CBC (HEMOGRAM)on 2025   
   
                                                    Erythrocyte   
distribution width   
(RBC) [Ratio]   15.1 %          High            11.5-15.0       [facility]  
   
                                        Comment on above:   Performed By: #### L  
 ####Medical Director: CASSI VASQUEZ   
([id number])[facility] ([facility])[address] [place] [address]   
   
                                                    Hematocrit (Bld)   
[Volume fraction] 43.4 %          Normal          35.0-47.0       [facility]  
   
                                        Comment on above:   Performed By: #### L  
 ####Medical Director: CASSI VASQUEZ   
([id number])[facility])[address] [place] [address]   
   
                                                    Hemoglobin (Bld)   
[Mass/Vol]      14.3 g/dL       Normal          11.7-16.0       [facility]  
   
                                        Comment on above:   Performed By: #### L  
 ####Medical Director: CASSI VASQUEZ   
([id number])[facility] [place] [address]   
   
                                                    MCH (RBC) [Entitic   
mass]           27.1 pg         Normal          26.0-34.0       [facility]  
   
                                        Comment on above:   Performed By: #### L  
 ####Medical Director: CASSI VASQUEZ   
([id number])[facility])[address] [place] [address]   
   
                      MCHC       32.9 %     Normal     30.5-36.0  [facility]  
   
                                        Comment on above:   Performed By: #### L  
 ####Medical Director: CASSI VASQUEZ   
([id number])[facility] [place] [address]   
   
                      MCV (RBC) [Entitic vol] 82.4 fL    Normal     77.0-99.0  S  
[facility]  
   
                                        Comment on above:   Performed By: #### L  
 ####Medical Director: CASSI VASQUEZ   
([id number])[facility])[address] [place] [address]   
   
                                                    Platelet mean volume   
(Bld) [Entitic vol] 9.3 fL          Normal          9.0-12.7        [facility]  
   
                                        Comment on above:   Performed By: #### L  
 ####Medical Director: CASSI VASQUEZ   
([id number])[facility])[address] [place] [address]   
   
                      Platelets (Bld) [#/Vol] 301 10*3/uL Normal     140-440      
[facility]  
   
                                        Comment on above:   Performed By: #### L  
 ####Medical Director: CASSI VASQUEZ   
([id number])[facility] [place] [address]   
   
                      RBC (Bld) [#/Vol] 5.27 10*6/uL High       3.80-5.20  [facility]  
   
                                        Comment on above:   Performed By: #### L  
 ####Medical Director: CASSI VASQUEZ   
([id number])[facility] [place] [address]   
   
                      WBC (Bld) [#/Vol] 10.1 10*3/uL Normal     3.6-10.7   [facility]  
   
                                        Comment on above:   Performed By: #### L  
 ####Medical Director: CASSI VASQUEZ   
([id number])[facility] [place] [address]   
   
                                                    CBC panel Auto (Bld)on    
   
                                                    Erythrocyte   
distribution width   
(RBC) [Ratio]   15.1 %          High            11.5 - 15.0 %   [facility]  
   
                                                    Hematocrit (Bld)   
[Volume fraction] 43.4 %                          35.0 - 47.0 %   [facility]  
   
                                                    Hemoglobin (Bld)   
[Mass/Vol]          14.3 g/dL                               11.7 - 16.0   
g/dL                                    [facility]  
   
                                                    Interpretation and   
review of laboratory   
results         Abnormal                                        [facility]  
   
                                                    MCH (RBC) [Entitic   
mass]               27.1 pg                                 26.0 - 34.0   
pg                                      [facility]  
   
                      MCHC (RBC) [Mass/Vol] 32.9 %                30.5 - 36.0 %   
[facility]  
   
                          MCV (RBC) [Entitic vol] 82.4 fL                   77.0  
 - 99.0   
fL                                      [facility]  
   
                                                    Platelet mean volume   
(Bld) [Entitic vol] 9.3 fL                          9.0 - 12.7 fL   [facility]  
   
                          Platelets (Bld) [#/Vol] 301 10*3/uL               140   
- 440   
10*3/uL                                 [facility]  
   
                          RBC (Bld) [#/Vol] 5.27 10*6/uL High         3.80 - 5.2  
0   
10*6/uL                                 [facility]  
   
                          WBC (Bld) [#/Vol] 10.1 10*3/uL              3.6 - 10.7  
   
10*3/uL                                 [facility]  
   
                                                    Laboratory - Chemistry and C  
hemistry - challengeon 2025   
   
                          Glucose [Mass/Vol] 165 mg/dL    High         70 - 100   
mg/dL                                   [facility]  
   
                          Magnesium [Mass/Vol] 1.9 mg/dL                 1.6 - 2  
.6   
mg/dL                                   [facility]  
   
                          Glucose [Mass/Vol] 134 mg/dL    High         70 - 100   
mg/dL                                   [facility]  
   
                                                    MAGNESIUMon 2025   
   
                      Magnesium [Mass/Vol] 1.9 mg/dL  Normal     1.6-2.6    [facility]  
   
                                        Comment on above:   Result Comment: ABRAHAN BORRERO COMMENTS:  
Higher values can be expected in females during menses.   
   
                                                            Performed By: #### L  
AB15, [id number] ####Medical Director: CASSI VASQUEZ ([id number])[facility])[address] [place] [address]   
   
                                                    Magnesium [Mass/Vol]on    
   
                                                    Interpretation and   
review of laboratory   
results         Normal                                          [facility]  
   
                                                            Higher values can be  
   
expected in females   
during menses.                                              [facility]  
   
                                                    No Panel Informationon    
   
                                                    Interpretation and   
review of laboratory   
results         Abnormal                                        [facility]  
   
                                                            Performed by: [facility], [address] [place] [address]  
  
CLIA ID: [id number]  
  
                                                            [facility]  
   
                                                    Interpretation and   
review of laboratory   
results         Abnormal                                        [facility]  
   
                                                            Performed by: [facility], [address] [place] [address]  
  
CLIA ID: [id number]  
  
                                                            [facility]  
   
                                                    Nursing Noteon 2025   
   
                                        Nursing Note        Family given room   
number from [facility] Normal                                  [facility]  
   
                                        Nursing Note        Patient   
family/visitor   
updated by RN at this   
time.               Normal                                  [facility]  
   
                                        Nursing Note        Report given to   
Danya STRINGER RN. Aware   
of consent needing to   
be signed since  
patient had questions   
for Dr. Madsen. Labs   
still need drawn by   
ultrasound.         Normal                                  [facility]  
   
                                        Nursing Note        Unsuccessful IV   
attempt patient   
tolerated well, IV   
ultrasound paged.   Normal                                  [facility]  
   
                                                    Op Noteon 2025   
   
                                        Op Note             OPERATIVE NOTE  
PATIENT NAME: Navdeep Guillen  
: 1960  
MRN: [id number]  
ATTENDING PHYSICIAN:   
Nick Madsen MD  
PROCEDURE DATE:   
2025  
PREOPERATIVE   
DIAGNOSIS:   
Diverticulitis.   
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   
fistula  
POSTOPERATIVE   
DIAGNOSIS: Same  
SURGEON: Nick Madsen MD  
ASSISTANT: Moni Hernandez  
OPERATION: Robotic   
assisted laparoscopic   
sigmoid colectomy,   
takedown of splenic  
flexure, omental   
flap, drainage of   
intra-abdominal   
abscess.  
ANESTHESIA: General  
ESTIMATED BLOOD LOSS:   
<50ml  
COMPLICATIONS: none  
SPECIMENS: Sigmoid   
colon and anastomotic   
rings  
INDICATIONS: The   
patient is a 64 y.o.   
year old female with   
history of above  
preop diagnosis. I   
explained the risk,   
benefits, expected   
outcome, and  
alternatives to the   
procedure. Patient   
understands the risks   
include but not  
inclusive to   
bleeding, infection,   
anesthesia   
complication, blood   
vessel/nerve  
damage, chronic pain,   
reoperation, and   
failure of the   
procedure to obtain   
its  
intended goals.   
Patient understands   
and is in agreement   
and would like to  
proceed.  
DESCRIPTION OF   
PROCEDURE:  
The patient was   
brought to the   
operating room and   
intubated by   
anesthesia. The  
patient was placed in   
the lithotomy   
position with arms   
tucked by their sides  
with all bony   
prominences   
appropriately padded.   
The anesthesia team   
performed a  
tap block. Urology   
performed cystoscopy   
with ureteral ICG   
injection and evans  
placement. The   
abdomen was then   
prepped with   
ChloraPrep and the   
patient was  
draped in the usual   
sterile fashion.  
We first started with   
a stab incision in   
the left upper   
quadrant and placed a  
Veress needle to gain   
insufflation. We then   
used an 8 mm robotic   
Optiview  
trocar to enter in   
the left upper   
quadrant. Upon entry   
there is no signs of  
injury. We then   
placed a total of 4   
robotic trochars from   
the left costal  
margin to the right   
ASIS 3 of them being   
8 mm and one of them   
being a 12 mm  
port. We placed a   
right upper quadrant   
5 mm air seal trocar.   
The patient was  
then placed in   
Trendelenburg with   
right lateral tilt.   
The robot was then  
docked.  
The sigmoid was   
densely adhered to   
the anterior   
abdominal wall as   
well as the  
left lateral pelvis.   
We spent at least an   
hour lysing adhesions   
of the colon to  
the pelvis and   
abdominal wall. We   
did encounter at   
least 2 abscesses   
that we  
drained purulent   
fluid from. We did   
identify the left   
ureter with firefly   
and  
kept that in the   
retroperitoneal   
position. We then   
made an incision on   
the  
right rectosigmoid   
mesentery to enter   
the retroperitoneal   
plane we developed  
that plane distally   
as well as proximally   
and then laterally.   
Again we  
identified the left   
ureter and kept that   
intact. We isolated   
out the inferior  
mesenteric artery and   
divided that using a   
60 mm white load   
stapler. We then  
mobilized the lateral   
attachments of the   
descending colon to   
medialize the colon  
is much as possible.   
We mobilized this all   
the way up to the   
splenic flexure  
and continued to take   
down the splenic   
flexure. We then   
continued our  
dissection down   
distally in the   
retrorectal plane. We   
were eventually able   
to  
mobilize the rectum   
circumferentially   
below the peritoneal   
reflection into an  
area where the rectum   
was soft and pliable.  
We then chose a   
portion of the rectum   
where the tinea have   
splayed around an  
area that was just   
distal to the sacral   
promontory and   
cleared off the   
rectal  
mesentery with the   
vessel sealer device.   
We then used the   
robotic 60 mm green  
load stapler to   
divide across the   
rectum. We then   
divided the   
descending  
sigmoid colon   
mesentery using a   
vessel sealer device   
up to the colon wall.   
We  
injected 2 cc of ICG   
to check perfusion   
and there was good   
perfusion to the  
proposed division   
segment. We then   
sharply divided   
across the colon and   
placed  
an Endoloop on the   
distal divided   
portion.  
We then extended the   
right lower quadrant   
stapler port and   
inserted the 29 mm  
anvil through this.   
We then used a 2-0   
strata fix to sew a   
pursestring around  
the proximal divided   
portion and placed   
the anvil inside   
there and tightened   
up  
the pursestring. We   
then cleared off the   
mesentery around the   
colon using  
electrocautery and   
scissors. The needle   
was then retrieved.   
We then used  
rectal dilators to   
dilate the rectum up   
and then placed our   
29 mm EEA stapler  
transanally and   
opened up the spike.   
We made our   
anastomosis and had   
to  
complete anastomotic   
rings.  
We then occluded the   
proximal portion of   
the anastomosis and   
submerged the  
anastomosis under   
saline. We then   
performed a flexible   
sigmoidoscopy and the  
anastomosis was   
viable proximally and   
distally and was   
patent and   
hemostatic.  
There were no air   
bubbles seen on a   
leak test. We then   
withdrew the flexible  
sigmoidoscope and   
suctioned out the   
fluid in the pelvis.   
I then proceeded to  
take the omentum off   
the proximal portion   
of the transverse   
colon using vessel  
sealer device to   
create a flap of   
omentum and prevent   
any fistulization of   
the  
bladder to the   
colorectal   
anastomosis. The   
omentum was (more   
content not   
included)...        Normal                                  [facility]  
   
                                        Op Note             PreOp Dx   
diverticulitis  
PostOp Dx Same  
Operation Cystoscopy,   
retrograde ICG in   
each ureter  
Surgeon Moi Bautista MD  
Assist None  
EBL Minimal  
Drains none  
Evans none  
Specimen none  
Condition To PACU  
This is a 64 y.o.   
patient who presents   
with diverticulitis,   
we were asked to  
place bilateral ICG  
Patient was brought   
to the operating   
room. A thorough time   
out was performed and  
everyone present was   
in agreement. Patient   
was placed on OR   
table. Anesthesia  
and lines were   
maintained by the   
anesthesia team.  
Patient was placed in   
the dorsal lithotomy   
position. Prepped and   
draped in usual  
fashion. Pressure   
points were padded. A   
cystourethroscope was   
inserted through  
the urethra and the   
bladder was   
inspected. Retrograde   
ICG 5cc in each   
ureter was  
placed with the   
conetip catheter.  
. The bladder was   
emptied with a evans   
and Dr Madsen took over   
the case            [facility]  
   
                                                    [id number] 2024   
   
                                        [id number]             Medication List  
Accurate as of   
2024   
9:55 AM. Always use   
your most recent med  
list.  
atenolol 50 MG tablet  
Commonly known as:   
Tenormin  
Medication   
Adjustments for   
Surgery: Take morning   
of surgery  
atenolol-chlorthalido  
ne 50-25 MG tablet  
Commonly known as:   
Tenoretic  
Notes to patient: Not   
taking  
chlorthalidone 25 MG   
tablet  
Commonly known as:   
Hygroton  
Medication   
Adjustments for   
Surgery: Hold morning   
of surgery  
docusate sodium 250   
MG capsule  
Commonly known as:   
Colace  
Medication   
Adjustments for   
Surgery: Hold morning   
of surgery  
glipiZIDE XL 2.5 MG   
24 hr tablet  
Commonly known as:   
Glucotrol XL  
Medication   
Adjustments for   
Surgery: Hold morning   
of surgery  
ibuprofen 800 MG   
tablet  
Medication   
Adjustments for   
Surgery: Stop 1 day   
before surgery  
KLOR-CON M20 20 MEQ   
ER tablet  
Generic drug:   
potassium chloride CR  
Medication   
Adjustments for   
Surgery: Hold morning   
of surgery  
metFORMIN 500 MG   
tablet  
Commonly known as:   
Glucophage  
Medication   
Adjustments for   
Surgery: Stop 2 days   
before surgery  
Notes to patient:   
Last dose on   
metroNIDAZOLE 500 MG   
tablet  
Commonly known as:   
Flagyl  
Take (1) Flagyl at   
3pm, 4pm, and before   
bedtime on the day   
prior to surgery.  
Notes to patient:   
Take as directed the   
day before surgery  
MULTIPLE VITAMINS PO  
Medication   
Adjustments for   
Surgery: Hold morning   
of surgery  
neomycin 500 MG   
tablet  
Commonly known as:   
Mycifradin  
Take two tablets   
(1000 mg) by mouth at   
3:00pm, 4:00pm and at   
bed time on the day  
prior to surgery  
Notes to patient:   
Take as directed the   
day before surgery  
potassium chloride CR   
20 MEQ ER tablet  
Commonly known as:   
K-Tab  
Notes to patient:   
Duplicate order  
Additional   
Instructions:  
FOLLOW DR. MADSEN'S BOWEL   
PREP INSTRUCTIONS FOR   
SURGERY AND CALL HIS   
OFFICE WITH ANY  
ISSUES OR QUESTIONS.  
You may take Tylenol   
for pain.  
NO Motrin, ibuprofen   
or Advil for 24 hours   
prior to surgery or   
longer if  
instructed by your   
surgeon.  
NO Aleve or Naprosyn   
for 5 days prior to   
surgery or longer if   
instructed by your  
surgeon.  
DO NOT take aspirin   
or aspirin containing   
products for 5 days   
before surgery, or  
longer if instructed   
by your surgeon.  
Follow any   
instructions given to   
you by Dr. Madsen.  
Shower with an   
antibacterial soap   
such as Dial or   
Safeguard before   
coming to  
the hospital.  
No makeup, lotion,   
powder, deodorant or   
body sprays. No hair   
products. Remove  
all jewelry and leave   
it at home. Wear   
loose comfortable   
clothing to go home   
in.  
You may brush your   
teeth morning of   
surgery. Do not wear   
contacts day of  
surgery.  
No marijuana (THC),   
smoking or alcohol   
for 24 hours prior to   
surgery.  
Please arrange for a   
responsible adult to   
drive you home after   
you are  
discharged.  
If you have specific   
questions, please   
call your surgeon.  
You will receive a   
call the day before   
your surgery to   
verify your arrival   
time  
and date. You will be   
asked to arrive at   
least two hours prior   
to your scheduled  
surgery time.  
We encourage you to   
write down any   
questions you may   
have for the surgeon,  
anesthesiologist, or   
other members of the   
surgical team and   
bring it with you  
the day of surgery.  
Please bring photo ID   
and insurance   
information.  
 AND PARKING IN   
THE MAIN DECK ARE   
FREE DAY OF SURGERY.  
PARKING IN THE DECK--   
AFTER PARKING TAKE   
THE ELEVATOR TO LEVEL   
ONE AND TAKE THE  
BRIDGE TO THE   
HOSPITAL. GO TO THE   
RIGHT AND GO AROUND   
THE CORNER TO THE   
SAME  
DAY SURGERY DESK AND   
CHECK IN THERE.  
IF GOING IN THE MAIN   
ENTRANCE-- TURN LEFT   
AND GO DOWN THE TESFAYE   
TO THE H  
ELEVATORS AND TAKE   
THEM TO ONE, LEFT OFF   
THE ELEVATOR AND GO   
AROUND TO THE SAME  
DAY DESK AND CHECK   
IN.                 [facility]  
   
                                                    [id number] 2024   
   
                                        [id number]              PAT phone interview   
done. Pt states she   
has cold symptoms for   
a few  
days-congestion,   
cough; no fever.   
Instructed pt to   
notify PCP/Dr. Madsen by   
Monday  
if symptoms don't   
improve or worsen. Pt   
verbalizes   
understanding.      [facility]  
   
                                                    Office Visiton 2024   
   
                                        Follow-up visit     [id number]   
Navdeep Guillen   
1960 F  
Date Provider   
Department Center  
2024 [id number],   
[facility] COL   
None  
Family History  
Family Status -   
Relation Status Age   
at Death  
Mother   
Father   
Sister Alive  
Brother Alive  
Notes: 1 brother   
  
Daughter  
Notes: 1 daughter   
 ( still born   
)  
Son  
Level of   
Service:[id number] [place]   
OFFICE/OUTPATIENT NEW   
MODERATE MDM 45   
MINUTES  
Reason for Visit and   
Comments:  
New Patient [542] -   
Diverticular disease   
w/perf ref from   
[place]  
surgical/PACS has 3   
recent ct scan   
uploaded/colonoscopy   
report in media,  
patient was admitted   
in hospital in Sept   
and Oct, pain in mid   
lower abd,  
diarrhea and   
constipation, gassy,   
sometimes has mucous   
in stool  
Other [0] - Pt   
unaccompanied       Normal                                  [facility]  
   
                                                    Progress Noteon 2024   
   
                                        Progress Note       Colon and Rectal   
Surgery  
PATIENT NAME: Navdeep Guillen  
: 1960  
MRN: [id number]  
TODAY'S DATE:   
2024  
Chief Complaint  
Patient presents with  
New Patient  
Diverticular disease   
w/perf ref from   
[facility] surgical/PACS   
has 3 recent ct  
scan   
uploaded/colonoscopy   
report in media,   
patient was admitted   
in hospital in  
Sept and Oct, pain in   
mid lower abd,   
diarrhea and   
constipation, gassy,   
sometimes  
has mucous in stool  
Other  
Pt unaccompanied  
SUBJECTIVE:  
Navdeep Guillen is   
a 64 y.o. female with   
hx of diverticulitis.   
Pt  
hospitalized sept and   
oct 2024 and was IV   
abx. Pt states that   
pain did go away  
but it did take   
longer course of abx   
to go away. Pt states   
that she may notice  
some air bubbles from   
the urine stream. No   
stool from vagina or   
urine.  
2023 colonoscopy   
Dr. Patricio with 3   
polyps removed from   
the rectum sigmoid  
colon and cecum and   
less than 5 mm in   
size. Pathology   
showing hyperplastic   
as  
well as tubular   
adenoma.  
2024 CT abdomen   
pelvis acute   
diverticulitis of   
sigmoid colon. No   
discrete  
abscess. Possible   
microperforation.   
Possible   
enterovesicular   
fistula versus  
adhesions/scarring   
from prior disease.  
2024 CT abdomen   
pelvis with mild   
recurrent sigmoid   
diverticulitis. Thick  
band of presumed   
inflammation between   
proximal sigmoid and   
bladder. Moderate  
bladder wall   
thickening without   
intraluminal air. No   
discrete drainable  
abscess.  
10/1/2024 CT abdomen   
pelvis with   
persistent acute   
sigmoid   
diverticulitis no  
focal fluid   
collection or free   
air  
Past Medical History:  
Diagnosis Date  
Breast cancer (HCC)   
2017  
Diabetes (HCC)  
High blood pressure  
Obesity  
Past Surgical   
History:  
Procedure Laterality   
Date  
BREAST LUMPECTOMY   
Right  
CHOLECYSTECTOMY  
HYSTERECTOMY  
total  
Current Outpatient   
Medications:  
atenolol-chlorthalido  
ne (Tenoretic) 50-25   
MG tablet, Take 1   
tablet by mouth  
daily., Disp: , Rfl:  
chlorthalidone   
(Hygroton) 25 MG   
tablet, Take 12.5 mg   
by mouth daily.,   
Disp: ,  
Rfl:  
glipiZIDE XL   
(Glucotrol XL) 2.5 MG   
24 hr tablet, Take   
2.5 mg by mouth   
daily.,  
Disp: , Rfl:  
ibuprofen 800 MG   
tablet, Take 800 mg   
by mouth every 8   
hours as needed.,   
Disp:  
, Rfl:  
KLOR-CON M20 20 MEQ   
ER tablet, Take 20   
mEq by mouth 2 times   
daily., Disp: ,  
Rfl:  
metFORMIN   
(Glucophage) 500 MG   
tablet, Take 500 mg   
by mouth twice a   
day., Disp:  
, Rfl:  
MULTIPLE VITAMINS PO,   
Take by mouth daily.,   
Disp: , Rfl:  
potassium chloride CR   
(K-Tab) 20 MEQ ER   
tablet, Take 20 mEq   
by mouth twice a  
day., Disp: , Rfl:  
Social History  
Socioeconomic History  
Marital status:   
  
Spouse name: Not on   
file  
Number of children:   
Not on file  
Years of education:   
Not on file  
Highest education   
level: Not on file  
Occupational History  
Not on file  
Tobacco Use  
Smoking status:   
Former  
Types: Cigarettes  
Smokeless tobacco:   
Never  
Substance and Sexual   
Activity  
Alcohol use: Not   
Currently  
Drug use: Never  
Sexual activity: Not   
Currently  
Other Topics Concern  
Not on file  
Social History   
Narrative  
Not on file  
Social Drivers of   
Health  
Financial Resource   
Strain: Low Risk   
(2024)  
Received from   
[facility]  
Overall Financial   
Resource Strain   
(CARDIA)  
Difficulty of Paying   
Living Expenses: Not   
very hard  
Food Insecurity: No   
Food Insecurity   
(2024)  
Received from   
[facility]  
Hunger Vital Sign  
Worried About Running   
Out of Food in the   
Last Year: Never true  
Ran Out of Food in   
the Last Year: Never   
true  
Transportation Needs:   
No Transportation   
Needs (2024)  
Received from   
[facility]  
PRAPARE -   
Transportation  
Lack of   
Transportation   
(Medical): No  
Lack of   
Transportation   
(Non-Medical): No  
Physical Activity:   
Insufficiently Active   
(2024)  
Received from   
[facility]  
Exercise Vital Sign  
Days of Exercise per   
Week: 2 days  
Minutes of Exercise   
per Session: 20 min  
Stress: No Stress   
Concern Present   
(2024)  
Received from   
[facility]  
[employer] [place] [employer]   
Occupational Health -   
Occupational Stress   
Questionnaire  
Feeling of Stress :   
Only a little  
Social Connections:   
Unknown (2024)  
Received from   
[facility]  
Social Connection and   
Isolation Panel   
[NHANES]  
Frequency of   
Communication with   
Friends and Family:   
More than three times   
a  
week  
Frequency of Social   
Gatherings with   
Friends and Family:   
More than three times  
a week  
Attends [employer]   
Services: Patient   
declined  
Active Member of   
Clubs or   
Organizations: No  
Attends Club or   
Organization   
Meetings: Never  
Marital Status:   
  
Intimate Partner   
Violence: Not on file  
Housing Stability:   
Not on file  
No family history on   
file.  
Allergies:  
Allergies  
Allergen Reactions  
Silver Sulfadiazine   
Hives  
Sulfa Antibiotics  
OBJECTIVE:  
/78 (BP   
Location: Left arm,   
Patient Position:   
Sitting, BP Cuff   
Size: Large  
adult long)   Pulse   
62   Temp 37.3 ?C   
(99.1 ?F) (Temporal)   
  Ht 5' 6  (1.676  
m)   Wt 297 lb (135   
kg)   BMI 47.94 kg/m?  
GENERAL: No acute   
distress  
ABDOMEN: soft,   
non-distended,   
non-tender, no   
guarding, n (more   
content not   
included)...        Normal                                  [facility]  
   
                                                    CNOVSPon 2024   
   
                                        OVS              Visit (SP) Office   
(GABE)  
---------------------  
-----------------  
NAVDEEP GUILLEN   
([id number]) 1960   
F  
Date Time Provider   
Department  
24 10:30 AM   
VIKKI POSADA  
During your visit   
today, we recorded   
the following   
information about   
you:  
Temperature Pulse   
Blood pressure Weight  
97.7 degrees   
69/minute 156/86   
133.6 kg  
Vikki Posada APRN.CNP 2024   
10:22 AM Signed  
Chief Complaint  
Patient presents   
with:  
Established Patient  
HPI:  
Navdeep Guillen is a   
64 year old female   
who presents here   
today for follow up  
breast cancer.  
Per Dr. Milner/Sumaya's   
previous note:  
H/o hypertension and   
borderline   
diabetes/insulin   
resistant, obesity,   
who  
presented with an   
abnormal breast exam   
at her PCP office.   
Subsequent diagnostic  
mammogram revealing a   
lobulated lesion in   
the right breast at   
the lower outer  
quadrant highly   
suspicious of   
malignancy.  
Patient had a   
mammotome breast   
biopsy on 3/27/17   
that showed invasive   
ductal  
carcinoma, positive   
for estrogen and   
progesterone   
receptors, equivocal   
for  
overexpression   
HER-2/andi.  
She had surgery by   
Dr. Dueñas, right   
breast lumpectomy and   
right axillary  
sentinel lymph node   
biopsy on 17 for   
right breast cancer.  
Stage IA- pT1c pN0   
(3/3 sentinel lymph   
nodes negative for   
metastatic disease)  
Tumor size 1.5 cm x 1   
x 0.9 cm  
Overall grade 2 out   
of 7  
Margins - 0.4 cm from   
posterior margin  
ER/UT >95%, [id number]   
not amplified by FISH  
Lymph/vasc invasion -   
none; derm/vasc/lymph   
invasion - none  
Menstrual history:   
Menarche at age 11,   
first pregnancy at   
age 18, 8 pregnancy;  
surgical   
menopause-total   
abdominal   
hysterectomy age 54.   
No estrogen   
replacement  
therapy. Family   
history: Mother    
of ovarian cancer in   
her 40's. No family  
history of breast   
cancer. She was   
initially started on   
anastrozole, then  
subsequent switch to   
Aromasin because of   
joint pain.  
RADIATION-17 to   
17  
Right breast  
Current treatment:  
1) Aromasin 25 mg   
once daily  
No new concerns   
today.  
Appetite: Medium.    
Wt. down 6# since   
[employer]   
level: Some days   
better than  
others.   
Denies fevers or   
recent illness.  
Resp:denies cough or   
sob, +seasonal   
allergies  
Cardiac:denies chest   
pain/palpitations  
GI:+abd pain when   
flare from   
diverticulitis,   
denies n/v, bowels   
alternate  
between normal and   
constipation h/o   
diverticulitis-having   
surgery this year  
:denies   
dysuria/hematuria  
Extrem:denies new   
pain, L foot pain s/p   
surgery  
Endo:denies hot   
flashes  
Neuro:denies symptoms   
of neuropathy  
Skin:denies   
rashes/lesions  
Heme:denies bleeding  
The ROS is otherwise   
negative.  
Past medical history,   
appointments,   
medications,   
allergies reviewed.   
No changes.  
EXAM:  
/86   Pulse 69   
  Temp 36.5 ?C (97.7   
?F) (Temporal)   Wt   
133.6 kg (294  
lb 8.6 oz)   SpO2 96%   
  BMI 48.27 kg/m?  
APPEARANCE Well   
appearing, alert, in   
no acute distress,   
well-hydrated, well  
nourished.  
HEART RRR with normal   
S1 and S2, no murmurs  
LUNG clear to   
auscultation  
BREAST FEMALE no   
mass/nodule b/l, R   
radiation changes  
LYMPH NODES No   
cervical   
lymphadenopathy, No   
supraclavicular   
lymphadenopathy,  
and No axillary   
lymphadenopathy.  
ABDOMEN bowel sounds   
normoactive, soft,   
non-tender  
EXTREMITIES No edema  
NEURO Awake, alert   
and oriented x 3,   
Normal gait, and No   
involuntary motions.  
SKIN Skin color,   
texture, turgor   
normal, no suspicious   
rashes or lesions  
ASSESSMENT/PLAN:  
1. Malignant neoplasm   
of lower-outer   
quadrant of right   
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  
estrogen receptor   
positive (HCC) -   
ICD9: 174.5, V86.0,   
ICD10: C50.511, Z17.0  
(primary diagnosis)  
pT1c pN0 (3/3   
sentinel lymph nodes   
negative for   
metastatic disease)   
stage IA  
infiltrating ductal   
carcinoma the right   
breast. ER/UT >95%,   
[id number]  
non-amplified by   
FISH.  
- No concerning   
findings on exam.  
- Overall tolerating   
aromasin well.  
- Discontinue   
aromasin. Has   
completed over 7   
years of therapy.  
- Mammogram due mid   
2025.  
- Follow up in 6   
months.  
- Pt. aware to call   
office with any   
questions/concerns.  
The sensitive   
examination was   
discussed with the   
Patient or Patient's  
Authorized   
Representative. As   
applicable, any other   
physician, advance  
practice provider,   
medical student, or   
other health   
professional student   
that  
will be observing or   
involved in the   
sensitive examination   
for educational or  
training purposes was   
discussed with the   
Patient or Authorized   
Representative.  
The Patient or   
Authorized   
Representative has   
agreed to proceed   
with the  
sensitive   
examination.  
(Sensitive   
examination includes   
inspection and/or   
palpation of the   
breasts,  
pelvis, prostate and   
anorectal regions)  
The patient indicates   
understanding of   
these issues and   
agrees with the plan.  
All documentation   
from previous visit   
of 24-Dr. Shell/myself was   
copied and  
pasted, documentation   
has been reviewed and   
edited as necessary   
for today's  
visit.  
NIC Ugalde.CNP  
Referring Provider:   
VIKKI POSADA   
[[id number]]  
Allergies As of Date:   
20 (more   
content not   
included)...        Normal                                  [facility]   
   
                                        LENNY                Telephone (HEMAWS)  
---------------------  
-----------------  
NAVDEEP GUILLEN   
([id number]) 1960   
F  
Date Time Provider   
Department  
10/1/24 VIKKI POSADA  
During your visit   
today, we recorded   
the following   
information about   
you:  
Magui Munguia   
10/1/2024 10:43 AM   
Signed  
Patient called in to   
see if she needs labs   
drawn as she is on   
Aromasin. She is  
scheduled for Vikki   
for tomorrow AM.   
Please advise/file   
orders if labs are  
needed.  
Vikki Braun APRN.CNP 10/1/2024   
1:49 PM Signed  
PCP has ordered labs   
this year.  
No labs needed   
tomorrow.  
NIC Ugalde.Magui Posey   
10/1/2024 2:11 PM   
Signed  
Spoke to patient,   
advising below, and   
she stated   
understanding.  
Magui Munguia  
Allergies As of Date:   
10/01/2024 Noted   
Allergy Reaction  
SILVADENE (SILVER   
SULFADIAZINE)   
2017 4 - Hives  
SULFA (SULFONAMIDE   
ANTIBIOTICS)   
2021 4 - Hives  
Date Reviewed:   
2024  
Reviewed by:   
Tracy Galdamez LPN   
- Fully Assessed  
Reason for Visit:  
Lab Orders [[id number]]  
Prescriptions as of   
10/01/2024  
- potassium chloride   
20 mEq TbER  
Take 1 tablet by   
mouth two times a   
day.  
- atenolol (TENORMIN)   
50 mg tablet  
Take 1 tablet by   
mouth once daily.  
- chlorthalidone   
(HYGROTON) 25 mg   
tablet  
Take 0.5 tablets by   
mouth once daily.  
- glipiZIDE   
(GLUCOTROL XL) 2.5 mg   
24 hr tablet  
Take 1 tablet by   
mouth once daily.  
- metFORMIN ER   
(GLUCOPHAGE XR) 500   
mg 24 hr tablet  
Take 1 tablet by   
mouth daily with   
breakfast.  
- blood sugar   
diagnostic (BLOOD   
GLUCOSE TEST) test   
strip  
Test blood sugar(s) 2   
times daily. Dx: Type   
2 DM - Controlled   
E11.9  
Insulin: No  
- Lancets  
Test blood sugar(s) 2   
times daily. Dx: Type   
2 DM - Controlled   
E11.9  
Insulin: No  
- exemestane   
(AROMASIN) 25 mg   
tablet  
Take 1 tablet by   
mouth once daily.  
- ibuprofen (MOTRIN)   
800 mg tablet  
Take 1 tablet by   
mouth every 8 hours   
as needed for pain.   
Take with food.  
- blood sugar   
diagnostic (BLOOD   
GLUCOSE TEST) test   
strip  
Test blood sugars   
2daily.   
Dx:ucnontrolled   
diabetes . Insulin:   
Yes  
- lancets (ONE TOUCH   
DELICA) 33 gauge misc  
Test blood sugar(s) 2   
daily. Dx: Type 2 DM   
- Controlled E11.9   
Insulin: Yes  
- Blood-Glucose Meter   
monitoring kit  
Glucose Meter of   
Choice - Kit - Dx:   
Type 2 DM -   
Uncontrolled E11.65  
- MV-MN/FOLIC   
ACID/CALCIUM/VIT K   
(ONE-A-DAY WOMEN'S 50   
PLUS ORAL)  
Take 1 tablet by   
mouth once daily.  
Meds Comments as of   
2015:  
Patient only took the   
Etodolac for a few   
days and noticed no   
difference so she  
quit taking this   
medication.  
Problem List As Of   
Date 10/01/2024 Noted   
Resolved  
Morbid obesity with   
BMI of 40.0-44.9,   
adult (HC*2014  
Uterus disorder   
[N85.9] 2014  
Hypertension [I10]   
2014  
Hypercholesterolemia   
[E78.00] 2014  
Endometrial   
hyperplasia without   
atypia,   
complex*2014  
Uterine prolapse   
without mention of   
vaginal   
wal*2014  
Rectocele [N81.6]   
2014  
Complex endometrial   
hyperplasia with   
atypia [N8*2014  
Follow-up   
examination,   
following other   
surgery *2015  
Bilateral low back   
pain with left-sided   
sciatic*2016  
Peroneal tendonitis   
[M76.70] 2016  
Controlled diabetes   
mellitus type II   
without co*2016  
Hyperopia [H52.00]   
2016  
Presbyopia [H52.4]   
2016  
Benign neoplasm of   
skin of eyelid   
including   
can*2016  
Chronic bilateral low   
back pain with   
left-sided*2016  
Malignant neoplasm of   
lower-outer quadrant   
of r*05/10/2017  
ER+ UT+ carcinoma of   
breast (HCC)   
[C50.919,   
Z17*05/10/2017  
Family history of   
ovarian cancer   
[Z80.41] 05/10/2017  
GERD   
(gastroesophageal   
reflux disease)   
[K21.9]  
Encounter Number:   
[id number]  
Encounter   
Status:Closed by   
MAGUI MUNGUIA on   
10/1/24             [facility]  
   
                                                    CNOVon 2024   
   
                                        CNOV                Office Visit (UCWSTR  
)  
---------------------  
-----------------  
WILMERNAVDEEP   
([id number]) 1960   
F  
Date Time Provider   
Department  
24 5:00 PM   
FABIANA AMBROSIO [facility]  
During your visit   
today, we recorded   
the following   
information about   
you:  
Temperature Pulse   
Respiration Blood   
pressure  
98.5 degrees   
63/minute 18/minute   
122/78  
Weight  
136.9 kg  
Fabiana Ambrosio APRN.CNP 2024   
5:24 PM Signed  
This note was created   
using NoteWriter.  
Subjective  
Navdeep ADAMS Wilmer is a   
64 year old female.  
64 year old female   
with PMH HTN, GERD,   
DM presents for   
illness.  
Acute onset yesterday  
+sore throat  
+headache  
+pain with swallowing  
+enlarged lymph nodes  
Mild cough  
Denies N/V/D  
Denies skin rash or   
lesions.  
+exposure to strep,   
citing her grandson   
was over this past   
weekend and was  
positive for strep.  
Denies using   
homeopathic or OTC  
Endorses   would like   
to be tested for   
strep   
The history is   
provided by the   
patient. No language   
 was used.  
Sore Throat  
This is a new   
problem. The current   
episode started   
yesterday. The   
problem has  
been unchanged.   
Neither side of   
throat is   
experiencing more   
pain than the  
other. There has been   
no fever. The pain is   
at a severity of   
6/10. The pain is  
moderate. Associated   
symptoms include   
coughing, headaches   
and swollen glands.  
Pertinent negatives   
include no abdominal   
pain, congestion,   
diarrhea, drooling,  
ear discharge, ear   
pain, hoarse voice,   
plugged ear   
sensation, neck pain,  
shortness of breath,   
stridor, trouble   
swallowing or   
vomiting. She has had  
exposure to strep.   
She has had no   
exposure to mono. She   
has tried nothing for  
the symptoms. The   
treatment provided no   
relief.  
PAST MEDICAL HISTORY  
2021: Abnormal   
mammogram  
2015: Achilles   
tendon pain  
2015: Ankle   
weakness  
12/15/2016: Chronic   
pain of left ankle  
2014: Colon   
abnormality  
Comment: Thickening   
of the ascending   
colon seen on the   
recent CT  
scan. Patient has had   
a colonoscopy around   
4 years ago.  
Records were obtained   
for when they were   
done, 4 years  
ago , in [facility]. her   
colonoscopy was  
completely normal, no   
thickening, and the   
biopsy too was  
normal except for   
lymphoid aggregates.  
: Diverticulitis  
Comment: Treated by   
Dr. Patricio at [facility]  
2016: DJD   
(degenerative joint   
disease), ankle and   
foot  
No date: DM (diabetes   
mellitus) (HCC)  
No date: GERD   
(gastroesophageal   
reflux disease)  
05/15/2014: Gross   
hematuria  
Comment: , had   
hematuria,   
investigated   
extensively along  
with cytoscopy, a   
stone was found but   
no signs of any  
malignancy.  
12/15/2016: Heel   
pain, chronic  
10/03/2017: Hepatic   
steatosis  
10/03/2017: Hiatal   
hernia  
No date: Hypertension  
No date: Insomnia  
05/15/2014: Kidney   
stone  
No date: Morbid   
obesity (HCC)  
No date:   
Nephrolithiasis  
2016: Neuritis  
05/15/2014: Renal   
lesion  
2015: S/P   
Achilles tendon   
repair  
Comment: 2015   
sx done  
PAST SURGICAL HISTORY  
2017: BREAST   
LUMPECTOMY HX; Right  
2021: BX BREAST   
W/DEVICE 1ST LESION   
STEREOTACTIC GUID;   
Right  
: CHOLECYSTECTOMY  
Comment: gallbladder   
removal  
: COLONOSCOPY  
2014: CT   
ABDOMEN  
2014: PAST   
SURGICAL HISTORY OF  
Comment: MARISOL   
hysteroscopy  
2015: PAST   
SURGICAL HISTORY OF;   
Left  
Comment: Left foot   
surgery  
10/16/2014: S PK LAVH   
[id number]  
ALLERGIES Silvadene   
[Silver Sulfadiazine]   
and Sulfa   
(Sulfonamide   
Antibiotics)  
MEDICATIONS  
atenolol (TENORMIN)   
50 mg tablet Take 1   
tablet by mouth once   
daily.  
chlorthalidone   
(HYGROTON) 25 mg   
tablet Take 0.5   
tablets by mouth once   
daily.  
glipiZIDE (GLUCOTROL   
XL) 2.5 mg 24 hr   
tablet Take 1 tablet   
by mouth once  
daily.  
metFORMIN ER   
(GLUCOPHAGE XR) 500   
mg 24 hr tablet Take   
1 tablet by mouth   
daily  
with breakfast.  
blood sugar   
diagnostic (BLOOD   
GLUCOSE TEST) test   
strip Test blood   
sugar(s) 2  
times daily. Dx: Type   
2 DM - Controlled   
E11.9 Insulin: No  
Lancets Test blood   
sugar(s) 2 times   
daily. Dx: Type 2 DM   
- Controlled E11.9  
Insulin: No  
exemestane (AROMASIN)   
25 mg tablet Take 1   
tablet by mouth once   
daily.  
potassium chloride 20   
mEq TbER Take 1   
tablet by mouth two   
times a day.  
ibuprofen (MOTRIN)   
800 mg tablet Take 1   
tablet by mouth every   
8 hours as  
needed for pain. Take   
with food.  
blood sugar   
diagnostic (BLOOD   
GLUCOSE TEST) test   
strip Test blood   
sugars  
2daily.   
Dx:ucnontrolled   
diabetes . Insulin:   
Yes  
lancets (ONE TOUCH   
DELICA) 33 gauge misc   
Test blood sugar(s) 2   
daily. Dx:  
Type 2 DM -   
Controlled E11.9   
Insulin: Yes  
Blood-Glucose Meter   
monitoring kit   
Glucose Meter of   
Choice - Kit - Dx:   
Type 2  
DM - Uncontrolled   
E11.65  
MV-MN/FOLIC   
ACID/CALCIUM/VIT K   
(ONE-A-DAY WOMEN'S 50   
PLUS ORAL) Take 1   
tablet  
by mouth once daily.  
FAMILY HISTORY  
Problem Relation Age   
of Onset  
Ovarian cancer Mother   
39  
Heart Father  
Hypertension Father  
Prostate Cancer   
Father 78  
other (kidney stones)   
Brother  
Hypertension Brother  
Diabetes Brother  
Seizures Son  
Breast Cancer Other   
(more content not   
included)...        Normal                                  [facility]  
   
                                                    STREP A MOLECULAR (POC)on    
   
                      Procedural Control Valid                            [facility]  
   
                      Strep A (POCT) Negative              Negative   [facility]  
   
                                                    Emilia 2024   
   
                                        CNPN                Telephone (INTMWS)  
---------------------  
-----------------  
NAVDEEP GUILLEN   
([id number]) 1960   
F  
Date Time Provider   
Department  
24 CYNTHIA DOUGLAS   
INTPHUWS  
During your visit   
today, we recorded   
the following   
information about   
you:  
Ani Lin LPN 2024 9:50 AM   
Signed  
Pt called upset that   
she got a letter that   
instructed her Dr. Douglas was  
terminated from   
[facility].   
Pt asking to please   
check into this. Pt  
repots she told them   
she would go with Sherly An ut would like   
us to check into  
this. Pt would like   
to stay with Dr. Douglas.  
Please advise pt of   
findings.  
SAMMI Chavira Chitra, MD   
2024 10:14 AM   
Signed  
Please let them know   
that the system has   
to be updated, and it   
should soon be ,  
I will still see her   
and she will remain   
my patient.  
Regards,  
Manisha Gaona MD, MA   
2024 10:48 AM   
Signed  
Patient notified.   
Patient will fax over   
copy of letter   
received.  
Allergies As of Date:   
2024 Noted   
Allergy Reaction  
SILVADENE (SILVER   
SULFADIAZINE)   
2017 4 - Hives  
SULFA (SULFONAMIDE   
ANTIBIOTICS)   
2021 4 - Hives  
Date Reviewed:   
2024  
Reviewed by:   
Vikki Posada APRN.CNP - Fully   
Assessed  
Prescriptions as of   
2024  
- atenolol (TENORMIN)   
50 mg tablet  
Take 1 tablet by   
mouth once daily.  
- chlorthalidone   
(HYGROTON) 25 mg   
tablet  
Take 0.5 tablets by   
mouth once daily.  
- glipiZIDE   
(GLUCOTROL XL) 2.5 mg   
24 hr tablet  
Take 1 tablet by   
mouth once daily.  
- metFORMIN ER   
(GLUCOPHAGE XR) 500   
mg 24 hr tablet  
Take 1 tablet by   
mouth daily with   
breakfast.  
- blood sugar   
diagnostic (BLOOD   
GLUCOSE TEST) test   
strip  
Test blood sugar(s) 2   
times daily. Dx: Type   
2 DM - Controlled   
E11.9  
Insulin: No  
- Lancets  
Test blood sugar(s) 2   
times daily. Dx: Type   
2 DM - Controlled   
E11.9  
Insulin: No  
- exemestane   
(AROMASIN) 25 mg   
tablet  
Take 1 tablet by   
mouth once daily.  
- potassium chloride   
20 mEq TbER  
Take 1 tablet by   
mouth two times a   
day.  
- ibuprofen (MOTRIN)   
800 mg tablet  
Take 1 tablet by   
mouth every 8 hours   
as needed for pain.   
Take with food.  
- blood sugar   
diagnostic (BLOOD   
GLUCOSE TEST) test   
strip  
Test blood sugars   
2daily.   
Dx:ucnontrolled   
diabetes . Insulin:   
Yes  
- lancets (ONE TOUCH   
DELICA) 33 gauge misc  
Test blood sugar(s) 2   
daily. Dx: Type 2 DM   
- Controlled E11.9   
Insulin: Yes  
- Blood-Glucose Meter   
monitoring kit  
Glucose Meter of   
Choice - Kit - Dx:   
Type 2 DM -   
Uncontrolled E11.65  
- MV-MN/FOLIC   
ACID/CALCIUM/VIT K   
(ONE-A-DAY WOMEN'S 50   
PLUS ORAL)  
Take 1 tablet by   
mouth once daily.  
Meds Comments as of   
2015:  
Patient only took the   
Etodolac for a few   
days and noticed no   
difference so she  
quit taking this   
medication.  
Problem List As Of   
Date 2024 Noted   
Resolved  
Morbid obesity with   
BMI of 40.0-44.9,   
adult (HC*2014  
Uterus disorder   
[N85.9] 2014  
Hypertension [I10]   
2014  
Hypercholesterolemia   
[E78.00] 2014  
Endometrial   
hyperplasia without   
atypia,   
complex*2014  
Uterine prolapse   
without mention of   
vaginal   
wal*2014  
Rectocele [N81.6]   
2014  
Complex endometrial   
hyperplasia with   
atypia [N8*2014  
Follow-up   
examination,   
following other   
surgery *2015  
Bilateral low back   
pain with left-sided   
sciatic*2016  
Peroneal tendonitis   
[M76.70] 2016  
Controlled diabetes   
mellitus type II   
without co*2016  
Hyperopia [H52.00]   
2016  
Presbyopia [H52.4]   
2016  
Benign neoplasm of   
skin of eyelid   
including   
can*2016  
Chronic bilateral low   
back pain with   
left-sided*2016  
Malignant neoplasm of   
lower-outer quadrant   
of r*05/10/2017  
ER+ UT+ carcinoma of   
breast (HCC)   
[C50.919,   
Z17*05/10/2017  
Family history of   
ovarian cancer   
[Z80.41] 05/10/2017  
GERD   
(gastroesophageal   
reflux disease)   
[K21.9]  
Encounter Number:   
[id number]  
Encounter   
Status:Closed by   
MANISHA JESUS on 24 [facility]  
   
                                                    CNOVon 2024   
   
                                        CNOV                Office Visit (INTMWS  
)  
---------------------  
-----------------  
NAVDEEP GUILLEN   
([id number]) 1960   
F  
Date Time Provider   
Department  
24 9:40 AM   
SHERLY AN INTHARISH  
During your visit   
today, we recorded   
the following   
information about   
you:  
Pulse Respiration   
Blood pressure Weight  
64/minute 16/minute   
116/78 136.5 kg  
Sherly An APRN.CNP   
2024 10:12 AM   
Signed  
CC: Patient presents   
with:  
Recheck: 6 month   
follow up  
HPI  
Navdeepflavio Guillen is a   
64 year old female   
who presents today   
for routine follow  
up.  
DIABETES MELLITUS:   
Ms. Guillen denies   
excessive thirst or   
increased frequency   
of  
urination, chest pain   
or dyspnea ,   
numbness, tingling or   
pain in extremities,  
new or unusual visual   
symptoms, low   
sugar/hypoglycemic   
reactions, weight  
loss/gain,   
lightheadedness/dizzi  
ness, and bowel   
changes/loose stools.  
Follows a diabetic   
diet most of the   
time. She is   
compliant with   
medication(s)  
and is tolerating   
med(s) without any   
side effects. She   
reports checking her  
glucose on a twice a   
day schedule with   
sugars in the fasting   
130s and  
postprandial 90s-110s   
range. Patient's last   
HgA1C was  
Hemoglobin A1C (%)  
Date Value  
2024 6.2  
2023 6.5  
07/15/2020 7.7  
2020 7.6  
Hemoglobin A1C (POCT)   
(%)  
Date Value  
10/26/2021 7.1  
)  
Last Ophthalmology   
exam was within the   
past 6 months at   
[facility] in [place]  
HTN: Ms. Guillen   
indicates that she is   
feeling well and   
denies any symptoms  
referable to elevated   
blood pressure.   
Specifically denies   
headache, chest pain,  
palpitations,   
dyspnea, and   
peripheral edema.   
Patient denies any   
side effects  
of her medication(s)   
and is compliant with   
their regimen. She   
does check BP's  
away from this office   
with average BP's in   
the 110s/80s range.   
Navdeep works out  
regularly 7 times per   
week with walking.   
She watches her diet   
for sodium, low  
fat and low   
cholesterol most of   
the time.  
Last 3 Encounter BP   
Readings:  
Date: BP:  
2024 116/78  
2024 99/69  
2024 146/80  
Right Breast CA: Had   
lumpectomy in 2017.   
Follows with oncology   
and still on  
aromasin  
Allergies starting   
this morning with   
runny nose and itchy   
eyes with sneezing.  
Denies fever, chills,   
headaches, dizziness,   
cough, wheezing, or   
shortness of  
breath.  
REVIEW OF SYSTEMS  
See HPI  
PAST MEDICAL HISTORY  
Diagnosis Date  
Abnormal mammogram   
2021  
Achilles tendon pain   
2015  
Ankle weakness   
2015  
Chronic pain of left   
ankle 12/15/2016  
Colon abnormality   
2014  
Thickening of the   
ascending colon seen   
on the recent CT   
scan. Patient has had  
a colonoscopy around   
4 years ago. Records   
were obtained for   
when they were  
done, 4 years ago ,   
in [facility]. her   
colonoscopy was   
completely  
normal, no   
thickening, and the   
biopsy too was normal   
except for lymphoid  
aggregates.  
Diverticulitis   
Treated by Dr. Patricio at [facility]  
DJD (degenerative   
joint disease), ankle   
and foot 2016  
DM (diabetes   
mellitus) (HCC)  
GERD   
(gastroesophageal   
reflux disease)  
Gross hematuria   
05/15/2014  
2014, had hematuria,   
investigated   
extensively along   
with cytoscopy, a   
stone  
was found but no   
signs of any   
malignancy.  
Heel pain, chronic   
12/15/2016  
Hepatic steatosis   
10/03/2017  
Hiatal hernia   
10/03/2017  
Hypertension  
Insomnia  
Kidney stone   
05/15/2014  
Morbid obesity (HCC)  
Nephrolithiasis  
Neuritis 2016  
Renal lesion   
05/15/2014  
S/P Achilles tendon   
repair 2015 sx done  
PAST SURGICAL HISTORY  
Procedure Laterality   
Date  
BREAST LUMPECTOMY HX   
Right 2017  
BX BREAST W/DEVICE   
1ST LESION   
STEREOTACTIC GUID   
Right 2021  
CHOLECYSTECTOMY   
gallbladder removal  
COLONOSCOPY   
CT ABDOMEN 2014  
PAST SURGICAL HISTORY   
OF 2014  
[facility] hysteroscopy  
PAST SURGICAL HISTORY   
OF Left 2015  
Left foot surgery  
S PK LAV [id number]   
10/16/2014  
ALLERGIES Silvadene   
[Silver Sulfadiazine]   
and Sulfa   
(Sulfonamide   
Antibiotics)  
MEDICATIONS  
exemestane (AROMASIN)   
25 mg tablet Take 1   
tablet by mouth once   
daily.  
potassium chloride 20   
mEq TbER Take 1   
tablet by mouth two   
times a day.  
ibuprofen (MOTRIN)   
800 mg tablet Take 1   
tablet by mouth every   
8 hours as  
needed for pain. Take   
with food.  
metFORMIN ER   
(GLUCOPHAGE XR) 500   
mg 24 hr tablet Take   
1 tablet by mouth   
daily  
with breakfast.  
blood sugar   
diagnostic (BLOOD   
GLUCOSE TEST) test   
strip Test blood   
sugars  
2daily.   
Dx:ucnontrolled   
diabetes . Insulin:   
Yes  
chlorthalidone   
(HYGROTON) 25 mg   
tablet Take 0.5   
tablets by mouth once   
daily.  
atenolol (TENORMIN)   
50 mg tablet Take 1   
tablet by mouth once   
daily.  
glipiZIDE (GLUCOTROL   
XL) 2.5 mg 24 hr   
tablet Take 1 tablet   
by mouth once  
daily.  
lancets (ONE TOUCH   
DELICA) 33 gauge misc   
Test blood sugar(s) 2   
daily. Dx:  
Type 2 DM -   
Controlled E11.9   
Insulin: Yes  
Blood-Glucose Meter   
monitoring kit   
Glucose Meter of   
Choice - Kit - Dx:   
Type 2  
DM - Uncontrolled   
E11.65  
MV-MN/FOLIC   
ACID/CALCIUM/VIT K   
(ONE-A-DAY WOMEN'S 50   
PLUS ORAL) Take 1   
tablet  
by mouth onc (more   
content not   
included)...        Normal                                  [facility]  
   
                                                    CBC panel Auto (Bld)on    
   
                                                    Erythrocyte   
distribution width   
(RBC) [Ratio]   14.8 %          Normal          11.5-15.0       [facility]  
   
                                        Comment on above:   Order Comment: Speci  
men Type: BLOOD SPECIMENOrdering Facility:  
   
[facility] Address: [address] [place] [address]   
   
                                                            Performed By: #### 5  
8410-2 ####[facility] CLAUDIO GAYKAI [id number] [place] [address]   
[place]   
   
                                                    Hematocrit (Bld)   
[Volume fraction] 46.3 %          High            36.0-46.0       [facility]  
   
                                        Comment on above:   Order Comment: Speci  
men Type: BLOOD SPECIMENOrdering Facility:  
  
[facility] Address: [address] [place] [address]   
   
                                                            Performed By: #### 5  
8410-2 ####[facility]   
THEODOREBrewertonNCMINGO [id number] [place] [address]   
[place]   
   
                                                    Hemoglobin (Bld)   
[Mass/Vol]      15.4 g/dL       Normal          11.5-15.5       [facility]  
   
                                        Comment on above:   Order Comment: Speci  
men Type: BLOOD SPECIMENOrdering Facility:  
   
[facility] Address: [address] [place] [address]   
   
                                                            Performed By: #### 5  
8410-2 ####[facility] [id number] [place] [address]   
[place]   
   
                                                    MCH (RBC) [Entitic   
mass]           27.3 pg         Normal          26.0-34.0       [facility]  
   
                                        Comment on above:   Order Comment: Speci  
men Type: BLOOD SPECIMENOrdering Facility:  
  
[facility] Address: [address] [place] [address]   
   
                                                            Performed By: #### 5  
8410-2 ####[facility] [id number] [place] [address]   
[place]   
   
                      MCHC (RBC) [Mass/Vol] 33.3 g/dL  Normal     30.5-36.0  [facility]  
   
                                        Comment on above:   Order Comment: Speci  
men Type: BLOOD SPECIMENOrdering Facility:  
   
[facility] Address: [address] [place] [address]   
   
                                                            Performed By: #### 5  
8410-2 ####[facility] [id number] [place] [address]   
[place]   
   
                      MCV (RBC) [Entitic vol] 82.1 fL    Normal     80.0-100.0 C  
[facility]  
   
                                        Comment on above:   Order Comment: Speci  
men Type: BLOOD SPECIMENOrdering Facility:  
  
[facility] Address: [address] [place] [address]   
   
                                                            Performed By: #### 5  
8410-2 ####[facility]   
THEODOREBrewertonDEANDRAA [id number] [place] [address]   
[place]   
   
                                                    Nucleated RBC (Bld)   
[#/Vol]         10*3/uL         Normal          <0.01           [facility]  
   
                                        Comment on above:   Order Comment: Speci  
men Type: BLOOD SPECIMENOrdering Facility:  
   
[facility] Address: [address] [place] [address]   
   
                                                            Performed By: #### 5  
8410-2 ####[facility] [id number] [place] [address]   
[place]   
   
                                                    Platelet mean volume   
(Bld) [Entitic vol] 10.3 fL         Normal          9.0-12.7        [facility]  
   
                                        Comment on above:   Order Comment: Speci  
men Type: BLOOD SPECIMENOrdering Facility:  
  
[facility] Address: [address] [place] [address]   
   
                                                            Performed By: #### 5  
8410-2 ####[facility] [id number] [place] [address]   
[place]   
   
                      Platelets (Bld) [#/Vol] 280 10*3/uL Normal     150-400      
[facility]  
   
                                        Comment on above:   Order Comment: Speci  
men Type: BLOOD SPECIMENOrdering Facility:  
   
[facility] Address: [address] [place] [address]   
   
                                                            Performed By: #### 5  
8410-2 ####[facility] [id number] [place] [address]   
[place]   
   
                      RBC (Bld) [#/Vol] 5.64 10*6/uL High       3.90-5.20  [facility]  
   
                                        Comment on above:   Order Comment: Speci  
men Type: BLOOD SPECIMENOrdering Facility:  
   
[facility] Address: [address] [place] [address]   
   
                                                            Performed By: #### 5  
8410-2 ####[facility] [id number] [place] [address]   
[place]   
   
                      WBC (Bld) [#/Vol] 9.50 10*3/uL Normal     3.70-11.00 [facility]  
   
                                        Comment on above:   Order Comment: Speci  
men Type: BLOOD SPECIMENOrdering Facility:  
   
[facility] Address: [facility] [place] [address]   
   
                                                            Performed By: #### 5  
8410-2 ####[facility]   
THEODOREBrewertonNCLIA [id number] [place] [id number]   
[place]   
   
                                                    CNOVSPon 2024   
   
                                        CNOVSP              Visit (SP) Office   
(HEMAWS)  
---------------------  
-----------------  
NAVDEEP GUILLEN   
([id number]) 1960   
F  
Date Time Provider   
Department  
24 10:30 AM   
VIKKI POSADA  
During your visit   
today, we recorded   
the following   
information about   
you:  
Temperature Pulse   
Blood pressure Weight  
97.9 degrees   
67/minute 99/69 136.8   
kg  
Vikki Posada APRN.CNP 2024   
10:34 AM Signed  
Chief Complaint  
Patient presents   
with:  
Established Patient  
HPI:  
Navdeep LAMAS Wilmer is a   
64 year old female   
who presents here   
today for follow up  
breast cancer.  
Per Dr. Milner/Sumaya's   
previous note:  
H/o hypertension and   
borderline   
diabetes/insulin   
resistant, obesity,   
who  
presented with an   
abnormal breast exam   
at her PCP office.   
Subsequent diagnostic  
mammogram revealing a   
lobulated lesion in   
the right breast at   
the lower outer  
quadrant highly   
suspicious of   
malignancy.  
Patient had a   
mammotome breast   
biopsy on 3/27/17   
that showed invasive   
ductal  
carcinoma, positive   
for estrogen and   
progesterone   
receptors, equivocal   
for  
overexpression   
HER-2/andi.  
She had surgery by   
Dr. Dueñas, right   
breast lumpectomy and   
right axillary  
sentinel lymph node   
biopsy on 17 for   
right breast cancer.  
Stage IA- pT1c pN0   
(3/3 sentinel lymph   
nodes negative for   
metastatic disease)  
Tumor size 1.5 cm x 1   
x 0.9 cm  
Overall grade 2 out   
of 7  
Margins - 0.4 cm from   
posterior margin  
ER/UT >95%, [id number]   
not amplified by FISH  
Lymph/vasc invasion -   
none; derm/vasc/lymph   
invasion - none  
Menstrual history:   
Menarche at age 11,   
first pregnancy at   
age 18, 8 pregnancy;  
surgical   
menopause-total   
abdominal   
hysterectomy age 54.   
No estrogen   
replacement  
therapy. Family   
history: Mother    
of ovarian cancer in   
her 40's. No family  
history of breast   
cancer. She was   
initially started on   
anastrozole, then  
subsequent switch to   
Aromasin because of   
joint pain.  
RADIATION-17 to   
17  
Right breast  
Current treatment:  
1) Aromasin 25 mg   
once daily  
No new concerns   
today.  
Appetite: Ok.  Wt.   
stable. Energy   
level: It's good.   
Denies fevers or   
recent illness.  
Resp:denies cough or   
sob, +seasonal   
allergies  
Cardiac:denies chest   
pain/palpitations  
GI:denies abd pain,   
n/v, moving bowels   
regularly h/o   
diverticulitis  
:denies   
dysuria/hematuria  
Extrem:denies new   
pain, L foot pain s/p   
surgery  
Endo:denies hot flash  
Neuro:denies symptoms   
of neuropathy  
Skin:denies   
rashes/lesions  
Heme:denies bleeding  
The ROS is otherwise   
negative.  
Past medical history,   
appointments,   
medications,   
allergies reviewed.   
No changes.  
EXAM:  
BP 99/69   Pulse 67     
Temp 36.6 ?C (97.9   
?F) (Temporal)   Wt   
(!) 136.8 kg  
(301 lb 9.6 oz)     
SpO2 96%   BMI 49.43   
kg/m?  
APPEARANCE Well   
appearing, alert, in   
no acute distress,   
well-hydrated, well  
nourished.  
HEART RRR with normal   
S1 and S2, no murmurs  
LUNG clear to   
auscultation  
BREAST FEMALE no   
mass/nodule b/l, R   
radiation changes  
LYMPH NODES No   
cervical   
lymphadenopathy, No   
supraclavicular   
lymphadenopathy,  
and No axillary   
lymphadenopathy.  
ABDOMEN bowel sounds   
normoactive, soft,   
non-tender  
EXTREMITIES No edema  
NEURO Awake, alert   
and oriented x 3,   
Normal gait, and No   
involuntary motions.  
SKIN Skin color,   
texture, turgor   
normal, no suspicious   
rashes or lesions  
RADIOLOGY:  
Mammogram 24:  
IMPRESSION: BENIGN   
FINDING  
There is no   
mammographic evidence   
of malignancy. A 1   
year screening  
mammogram is   
recommended.  
ASSESSMENT/PLAN:  
1. Malignant neoplasm   
of lower-outer   
quadrant of right   
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  
estrogen receptor   
positive (HCC) -   
ICD9: 174.5, V86.0,   
ICD10: C50.511, Z17.0  
pT1c pN0 (3/3   
sentinel lymph nodes   
negative for   
metastatic disease)   
stage IA  
infiltrating ductal   
carcinoma the right   
breast. ER/UT >95%,   
[id number]  
non-amplified by   
FISH.  
- No concerning   
findings on exam.  
- Overall tolerating   
aromasin well.  
- Reviewed mammogram   
with pt.  
- Continue aromasin.  
- Mammogram due mid   
2025.  
- Follow up in 6   
months.  
- Pt. aware to call   
office with any   
questions/concerns.  
The patient indicates   
understanding of   
these issues and   
agrees with the plan.  
All documentation   
from previous visit   
of 10/2/23-Dr. Shell/myself was   
copied  
and pasted,   
documentation has   
been reviewed and   
edited as necessary   
for today's  
visit.  
NIC Ugalde.CNP  
Referring Provider:   
VIKKI POSADA   
[[id number]]  
Allergies As of Date:   
2024 Noted   
Allergy Reaction  
SILVADENE (SILVER   
SULFADIAZINE)   
2017 4 - Hives  
SULFA (SULFONAMIDE   
ANTIBIOTICS)   
2021 4 - Hives  
Date Reviewed:   
2024  
Reviewed by:   
Vikki Posada APRN.CNP - Fully   
Assessed  
Reason for Visit:  
Established Patient   
[175]  
Primary Visit   
Diagnosis:Malignant   
neoplasm of   
lower-outer quadrant   
of right  
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   
estrogen receptor   
positive (HCC)  
[C50.511, Z17.0]  
Other Visit   
Diagnosis:Morbid   
obesity with BMI of   
40.0-44.9, adult   
(HCC)  
[E66.01, Z68.41]  
Follow-up and   
Disposition History   
for Encounter  
Date Provider   
Department Center   
(more content not   
included)...        Normal                                  [facility]  
   
                                                    Comprehensive metabolic 2000  
 panelon 2024   
   
                      Albumin [Mass/Vol] 4.0 g/dL   Normal     3.9-4.9    [facility]  
   
                                        Comment on above:   Order Comment: Speci  
men Type: BLOOD SPECIMENOrdering Facility:  
   
[facility] Address: [address] [place] [address]   
   
                                                            Performed By: #### 2  
4323-8 ####[facility]   
MILLTOWNCLIA [id number] [place] [address]   
[place]   
   
                                                    ALP [Catalytic   
activity/Vol]   132 U/L         High                      [facility]  
   
                                        Comment on above:   Order Comment: Speci  
men Type: BLOOD SPECIMENOrdering Facility:  
  
[facility] Address: [address] [place] [address]   
   
                                                            Performed By: #### 2  
4323-8 ####[facility]   
MILLTOWNCLIA [id number] [place] [address]   
[place]   
   
                                                    ALT [Catalytic   
activity/Vol]   23 U/L          Normal          7-38            [facility]  
   
                                        Comment on above:   Order Comment: Speci  
men Type: BLOOD SPECIMENOrdering Facility:  
  
[facility] Address: [address] [place] [address]   
   
                                                            Performed By: #### 2  
4323-8 ####[facility] [id number] [place] [address]   
[place]   
   
                      Anion gap [Moles/Vol] 11 mmol/L  Normal     9-18       [facility]  
   
                                        Comment on above:   Order Comment: Speci  
men Type: BLOOD SPECIMENOrdering Facility:  
   
[facility] Address: [address] [place] [address]   
   
                                                            Performed By: #### 2  
4323-8 ####[facility]   
MILLTOWNCLIA [id number] [place] [address]   
[place]   
   
                                                    AST [Catalytic   
activity/Vol]   17 U/L          Normal          13-35           [facility]  
   
                                        Comment on above:   Order Comment: Speci  
men Type: BLOOD SPECIMENOrdering Facility:  
  
[facility] Address: [address] [place] [address]   
   
                                                            Performed By: #### 2  
4323-8 ####[facility] [id number] [place] [address]   
[place]   
   
                      Bilirubin [Mass/Vol] 0.3 mg/dL  Normal     0.2-1.3    [facility]  
   
                                        Comment on above:   Order Comment: Speci  
men Type: BLOOD SPECIMENOrdering Facility:  
   
[facility] Address: [address] [place] [address]   
   
                                                            Performed By: #### 2  
4323-8 ####[facility] [id number] [place] [address]   
[place]   
   
                      Calcium [Mass/Vol] 10.3 mg/dL High       8.5-10.2   [facility]  
   
                                        Comment on above:   Order Comment: Speci  
men Type: BLOOD SPECIMENOrdering Facility:  
   
[facility] Address: [address] [place] [address]   
   
                                                            Performed By: #### 2  
4323-8 ####[facility] [id number] [place] [address]   
[place]   
   
                      Chloride [Moles/Vol] 105 mmol/L Normal          [facility]  
   
                                        Comment on above:   Order Comment: Speci  
men Type: BLOOD SPECIMENOrdering Facility:  
   
[facility] Address: [address] [place] [address]   
   
                                                            Performed By: #### 2  
4323-8 ####[facility] [id number] [place] [address]   
[place]   
   
                      CO2 [Moles/Vol] 22 mmol/L  Normal     22-30      [facility]  
   
                                        Comment on above:   Order Comment: Speci  
men Type: BLOOD SPECIMENOrdering Facility:  
   
[facility] Address: [address] [place] [address]   
   
                                                            Performed By: #### 2  
4323-8 ####[facility] [id number] [place] [address]   
[place]   
   
                      Creatinine [Mass/Vol] 0.72 mg/dL Normal     0.58-0.96  [facility]  
   
                                        Comment on above:   Order Comment: Speci  
men Type: BLOOD SPECIMENOrdering Facility:  
   
[facility] Address: [address] [place] [address]   
   
                                                            Performed By: #### 2  
4323-8 ####[facility] [id number] [place] [address]   
[place]   
   
                                                    Creatinine and   
Glomerular filtration   
rate.predicted panel   
(S/P/Bld)       92 mL/min/1.73m??? Normal          >=60            [facility]  
   
                                        Comment on above:   Order Comment: Speci  
men Type: BLOOD SPECIMENOrdering Facility:  
   
[facility] Address: [address] [place] [address]   
   
                                                            Result Comment: Orly  
mated Glomerular Filtration Rate (eGFR) is   
calculated using the  CKD-EPI creatinine equation. This   
equation utilizes serum creatinine, sex, and age as   
parameters. The creatinine assay has traceable calibration to   
isotope dilution-mass spectrometry. Refer to KDIGO guidelines   
for clinical interpretation. In patients with unstable renal   
function, e.g. those with acute kidney injury, the eGFR may   
not accurately reflect actual GFR.   
   
                                                            Performed By: #### 2  
4323-8 ####[facility] [id number] [place] [address]   
[place]   
   
                      Glucose [Mass/Vol] 141 mg/dL  High       74-99      [facility]  
   
                                        Comment on above:   Order Comment: Speci  
men Type: BLOOD SPECIMENOrdering Facility:  
   
[facility] Address: [address] [place] [address]   
   
                                                            Result Comment: The   
American Diabetes Association (ADA)   
provides guidance for cutoff values for fasting glucose and   
random glucose. The ADA defines fasting as no caloric intake   
for at least 8 hours. Fasting plasma glucose results between   
100 to 125 mg/dL indicate increased risk for diabetes   
(prediabetes).  
Fasting plasma glucose results greater than or equal to 126   
mg/dL meet the criteria for diagnosis of diabetes. In the   
absence of unequivocal hyperglycemia, results should be   
confirmed by repeat testing. In a patient with classic   
symptoms of hyperglycemia or hyperglycemic crisis, random   
plasma glucose results greater than or equal to 200 mg/dL meet   
the criteria for diagnosis of diabetes.  
Reference: Standards of Medical Care in Diabetes 2016,   
American Diabetes Association. Diabetes Care. 2016.39(Suppl   
1).   
   
                                                            Performed By: #### 2  
4323-8 ####[facility]   
MILLTOWNCLIA [id number] [place] [address]   
[place]   
   
                      Potassium [Moles/Vol] 3.8 mmol/L Normal     3.7-5.1    [facility]  
   
                                        Comment on above:   Order Comment: Speci  
men Type: BLOOD SPECIMENOrdering Facility:  
   
[facility] Address: [address] [place] [address]   
   
                                                            Performed By: #### 2  
4323-8 ####[facility]   
MILLWNCLIA [id number] [place] [address]   
[place]   
   
                      Protein [Mass/Vol] 7.5 g/dL   Normal     6.3-8.0    [facility]  
   
                                        Comment on above:   Order Comment: Speci  
men Type: BLOOD SPECIMENOrdering Facility:  
   
[facility] Address: [address] [place] [address]   
   
                                                            Performed By: #### 2  
4323-8 ####[facility] [id number] [place] [address]   
[place]   
   
                      Sodium [Moles/Vol] 138 mmol/L Normal     136-144    [facility]  
   
                                        Comment on above:   Order Comment: Speci  
men Type: BLOOD SPECIMENOrdering Facility:  
   
[facility] Address: [address] [place] [address]   
   
                                                            Performed By: #### 2  
4323-8 ####[facility]   
MILLTOWNCLIA [id number] [place] [address]   
[place]   
   
                                                    Urea nitrogen   
[Mass/Vol]      11 mg/dL        Normal          7-21            [facility]  
   
                                        Comment on above:   Order Comment: Speci  
men Type: BLOOD SPECIMENOrdering Facility:  
   
[facility] Address: [address] [place] [address]   
   
                                                            Performed By: #### 2  
4323-8 ####[facility]CL[facility] [id number] [place] [address]   
[place]   
   
                                                    HbA1c (Bld)on 2024   
   
                                                    Average glucose   
Estimated from glycated   
hemoglobin (Bld)   
[Mass/Vol]      131 mg/dL       Normal                          [facility]  
   
                                        Comment on above:   Order Comment: Speci  
men Type: BLOOD SPECIMENOrdering Facility:  
   
[facility] Address: [address] [place] [address]   
   
                                                            Result Comment: eAG:  
 (Estimated average glucose) is a   
calculated value from HgbA1c and is representative of the   
average blood glucose level in the last 2-3 month period.   
   
                                                            Performed By: #### 5  
5454-3 ####[facility]   
LABCLIA [id number] [place] [address] [place]   
   
                                                    HbA1c (Bld) [Mass   
fraction]       6.2 %           High            4.3-5.6         [facility]  
   
                                        Comment on above:   Order Comment: Speci  
men Type: BLOOD SPECIMENOrdering Facility:  
  
[facility] Address: [address] [place] [address]   
   
                                                            Result Comment: Amer  
ican Diabetes Association guidelines   
indicate that patients with HgbA1c in the range 5.7-6.4% are   
at increased risk for development of diabetes, and   
intervention by lifestyle modification may be beneficial.   
HgbA1c greater or equal to 6.5% is considered diagnostic of   
diabetes.   
   
                                                            Performed By: #### 5  
5454-3 ####[facility]   
LABCLIA [id number] [place] [address] [place]   
   
                                                    Lipid 1996 panelon   
4   
   
                      Cholesterol [Mass/Vol] 170 mg/dL  Normal     <200       [facility]  
   
                                        Comment on above:   Order Comment: Speci  
men Type: BLOOD SPECIMENOrdering Facility:  
   
[facility] Address: [address] [place] [address]   
   
                                                            Result Comment: <200  
 mg/dL, Desirable  
200-239 mg/dL, Borderline high  
>239 mg/dL, High   
   
                                                            Performed By: #### 2  
4331-1 ####[facility]   
LABCLIA [id number] [address] STATES OF [facility] [id number] [place] [address]   
[place]   
   
                                                    Cholesterol in HDL   
[Mass/Vol]      37 mg/dL        Low             >39             [facility]  
   
                                        Comment on above:   Order Comment: Donna  
men Type: BLOOD SPECIMENOrdering Facility:  
   
[facility] Address: [address] [place] [address]   
   
                                                            Result Comment: 40-5  
9 mg/dL, Acceptable  
>59 mg/dL, High: Negative risk factor for coronary heart   
disease  
<40 mg/dL, Low: Positive risk factor for coronary heart   
disease   
   
                                                            Performed By: #### 2  
4331-1 ####[facility]   
LABCLIA [id number] [address] [id number] [address] [place]   
   
                                                    Cholesterol in LDL   
[Mass/Vol]      103 mg/dL       High            <100            [facility]  
   
                                        Comment on above:   Order Comment: Speci  
men Type: BLOOD SPECIMENOrdering Facility:  
   
[facility] Address: [address] [place] [address]   
   
                                                            Result Comment: <100  
 mg/dL, Optimal  
100-129 mg/dL, Near optimal/above optimal  
130-159 mg/dL, Borderline high  
160-189 mg/dL, High  
>189 mg/dL, Very high  
Secondary prevention optimal LDL Cholesterol levels are   
recommended to be < 70 mg/dL   
   
                                                            Performed By: #### 2  
4331-1 ####[facility]   
LABCLIA [id number] [address] [id number] [place] [address]   
[place]   
   
                                                    Cholesterol in   
LDL/Cholesterol in HDL   
[Mass ratio]    2.78 {ratio}    High            <2.54           [facility]  
   
                                        Comment on above:   Order Comment: Donna  
men Type: BLOOD SPECIMENOrdering Facility:  
  
[facility] Address: [address] [place] [address]   
   
                                                            Result Comment: Refe  
rence:  
1. National Cholesterol Education Program ATP III Guideline   
At-A-Glance Quick Desk Reference: National Heart, Lung, and   
Blood [place]. National Institutes of Health. 2001: NIH   
Publication No. .  
2. An International Atherosclerosis Society position paper:   
global recommendations for the management of dyslipidemia:   
executive summary, Atherosclerosis. 2014: 232(2):410-413.   
   
                                                            Performed By: #### 2  
4331-1 ####[facility]   
LABCLIA [id number] [address] [id number] [place] [address]   
[place]   
   
                                                    Cholesterol in VLDL   
[Mass/Vol]      30 mg/dL        High            <30             [facility]  
   
                                        Comment on above:   Order Comment: Speci  
men Type: BLOOD SPECIMENOrdering Facility:  
   
[facility] Address: [address] [place] [address]   
   
                                                            Performed By: #### 2  
4331-1 ####[facility]   
LABCLIA [id number] [address] [id number] [place] [address]   
[place]   
   
                                                    Cholesterol non HDL   
[Mass/Vol]      133 mg/dL       High            <130            [facility]  
   
                                        Comment on above:   Order Comment: Speci  
men Type: BLOOD SPECIMENOrdering Facility:  
   
[facility] Address: [address] [place] [address]   
   
                                                            Result Comment: <130  
 mg/dL, Optimal  
130-159 mg/dL, Near optimal/above optimal  
160-189 mg/dL, Borderline high  
190-219 mg/dL, High  
>219 mg/dL, Very high  
Secondary prevention optimal non HDL Cholesterol levels are   
recommended to be <100 mg/dL   
   
                                                            Performed By: #### 2  
4331-1 ####[facility]   
LABCLIA [id number] [address] [id number] [place] [address]   
[place]   
   
                                                    Cholesterol.total/Lyn  
sterol in HDL [Mass   
ratio]          4.59 {ratio}    Normal          <5.10           [facility]  
   
                                        Comment on above:   Order Comment: Speci  
men Type: BLOOD SPECIMENOrdering Facility:  
  
[facility] Address: [address] [place] [address]   
   
                                                            Performed By: #### 2  
4331-1 ####[facility]   
LABCLIA [id number] [address] [id number] [place] [address]   
[place]   
   
                      FASTING TIME 12 hrs     Normal                [facility]  
   
                                        Comment on above:   Order Comment: Speci  
men Type: BLOOD SPECIMENOrdering Facility:  
  
[facility] Address: [address] [place] [address]   
   
                                                            Performed By: #### 2  
4331-1 ####[facility]   
LABCLIA [id number] [address] [id number] [place] [address]   
[place]   
   
                      Triglyceride [Mass/Vol] 148 mg/dL  Normal     <150       C  
[facility]  
   
                                        Comment on above:   Order Comment: Speci  
men Type: BLOOD SPECIMENOrdering Facility:  
   
[facility] Address: [address] [place] [address]   
   
                                                            Result Comment: <150  
 mg/dL, Normal  
150-199 mg/dL, Borderline high  
200-499 mg/dL, High  
>499 mg/dL, Very high   
   
                                                            Performed By: #### 2  
4331-1 ####[facility]   
LABCLIA [id number] [id number] [place] [address]   
[place]   
   
                                                    Bacteria Ur Culton   
4   
   
                                                    Bacteria identified Cx   
Nom (U)                                 ORGANISM ID: 1  
10,000 -<50,000   
CFU/ml Mixed   
microbiota  
No further workup.   
Mixed microbiota can   
be due   
to???urine???contamin  
ation with skin   
bacteria at time of   
collection or   
presence of a   
long-term urinary   
catheter. If a new   
culture is needed,   
please consider   
re-education of the   
patient on proper   
midstream collection   
technique or straight   
catheterization   
for???urine???collect  
ion.                Normal                                  [facility]  
   
                                        Comment on above:   Performed By: #### 6  
30-4 ####[facility]   
LABCLIA [id number] [facility] [id number] [place] [address] OF [facility]   
   
                                                    CNOVon 2024   
   
                                        CNOV                Office Visit (INTMWS  
)  
---------------------  
-----------------  
NAVDEEP GUILLEN   
([id number]) 1960   
F  
Date Time Provider   
Department  
3/4/24 2:40 PM   
MEERA MENCHACA INTMWS  
During your visit   
today, we recorded   
the following   
information about   
you:  
Temperature Pulse   
Respiration Blood   
pressure  
98.7 degrees   
73/minute 16/minute   
146/80  
Weight Height  
133.4 kg 1.664 m  
Meera Menchaca PA-C   
3/4/2024 6:16 PM   
Signed  
CC: Patient presents   
with:  
Same Day Appointment:   
abdomen cramping,   
left flank pain,   
pinkish when wipes,  
burning at times  
HPI  
Navdeep LAMAS Wilmer is a   
63 year old female   
who presents with   
complaint of  
increased frequency,   
blood in urine, and L   
flank discomfort and   
left abdominal  
discomfort for 1   
days. Other symptoms   
include lower back   
pain (right sided)  
and nausea. The   
patient denies fever,   
vomiting, and vaginal   
discharge. She  
has tried nothing to   
help to alleviate her   
symptoms. Ms. Guillen   
has history of  
previous UTIs and   
kidney stones.  
She called her   
surgeon, Dr. Patricio   
(gen surg at [facility])   
because of her hx of  
diverticulitis, for   
which she just had a   
bout and was txed end   
of January.  
REVIEW OF SYSTEMS  
GI: Positive for   
abdominal discomfort   
looser stools, Denies   
any melena or  
hematochezia  
All other systems   
negative.  
PAST MEDICAL HISTORY  
Diagnosis Date  
Abnormal mammogram   
2021  
Achilles tendon pain   
2015  
Ankle weakness   
2015  
Chronic pain of left   
ankle 12/15/2016  
Colon abnormality   
2014  
Thickening of the   
ascending colon seen   
on the recent CT   
scan. Patient has had  
a colonoscopy around   
4 years ago. Records   
were obtained for   
when they were  
done, 4 years ago ,   
in [facility]. her   
colonoscopy was   
completely  
normal, no   
thickening, and the   
biopsy too was normal   
except for lymphoid  
aggregates.  
Diverticulitis   
Treated by Dr. Patricio at [facility]  
DJD (degenerative   
joint disease), ankle   
and foot 2016  
DM (diabetes   
mellitus) (HCC)  
GERD   
(gastroesophageal   
reflux disease)  
Gross hematuria   
05/15/2014  
2014, had hematuria,   
investigated   
extensively along   
with cytoscopy, a   
stone  
was found but no   
signs of any   
malignancy.  
Heel pain, chronic   
12/15/2016  
Hepatic steatosis   
10/03/2017  
Hiatal hernia   
10/03/2017  
Hypertension  
Insomnia  
Kidney stone   
05/15/2014  
Morbid obesity (HCC)  
Nephrolithiasis  
Neuritis 2016  
Renal lesion   
05/15/2014  
S/P Achilles tendon   
repair 2015 sx done  
PAST SURGICAL HISTORY  
Procedure Laterality   
Date  
BREAST LUMPECTOMY HX   
Right   
BX BREAST W/DEVICE   
1ST LESION   
STEREOTACTIC GUID   
Right 2021  
CHOLECYSTECTOMY   
gallbladder removal  
COLONOSCOPY   
CT ABDOMEN 2014  
PAST SURGICAL HISTORY   
OF 2014  
DANGA hysteroscopy  
PAST SURGICAL HISTORY   
OF Left 2015  
Left foot surgery  
S PK LAVH [id number]   
10/16/2014  
ALLERGIES Silvadene   
[Silver Sulfadiazine]   
and Sulfa   
(Sulfonamide   
Antibiotics)  
MEDICATIONS  
ibuprofen (MOTRIN)   
800 mg tablet Take 1   
tablet by mouth every   
8 hours as  
needed for pain. Take   
with food.  
metFORMIN ER   
(GLUCOPHAGE XR) 500   
mg 24 hr tablet Take   
1 tablet by mouth   
daily  
with breakfast.  
blood sugar   
diagnostic (BLOOD   
GLUCOSE TEST) test   
strip Test blood   
sugars  
2daily.   
Dx:ucnontrolled   
diabetes . Insulin:   
Yes  
chlorthalidone   
(HYGROTON) 25 mg   
tablet Take 0.5   
tablets by mouth once   
daily.  
atenolol (TENORMIN)   
50 mg tablet Take 1   
tablet by mouth once   
daily.  
potassium chloride 20   
mEq TbER Take 1   
tablet by mouth twice   
daily.  
glipiZIDE (GLUCOTROL   
XL) 2.5 mg 24 hr   
tablet Take 1 tablet   
by mouth once  
daily.  
exemestane (AROMASIN)   
25 mg tablet Take 1   
tablet by mouth once   
daily.  
azithromycin   
(ZITHROMAX Z-ALLAN) 250   
mg tablet TAKE 2 TABS   
ON THE FIRST DAY,  
THEN ONE TAB DAILY   
FOR 4 DAYS.  
lancets (ONE TOUCH   
DELICA) 33 gauge misc   
Test blood sugar(s) 2   
daily. Dx:  
Type 2 DM -   
Controlled E11.9   
Insulin: Yes  
Blood-Glucose Meter   
monitoring kit   
Glucose Meter of   
Choice - Kit - Dx:   
Type 2  
DM - Uncontrolled   
E11.65  
MV-MN/FOLIC   
ACID/CALCIUM/VIT K   
(ONE-A-DAY WOMEN'S 50   
PLUS ORAL) Take 1   
tablet  
by mouth once daily.  
FAMILY HISTORY  
Problem Relation Age   
of Onset  
Ovarian cancer Mother   
39  
Heart Father  
Hypertension Father  
Prostate Cancer   
Father 78  
other (kidney stones)   
Brother  
Hypertension Brother  
Diabetes Brother  
Seizures Son  
Breast Cancer Other   
55  
Double first cousin   
(daughter of mother's   
sister and father's   
brother)  
Social History  
Tobacco Use  
Smoking status:   
Former  
Packs/day: 1.00  
Years: 10.00  
Additional pack   
years: 0.00  
Total pack years:   
10.00  
Types: Cigarettes  
Quit date: 5/15/2007  
Years since quittin.8  
Smokeless tobacco:   
Never  
Vaping Use  
Vaping Use: Never   
used  
Substance Use Topics  
Alcohol use: No  
Drug use: Not   
Currently  
Comment: marijuana   
for insomnia -   
currently not using  
PHYSICAL EXAM  
Pulse 73   Temp 37.1   
?C (98.7 ?F)   Resp   
16   Ht 166.4 cm (5'   
5.5 )   Wt  
133.4 kg (294 lb)     
SpO2 97%   BMI 48.18   
kg/m?  
General Appe (more   
content not   
included)...        Normal                                  [facility]  
   
                                                    UA DIP, URINE (POC)on 2024   
   
                      BILIRUBIN UA (POCT) Negative              Negative   [facility]  
   
                      CLARITY UA (POCT) Cloudy                           [facility]  
[facility]   
[facility]  
   
                      COLOR UA (POCT) Yellow                           [facility]  
   
                          GLUCOSE UA (POCT) Negative                  Negative   
mg/dL                                   [facility]  
   
                      Hemoglobin Ql (U) Trace-intact Abnormal   Negative   [facility]  
   
                          KETONE UA (POCT) Negative                  Negative   
mg/dL                                   [facility]  
   
                      LEUKOCYTES UA (POCT) Large      Abnormal   Negative   [facility]  
[facility]  
   
                      NITRITE UA (POCT) Negative              Negative   [facility]  
   
                      PH UA (POCT) 7.0                   4.5 - 8.0  [facility]  
   
                          Protein Ql (U) Negative                  Negative   
mg/dL                                   [facility]  
   
                                                    SPECIFIC GRAVITY UA   
(POCT)          1.020                           1.005 - 1.030   [facility]  
   
                          UROBILINOGEN UA (POCT) 0.2 E.U./dL               [place]  
l   
E.U./dL                                 [facility]  
   
                                                    CNCOon 2024   
   
                                        CNCO                HNO ID: [id number]  
Author: COORDINATOR,   
MAMMOGRAPHY, ?  
Service: ?  
Author Type:   
Physician  
Type: Letter  
Filed: 2024   
10:52  
Note Text:  
2024  
PID:  
[id number] Navdeep Guillen  
[address] [place] [address]  
Dear Ms. Guillen,  
We are pleased to   
inform you that the   
results of your   
recent breast imaging   
exam  
on 2024 are   
normal.  
Early detection of   
cancer is very   
important. We also   
understand   
recommendations  
regarding breast   
cancer screening are   
controversial. Please   
discuss with your  
primary care provider   
which strategy is   
best for you and   
whether a mammogram   
is  
right for you.  
Your imaging studies   
and report will be   
kept on file at   
[facility] as   
part  
of your permanent   
medical record and   
are available for   
your continuing care.  
Thank you for   
allowing us to help   
in meeting your   
health care needs.  
Sincerely,  
Dr. Mcdermott  
Interpreting   
Radiologist  
[facility]  
(Normal over 40)    Normal                                  [facility]  
   
                                                    BD DXA - AXIAL SKELETONon    
   
                                        BD DXA - AXIAL SKELETON * * *Final Repor  
t* *   
*  
DATE OF EXAM: 2024 10:47AM  
[facility] 0804 - BD DXA -   
AXIAL SKELETON /   
ACCESSION # [id number]  
PROCEDURE REASON:   
Screening for   
osteoporosis  
  
* * * * Physician   
Interpretation * * *   
*  
EXAMINATION: DXA BONE   
DENSITOMETRY BD DXA -   
AXIAL SKELETON  
PATIENT DEMOGRAPHICS:   
Age: 63 years,   
Gender: Female  
SCANNER INFORMATION:  
DXA Model: Claudio   
[facility] [employer]   
Discovery C [id number]  
Date Scanned:   
2024 10:47 AM  
CLINICAL HISTORY:   
DIAGNOSTIC Screening   
for osteoporosis .  
RISK FACTORS FOR   
OSTEOPOROSIS AND   
ASSOCIATED FRACTURES   
REPORTED BY THIS  
PATIENT:  
Please refer to Bone   
Health Questionnaire   
in the EMR  
CURRENT THERAPY:  
Please refer to Bone   
Health Questionnaire   
in the EMR  
TECHNICAL   
LIMITATIONS:  
None  
RESULTS:  
Lumbar spine (L1, L2,   
L3, L4): 1.062 g/cm2,   
T-score 0.2, Z-score   
2.0  
Lumbar spine: 2017:   
1.117 g/cm2  
Right Femoral Neck:   
1.064 g/cm2, T-score   
1.9, Z-score 3.5  
Right Total Hip:   
1.254 g/cm2, T-score   
2.6, Z-score 3.8  
Left Femoral Neck:   
0.989 g/cm2, T-score   
1.3, Z-score 2.8  
Left Femoral Neck:   
2017: 1.015 g/cm2  
There has been a 2.6%   
interval decrease in   
bone mineral density  
Left Total Hip: 1.314   
g/cm2, T-score 3.1,   
Z-score 4.3  
Left Total Hip: 2017:   
1.344 g/cm2  
There has been a 2.2%   
statistically   
significant interval   
decrease in bone  
mineral density.  
CHANGE IS   
STATISTICALLY   
SIGNIFICANT IN THE   
SPINE OR HIP IF   
GREATER THAN  
OR EQUAL TO 0.04   
g/cm2  
VERTEBRAL FRACTURE   
ASSESSMENT  
Not performed.  
IMPRESSION:  
THE LOWEST T-SCORE IS   
0.2 IN THE SPINE  
1) DIAGNOSIS (based   
on BMD alone): NORMAL   
BONE DENSITY  
Caution: Medical   
conditions other than   
osteoporosis may   
cause low bone  
density, such as   
osteomalacia or renal   
osteodystrophy.   
Clinical  
correlation is   
necessary.  
2) FRACTURE RISK   
(based on FRAX):  
10-year absolute   
fracture risk:  
- major osteoporotic   
fracture = 4.8 %  
- hip fracture = 0.1   
%  
- A diagnosis of   
Osteoporosis, a 10   
year probability  
of hip fracture   
greater  
than or equal to 3%   
or a 10 year   
probability of any  
major   
osteoporosis-related  
fracture greater than   
or equal to 20%   
should be  
considered for  
treatment.  
- DXA scanner   
generated FRAX   
calculations may  
slightly differ from  
online FRAX   
calculations due to   
differences in  
software versions.  
- All recommendations   
and calculations are   
to be considered as  
guidelines and should   
not replace sound   
clinical judgement  
- Caution: Fracture   
risk may be increased   
independent of BMD in  
patients with   
corticosteroid use,   
age greater than 65   
years, or a  
history of prior   
fragility fracture.  
RECOMMENDATIONS:  
Follow-up in 2 years   
or as clinically   
indicated. Patients   
that are  
taking   
corticosteroids, are   
transplant recipients   
or have  
hyperparathyroidism   
should have annual   
follow-up. Follow-up   
scans should  
always be done on the   
same machine for   
accurate comparison.  
FOR MORE INFORMATION   
ABOUT DIAGNOSIS AND   
TREATMENT:  
[facility] for   
Osteoporosis and   
Metabolic Bone  
Disease:?   
www.ccf.org/arthritis  
/osteo  
National Osteoporosis   
Foundation:?   
www.nof.org  
International Society   
of Clinical   
Densitometry   
www.iscd.org  
:   
SHARON  
Transcribe Date/Time:   
2024 11:13A  
Dictated by : CHRISTOPHER BOYER MD  
This examination was   
interpreted and the   
report reviewed and  
electronically signed   
by:  
CHRISTOPHER BOYER MD on   
2024 11:15AM   
EST  
[id number]  
N  
0.2                 Normal                                  [facility]  
   
                                                    DXA Skeletal system.axial Vi  
ews for bone densityon 2024   
   
                      LOWEST T-SCORE 0.2                              [facility] SCREENING W TOMOon    
   
                                        YUAN SCREENING W GALILEA * * *Final Report*   
*   
*  
DATE OF EXAM: 2024 11:25AM  
[facility] [id number] - YUAN   
SCREENING W GALILEA /   
ACCESSION # [id number]  
PROCEDURE REASON:   
multiple diagnoses  
  
* * * * Physician   
Interpretation * * *   
*  
RESULT: #[id number] -   
[facility] SCREENING W GALILEA  
BILATERAL DIGITAL   
SCREENING MAMMOGRAM   
TOMOSYNTHESIS WITH   
CAD: 2024  
HISTORY: Multiple   
Diagnoses / Screening   
Mammogram-Patient   
reports NO  
symptoms. /priors   
available for   
comparison /SEE TECH   
NOTE.  
RESULT:  
TECHNIQUE: The study   
was acquired using   
full field digital   
technology and  
interpreted from soft   
copy.  
Digital Breast   
Tomosynthesis (DBT)   
images were obtained   
and used to  
assist in the   
interpretation of   
this examination.  
Current study was   
also evaluated with a   
Computer Aided   
Detection (CAD).  
Comparison is made to   
exams dated:   
2023 mammogram,   
2023  
mammogram, 2022   
mammogram, 2020   
mammogram, and   
2021  
mammogram - [facility].   
There are scattered   
areas of  
fibroglandular   
density.  
There are benign post   
operative findings   
and a biopsy clip in   
the right  
breast.  
No significant   
masses,   
calcifications, or   
other findings are   
seen in  
either breast.  
There has been no   
significant interval   
change.  
IMPRESSION: BENIGN   
FINDING  
There is no   
mammographic evidence   
of malignancy. A 1   
year screening  
mammogram is   
recommended.  
Ana Mcdermott M.D., ch/lillie:2024   
10:52:48  
Imaging   
Technologist(s):   
Italia Barfield,   
[facility]  
letter sent: Normal   
over 40  
Mammogram BI-RADS: 2   
Benign finding  
Multiple national   
specialty   
organizations have   
released breast   
cancer  
screening guidelines   
for women at average   
risk for developing   
breast  
cancer - guidelines   
that are based on   
both evidence and   
opinion, yet  
differ on when to   
start and how often   
to screen for breast   
cancer. With  
representation from   
Breast Imaging,   
Internal Medicine,   
Women's Health,  
Family Medicine, and   
Medical/Surgical   
Oncology, the   
[facility] has  
carefully reviewed   
the data and reached   
the following   
consensus:  
1) All women should   
engage in shared   
decision-making with   
their providers  
to decide when to   
start and how often   
to screen;  
2) All women should   
have the opportunity   
to start screening   
mammography  
at age 40;  
3) For women ages   
45-55, we recommend   
annual screening   
mammograms;  
4) For women ages 55   
and over, we support   
both the transition   
from an  
annual to a biennial   
interval if this   
aligns more with   
patient's values  
and preferences, or   
continuation with   
annual screening;  
5) All women should   
discuss with their   
providers when to   
stop screening  
mammograms.  
:   
Lillie  
Transcribe Date/Time:   
2024 10:45A  
Dictated by: ANA MCDERMOTT MD  
This examination was   
interpreted and the   
report reviewed and  
electronically signed   
by:  
ANA MCDERMOTT MD on   
2024 10:52AM   
EST  
[id number]  
N                   Normal                                  [facility] 2024   
   
                                        CNPN                Telephone (INTMWS)  
---------------------  
-----------------  
NAVDEEP GUILLEN   
([id number]) 1960   
F  
Date Time Provider   
Department  
24 CYNTHIA DOUGLAS   
INTMWS  
During your visit   
today, we recorded   
the following   
information about   
you:  
Joie Hardwick, RN   
2024 2:33 PM   
Signed  
Patient reports she   
went to [facility] ER on   
 and was   
seen for  
diverticulitis.  
She was prescribed:  
Amoxicillin-Pot   
Clavulanate Active 1   
TABLET PO TWICE A DAY   
 12:00am  
Pt states she has not   
improved much because   
she has only taken   
the antibiotics  
for 2 days so far.  
Patient was   
recommended to follow   
up with PCP Office   
and Dr. Patricio. Pt  
states she sees Dr. Patricio for her   
diverticulitis.  
Pt asking if she   
should follow-up with   
PCP or not? She does   
not wish to follow  
up with both PCP and   
Dr. Patricio, if not   
necessary.  
Please advise   
patient, if able.   
[phone]  
Thank you.  
Sherly An APRN.CNP   
2024 8:40 AM   
Signed  
Please get [place] ER   
records so I can   
review them  
Thank you  
NIC Barney.LUIS ENRIQUE Jesus Manisha Madsen   
2024 10:44 AM   
Signed  
Records printed and   
placed on provider   
desk for review.  
Sherly An APRN.CNP   
2024 12:29 PM   
Signed  
If patient is   
following closely   
with general surgery   
then no need to   
follow up  
with me. Her kidney   
function was slightly   
decreased in ER so   
needs rechecked  
after after she is   
feeling better and   
diverticulitis has   
resolved.  
Thank you  
WADE aBrney Amanda, RN   
2024 12:48 PM   
Signed  
Called and left a   
voicemail for the   
Patient to call back   
and ask for a nurse   
to  
receive the providers   
message.  
RAISSA Gannon Barbara, RN   
2024 10:50 AM   
Signed  
Pt returned call and   
given Sherly An's   
instructions and   
recommendations. Pt  
states she will call   
Dr. Patricio's office   
to set up her follow   
up. She was  
waiting to hear back   
from Sherly about who   
she should f/u with.   
Notified to call  
us back to repeat   
labs when feeling   
better  
Allergies As of Date:   
2024 Noted   
Allergy Reaction  
SILVADENE (SILVER   
SULFADIAZINE)   
2017 4 - Hives  
SULFA (SULFONAMIDE   
ANTIBIOTICS)   
2021 4 - Hives  
Date Reviewed:   
2023  
Reviewed by: Meme Matute - Fully   
Assessed  
Reason for Visit:  
Patient Question   
[9197]  
ER F/U [41]  
Prescriptions as of   
2024  
- ibuprofen (MOTRIN)   
800 mg tablet  
Take 1 tablet by   
mouth every 8 hours   
as needed for pain.   
Take with food.  
- azithromycin   
(ZITHROMAX Z-ALLAN) 250   
mg tablet  
TAKE 2 TABS ON THE   
FIRST DAY, THEN ONE   
TAB DAILY FOR 4 DAYS.  
- metFORMIN ER   
(GLUCOPHAGE XR) 500   
mg 24 hr tablet  
Take 1 tablet by   
mouth daily with   
breakfast.  
- blood sugar   
diagnostic (BLOOD   
GLUCOSE TEST) test   
strip  
Test blood sugars   
2daily.   
Dx:ucnontrolled   
diabetes . Insulin:   
Yes  
- chlorthalidone   
(HYGROTON) 25 mg   
tablet  
Take 0.5 tablets by   
mouth once daily.  
- atenolol (TENORMIN)   
50 mg tablet  
Take 1 tablet by   
mouth once daily.  
- potassium chloride   
20 mEq TbER  
Take 1 tablet by   
mouth twice daily.  
- glipiZIDE   
(GLUCOTROL XL) 2.5 mg   
24 hr tablet  
Take 1 tablet by   
mouth once daily.  
- exemestane   
(AROMASIN) 25 mg   
tablet  
Take 1 tablet by   
mouth once daily.  
- lancets (ONE TOUCH   
DELICA) 33 gauge misc  
Test blood sugar(s) 2   
daily. Dx: Type 2 DM   
- Controlled E11.9   
Insulin: Yes  
- Blood-Glucose Meter   
monitoring kit  
Glucose Meter of   
Choice - Kit - Dx:   
Type 2 DM -   
Uncontrolled E11.65  
- MV-MN/FOLIC   
ACID/CALCIUM/VIT K   
(ONE-A-DAY WOMEN'S 50   
PLUS ORAL)  
Take 1 tablet by   
mouth once daily.  
Meds Comments as of   
2015:  
Patient only took the   
Etodolac for a few   
days and noticed no   
difference so she  
quit taking this   
medication.  
Problem List As Of   
Date 2024 Noted   
Resolved  
Morbid obesity with   
BMI of 40.0-44.9,   
adult (HC*2014  
Uterus disorder   
[N85.9] 2014  
Hypertension [I10]   
2014  
Hypercholesterolemia   
[E78.00] 2014  
Endometrial   
hyperplasia without   
atypia,   
complex*2014  
Uterine prolapse   
without mention of   
vaginal   
wal*2014  
Rectocele [N81.6]   
2014  
Complex endometrial   
hyperplasia with   
atypia [N8*2014  
Follow-up   
examination,   
following other   
surgery *2015  
Bilateral low back   
pain with left-sided   
sciatic*2016  
Peroneal tendonitis   
[M76.70] 2016  
Controlled diabetes   
mellitus type II   
without co*2016  
Hyperopia [H52.00]   
2016  
Presbyopia [H52.4]   
2016  
Benign neoplasm of   
skin of eyelid   
including   
can*2016  
Chronic bilateral low   
back pain with   
left-sided*2016  
Malignant neoplasm of   
lower-outer quadrant   
of r*05/10/2017  
ER+ UT+ carcinoma of   
breast (HCC)   
[C50.919,   
Z17*05/10/2017  
Family history of   
ovarian cancer   
[Z80.41] 05/10/2017  
GERD   
(gastroesophageal   
reflux disease)   
[K21.9]  
Encounter Number:   
[id number]  
Encounter   
Status:Closed by   
AMANUEL ABREU on   
24              Normal                                  [facility]  
   
                                                    ALBUMIN/CREAT RATIO RND URon  
 2023   
   
                                                    Albumin DL <= 20 mg/L   
(U) [Mass/Vol]  105.3 mg/L      Normal                          [facility]  
   
                                        Comment on above:   Order Comment: Speci  
men Type: URINE SPECIMENOrdering Facility:  
   
[facility] Address: [address] [place] [address]   
   
                                                            Performed By: #### U  
ACR ####[facility]   
LABCLIA [id number] [place] [address] [place]   
   
                                                    Albumin/Creatinine (U)   
[Mass ratio]    85 mg/g         High            <30             [facility]  
   
                                        Comment on above:   Order Comment: Speci  
men Type: URINE SPECIMENOrdering Facility:  
  
[facility] Address: [address] [place] [address]   
   
                                                            Result Comment: Adul  
t Male and Female Nephrotic Criteria:  
<30 mg/g is considered normal to mildly increased  
 mg/g is considered moderately increased  
>300 mg/g is considered severely increased  
KDIGO. (2013). KDIGO 2012 Clinical Practice Guideline for the   
Evaluation and Management of Chronic Kidney Disease. Official   
Journal of the International Society of Nephrology, 3(1),   
1-150.   
   
                                                            Performed By: #### U  
ACR ####[facility]   
LABCLIA [id number] [place] [address] [place]   
   
                                                    Creatinine (U)   
[Mass/Vol]      123.2 mg/dL     Normal          20.0-300.0      [facility]  
   
                                        Comment on above:   Order Comment: Speci  
men Type: URINE SPECIMENOrdering Facility:  
   
[facility] Address: [address] [place] [address]   
   
                                                            Performed By: #### U  
ACR ####[facility]   
LABCLIA [id number] [place] [address] [place]   
   
                                                    Basic metabolic 2000 panelon  
 2023   
   
                      Anion gap [Moles/Vol] 13 mmol/L  Normal     9-18       [facility]  
   
                                        Comment on above:   Order Comment: Speci  
men Type: BLOOD SPECIMENOrdering Facility:  
   
[facility] Address: [address] [place] [address]   
   
                                                            Performed By: #### 2  
4321-2 ####[facility]   
LABCLIA [id number] [place] [address] [place]   
   
                      Calcium [Mass/Vol] 10.3 mg/dL High       8.5-10.2   [facility]  
   
                                        Comment on above:   Order Comment: Speci  
men Type: BLOOD SPECIMENOrdering Facility:  
   
[facility] Address:  [place] [address]   
   
                                                            Performed By: #### 2  
4321-2 ####[facility]   
LABCLIA [id number] [place] [address] [place]   
   
                      Chloride [Moles/Vol] 104 mmol/L Normal          [facility]  
   
                                        Comment on above:   Order Comment: Speci  
men Type: BLOOD SPECIMENOrdering Facility:  
   
[facility] Address:  [place] [address]   
   
                                                            Performed By: #### 2  
4321-2 ####[facility]   
LABCLIA [id number] [place] [id number] [place]   
   
                      CO2 [Moles/Vol] 26 mmol/L  Normal     22-30      [facility]  
   
                                        Comment on above:   Order Comment: Speci  
men Type: BLOOD SPECIMENOrdering Facility:  
   
[facility] Address:  [place] [address]   
   
                                                            Performed By: #### 2  
4321-2 ####[facility]   
LABCLIA [id number] [place] [address] [place]   
   
                      Creatinine [Mass/Vol] 0.83 mg/dL Normal     0.58-0.96  [facility]  
   
                                        Comment on above:   Order Comment: Speci  
men Type: BLOOD SPECIMENOrdering Facility:  
   
[facility] Address: [address] [place] [address]   
   
                                                            Performed By: #### 2  
4321-2 ####[facility]   
LABIA [id number] [place] [address] [place]   
   
                                                    Creatinine and   
Glomerular filtration   
rate.predicted panel   
(S/P/Bld)       78 mL/min/1.73m??? Normal          >=60            [facility]  
   
                                        Comment on above:   Order Comment: Speci  
men Type: BLOOD SPECIMENOrdering Facility:  
   
[facility] Address: [address] [place] [address]   
   
                                                            Result Comment: Orly  
mated Glomerular Filtration Rate (eGFR) is   
calculated using the  CKD-EPI creatinine equation. This   
equation utilizes serum creatinine, sex, and age as   
parameters. The creatinine assay has traceable calibration to   
isotope dilution-mass spectrometry. Refer to KDIGO guidelines   
for clinical interpretation. In patients with unstable renal   
function, e.g. those with acute kidney injury, the eGFR may   
not accurately reflect actual GFR.   
   
                                                            Performed By: #### 2  
4321-2 ####[facility]   
LABCLIA [id number] [place] [address] [place]   
   
                      Glucose [Mass/Vol] 146 mg/dL  High       74-99      [facility]  
   
                                        Comment on above:   Order Comment: Speci  
men Type: BLOOD SPECIMENOrdering Facility:  
   
[facility] Address: [address] [place] [address]   
   
                                                            Result Comment: The   
American Diabetes Association (ADA)   
provides guidance for cutoff values for fasting glucose and   
random glucose. The ADA defines fasting as no caloric intake   
for at least 8 hours. Fasting plasma glucose results between   
100 to 125 mg/dL indicate increased risk for diabetes   
(prediabetes).  
Fasting plasma glucose results greater than or equal to 126   
mg/dL meet the criteria for diagnosis of diabetes. In the   
absence of unequivocal hyperglycemia, results should be   
confirmed by repeat testing. In a patient with classic   
symptoms of hyperglycemia or hyperglycemic crisis, random   
plasma glucose results greater than or equal to 200 mg/dL meet   
the criteria for diagnosis of diabetes.  
Reference: Standards of Medical Care in Diabetes 2016,   
American Diabetes Association. Diabetes Care. 2016.39(Suppl   
1).   
   
                                                            Performed By: #### 2  
4321-2 ####[facility]   
LABCLIA [id number] [place] [address] [place]   
   
                      Potassium [Moles/Vol] 3.9 mmol/L Normal     3.7-5.1    [facility]  
   
                                        Comment on above:   Order Comment: Speci  
men Type: BLOOD SPECIMENOrdering Facility:  
   
[facility] Address: [address] [place] [address]   
   
                                                            Performed By: #### 2  
4321-2 ####[facility]   
LABCLIA [id number] [place] [address] [place]   
   
                      Sodium [Moles/Vol] 143 mmol/L Normal     136-144    [facility]  
   
                                        Comment on above:   Order Comment: Speci  
men Type: BLOOD SPECIMENOrdering Facility:  
   
[facility] Address: [address] [place] [address]   
   
                                                            Performed By: #### 2  
4321-2 ####[facility]   
LABCLIA [id number] [place] [address] [place]   
   
                                                    Urea nitrogen   
[Mass/Vol]      11 mg/dL        Normal          7-21            [facility]  
   
                                        Comment on above:   Order Comment: Speci  
men Type: BLOOD SPECIMENOrdering Facility:  
   
[facility] Address: [address] [place] [address]   
   
                                                            Performed By: #### 2  
4321-2 ####[facility]   
LABCLIA [id number] [place] [address] [place]   
   
                                                    CBC panel Auto (Bld)on    
   
                                                    Erythrocyte   
distribution width   
(RBC) [Ratio]   15.8 %          High            11.5 - 15.0 %   [facility]  
   
                                                    Hematocrit (Bld)   
[Volume fraction] 47.1 %          High            36.0 - 46.0 %   [facility]  
   
                                                    Hemoglobin (Bld)   
[Mass/Vol]          15.2 g/dL                               11.5 - 15.5   
g/dL                                    [facility]  
   
                                                    MCH (RBC) [Entitic   
mass]               27.1 pg                                 26.0 - 34.0   
pg                                      [facility]  
   
                          MCHC (RBC) [Mass/Vol] 32.3 g/dL                 30.5 -  
 36.0   
g/dL                                    [facility]  
   
                          MCV (RBC) [Entitic vol] 84.0 fL                   80.0  
 - 100.0   
fL                                      [facility]  
   
                                                    Nucleated RBC (Bld)   
[#/Vol]                                         <0.01 k/uL      [facility]  
   
                                                    Platelet mean volume   
(Bld) [Entitic vol] 10.0 fL                         9.0 - 12.7 fL   [facility]  
   
                          Platelets (Bld) [#/Vol] 354 10*3/uL               150   
- 400   
k/uL                                    [facility]  
   
                          RBC (Bld) [#/Vol] 5.61 10*6/uL High         3.90 - 5.2  
0   
m/uL                                    [facility]  
   
                          WBC (Bld) [#/Vol] 9.70 10*3/uL              3.70 - 11.  
00   
k/uL                                    [facility]  
   
                                                    Erythrocyte   
distribution width   
(RBC) [Ratio]   15.8 %          High            11.5-15.0       [facility]  
   
                                        Comment on above:   Order Comment: Speci  
men Type: BLOOD SPECIMENOrdering Facility:  
   
[facility] Address: [address] [place] [address]   
   
                                                            Performed By: #### 5  
8410-2 ####[facility]   
LABIA [id number] [place] [address] [place]   
   
                                                    Hematocrit (Bld)   
[Volume fraction] 47.1 %          High            36.0-46.0       [facility]  
   
                                        Comment on above:   Order Comment: Speci  
men Type: BLOOD SPECIMENOrdering Facility:  
  
[facility] Address: [address] [place] [address]   
   
                                                            Performed By: #### 5  
8410-2 ####[facility]   
LABIA [id number] [place] [address] [place]   
   
                                                    Hemoglobin (Bld)   
[Mass/Vol]      15.2 g/dL       Normal          11.5-15.5       [facility]  
   
                                        Comment on above:   Order Comment: Speci  
men Type: BLOOD SPECIMENOrdering Facility:  
   
[facility] Address:  [place] [address]   
   
                                                            Performed By: #### 5  
8410-2 ####[facility]   
LABIA [id number] [place] [address] [place]   
   
                                                    MCH (RBC) [Entitic   
mass]           27.1 pg         Normal          26.0-34.0       [facility]  
   
                                        Comment on above:   Order Comment: Speci  
men Type: BLOOD SPECIMENOrdering Facility:  
  
[facility] Address:  [place] [address]   
   
                                                            Performed By: #### 5  
8410-2 ####[facility]   
LABIA [id number] [place] [address] [place]   
   
                      MCHC (RBC) [Mass/Vol] 32.3 g/dL  Normal     30.5-36.0  [facility]  
   
                                        Comment on above:   Order Comment: Speci  
men Type: BLOOD SPECIMENOrdering Facility:  
   
[facility] Address:  [place] [address]   
   
                                                            Performed By: #### 5  
8410-2 ####[facility]   
LABIA [id number] [place] [address] [place]   
   
                      MCV (RBC) [Entitic vol] 84.0 fL    Normal     80.0-100.0 C  
[facility]  
   
                                        Comment on above:   Order Comment: Speci  
men Type: BLOOD SPECIMENOrdering Facility:  
  
[facility] Address:  [place] [address]   
   
                                                            Performed By: #### 5  
8410-2 ####[facility]   
LABCLIA [id number] [place] [address] [place]   
   
                                                    Nucleated RBC (Bld)   
[#/Vol]         10*3/uL         Normal          <0.01           [facility]  
   
                                        Comment on above:   Order Comment: Speci  
men Type: BLOOD SPECIMENOrdering Facility:  
   
[facility] Address:  [place] [address]   
   
                                                            Performed By: #### 5  
8410-2 ####[facility]   
LABCLIA [id number] [place] [address] [place]   
   
                                                    Platelet mean volume   
(Bld) [Entitic vol] 10.0 fL         Normal          9.0-12.7        [facility]  
   
                                        Comment on above:   Order Comment: Speci  
men Type: BLOOD SPECIMENOrdering Facility:  
  
[facility] Address: [address] [place] [address]   
   
                                                            Performed By: #### 5  
8410-2 ####[facility]   
LABCLIA [id number] [place] [address] [place]   
   
                      Platelets (Bld) [#/Vol] 354 10*3/uL Normal     150-400      
[facility]  
   
                                        Comment on above:   Order Comment: Speci  
men Type: BLOOD SPECIMENOrdering Facility:  
   
[facility] Address: [address] [place] [address]   
   
                                                            Performed By: #### 5  
8410-2 ####[facility]   
LABIA [id number] [place] [address] [place]   
   
                      RBC (Bld) [#/Vol] 5.61 10*6/uL High       3.90-5.20  [facility]  
   
                                        Comment on above:   Order Comment: Speci  
men Type: BLOOD SPECIMENOrdering Facility:  
   
[facility] Address: [address] [place] [address]   
   
                                                            Performed By: #### 5  
8410-2 ####[facility]   
LABIA [id number] [place] [address] [place]   
   
                      WBC (Bld) [#/Vol] 9.70 10*3/uL Normal     3.70-11.00 [facility]  
   
                                        Comment on above:   Order Comment: Speci  
men Type: BLOOD SPECIMENOrdering Facility:  
   
[facility] Address: [address] [place] [address]   
   
                                                            Performed By: #### 5  
8410-2 ####[facility]   
LABCLIA [id number] [place] [address] [place]   
   
                                                    CNOVon 2023   
   
                                        CNOV                Office Visit (INTMWS  
)  
---------------------  
-----------------  
NAVDEEP GUILLEN   
([id number]) 1960   
F  
Date Time Provider   
Department  
23 10:40 AM   
SHERLY AN  
During your visit   
today, we recorded   
the following   
information about   
you:  
Pulse Respiration   
Blood pressure Weight  
68/minute 16/minute   
122/76 137.5 kg  
Sherly An APRN.CNP   
2023 4:01 PM   
Signed  
CC: Patient presents   
with:  
F/U 6 months  
HPI  
Navdeep Guillen is a   
63 year old female   
who presents today   
for routine follow  
up.  
DIABETES MELLITUS:   
Ms. Guillen denies   
excessive thirst or   
increased frequency  
of urination, chest   
pain or dyspnea ,   
numbness, tingling or   
pain in  
extremities, new or   
unusual visual   
symptoms, low   
sugar/hypoglycemic   
reactions,  
weight loss/gain,   
lightheadedness/dizzi  
ness. Follows a   
diabetic diet most of  
the time. She is   
compliant with   
medication(s) and is   
tolerating med(s)   
without  
any side effects. She   
reports checking her   
glucose on a twice a   
day schedule  
with sugars in the   
fasting 120s-140s and   
postprandial 70s-90s   
range.  
Patient's last HgA1C   
was  
Hemoglobin A1C (%)  
Date Value  
2023 6.1  
2023 6.8  
07/15/2020 7.7  
2020 7.6  
Hemoglobin A1C (POCT)   
(%)  
Date Value  
10/26/2021 7.1  
)  
Last Ophthalmology   
exam was over a year   
ago.  
HTN: Ms. Guillen   
indicates that she is   
feeling well and   
denies any symptoms  
referable to elevated   
blood pressure.   
Specifically denies   
headache, chest pain,  
palpitations,   
dyspnea, and   
peripheral edema.   
Patient denies any   
side effects  
of her medication(s)   
and is compliant with   
their regimen. She   
does check BP's  
away from this office   
with average BP's in   
the 110s-120s/70s-80   
range. Navdeep  
gets sporadic   
irregular exercise   
with walking. She   
watches her diet for  
sodium, low fat and   
low cholesterol some   
of the time.  
Last 3 Encounter BP   
Readings:  
Date: BP:  
2023 122/76  
10/02/2023 103/67  
2023 118/72  
Obesity: Has   
difficulty with   
exercise because of   
arthritic pain. Will   
every  
now and then take an   
ibuprofen to help   
when she is unable to   
sleep. Does not  
use more than once a   
week or less.  
REVIEW OF SYSTEMS  
General: no fevers,   
no chills, no night   
sweats, no recurrent   
infections, no  
change in appetite,   
no change in energy,   
and no significant   
changes in weight  
Respiratory: no   
cough, no wheezing,   
no shortness of   
breath, no hemoptysis  
Cardiovascular: no   
chest pain, no chest   
pressure, no   
palpitations, and no  
swelling  
Endocrine: no   
fatigue, no polyuria,   
no polyphagia, and no   
polydipsia  
Neurologic: No   
headache, weakness,   
numbness, dizziness,   
memory loss, syncope.  
PAST MEDICAL HISTORY  
Diagnosis Date  
Abnormal mammogram   
2021  
Achilles tendon pain   
2015  
Ankle weakness   
2015  
Chronic pain of left   
ankle 12/15/2016  
Colon abnormality   
2014  
Thickening of the   
ascending colon seen   
on the recent CT   
scan. Patient has had  
a colonoscopy around   
4 years ago. Records   
were obtained for   
when they were  
done, 4 years ago ,   
in [facility]. her   
colonoscopy was   
completely  
normal, no   
thickening, and the   
biopsy too was normal   
except for lymphoid  
aggregates.  
Diverticulitis   
Treated by Dr. Patricio at [facility]  
DJD (degenerative   
joint disease), ankle   
and foot 2016  
DM (diabetes   
mellitus) (HCC)  
GERD   
(gastroesophageal   
reflux disease)  
Gross hematuria   
05/15/2014  
2014, had hematuria,   
investigated   
extensively along   
with cytoscopy, a   
stone  
was found but no   
signs of any   
malignancy.  
Heel pain, chronic   
12/15/2016  
Hepatic steatosis   
10/03/2017  
Hiatal hernia   
10/03/2017  
Hypertension  
Insomnia  
Kidney stone   
05/15/2014  
Morbid obesity (HCC)  
Nephrolithiasis  
Neuritis 2016  
Renal lesion   
05/15/2014  
S/P Achilles tendon   
repair 2015 sx done  
PAST SURGICAL HISTORY  
Procedure Laterality   
Date  
BREAST LUMPECTOMY HX   
Right 2017  
BX BREAST W/DEVICE   
1ST LESION   
STEREOTACTIC GUID   
Right 2021  
CHOLECYSTECTOMY 2011  
gallbladder removal  
COLONOSCOPY   
CT ABDOMEN 2014  
PAST SURGICAL HISTORY   
OF 2014  
[facility] hysteroscopy  
PAST SURGICAL HISTORY   
OF Left 2015  
Left foot surgery  
S [facility] [id number]   
10/16/2014  
ALLERGIES Silvadene   
[Silver Sulfadiazine]   
and Sulfa   
(Sulfonamide   
Antibiotics)  
MEDICATIONS  
metFORMIN ER   
(GLUCOPHAGE XR) 500   
mg 24 hr tablet Take   
1 tablet by mouth   
daily  
with breakfast.  
blood sugar   
diagnostic (BLOOD   
GLUCOSE TEST) test   
strip Test blood   
sugars  
2daily.   
Dx:ucnontrolled   
diabetes . Insulin:   
Yes  
chlorthalidone   
(HYGROTON) 25 mg   
tablet Take 0.5   
tablets by mouth once   
daily.  
atenolol (TENORMIN)   
50 mg tablet Take 1   
tablet by mouth once   
daily.  
potassium chloride 20   
mEq TbER Take 1   
tablet by mouth twice   
daily.  
glipiZIDE (GLUCOTROL   
XL) 2.5 mg 24 hr   
tablet Take 1 tablet   
by mouth once  
daily.  
exemestane (AROMASIN)   
25 mg tablet Take 1   
tablet by mouth once   
daily.  
ibuprofen (MOTRIN)   
800 mg tablet Take 1   
tablet by mouth every   
8 hours as  
needed for pain. Take   
wit (more content not   
included)...        Normal                                  [facility]  
   
                                                    HbA1c (Bld)on 2023   
   
                                                    Average glucose   
Estimated from glycated   
hemoglobin (Bld)   
[Mass/Vol]      140 mg/dL                                       [facility]  
   
                                                    HbA1c (Bld) [Mass   
fraction]       6.5 %           High            4.3 - 5.6 %     [facility]  
   
                                                    Average glucose   
Estimated from glycated   
hemoglobin (Bld)   
[Mass/Vol]      140 mg/dL       Normal                          [facility]  
   
                                        Comment on above:   Order Comment: Speci  
men Type: BLOOD SPECIMENOrdering Facility:  
   
[facility] Address: [address] [place] [address]   
   
                                                            Result Comment: eAG:  
 (Estimated average glucose) is a   
calculated value from HgbA1c and is representative of the   
average blood glucose level in the last 2-3 month period.   
   
                                                            Performed By: #### 5  
5454-3 ####[facility] [facility]   
LABCLIA [id number] [place] [address] [place]   
   
                                                    HbA1c (Bld) [Mass   
fraction]       6.5 %           High            4.3-5.6         [facility]  
   
                                        Comment on above:   Order Comment: Speci  
men Type: BLOOD SPECIMENOrdering Facility:  
  
[facility] Address: [address] [place] [address]   
   
                                                            Result Comment: Amer  
[place] Diabetes Association guidelines   
indicate that patients with HgbA1c in the range 5.7-6.4% are   
at increased risk for development of diabetes, and   
intervention by lifestyle modification may be beneficial.   
HgbA1c greater or equal to 6.5% is considered diagnostic of   
diabetes.   
   
                                                            Performed By: #### 5  
[id number] ####[facility]   
LABCLIA [id number] [facility] [id number] OF [facility]   
   
                                                    UA DIP, URINE (POC)on 2023   
   
                      BILIRUBIN UA (POCT) Negative              Negative   [facility]  
   
                      CLARITY UA (POCT) Clear                            [facility]  
   
                      COLOR UA (POCT) Yellow                           [facility]  
   
                          GLUCOSE UA (POCT) Negative                  Negative   
mg/dL                                   [facility]  
   
                                                    HEMOGLOBIN/BLOOD UA   
(POCT)          Trace-intact    Abnormal        Negative        [facility]  
   
                          KETONE UA (POCT) Negative                  Negative   
mg/dL                                   [facility]  
   
                      LEUKOCYTES UA (POCT) Moderate   Abnormal   Negative   [facility]  
[facility]  
   
                      NITRITE UA (POCT) Negative              Negative   [facility]  
   
                      PH UA (POCT) 6.0                   4.5 - 8.0  [facility]  
   
                          Protein Ql (U) Negative                  Negative   
mg/dL                                   [facility]  
   
                                                    SPECIFIC GRAVITY UA   
(POCT)          1.020                           1.005 - 1.030   [facility]  
   
                          UROBILINOGEN UA (POCT) 0.2 E.U./dL               [facility]  
[facility]   
E.U./dL                                 [facility]  
   
                                                    UA DIP, URINE (POC)on 2023   
   
                      BILIRUBIN UA (POCT) Negative              Negative   [facility]  
[facility]   
Clinic  
   
                      CLARITY UA (POCT) Clear                            [facility]  
   
                      COLOR UA (POCT) Yellow                           [facility]  
   
                          GLUCOSE UA (POCT) Negative                  Negative   
mg/dL                                   [facility]  
   
                                                    HEMOGLOBIN/BLOOD UA   
(POCT)          Small           Abnormal        Negative        [facility]  
   
                          KETONE UA (POCT) Negative                  Negative   
mg/dL                                   [facility]  
   
                      LEUKOCYTES UA (POCT) Large      Abnormal   Negative   [facility]  
   
                      NITRITE UA (POCT) Negative              Negative   [facility]  
   
                      PH UA (POCT) 7.0                   4.5 - 8.0  [facility]  
   
                          Protein Ql (U) 30 mg/dL     Abnormal     Negative   
mg/dL                                   [facility]  
   
                                                    SPECIFIC GRAVITY UA   
(POCT)          1.020                           1.005 - 1.030   [facility]  
   
                          UROBILINOGEN UA (POCT) 0.2 E.U./dL               [facility]  
l   
E.U./dL                                 [facility]  
   
                                                    YUAN SCREENINGon 2023   
   
                                                                  [facility]  
   
                                                    XR Foot 3+ Views Righton    
   
                                        XR Foot 3+ Views Right Exam Date/Time:  
2019 14:15 EDT  
Reason for Exam:  
Pain, Traumatic  
Report  
STUDY:  
XR Foot 3+ Views   
Right; 2019 2:15   
pm  
INDICATION:  
Pain, Traumatic.  
COMPARISON:  
None.  
ACCESSION NUMBER(S):  
[id number]  
ORDERING CLINICIAN:  
Steve Govea  
FINDINGS:  
Three views of the   
right foot including   
AP, oblique and   
lateral projections   
were obtained. There  
is no radiographic   
evidence of acute   
fracture or   
dislocation of the   
right foot.   
Degenerative  
changes are present   
at multiple   
interphalangeal   
joints in the right   
great toe metatarsal-  
phalangeal joint with   
mild hallux valgus   
angulation. No acute   
erosive changes are   
present.  
Prominent posterior   
and plantar calcaneal   
enthesophytes are   
present off of the   
calcaneal  
tuberosity.  
IMPRESSION:  
1. No acute fracture   
or dislocation   
identified.  
2. Degenerative   
changes, as described   
above.  
***** FINAL REPORT   
*****  
Dictated: 2019   
2:25 pm Odin Yost MD  
Signed (Electronic   
Signature):   
2019 2:25 pm  
Signed by: Odin Yost MD  
Technologist: MB    [facility]  
   
                                                    PROGRESSon 2018   
   
                      OSU NOTES             [facility]  
   
                      OSU NOTES             [facility]  
  
  
  
Vital Signs  
  
  
                      Date Time  Vital Sign Value      Performing Clinician Jose sears  
   
                                                    2025   
09:          Body temperature    97.9 [degF]         Nick Madsen MD  
Work Phone:   
[phone]                          [facility]  
   
                                                    2025   
09:                              Diastolic blood   
pressure                  62 mm[Hg]                 Nick Madsen MD  
Work Phone:   
[phone]                          [facility]  
   
                                                    2025   
09:          Heart rate          74 /min             Nick Madsen MD  
Work Phone:   
[phone]                          [facility]  
   
                                                    2025   
09:          Respiratory rate    18 /min             Nick Madsen MD  
Work Phone:   
[phone]                          [facility]  
   
                                                    2025   
09:                              SaO2% (BldA) [Mass   
fraction]                 97 %                      Nick Madsen MD  
Work Phone:   
[phone]                          [facility]  
   
                                                    2025   
09:                              Systolic blood   
pressure                  112 mm[Hg]                Nick Madsen MD  
Work Phone:   
[phone]                          [facility]  
   
                                                    2024   
09:          Body height         167.6 cm            Nick Madsen MD  
Work Phone:   
[phone]                          [facility]  
   
                                                    2024   
09:                              Body mass index (BMI)   
[Ratio]                   47.94 kg/m2               Nick Madsen MD  
Work Phone:   
[phone]                          [facility]  
   
                                                    2024   
09:          Body temperature    99.1 [degF]         Nick Madsen MD  
Work Phone:   
[phone]                          [facility]  
   
                                                    2024   
09:          Body weight         134.72 kg           Nick Madsen MD  
Work Phone:   
[phone]                          [facility]  
   
                                                    2024   
09:                              Diastolic blood   
pressure                  78 mm[Hg]                 Nick Madsen MD  
Work Phone:   
[phone]                          [facility]  
   
                                                    2024   
09:          Heart rate          62 /min             Nick Madsen MD  
Work Phone:   
[phone]                          [facility]  
   
                                                    2024   
09:                              Systolic blood   
pressure                  116 mm[Hg]                Nick Madsen MD  
Work Phone:   
[phone]                          [facility]  
   
                                                    2024   
10:                              Body mass index (BMI)   
[Ratio]                   48.27 kg/m2               Vikki Posada   
APRN.CNP  
Work Phone:   
[phone]                          [facility]  
   
                                                    2024   
10:          Body temperature    97.7 [degF]         Vikki Posada   
APRN.CNP  
Work Phone:   
[phone]                          [facility]  
   
                                                    2024   
10:          Body weight         133.6 kg            Vikki Posada   
APRN.CNP  
Work Phone:   
[phone]                          [facility]  
   
                                                    2024   
10:                              Diastolic blood   
pressure                  86 mm[Hg]                 Norlina Posada   
APRN.CNP  
Work Phone:   
[phone]                          [facility]  
   
                                                    2024   
10:          Heart rate          69 /min             Vikki Posada   
APRN.CNP  
Work Phone:   
[phone]                          [facility]  
   
                                                    2024   
10:                              SaO2% (BldA) [Mass   
fraction]                 96 %                      Norlina Posada   
APRN.CNP  
Work Phone:   
[phone]                          [facility]  
   
                                                    2024   
10:                              Systolic blood   
pressure                  156 mm[Hg]                Vikki Posada   
APRN.CNP  
Work Phone:   
[phone]                          [facility]  
   
                                                    2024   
16:                              Body mass index (BMI)   
[Ratio]                   49.46 kg/m2               Fabiana Ambrosio   
APRN.CNP  
Work Phone:   
[phone]                          [facility]  
   
                                                    2024   
16:          Body temperature    98.49 [degF]        Fabiana Ambrosio   
APRN.CNP  
Work Phone:   
[phone]                          [facility]  
   
                                                    2024   
16:          Body weight         136.9 kg            Fabiana Ambrosio   
APRN.CNP  
Work Phone:   
[phone]                          [facility]  
   
                                                    2024   
16:                              Diastolic blood   
pressure                  78 mm[Hg]                 Fabiana Ambrosio   
APRN.CNP  
Work Phone:   
[phone]                          [facility]  
   
                                                    2024   
16:          Heart rate          63 /min             Fabiana Ambrosio   
APRN.CNP  
Work Phone:   
[phone]                          [facility]  
   
                                                    2024   
16:          Respiratory rate    18 /min             Fabiana Ambrosio   
APRN.CNP  
Work Phone:   
[phone]                          [facility]  
   
                                                    2024   
16:                              SaO2% (BldA) [Mass   
fraction]                 97 %                      Fabiana Ambrosio   
APRN.CNP  
Work Phone:   
[phone]                          [facility]  
   
                                                    2024   
16:                              Systolic blood   
pressure                  122 mm[Hg]                Fabiana Ambrosio   
APRN.CNP  
Work Phone:   
[phone]                          [facility]  
   
                                                    2024   
09:                              Body mass index (BMI)   
[Ratio]                   49.33 kg/m2               Sherly Older APRN.CNP  
Work Phone:   
[phone]                          [facility]  
   
                                                    2024   
09:          Body weight         136.53 kg           Sherly Older APRN.CNP  
Work Phone:   
[phone]                          [facility]  
   
                                                    2024   
09:                              Diastolic blood   
pressure                  78 mm[Hg]                 Sherly Older APRN.CNP  
Work Phone:   
[phone]                          [facility]  
   
                                                    2024   
09:          Heart rate          64 /min             Sherly Older APRN.CNP  
Work Phone:   
[phone]                          [facility]  
   
                                                    2024   
09:          Respiratory rate    16 /min             Sherly Older APRN.CNP  
Work Phone:   
[phone]                          [facility]  
   
                                                    2024   
09:                              SaO2% (BldA) [Mass   
fraction]                 97 %                      Sherly Older APRN.CNP  
Work Phone:   
[phone]                          [facility]  
   
                                                    2024   
09:                              Systolic blood   
pressure                  116 mm[Hg]                Sherly Older APRN.CNP  
Work Phone:   
[phone]                          [facility]  
   
                                                    2024   
10:          Body temperature    97.9 [degF]         Norlina Posada   
APRN.CNP  
Work Phone:   
[phone]                          [facility]  
   
                                                    2024   
10:          Body weight         136.81 kg           Vikki Posada   
APRN.CNP  
Work Phone:   
[phone]                          [facility]  
   
                                                    2024   
10:                              Diastolic blood   
pressure                  69 mm[Hg]                 Norlina Posada   
APRN.CNP  
Work Phone:   
[phone]                          [facility]  
   
                                                    2024   
10:          Heart rate          67 /min             Vikki Posada   
APRN.CNP  
Work Phone:   
[phone]                          [facility]  
   
                                                    2024   
10:                              SaO2% (BldA) [Mass   
fraction]                 96 %                      Norlina Posada   
APRN.CNP  
Work Phone:   
[phone]                          [facility]  
   
                                                    2024   
10:                              Systolic blood   
pressure                  99 mm[Hg]                 Vikki Posada   
APRN.CNP  
Work Phone:   
[phone]                          [facility]  
   
                                                    2024   
14:          Body height         166.4 cm            Meera Denbow PA-C  
Work Phone:   
[phone]                          [facility]  
   
                                                    2024   
14:          Body temperature    98.71 [degF]        Meera Denbow PA-C  
Work Phone:   
[phone]                          [facility]  
   
                                                    2024   
14:          Body weight         133.36 kg           Meera Denbow PA-C  
Work Phone:   
[phone]                          [facility]  
   
                                                    2024   
14:                              Diastolic blood   
pressure                  80 mm[Hg]                 Meera Denbow PA-C  
Work Phone:   
[phone]                          [facility]  
   
                                                    2024   
14:          Heart rate          73 /min             Meera Denbow PA-C  
Work Phone:   
[phone]                          [facility]  
   
                                                    2024   
14:          Respiratory rate    16 /min             Meera Denbow PA-C  
Work Phone:   
[phone]                          [facility]  
   
                                                    2024   
14:                              SaO2% (BldA) [Mass   
fraction]                 97 %                      Meera Denbow PA-C  
Work Phone:   
[phone]                          [facility]  
   
                                                    2024   
14:                              Systolic blood   
pressure                  146 mm[Hg]                Meera Denbow PA-C  
Work Phone:   
[phone]                          [facility]  
   
                                                    2023   
10:          Body weight         137.53 kg           Sherly Older APRN.CNP  
Work Phone:   
[phone]                          [facility]  
   
                                                    2023   
10:                              Diastolic blood   
pressure                  76 mm[Hg]                 Sherly Older APRN.CNP  
Work Phone:   
[phone]                          [facility]  
   
                                                    2023   
10:          Heart rate          68 /min             Sherly Older APRN.CNP  
Work Phone:   
[phone]                          [facility]  
   
                                                    2023   
10:          Respiratory rate    16 /min             Sherly Older APRN.CNP  
Work Phone:   
[phone]                          [facility]  
   
                                                    2023   
10:                              SaO2% (BldA) [Mass   
fraction]                 97 %                      Sherly Older APRN.CNP  
Work Phone:   
[phone]                          [facility]  
   
                                                    2023   
10:                              Systolic blood   
pressure                  122 mm[Hg]                Sherly Older APRN.CNP  
Work Phone:   
[phone]                          [facility]  
   
                                                    2023   
09:          Body weight         132.9 kg            Cynthia Douglas MD  
Work Phone:   
[phone]                          [facility]  
   
                                                    2023   
09:                              Diastolic blood   
pressure                  72 mm[Hg]                 Cynthia Douglas MD  
Work Phone:   
[phone]                          [facility]  
   
                                                    2023   
09:          Heart rate          64 /min             Cynthia Douglas MD  
Work Phone:   
[phone]                          [facility]  
   
                                                    2023   
09:          Respiratory rate    16 /min             Cynthia Douglas MD  
Work Phone:   
[phone]                          [facility]  
   
                                                    2023   
09:                              SaO2% (BldA) [Mass   
fraction]                 97 %                      Cynthia Douglas MD  
Work Phone:   
[phone]                          [facility]  
   
                                                    2023   
09:                              Systolic blood   
pressure                  118 mm[Hg]                Cynthia Douglas MD  
Work Phone:   
[phone]                          [facility]  
   
                                                    2023   
12:          Body height         167.6 cm            Kajal Dueñas MD  
Work Phone:   
[phone]                          [facility]  
   
                                                    2023   
12:          Body temperature    98.6 [degF]         Kajal Dueñas MD  
Work Phone:   
[phone]                          [facility]  
   
                                                    2023   
12:          Body weight         131.09 kg           Kajal Dueñas MD  
Work Phone:   
[phone]                          [facility]  
   
                                                    2023   
12:                              Diastolic blood   
pressure                  72 mm[Hg]                 Kajal Dueñas MD  
Work Phone:   
[phone]                          [facility]  
   
                                                    2023   
12:          Heart rate          81 /min             Kajal Dueñas MD  
Work Phone:   
[phone]                          [facility]  
   
                                                    2023   
12:                              SaO2% (BldA) [Mass   
fraction]                 96 %                      Kajal Dueñas MD  
Work Phone:   
[phone]                          [facility]  
   
                                                    2023   
12:                              Systolic blood   
pressure                  118 mm[Hg]                Kajal Dueñas MD  
Work Phone:   
[phone]                          [facility]  
   
                                                    2023   
11:          Body height         167.6 cm            Julito Osborne PA-C  
Work Phone:   
[phone]                          [facility]  
   
                                                    2023   
11:          Body temperature    97.7 [degF]         Julito Osborne PA-C  
Work Phone:   
[phone]                          [facility]  
   
                                                    2023   
11:          Body weight         132.45 kg           Julito Osborne PA-C  
Work Phone:   
[phone]                          [facility]  
   
                                                    2023   
11:                              Diastolic blood   
pressure                  80 mm[Hg]                 Julito Osborne PA-C  
Work Phone:   
[phone]                          [facility]  
   
                                                    2023   
11:          Heart rate          86 /min             Julito Osborne PA-C  
Work Phone:   
[phone]                          [facility]  
   
                                                    2023   
11:                              SaO2% (BldA) [Mass   
fraction]                 98 %                      Julito Osborne PA-C  
Work Phone:   
[phone]                          [facility]  
   
                                                    2023   
11:                              Systolic blood   
pressure                  110 mm[Hg]                Julito Osborne PA-C  
Work Phone:   
[phone]                          [facility]  
   
                                                    2023   
09:          Body height         167 cm              Francis Masci DO  
Work Phone:   
[phone]                          [facility]  
   
                                                    2023   
09:          Body temperature    98.2 [degF]         Francis Masci DO  
Work Phone:   
[phone]                          [facility]  
   
                                                    2023   
09:          Body weight         132.22 kg           Francis Masci DO  
Work Phone:   
[phone]                          [facility]  
   
                                                    2023   
09:                              Diastolic blood   
pressure                  70 mm[Hg]                 Francis Lacyi DO  
Work Phone:   
[phone]                          [facility]  
   
                                                    2023   
09:          Heart rate          81 /min             Francis Lacyi DO  
Work Phone:   
[phone]                          [facility]  
   
                                                    2023   
09:                              SaO2% (BldA) [Mass   
fraction]                 98 %                      Francis Lacyi DO  
Work Phone:   
[phone]                          [facility]  
   
                                                    2023   
09:                              Systolic blood   
pressure                  115 mm[Hg]                Francis Lacyi DO  
Work Phone:   
[phone]                          [facility]  
   
                                                    2023   
15:          Body height         165.1 cm            Cynthia Douglas MD  
Work Phone:   
[phone]                          [facility]  
   
                                                    2023   
15:          Body temperature    98.29 [degF]        Cynthia Douglas MD  
Work Phone:   
[phone]                          [facility]  
   
                                                    2023   
15:          Body weight         131.54 kg           Cynthia Douglas MD  
Work Phone:   
[phone]                          [facility]  
   
                                                    2023   
15:                              Diastolic blood   
pressure                  82 mm[Hg]                 Cynthia Douglas MD  
Work Phone:   
[phone]                          [facility]  
   
                                                    2023   
15:          Heart rate          71 /min             Cynthia Douglas MD  
Work Phone:   
[phone]                          [facility]  
   
                                                    2023   
15:          Respiratory rate    16 /min             Cynthia Douglas MD  
Work Phone:   
[phone]                          [facility]  
   
                                                    2023   
15:                              SaO2% (BldA) [Mass   
fraction]                 97 %                      Cynthia Douglas MD  
Work Phone:   
[phone]                          [facility]  
   
                                                    2023   
15:                              Systolic blood   
pressure                  120 mm[Hg]                Cynthia Douglas MD  
Work Phone:   
[phone]                          [facility]  
   
                                                    2022   
12:          Body temperature    97.5 [degF]         Jasbir Govea APRN.CNP  
Work Phone:   
[phone]                          [facility]  
   
                                                    2022   
12:          Body weight         137.71 kg           [facility]   
APRN.[facility]  
Work Phone:   
[phone]                          [facility]  
   
                                                    2022   
12:                              Diastolic blood   
pressure                  74 mm[Hg]                 [facility]   
APRN.CNP  
Work Phone:   
[phone]                          [facility]  
   
                                                    2022   
12:          Heart rate          65 /min             [facility]   
APRN.CNP  
Work Phone:   
[phone]                          [facility]  
   
                                                    2022   
12:          Respiratory rate    18 /min             [facility]   
APRN.CNP  
Work Phone:   
[phone]                          [facility]  
   
                                                    2022   
12:                              SaO2% (BldA) [Mass   
fraction]                 97 %                      [facility]   
APRN.CNP  
Work Phone:   
[phone]                          [facility]  
   
                                                    2022   
12:                              Systolic blood   
pressure                  122 mm[Hg]                [facility]   
APRN.CNP  
Work Phone:   
[phone]                          [facility]  
   
                                                    2022   
14:          Body height         165.1 cm            Cynthia Douglas MD  
Work Phone:   
[phone]                          [facility]  
   
                                                    2022   
14:          Body temperature    98.4 [degF]         Cynthia Douglas MD  
Work Phone:   
[phone]                          [facility]  
   
                                                    2022   
14:          Body weight         137.89 kg           Cynthia Douglas MD  
Work Phone:   
[phone]                          [facility]  
   
                                                    2022   
14:                              Diastolic blood   
pressure                  76 mm[Hg]                 Cynthia Douglas MD  
Work Phone:   
[phone]                          [facility]  
   
                                                    2022   
14:          Heart rate          75 /min             Cynthia Douglas MD  
Work Phone:   
[phone]                          [facility]  
   
                                                    2022   
14:          Respiratory rate    16 /min             Cynthia Douglas MD  
Work Phone:   
[phone]                          [facility]  
   
                                                    2022   
14:                              SaO2% (BldA) [Mass   
fraction]                 94 %                      Cynthia Douglas MD  
Work Phone:   
[phone]                          [facility]  
   
                                                    2022   
14:06040                              Systolic blood   
pressure                  122 mm[Hg]                Cynthia Douglas MD  
Work Phone:   
[phone]                          [facility]  
  
  
  
Encounters  
  
  
                          Encounter Date Encounter Type Care Provider Facility  
   
                                                    Start: 2025  
End: 2025           ambulatory                Karen Nobles RN                                      [facility]   
Communication  
   
                                                    Start: 2025  
End: 2025                         Patient encounter   
procedure                               Karen Nobles RN                                      [facility]   
Communication  
   
                                                    Start: 2025  
End: 2025                         Evaluation and management   
of inpatient                            Nick Madsen MD  
Work Phone:   
[phone]                          [facility]   
[facility]  
   
                                        Comment on above:   Colovesical fistula   
(Primary Dx);  
Diverticular disease   
   
                                                    Start: 01-  
End: 01-           Refill                    Sherly RAMOSCNP  
Work Phone:   
[phone]                          Internal Medicine   
[place]  
   
                                        Comment on above:   Refill Request   
   
                                                    Start: 2024  
End: 2024     [facility]   
[facility]  
   
                                                    Start: 2024  
End: 2024                         Admission to same day   
surgery center                          Nick Madsen MD  
Work Phone:   
[phone]                          [facility]   
[place]  
   
                                        Comment on above:   Colovesical fistula   
(Primary Dx)   
   
                                                    Start: 2024  
End: 2024                         Office outpatient new 45   
minutes                                 Nick Madsen MD  
Work Phone:   
[phone]                          [facility]   
[place]  
   
                                        Comment on above:   Diverticular disease  
 (Primary Dx);  
Colovesical fistula   
   
                                                    Start: 2024  
End: 2024           ambulatory                Nick Madsen MD  
Work Phone:   
[phone]                          [facility]   
[place]  
   
                                                    Start: 2024  
End: 2024                         Patient encounter   
procedure                               Vikki RAMOSCNP  
Work Phone:   
[phone]                          Hematology/Oncology  
   
                                                    Start: 2024  
End: 2024           ambulatory                Vikki Posada APRFARAZ.CNP  
Work Phone:   
[phone]                          Hematology/Oncology  
   
                                        Comment on above:   Malignant neoplasm o  
f lower-outer quadrant of right breast of   
female, estrogen receptor positive (HCC) (Primary Dx);  
Encounter for screening mammogram for high-risk patient   
   
                                                    Start: 10-  
End: 10-           Telephone encounter       Vikki LUCERO.CNP  
Work Phone:   
[phone]                          Hematology/Oncology  
   
                                        Comment on above:   Lab Orders   
   
                                                    Start: 2024  
End: 2024     ambulatory          Shayna Mai MA   [facility]   
[place]  
   
                                                    Start: 2024  
End: 2024                         Patient encounter   
procedure                 Shayna Mai MA         [facility]   
[place]  
   
                                        Comment on above:   Population Health Na  
vigation Outreach (/UHC, AWV, Care gaps,   
HCC,   
[place] [facility]//)   
   
                                                    Start: 2024  
End: 2024           Refill                    Cynthia Douglas MD  
Work Phone:   
[phone]                          Internal Medicine   
[place]  
   
                                        Comment on above:   Refill Request   
   
                                                    Start: 2024  
End: 2024     ambulatory          [facility]ira [facility]   
[place]  
   
                                                    Start: 2024  
End: 2024                         Patient encounter   
procedure                 Kerrie Henderson MA      [facility]   
[place]  
   
                                                    Start: 2024  
End: 2024     ambulatory          CYNTHIA DOUGLAS        Facility:[facility]  
   
                                                    Start: 2024  
End: 2024                         Patient encounter   
procedure                               Fabiana Ambrosio   
APRN.CNP  
Work Phone:   
[phone]                          [place] [facility]  
   
                                        Comment on above:   Upper respiratory tr  
act infection, unspecified type (Primary   
Dx)   
   
                                Start: 2024 Telephone encounter Cynthia unger MD  
Work Phone:   
[phone]                          Internal Medicine   
[place]  
   
                                                    Start: 2024  
End: 2024     ambulatory          SHERLY AN           Facility:[facility]  
   
                                                    Start: 2024  
End: 2024                         Patient encounter   
procedure                               Sherly An APRFARAZ.CNP  
Work Phone:   
[phone]                          Internal Medicine   
[place]  
   
                                        Comment on above:   Type 2 diabetes aruna  
itus without complication, without long-  
term   
current use of insulin (HCC) (Primary Dx);  
Primary hypertension;  
Hypercholesterolemia;  
Malignant neoplasm of lower-outer quadrant of right breast of   
female, estrogen receptor positive (HCC);  
Seasonal allergic rhinitis, unspecified trigger   
   
                                Start: 2024 Refill          Norlina Carpente  
r   
APRN.CNP  
Work Phone:   
[phone]                          Hematology/Oncology  
   
                                        Comment on above:   Refill Request   
   
                                Start: 06- Refill          Vikki Carpente  
r   
APRN.CNP  
Work Phone:   
[phone]                          Hematology/Oncology  
   
                                        Comment on above:   Refill Request   
   
                                                    Start: 2024  
End: 2024                         Patient encounter   
procedure                               Vikki Posada   
APRN.CNP  
Work Phone:   
[phone]                          [facility]  
   
                                                    Start: 2024  
End: 2024           ambulatory                Vikki Posada   
APRN.CNP  
Work Phone:   
[phone]                          Hematology/Oncology  
   
                                        Comment on above:   Malignant neoplasm o  
f lower-outer quadrant of right breast of   
female, estrogen receptor positive (HCC) (Primary Dx);  
Morbid obesity with BMI of 40.0-44.9, adult (HCC)   
   
                                Start: 2024 ambulatory      Sherly An APRN  
.CNP  
Work Phone:   
[phone]                          Internal Medicine   
[place]  
   
                                        Comment on above:   Potassium   
   
                                Start: 2024 ambulatory      Kinza Doshi MA                               [facility]  
   
                                        Start: 2024   Patient encounter   
procedure                               Kinza Doshi MA                               [facility]   
[place]  
   
                                        Comment on above:   Population Health Na  
vigation Outreach ([facility] Annual Wellness   
Visit )   
   
                                                    Start: 2024  
End: 2024     ambulatory          MEERA MENCHACA     Facility:[facility]  
   
                                                    Start: 2024  
End: 2024                         Patient encounter   
procedure                               Meera Menchaca PA-C  
Work Phone:   
[phone]                          Internal Medicine   
[place]  
   
                                        Comment on above:   Left flank pain ([place]  
slava Dx)   
   
                                Start: 2024 Documentation procedure Mammog  
jason   
Coordinator                             [facility]   
MAIN  
   
                                Start: 2024 Letter encounter Mammography   
Coordinator                             [facility]   
Department  
   
                                                    Start: 2024  
End: 2024     ambulatory          VIKKI POSADA     Facility:[facility]  
   
                                                    Start: 2024  
End: 2024                         Subsequent hospital visit   
by physician                            [facility]   
[facility]  
Work Phone:   
[phone]                          Radiology  
   
                                        Comment on above:   Screening for osteop  
orosis [Z13.820]   
   
                                                            Malignant neoplasm o  
f lower-outer quadrant of right breast of   
female, estrogen receptor positive (HCC) (HCC) [C50.511, Z17.0]   
   
                                Start: 2024 ambulatory      Sherly Older APRN  
.CNP  
Work Phone:   
[phone]                          Internal Medicine   
[place]  
   
                                        Comment on above:   Phone message   
   
                                Start: 2024 Telephone encounter Cynthia unger MD  
Work Phone:   
[phone]                          Internal Medicine   
[place]  
   
                                        Comment on above:   Patient Question; ER  
 F/U   
   
                                Start: 2023 Refill          Lillie dejesus PA-C  
Work Phone:   
[phone]                          Internal Medicine   
[place]  
   
                                        Comment on above:   Refill Request   
   
                                                    Start: 2023  
End: 2023     ambulatory          SHERLY OLDER           Facility:[facility]  
   
                                                    Start: 2023  
End: 2023                         Patient encounter   
procedure                               Sherly An APRN.CNP  
Work Phone:   
[phone]                          Internal Medicine   
[place]  
   
                                        Comment on above:   Controlled type 2 di  
abetes mellitus without complication,   
without   
long-term current use of insulin (HCC) (Primary Dx);  
Primary hypertension;  
Morbid obesity with BMI of 40.0-44.9, adult ([facility])   
   
                                Start: 2023 Refill          Sherly Older APRN  
.CNP  
Work Phone:   
[phone]                          Internal Medicine   
[place]  
   
                                        Comment on above:   Refill Request   
   
                                Start: 2023 Refill          Sherly Older APRN  
.CNP  
Work Phone:   
[phone]                          Internal Medicine   
[place]  
   
                                        Comment on above:   Refill Request   
   
                                Start: 2023 Refill          Sherly Older APRN  
.CNP  
Work Phone:   
[phone]                          Internal Medicine   
[place]  
   
                                        Comment on above:   Refill Request   
   
                          Start: 2023 Refill       [facility] Hematology  
/Oncology  
   
                                        Comment on above:   Refill Request   
   
                                Start: 2023 Telephone encounter Cynthia unger MD  
Work Phone:   
[phone]                          Internal Medicine   
[place]  
   
                                                    Start: 2023  
End: 2023                         Patient encounter   
procedure                               Cynthia Douglas MD  
Work Phone:   
[phone]                          Internal Medicine   
[place]  
   
                                        Comment on above:   Type 2 diabetes aruna  
itus without complication, without long-  
term   
current use of insulin (HCC) (Primary Dx);  
Screening for osteoporosis;  
Asymptomatic menopause;  
Primary hypertension;  
Hypercholesterolemia;  
Diverticulitis;  
Dysuria;  
Vaginal yeast infection;  
Morbid obesity with BMI of 40.0-44.9, adult (HCC)   
   
                                                    Start: 2023  
End: 2023           ambulatory                Kajal Dueñas MD  
Work Phone:   
[phone]                          General Surgery  
   
                                        Comment on above:   Calcification of rig  
ht breast on mammography (Primary Dx)   
   
                                                    Start: 2023  
End: 2023                         Telemedicine consultation   
with patient                            Kajal Dueñas MD  
Work Phone:   
[phone]                          [facility]  
   
                                Start: 2023 Telephone encounter Francis miramontes DO  
Work Phone:   
[phone]                          Hematology/Oncology  
   
                                        Comment on above:   Patient Question   
   
                                Start: 2023 Telephone encounter Kajal Padilla MD  
Work Phone:   
[phone]                          General Surgery  
   
                                        Comment on above:   Results (Right breas  
t biopsy results)   
   
                          Start: 2023 ambulatory   Stephanie Hoskins MA Navigat  
e Clinic   
[place]  
   
                                        Comment on above:   Population Health Na  
vigation Outreach ([facility] Care Gaps)   
   
                                Start: 2023 Telephone encounter Frannie cisneros PA-C  
Work Phone:   
[phone]                          [facility]  
   
                                        Comment on above:   Results   
   
                                                    Start: 2023  
End: 2023                         Patient encounter   
procedure                               Kajal Dueñas MD  
Work Phone:   
[phone]                          General Surgery  
   
                                        Comment on above:   Calcification of rig  
ht breast on mammography;  
Diverticulosis of large intestine without perforation or abscess   
without bleeding;  
Abnormal CT scan, gastrointestinal tract   
   
                                                    Start: 2023  
End: 2023                         Patient encounter   
procedure                               Julito Osborne PA-C  
Work Phone:   
[phone]                          Urology  
   
                                        Comment on above:   Gross hematuria   
   
                                Start: 2023 Telephone encounter Tasneem morales MD  
Work Phone:   
[phone]                          Mammography  
   
                                        Comment on above:   Mammogram Result Alex  
l Back   
   
                                                            Follow Up (Referral   
to Dr. Dejesus)   
   
                                                    Start: 2023  
End: 2023           ambulatory                Francis Shell DO  
Work Phone:   
[phone]                          Hematology/Oncology  
   
                                        Comment on above:   Malignant neoplasm o  
f lower-outer quadrant of right breast of   
female, estrogen receptor positive (HCC) (Primary Dx);  
Abnormal mammogram;  
Gross hematuria   
   
                                                    Start: 2023  
End: 2023                         Patient encounter   
procedure                               Francis AMARAL Sumaya PERKINS  
Work Phone:   
[phone]                          [facility]  
   
                                Start: 2023 Documentation procedure Mammog  
jason   
Coordinator                             [facility]   
MAIN  
   
                                Start: 2023 Letter encounter Mammography   
Coordinator                             [facility]   
Department  
   
                                                    Start: 2023  
End: 2023                         Subsequent hospital visit   
by physician                            Screen Mammo [facility]   
Wstr                                    Mammogram  
   
                                        Comment on above:   Primary cancer of lo  
wer outer quadrant of right female breast   
(HCC)   
[C50.511]   
   
                                                    Start: 2023  
End: 2023                         Patient encounter   
procedure                               Cynthia Douglas MD  
Work Phone:   
[phone]                          Internal Medicine   
[place]  
   
                                        Comment on above:   Primary hypertension  
 (Primary Dx);  
Controlled type 2 diabetes mellitus without complication, without   
long-term current use of insulin (HCC);  
Diverticulitis;  
Candida infection   
   
                                                            Primary cancer of lo  
wer outer quadrant of right female breast (HCC)   
(Primary Dx);  
Carcinoma of right breast, estrogen and progesterone receptor   
positive (HCC)   
   
                                Start: 2023 Refill          Sherly SousaCNP  
Work Phone:   
[phone]                          Internal Medicine   
[place]  
   
                                        Comment on above:   Refill Request   
   
                                Start: 2023 Telephone encounter Cynthia unger MD  
Work Phone:   
[phone]                          Internal Medicine   
[place]  
   
                                        Comment on above:   Constipation   
   
                                Start: 2023 Telephone encounter Cynthia unger MD  
Work Phone:   
[phone]                          Internal Medicine   
[place]  
   
                                        Comment on above:   Results   
   
                                Start: 2022 Refill          Cynthia STRINGER  
Work Phone:   
[phone]                          Family Medicine   
[place]  
   
                                        Comment on above:   Refill Request   
   
                                                    Start: 2022  
End: 2022                         Office outpatient visit   
25 minutes                              Jasbir RAMOSCNP  
Work Phone:   
[phone]                          [facility]  
   
                                        Comment on above:   Sinobronchitis (Prim  
david Dx)   
   
                          Start: 2022 ambulatory   Itzel Stone MA Navigat  
e Clinic   
[place]  
   
                                        Comment on above:   Population Health Na  
vigation Outreach ([facility])   
   
                                Start: 2022 Telephone encounter Cynthia unger MD  
Work Phone:   
[phone]                          Internal Medicine   
[place]  
   
                                        Comment on above:   Medication concern   
   
                                Start: 10- Telephone encounter Sherly RAMOSCNP  
Work Phone:   
[phone]                          Internal Medicine   
[place]  
   
                                        Comment on above:   Results   
   
                                Start: 2022 Refill          Jacquelyn Milner MD  
Work Phone:   
[phone]                          Hematology/Oncology  
   
                                        Comment on above:   Refill Request   
   
                                                            Results   
   
                                Start: 2022 Telephone encounter Cynthia unger MD  
Work Phone:   
[phone]                          Family Medicine   
[place]  
   
                                        Comment on above:   Lab Orders; Orders   
   
                          Start: 2022 ambulatory   Shayna Mai MA Navig  
ate Clinic   
[place]  
   
                                        Comment on above:   Population Health Na  
vigation Outreach ([facility] care gaps)   
   
                                Start: 2022 Refill          Francis A Masci D  
O  
Work Phone:   
[phone]                          Hematology/Oncology  
   
                                        Comment on above:   Refill Request   
   
                                Start: 2022 Refill          Haley SousaCNP  
Work Phone:   
[phone]                          Internal Medicine   
[place]  
   
                                        Comment on above:   Refill Request   
   
                                Start: 2022 Refill          Francis A Masci D  
O  
Work Phone:   
[phone]                          Hematology/Oncology  
   
                                        Comment on above:   Refill Request   
   
                                                    Start: 2022  
End: 2022                         Patient encounter   
procedure                               Cynthia Douglas MD  
Work Phone:   
[phone]                          Internal Medicine   
[place]  
   
                                        Comment on above:   Morbid obesity with   
BMI of 40.0-44.9, adult (HCC) (Primary   
Dx);  
Primary hypertension;  
Hypercholesterolemia;  
Controlled type 2 diabetes mellitus without complication, without   
long-term current use of insulin (HCC)   
   
                                                    Start: 2019  
End: 2019                         Patient encounter   
procedure                 Steve Govea      Facility:[facility]  
   
                                        Start: 2019   Patient encounter   
procedure                                           Facility:[id number]  
   
                                                    Start: 2018  
End: 2018                         Patient encounter   
procedure                 Maricruz Ravi           Facility:[facility]  
   
                          Start: 2018 Ambulatory   MARICRUZMELODY RAVI [facility]  
   
                          Start: 2017 Ambulatory   [facility]  
   
                          Start: 07- [facility]  
  
  
  
Procedures  
  
  
                          Date         Procedure    Procedure Detail Performing   
Clinician  
   
                                        Start: 2025   Glucose quantitative  
 blood   
xcpt reagent strip                                  Nick Madsen MD  
Work Phone:   
[phone]  
   
                                        Start: 2025   Glucose quantitative  
 blood   
xcpt reagent strip                                  Nick Madsen MD  
Work Phone:   
[phone]  
   
                                                    Start: 2025  
End: 2025                         Basic metabolic panel   
calcium total                                       Moni Hernandez MD  
Work Phone:   
[phone]  
   
                                        Start: 2025   Urinalysis complete   
panel   
- Urine                                             Jane Lange MD  
Work Phone:   
[phone]  
   
                                        Start: 2025   Urnls dip stick/tabl  
et   
reagent auto microscopy                             aJne Lange MD  
Work Phone:   
[phone]  
   
                                        Start: 2025   Glucose quantitative  
 blood   
xcpt reagent strip                                  Nick Madsen MD  
Work Phone:   
[phone]  
   
                                        Start: 2025   Glucose quantitative  
 blood   
xcpt reagent strip                                  Nick Madsen MD  
Work Phone:   
[phone]  
   
                                        Start: 2025   Glucose quantitative  
 blood   
xcpt reagent strip                                  Nick Madsen MD  
Work Phone:   
[phone]  
   
                                        Start: 2025   Glucose quantitative  
 blood   
xcpt reagent strip                                  Nick Madsen MD  
Work Phone:   
[phone]  
   
                                        Start: 2025   Basic metabolic pane  
l   
calcium total                                       Moni Hernandez MD  
Work Phone:   
[phone]  
   
                                        Start: 2025   Glucose quantitative  
 blood   
xcpt reagent strip                                  Nick Madsen MD  
Work Phone:   
[phone]  
   
                                        Start: 2025   Glucose quantitative  
 blood   
xcpt reagent strip                                  Nick Madsen MD  
Work Phone:   
[phone]  
   
                                        Start: 2025   Glucose quantitative  
 blood   
xcpt reagent strip                                  Nick Madsen MD  
Work Phone:   
[phone]  
   
                                        Start: 2025   Glucose quantitative  
 blood   
xcpt reagent strip                                  Nick Madsen MD  
Work Phone:   
[phone]  
   
                                        Start: 2025   Basic metabolic pane  
l   
calcium total                                       Moni Hernandez MD  
Work Phone:   
[phone]  
   
                                        Start: 2025   Manual Differential   
panel   
- Blood                                             Moni Hernandez MD  
Work Phone:   
[phone]  
   
                                        Start: 2025   Glucose quantitative  
 blood   
xcpt reagent strip                                  Nick Madsen MD  
Work Phone:   
[phone]  
   
                                        Start: 2025   Glucose quantitative  
 blood   
xcpt reagent strip                                  Nick Madsen MD  
Work Phone:   
[phone]  
   
                                        Start: 2025   Glucose quantitative  
 blood   
xcpt reagent strip                                  Nick Madsen MD  
Work Phone:   
[phone]  
   
                                                    Start: 2025  
End: 2025                         Cysto w/insert ureteral   
stent                                               Nick Madsen MD  
Work Phone:   
[phone]  
   
                                                    Start: 2025  
End: 2025                         Laparoscopy colectomy   
partial w/anastomosis                               Nick Madsen MD  
Work Phone:   
[phone]  
   
                                        Start: 2025   Ecg routine ecg w/le  
ast 12   
lds trcg only w/o i&r                               Chyna Jerry   
APRN - CRNA  
Work Phone:   
[phone]  
   
                          Start: 2025 Antibody screen              NICK MADSEN  
   
                                        Comment on above:   Performed By: #### L  
 ####Medical Director: CASSI VASQUEZ   
([id number])[facility] (Merged with [facility])[address] [place] [address]   
   
                                        Start: 2025   ABO and Rh group [Ty  
pe] in   
Blood by Confirmatory   
method                                              Chyna Jerry   
APRN - CRNA  
Work Phone:   
[phone]  
   
                                                    Start: 2025  
End: 2025                         Basic metabolic panel   
calcium total                                       Chyna Jerry   
APRN - CRNA  
Work Phone:   
[phone]  
   
                          Start: 2025 Blood typing serologic abo            
    Chyna Jerry   
APRN - CRNA  
Work Phone:   
[phone]  
   
                          Start: 2024 STREP A MOLECULAR (POC)               
 Fabiana Ambrosio APRFARAZ.CNP  
Work Phone:   
[phone]  
   
                                        Start: 2024   Urnls dip stick/tabl  
et   
rgnt auto w/o microscopy                            Meera SARKARC  
Work Phone:   
[phone]  
   
                                        Start: 2024   Dxa bone density donis  
dy 1/>   
sites axial skel                                    Cynthia Douglas MD  
Work Phone:   
[phone]  
   
                          Start: 2024 Mammography               Nick Madsen MD  
Work Phone:   
[phone]  
   
                          Start: 2023 Colonoscopy               [facility]  
er  
   
                                        Start: 2023   Urnls dip stick/tabl  
et   
rgnt auto w/o microscopy                            Cynthia Douglas MD  
Work Phone:   
[phone]  
   
                                        Start: 2023   Urnls dip stick/tabl  
et   
rgnt auto w/o microscopy                            Julito SARKARC  
Work Phone:   
[phone]  
   
                                                    Start: 2023  
End: 2023     Mammography                             Jacquelyn Milner MD  
Work Phone:   
[phone]  
   
                                        Start: 2022   Adult depression scr  
eening   
assessment                                          Cynthia Douglas MD  
Work Phone:   
[phone]  
   
                          Start: 2022 Mammography               Cynthia unger MD  
Work Phone:   
[phone]  
   
                          Start: 2011 Colonoscopy               Cynthia unger MD  
Work Phone:   
[phone]  
  
  
  
Plan of Treatment  
  
  
                          Date         Care Activity Detail       Author  
   
                                                    Start:   
2028          Colonoscopy         COLONOSCOPY         [facility]  
   
                                                    Start:   
2028                              COLORECTAL CANCER   
SCREENING                 COLORECTAL CANCER SCREENING [facility]  
   
                                                    Start:   
2028                              Screening for   
malignant neoplasm of   
colon                                               [facility]  
   
                                                    Start:   
2026                              Diabetes: Estimated   
Glomerular Filtration   
Rate for Kidney Health                  Diabetes: Estimated   
Glomerular Filtration Rate   
for Kidney Health                       [facility]  
   
                                                    Start:   
2025                              BP Controlled   
(<130/80)                 BP Controlled (<130/80)   [facility]  
   
                                                    Start:   
2025                              Annual PCP Team   
Chronic Disease Visit                   Annual PCP Team Chronic   
Disease Visit                           [facility]  
   
                                                    Start:   
2025                              BP Controlled   
(<130/80)                 BP Controlled (<130/80)   [facility]  
   
                                                    Start:   
2025                              Covid-19 Vaccine ( season)                         Covid-19 Vaccine (1 - 2023-24   
season)                                 [facility]  
   
                                                    Comment on   
above:                                  Postponed from 2023 (Declined at t  
his time)   
   
                                                    Start:   
2025                              Pneumococcal Vaccine:   
50+ (1 of 2 - PCV)                      Pneumococcal Vaccine: 50+ (1   
of 2 - PCV)                             [facility]  
   
                                                    Comment on   
above:                                  Postponed from 1977 (Declined at t  
his time)   
   
                                                    Start:   
2025                              Pneumococcal Vaccine:   
65+ (1 of 2 - PCV)                      Pneumococcal Vaccine: 65+ (1   
of 2 - PCV)                             [facility]  
   
                                                    Comment on   
above:                                  Postponed from 1964 (Declined at t  
his time)   
   
                                                    Start:   
2025  
End: 2025           ambulatory                2025 10:30 AM EDT Deann tarango   
(SP) Office   
Hematology/Oncology [address]   
[place], [place] [id number] [phone] Vikki Posada, [address]   
[place] [address]   
[phone] (Work)   
[phone] (Fax) 6 MO OV   
*mamm w/ galilea 25                   Hematology/Oncolog  
y  
   
                                                    Comment on   
above:                                  6 MO OV *mamm w/ galilea 25   
   
                                                    Start:   
2025                              BP Controlled   
(<130/80)                 BP Controlled (<130/80)   [facility]  
   
                                                    Start:   
2025                              Hemoglobin A1c   
measurement               Diabetes: Hemoglobin A1C  [facility]  
   
                                                    Start:   
2025                              Hepatitis B surface   
antibody level            LDL Cholesterol           [facility]  
   
                                                    Start:   
2025                              Annual PCP Team   
Chronic Disease Visit                   Annual PCP Team Chronic   
Disease Visit                           [facility]  
   
                                                    Start:   
2025  
End: 2025                         Patient encounter   
procedure                               2025 10:50 AM EST   
Appointment Mammogram [address]   
[place] [id number] [phone] Malignant   
neoplasm of lower-outer   
quadrant of right breast of   
female, estrogen receptor   
positive (HCC) [C50.511,   
Z17.0]; Encounter for   
screening mammogram for   
high-risk patient [Z12.31]              Mammogram  
   
                                                    Comment on   
above:                                  Malignant neoplasm of lower-outer quadra  
nt of right breast of female,   
estrogen receptor positive (HCC) [C50.511, Z17.0]; Encounter for   
screening mammogram for high-risk patient [Z12.31]   
   
                                                    Start:   
2025                              Screening for   
malignant neoplasm of   
breast                                              [facility]  
   
                                                    Start:   
2025  
End: 2025                         Patient encounter   
procedure                               2025 11:00 AM EST   
Office Visit [facility] Surgery - [address] [place] [address]   
[id number] [phone] Nick Madsen MD [address] [place] [id number] [phone] (Work)   
[phone] (Fax)                      [facility] Surgery   
- [place]  
   
                                                    Start:   
2025  
End: 2025                         Patient encounter   
procedure                               2025 11:15 AM EST   
Office Visit [facility] Surgery - [address] [place] [address]   
[id number] [phone] Nick Madsen MD [address] [place] [id number] [phone] (Work)   
[phone] (Fax)                      [facility] Surgery   
- [place]  
   
                                                    Start:   
2025                              Advance Directive   
Discussion                Advance Directive Discussion [facility]  
   
                                                    Start:   
2025                              Medicare Advantage   
Annual Wellness Visit                   Medicare Advantage Annual   
Wellness Visit                          [facility]  
   
                                                    Start:   
2024  
End: 2024                         Admission to same day   
surgery center                          2024 8:30 AM EST -   
2024 1:30 PM EST   
Surgery [facility] [place] [id number] [phone] Nick Madsen MD   
[address] [place] [address] [address] [place] [address] [id number] [phone]   
(Work) [phone] (Fax)   
ROBOTIC ASSISTED LAPAROSCOPIC   
SIGMOID COLECTOMY, POSSIBLE   
TRANSANAL EXTRACTION [41099   
(CPT )]                                 [facility] MAIN OR  
   
                                                    Comment on   
above:                                  ROBOTIC ASSISTED LAPAROSCOPIC SIGMOID CO  
LECTOMY, POSSIBLE TRANSANAL   
EXTRACTION [96151 (CPT )]   
   
                                                    Start:   
2024  
End: 2024                         Cysto w/insert   
ureteral stent                          CYSTOSCOPY WITH INSERTION   
URETERAL STENT Colovesical   
fistula Diverticular disease   
2024 8:30 AM EST                  [facility] Operating [facility]  
   
                                                    Start:   
2024  
End: 2024                         Laparoscopy colectomy   
partial w/anastomosis                   ROBOTIC (XI) ASSISTED   
LAPAROSCOPIC COLECTOMY   
PARTIAL (RIGHT) Colovesical   
fistula Diverticular disease   
2024 8:30 AM EST                  [facility] Operating [facility]  
   
                                                    Start:   
2024                              Subsequent hospital   
visit by physician                      2024 8:30 AM EST   
Hospital Encounter [facility]   
 [facility] [place]   
[id number] [phone] Nick Madsen MD [address] [place] [address] [address] [place] [address] [phone] (Work)   
[phone] (Fax)                      [facility]  
   
                                                    Start:   
2024  
End: 2024                         Patient encounter   
procedure                                           Internal Medicine   
[place]  
   
                                                    Comment on   
above:                                  6 month follow up   
   
                                                            Medicare wellnss/ 6   
month follow up   
   
                                                    Start:   
2024  
End: 2025                         CBC panel - Blood by   
Automated count                         COMPLETE BLOOD COUNT Lab   
Routine Type 2 diabetes   
mellitus without   
complication, without   
long-term current use of   
insulin (HCC) Primary   
hypertension Expected:   
2024 (Approximate),   
Expires: 2025                     [facility]  
   
                                                    Comment on   
above:                                  Expected: 2024 (Approximate), Expi  
res: 2025   
   
                                                    Start:   
2024  
End: 2025                         Comprehensive   
metabolic 2000 panel -   
Serum or Plasma                         COMPREHENSIVE METABOLIC PANEL   
Lab Routine Type 2 diabetes   
mellitus without   
complication, without   
long-term current use of   
insulin (HCC) Primary   
hypertension   
Hypercholesterolemia   
Expected: 2024   
(Approximate), Expires:   
2025                              [facility]  
   
                                                    Comment on   
above:                                  Expected: 2024 (Approximate), Expi  
res: 2025   
   
                                                    Start:   
2024  
End: 2025                         Hemoglobin A1c in   
Blood                                   HEMOGLOBIN A1C Lab Routine   
Type 2 diabetes mellitus   
without complication, without   
long-term current use of   
insulin (HCC) Expected:   
2024 (Approximate),   
Expires: 2025                     [facility]  
   
                                                    Comment on   
above:                                  Expected: 2024 (Approximate), Expi  
res: 2025   
   
                                                    Start:   
2024  
End: 2025                         Lipid 1996 panel -   
Serum or Plasma                         LIPID PANEL BASIC Lab Routine   
Type 2 diabetes mellitus   
without complication, without   
long-term current use of   
insulin (HCC)   
Hypercholesterolemia   
Expected: 2024   
(Approximate), Expires:   
2025                              [facility]  
Work Phone:   
[phone]  
   
                                                    Comment on   
above:                                  Expected: 2024 (Approximate), Expi  
res: 2025   
   
                                                    Start:   
2024  
End: 2024                         Admission to   
establishment                           2024 9:30 AM EST   
Pre-Admission Testing [facility]   
Pre-Admit Testing [address] [place] [address] [phone]                            [facility] Pre-Admit   
Testing  
   
                                                    Start:   
2024                              Annual PCP Team   
Chronic Disease Visit                   Annual PCP Team Chronic   
Disease Visit                           [facility]  
   
                                                    Start:   
2024                              BP Controlled   
(<130/80)                 BP Controlled (<130/80)   [facility]  
   
                                                    Start:   
2024                              Diabetes: Estimated   
Glomerular Filtration   
Rate for Kidney Health                  Diabetes: Estimated   
Glomerular Filtration Rate   
for Kidney [facility]  
   
                                                    Start:   
2024                              Diabetes: Urine   
Albumin-Creatinine   
Ratio for Kidney   
Health                                  Diabetes: Urine   
Albumin-Creatinine Ratio for   
Kidney Health                           [facility]  
   
                                                    Start:   
2024                Hepatitis B screening     Urine Albumin:Creatinine   
Ratio                                   [facility]  
   
                                                    Start:   
2024                              Hepatitis B Vaccine (1   
of 3 - Risk 3-dose   
series)                                 Hepatitis B Vaccine (1 of 3 -   
Risk 3-dose series)                     [facility]  
   
                                                    Comment on   
above:                                  Postponed from 2018 (Declined at t  
his time)   
   
                                                    Start:   
2024          HIV Screening       HIV Screening       [facility]  
   
                                                    Comment on   
above:                                  Postponed from 1976 (Declined at t  
his time)   
   
                                                    Start:   
2024          HIV screening       HIV Screening       [facility]  
   
                                                    Comment on   
above:                                  Postponed from 1976 (Declined at t  
his time)   
   
                                                    Start:   
2024                              RSV Vaccine (1 -   
1-dose 60+ series)                      RSV Vaccine (1 - 1-dose 60+   
series)                                 [facility]  
   
                                                    Comment on   
above:                                  Postponed from 2018 (Declined at t  
his time)   
   
                                                    Start:   
2024                              RSV Vaccine (1 - Risk   
60-74 years 1-dose   
series)                                 RSV Vaccine (1 - Risk 60-74   
years 1-dose series)                    [facility]  
   
                                                    Comment on   
above:                                  Postponed from 2018 (Declined at t  
his time)   
   
                                                    Start:   
2024                              Shingrix Vaccine (1 of   
2)                        Shingrix Vaccine (1 of 2) [facility]  
   
                                                    Comment on   
above:                                  Postponed from 2008 (Declined at t  
his time)   
   
                                                    Start:   
10-                              BP Controlled   
(<130/80)                 BP Controlled (<130/80)   [facility]  
   
                                                    Start:   
10-                              Hemoglobin A1c   
measurement               HbA1C                     [facility]  
   
                                                    Start:   
10-  
End: 10-           ambulatory                10/02/2024 10:30 AM EDT Visi  
t   
(SP) Office   
Hematology/Oncology [address] [place], [place] [id number] [phone] Vikki Posada APRN. [address]   
[address] [place] [id number] [phone] (Work)   
[phone] (Fax) 6 MO OV*             Hematology/Oncolog  
y  
   
                                                    Comment on   
above:                                  6 MO OV*   
   
                                                    Start:   
2024                              Covid-19 Vaccine ( season)                         Covid-19 Vaccine (   
season)                                 [facility]  
   
                                                    Start:   
2024                              Covid-19 Vaccine ( season)                         Covid-19 Vaccine (   
season)                                 [facility]  
   
                                                    Start:   
2024          Influenza vaccination                     [facility]  
   
                                                    Start:   
2024                              DTaP/Tdap/Td Vaccines   
(2 - Td or Tdap)                        DTaP/Tdap/Td Vaccines (2 - Td   
or Tdap)                                [facility]  
   
                                                    Start:   
2024                              Urine microalbumin   
profile                                             [facility]  
   
                                                    Start:   
2024          Influ[facility] vaccination [facility]  
[facility]  
   
                                                    Comment on   
above:                                  Postponed from 2023 (Declined at t  
his time)   
   
                                                    Start:   
2024  
End: 2024                         Patient encounter   
procedure                               2024 9:40 AM EDT Office   
Visit Internal Medicine   
[place] [address] [place] [address]   
[phone] Sherly An APRN.CNP [address] [facility]   
[place] [address]   
[phone] (Work)   
[phone] (Fax) 6 Month   
follow up                               Internal Medicine   
[place]  
   
                                                    Comment on   
above:                                  6 Month follow up   
   
                                                    Start:   
2024                              Hemoglobin A1c   
measurement               HbA1C                     [facility]  
   
                                                    Start:   
2024                              Hemoglobin   
A1c/Hemoglobin.total   
in Blood                  HbA1C                     [facility]  
   
                                                    Start:   
2024  
End: 2024                         CBC panel - Blood by   
Automated count                         CBC Lab Routine Controlled   
type 2 diabetes mellitus   
without complication, without   
long-term current use of   
insulin (HCC) Primary   
hypertension Expected:   
2024 (Approximate),   
Expires: 2024                     [facility]  
Work Phone:   
[phone]  
   
                                                    Comment on   
above:                                  Expected: 2024 (Approximate), Expi  
res: 2024   
   
                                                    Start:   
2024  
End: 2024                         Comprehensive   
metabolic 2000 panel -   
Serum or Plasma                         COMP METABOLIC PANEL Lab   
Routine Controlled type 2   
diabetes mellitus without   
complication, without   
long-term current use of   
insulin (HCC) Primary   
hypertension Expected:   
2024 (Approximate),   
Expires: 2024                     [facility]  
Work Phone:   
[phone]  
   
                                                    Comment on   
above:                                  Expected: 2024 (Approximate), Expi  
res: 2024   
   
                                                    Start:   
2024  
End: 2024                         Hemoglobin A1c in   
Blood                                   HGB A1C Lab Routine   
Controlled type 2 diabetes   
mellitus without   
complication, without   
long-term current use of   
insulin (HCC) Expected:   
2024 (Approximate),   
Expires: 2024                     [facility]  
Work Phone:   
[phone]  
   
                                                    Comment on   
above:                                  Expected: 2024 (Approximate), Expi  
res: 2024   
   
                                                    Start:   
2024  
End: 2024                         Lipid 1996 panel -   
Serum or Plasma                         LIPID PANEL BASIC Lab Routine   
Controlled type 2 diabetes   
mellitus without   
complication, without   
long-term current use of   
insulin (HCC) Primary   
hypertension Expected:   
2024 (Approximate),   
Expires: 2024                     [facility]  
Work Phone:   
[phone]  
   
                                                    Comment on   
above:                                  Expected: 2024 (Approximate), Expi  
res: 2024   
   
                                                    Start:   
2024                              ANNUAL PCP TEAM   
CHRONIC DISEASE VISIT                   ANNUAL PCP TEAM CHRONIC   
DISEASE VISIT                           [facility]  
   
                                                    Start:   
2024                              BP CONTROLLED   
(<130/80)                 BP CONTROLLED (<130/80)   [facility]  
   
                                                    Start:   
2024          COVID-19 VACCINE (#1) COVID-19 VACCINE (#1) [facility]  
   
                                                    Comment on   
above:                                  Postponed from 1958 (Declined at t  
his time)   
   
                                                    Start:   
2024                              Pneumococcal Vaccine:   
65+ (1 - PCV)                           Pneumococcal Vaccine: 65+ (1   
- PCV)                                  [facility]  
   
                                                    Comment on   
above:                                  Postponed from 1964 (Declined at t  
his time)   
   
                                                    Start:   
2024                              Pneumococcal Vaccine:   
65+ (1 of 2 - PCV)                      Pneumococcal Vaccine: 65+ (1   
of 2 - PCV)                             [facility]  
   
                                                    Comment on   
above:                                  Postponed from 1964 (Declined at t  
his time)   
   
                                                    Start:   
2024                              PNEUMOCOCCAL: 65+ (1 -   
PCV)                      PNEUMOCOCCAL: 65+ (1 - PCV) [facility]  
   
                                                    Comment on   
above:                                  Postponed from 1964 (Declined at t  
his time)   
   
                                                    Start:   
2024                              Hepatitis B surface   
antibody level            LDL CHOLESTEROL           [facility]  
   
                                                    Start:   
2024                              BP CONTROLLED   
(<130/80)                 BP CONTROLLED (<130/80)   [facility]  
   
                                                    Start:   
2024                              BP CONTROLLED   
(<130/80)                 BP CONTROLLED (<130/80)   [facility]  
   
                                                    Start:   
2024          Mammography                             [facility]  
   
                                                    Start:   
2024                              Screening for   
malignant neoplasm of   
breast                    Mammogram Screening       [facility]  
   
                                                    Start:   
2024                              ANNUAL PCP TEAM   
CHRONIC DISEASE VISIT                   ANNUAL PCP TEAM CHRONIC   
DISEASE VISIT                           [facility]  
   
                                                    Start:   
2024                              Hepatitis B surface   
antibody level            LDL CHOLESTEROL           [facility]  
   
                                                    Start:   
2024                              Advance Directive   
Discussion                Advance Directive Discussion [facility]  
   
                                                    Start:   
2024                              Behavioral Health   
Screening                 Behavioral Health Screening [facility]  
   
                                                    Start:   
2024          Depression Assessment Depression Assessment [facility]  
   
                                                    Start:   
2024                              Medicare Advantage   
Annual Wellness Visit                   Medicare Advantage Annual   
Wellness Visit                          [facility]  
   
                                                    Start:   
2023                              BP CONTROLLED   
(<130/80)                 BP CONTROLLED (<130/80)   [facility]  
   
                                                    Start:   
2023  
End: 2024                         ALBUMIN/CREAT RATIO   
RND UR                                              [facility]  
Work Phone:   
[phone]  
   
                                                    Comment on   
above:                                  Expected: 2023, Expires:   
4   
   
                                                    Start:   
2023  
End: 2024                         Basic metabolic 2000   
panel - Serum or   
Plasma                                              [facility]  
Work Phone:   
[phone]  
   
                                                    Comment on   
above:                                  Expected: 2023, Expires:    
   
                                                    Start:   
10-                              Hemoglobin   
A1c/Hemoglobin.total   
in Blood                  HBA1C                     [facility]  
   
                                                    Start:   
2023                              3 comp foot exam   
completed                 DIABETIC FOOT EXAM        [facility]  
   
                                                    Start:   
2023                              ANNUAL PCP TEAM   
CHRONIC DISEASE VISIT                   ANNUAL PCP TEAM CHRONIC   
DISEASE VISIT                           [facility]  
   
                                                    Start:   
2023                              Hepatitis B surface   
antibody level            LDL CHOLESTEROL           [facility]  
   
                                                    Start:   
2023                              Covid-19 Vaccine ( season)                         Covid-19 Vaccine (   
season)                                 [facility]  
   
                                                    Start:   
2023          Influenza vaccination                     [facility]  
   
                                                    Start:   
2023                              Hemoglobin   
A1c/Hemoglobin.total   
in Blood                  HBA1C                     [facility]  
   
                                                    Start:   
2023          Influenza vaccination INFLUENZA (#1)      [facility]  
   
                                                    Comment on   
above:                                  Postponed from 2022 (Declined at t  
his time)   
   
                                                    Start:   
2023  
End: 2023                         ALBUMIN/CREAT RATIO   
RND UR                                  ALBUMIN/CREAT RATIO RND UR   
Lab Routine Type 2 diabetes   
mellitus without   
complication, without   
long-term current use of   
insulin (HCC) Expected:   
2023, Expires:   
2023                              [facility]  
Work Phone:   
[phone]  
   
                                                    Comment on   
above:                                  Expected: 2023, Expires:   
3   
   
                                                    Start:   
2023                              ANNUAL PCP TEAM   
CHRONIC DISEASE VISIT                   ANNUAL PCP TEAM CHRONIC   
DISEASE VISIT                           [facility]  
   
                                                    Start:   
2023                              BP CONTROLLED   
(<130/80)                 BP CONTROLLED (<130/80)   [facility]  
   
                                                    Start:   
2023                              Hemoglobin   
A1c/Hemoglobin.total   
in Blood                  HBA1C                     [facility]  
   
                                                    Start:   
2023                              ADVANCE DIRECTIVE   
DISCUSSION                ADVANCE DIRECTIVE DISCUSSION [facility]  
   
                                                    Start:   
2023          BONE DENSITY        BONE DENSITY        [facility]  
   
                                                    Start:   
2023          Bone Density Screening Bone Density Screening [facility]  
   
                                                    Start:   
2023                              Pneumococcal Vaccine:   
65+ Years (1 of 1 -   
PCV)                                    Pneumococcal Vaccine: 65+   
Years (1 of 1 - PCV)                    [facility]  
   
                                                    Start:   
2023                              Screening for   
osteoporosis              Bone Density Screening    [facility]  
   
                                                    Start:   
2023  
End: 2023                         CBC W Auto   
Differential panel -   
Blood                                   CBC + DIFF Lab STAT Primary   
cancer of lower outer   
quadrant of right female   
breast (HCC) Carcinoma of   
right breast, estrogen and   
progesterone receptor   
positive (HCC) Expected:   
2023, Expires:   
2023                              [facility]  
Work Phone:   
[phone]  
   
                                                    Comment on   
above:                                  Expected: 2023, Expires:   
3   
   
                                                    Start:   
2023  
End: 2023                         Comprehensive   
metabolic 2000 panel -   
Serum or Plasma                         COMP METABOLIC PANEL Lab   
Routine Primary cancer of   
lower outer quadrant of right   
female breast (HCC) Carcinoma   
of right breast, estrogen and   
progesterone receptor   
positive (HCC) Expected:   
2023, Expires:   
2023                              [facility]  
Work Phone:   
[phone]  
   
                                                    Comment on   
above:                                  Expected: 2023, Expires:   
3   
   
                                                    Start:   
2023                              Adult depression   
screening assessment      DEPRESSION SCREENING      [facility]  
   
                                                    Start:   
2023                Hepatitis B screening     URINE ALBUMIN:CREATININE   
RATIO                                   [facility]  
   
                                                    Start:   
2023                              Hepatitis B surface   
antibody level            LDL CHOLESTEROL           [facility]  
   
                                                    Start:   
2023          Mammography         MAMMOGRAM           [facility]  
   
                                                    Start:   
2023          DEPRESSION ASSESSMENT DEPRESSION ASSESSMENT [facility]  
   
                                                    Start:   
2022          Glaucoma screening  Dilated Retinal Exam [facility]  
   
                                                    Start:   
2022                              Hepatitis C antibody,   
confirmatory test         DILATED RETINAL EXAM      [facility]  
   
                                                    Start:   
2022  
End: 2023                         Basic metabolic 2000   
panel - Serum or   
Plasma                                  BASIC METABOLIC PNL Lab   
Routine Hypokalemia Expected:   
2022 (Approximate),   
Expires: 2023                     [facility]  
Work Phone:   
[phone]  
   
                                                    Comment on   
above:                                  Expected: 2022 (Approximate), Expi  
res: 2023   
   
                                                    Start:   
10-                              3 comp foot exam   
completed                 DIABETIC FOOT EXAM        [facility]  
   
                                                    Start:   
10-                              BP CONTROLLED   
(<130/80)                 BP CONTROLLED (<130/80)   [facility]  
   
                                                    Start:   
10-          COVID-19 VACCINE (#1) COVID-19 VACCINE (#1) [facility]  
   
                                                    Comment on   
above:                                  Postponed from 1963 (Declined at t  
his time)   
   
                                                            Postponed from  (Declined at this time)   
   
                                                    Start:   
10-          COVID-19 VACCINE (1) COVID-19 VACCINE (1) [facility]  
   
                                                    Comment on   
above:                                  Postponed from 1970 (Declined at t  
his time)   
   
                                                    Start:   
10-                              SHINGRIX VACCINE (1 of   
2)                        SHINGRIX VACCINE (1 of 2) [facility]  
   
                                                    Comment on   
above:                                  Postponed from 2008 (Declined at t  
his time)   
   
                                                            Postponed from  (Declined at this time)   
   
                                                    Start:   
10-  
End: 2022           POTASSIUM BLD             POTASSIUM BLD Lab Routine   
Hypokalemia Expected:   
10/05/2022 (Approximate),   
Expires: 2022                     [facility]  
Work Phone:   
[phone]  
   
                                                    Comment on   
above:                                  Expected: 10/05/2022 (Approximate), Expi  
res: 2022   
   
                                                    Start:   
2022                              Hemoglobin   
A1c/Hemoglobin.total   
in Blood                  HBA1C                     [facility]  
   
                                                    Start:   
2022          Influenza vaccination                     [facility]  
   
                                                    Start:   
2022          Influenza vaccination INFLUENZA (#1)      [facility]  
   
                                                    Comment on   
above:                                  Postponed from 2021 (Declined at t  
his time)   
   
                                                    Start:   
2022          DEPRESSION ASSESSMENT DEPRESSION ASSESSMENT [facility]  
   
                                                    Start:   
2021          Colonoscopy         COLONOSCOPY         [facility]  
   
                                                    Start:   
2021                              COLORECTAL CANCER   
SCREENING                 COLORECTAL CANCER SCREENING [facility]  
   
                                                    Start:   
2019          HPV TESTING         HPV TESTING         [facility]  
   
                                                    Start:   
2019          PAP TESTING         PAP TESTING         [facility]  
   
                                                    Start:   
2018                              Hepatitis B Vaccine (1   
of 3 - Risk 3-dose   
series)                                 Hepatitis B Vaccine (1 of 3 -   
Risk 3-dose series)                     [facility]  
   
                                                    Start:   
2018                              RSV Immunization for   
Adults (1 - Risk 60-74   
years 1-dose series)                    RSV Immunization for Adults   
(1 - Risk 60-74 years 1-dose   
series)                                 [facility]  
   
                                                    Start:   
2018                              RSV Vaccine (1 -   
1-dose 60+ series)                      RSV Vaccine (1 - 1-dose 60+   
series)                                 [facility]  
   
                                                    Start:   
2018                              RSV Vaccine (1 - Risk   
60-74 years 1-dose   
series)                                 RSV Vaccine (1 - Risk 60-74   
years 1-dose series)                    [facility]  
   
                                                    Start:   
2008                              Pneumococcal Vaccine:   
50+ Years (1 of 1 -   
PCV)                                    Pneumococcal Vaccine: 50+   
Years (1 of 1 - PCV)                    [facility]  
   
                                                    Start:   
2008                              SHINGRIX VACCINE (1 of   
2)                        SHINGRIX VACCINE (1 of 2) [facility]  
   
                                                    Start:   
2008                              Zoster Vaccines (1 of   
2)                        Zoster Vaccines (1 of 2)  [facility]  
   
                                                    Start:   
2003          COLOGUARD (FIT-DNA) COLOGUARD (FIT-DNA) [facility]  
   
                                                    Start:   
2003          CT COLONOGRAPHY     CT COLONOGRAPHY     [facility]  
   
                                                    Start:   
2003          FECAL OCCULT BLOOD  FECAL OCCULT BLOOD  [facility]  
   
                                                    Start:   
2003                              Screening for   
malignant neoplasm of   
colon                                               [facility]  
   
                                                    Start:   
2003          SIGMOIDOSCOPY       SIGMOIDOSCOPY       [facility]  
   
                                                    Start:   
1998                              Screening for   
malignant neoplasm of   
breast                    Mammogram                 [facility]  
   
                                                    Start:   
1976          Anxiety Screening   Anxiety Screening   [facility]  
   
                                                    Start:   
1976          Depression Screening Depression Screening [facility]  
   
                                                    Start:   
1976          Hepatitis C screening Hepatitis C Screening [facility]  
   
                                                    Start:   
1976          HIV SCREENING       HIV SCREENING       [facility]  
   
                                                    Start:   
1970          Depression Screening Depression Screening [facility]  
   
                                                    Start:   
1968                              Diabetic foot   
examination               Diabetes: Foot Exam       [facility]  
   
                                                    Start:   
1968                Glaucoma screening        Diabetes: Retinopathy   
Screening                               [facility]  
   
                                                    Start:   
1968                              Preventive dental   
service                   Diabetes: Dental Exam     [facility]  
   
                                                    Start:   
1964          PNEUMOCOCCAL (1 - PCV) PNEUMOCOCCAL (1 - PCV) [facility]  
   
                                                    Start:   
1964                              Pneumococcal Vaccine:   
65+ (1 of 2 - PCV)                      Pneumococcal Vaccine: 65+ (1   
of 2 - PCV)                             [facility]  
   
                                                    Start:   
1964                              PNEUMOCOCCAL: 65+ (1 -   
PCV)                      PNEUMOCOCCAL: 65+ (1 - PCV) [facility]  
   
                                                    Start:   
1959                              MMR Vaccines (1 of 1 -   
Standard series)                        MMR Vaccines (1 of 1 -   
Standard series)                        [facility]  
   
                                                    Start:   
1958          COVID-19 VACCINE (#1) COVID-19 VACCINE (#1) [facility]  
   
                                                    Start:   
1958          Lipid panel         Lipid Panel         [facility]  
   
                                                    Start:   
1958                              Screening for   
malignant neoplasm of   
colon                                               [facility]  
   
                                                            Bacteria identified   
in   
Urine by Culture                        URINE CULTURE Microbiology   
Routine Gross hematuria   
2023 1:07 PM EST                  [facility]  
Work Phone:   
[phone]  
   
                                                            Bacteria identified   
in   
Urine by Culture                        URINE CULTURE Microbiology   
Routine Dysuria 2023   
10:34 AM EDT                            [facility]  
Work Phone:   
[phone]  
   
                                                            Bacteria identified   
in   
Urine by Culture                        URINE CULTURE Microbiology   
Routine Left flank pain   
2024 3:35 PM EST                  [facility]  
Work Phone:   
[phone]  
   
                                                      
End: 2023                         CBC panel - Blood by   
Automated count                         CBC Lab Routine Controlled   
type 2 diabetes mellitus   
without complication, without   
long-term current use of   
insulin (HCC) Every 6 months   
for 12 Occurrences starting   
2022 until 2023             [facility]  
Work Phone:   
[phone]  
   
                                                    Comment on   
above:                                  Every 6 months for 12 Occurrences starti  
ng 2022 until   
2023   
   
                                                      
End: 2023                         Comprehensive   
metabolic 2000 panel -   
Serum or Plasma                         COMP METABOLIC PANEL Lab   
Routine Controlled type 2   
diabetes mellitus without   
complication, without   
long-term current use of   
insulin (HCC) Every 6 months   
for 12 Occurrences starting   
2022 until 2023             [facility]  
Work Phone:   
[phone]  
   
                                                    Comment on   
above:                                  Every 6 months for 12 Occurrences starti  
ng 2022 until   
2023   
   
                                                            DBT Breast - bilater  
al   
screening                               [facility]   
Radiology Routine Malignant   
neoplasm of lower-outer   
quadrant of right breast of   
female, estrogen receptor   
positive (HCC) (HCC)   
Encounter for screening   
mammogram for high-risk   
patient 2024 11:26 AM   
EST                                     [facility]  
Work Phone:   
[phone]  
   
                                                      
End: 2025                         DBT Breast - bilateral   
screening                               [facility]   
Radiology Routine Malignant   
neoplasm of lower-outer   
quadrant of right breast of   
female, estrogen receptor   
positive (HCC) Encounter for   
screening mammogram for   
high-risk patient 1   
Occurrences starting   
2024 until 2025             [facility]  
Work Phone:   
[phone]  
   
                                                    Comment on   
above:                                  1 Occurrences starting 2024 until   
2025   
   
                                                      
End: 2024                         Diagnostic mammography   
computer-aided detcj   
uni                                     [facility]   
Routine Abnormal mammogram 1   
Occurrences starting   
2023 until 2024             [facility]  
Work Phone:   
[phone]  
   
                                                    Comment on   
above:                                  1 Occurrences starting 2023 until   
2024   
   
                                                      
End: 2024           DXA-AXIAL SKELETON        [facility]iology  
   
Routine Screening for   
osteoporosis 1 Occurrences   
starting 2023 until   
2024                              [facility]  
Work Phone:   
[phone]  
   
                                                    Comment on   
above:                                  1 Occurrences starting 2023 until   
2024   
   
                                                      
End: 2023                         Hemoglobin A1c in   
Blood                                   HGB A1C Lab Routine   
Controlled type 2 diabetes   
mellitus without   
complication, without   
long-term current use of   
insulin (HCC) Every 3 months   
for 24 Occurrences starting   
2022 until 2023             [facility]  
Work Phone:   
[phone]  
   
                                                    Comment on   
above:                                  Every 3 months for 24 Occurrences starti  
ng 2022 until   
2023   
   
                                                            Laparoscopy colectom  
y   
partial w/anastomosis                   LAPAROSCOPIC COLECTOMY   
SIGMOID Colovesical fistula             [facility]  
   
                                                      
End: 2023                         Lipid 1996 panel -   
Serum or Plasma                         LIPID PANEL BASIC Lab Routine   
Controlled type 2 diabetes   
mellitus without   
complication, without   
long-term current use of   
insulin (HCC) Every 6 months   
for 12 Occurrences starting   
2022 until 2023             [facility]  
Work Phone:   
[phone]  
   
                                                    Comment on   
above:                                  Every 6 months for 12 Occurrences starti  
ng 2022 until   
2023   
   
                                                POST VOID RESIDUAL POST VOID RES  
IDUAL Procedures   
Routine Gross hematuria   
Ordered: 2023                     [facility]  
Work Phone:   
[phone]  
   
                                                    Comment on   
above:                                  Ordered: 2023   
   
                                                      
End: 2023                         Thyrotropin   
[Units/volume] in   
Serum or Plasma                         TSH BLD Lab Routine   
Controlled type 2 diabetes   
mellitus without   
complication, without   
long-term current use of   
insulin (HCC) Every 3 months   
for 24 Occurrences starting   
2022 until 2023             [facility]  
Work Phone:   
[phone]  
   
                                                    Comment on   
above:                                  Every 3 months for 24 Occurrences starti  
ng 2022 until   
2023   
   
                                       Tissue exam               [facility]  
Work Phone:   
[phone]  
   
                                                    Comment on   
above:                                  Release Upon Ordering for 1 Occurrences   
starting 2025, 1   
completed   
   
                                                      
End: 2024                         Us breast uni real   
time with image   
limited                                 US BREAST LTD RT Radiology   
Routine Abnormal mammogram 1   
Occurrences starting   
2023 until 2024             [facility]  
Work Phone:   
[phone]  
   
                                                    Comment on   
above:                                  1 Occurrences starting 2023 until   
2024   
   
                                                                 [facility]  
  
  
  
Immunizations  
  
  
                      Immunization Date Immunization Notes      Care Provider Fa  
floyd  
   
                                        10-          influenza virus   
vaccine, unspecified   
formulation                             Screen Wstr         [facility]  
   
                                        2014          tetanus toxoid, redu  
xiomara   
diphtheria toxoid, and   
acellular pertussis   
vaccine, adsorbed                                   Cynthia Douglas MD  
Work Phone:   
[phone]                          [facility]  
Work Phone:   
[phone]  
  
  
  
Payers  
  
  
                          Date         Payer Category Payer        Policy ID  
   
                                2024      Medicare O    [facility] DUAL COMPLET  
E Member   
Subscriber Plan / Payer   
(Effective   
2024-Present)   
Name: Navdeep Guillen   
Member ID: [id number]   
Relation to Subscriber:   
Self Name: Navdeep Guillen [place] Subscriber   
ID: [id number] Payer ID:   
[id number] (NAIC) Group ID:   
[id number] Type: Medicare   
HMO Address: [address]   
[place] [address]                 [id number]  
[id number]  
   
                          2020   Unknown                   [id number]  
   
                                2020      Medicaid        MEDICAID [facility]  
   
MEDICAID [id number]   
2020-Present   
[phone] [address]   
[place] [address]   
Medicaid                                [id number]   
[id number]  
[id number]  
   
                                2019      Medicare UHC MEDICARE [facility]  
 DUAL   
COMPLETE HMO SNP   
[id number]   
2019-Present   
[phone] [address]   
[place] [address]   
Medicare                                [id number]   
[id number]  
[id number]  
   
                          2019   Medicare                    
   
                          2019   Medicaid                    
   
                          2019   Medicaid                  [id number]  
   
                          2018   Private Health Insurance                
   
                          1960   Unknown                   [id number]   
[id number]  
[id number]  
   
                          1960   Unknown                   [id number]   
[id number]  
[id number]  
   
                          1960   Unknown                   [id number]   
[id number]  
[id number]  
   
                          1960   Unknown                   [id number]   
[id number]  
[id number]  
   
                                       Medicare                  [id number]  
  
  
  
Social History  
  
  
                          Date         Type         Detail       Facility  
   
                                                    Start:   
05-  
End: 2024                         Tobacco smoking status   
NHIS                      Ex-smoker                 [facility]  
Work Phone:   
[phone]  
   
                                                    Start:   
1976  
End: 2009     History of tobacco use Current smoker      [facility]  
Work Phone:   
[phone]  
   
                                                    Start:   
1976  
End: 2009     History of tobacco use Cigarette Smoker    [facility]  
Work Phone:   
[phone]  
   
                                                    Start:   
05-  
End: 2025                         Cigarettes smoked current   
(pack per day) - Reported 1                         [facility]  
   
                                                    Start:   
05-  
End: 2024           Tobacco use and exposure  Smokeless tobacco   
non-user                                [facility]  
Work Phone:   
[phone]  
   
                                                    Start:   
2022  
End: 2024           Alcohol intake            Current non-drinker of   
alcohol (finding)                       [facility]  
   
                                                    Start:   
10-  
End: 2023                         History SDOH Alcohol   
Binge                     1                         [facility]  
   
                                                    Start:   
10-  
End: 2023                         History SDOH Social   
Connections Phone         5                         [facility]  
   
                                                    Start:   
10-  
End: 2022                         History SDOH Social   
[facility]        98                        [facility]  
   
                                                    Start:   
10-  
End: 2023                         History SDOH Social   
[facility] Membership    2                         [facility]  
   
                                                    Start:   
10-  
End: 2022     History SDOH Financial 3                   [facility]  
   
                                                    Start:   
2020          Education           12                  [facility]  
   
                                                    Start:   
1960          Sex Assigned At Birth Female              [facility]  
   
                                                    Start:   
2022  
End: 2022                         Exposure to SARS-CoV-2   
(event)                   Not sure                  [facility]  
Work Phone:   
[phone]  
   
                                                    Start:   
2022  
End: 2023                         History SDOH Alcohol Std   
Drinks                    0                         [facility]  
   
                                                    Start:   
2023  
End: 2025                         Social connection and   
isolation panel                                     [facility]  
   
                                                            Do you belong to any  
   
clubs or organizations   
such as Yazidism groups,   
unions, fraternal or   
athletic groups, or   
school groups?            No                        [facility]  
   
                                                            Are you now ,  
   
, ,   
, never    
or living with a partner?                   [facility]  
   
                                                            How often to you hav  
e a   
drink containing alcohol? Never                     [facility]  
   
                                                            How many standard dr  
inks   
containing alcohol do you   
have on a typical day?    Patient does not drink    [facility]  
   
                                                            Do you feel stress -  
   
tense, restless, nervous,   
or anxious, or unable to   
sleep at night because   
your mind is troubled all   
the time - these days   
[OSQ]                     Not at all                [facility]  
   
                                                            (I/We) worried wheth  
er   
(my/our) food would run   
out before (I/we) got   
money to buy more.        Never true                [facility]  
   
                                                    Start:   
10-                Gender identity           Identifies as female   
gender (finding)                        [facility]  
   
                                                    Start:   
10-          Sexual orientation  Heterosexual (finding) [facility]  
   
                                                            How hard is it for y  
ou to   
pay for the very basics   
like food, housing,   
medical care, and heating Not very hard             [facility]  
   
                                                            Do you feel stress -  
   
tense, restless, nervous,   
or anxious, or unable to   
sleep at night because   
your mind is troubled all   
the time - these days   
[OSQ]                     Only a little             [facility]  
   
                                                    Start:   
2024  
End: 2025     Alcoholic beverage intake Ex-drinker (finding) [facility]  
   
                                                    Start:   
1960          Sex assigned at birth Not on file         [facility]  
   
                                                    Start:   
2024          Sex                 Female (finding)    [facility]  
   
                                                            Are you now ,  
   
, ,   
, never    
or living with a partner?                    [facility]  
   
                                                            How hard is it for y  
ou to   
pay for the very basics   
like food, housing,   
medical care, and heating Hard                      [facility]  
  
  
  
Medical Equipment  
  
  
                                Procedure Code  Equipment Code  Equipment Origin  
al   
Text                                    Equipment   
Identifier                              Dates  
   
                                                                Sys Endscp Fix   
[employer] -   
[id number]                [id number]                Start:   
2015  
   
                                                                 [id number],   
[id number],   
[id number],   
[id number]                              Start:   
2020  
End:   
2023  
   
                                        Comment on above:   Test blood sugars 2d  
aily. Dx:ucnontrolled diabetes . Insulin:   
Yes   
   
                                                            Test blood sugar(s)   
2 daily. Dx: Type 2 DM - Controlled E11.9   
Insulin: Yes   
  
  
  
Clinical Notes 2016 to 2025  
Telephone Encounter - Karen Nobles RN - 2025 7:36 PM ESTTelephone   
Encounter - Karen Nobles RN - 2025 7:36 PM Nadir Kerns RN - 
2025 3:56 PM ESTAttachments  
  
                                Note Date & Type Note            Facility  
   
                                                    2025 Telephone   
encounter Note                          Formatting of this note might be   
different from the original.  
See previous encounter  
  
  
Electronically signed by Karen Nobles RN at 2025 8:01   
PM EST  
                                        [facility]  
   
                                                    2025 Miscellaneous   
Notes                                   Formatting of this note might be   
different from the original.  
See previous encounter  
  
  
Electronically signed by Karen Nobles RN at 2025 8:01   
PM EST  
documented in this encounter            [facility]  
   
                                        2025 Note     Peripheral Block  
Time Out: 2025 12:20 PM  
Patient location during procedure:   
Procedural  
Start time: 2025 12:20 PM  
End time: 2025 12:25 PM  
Reason for block: at surgeon's   
request and post-op pain   
management  
Staffing  
Performed: CRNA  
Resident/CRNA: NIC Grewal CRNA  
Preanesthetic Checklist  
Completed: patient identified, IV   
checked, site marked, risks and   
benefits  
discussed, surgical consent,   
monitors and equipment checked,   
pre-op evaluation  
and timeout performed  
Region:  
[place]  
Primary: TAP (Bupivacaine 0.375%/   
Epi 1:200,000/ Dex 0.1mg/mL 40ml   
divided  
evenly bilateral)  
Secondary: Upper rectus   
(Bupivacaine 0.375%/ Epi   
1:200,000/ Dex 0.1mg/mL 20ml  
divided evenly bilateral)  
Peripheral Block  
Patient position: supine  
Prep: ChloraPrep  
Patient monitoring: heart rate,   
cardiac monitor, continuous pulse   
ox and  
continuous capnometry  
O2: ETT/LMA  
Laterality: bilateral  
Injection technique: single-shot  
Guidance: ultrasound guided -image   
retained in chart, tip of the   
needle  
identified by ultraound during   
injection.  
Needle  
Needle: 21G X 110 mm  
Additional Notes  
2025 12:20 PM  
Assessment  
Injection assessment: negative   
aspiration for heme, no   
paresthesia on injection  
and incremental injection  
Paresthesia pain: none  
Heart rate change: no  
Slow fractionated injection: yes  
Required Documentation: Relevant   
anatomy identified (Nerves,   
Vessels, Muscles),  
Negative for blood on aspiration,   
Local anesthetic injected   
incrementally with  
intermittent aspiration every 5   
mL, Normal resistance with   
injection, No EKG  
changes noted, No symptoms of   
toxicity, Local anesthetic spread   
visualized  
around nerves or plane. and Local   
anesthetic injected without  
difficultyMedications   
dexAMETHasone-bupivacaine-epinephr  
ine (TAP) syringe -  
Injection  
60 mL - 2025 12:20:00 PM           [facility]  
   
                                        2025 Note     Discharge Summary  
Navdeep Guillen : 1960   
MRN: [id number]  
ADMIT DATE: 2025 DISCHARGE   
DATE: 2025  
PRIMARY CARE PHYSICIAN: Cynthia Douglas  
VISIT STATUS: Admission  
CODE STATUS: Prior  
DISCHARGE DIAGNOSES: Principal   
Problem:  
Colovesical fistula  
Active Problems:  
Diverticular disease  
HOSPITAL COURSE:  
64 y.o. female s/p robotic sigmoid   
colectomy for diverticulitis on   
25.  
Patient tolerated the procedure   
well. Post operatively patient did   
have some  
dizziness with standing. On POD3   
patient's dizziness was at her   
baseline level,  
she was tolerating diet, having   
bowel function, ambulating an   
urinating without  
difficulties. Patient was stable   
for discharge home.  
SIGNIFICANT DIAGNOSTIC STUDIES:  
N/A  
CONSULTANTS:  
None  
RECOMMENDED NEXT STEPS:  
Outpatient follow up  
DISCHARGE MEDICATIONS:  
Medication List  
START taking these medications  
oxyCODONE 5 MG immediate release   
tablet  
Commonly known as: Roxicodone  
Take 1 tablet (5 mg) by mouth   
every 6 hours as needed for severe   
pain (7-10) for  
up to 7 days.  
CONTINUE taking these medications  
atenolol 50 MG tablet  
Commonly known as: Tenormin  
atenolol-chlorthalidone 50-25 MG   
tablet  
Commonly known as: Tenoretic  
chlorthalidone 25 MG tablet  
Commonly known as: Hygroton  
docusate sodium 250 MG capsule  
Commonly known as: Colace  
glipiZIDE XL 2.5 MG 24 hr tablet  
Commonly known as: Glucotrol XL  
ibuprofen 800 MG tablet  
KLOR-CON M20 20 MEQ ER tablet  
Generic drug: potassium chloride   
CR  
metFORMIN 500 MG tablet  
Commonly known as: Glucophage  
metroNIDAZOLE 500 MG tablet  
Commonly known as: Flagyl  
Take (1) Flagyl at 3pm, 4pm, and   
before bedtime on the day prior to   
surgery.  
MULTIPLE VITAMINS PO  
neomycin 500 MG tablet  
Commonly known as: Mycifradin  
Take two tablets (1000 mg) by   
mouth at 3:00pm, 4:00pm and at bed   
time on the day  
prior to surgery  
potassium chloride CR 20 MEQ ER   
tablet  
Commonly known as: K-Tab  
Where to Get Your Medications  
These medications were sent to   
[facility]  
 [facility] [place] [address]  
Phone: [phone]  
oxyCODONE 5 MG immediate release   
tablet  
DIET: Regular soft diet  
ACTIVITY: No heavy lifting.  
__________________________________  
__________________________________  
____________  
______________  
COMPLEXITY OF FOLLOW UP:  
[x] Moderate Complexity: follow up   
within 7-14 calendar days (99843)  
[] Severe Complexity: follow up   
within 7 calendar days (49766)  
FOLLOW UP TESTING, PENDING RESULTS   
OR REFERRALS AT TRANSITIONAL CARE   
VISIT:  
[] Yes  
[x] No  
PENDING STUDIES:  
Pathology  
DISPOSITION: Home  
Follow up with Nick Madsen MD  
[address] [place] [address]  
[address]  
[facility] [address]  
[phone]  
Schedule an appointment as soon as   
possible for a visit in 2 week(s)  
Cynthia Douglas MD  
[address] [facility] [address]  
[phone]  
SIGNED:  
MONI HERNANDEZ MD  
2025, 4:43 PM                      [facility]  
   
                                        2025 Note     Patient verbalized u  
nderstanding   
of discharge instructions and   
education. IV  
removed. All questions answered.        [facility]  
   
                                        2025 Nurse Note Formatting of this  
 note might be   
different from the original.  
Patient verbalized understanding   
of discharge instructions and   
education. IV removed. All   
questions answered.  
Electronically signed by Bladimir Kerns RN at 2025 3:59 PM EST  
                                        [facility]  
   
                                        2025 Nurse Note Formatting of this  
 note might be   
different from the original.  
Patient verbalized understanding   
of discharge instructions and   
education. IV removed. All   
questions answered.  
Electronically signed by Bladimir Kerns RN at 2025 3:59 PM EST  
documented in this encounter            [facility]  
   
                                                    2025 Hospital   
Discharge instructions                    
  
  
Nick Madsen MD - 2025 12:47   
PM ESTFormatting of this note   
might be different from the   
original.  
Images from the original note were   
not included.  
  
  
Discharge Instructions  
  
Call your surgeon in 1 to 2 days   
to schedule a follow-up   
appointment in 2 weeks.  
  
OK to shower. OK for activity as   
tolerated. No lifting over 15   
pounds.  
  
Wound Care: keep wound clean and   
dry, reinforce dressing PRN and   
ice to area for comfort.  
  
If you have skin glue, that will   
fall off on its own, just keep   
area clean and dry.  
  
Please keep an eye on your evans   
urine output. Call if having   
trouble with evans.  
  
No driving while taking   
narcotic/sedating medications.  
  
You may take an over the counter   
stool softener while on narcotics   
for constipation as needed   
(colace, miralax, etc).  
  
Continue a soft diet for 1-2   
weeks. Eat small portions (<25-50%   
or your meal) and go slow with you   
diet until you are tolerating it   
well. Chew your food really well.   
Eat things that can easily be   
broken up with a fork. Try to   
avoid too many raw fruits or   
vegetables for the first 1-2   
weeks. Cooked/canned fruits and   
vegetables are ok.  
  
Call your Physician or return to   
the Emergency Room if you   
experience:  
-New or increased pain.  
-New or increased abdominal   
bloating or distention  
-New or increased bleeding.  
-Nausea & vomitting.  
-Fever & chills.  
-Shortness of breath.  
-Chest pain.  
  
Post-Operative Pain Reduction   
Strategy to Minimize Opioid Use  
  
Please take acetaminophen   
(Tylenol) and/or NSAIDS (Advil,   
Motrin) for postoperative pain   
control before taking opioid   
prescriptions for pain. You can   
alternate between the two or   
stagger them to achieve a more   
durable pain control regimen.  
-Do not exceed more than 4g of   
acetaminophen within 24 hours  
-Do not exceed more than 3200 mg   
NSAIDs in 24 hours  
-Do not take NSAIDs (ex: kidney   
problems or bleeding) or   
acetaminophen (ex: liver problems)   
if you have any contraindications   
to them .  
  
If your pain is not controlled by   
acetaminophen and/or NSAIDS,   
please take your opioid   
prescription   
(Norco/vicodin/percocet have   
tylenol in them) as needed for   
pain relief and try to use the   
minimum amount necessary for   
adequate pain relief.  
  
Opioid narcotics can suppress your   
breathing and decrease your level   
of alertness. It can also cause   
your bowel function to slow down   
and potentially make you   
constipated.  
-Do not drive or operate heavy   
machinery while on narcotic   
medications.  
-You can take Colace or MiraLAX   
over the counter as needed for   
constipation.  
  
Please properly dispose your   
excess opioid medications at the   
appropriate facility/location.  
  
An EXAMPLE schedule of how to take   
these medications is below. It is   
not necessary to wake yourself   
from sleep to take pain   
medication.  
  
6am: 1000 mg tylenol and 1-2   
oxycodone (5mg) tablets if needed  
9am: 800 mg ibuprofen  
12pm: 1000 mg tylenol and 1-2   
oxycodone (5mg) tablets if needed  
3pm: 800 mg ibuprofen  
6pm: 1000 mg tylenol and 1-2   
oxycodone (5mg) tablets if needed  
9pm: 800 mg ibuprofen  
12am: 1000 mg tylenol and 1-2   
oxycodone (5mg) tablets if needed  
3am: 800 mg ibuprofen  
6am: 1000 mg tylenol and 1-2   
oxycodone (5mg) tablets if needed  
Electronically signed by Nick Madsen MD at 2025 12:47 PM EST  
  
  
  
  
The following attachments cannot   
be sent through Care   
Everywhere.How to Care for Your   
Evans Catheter (English)documented   
in this encounter                       [facility]  
   
                                                    2025 History of   
Present illness Narrative               Formatting of this note is   
different from the original.  
Department of General Surgery  
Daily Progress Note  
Surg 3 CRS Service  
ADMIT DATE: 2025  
TODAY'S DATE: 2025  
  
SUBJECTIVE: NAEON.  
Patient continues to endorse   
dizziness with standing up,   
however she believes it is at her   
baseline level. States that her   
pain is well controlled. Denies   
nausea or vomiting. Is passing   
flatus and bowel movements.  
  
OBJECTIVE:  
  
VITALS: /62 (BP Location:   
Left arm, Patient Position:   
Sitting)   Pulse 74   Temp 36.6 C   
(97.9 F) (Temporal)   Resp 18     
SpO2 97%  
  
INTAKE/OUTPUT:  
I/O last 3 completed shifts:  
In: 850 [P.O.:850]  
Out: 3500 [Urine:3500]  
I/O this shift:  
In: 1240 [P.O.:1240]  
Out: 800 [Urine:800]  
  
PHYSICAL EXAM:  
Gen: NAD, A&Ox3, pain well   
controlled  
Heart: Well perfused  
Lungs: symmetric chest rise,   
normal work of breathing  
Abd: soft, appropriately tender,   
non distended. Non rigid.   
Incisions c/d/I.  
Ext: no c/c/e no gross deformities  
Skin: warm, well perfused, <2 cm   
cap refill, no obvious rashes,   
cellulitis or gross discoloration   
  
LABS  
CBC:  
Recent Labs  
25  
0408 25  
0141 25  
0417  
WBC 15.3* 14.9* 9.7  
HGB 14.5 13.7 13.0  
HCT 42.8 42.2 38.8  
 265 289  
  
BMP:  
Recent Labs  
25  
0408 25  
0141 25  
0418  
 140 142  
K 3.6 3.5 3.6  
 109* 111*  
CO2 20* 23 25  
BUN 8* 8* 8*  
CREATININE 0.68 0.71 0.63  
GLUCOSE 159* 111 86  
  
Hepatic: No results for input(s):   
 AST ,  ALT ,  BILITOT ,  ALKPHOS    
in the last 72 hours.  
  
No lab exists for component:  ALB   
  
Current Inpatient Medications  
Scheduled Meds:acetaminophen,   
1,000 mg, Oral, q8h  
atenolol, 50 mg, Oral, Daily  
chlorthalidone, 12.5 mg, Oral,   
Daily  
enoxaparin, 40 mg, SubCUTAneous,   
Daily  
insulin lispro, 0-12 Units,   
SubCUTAneous, TID WC  
And  
insulin lispro, 0-12 Units,   
SubCUTAneous, Nightly  
potassium chloride CR, 20 mEq,   
Oral, BID  
sodium chloride 0.9%, 5-40 mL,   
IntraVENous, q12h  
  
Continuous Infusions:  
PRN Meds:PRN medications:   
dextrose, dextrose, glucagon   
(rDNA), glucose, hydrALAZINE,   
HYDROmorphone, labetalol,   
naloxone, ondansetron ODT **OR**   
ondansetron, oxyCODONE **OR**   
oxyCODONE, sodium chloride, sodium   
chloride 0.9%  
  
ASSESSMENT AND PLAN:  
64 y.o. female s/p robotic sigmoid   
colectomy for diverticulitis with   
concern for colovesical fistula   
 with Dr. Madsen  
  
- Soft diet  
- Home meds except diabetes   
medications  
- prn pain control  
- Encourage ambulation  
- IS  
- dc with evans- keep for 2 weeks  
- Dispo: discharge home today  
- DW Dr. Madsen.  
  
Moni Hernandez MD  
General Surgery PGY-4  
Pager # [phone]  
Attending Supervising Physician's   
Attestation Statement  
I performed a history and physical   
examination of the patient and   
discussed the findings and   
management with the resident   
physician. I reviewed and agree   
with the findings and plan as   
documented. Date of service is   
2025  
  
Electronically signed by Nick Maya Ma, MD on 2025 at 3:57 PM  
Electronically signed by Moni Hernandez MD at 2025 3:54 PM   
EST  
Electronically signed by Moni Hernandez MD at 2025 3:54 PM   
EST  
Electronically signed by Nick Madsen MD at 2025 3:57 PM EST  
Formatting of this note is   
different from the original.  
Department of General Surgery  
Daily Progress Note  
Surg 3 CRS Service  
ADMIT DATE: 2025  
TODAY'S DATE: 2025  
  
SUBJECTIVE: NAEON.  
Patient continues to endorse   
dizziness with standing up. States   
that she does normally have this   
at home but it is more severe   
here.  
Endorses belching, but also   
passing flatus and bowel   
movements. Endorses abdominal   
soreness  
  
OBJECTIVE:  
  
VITALS: /71   Pulse 83     
Temp 37.2 C (98.9 F) (Temporal)     
Resp 16   SpO2 94%  
  
INTAKE/OUTPUT:  
I/O last 3 completed shifts:  
In: 1027.5 [P.O.:370; I.V.:657.5]  
Out: 4250 [Urine:4250]  
I/O this shift:  
In: 100 [P.O.:100]  
Out: -  
  
PHYSICAL EXAM:  
Gen: NAD, A&Ox3, pain well   
controlled  
Heart: Well perfused  
Lungs: symmetric chest rise,   
normal work of breathing  
Abd: soft, appropriately tender,   
non distended. Non rigid.   
Incisions c/d/I.  
Ext: no c/c/e no gross deformities  
Skin: warm, well perfused, <2 cm   
cap refill, no obvious rashes,   
cellulitis or gross discoloration  
  
LABS  
CBC:  
Recent Labs  
25  
1059 25  
0408 25  
0141  
WBC 10.1 15.3* 14.9*  
HGB 14.3 14.5 13.7  
HCT 43.4 42.8 42.2  
 287 265  
  
BMP:  
Recent Labs  
25  
1059 25  
0408 25  
0141  
 136 140  
K 3.4* 3.6 3.5  
 106 109*  
CO2 24 20* 23  
BUN 8* 8* 8*  
CREATININE 0.83 0.68 0.71  
GLUCOSE 116* 159* 111  
  
Hepatic: No results for input(s):   
 AST ,  ALT ,  BILITOT ,  ALKPHOS    
in the last 72 hours.  
  
No lab exists for component:  ALB   
  
Current Inpatient Medications  
Scheduled Meds:acetaminophen,   
1,000 mg, Oral, q8h  
atenolol, 50 mg, Oral, Daily  
chlorthalidone, 12.5 mg, Oral,   
Daily  
enoxaparin, 40 mg, SubCUTAneous,   
Daily  
insulin lispro, 0-12 Units,   
SubCUTAneous, TID WC  
And  
insulin lispro, 0-12 Units,   
SubCUTAneous, Nightly  
potassium chloride CR, 20 mEq,   
Oral, BID  
sodium chloride 0.9%, 5-40 mL,   
IntraVENous, q12h  
  
Continuous Infusions:  
PRN Meds:PRN medications:   
dextrose, dextrose, glucagon   
(rDNA), glucose, hydrALAZINE,   
HYDROmorphone, labetalol,   
naloxone, ondansetron ODT **OR**   
ondansetron, oxyCODONE **OR**   
oxyCODONE, sodium chloride, sodium   
chloride 0.9%  
  
ASSESSMENT AND PLAN:  
64 y.o. female s/p robotic sigmoid   
colectomy for diverticulitis with   
concern for colovesical fistula   
 with Dr. Madsen  
  
- Soft diet  
- check orthostatic vital signs,   
prn bolus if needed  
- Restart home meds except   
diabetes medications  
- prn pain control  
- Encourage ambulation  
- IS  
- dc with evans- keep for 2 weeks  
- Dispo: potential discharge home   
tomorrow if symptoms resolve  
- [facility] Dr. Newell on call for Dr. Madsen.  
  
Moni Hernandez MD  
General Surgery PGY-4  
Pager # [phone]  
  
Attending Supervising Physician's   
Attestation Statement  
I performed a history and physical   
examination of the patient. I   
discussed the findings and   
management with the resident   
physician. I reviewed and agree   
with the findings and plan as   
documented.  
  
Niesha Newell MD  
  
Electronically signed by Moni Hernandez MD at 2025 10:08 AM   
EST  
Electronically signed by Niesha Newell MD at 2025 1:28 PM   
EST  
Formatting of this note might be   
different from the original.  
.Nutrition rescreen completed.   
Chart reviewed. Patient to be   
monitored and followed by the diet   
technician. ELEUTERIO Villanueva  
Electronically signed by Radha Castaneda at 2025 9:57 AM EST  
Formatting of this note is   
different from the original.  
Department of General Surgery  
Daily Progress Note  
Surg 3 CRS Service  
ADMIT DATE: 2025  
TODAY'S DATE: 2025  
  
SUBJECTIVE: MYRON. Endorses some   
abdominal soreness mostly in her   
LLQ this morning. Denies nausea or   
vomiting, does have some belching.   
Notes feeling lightheaded this   
morning but thinks that it's   
because she has not had anything   
other than water.  
  
OBJECTIVE:  
  
VITALS: /66 (BP Location:   
Left arm, Patient Position:   
Sitting)   Pulse 82   Temp 37.4 C   
(99.4 F) (Temporal)   Resp 18     
SpO2 93%  
  
INTAKE/OUTPUT:  
I/O last 3 completed shifts:  
In: 2627 [P.O.:250; I.V.:2225; IV   
Piggyback:152]  
Out: 1000 [Urine:1000]  
No intake/output data recorded.  
  
PHYSICAL EXAM:  
Gen: NAD, A&Ox3, pain well   
controlled  
Heart: Well perfused  
Lungs: symmetric chest rise,   
normal work of breathing  
Abd: soft, appropriately tender,   
non distended. Non rigid.   
Incisions c/d/I.  
Ext: no c/c/e no gross deformities  
Skin: warm, well perfused, <2 cm   
cap refill, no obvious rashes,   
cellulitis or gross discoloration  
  
LABS  
CBC:  
Recent Labs  
25  
1059 25  
0408  
WBC 10.1 15.3*  
HGB 14.3 14.5  
HCT 43.4 42.8  
 287  
  
BMP:  
Recent Labs  
25  
1059 25  
0408  
 136  
K 3.4* 3.6  
 106  
CO2 24 20*  
BUN 8* 8*  
CREATININE 0.83 0.68  
GLUCOSE 116* 159*  
  
Hepatic: No results for input(s):   
 AST ,  ALT ,  BILITOT ,  ALKPHOS    
in the last 72 hours.  
  
No lab exists for component:  ALB   
  
Current Inpatient Medications  
Scheduled Meds:acetaminophen,   
1,000 mg, Oral, q8h  
enoxaparin, 40 mg, SubCUTAneous,   
Daily  
insulin lispro, 0-12 Units,   
SubCUTAneous, TID WC  
And  
insulin lispro, 0-12 Units,   
SubCUTAneous, Nightly  
sodium chloride 0.9%, 5-40 mL,   
IntraVENous, q12h  
  
Continuous Infusions:  
PRN Meds:PRN medications:   
dextrose, dextrose, glucagon   
(rDNA), glucose, hydrALAZINE,   
HYDROmorphone, labetalol,   
naloxone, ondansetron ODT **OR**   
ondansetron, oxyCODONE **OR**   
oxyCODONE, sodium chloride, sodium   
chloride 0.9%  
  
ASSESSMENT AND PLAN:  
64 y.o. female s/p robotic sigmoid   
colectomy for diverticulitis with   
concern for colovesical fistula   
 with Dr. Madsen  
  
- Advance to soft diet  
- DC IVF  
- prn pain control  
- Encourage ambulation  
- IS  
- dc with evans- keep for 2 weeks  
- Dispo: potential DC home today   
if patient's lightheadedness   
resolves and she tolerating diet  
- [facility] Dr. Newell on call for Dr. Madsen.  
  
Moni Hernandez MD  
General Surgery PGY-4  
Pager # [phone]  
  
Attending Supervising Physician's   
Attestation Statement  
I performed a history and physical   
examination of the patient. I   
discussed the findings and   
management with the resident   
physician. I reviewed and agree   
with the findings and plan as   
documented.  
  
Niesha Newell MD  
  
  
Electronically signed by Moni Hernandez MD at 2025 8:58 AM   
EST  
Electronically signed by Niesha Newell MD at 2025 11:09 AM   
EST  
documented in this encounter            [facility]  
   
                                        2025 Note     Department of [facility]  
l Surgery  
Daily Progress Note  
Surg 3 CRS Service  
ADMIT DATE: 2025  
TODAY'S DATE: 2025  
SUBJECTIVE: NAEON.  
Patient continues to endorse   
dizziness with standing up,   
however she believes it  
is at her baseline level. States   
that her pain is well controlled.   
Denies nausea  
or vomiting. Is passing flatus and   
bowel movements.  
OBJECTIVE:  
VITALS: /62 (BP Location:   
Left arm, Patient Position:   
Sitting)   Pulse  
74   Temp 36.6 ?C (97.9 ?F)   
(Temporal)   Resp 18   SpO2 97%  
INTAKE/OUTPUT:  
I/O last 3 completed shifts:  
In: 850 [P.O.:850]  
Out: 3500 [Urine:3500]  
I/O this shift:  
In: 1240 [P.O.:1240]  
Out: 800 [Urine:800]  
PHYSICAL EXAM:  
Gen: NAD, A&Ox3, pain well   
controlled  
Heart: Well perfused  
Lungs: symmetric chest rise,   
normal work of breathing  
Abd: soft, appropriately tender,   
non distended. Non rigid.   
Incisions c/d/I.  
Ext: no c/c/e no gross deformities  
Skin: warm, well perfused, <2 cm   
cap refill, no obvious rashes,   
cellulitis or  
gross discoloration  
LABS  
CBC:  
Recent Labs  
25  
0408 25  
0141 25  
0417  
WBC 15.3* 14.9* 9.7  
HGB 14.5 13.7 13.0  
HCT 42.8 42.2 38.8  
 265 289  
BMP:  
Recent Labs  
25  
0408 25  
0141 25  
0418  
 140 142  
K 3.6 3.5 3.6  
 109* 111*  
CO2 20* 23 25  
BUN 8* 8* 8*  
CREATININE 0.68 0.71 0.63  
GLUCOSE 159* 111 86  
Hepatic: No results for input(s):   
 AST ,  ALT ,  BILITOT ,  ALKPHOS    
in the last  
72 hours.  
No lab exists for component:  ALB   
Current Inpatient Medications  
Scheduled Meds:acetaminophen,   
1,000 mg, Oral, q8h  
atenolol, 50 mg, Oral, Daily  
chlorthalidone, 12.5 mg, Oral,   
Daily  
enoxaparin, 40 mg, SubCUTAneous,   
Daily  
insulin lispro, 0-12 Units,   
SubCUTAneous, TID WC  
And  
insulin lispro, 0-12 Units,   
SubCUTAneous, Nightly  
potassium chloride CR, 20 mEq,   
Oral, BID  
sodium chloride 0.9%, 5-40 mL,   
IntraVENous, q12h  
Continuous Infusions:  
PRN Meds:PRN medications:   
dextrose, dextrose, glucagon   
(rDNA), glucose,  
hydrALAZINE, HYDROmorphone,   
labetalol, naloxone, ondansetron   
ODT **OR**  
ondansetron, oxyCODONE **OR**   
oxyCODONE, sodium chloride, sodium   
chloride 0.9%  
ASSESSMENT AND PLAN:  
64 y.o. female s/p robotic sigmoid   
colectomy for diverticulitis with   
concern  
for colovesical fistula  with   
Dr. Madsen  
- Soft diet  
- Home meds except diabetes   
medications  
- prn pain control  
- Encourage ambulation  
- IS  
- dc with evans- keep for 2 weeks  
- Dispo: discharge home today  
- DW Dr. Madsen.  
Moni Hernandez MD  
General Surgery PGY-4  
Pager # [phone]  
Attending Supervising Physician's   
Attestation Statement  
I performed a history and physical   
examination of the patient and   
discussed the  
findings and management with the   
resident physician. I reviewed and   
agree with  
the findings and plan as   
documented. Date of service is   
2025  
Electronically signed by Nick Maya Ma, MD on 2025 at 3:57 PM      [facility]  
   
                                        2025 Plan of care note Formatting   
of this note might be   
different from the original.  
  
Problem: Pain - Adult  
Goal: Verbalizes/displays adequate   
comfort level or baseline comfort   
level  
Outcome: Progressing  
  
Problem: Safety - Adult  
Goal: Free from fall injury  
Outcome: Progressing  
  
Problem: Discharge Planning  
Goal: Discharge to home or other   
facility with appropriate   
resources  
Outcome: Progressing  
  
Problem: Chronic Conditions and   
Co-morbidities  
Goal: Patient's chronic conditions   
and co-morbidity symptoms are   
monitored and maintained or   
improved  
Outcome: Progressing  
  
Electronically signed by Clemente Bergeron RN at 2025 5:09 AM   
EST  
                                        [facility]  
   
                                                    2025 Miscellaneous   
Notes                                   Formatting of this note might be   
different from the original.  
  
Problem: Pain - Adult  
Goal: Verbalizes/displays adequate   
comfort level or baseline comfort   
level  
Outcome: Progressing  
  
Problem: Safety - Adult  
Goal: Free from fall injury  
Outcome: Progressing  
  
Problem: Discharge Planning  
Goal: Discharge to home or other   
facility with appropriate   
resources  
Outcome: Progressing  
  
Problem: Chronic Conditions and   
Co-morbidities  
Goal: Patient's chronic conditions   
and co-morbidity symptoms are   
monitored and maintained or   
improved  
Outcome: Progressing  
  
Electronically signed by Clemente Bergeron RN at 2025 5:09 AM   
EST  
Formatting of this note might be   
different from the original.  
  
Problem: Pain - Adult  
Goal: Verbalizes/displays adequate   
comfort level or baseline comfort   
level  
Outcome: Progressing  
  
Problem: Safety - Adult  
Goal: Free from fall injury  
Outcome: Progressing  
  
Problem: Discharge Planning  
Goal: Discharge to home or other   
facility with appropriate   
resources  
Outcome: Progressing  
  
Problem: Chronic Conditions and   
Co-morbidities  
Goal: Patient's chronic conditions   
and co-morbidity symptoms are   
monitored and maintained or   
improved  
Outcome: Progressing  
  
Electronically signed by Jewell Chacon RN at 2025 7:34 AM   
EST  
Formatting of this note might be   
different from the original.  
  
Problem: Pain - Adult  
Goal: Verbalizes/displays adequate   
comfort level or baseline comfort   
level  
Outcome: Progressing  
  
Problem: Safety - Adult  
Goal: Free from fall injury  
Outcome: Progressing  
  
Problem: Discharge Planning  
Goal: Discharge to home or other   
facility with appropriate   
resources  
Outcome: Progressing  
  
Problem: Chronic Conditions and   
Co-morbidities  
Goal: Patient's chronic conditions   
and co-morbidity symptoms are   
monitored and maintained or   
improved  
Outcome: Progressing  
  
Electronically signed by Clemente Bergeron RN at 2025 5:09 AM   
EST  
Formatting of this note might be   
different from the original.  
  
Problem: Pain - Adult  
Goal: Verbalizes/displays adequate   
comfort level or baseline comfort   
level  
Outcome: Progressing  
  
Problem: Safety - Adult  
Goal: Free from fall injury  
Outcome: Progressing  
  
Electronically signed by Imani Means RN at 2025 5:49 PM   
EST  
Formatting of this note might be   
different from the original.  
  
Problem: Pain - Adult  
Goal: Verbalizes/displays adequate   
comfort level or baseline comfort   
level  
2025 021 by Jewell Chacon RN  
Outcome: Progressing  
2025 by Jewell Chacon RN  
Outcome: Progressing  
  
Problem: Safety - Adult  
Goal: Free from fall injury  
2025 021 by Jewell Chacon RN  
Outcome: Progressing  
2025 221 by Jewell Chacon RN  
Outcome: Progressing  
  
Problem: Discharge Planning  
Goal: Discharge to home or other   
facility with appropriate   
resources  
2025 021 by Jewell Chacon RN  
Outcome: Progressing  
2025 221 by Jewell Chacon RN  
Outcome: Progressing  
  
Problem: Chronic Conditions and   
Co-morbidities  
Goal: Patient's chronic conditions   
and co-morbidity symptoms are   
monitored and maintained or   
improved  
2025 021 by Jewell Chacon RN  
Outcome: Progressing  
2025 by Jewell Chacon RN  
Outcome: Progressing  
  
Electronically signed by Jewell Chacon RN at 2025 2:14 AM   
EST  
Formatting of this note might be   
different from the original.  
  
Problem: Pain - Adult  
Goal: Verbalizes/displays adequate   
comfort level or baseline comfort   
level  
Outcome: Progressing  
  
Problem: Safety - Adult  
Goal: Free from fall injury  
Outcome: Progressing  
  
Problem: Discharge Planning  
Goal: Discharge to home or other   
facility with appropriate   
resources  
Outcome: Progressing  
  
Problem: Chronic Conditions and   
Co-morbidities  
Goal: Patient's chronic conditions   
and co-morbidity symptoms are   
monitored and maintained or   
improved  
Outcome: Progressing  
  
Electronically signed by Jewell Chacon RN at 2025 10:11 PM   
EST  
Formatting of this note might be   
different from the original.  
  
Problem: Pain - Adult  
Goal: Verbalizes/displays adequate   
comfort level or baseline comfort   
level  
Outcome: Progressing  
  
Problem: Safety - Adult  
Goal: Free from fall injury  
Outcome: Progressing  
  
Problem: Discharge Planning  
Goal: Discharge to home or other   
facility with appropriate   
resources  
Outcome: Progressing  
  
Problem: Chronic Conditions and   
Co-morbidities  
Goal: Patient's chronic conditions   
and co-morbidity symptoms are   
monitored and maintained or   
improved  
Outcome: Progressing  
  
Electronically signed by Reginald El RN at 2025 6:44 PM   
EST  
Formatting of this note might be   
different from the original.  
Family given room number from [facility]  
Electronically signed by Momo Ramírez RN at 2025 6:20 PM   
EST  
Formatting of this note might be   
different from the original.  
Patient family/visitor updated by   
RN at this time.  
  
  
Electronically signed by Momo Ramírez RN at 2025 5:52 PM   
EST  
Formatting of this note might be   
different from the original.  
PreOp Dx diverticulitis  
PostOp Dx Same  
Operation Cystoscopy, retrograde   
ICG in each ureter  
Surgeon Moi Bautista MD  
Assist None  
EBL Minimal  
Drains none  
Evans none  
Specimen none  
Condition To PACU  
  
This is a 64 y.o. patient who   
presents with diverticulitis, we   
were asked to place bilateral ICG  
  
Patient was brought to the   
operating room. A thorough time   
out was performed and everyone   
present was in agreement. Patient   
was placed on OR table. Anesthesia   
and lines were maintained by the   
anesthesia team.  
Patient was placed in the dorsal   
lithotomy position. Prepped and   
draped in usual fashion. Pressure   
points were padded. A   
cystourethroscope was inserted   
through the urethra and the   
bladder was inspected. Retrograde   
ICG 5cc in each ureter was placed   
with the conetip catheter.  
  
. The bladder was emptied with a   
evans and Dr Madsen took over the case  
  
Electronically signed by Moi Bautista MD at 2025 2:26 PM   
EST  
Formatting of this note might be   
different from the original.  
  
OPERATIVE NOTE  
  
PATIENT NAME: Navdeep Guillen  
: 1960  
MRN: [id number]  
ATTENDING PHYSICIAN: Nick Madsen MD  
PROCEDURE DATE: 2025  
  
PREOPERATIVE DIAGNOSIS:   
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   
fistula  
  
POSTOPERATIVE DIAGNOSIS: Same  
  
SURGEON: Nick Madsen MD  
  
ASSISTANT: Moni Hernandez  
  
OPERATION: Robotic assisted   
laparoscopic sigmoid colectomy,   
takedown of splenic flexure,   
omental flap, drainage of   
intra-abdominal abscess.  
  
ANESTHESIA: General  
  
ESTIMATED BLOOD LOSS: <50ml  
  
COMPLICATIONS: none  
  
SPECIMENS: Sigmoid colon and   
anastomotic rings  
  
INDICATIONS: The patient is a 64   
y.o. year old female with history   
of above preop diagnosis. I   
explained the risk, benefits,   
expected outcome, and alternatives   
to the procedure. Patient   
understands the risks include but   
not inclusive to bleeding,   
infection, anesthesia   
complication, blood vessel/nerve   
damage, chronic pain, reoperation,   
and failure of the procedure to   
obtain its intended goals. Patient   
understands and is in agreement   
and would like to proceed.  
  
DESCRIPTION OF PROCEDURE:  
  
The patient was brought to the   
operating room and intubated by   
anesthesia. The patient was placed   
in the lithotomy position with   
arms tucked by their sides with   
all bony prominences appropriately   
padded. The anesthesia team   
performed a tap block. Urology   
performed cystoscopy with ureteral   
ICG injection and evans placement.   
The abdomen was then prepped with   
ChloraPrep and the patient was   
draped in the usual sterile   
fashion.  
  
We first started with a stab   
incision in the left upper   
quadrant and placed a Veress   
needle to gain insufflation. We   
then used an 8 mm robotic Optiview   
trocar to enter in the left upper   
quadrant. Upon entry there is no   
signs of injury. We then placed a   
total of 4 robotic trochars from   
the left costal margin to the   
right ASIS 3 of them being 8 mm   
and one of them being a 12 mm   
port. We placed a right upper   
quadrant 5 mm air seal trocar. The   
patient was then placed in   
Trendelenburg with right lateral   
tilt. The robot was then docked.  
  
The sigmoid was densely adhered to   
the anterior abdominal wall as   
well as the left lateral pelvis.   
We spent at least an hour lysing   
adhesions of the colon to the   
pelvis and abdominal wall. We did   
encounter at least 2 abscesses   
that we drained purulent fluid   
from. We did identify the left   
ureter with firefly and kept that   
in the retroperitoneal position.   
We then made an incision on the   
right rectosigmoid mesentery to   
enter the retroperitoneal plane we   
developed that plane distally as   
well as proximally and then   
laterally. Again we identified the   
left ureter and kept that intact.   
We isolated out the inferior   
mesenteric artery and divided that   
using a 60 mm white load stapler.   
We then mobilized the lateral   
attachments of the descending   
colon to medialize the colon is   
much as possible. We mobilized   
this all the way up to the splenic   
flexure and continued to take down   
the splenic flexure. We then   
continued our dissection down   
distally in the retrorectal plane.   
We were eventually able to   
mobilize the rectum   
circumferentially below the   
peritoneal reflection into an area   
where the rectum was soft and   
pliable.  
  
We then chose a portion of the   
rectum where the tinea have   
splayed around an area that was   
just distal to the sacral   
promontory and cleared off the   
rectal mesentery with the vessel   
sealer device. We then used the   
robotic 60 mm green load stapler   
to divide across the rectum. We   
then divided the descending   
sigmoid colon mesentery using a   
vessel sealer device up to the   
colon wall. We injected 2 cc of   
ICG to check perfusion and there   
was good perfusion to the proposed   
division segment. We then sharply   
divided across the colon and   
placed an Endoloop on the distal   
divided portion.  
  
We then extended the right lower   
quadrant stapler port and inserted   
the 29 mm anvil through this. We   
then used a 2-0 strata fix to sew   
a pursestring around the proximal   
divided portion and placed the   
anvil inside there and tightened   
up the pursestring. We then   
cleared off the mesentery around   
the colon using electrocautery and   
scissors. The needle was then   
retrieved. We then used rectal   
dilators to dilate the rectum up   
and then placed our 29 mm EEA   
stapler transanally and opened up   
the spike. We made our anastomosis   
and had to complete anastomotic   
rings.  
  
We then occluded the proximal   
portion of the anastomosis and   
submerged the anastomosis under   
saline. We then performed a   
flexible sigmoidoscopy and the   
anastomosis was viable proximally   
and distally and was patent and   
hemostatic. There were no air   
bubbles seen on a leak test. We   
then withdrew the flexible   
sigmoidoscope and suctioned out   
the fluid in the pelvis. I then   
proceeded to take the omentum off   
the proximal portion of the   
transverse colon using vessel   
sealer device to create a flap of   
omentum and prevent any   
fistulization of the bladder to   
the colorectal anastomosis. The   
omentum was sutured to the   
peritoneum in the pelvis with 2-0   
STRATAFIX. We then placed a large   
Endo Catch bag through the right   
lower quadrant stapler port and   
placed the specimen in that. The   
robot was then undocked.  
  
The right lower quadrant incision   
was then extended a little bit and   
the specimen was extracted through   
here and sent off as sigmoid   
colon. We then closed the   
extraction site using 0 PDS x2 to   
close the anterior fascia. We then   
irrigated the subcutaneous tissue   
and closed all incision sites   
using 4-0 Monocryl and dressed it   
with Dermabond. The patient was   
then cleaned up and awoken by   
anesthesia then transferred to the   
PACU in stable condition.  
  
  
  
Electronically signed by Nick Maya Ma, MD on 2025 at 5:26 PM  
Electronically signed by Nick Madsen MD at 2025 5:31 PM EST  
Formatting of this note might be   
different from the original.  
Report given to Danya STRINGER RN.   
Aware of consent needing to be   
signed since patient had questions   
for Dr. Madsen. Labs still need drawn   
by ultrasound.  
Electronically signed by Momo Ramírez RN at 2025 11:27   
AM EST  
Formatting of this note might be   
different from the original.  
Unsuccessful IV attempt patient   
tolerated well, IV ultrasound   
paged.  
Electronically signed by Jennifer Vora RN at 2025 11:05 AM   
EST  
documented in this encounter            [facility]  
   
                                        2025 Note     Department of [facility] Surgery  
Daily Progress Note  
Surg 3 CRS Service  
ADMIT DATE: 2025  
TODAY'S DATE: 2025  
SUBJECTIVE: NAEON.  
Patient continues to endorse   
dizziness with standing up. States   
that she does  
normally have this at home but it   
is more severe here.  
Endorses belching, but also   
passing flatus and bowel   
movements. Endorses  
abdominal soreness  
OBJECTIVE:  
VITALS: /71   Pulse 83     
Temp 37.2 ?C (98.9 ?F) (Temporal)   
  Resp 16  
  SpO2 94%  
INTAKE/OUTPUT:  
I/O last 3 completed shifts:  
In: 1027.5 [P.O.:370; I.V.:657.5]  
Out: 4250 [Urine:4250]  
I/O this shift:  
In: 100 [P.O.:100]  
Out: -  
PHYSICAL EXAM:  
Gen: NAD, A&Ox3, pain well   
controlled  
Heart: Well perfused  
Lungs: symmetric chest rise,   
normal work of breathing  
Abd: soft, appropriately tender,   
non distended. Non rigid.   
Incisions c/d/I.  
Ext: no c/c/e no gross deformities  
Skin: warm, well perfused, <2 cm   
cap refill, no obvious rashes,   
cellulitis or  
gross discoloration  
LABS  
CBC:  
Recent Labs  
25  
1059 25  
0408 25  
0141  
WBC 10.1 15.3* 14.9*  
HGB 14.3 14.5 13.7  
HCT 43.4 42.8 42.2  
 287 265  
BMP:  
Recent Labs  
25  
1059 25  
0408 25  
0141  
 136 140  
K 3.4* 3.6 3.5  
 106 109*  
CO2 24 20* 23  
BUN 8* 8* 8*  
CREATININE 0.83 0.68 0.71  
GLUCOSE 116* 159* 111  
Hepatic: No results for input(s):   
 AST ,  ALT ,  BILITOT ,  ALKPHOS    
in the last  
72 hours.  
No lab exists for component:  ALB   
Current Inpatient Medications  
Scheduled Meds:acetaminophen,   
1,000 mg, Oral, q8h  
atenolol, 50 mg, Oral, Daily  
chlorthalidone, 12.5 mg, Oral,   
Daily  
enoxaparin, 40 mg, SubCUTAneous,   
Daily  
insulin lispro, 0-12 Units,   
SubCUTAneous, TID WC  
And  
insulin lispro, 0-12 Units,   
SubCUTAneous, Nightly  
potassium chloride CR, 20 mEq,   
Oral, BID  
sodium chloride 0.9%, 5-40 mL,   
IntraVENous, q12h  
Continuous Infusions:  
PRN Meds:PRN medications:   
dextrose, dextrose, glucagon   
(rDNA), glucose,  
hydrALAZINE, HYDROmorphone,   
labetalol, naloxone, ondansetron   
ODT **OR**  
ondansetron, oxyCODONE **OR**   
oxyCODONE, sodium chloride, sodium   
chloride 0.9%  
ASSESSMENT AND PLAN:  
64 y.o. female s/p robotic sigmoid   
colectomy for diverticulitis with   
concern  
for colovesical fistula 1/24 with   
Dr. Madsen  
- Soft diet  
- check orthostatic vital signs,   
prn bolus if needed  
- Restart home meds except   
diabetes medications  
- prn pain control  
- Encourage ambulation  
- IS  
- dc with evans- keep for 2 weeks  
- Dispo: potential discharge home   
tomorrow if symptoms resolve  
- N[place] Dr. Newell on call for Dr. Madsen.  
Moni Hernandez MD  
General Surgery PGY-4  
Pager # [phone]  
Attending Supervising Physician's   
Attestation Statement  
I performed a history and physical   
examination of the patient. I   
discussed the  
findings and management with the   
resident physician. I reviewed and   
agree with  
the findings and plan as   
documented.  
Niesha Newell MD  
Addendum:  
Per H&P, patient documented to   
have BMI-47.94 with comorbidities   
of HTN and DM2.  
Could this be further specified   
as:  
Class 3 obesity                         [facility]  
   
                                        2025 Plan of care note Formatting   
of this note might be   
different from the original.  
  
Problem: Pain - Adult  
Goal: Verbalizes/displays adequate   
comfort level or baseline comfort   
level  
Outcome: Progressing  
  
Problem: Safety - Adult  
Goal: Free from fall injury  
Outcome: Progressing  
  
Problem: Discharge Planning  
Goal: Discharge to home or other   
facility with appropriate   
resources  
Outcome: Progressing  
  
Problem: Chronic Conditions and   
Co-morbidities  
Goal: Patient's chronic conditions   
and co-morbidity symptoms are   
monitored and maintained or   
improved  
Outcome: Progressing  
  
Electronically signed by Jewell Chacon RN at 2025 7:34 AM   
[facility]  
   
                                        2025 Plan of care note Formatting   
of this note might be   
different from the original.  
  
Problem: Pain - Adult  
Goal: Verbalizes/displays adequate   
comfort level or baseline comfort   
level  
Outcome: Progressing  
  
Problem: Safety - Adult  
Goal: Free from fall injury  
Outcome: Progressing  
  
Problem: Discharge Planning  
Goal: Discharge to home or other   
facility with appropriate   
resources  
Outcome: Progressing  
  
Problem: Chronic Conditions and   
Co-morbidities  
Goal: Patient's chronic conditions   
and co-morbidity symptoms are   
monitored and maintained or   
improved  
Outcome: Progressing  
  
Electronically signed by Clemente Bergeron RN at 2025 5:09 AM   
[facility]  
   
                                        2025 Note     Problem: Pain - Adul  
t  
Goal: Verbalizes/displays adequate   
comfort level or baseline comfort   
level  
Outcome: Progressing  
  
Problem: Safety - Adult  
Goal: Free from fall injury  
Outcome: Progressing                    [facility]  
   
                                        2025 Plan of care note Formatting   
of this note might be   
different from the original.  
  
Problem: Pain - Adult  
Goal: Verbalizes/displays adequate   
comfort level or baseline comfort   
level  
Outcome: Progressing  
  
Problem: Safety - Adult  
Goal: Free from fall injury  
Outcome: Progressing  
  
Electronically signed by Imani Means RN at 2025 5:49 PM   
[facility]  
   
                                        2025 Note     Department of [facility] Surgery  
Daily Progress Note  
Surg 3 CRS Service  
ADMIT DATE: 2025  
TODAY'S DATE: 2025  
SUBJECTIVE: MYRON. Endorses some   
abdominal soreness mostly in her   
LLQ this  
morning. Denies nausea or   
vomiting, does have some belching.   
Notes feeling  
lightheaded this morning but   
thinks that it's because she has   
not had anything  
other than water.  
OBJECTIVE:  
VITALS: /66 (BP Location:   
Left arm, Patient Position:   
Sitting)   Pulse  
82   Temp 37.4 ?C (99.4 ?F)   
(Temporal)   Resp 18   SpO2 93%  
INTAKE/OUTPUT:  
I/O last 3 completed shifts:  
In: 2627 [P.O.:250; I.V.:2225; IV   
Piggyback:152]  
Out: 1000 [Urine:1000]  
No intake/output data recorded.  
PHYSICAL EXAM:  
Gen: NAD, A&Ox3, pain well   
controlled  
Heart: Well perfused  
Lungs: symmetric chest rise,   
normal work of breathing  
Abd: soft, appropriately tender,   
non distended. Non rigid.   
Incisions c/d/I.  
Ext: no c/c/e no gross deformities  
Skin: warm, well perfused, <2 cm   
cap refill, no obvious rashes,   
cellulitis or  
gross discoloration  
LABS  
CBC:  
Recent Labs  
25  
1059 25  
0408  
WBC 10.1 15.3*  
HGB 14.3 14.5  
HCT 43.4 42.8  
 287  
BMP:  
Recent Labs  
25  
1059 25  
0408  
 136  
K 3.4* 3.6  
 106  
CO2 24 20*  
BUN 8* 8*  
CREATININE 0.83 0.68  
GLUCOSE 116* 159*  
Hepatic: No results for input(s):   
 AST ,  ALT ,  BILITOT ,  ALKPHOS    
in the last  
72 hours.  
No lab exists for component:  ALB   
Current Inpatient Medications  
Scheduled Meds:acetaminophen,   
1,000 mg, Oral, q8h  
enoxaparin, 40 mg, SubCUTAneous,   
Daily  
insulin lispro, 0-12 Units,   
SubCUTAneous, TID WC  
And  
insulin lispro, 0-12 Units,   
SubCUTAneous, Nightly  
sodium chloride 0.9%, 5-40 mL,   
IntraVENous, q12h  
Continuous Infusions:  
PRN Meds:PRN medications:   
dextrose, dextrose, glucagon   
(rDNA), glucose,  
hydrALAZINE, HYDROmorphone,   
labetalol, naloxone, ondansetron   
ODT **OR**  
ondansetron, oxyCODONE **OR**   
oxyCODONE, sodium chloride, sodium   
chloride 0.9%  
ASSESSMENT AND PLAN:  
64 y.o. female s/p robotic sigmoid   
colectomy for diverticulitis with   
concern  
for colovesical fistula  with   
Dr. Madsen  
- Advance to soft diet  
- DC IVF  
- prn pain control  
- Encourage ambulation  
- IS  
- dc with evans- keep for 2 weeks  
- Dispo: potential DC home today   
if patient's lightheadedness   
resolves and she  
tolerating diet  
- WDW Dr. Newell on call for Dr. Madsen.  
Moni Hernandez MD  
General Surgery PGY-4  
Pager # [phone]  
Attending Supervising Physician's   
Attestation Statement  
I performed a history and physical   
examination of the patient. I   
discussed the  
findings and management with the   
resident physician. I reviewed and   
agree with  
the findings and plan as   
documented.  
Niesha Newell MD                       [facility]  
   
                                        2025 Plan of care note Formatting   
of this note might be   
different from the original.  
  
Problem: Pain - Adult  
Goal: Verbalizes/displays adequate   
comfort level or baseline comfort   
level  
2025 by Jewell Chacon RN  
Outcome: Progressing  
2025 by Jewell Chacon RN  
Outcome: Progressing  
  
Problem: Safety - Adult  
Goal: Free from fall injury  
2025 by Jewell Cahcon RN  
Outcome: Progressing  
2025 by Jewell Chacon RN  
Outcome: Progressing  
  
Problem: Discharge Planning  
Goal: Discharge to home or other   
facility with appropriate   
resources  
2025 by Jewell Chacon RN  
Outcome: Progressing  
2025 by Jewell Chacon RN  
Outcome: Progressing  
  
Problem: Chronic Conditions and   
Co-morbidities  
Goal: Patient's chronic conditions   
and co-morbidity symptoms are   
monitored and maintained or   
improved  
2025 0214 by Jewell Chacon RN  
Outcome: Progressing  
2025 2211 by Jewell Chacno RN  
Outcome: Progressing  
  
Electronically signed by Jewell Chacon RN at 2025 2:14 AM   
[facility]  
   
                                        2025 Plan of care note Formatting   
of this note might be   
different from the original.  
  
Problem: Pain - Adult  
Goal: Verbalizes/displays adequate   
comfort level or baseline comfort   
level  
Outcome: Progressing  
  
Problem: Safety - Adult  
Goal: Free from fall injury  
Outcome: Progressing  
  
Problem: Discharge Planning  
Goal: Discharge to home or other   
facility with appropriate   
resources  
Outcome: Progressing  
  
Problem: Chronic Conditions and   
Co-morbidities  
Goal: Patient's chronic conditions   
and co-morbidity symptoms are   
monitored and maintained or   
improved  
Outcome: Progressing  
  
Electronically signed by Jewell Chacon RN at 2025 10:11 PM   
[facility]  
   
                                        2025 Plan of care note Formatting   
of this note might be   
different from the original.  
  
Problem: Pain - Adult  
Goal: Verbalizes/displays adequate   
comfort level or baseline comfort   
level  
Outcome: Progressing  
  
Problem: Safety - Adult  
Goal: Free from fall injury  
Outcome: Progressing  
  
Problem: Discharge Planning  
Goal: Discharge to home or other   
facility with appropriate   
resources  
Outcome: Progressing  
  
Problem: Chronic Conditions and   
Co-morbidities  
Goal: Patient's chronic conditions   
and co-morbidity symptoms are   
monitored and maintained or   
improved  
Outcome: Progressing  
  
Electronically signed by Reginald El RN at 2025 6:44 PM   
[facility]  
   
                                        2025 Note     Formatting of this n  
ote might be   
different from the original.  
Family given room number from [employer]  
Electronically signed by Momo Ramírez RN at 2025 6:20 PM   
[facility]  
   
                                        2025 Note     Formatting of this n  
ote might be   
different from the original.  
Family given room number from [employer]  
Electronically signed by Momo Ramírez RN at 2025 6:20 PM   
[facility]  
   
                                        2025 Note     Formatting of this n  
ote might be   
different from the original.  
Patient family/visitor updated by   
RN at this time.  
  
  
Electronically signed by Momo Ramírez RN at 2025 5:52 PM   
[facility]  
   
                                        2025 Note     Formatting of this n  
ote might be   
different from the original.  
Patient family/visitor updated by   
RN at this time.  
  
  
Electronically signed by Momo Ramírez RN at 2025 5:52 PM   
[facility]  
   
                                        2025 Note     Patient: Navdeep Guillen  
Procedure Summary  
Date: 25 Room / Location:   
[address] Operating   
Room  
Anesthesia Start: 1208 Anesthesia   
Stop: 173  
Procedures:  
ROBOTIC ASSISTED LAPAROSCOPIC   
SIGMOID COLECTOMY, POSSIBLE   
TRANSANAL  
EXTRACTION (Abdomen)  
CYSTOSCOPY, WITH URETERAL STENT   
INSERTION (Urethra) Diagnosis:  
Colovesical fistula  
Diverticular disease  
Surgeons: Nick Madsen MD   
Responsible Provider: Theron Hidalgo MD  
Anesthesia Type: general, regional   
ASA Status: 3  
Anesthesia Type: general, regional  
Vitals Value Taken Time  
/65 25 1815  
Temp 36.1 ?C (97 ?F) 25 1727  
Pulse 67 25 1815  
Resp 13 25 1815  
SpO2 94 % 25  
Anesthesia Post Evaluation  
Patient location during   
evaluation: PACU  
Patient participation: complete -   
patient participated  
Level of consciousness: awake and   
alert  
Pain management: satisfactory to   
patient  
Airway patency: patent  
Dental Injury: no  
Cardiovascular status: acceptable,   
blood pressure returned to   
baseline and  
hemodynamically stable  
Respiratory status: acceptable and   
spontaneous ventilation  
Hydration status: euvolemic  
Nausea/Vomiting: controlled  
No notable events documented.  
Patient can be discharged once all   
PACU criteria has been met.             [facility]  
   
                                        2025 Note     Patient: Navdeep Guillen  
Procedure Summary  
Date: 25 Room / Location:   
[address] Operating   
Room  
Anesthesia Start: 1208 Anesthesia   
Stop: 173  
Procedures:  
ROBOTIC ASSISTED LAPAROSCOPIC   
SIGMOID COLECTOMY, POSSIBLE   
TRANSANAL  
EXTRACTION (Abdomen)  
CYSTOSCOPY, WITH URETERAL STENT   
INSERTION (Urethra) Diagnosis:  
Colovesical fistula  
Diverticular disease  
Surgeons: Nick Madsen MD   
Responsible Provider: Theron Hidalgo MD  
Anesthesia Type: general, regional   
ASA Status: 3  
Anesthesia Type: general, regional  
Vitals Value Taken Time  
/65 25 1815  
Temp 36.1 ?C (97 ?F) 25 1727  
Pulse 67 25 1815  
Resp 13 25 1815  
SpO2 94 % 25 1815  
Anesthesia Post Evaluation  
Patient location during   
evaluation: PACU  
Patient participation: complete -   
patient participated  
Level of consciousness: awake and   
alert  
Pain score: 0  
Pain management: satisfactory to   
patient  
Multimodal analgesia pain   
management approach  
Airway patency: patent  
Two or more strategies used to   
mitigate risk of obstructive sleep   
apnea  
Cardiovascular status: acceptable   
and hemodynamically stable  
Respiratory status: acceptable  
Hydration status: acceptable  
No notable events documented.  
MIPS #430 PONV  
Patient received an inhalational   
anesthetic (4554F)  
Patient exhibits three or more   
risk factors for PONV (4556F)  
Patient received at leaset 2   
prophylactic Rx PONV anti-emtic   
agents of different  
classes preop and/or intraop   
()  
MIPS # 424 Perioperative   
Temperature Management  
Anesthesia time was 60 minutes or   
longer (4255F)  
Anesthesai administered was   
General (inhalational or TIVA) or   
Neuraxial block  
()  
At least one body temperature   
greater than 95.8F/35.5C achieved   
within the 30  
mins immediately prior to or the   
15 minutes immediately following   
anesthesia end  
time ()  
MIPS #477 Multimodal Pain   
Management  
Not emergent case  
Patient was administered   
multimodal pain management (two or   
more drugs and/or  
interventions excluding systemic   
opioids) in the periopeartive   
period occurring  
at some time between 6 hours prior   
to anesthesia start time until   
discharged  
from PACU ()  
MIPS #404 Anesthesiology Smoking   
Abstinence  
The patient is not a current   
smoker (e.g. cigarette, cigar,   
pipe,  
e-cigarette/vaping/marijuana) If   
no stop here ()  
I completed my handoff to the   
receiving clinician during which   
we:  
1. Identified the patient  
2. Identified the responsible   
provider  
3. Reviewed the pertinent medical   
history  
4. Discussed the surgical course  
5. Reviewed intra-op anesthesia   
management and issues during   
anesthesia  
6. Set expectations for   
post-procedure period  
7. Allowed opportunity for   
questions and acknowledgement of   
understanding.                          [facility]  
   
                                        2025 Note     Airway  
Date/Time: 2025 12:20 PM  
Urgency: scheduled  
Airway not difficult  
General Information and Staff  
Patient location during procedure:   
Procedural  
Resident/CRNA: Miguel A Owens CRNA  
Performed: CRNA  
Indications and Patient Condition  
Indications for airway management:   
anesthesia  
Sedation level: Asleep  
Preoxygenated: yes  
Patient position: sniffing  
Mask difficulty assessment: 1 -   
vent by mask  
Final Airway Details  
Final airway type: endotracheal   
airway  
Successful airway: ETT  
Cuffed: yes  
Successful intubation technique:   
direct laryngoscopy  
Facilitating devices/methods:   
intubating stylet  
Endotracheal tube insertion site:   
oral  
Blade: Alejandro  
Blade size: #3  
ETT size (mm): 7.0  
Cormack-Lehane Classification:   
grade I - full view of glottis  
Placement verified by: chest   
auscultation and capnometry  
Measured from: lips  
ETT to lips (cm): 21  
Number of attempts at approach: 1  
Number of other approaches   
attempted: 0                            [facility]  
   
                                        2025 Note     Formatting of this n  
ote might be   
different from the original.  
PreOp Dx diverticulitis  
PostOp Dx Same  
Operation Cystoscopy, retrograde   
ICG in each ureter  
Surgeon Moi Bautista MD  
Assist None  
EBL Minimal  
Drains none  
Evans none  
Specimen none  
Condition To PACU  
  
This is a 64 y.o. patient who   
presents with diverticulitis, we   
were asked to place bilateral ICG  
  
Patient was brought to the   
operating room. A thorough time   
out was performed and everyone   
present was in agreement. Patient   
was placed on OR table. Anesthesia   
and lines were maintained by the   
anesthesia team.  
Patient was placed in the dorsal   
lithotomy position. Prepped and   
draped in usual fashion. Pressure   
points were padded. A   
cystourethroscope was inserted   
through the urethra and the   
bladder was inspected. Retrograde   
ICG 5cc in each ureter was placed   
with the conetip catheter.  
  
. The bladder was emptied with a   
evans and Dr Madsen took over the case  
  
Electronically signed by Moi Bautista MD at 2025 2:26 PM   
EST  
                                        [facility]  
Work Phone:   
[phone]  
   
                                        2025 Note     Formatting of this n  
ote might be   
different from the original.  
  
OPERATIVE NOTE  
  
PATIENT NAME: Navdeep Guillen  
: 1960  
MRN: [id number]  
ATTENDING PHYSICIAN: Nick Madsen MD  
PROCEDURE DATE: 2025  
  
PREOPERATIVE DIAGNOSIS:   
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   
fistula  
  
POSTOPERATIVE DIAGNOSIS: Same  
  
SURGEON: Nick Madsen MD  
  
ASSISTANT: Moni Hernandez  
  
OPERATION: Robotic assisted   
laparoscopic sigmoid colectomy,   
takedown of splenic flexure,   
omental flap, drainage of   
intra-abdominal abscess.  
  
ANESTHESIA: General  
  
ESTIMATED BLOOD LOSS: <50ml  
  
COMPLICATIONS: none  
  
SPECIMENS: Sigmoid colon and   
anastomotic rings  
  
INDICATIONS: The patient is a 64   
y.o. year old female with history   
of above preop diagnosis. I   
explained the risk, benefits,   
expected outcome, and alternatives   
to the procedure. Patient   
understands the risks include but   
not inclusive to bleeding,   
infection, anesthesia   
complication, blood vessel/nerve   
damage, chronic pain, reoperation,   
and failure of the procedure to   
obtain its intended goals. Patient   
understands and is in agreement   
and would like to proceed.  
  
DESCRIPTION OF PROCEDURE:  
  
The patient was brought to the   
operating room and intubated by   
anesthesia. The patient was placed   
in the lithotomy position with   
arms tucked by their sides with   
all bony prominences appropriately   
padded. The anesthesia team   
performed a tap block. Urology   
performed cystoscopy with ureteral   
ICG injection and evans placement.   
The abdomen was then prepped with   
ChloraPrep and the patient was   
draped in the usual sterile   
fashion.  
  
We first started with a stab   
incision in the left upper   
quadrant and placed a Veress   
needle to gain insufflation. We   
then used an 8 mm robotic Optiview   
trocar to enter in the left upper   
quadrant. Upon entry there is no   
signs of injury. We then placed a   
total of 4 robotic trochars from   
the left costal margin to the   
right ASIS 3 of them being 8 mm   
and one of them being a 12 mm   
port. We placed a right upper   
quadrant 5 mm air seal trocar. The   
patient was then placed in   
Trendelenburg with right lateral   
tilt. The robot was then docked.  
  
The sigmoid was densely adhered to   
the anterior abdominal wall as   
well as the left lateral pelvis.   
We spent at least an hour lysing   
adhesions of the colon to the   
pelvis and abdominal wall. We did   
encounter at least 2 abscesses   
that we drained purulent fluid   
from. We did identify the left   
ureter with firefly and kept that   
in the retroperitoneal position.   
We then made an incision on the   
right rectosigmoid mesentery to   
enter the retroperitoneal plane we   
developed that plane distally as   
well as proximally and then   
laterally. Again we identified the   
left ureter and kept that intact.   
We isolated out the inferior   
mesenteric artery and divided that   
using a 60 mm white load stapler.   
We then mobilized the lateral   
attachments of the descending   
colon to medialize the colon is   
much as possible. We mobilized   
this all the way up to the splenic   
flexure and continued to take down   
the splenic flexure. We then   
continued our dissection down   
distally in the retrorectal plane.   
We were eventually able to   
mobilize the rectum   
circumferentially below the   
peritoneal reflection into an area   
where the rectum was soft and   
pliable.  
  
We then chose a portion of the   
rectum where the tinea have   
splayed around an area that was   
just distal to the sacral   
promontory and cleared off the   
rectal mesentery with the vessel   
sealer device. We then used the   
robotic 60 mm green load stapler   
to divide across the rectum. We   
then divided the descending   
sigmoid colon mesentery using a   
vessel sealer device up to the   
colon wall. We injected 2 cc of   
ICG to check perfusion and there   
was good perfusion to the proposed   
division segment. We then sharply   
divided across the colon and   
placed an Endoloop on the distal   
divided portion.  
  
We then extended the right lower   
quadrant stapler port and inserted   
the 29 mm anvil through this. We   
then used a 2-0 strata fix to sew   
a pursestring around the proximal   
divided portion and placed the   
anvil inside there and tightened   
up the pursestring. We then   
cleared off the mesentery around   
the colon using electrocautery and   
scissors. The needle was then   
retrieved. We then used rectal   
dilators to dilate the rectum up   
and then placed our 29 mm EEA   
stapler transanally and opened up   
the spike. We made our anastomosis   
and had to complete anastomotic   
rings.  
  
We then occluded the proximal   
portion of the anastomosis and   
submerged the anastomosis under   
saline. We then performed a   
flexible sigmoidoscopy and the   
anastomosis was viable proximally   
and distally and was patent and   
hemostatic. There were no air   
bubbles seen on a leak test. We   
then withdrew the flexible   
sigmoidoscope and suctioned out   
the fluid in the pelvis. I then   
proceeded to take the omentum off   
the proximal portion of the   
transverse colon using vessel   
sealer device to create a flap of   
omentum and prevent any   
fistulization of the bladder to   
the colorectal anastomosis. The   
omentum was sutured to the   
peritoneum in the pelvis with 2-0   
STRATAFIX. We then placed a large   
Endo Catch bag through the right   
lower quadrant stapler port and   
placed the specimen in that. The   
robot was then undocked.  
  
The right lower quadrant incision   
was then extended a little bit and   
the specimen was extracted through   
here and sent off as sigmoid   
colon. We then closed the   
extraction site using 0 PDS x2 to   
close the anterior fascia. We then   
irrigated the subcutaneous tissue   
and closed all incision sites   
using 4-0 Monocryl and dressed it   
with Dermabond. The patient was   
then cleaned up and awoken by   
anesthesia then transferred to the   
PACU in stable condition.  
  
  
  
Electronically signed by Nick Maya Ma, MD on 2025 at 5:26 PM  
Electronically signed by Nick Madsen MD at 2025 5:31 PM [email or web address]  
   
                                        2025 Note     Formatting of this n  
ote might be   
different from the original.  
PreOp Dx diverticulitis  
PostOp Dx Same  
Operation Cystoscopy, retrograde   
ICG in each ureter  
Surgeon Moi Bautista MD  
Assist None  
EBL Minimal  
Drains none  
Evans none  
Specimen none  
Condition To PACU  
  
This is a 64 y.o. patient who   
presents with diverticulitis, we   
were asked to place bilateral ICG  
  
Patient was brought to the   
operating room. A thorough time   
out was performed and everyone   
present was in agreement. Patient   
was placed on OR table. Anesthesia   
and lines were maintained by the   
anesthesia team.  
Patient was placed in the dorsal   
lithotomy position. Prepped and   
draped in usual fashion. Pressure   
points were padded. A   
cystourethroscope was inserted   
through the urethra and the   
bladder was inspected. Retrograde   
ICG 5cc in each ureter was placed   
with the conetip catheter.  
  
. The bladder was emptied with a   
evans and Dr Madsen took over the case  
  
Electronically signed by Moi Bautista MD at 2025 2:26 PM   
[email or web address]  
Work Phone:   
[phone]  
   
                                        2025 Note     Formatting of this n  
ote might be   
different from the original.  
  
OPERATIVE NOTE  
  
PATIENT NAME: Navdeep Guileln  
: 1960  
MRN: [id number]  
ATTENDING PHYSICIAN: Nick Madsen MD  
PROCEDURE DATE: 2025  
  
PREOPERATIVE DIAGNOSIS:   
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   
fistula  
  
POSTOPERATIVE DIAGNOSIS: Same  
  
SURGEON: Nick Madsen MD  
  
ASSISTANT: Moni Hernandez  
  
OPERATION: Robotic assisted   
laparoscopic sigmoid colectomy,   
takedown of splenic flexure,   
omental flap, drainage of   
intra-abdominal abscess.  
  
ANESTHESIA: General  
  
ESTIMATED BLOOD LOSS: <50ml  
  
COMPLICATIONS: none  
  
SPECIMENS: Sigmoid colon and   
anastomotic rings  
  
INDICATIONS: The patient is a 64   
y.o. year old female with history   
of above preop diagnosis. I   
explained the risk, benefits,   
expected outcome, and alternatives   
to the procedure. Patient   
understands the risks include but   
not inclusive to bleeding,   
infection, anesthesia   
complication, blood vessel/nerve   
damage, chronic pain, reoperation,   
and failure of the procedure to   
obtain its intended goals. Patient   
understands and is in agreement   
and would like to proceed.  
  
DESCRIPTION OF PROCEDURE:  
  
The patient was brought to the   
operating room and intubated by   
anesthesia. The patient was placed   
in the lithotomy position with   
arms tucked by their sides with   
all bony prominences appropriately   
padded. The anesthesia team   
performed a tap block. Urology   
performed cystoscopy with ureteral   
ICG injection and evans placement.   
The abdomen was then prepped with   
ChloraPrep and the patient was   
draped in the usual sterile   
fashion.  
  
We first started with a stab   
incision in the left upper   
quadrant and placed a Veress   
needle to gain insufflation. We   
then used an 8 mm robotic Optiview   
trocar to enter in the left upper   
quadrant. Upon entry there is no   
signs of injury. We then placed a   
total of 4 robotic trochars from   
the left costal margin to the   
right ASIS 3 of them being 8 mm   
and one of them being a 12 mm   
port. We placed a right upper   
quadrant 5 mm air seal trocar. The   
patient was then placed in   
Trendelenburg with right lateral   
tilt. The robot was then docked.  
  
The sigmoid was densely adhered to   
the anterior abdominal wall as   
well as the left lateral pelvis.   
We spent at least an hour lysing   
adhesions of the colon to the   
pelvis and abdominal wall. We did   
encounter at least 2 abscesses   
that we drained purulent fluid   
from. We did identify the left   
ureter with firefly and kept that   
in the retroperitoneal position.   
We then made an incision on the   
right rectosigmoid mesentery to   
enter the retroperitoneal plane we   
developed that plane distally as   
well as proximally and then   
laterally. Again we identified the   
left ureter and kept that intact.   
We isolated out the inferior   
mesenteric artery and divided that   
using a 60 mm white load stapler.   
We then mobilized the lateral   
attachments of the descending   
colon to medialize the colon is   
much as possible. We mobilized   
this all the way up to the splenic   
flexure and continued to take down   
the splenic flexure. We then   
continued our dissection down   
distally in the retrorectal plane.   
We were eventually able to   
mobilize the rectum   
circumferentially below the   
peritoneal reflection into an area   
where the rectum was soft and   
pliable.  
  
We then chose a portion of the   
rectum where the tinea have   
splayed around an area that was   
just distal to the sacral   
promontory and cleared off the   
rectal mesentery with the vessel   
sealer device. We then used the   
robotic 60 mm green load stapler   
to divide across the rectum. We   
then divided the descending   
sigmoid colon mesentery using a   
vessel sealer device up to the   
colon wall. We injected 2 cc of   
ICG to check perfusion and there   
was good perfusion to the proposed   
division segment. We then sharply   
divided across the colon and   
placed an Endoloop on the distal   
divided portion.  
  
We then extended the right lower   
quadrant stapler port and inserted   
the 29 mm anvil through this. We   
then used a 2-0 strata fix to sew   
a pursestring around the proximal   
divided portion and placed the   
anvil inside there and tightened   
up the pursestring. We then   
cleared off the mesentery around   
the colon using electrocautery and   
scissors. The needle was then   
retrieved. We then used rectal   
dilators to dilate the rectum up   
and then placed our 29 mm EEA   
stapler transanally and opened up   
the spike. We made our anastomosis   
and had to complete anastomotic   
rings.  
  
We then occluded the proximal   
portion of the anastomosis and   
submerged the anastomosis under   
saline. We then performed a   
flexible sigmoidoscopy and the   
anastomosis was viable proximally   
and distally and was patent and   
hemostatic. There were no air   
bubbles seen on a leak test. We   
then withdrew the flexible   
sigmoidoscope and suctioned out   
the fluid in the pelvis. I then   
proceeded to take the omentum off   
the proximal portion of the   
transverse colon using vessel   
sealer device to create a flap of   
omentum and prevent any   
fistulization of the bladder to   
the colorectal anastomosis. The   
omentum was sutured to the   
peritoneum in the pelvis with 2-0   
STRATAFIX. We then placed a large   
Endo Catch bag through the right   
lower quadrant stapler port and   
placed the specimen in that. The   
robot was then undocked.  
  
The right lower quadrant incision   
was then extended a little bit and   
the specimen was extracted through   
here and sent off as sigmoid   
colon. We then closed the   
extraction site using 0 PDS x2 to   
close the anterior fascia. We then   
irrigated the subcutaneous tissue   
and closed all incision sites   
using 4-0 Monocryl and dressed it   
with Dermabond. The patient was   
then cleaned up and awoken by   
anesthesia then transferred to the   
PACU in stable condition.  
  
  
  
Electronically signed by Nick Maya Ma, MD on 2025 at 5:26 PM  
Electronically signed by Nick Madsen MD at 2025 5:31 PM [facility]  
   
                                                    2025 History and   
physical note                           Formatting of this note is   
different from the original.  
Interval History and Physical  
  
I have interviewed and examined   
the patient and reviewed the   
recent History and Physical. There   
are no changes to the recent H&P   
documentation  
  
SUBJECTIVE:  
Navdeep Guillen is a 64 y.o.   
female with hx of diverticulitis.   
Pt hospitalized sept and oct 2024   
and was IV abx. Pt states that   
pain did go away but it did take   
longer course of abx to go away.   
Pt states that she may notice some   
air bubbles from the urine stream.   
No stool from vagina or urine.  
  
2023 colonoscopy Dr. Patricio   
with 3 polyps removed from the   
rectum sigmoid colon and cecum and   
less than 5 mm in size. Pathology   
showing hyperplastic as well as   
tubular adenoma.  
2024 CT abdomen pelvis acute   
diverticulitis of sigmoid colon.   
No discrete abscess. Possible   
microperforation. Possible   
enterovesicular fistula versus   
adhesions/scarring from prior   
disease.  
2024 CT abdomen pelvis with   
mild recurrent sigmoid   
diverticulitis. Thick band of   
presumed inflammation between   
proximal sigmoid and bladder.   
Moderate bladder wall thickening   
without intraluminal air. No   
discrete drainable abscess.  
10/1/2024 CT abdomen pelvis with   
persistent acute sigmoid   
diverticulitis no focal fluid   
collection or free air  
  
Medical History  
  
Past Medical History:  
Diagnosis Date  
Breast cancer (HCC) 2017  
Diabetes (HCC)  
High blood pressure  
Obesity  
  
  
  
Surgical History  
  
Past Surgical History:  
Procedure Laterality Date  
BREAST LUMPECTOMY Right  
CHOLECYSTECTOMY  
HYSTERECTOMY  
total  
  
  
  
  
Current Medications  
  
Current Outpatient Medications:  
atenolol-chlorthalidone   
(Tenoretic) 50-25 MG tablet, Take   
1 tablet by mouth daily., Disp: ,   
Rfl:  
chlorthalidone (Hygroton) 25 MG   
tablet, Take 12.5 mg by mouth   
daily., Disp: , Rfl:  
glipiZIDE XL (Glucotrol XL) 2.5 MG   
24 hr tablet, Take 2.5 mg by mouth   
daily., Disp: , Rfl:  
ibuprofen 800 MG tablet, Take 800   
mg by mouth every 8 hours as   
needed., Disp: , Rfl:  
KLOR-CON M20 20 MEQ ER tablet,   
Take 20 mEq by mouth 2 times   
daily., Disp: , Rfl:  
metFORMIN (Glucophage) 500 MG   
tablet, Take 500 mg by mouth twice   
a day., Disp: , Rfl:  
MULTIPLE VITAMINS PO, Take by   
mouth daily., Disp: , Rfl:  
potassium chloride CR (K-Tab) 20   
MEQ ER tablet, Take 20 mEq by   
mouth twice a day., Disp: , Rfl:  
  
  
Social History  
  
  
  
Socioeconomic History  
Marital status:   
Spouse name: Not on file  
Number of children: Not on file  
Years of education: Not on file  
Highest education level: Not on   
file  
Occupational History  
Not on file  
Tobacco Use  
Smoking status: Former  
Types: Cigarettes  
Smokeless tobacco: Never  
Substance and Sexual Activity  
Alcohol use: Not Currently  
Drug use: Never  
Sexual activity: Not Currently  
Other Topics Concern  
Not on file  
Social History Narrative  
Not on file  
  
Social Drivers of Health  
  
  
Financial Resource Strain: Low   
Risk (2024)  
Received from [facility]  
Overall Financial Resource Strain   
(CARDIA)  
Difficulty of Paying Living   
Expenses: Not very hard  
Food Insecurity: No Food   
Insecurity (2024)  
Received from [facility]  
Hunger Vital Sign  
Worried About Running Out of Food   
in the Last Year: Never true  
Ran Out of Food in the Last Year:   
Never true  
Transportation Needs: No   
Transportation Needs (2024)  
Received from [facility]  
PRAPARE - Transportation  
Lack of Transportation (Medical):   
No  
Lack of Transportation   
(Non-Medical): No  
Physical Activity: Insufficiently   
Active (2024)  
Received from [facility]  
Exercise Vital Sign  
Days of Exercise per Week: 2 days  
Minutes of Exercise per Session:   
20 min  
Stress: No Stress Concern Present   
(2024)  
Received from [facility]  
[employer]   
Health - Occupational Stress   
Questionnaire  
Feeling of Stress : Only a little  
Social Connections: Unknown   
(2024)  
Received from [facility]  
Social Connection and Isolation   
Panel [NHANES]  
Frequency of Communication with   
Friends and Family: More than   
three times a week  
Frequency of Social Gatherings   
with Friends and Family: More than   
three times a week  
Attends Evangelical Services:   
Patient declined  
Active Member of Clubs or   
Organizations: No  
Attends Club or Organization   
Meetings: Never  
Marital Status:   
Intimate Partner Violence: Not on   
file  
Housing Stability: Not on file  
  
  
  
Family History  
No family history on file.  
  
  
Allergies:  
Allergies  
  
Allergies  
Allergen Reactions  
Silver Sulfadiazine Hives  
Sulfa Antibiotics  
  
  
  
OBJECTIVE:  
/78 (BP Location: Left arm,   
Patient Position: Sitting, BP Cuff   
Size: Large adult long)   Pulse 62   
  Temp 37.3 C (99.1 F) (Temporal)   
  Ht 5' 6  (1.676 m)   Wt 297 lb   
(135 kg)   BMI 47.94 kg/m  
  
GENERAL: No acute distress  
ABDOMEN: soft, non-distended,   
non-tender, no guarding, no   
rebound, no obvious   
hepatosplenomegaly.  
  
Exam chaperoned by female   
assistant.  
  
  
ASSESSMENT/PLAN:  
Diagnosis Plan  
1. Diverticular disease  
  
2. Colovesical fistula  
  
  
  
64 y.o.female with complicated   
diverticulitis and likely   
colovesical fistula.  
  
Would recommend a robotic assisted   
laparoscopic sigmoid colectomy,   
cystoscopy and ureteral ICG,   
possible transanal extraction.   
Possible same-day discharge.  
  
Details and risks of surgery are   
discussed including the risks of:   
bleeding, infection, abscess,   
anastomotic leak, possible need   
for re-operation or ostomy, bowel   
injury, ureteral injury, vascular   
or nerve injury, prolonged   
healing, wound problems, and   
expected functional results. She   
indicates understanding and wishes   
to proceed with surgery.  
  
  
Electronically signed by Nick Maya Ma, MD on 2025 at 11:34   
AM  
Electronically signed by Nick Madsen MD at 2025 11:34 AM [facility]  
   
                                        2025 Note     Interval History and  
 Physical  
I have interviewed and examined   
the patient and reviewed the   
recent History and  
Physical. There are no changes to   
the recent H&P documentation  
SUBJECTIVE:  
Navdeep Guillen is a 64 y.o.   
female with hx of diverticulitis.   
Pt  
hospitalized sept and oct 2024 and   
was IV abx. Pt states that pain   
did go away  
but it did take longer course of   
abx to go away. Pt states that she   
may notice  
some air bubbles from the urine   
stream. No stool from vagina or   
urine.  
2023 colonoscopy Dr. Patricio   
with 3 polyps removed from the   
rectum sigmoid  
colon and cecum and less than 5 mm   
in size. Pathology showing   
hyperplastic as  
well as tubular adenoma.  
2024 CT abdomen pelvis acute   
diverticulitis of sigmoid colon.   
No discrete  
abscess. Possible   
microperforation. Possible   
enterovesicular fistula versus  
adhesions/scarring from prior   
disease.  
2024 CT abdomen pelvis with   
mild recurrent sigmoid   
diverticulitis. Thick  
band of presumed inflammation   
between proximal sigmoid and   
bladder. Moderate  
bladder wall thickening without   
intraluminal air. No discrete   
drainable  
abscess.  
10/1/2024 CT abdomen pelvis with   
persistent acute sigmoid   
diverticulitis no  
focal fluid collection or free air  
Medical History  
Past Medical History:  
Diagnosis Date  
Breast cancer (HCC) 2017  
Diabetes (HCC)  
High blood pressure  
Obesity  
Surgical History  
Past Surgical History:  
Procedure Laterality Date  
BREAST LUMPECTOMY Right  
CHOLECYSTECTOMY  
HYSTERECTOMY  
total  
Current Medications  
Current Outpatient Medications:  
atenolol-chlorthalidone   
(Tenoretic) 50-25 MG tablet, Take   
1 tablet by mouth  
daily., Disp: , Rfl:  
chlorthalidone (Hygroton) 25 MG   
tablet, Take 12.5 mg by mouth   
daily., Disp: ,  
Rfl:  
glipiZIDE XL (Glucotrol XL) 2.5 MG   
24 hr tablet, Take 2.5 mg by mouth   
daily.,  
Disp: , Rfl:  
ibuprofen 800 MG tablet, Take 800   
mg by mouth every 8 hours as   
needed., Disp:  
, Rfl:  
KLOR-CON M20 20 MEQ ER tablet,   
Take 20 mEq by mouth 2 times   
daily., Disp: ,  
Rfl:  
metFORMIN (Glucophage) 500 MG   
tablet, Take 500 mg by mouth twice   
a day., Disp:  
, Rfl:  
MULTIPLE VITAMINS PO, Take by   
mouth daily., Disp: , Rfl:  
potassium chloride CR (K-Tab) 20   
MEQ ER tablet, Take 20 mEq by   
mouth twice a  
day., Disp: , Rfl:  
Social History  
Socioeconomic History  
Marital status:   
Spouse name: Not on file  
Number of children: Not on file  
Years of education: Not on file  
Highest education level: Not on   
file  
Occupational History  
Not on file  
Tobacco Use  
Smoking status: Former  
Types: Cigarettes  
Smokeless tobacco: Never  
Substance and Sexual Activity  
Alcohol use: Not Currently  
Drug use: Never  
Sexual activity: Not Currently  
Other Topics Concern  
Not on file  
Social History Narrative  
Not on file  
Social Drivers of Health  
Financial Resource Strain: Low   
Risk (2024)  
Received from [facility]  
Overall Financial Resource Strain   
(CARDIA)  
Difficulty of Paying Living   
Expenses: Not very hard  
Food Insecurity: No Food   
Insecurity (2024)  
Received from [facility]  
Hunger Vital Sign  
Worried About Running Out of Food   
in the Last Year: Never true  
Ran Out of Food in the Last Year:   
Never true  
Transportation Needs: No   
Transportation Needs (2024)  
Received from [facility]  
PRAPARE - Transportation  
Lack of Transportation (Medical):   
No  
Lack of Transportation   
(Non-Medical): No  
Physical Activity: Insufficiently   
Active (2024)  
Received from [facility]  
Exercise Vital Sign  
Days of Exercise per Week: 2 days  
Minutes of Exercise per Session:   
20 min  
Stress: No Stress Concern Present   
(2024)  
Received from [facility]  
[employer]   
Health - Occupational Stress   
Questionnaire  
Feeling of Stress : Only a little  
Social Connections: Unknown   
(2024)  
Received from [facility]  
Social Connection and Isolation   
Panel [NHANES]  
Frequency of Communication with   
Friends and Family: More than   
three times a  
week  
Frequency of Social Gatherings   
with Friends and Family: More than   
three times  
a week  
Attends Evangelical Services:   
Patient declined  
Active Member of Clubs or   
Organizations: No  
Attends Club or Organization   
Meetings: Never  
Marital Status:   
Intimate Partner Violence: Not on   
file  
Housing Stability: Not on file  
Family History  
No family history on file.  
Allergies:  
Allergies  
Allergies  
Allergen Reactions  
Silver Sulfadiazine Hives  
Sulfa Antibiotics  
OBJECTIVE:  
/78 (BP Location: Left arm,   
Patient Position: Sitting, BP Cuff   
Size: Large  
adult long)   Pulse 62   Temp 37.3   
?C (99.1 ?F) (Temporal)   Ht 5' 6    
(1.676  
m)   Wt 297 lb (135 kg)   BMI   
47.94 kg/m?  
GENERAL: No acute distress  
ABDOMEN: soft, non-distended,   
non-tender, no guarding, no   
rebound, no obvious  
hepatosplenomegaly.  
Exam chaperoned by female   
assistant.  
ASSESSMENT/PLAN:  
Diagnosis Plan  
1. Diverticular disease  
2. Colovesical fistula  
64 y.o.female with complica (more   
content not included)...                [facility]  
   
                                                    2025 History and   
physical note                           Formatting of this note is   
different from the original.  
Interval History and Physical  
  
I have interviewed and examined   
the patient and reviewed the   
recent History and Physical. There   
are no changes to the recent H&P   
documentation  
  
SUBJECTIVE:  
Navdeep Guillen is a 64 y.o.   
female with hx of diverticulitis.   
Pt hospitalized sept and oct 2024   
and was IV abx. Pt states that   
pain did go away but it did take   
longer course of abx to go away.   
Pt states that she may notice some   
air bubbles from the urine stream.   
No stool from vagina or urine.  
  
2023 colonoscopy Dr. Patricio   
with 3 polyps removed from the   
rectum sigmoid colon and cecum and   
less than 5 mm in size. Pathology   
showing hyperplastic as well as   
tubular adenoma.  
2024 CT abdomen pelvis acute   
diverticulitis of sigmoid colon.   
No discrete abscess. Possible   
microperforation. Possible   
enterovesicular fistula versus   
adhesions/scarring from prior   
disease.  
2024 CT abdomen pelvis with   
mild recurrent sigmoid   
diverticulitis. Thick band of   
presumed inflammation between   
proximal sigmoid and bladder.   
Moderate bladder wall thickening   
without intraluminal air. No   
discrete drainable abscess.  
10/1/2024 CT abdomen pelvis with   
persistent acute sigmoid   
diverticulitis no focal fluid   
collection or free air  
  
Medical History  
  
Past Medical History:  
Diagnosis Date  
Breast cancer (HCC) 2017  
Diabetes (HCC)  
High blood pressure  
Obesity  
  
  
  
Surgical History  
  
Past Surgical History:  
Procedure Laterality Date  
BREAST LUMPECTOMY Right  
CHOLECYSTECTOMY  
HYSTERECTOMY  
total  
  
  
  
  
Current Medications  
  
Current Outpatient Medications:  
atenolol-chlorthalidone   
(Tenoretic) 50-25 MG tablet, Take   
1 tablet by mouth daily., Disp: ,   
Rfl:  
chlorthalidone (Hygroton) 25 MG   
tablet, Take 12.5 mg by mouth   
daily., Disp: , Rfl:  
glipiZIDE XL (Glucotrol XL) 2.5 MG   
24 hr tablet, Take 2.5 mg by mouth   
daily., Disp: , Rfl:  
ibuprofen 800 MG tablet, Take 800   
mg by mouth every 8 hours as   
needed., Disp: , Rfl:  
KLOR-CON M20 20 MEQ ER tablet,   
Take 20 mEq by mouth 2 times   
daily., Disp: , Rfl:  
metFORMIN (Glucophage) 500 MG   
tablet, Take 500 mg by mouth twice   
a day., Disp: , Rfl:  
MULTIPLE VITAMINS PO, Take by   
mouth daily., Disp: , Rfl:  
potassium chloride CR (K-Tab) 20   
MEQ ER tablet, Take 20 mEq by   
mouth twice a day., Disp: , Rfl:  
  
  
Social History  
  
  
  
Socioeconomic History  
Marital status:   
Spouse name: Not on file  
Number of children: Not on file  
Years of education: Not on file  
Highest education level: Not on   
file  
Occupational History  
Not on file  
Tobacco Use  
Smoking status: Former  
Types: Cigarettes  
Smokeless tobacco: Never  
Substance and Sexual Activity  
Alcohol use: Not Currently  
Drug use: Never  
Sexual activity: Not Currently  
Other Topics Concern  
Not on file  
Social History Narrative  
Not on file  
  
Social Drivers of Health  
  
  
Financial Resource Strain: Low   
Risk (2024)  
Received from [facility]  
Overall Financial Resource Strain   
(CARDIA)  
Difficulty of Paying Living   
Expenses: Not very hard  
Food Insecurity: No Food   
Insecurity (2024)  
Received from [facility]  
Hunger Vital Sign  
Worried About Running Out of Food   
in the Last Year: Never true  
Ran Out of Food in the Last Year:   
Never true  
Transportation Needs: No   
Transportation Needs (2024)  
Received from [facility]  
PRAPARE - Transportation  
Lack of Transportation (Medical):   
No  
Lack of Transportation   
(Non-Medical): No  
Physical Activity: Insufficiently   
Active (2024)  
Received from [facility]  
Exercise Vital Sign  
Days of Exercise per Week: 2 days  
Minutes of Exercise per Session:   
20 min  
Stress: No Stress Concern Present   
(2024)  
Received from [facility]  
[employer]   
Health - Occupational Stress   
Questionnaire  
Feeling of Stress : Only a little  
Social Connections: Unknown   
(2024)  
Received from [facility]  
Social Connection and Isolation   
Panel [NHANES]  
Frequency of Communication with   
Friends and Family: More than   
three times a week  
Frequency of Social Gatherings   
with Friends and Family: More than   
three times a week  
Attends Evangelical Services:   
Patient declined  
Active Member of Clubs or   
Organizations: No  
Attends Club or Organization   
Meetings: Never  
Marital Status:   
Intimate Partner Violence: Not on   
file  
Housing Stability: Not on file  
  
  
  
Family History  
No family history on file.  
  
  
Allergies:  
Allergies  
  
Allergies  
Allergen Reactions  
Silver Sulfadiazine Hives  
Sulfa Antibiotics  
  
  
  
OBJECTIVE:  
/78 (BP Location: Left arm,   
Patient Position: Sitting, BP Cuff   
Size: Large adult long)   Pulse 62   
  Temp 37.3 C (99.1 F) (Temporal)   
  Ht 5' 6  (1.676 m)   Wt 297 lb   
(135 kg)   BMI 47.94 kg/m  
  
GENERAL: No acute distress  
ABDOMEN: soft, non-distended,   
non-tender, no guarding, no   
rebound, no obvious   
hepatosplenomegaly.  
  
Exam chaperoned by female   
assistant.  
  
  
ASSESSMENT/PLAN:  
Diagnosis Plan  
1. Diverticular disease  
  
2. Colovesical fistula  
  
  
  
64 y.o.female with complicated   
diverticulitis and likely   
colovesical fistula.  
  
Would recommend a robotic assisted   
laparoscopic sigmoid colectomy,   
cystoscopy and ureteral ICG,   
possible transanal extraction.   
Possible same-day discharge.  
  
Details and risks of surgery are   
discussed including the risks of:   
bleeding, infection, abscess,   
anastomotic leak, possible need   
for re-operation or ostomy, bowel   
injury, ureteral injury, vascular   
or nerve injury, prolonged   
healing, wound problems, and   
expected functional results. She   
indicates understanding and wishes   
to proceed with surgery.  
  
  
Electronically signed by Nick Maya Ma, MD on 2025 at 11:34   
AM  
Electronically signed by Nick Madsen MD at 2025 11:34 AM EST  
documented in this encounter            [facility]  
   
                                        2025 Note     Formatting of this n  
ote might be   
different from the original.  
Report given to Danya STRINGER RN.   
Aware of consent needing to be   
signed since patient had questions   
for Dr. Madsen. Labs still need drawn   
by ultrasound.  
Electronically signed by Momo Ramírez RN at 2025 11:27   
AM EST  
                                        [facility]  
   
                                        2025 Note     Formatting of this n  
ote might be   
different from the original.  
Report given to Danya STRINGER RN.   
Aware of consent needing to be   
signed since patient had questions   
for Dr. Madsen. Labs still need drawn   
by ultrasound.  
Electronically signed by Momo Ramírez RN at 2025 11:27   
AM EST  
                                        [facility]  
   
                                        2025 Note     Formatting of this n  
ote might be   
different from the original.  
Unsuccessful IV attempt patient   
tolerated well, IV ultrasound   
paged.  
Electronically signed by Jennifer Vora RN at 2025 11:05 AM   
EST  
                                        [facility]  
   
                                        2025 Note     Formatting of this n  
ote might be   
different from the original.  
Unsuccessful IV attempt patient   
tolerated well, IV ultrasound   
paged.  
Electronically signed by Jennifer Vora RN at 2025 11:05 AM   
EST  
                                        [facility]  
   
                                        2025 Note     Patient: Navdeep Guillen  
Procedure Information  
Date/Time: 25 1230  
Procedures:  
ROBOTIC ASSISTED LAPAROSCOPIC   
SIGMOID COLECTOMY, POSSIBLE   
TRANSANAL  
EXTRACTION (Abdomen) - 5 HOURS  
CYSTOSCOPY, WITH URETERAL STENT   
INSERTION (Urethra)  
Location: [address]   
Operating Room  
Surgeons: Nick Madsen MD  
Relevant Problems  
GI  
(+) Diverticular disease  
Past Medical History:  
Past Medical History:  
No date: Arthritis  
2017: Breast cancer (HCC)  
Comment: right  
No date: Diabetes (HCC)  
No date: High blood pressure  
No date: Obesity  
No date: Snoring  
Past Surgical History:  
Past Surgical History:  
No date: BREAST BIOPSY  
No date: BREAST LUMPECTOMY; Right  
Comment: with lymph node removal  
No date: CHOLECYSTECTOMY  
No date: COLONOSCOPY  
No date: FOOT SURGERY; Left  
Comment: repair mary's tendon  
No date: HYSTERECTOMY  
Comment: total  
Social History:  
TOBACCO: reports that she quit   
smoking about 16 years ago. Her   
smoking use  
included cigarettes. She started   
smoking about 49 years ago. She   
has a 33  
pack-year smoking history. She has   
never used smokeless tobacco.  
ETOH: reports that she does not   
currently use alcohol.  
Social History  
Substance and Sexual Activity  
Drug Use Never  
Family History:  
No family history on file.  
Pregnancy Screening:  
Hysterectomy  
Clinical information reviewed:  
Physical Exam  
Airway  
Mallampati: III  
Neck ROM: full  
Mouth Open: normal  
Cardiovascular  
Dental  
(+) edentulous  
Pulmonary  
Abdominal  
Anesthesia Plan  
patient is NPO appropriate  
Any family history or previous   
problems with anesthesia no  
ASA 3  
general and regional  
Any family history or previous   
problems with anesthesia no  
The patient is not a current   
smoker.  
Anesthetic plan and risks   
discussed with patient.  
Anesthesia Key Considerations  
Consider A-line given proposed   
case duration  
THAO Screening  
Labs:  
No results found for:  WBC ,   
 HGB ,  HCT ,  MCV ,  PLT   
No results found for:  SODIUM ,   
 NA ,  POTASSIUM ,  K ,   
 CHLORIDE ,  CL ,  CO2 ,  
 BUN ,  CREATININE ,  GLUCOSE ,   
 CALCIUM ,  PROT ,  BILIRUBINFL ,   
 ALKPHOS ,  
 AST ,  ALT ,  EGFR ,  GLOB   
No echocardiogram results found   
for the past 14 days  
No results found for this or any   
previous visit.  
Equipment Requests:  
Additional Equipment Requests  
Blood Equipment: warmer  
Vascular Equipment: arterial line   
kit, single transducer and 2nd IV  
Block Team                              [facility]  
   
                                                    01- Telephone   
encounter Note                          Formatting of this note is   
different from the original.  
Patient Gengohart message requesting   
the following refill  
  
Refill(s) Requested:  
Requested Prescriptions  
  
Pending Prescriptions Disp Refills  
ibuprofen (MOTRIN) 800 mg tablet   
45 tablet 1  
Sig: Take 1 tablet by mouth every   
8 hours as needed for pain. Take   
with food.  
  
ALLERGIES  
Allergen Reactions  
Silvadene [Silver S* Hives  
Sulfa (Sulfonamide * Hives  
  
Phone Number: [phone] (home)   
[phone] (cell)  
Last Office Visit Date: 2024  
Last [facility] Visit: Visit   
date not found  
Future Appointment: Visit date not   
found  
  
The patients preferred pharmacy   
has been captured for this   
encounter? yes  
  
Request is for script(s) to be   
escript to pharmacy.  
  
Slava Bird LPN  
  
  
Electronically signed by Slava Bird LPN at 01/10/2025 1:10 PM   
EST  
                                        [facility]  
   
                                                    01- Miscellaneous   
Notes                                   Formatting of this note is   
different from the original.  
Patient Gengohart message requesting   
the following refill  
  
Refill(s) Requested:  
Requested Prescriptions  
  
Pending Prescriptions Disp Refills  
ibuprofen (MOTRIN) 800 mg tablet   
45 tablet 1  
Sig: Take 1 tablet by mouth every   
8 hours as needed for pain. Take   
with food.  
  
ALLERGIES  
Allergen Reactions  
Silvadene [Silver S* Hives  
Sulfa (Sulfonamide * Hives  
  
Phone Number: [phone] (home)   
[phone] (cell)  
Last Office Visit Date: 2024  
Last [facility] Visit: Visit   
date not found  
Future Appointment: Visit date not   
found  
  
The patients preferred pharmacy   
has been captured for this   
encounter? yes  
  
Request is for script(s) to be   
escript to pharmacy.  
  
Slava Bird LPN  
  
  
Electronically signed by Slava Bird LPN at 01/10/2025 1:10 PM   
EST  
documented in this encounter            [facility]  
   
                                                    2024 History of   
Present illness Narrative               Formatting of this note is   
different from the original.  
  
Colon and Rectal Surgery  
  
PATIENT NAME: Navdeep Guillen  
: 1960  
MRN: [id number]  
TODAY'S DATE: 2024  
  
Chief Complaint  
Patient presents with  
New Patient  
Diverticular disease w/perf ref   
from [place] [facility]/PACS has 3   
recent ct scan   
uploaded/colonoscopy report in   
media, patient was admitted in   
hospital in Sept and Oct, pain in   
mid lower abd, diarrhea and   
constipation, gassy, sometimes has   
mucous in stool  
Other  
Pt unaccompanied  
  
SUBJECTIVE:  
Navdeep Guillen is a 64 y.o.   
female with hx of diverticulitis.   
Pt hospitalized sept and oct 2024   
and was IV abx. Pt states that   
pain did go away but it did take   
longer course of abx to go away.   
Pt states that she may notice some   
air bubbles from the urine stream.   
No stool from vagina or urine.  
  
2023 colonoscopy Dr. Patricio   
with 3 polyps removed from the   
rectum sigmoid colon and cecum and   
less than 5 mm in size. Pathology   
showing hyperplastic as well as   
tubular adenoma.  
2024 CT abdomen pelvis acute   
diverticulitis of sigmoid colon.   
No discrete abscess. Possible   
microperforation. Possible   
enterovesicular fistula versus   
adhesions/scarring from prior   
disease.  
2024 CT abdomen pelvis with   
mild recurrent sigmoid   
diverticulitis. Thick band of   
presumed inflammation between   
proximal sigmoid and bladder.   
Moderate bladder wall thickening   
without intraluminal air. No   
discrete drainable abscess.  
10/1/2024 CT abdomen pelvis with   
persistent acute sigmoid   
diverticulitis no focal fluid   
collection or free air  
  
Past Medical History:  
Diagnosis Date  
Breast cancer (HCC) 2017  
Diabetes (HCC)  
High blood pressure  
Obesity  
  
Past Surgical History:  
Procedure Laterality Date  
BREAST LUMPECTOMY Right  
CHOLECYSTECTOMY  
HYSTERECTOMY  
total  
  
Current Outpatient Medications:  
atenolol-chlorthalidone   
(Tenoretic) 50-25 MG tablet, Take   
1 tablet by mouth daily., Disp: ,   
Rfl:  
chlorthalidone (Hygroton) 25 MG   
tablet, Take 12.5 mg by mouth   
daily., Disp: , Rfl:  
glipiZIDE XL (Glucotrol XL) 2.5 MG   
24 hr tablet, Take 2.5 mg by mouth   
daily., Disp: , Rfl:  
ibuprofen 800 MG tablet, Take 800   
mg by mouth every 8 hours as   
needed., Disp: , Rfl:  
KLOR-CON M20 20 MEQ ER tablet,   
Take 20 mEq by mouth 2 times   
daily., Disp: , Rfl:  
metFORMIN (Glucophage) 500 MG   
tablet, Take 500 mg by mouth twice   
a day., Disp: , Rfl:  
MULTIPLE VITAMINS PO, Take by   
mouth daily., Disp: , Rfl:  
potassium chloride CR (K-Tab) 20   
MEQ ER tablet, Take 20 mEq by   
mouth twice a day., Disp: , Rfl:  
  
Social History  
  
Socioeconomic History  
Marital status:   
Spouse name: Not on file  
Number of children: Not on file  
Years of education: Not on file  
Highest education level: Not on   
file  
Occupational History  
Not on file  
Tobacco Use  
Smoking status: Former  
Types: Cigarettes  
Smokeless tobacco: Never  
Substance and Sexual Activity  
Alcohol use: Not Currently  
Drug use: Never  
Sexual activity: Not Currently  
Other Topics Concern  
Not on file  
Social History Narrative  
Not on file  
  
Social Drivers of Health  
  
Financial Resource Strain: Low   
Risk (2024)  
Received from [facility]  
Overall Financial Resource Strain   
(CARDIA)  
Difficulty of Paying Living   
Expenses: Not very hard  
Food Insecurity: No Food   
Insecurity (2024)  
Received from [facility]  
Hunger Vital Sign  
Worried About Running Out of Food   
in the Last Year: Never true  
Ran Out of Food in the Last Year:   
Never true  
Transportation Needs: No   
Transportation Needs (2024)  
Received from [facility]  
PRAPARE - Transportation  
Lack of Transportation (Medical):   
No  
Lack of Transportation   
(Non-Medical): No  
Physical Activity: Insufficiently   
Active (2024)  
Received from [facility]  
Exercise Vital Sign  
Days of Exercise per Week: 2 days  
Minutes of Exercise per Session:   
20 min  
Stress: No Stress Concern Present   
(2024)  
Received from [facility]  
[employer] [place] [employer]   
Health - Occupational Stress   
Questionnaire  
Feeling of Stress : Only a little  
Social Connections: Unknown   
(2024)  
Received from [facility]  
Social Connection and Isolation   
Panel [NHANES]  
Frequency of Communication with   
Friends and Family: More than   
three times a week  
Frequency of Social Gatherings   
with Friends and Family: More than   
three times a week  
Attends Evangelical Services:   
Patient declined  
Active Member of Clubs or   
Organizations: No  
Attends Club or Organization   
Meetings: Never  
Marital Status:   
Intimate Partner Violence: Not on   
file  
Housing Stability: Not on file  
  
No family history on file.  
  
Allergies:  
Allergies  
Allergen Reactions  
Silver Sulfadiazine Hives  
Sulfa Antibiotics  
  
OBJECTIVE:  
/78 (BP Location: Left arm,   
Patient Position: Sitting, BP Cuff   
Size: Large adult long)   Pulse 62   
  Temp 37.3 C (99.1 F) (Temporal)   
  Ht 5' 6  (1.676 m)   Wt 297 lb   
(135 kg)   BMI 47.94 kg/m  
  
GENERAL: No acute distress  
ABDOMEN: soft, non-distended,   
non-tender, no guarding, no   
rebound, no obvious   
hepatosplenomegaly.  
  
Exam chaperoned by female   
assistant.  
  
ASSESSMENT/PLAN:  
Diagnosis Plan  
1. Diverticular disease  
  
2. Colovesical fistula  
  
  
64 y.o.female with complicated   
diverticulitis and likely   
colovesical fistula.  
  
Would recommend a robotic assisted   
laparoscopic sigmoid colectomy,   
cystoscopy and ureteral ICG,   
possible transanal extraction.   
Possible same-day discharge.  
  
Details and risks of surgery are   
discussed including the risks of:   
bleeding, infection, abscess,   
anastomotic leak, possible need   
for re-operation or ostomy, bowel   
injury, ureteral injury, vascular   
or nerve injury, prolonged   
healing, wound problems, and   
expected functional results. She   
indicates understanding and wishes   
to proceed with surgery.  
  
Discussed with patient that she   
should try to lose as much weight   
as possible and keep her blood   
sugars under control to increase   
success of her surgery. Discussed   
with patient diet recommendations   
to decrease sugar and calorie   
intake.  
  
Follow up for Scheduled procedure.  
  
The patient was given the   
opportunity to ask questions and   
all questions were answered to the   
best of my ability. The patient   
agrees with the above noted plan.  
  
The patient was seen and examined   
independently and relevant data   
reviewed by myself. A full chart   
review/interpretation of labs,   
imaging, endoscopic reports,   
pathology, and   
requesting/reviewing previous   
records was performed when   
available.  
  
Electronically signed by Nick Maya Ma, MD on 2024 at 9:54   
AM  
Electronically signed by Nick Madsen MD at 2024 9:57 AM EST  
documented in this encounter            [facility]  
   
                                        2024 Note     HNO ID: [id number]  
Author: VIKKI POSADA APRN.CNP  
Service: ?  
Author Type: Nurse Practitioner  
Type: Progress Notes  
Filed: 2024 10:22  
Note Text:  
Chief Complaint  
Patient presents with:  
Established Patient  
HPI:  
Navdeep Guillen is a 64 year old   
female who presents here today for   
follow up  
breast cancer.  
Per Dr. Milner/Sumaya's previous note:  
H/o hypertension and borderline   
diabetes/insulin resistant,   
obesity, who  
presented with an abnormal breast   
exam at her PCP office. Subsequent   
diagnostic  
mammogram revealing a lobulated   
lesion in the right breast at the   
lower outer  
quadrant highly suspicious of   
malignancy.  
Patient had a mammotome breast   
biopsy on 3/27/17 that showed   
invasive ductal  
carcinoma, positive for estrogen   
and progesterone receptors,   
equivocal for  
overexpression HER-2/andi.  
She had surgery by Dr. Dueñas, right   
breast lumpectomy and right   
axillary sentinel  
lymph node biopsy on 17 for   
right breast cancer.  
Stage IA- pT1c pN0 (3/3 sentinel   
lymph nodes negative for   
metastatic disease)  
Tumor size 1.5 cm x 1 x 0.9 cm  
Overall grade 2 out of 7  
Margins - 0.4 cm from posterior   
margin  
ER/UT >95%, [id number] not amplified   
by FISH  
Lymph/vasc invasion - none;   
derm/vasc/lymph invasion - none  
Menstrual history: Menarche at age   
11, first pregnancy at age 18, 8   
pregnancy;  
surgical menopause-total abdominal   
hysterectomy age 54. No estrogen   
replacement  
therapy. Family history: Mother   
 of ovarian cancer in her   
40's. No family  
history of breast cancer. She was   
initially started on anastrozole,   
then  
subsequent switch to Aromasin   
because of joint pain.  
RADIATION-17 to 17  
Right breast  
Current treatment:  
1) Aromasin 25 mg once daily  
No new concerns today.  
Appetite: Medium.  Wt. down 6#   
since April Energy level: Some   
days better than  
others.   
Denies fevers or recent illness.  
Resp:denies cough or sob,   
+seasonal allergies  
Cardiac:denies chest   
pain/palpitations  
GI:+abd pain when flare from   
diverticulitis, denies n/v, bowels   
alternate  
between normal and constipation   
h/o diverticulitis-having surgery   
this year  
:denies dysuria/hematuria  
Extrem:denies new pain, L foot   
pain s/p surgery  
Endo:denies hot flashes  
Neuro:denies symptoms of   
neuropathy  
Skin:denies rashes/lesions  
Heme:denies bleeding  
The ROS is otherwise negative.  
Past medical history,   
appointments, medications,   
allergies reviewed. No changes.  
EXAM:  
/86   Pulse 69   Temp 36.5   
?C (97.7 ?F) (Temporal)   Wt 133.6   
kg (294  
lb 8.6 oz)   SpO2 96%   BMI 48.27   
kg/m?  
APPEARANCE Well appearing, alert,   
in no acute distress,   
well-hydrated, well  
nourished.  
HEART RRR with normal S1 and S2,   
no murmurs  
LUNG clear to auscultation  
BREAST FEMALE no mass/nodule b/l,   
R radiation changes  
LYMPH NODES No cervical   
lymphadenopathy, No   
supraclavicular lymphadenopathy,   
and  
No axillary lymphadenopathy.  
ABDOMEN bowel sounds normoactive,   
soft, non-tender  
EXTREMITIES No edema  
NEURO Awake, alert and oriented x   
3, Normal gait, and No involuntary   
motions.  
SKIN Skin color, texture, turgor   
normal, no suspicious rashes or   
lesions  
ASSESSMENT/PLAN:  
1. Malignant neoplasm of   
lower-outer quadrant of right   
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  
estrogen receptor positive (HCC) -   
ICD9: 174.5, V86.0, ICD10:   
C50.511, Z17.0  
(primary diagnosis)  
pT1c pN0 (3/3 sentinel lymph nodes   
negative for metastatic disease)   
stage IA  
infiltrating ductal carcinoma the   
right breast. ER/UT >95%, Sfe4dxm  
non-amplified by FISH.  
- No concerning findings on exam.  
- Overall tolerating aromasin   
well.  
- Discontinue aromasin. Has   
completed over 7 years of therapy.  
- Mammogram due mid 2025.  
- Follow up in 6 months.  
- Pt. aware to call office with   
any questions/concerns.  
The sensitive examination was   
discussed with the Patient or   
Patient's Authorized  
Representative. As applicable, any   
other physician, advance practice   
provider,  
medical student, or other health   
professional student that will be   
observing or  
involved in the sensitive   
examination for educational or   
training purposes was  
discussed with the Patient or   
Authorized Representative. The   
Patient or  
Authorized Representative has   
agreed to proceed with the   
sensitive examination.  
(Sensitive examination includes   
inspection and/or palpation of the   
breasts,  
pelvis, prostate and anorectal   
regions)  
The patient indicates   
understanding of these issues and   
agrees with the plan.  
All documentation from previous   
visit of 24-Dr. Shell/myself   
was copied and  
pasted, documentation has been   
reviewed and edited as necessary   
for today's  
visit.  
[facility]  
   
                                                    2024 History of   
Present illness Narrative               Formatting of this note might be   
different from the original.  
Chief Complaint  
Patient presents with:  
Established Patient  
  
HPI:  
Navdeep Guillen is a 64 year old   
female who presents here today for   
follow up breast cancer.  
  
Per Dr. Milner/Sumaya's previous note:  
H/o hypertension and borderline   
diabetes/insulin resistant,   
obesity, who presented with an   
abnormal breast exam at her PCP   
office. Subsequent diagnostic   
mammogram revealing a lobulated   
lesion in the right breast at the   
lower outer quadrant highly   
suspicious of malignancy.  
  
Patient had a mammotome breast   
biopsy on 3/27/17 that showed   
invasive ductal carcinoma,   
positive for estrogen and   
progesterone receptors, equivocal   
for overexpression HER-2/andi.  
  
She had surgery by Dr. Dueñas, right   
breast lumpectomy and right   
axillary sentinel lymph node   
biopsy on 17 for right breast   
cancer.  
  
Stage IA- pT1c pN0 (3/3 sentinel   
lymph nodes negative for   
metastatic disease)  
Tumor size 1.5 cm x 1 x 0.9 cm  
Overall grade 2 out of 7  
Margins - 0.4 cm from posterior   
margin  
ER/UT >95%, [id number] not amplified   
by FISH  
Lymph/vasc invasion - none;   
derm/vasc/lymph invasion - none  
  
Menstrual history: Menarche at age   
11, first pregnancy at age 18, 8   
pregnancy; surgical   
menopause-total abdominal   
hysterectomy age 54. No estrogen   
replacement therapy. Family   
history: Mother  of ovarian   
cancer in her 40's. No family   
history of breast cancer. She was   
initially started on anastrozole,   
then subsequent switch to Aromasin   
because of joint pain.  
  
  
RADIATION-17 to 17  
Right breast  
  
  
Current treatment:  
1) Aromasin 25 mg once daily  
  
  
  
  
  
  
  
  
  
  
  
  
No new concerns today.  
  
  
Appetite: Medium.  Wt. down 6#   
since April Energy level: Some   
days better than others.   
Denies fevers or recent illness.  
Resp:denies cough or sob,   
+seasonal allergies  
Cardiac:denies chest   
pain/palpitations  
GI:+abd pain when flare from   
diverticulitis, denies n/v, bowels   
alternate between normal and   
constipation h/o   
diverticulitis-having surgery this   
year  
:denies dysuria/hematuria  
Extrem:denies new pain, L foot   
pain s/p surgery  
Endo:denies hot flashes  
Neuro:denies symptoms of   
neuropathy  
Skin:denies rashes/lesions  
Heme:denies bleeding  
  
The ROS is otherwise negative.  
  
Past medical history,   
appointments, medications,   
allergies reviewed. No changes.  
  
EXAM:  
/86   Pulse 69   Temp 36.5 C   
(97.7 F) (Temporal)   Wt 133.6 kg   
(294 lb 8.6 oz)   SpO2 96%   BMI   
48.27 kg/m  
  
APPEARANCE Well appearing, alert,   
in no acute distress,   
well-hydrated, well nourished.  
HEART RRR with normal S1 and S2,   
no murmurs  
LUNG clear to auscultation  
BREAST FEMALE no mass/nodule b/l,   
R radiation changes  
LYMPH NODES No cervical   
lymphadenopathy, No   
supraclavicular lymphadenopathy,   
and No axillary lymphadenopathy.  
ABDOMEN bowel sounds normoactive,   
soft, non-tender  
EXTREMITIES No edema  
NEURO Awake, alert and oriented x   
3, Normal gait, and No involuntary   
motions.  
SKIN Skin color, texture, turgor   
normal, no suspicious rashes or   
lesions  
  
ASSESSMENT/PLAN:  
1. Malignant neoplasm of   
lower-outer quadrant of right   
breast of female, estrogen   
receptor positive (HCC) - ICD9:   
174.5, V86.0, ICD10: C50.511,   
Z17.0 (primary diagnosis)  
pT1c pN0 (3/3 sentinel lymph nodes   
negative for metastatic disease)   
stage IA infiltrating ductal   
carcinoma the right breast. ER/UT   
>95%, [id number] non-amplified by   
FISH.  
- No concerning findings on exam.  
- Overall tolerating aromasin   
well.  
- Discontinue aromasin. Has   
completed over 7 years of therapy.  
- Mammogram due mid 2025.  
- Follow up in 6 months.  
- Pt. aware to call office with   
any questions/concerns.  
  
  
The sensitive examination was   
discussed with the Patient or   
Patient's Authorized   
Representative. As applicable, any   
other physician, advance practice   
provider, medical student, or   
other health professional student   
that will be observing or involved   
in the sensitive examination for   
educational or training purposes   
was discussed with the Patient or   
Authorized Representative. The   
Patient or Authorized   
Representative has agreed to   
proceed with the sensitive   
examination.  
  
(Sensitive examination includes   
inspection and/or palpation of the   
breasts, pelvis, prostate and   
anorectal regions)  
  
The patient indicates   
understanding of these issues and   
agrees with the plan.  
  
  
All documentation from previous   
visit of 24-Dr. Shell/myself   
was copied and pasted,   
documentation has been reviewed   
and edited as necessary for   
today's visit.  
  
  
  
Vikki Posada APRN.CNP  
  
  
  
Electronically signed by   
Vikki Posada APRN.CNP at   
2024 10:22 AM EST  
documented in this encounter            [facility]  
   
                                        10- Note     We rec'd referral fo  
r recurrent   
diverticular disease with   
perforation.  
  
LM for pt to return my call to   
schedule.                               [facility]  
   
                                                    10- Telephone   
encounter Note                          Formatting of this note might be   
different from the original.  
Spoke to patient, advising below,   
and she stated understanding.  
  
Magui Munguia  
  
Electronically signed by Magui Munguia at 10/01/2024 2:11 PM   
EDT  
                                        [facility]  
   
                                                    10- Miscellaneous   
Notes                                   Formatting of this note might be   
different from the original.  
Spoke to patient, advising below,   
and she stated understanding.  
  
Magui Munguia  
  
Electronically signed by Magui Munguia at 10/01/2024 2:11 PM   
EDT  
Formatting of this note might be   
different from the original.  
PCP has ordered labs this year.  
No labs needed tomorrow.  
Vikki Posada APRN.CNP  
  
Electronically signed by   
Vikki Posada APRN.CNP at   
10/01/2024 1:49 PM EDT  
Formatting of this note might be   
different from the original.  
Patient called in to see if she   
needs labs drawn as she is on   
Aromasin. She is scheduled for   
Vikki for tomorrow AM. Please   
advise/file orders if labs are   
needed.  
  
Magui Munguia  
  
Electronically signed by Magui Munguia at 10/01/2024 10:43 AM   
EDT  
documented in this encounter            [facility]  
   
                                                    10- Telephone   
encounter Note                          Formatting of this note might be   
different from the original.  
PCP has ordered labs this year.  
No labs needed tomorrow.  
Vikki Posada APRN.CNP  
  
Electronically signed by   
Vikki Posada APRN.CNP at   
10/01/2024 1:49 PM EDT  
                                        [facility]  
   
                                                    10- Telephone   
encounter Note                          Formatting of this note might be   
different from the original.  
Patient called in to see if she   
needs labs drawn as she is on   
Aromasin. She is scheduled for   
Vikki for tomorrow AM. Please   
advise/file orders if labs are   
needed.  
  
Magui Munguia  
  
Electronically signed by Magui Munguia at 10/01/2024 10:43 AM   
EDT  
                                        [facility]  
   
                                        2024 Note     HNO ID: [id number]  
Author: SHAYNA MAI MA  
Service: ?  
Author Type: Medical Assistant  
Type: Progress Notes  
Filed: 2024 12:24  
Note Text:  
POPULATION HEALTH NAVIGATION   
OUTREACH  
Action/FYI  
Called and left a message to call   
[phone], to discuss health   
maintenance  
items that are due.  
Sent My Chart message.  
PCP appt: Flipped follow up on   
 to wellness  
 due:  
HCC gaps  
DILATED RETINAL EXAM  
BREAST CANCER SCREENING- due in   
Feb  
Flu  
Reason for Outreach  
Care Gap/HCC or Scheduling   
Wellness Visits  
Care Gaps due:  
Medicare Annual Wellness Visit  
Breast Cancer Screening  
Diabetic Eye Exam  
Flu Vaccine  
Patient Contacted:  
Unable or unnecessary to reach   
patient:  
Left message  
Leftronic message sent  
HCC related  
Flipped existing appointment  
Navigation Signature:  
Shayna Mai MA  
2024 12:22 PM             [facility]  
   
                                                    2024 History of   
Present illness Narrative               Formatting of this note is   
different from the original.  
POPULATION HEALTH NAVIGATION   
OUTREACH  
  
Action/FYI  
Called and left a message to call   
[phone], to discuss health   
maintenance items that are due.  
Sent My Chart message.  
PCP appt: Flipped follow up on   
 to wellness  
 due:  
HCC gaps  
DILATED RETINAL EXAM  
BREAST CANCER SCREENING- due in   
Feb  
Flu  
  
  
  
Reason for Outreach  
Care Gap/HCC or Scheduling   
Wellness Visits  
  
Care Gaps due:  
Medicare Annual Wellness Visit  
Breast Cancer Screening  
Diabetic Eye Exam  
Flu Vaccine  
  
Patient Contacted:  
Unable or unnecessary to reach   
patient:  
Left message  
Gengohart message sent  
HCC related  
Flipped existing appointment  
  
Navigation Signature:  
  
Shayna Mai MA  
2024 12:22 PM  
  
  
Electronically signed by Shayna Mai MA at 2024 12:24   
PM EDT  
documented in this encounter            [facility]  
   
                                        2024 Note     Patient Outreach (NE  
TNAV)  
----------------------------------  
----------------------------------  
------------  
NAVDEEP GUILLEN ([id number])   
1960 F  
Date Time Provider Department  
24 MINOR, SHAYNA NETNAV  
During your visit today, we   
recorded the following information   
about you:  
Shayna Mai MA 2024   
12:24 PM Signed  
POPULATION HEALTH NAVIGATION   
OUTREACH  
Action/FYI  
Called and left a message to call   
[phone], to discuss health   
maintenance  
items that are due.  
Sent My Chart message.  
PCP appt: Flipped follow up on   
 to wellness  
HM due:  
HCC gaps  
DILATED RETINAL EXAM  
BREAST CANCER SCREENING- due in   
Feb  
Flu  
Reason for Outreach  
Care Gap/HCC or Scheduling   
Wellness Visits  
Care Gaps due:  
Medicare Annual Wellness Visit  
Breast Cancer Screening  
Diabetic Eye Exam  
Flu Vaccine  
Patient Contacted:  
Unable or unnecessary to reach   
patient:  
Left message  
Gengohart message sent  
HCC related  
Flipped existing appointment  
Navigation Signature:  
Shayna Mai MA  
2024 12:22 PM  
Allergies As of Date: 2024   
Noted Allergy Reaction  
SILVADENE (SILVER SULFADIAZINE)   
2017 4 - Hives  
SULFA (SULFONAMIDE ANTIBIOTICS)   
2021 4 - Hives  
Date Reviewed: 2024  
Reviewed by: Tracy Galdamez LPN   
- Fully Assessed  
Reason for Visit:  
Population Health Navigation   
Outreach [3910] Cmt:  
C, AWV, Care gaps,  
HCC, Claudio PCSA  
Prescriptions as of 2024  
- potassium chloride 20 mEq TbER  
Take 1 tablet by mouth two times a   
day.  
- atenolol (TENORMIN) 50 mg tablet  
Take 1 tablet by mouth once daily.  
- chlorthalidone (HYGROTON) 25 mg   
tablet  
Take 0.5 tablets by mouth once   
daily.  
- glipiZIDE (GLUCOTROL XL) 2.5 mg   
24 hr tablet  
Take 1 tablet by mouth once daily.  
- metFORMIN ER (GLUCOPHAGE XR) 500   
mg 24 hr tablet  
Take 1 tablet by mouth daily with   
breakfast.  
- blood sugar diagnostic (BLOOD   
GLUCOSE TEST) test strip  
Test blood sugar(s) 2 times daily.   
Dx: Type 2 DM - Controlled E11.9  
Insulin: No  
- Lancets  
Test blood sugar(s) 2 times daily.   
Dx: Type 2 DM - Controlled E11.9  
Insulin: No  
- exemestane (AROMASIN) 25 mg   
tablet  
Take 1 tablet by mouth once daily.  
- ibuprofen (MOTRIN) 800 mg tablet  
Take 1 tablet by mouth every 8   
hours as needed for pain. Take   
with food.  
- blood sugar diagnostic (BLOOD   
GLUCOSE TEST) test strip  
Test blood sugars 2daily.   
Dx:ucnontrolled diabetes .   
Insulin: Yes  
- lancets (ONE TOUCH DELICA) 33   
gauge misc  
Test blood sugar(s) 2 daily. Dx:   
Type 2 DM - Controlled E11.9   
Insulin: Yes  
- Blood-Glucose Meter monitoring   
kit  
Glucose Meter of Choice - Kit -   
Dx: Type 2 DM - Uncontrolled   
E11.65  
- MV-MN/FOLIC ACID/CALCIUM/VIT K   
(ONE-A-DAY WOMEN'S 50 PLUS ORAL)  
Take 1 tablet by mouth once daily.  
Meds Comments as of 2015:  
Patient only took the Etodolac for   
a few days and noticed no   
difference so she  
quit taking this medication.  
Problem List As Of Date 2024   
Noted Resolved  
Morbid obesity with BMI of   
40.0-44.9, adult (HC*2014  
Uterus disorder [N85.9] 2014  
Hypertension [I10] 2014  
Hypercholesterolemia [E78.00]   
2014  
Endometrial hyperplasia without   
atypia, complex*2014  
Uterine prolapse without mention   
of vaginal wal*2014  
Rectocele [N81.6] 2014  
Complex endometrial hyperplasia   
with atypia [N8*2014  
Follow-up examination, following   
other surgery *2015  
Bilateral low back pain with   
left-sided sciatic*2016  
Peroneal tendonitis [M76.70]   
2016  
Controlled diabetes mellitus type   
II without co*2016  
Hyperopia [H52.00] 2016  
Presbyopia [H52.4] 2016  
Benign neoplasm of skin of eyelid   
including can*2016  
Chronic bilateral low back pain   
with left-sided*2016  
Malignant neoplasm of lower-outer   
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  
ER+ UT+ carcinoma of breast (HCC)   
[C50.919, Z17*05/10/2017  
Family history of ovarian cancer   
[Z80.41] 05/10/2017  
GERD (gastroesophageal reflux   
disease) [K21.9]  
Encounter Number: [id number]  
Encounter Status:Closed by SHAYNA MAI on 24                     [facility]  
   
                                                    2024 Telephone   
encounter Note                          Formatting of this note is   
different from the original.  
The patient has been identified by   
name and date of birth: Yes  
  
Caregiver verified no other   
encounters exist for this   
prescription request: Yes  
  
Caregiver confirmed with   
patient/requestor that no other   
refills are due, in the near   
future, with this provider at this   
time: Yes  
  
The last office visit in the   
department: 2024  
  
Does the patient have a future   
office visit with this   
provider/department: Yes   
2024  
  
Requested Prescriptions  
  
Pending Prescriptions Disp Refills  
potassium chloride 20 mEq TbER 180   
tablet 1  
Sig: Take 1 tablet by mouth two   
times a day.  
  
Joie Hardwick RN  
  
  
Electronically signed by Joie Hardwick RN at 2024 5:00 PM   
EDT  
                                        [facility]  
   
                                                    2024 Miscellaneous   
Notes                                   Formatting of this note is   
different from the original.  
The patient has been identified by   
name and date of birth: Yes  
  
Caregiver verified no other   
encounters exist for this   
prescription request: Yes  
  
Caregiver confirmed with   
patient/requestor that no other   
refills are due, in the near   
future, with this provider at this   
time: Yes  
  
The last office visit in the   
department: 2024  
  
Does the patient have a future   
office visit with this   
provider/department: Yes   
2024  
  
Requested Prescriptions  
  
Pending Prescriptions Disp Refills  
potassium chloride 20 mEq TbER 180   
tablet 1  
Sig: Take 1 tablet by mouth two   
times a day.  
  
Joie Hardwick RN  
  
  
Electronically signed by Joie Hardwick RN at 2024 5:00 PM   
EDT  
documented in this encounter            [facility]  
   
                                        2024 Note     HNO ID: [id number]  
Author: KERRIE HENDERSON MA  
Service: ?  
Author Type: Medical Assistant  
Type: Progress Notes  
Filed: 2024 16:14  
Note Text:  
  
Opened in error                         [facility]  
   
                                                    2024 History of   
Present illness Narrative               Formatting of this note might be   
different from the original.  
  
Opened in error  
Electronically signed by   
Kerrie Henderson MA at   
2024 4:14 PM EDT  
documented in this encounter            [facility]  
   
                                        2024 Note     Patient Outreach (NE  
TNAV)  
----------------------------------  
----------------------------------  
------------  
NAVDEEP GUILLEN ([id number])   
1960 F  
Date Time Provider Department  
24 KERRIE HENDERSON NETNAV  
During your visit today, we   
recorded the following information   
about you:  
Kerrie Henderson MA 2024   
4:14 PM Signed  
Opened in error  
Allergies As of Date: 2024   
Noted Allergy Reaction  
SILVADENE (SILVER SULFADIAZINE)   
2017 4 - Hives  
SULFA (SULFONAMIDE ANTIBIOTICS)   
2021 4 - Hives  
Date Reviewed: 2024  
Reviewed by: Tracy Galdamez LPN   
- Fully Assessed  
Prescriptions as of 2024  
- atenolol (TENORMIN) 50 mg tablet  
Take 1 tablet by mouth once daily.  
- chlorthalidone (HYGROTON) 25 mg   
tablet  
Take 0.5 tablets by mouth once   
daily.  
- glipiZIDE (GLUCOTROL XL) 2.5 mg   
24 hr tablet  
Take 1 tablet by mouth once daily.  
- metFORMIN ER (GLUCOPHAGE XR) 500   
mg 24 hr tablet  
Take 1 tablet by mouth daily with   
breakfast.  
- blood sugar diagnostic (BLOOD   
GLUCOSE TEST) test strip  
Test blood sugar(s) 2 times daily.   
Dx: Type 2 DM - Controlled E11.9  
Insulin: No  
- Lancets  
Test blood sugar(s) 2 times daily.   
Dx: Type 2 DM - Controlled E11.9  
Insulin: No  
- exemestane (AROMASIN) 25 mg   
tablet  
Take 1 tablet by mouth once daily.  
- potassium chloride 20 mEq TbER  
Take 1 tablet by mouth two times a   
day.  
- ibuprofen (MOTRIN) 800 mg tablet  
Take 1 tablet by mouth every 8   
hours as needed for pain. Take   
with food.  
- blood sugar diagnostic (BLOOD   
GLUCOSE TEST) test strip  
Test blood sugars 2daily.   
Dx:ucnontrolled diabetes .   
Insulin: Yes  
- lancets (ONE TOUCH DELICA) 33   
gauge misc  
Test blood sugar(s) 2 daily. Dx:   
Type 2 DM - Controlled E11.9   
Insulin: Yes  
- Blood-Glucose Meter monitoring   
kit  
Glucose Meter of Choice - Kit -   
Dx: Type 2 DM - Uncontrolled   
E11.65  
- MV-MN/FOLIC ACID/CALCIUM/VIT K   
(ONE-A-DAY WOMEN'S 50 PLUS ORAL)  
Take 1 tablet by mouth once daily.  
Meds Comments as of 2015:  
Patient only took the Etodolac for   
a few days and noticed no   
difference so she  
quit taking this medication.  
Problem List As Of Date 2024   
Noted Resolved  
Morbid obesity with BMI of   
40.0-44.9, adult (HC*2014  
Uterus disorder [N85.9] 2014  
Hypertension [I10] 2014  
Hypercholesterolemia [E78.00]   
2014  
Endometrial hyperplasia without   
atypia, complex*2014  
Uterine prolapse without mention   
of vaginal wal*2014  
Rectocele [N81.6] 2014  
Complex endometrial hyperplasia   
with atypia [N8*2014  
Follow-up examination, following   
other surgery *2015  
Bilateral low back pain with   
left-sided sciatic*2016  
Peroneal tendonitis [M76.70]   
2016  
Controlled diabetes mellitus type   
II without co*2016  
Hyperopia [H52.00] 2016  
Presbyopia [H52.4] 2016  
Benign neoplasm of skin of eyelid   
including can*2016  
Chronic bilateral low back pain   
with left-sided*2016  
Malignant neoplasm of lower-outer   
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  
ER+ UT+ carcinoma of breast (HCC)   
[C50.919, Z17*05/10/2017  
Family history of ovarian cancer   
[Z80.41] 05/10/2017  
GERD (gastroesophageal reflux   
disease) [K21.9]  
Encounter Number: [id number]  
Encounter Status:Closed by   
KERRIE HENDERSON on 24           [facility]  
   
                                        2024 Note     HNO ID: [id number]  
Author: FABIANA AMBROSIO APRN.CNP  
Service: ?  
Author Type: Nurse Practitioner  
Type: Progress Notes  
Filed: 2024 17:24  
Note Text:  
This note was created using   
NoteWriter.  
Subjective  
Navdeep Guillen is a 64 year old   
female.  
64 year old female with PMH HTN,   
GERD, DM presents for illness.  
Acute onset yesterday  
+sore throat  
+headache  
+pain with swallowing  
+enlarged lymph nodes  
Mild cough  
Denies N/V/D  
Denies skin rash or lesions.  
+exposure to strep, citing her   
grandson was over this past   
weekend and was  
positive for strep.  
Denies using homeopathic or OTC  
Endorses   would like to be tested   
for strep   
The history is provided by the   
patient. No    
was used.  
Sore Throat  
This is a new problem. The current   
episode started yesterday. The   
problem has  
been unchanged. Neither side of   
throat is experiencing more pain   
than the other.  
There has been no fever. The pain   
is at a severity of 6/10. The pain   
is  
moderate. Associated symptoms   
include coughing, headaches and   
swollen glands.  
Pertinent negatives include no   
abdominal pain, congestion,   
diarrhea, drooling,  
ear discharge, ear pain, hoarse   
voice, plugged ear sensation, neck   
pain,  
shortness of breath, stridor,   
trouble swallowing or vomiting.   
She has had  
exposure to strep. She has had no   
exposure to mono. She has tried   
nothing for  
the symptoms. The treatment   
provided no relief.  
PAST MEDICAL HISTORY  
2021: Abnormal mammogram  
2015: Achilles tendon pain  
2015: Ankle weakness  
12/15/2016: Chronic pain of left   
ankle  
2014: Colon abnormality  
Comment: Thickening of the   
ascending colon seen on the recent   
CT  
scan. Patient has had a   
colonoscopy around 4 years ago.  
Records were obtained for when   
they were done, 4 years  
ago , in [facility]. her   
colonoscopy was  
completely normal, no thickening,   
and the biopsy too was  
normal except for lymphoid   
aggregates.  
: Diverticulitis  
Comment: Treated by Dr. Patricio   
at [facility]  
2016: DJD (degenerative   
joint disease), ankle and foot  
No date: DM (diabetes mellitus)   
(HCC)  
No date: GERD (gastroesophageal   
reflux disease)  
05/15/2014: Gross hematuria  
Comment: , had hematuria,   
investigated extensively along  
with cytoscopy, a stone was found   
but no signs of any  
malignancy.  
12/15/2016: Heel pain, chronic  
10/03/2017: Hepatic steatosis  
10/03/2017: Hiatal hernia  
No date: Hypertension  
No date: Insomnia  
05/15/2014: Kidney stone  
No date: Morbid obesity (HCC)  
No date: Nephrolithiasis  
2016: Neuritis  
05/15/2014: Renal lesion  
2015: S/P Achilles tendon   
repair  
Comment: 2015 sx done  
PAST SURGICAL HISTORY  
2017: BREAST LUMPECTOMY HX; Right  
2021: BX BREAST W/DEVICE 1ST   
LESION STEREOTACTIC GUID; Right  
: CHOLECYSTECTOMY  
Comment: gallbladder removal  
: COLONOSCOPY  
2014: CT ABDOMEN  
2014: PAST SURGICAL HISTORY   
OF  
Comment: MARISOL hysteroscopy  
2015: PAST SURGICAL HISTORY   
OF; Left  
Comment: Left foot surgery  
10/16/2014: S PK LAVH [id number]  
ALLERGIES Silvadene [Silver   
Sulfadiazine] and Sulfa   
(Sulfonamide Antibiotics)  
MEDICATIONS  
atenolol (TENORMIN) 50 mg tablet   
Take 1 tablet by mouth once daily.  
chlorthalidone (HYGROTON) 25 mg   
tablet Take 0.5 tablets by mouth   
once daily.  
glipiZIDE (GLUCOTROL XL) 2.5 mg 24   
hr tablet Take 1 tablet by mouth   
once daily.  
metFORMIN ER (GLUCOPHAGE XR) 500   
mg 24 hr tablet Take 1 tablet by   
mouth daily  
with breakfast.  
blood sugar diagnostic (BLOOD   
GLUCOSE TEST) test strip Test   
blood sugar(s) 2  
times daily. Dx: Type 2 DM -   
Controlled E11.9 Insulin: No  
Lancets Test blood sugar(s) 2   
times daily. Dx: Type 2 DM -   
Controlled E11.9  
Insulin: No  
exemestane (AROMASIN) 25 mg tablet   
Take 1 tablet by mouth once daily.  
potassium chloride 20 mEq TbER   
Take 1 tablet by mouth two times a   
day.  
ibuprofen (MOTRIN) 800 mg tablet   
Take 1 tablet by mouth every 8   
hours as needed  
for pain. Take with food.  
blood sugar diagnostic (BLOOD   
GLUCOSE TEST) test strip Test   
blood sugars  
2daily. Dx:ucnontrolled diabetes .   
Insulin: Yes  
lancets (ONE TOUCH DELICA) 33   
gauge misc Test blood sugar(s) 2   
daily. Dx: Type  
2 DM - Controlled E11.9 Insulin:   
Yes  
Blood-Glucose Meter monitoring kit   
Glucose Meter of Choice - Kit -   
Dx: Type 2  
DM - Uncontrolled E11.65  
MV-MN/FOLIC ACID/CALCIUM/VIT K   
(ONE-A-DAY WOMEN'S 50 PLUS ORAL)   
Take 1 tablet  
by mouth once daily.  
FAMILY HISTORY  
Problem Relation Age of Onset  
Ovarian cancer Mother 39  
Heart Father  
Hypertension Father  
Prostate Cancer Father 78  
other (kidney stones) Brother  
Hypertension Brother  
Diabetes Brother  
Seizures Son  
Breast Cancer Other 55  
Double first cousin (daughter of   
mother's sister and father's   
brother)  
Social History  
Tobacco Use  
Smoking status: Former  
Packs/day: 1.00  
Years: 10.00  
Additional pack years: 0.00  
Total pack years: 10.00  
Types: Cigarettes  
Quit date: 5/15/2007  
Years since quittin.2 (more   
content not included)...                [facility]  
   
                                                    2024 History of   
Present illness Narrative               Formatting of this note is   
different from the original.  
This note was created using   
SpotlessCityriter.  
Subjective  
Navdeep Guillen is a 64 year old   
female.  
  
64 year old female with PMH HTN,   
GERD, DM presents for illness.  
  
Acute onset yesterday  
+sore throat  
+headache  
+pain with swallowing  
+enlarged lymph nodes  
  
Mild cough  
Denies N/V/D  
Denies skin rash or lesions.  
  
+exposure to strep, citing her   
grandson was over this past   
weekend and was positive for   
strep.  
  
Denies using homeopathic or OTC  
  
Endorses   would like to be tested   
for strep   
  
The history is provided by the   
patient. No    
was used.  
Sore Throat  
This is a new problem. The current   
episode started yesterday. The   
problem has been unchanged.   
Neither side of throat is   
experiencing more pain than the   
other. There has been no fever.   
The pain is at a severity of 6/10.   
The pain is moderate. Associated   
symptoms include coughing,   
headaches and swollen glands.   
Pertinent negatives include no   
abdominal pain, congestion,   
diarrhea, drooling, ear discharge,   
ear pain, hoarse voice, plugged   
ear sensation, neck pain,   
shortness of breath, stridor,   
trouble swallowing or vomiting.   
She has had exposure to strep. She   
has had no exposure to mono. She   
has tried nothing for the   
symptoms. The treatment provided   
no relief.  
  
PAST MEDICAL HISTORY  
2021: Abnormal mammogram  
2015: Achilles tendon pain  
2015: Ankle weakness  
12/15/2016: Chronic pain of left   
ankle  
2014: Colon abnormality  
Comment: Thickening of the   
ascending colon seen on the recent   
CT  
scan. Patient has had a   
colonoscopy around 4 years ago.  
Records were obtained for when   
they were done, 4 years  
ago , in [facility]. her   
colonoscopy was  
completely normal, no thickening,   
and the biopsy too was  
normal except for lymphoid   
aggregates.  
: Diverticulitis  
Comment: Treated by Dr. Patricio   
at [facility]  
2016: DJD (degenerative   
joint disease), ankle and foot  
No date: DM (diabetes mellitus)   
(HCC)  
No date: GERD (gastroesophageal   
reflux disease)  
05/15/2014: Gross hematuria  
Comment: , had hematuria,   
investigated extensively along  
with cytoscopy, a stone was found   
but no signs of any  
malignancy.  
12/15/2016: Heel pain, chronic  
10/03/2017: Hepatic steatosis  
10/03/2017: Hiatal hernia  
No date: Hypertension  
No date: Insomnia  
05/15/2014: Kidney stone  
No date: Morbid obesity (HCC)  
No date: Nephrolithiasis  
2016: Neuritis  
05/15/2014: Renal lesion  
2015: S/P Achilles tendon   
repair  
Comment: 2015 sx done  
  
PAST SURGICAL HISTORY  
2017: BREAST LUMPECTOMY HX; Right  
2021: BX BREAST W/DEVICE 1ST   
LESION STEREOTACTIC GUID; Right  
: CHOLECYSTECTOMY  
Comment: gallbladder removal  
: COLONOSCOPY  
2014: CT ABDOMEN  
2014: PAST SURGICAL HISTORY   
OF  
Comment: D&C hysteroscopy  
2015: PAST SURGICAL HISTORY   
OF; Left  
Comment: Left foot surgery  
10/16/2014: S PK LAVH [id number]  
  
ALLERGIES Silvadene [Silver   
Sulfadiazine] and Sulfa   
(Sulfonamide Antibiotics)  
  
MEDICATIONS  
atenolol (TENORMIN) 50 mg tablet   
Take 1 tablet by mouth once daily.  
chlorthalidone (HYGROTON) 25 mg   
tablet Take 0.5 tablets by mouth   
once daily.  
glipiZIDE (GLUCOTROL XL) 2.5 mg 24   
hr tablet Take 1 tablet by mouth   
once daily.  
metFORMIN ER (GLUCOPHAGE XR) 500   
mg 24 hr tablet Take 1 tablet by   
mouth daily with breakfast.  
blood sugar diagnostic (BLOOD   
GLUCOSE TEST) test strip Test   
blood sugar(s) 2 times daily. Dx:   
Type 2 DM - Controlled E11.9   
Insulin: No  
Lancets Test blood sugar(s) 2   
times daily. Dx: Type 2 DM -   
Controlled E11.9 Insulin: No  
exemestane (AROMASIN) 25 mg tablet   
Take 1 tablet by mouth once daily.  
potassium chloride 20 mEq TbER   
Take 1 tablet by mouth two times a   
day.  
ibuprofen (MOTRIN) 800 mg tablet   
Take 1 tablet by mouth every 8   
hours as needed for pain. Take   
with food.  
blood sugar diagnostic (BLOOD   
GLUCOSE TEST) test strip Test   
blood sugars 2daily.   
Dx:ucnontrolled diabetes .   
Insulin: Yes  
lancets (ONE TOUCH DELICA) 33   
gauge misc Test blood sugar(s) 2   
daily. Dx: Type 2 DM - Controlled   
E11.9 Insulin: Yes  
Blood-Glucose Meter monitoring kit   
Glucose Meter of Choice - Kit -   
Dx: Type 2 DM - Uncontrolled   
E11.65  
MV-MN/FOLIC ACID/CALCIUM/VIT K   
(ONE-A-DAY WOMEN'S 50 PLUS ORAL)   
Take 1 tablet by mouth once daily.  
  
  
FAMILY HISTORY  
Problem Relation Age of Onset  
Ovarian cancer Mother 39  
Heart Father  
Hypertension Father  
Prostate Cancer Father 78  
other (kidney stones) Brother  
Hypertension Brother  
Diabetes Brother  
Seizures Son  
Breast Cancer Other 55  
Double first cousin (daughter of   
mother's sister and father's   
brother)  
  
Social History  
  
Tobacco Use  
Smoking status: Former  
Packs/day: 1.00  
Years: 10.00  
Additional pack years: 0.00  
Total pack years: 10.00  
Types: Cigarettes  
Quit date: 5/15/2007  
Years since quittin.2  
Smokeless tobacco: Never  
Vaping Use  
Vaping Use: Never used  
Substance Use Topics  
Alcohol use: No  
Drug use: Not Currently  
Comment: marijuana for insomnia -   
currently not using  
  
Review of Systems  
Constitutional: Negative for   
activity change, appetite change,   
chills and fatigue.  
HENT: Positive for sore throat.   
Negative for congestion, drooling,   
ear discharge, ear pain, hoarse   
voice, rhinorrhea, sinus pain and   
trouble swallowing.  
Eyes: Negative for pain,   
discharge, redness and itching.  
Respiratory: Positive for cough.   
Negative for apnea, chest   
tightness, shortness of breath and   
stridor.  
Gastrointestinal: Negative for   
abdominal pain, diarrhea and   
vomiting.  
Musculoskeletal: Negative for   
arthralgias, back pain, gait   
problem and neck pain.  
Skin: Negative for color change,   
pallor and rash.  
Allergic/Immunologic: Negative for   
environmental allergies, food   
allergies and immunocompromised   
state.  
Neurological: Positive for   
headaches. Negative for dizziness   
and facial asymmetry.  
Hematological: Negative for   
adenopathy. Does not bruise/bleed   
easily.  
Psychiatric/Behavioral: Negative   
for agitation and behavioral   
problems.  
  
Objective  
/78   Pulse 63   Temp 36.9 C   
(98.5 F) (Tympanic)   Resp 18   Wt   
(!) 136.9 kg (301 lb 13 oz)   SpO2   
97%   BMI 49.46 kg/m  
Physical Exam  
Vitals and nursing note reviewed.  
Constitutional:  
General: She is not in acute   
distress.  
Appearance: Normal appearance. She   
is normal weight. She is not   
ill-appearing, toxic-appearing or   
diaphoretic.  
HENT:  
Head: Normocephalic and   
atraumatic.  
Right Ear: Ear canal and external   
ear normal.  
Left Ear: Ear canal and external   
ear normal.  
Nose: Congestion present. No   
rhinorrhea.  
Mouth/Throat:  
Mouth: Mucous membranes are moist.  
Pharynx: Posterior oropharyngeal   
erythema (1 + enlarged bilateral.   
Uvula midline. Handling   
secretions) present. No   
oropharyngeal exudate.  
Eyes:  
General:  
Right eye: No discharge.  
Left eye: No discharge.  
Extraocular Movements: Extraocular   
movements intact.  
Conjunctiva/sclera: Conjunctivae   
normal.  
Pupils: Pupils are equal, round,   
and reactive to light.  
Cardiovascular:  
Rate and Rhythm: Normal rate and   
regular rhythm.  
Pulses: Normal pulses.  
Heart sounds: Normal heart sounds.   
No murmur heard.  
No friction rub.  
Pulmonary:  
Effort: Pulmonary effort is   
normal. No respiratory distress.  
Breath sounds: Normal breath   
sounds. No stridor. No wheezing,   
rhonchi or rales.  
Chest:  
Chest wall: No tenderness.  
Abdominal:  
General: Abdomen is flat. There is   
no distension.  
Palpations: Abdomen is soft. There   
is no mass.  
Tenderness: There is no abdominal   
tenderness. There is no right CVA   
tenderness, left CVA tenderness,   
guarding or rebound.  
Hernia: No hernia is present.  
Musculoskeletal:  
General: No swelling, tenderness,   
deformity or signs of injury.   
Normal range of motion.  
Cervical back: Normal range of   
motion and neck supple. No   
rigidity.  
Right lower leg: No edema.  
Left lower leg: No edema.  
Lymphadenopathy:  
Cervical: Cervical adenopathy   
present.  
Skin:  
General: Skin is warm and dry.  
Coloration: Skin is not jaundiced   
or pale.  
Findings: No bruising, erythema,   
lesion or rash.  
Neurological:  
General: No focal deficit present.  
Mental Status: She is alert and   
oriented to person, place, and   
time.  
Cranial Nerves: No cranial nerve   
deficit.  
Sensory: No sensory deficit.  
Motor: No weakness.  
Coordination: Coordination normal.  
Gait: Gait normal.  
Psychiatric:  
Mood and Affect: Mood normal.  
Behavior: Behavior normal.  
Thought Content: Thought content   
normal.  
Judgment: Judgment normal.  
  
Assessment and Plan  
  
ASSESSMENT/PLAN:  
1. Upper respiratory tract   
infection, unspecified type -   
ICD9: 465.9, ICD10: J06.9  
X one day  
No red flags  
- Discussed viral etiology and   
rationale for treatment.  
- Group A strep molecular testing   
negative  
- Symptomatic treatment with prn   
analgesia  
- Supportive care with fluids and   
rest  
- The patient may also use OTC   
cough and cold meds as needed,   
warm salt water gargles, throat   
lozenges and/or OTC throat spray   
as needed, and nasal saline gtts   
and suction prn.  
- Follow up in 3-5 days if   
symptoms persist or sooner if   
worsening of symptoms  
- STREP A MOLECULAR (POC)  
  
NIC Lam.CNP  
Electronically signed by Fabiana Ambrosio APRN.CNP at 2024   
5:24 PM EDT  
documented in this encounter            [facility]  
   
                                                    2024 Telephone   
encounter Note                          Formatting of this note might be   
different from the original.  
Patient notified. Patient will fax   
over copy of letter received.  
Electronically signed by Manisha Jesus MA at 2024 10:48 AM   
EDT  
                                        [facility]  
   
                                                    2024 Miscellaneous   
Notes                                   Formatting of this note might be   
different from the original.  
Patient notified. Patient will fax   
over copy of letter received.  
Electronically signed by Manisha Jesus MA at 2024 10:48 AM   
EDT  
Formatting of this note might be   
different from the original.  
Please let them know that the   
system has to be updated, and it   
should soon be , I will still see   
her and she will remain my   
patient.  
  
Regards,  
  
Cynthia Douglas MD  
  
Electronically signed by Cynthia Douglas MD at 2024 10:14 AM   
EDT  
Formatting of this note might be   
different from the original.  
Pt called upset that she got a   
letter that instructed her Dr. Douglas was terminated from [facility]. Pt asking to please   
check into this. Pt repots she   
told them she would go with Sherly An ut would like us to check   
into this. Pt would like to stay   
with Dr. Douglas.  
  
Please advise pt of findings.  
  
Ani Lin LPN  
  
Electronically signed by Ani Lin LPN at 2024   
9:50 AM EDT  
documented in this encounter            [facility]  
   
                                                    2024 Telephone   
encounter Note                          Formatting of this note might be   
different from the original.  
Please let them know that the   
system has to be updated, and it   
should soon be , I will still see   
her and she will remain my   
patient.  
  
Regards,  
  
Cynthia Douglas MD  
  
Electronically signed by Cynthia Douglas MD at 2024 10:14 AM   
EDT  
                                        [facility]  
   
                                                    2024 Telephone   
encounter Note                          Formatting of this note might be   
different from the original.  
Pt called upset that she got a   
letter that instructed her Dr. Douglas was terminated from [facility]. Pt asking to please   
check into this. Pt repots she   
told them she would go with Sherly nA ut would like us to check   
into this. Pt would like to stay   
with Dr. Douglas.  
  
Please advise pt of findings.  
  
Ani Lin LPN  
  
Electronically signed by Ani Lin LPN at 2024   
9:50 AM EDT  
                                        [facility]  
   
                                        2024 Note     HNO ID: [id number]  
Author: SHERLY AN APRN.CNP  
Service: ?  
Author Type: Nurse Practitioner  
Type: Progress Notes  
Filed: 2024 10:12  
Note Text:  
CC: Patient presents with:  
Recheck: 6 month follow up  
HPI  
Navdeep Guillen is a 64 year old   
female who presents today for   
routine follow  
up.  
DIABETES MELLITUS: Ms. Guillen   
denies excessive thirst or   
increased frequency of  
urination, chest pain or dyspnea ,   
numbness, tingling or pain in   
extremities,  
new or unusual visual symptoms,   
low sugar/hypoglycemic reactions,   
weight  
loss/gain,   
lightheadedness/dizziness, and   
bowel changes/loose stools.   
Follows  
a diabetic diet most of the time.   
She is compliant with   
medication(s) and is  
tolerating med(s) without any side   
effects. She reports checking her   
glucose  
on a twice a day schedule with   
sugars in the fasting 130s and   
postprandial  
90s-110s range. Patient's last   
HgA1C was  
Hemoglobin A1C (%)  
Date Value  
2024 6.2  
2023 6.5  
07/15/2020 7.7  
2020 7.6  
Hemoglobin A1C (POCT) (%)  
Date Value  
10/26/2021 7.1  
)  
Last Ophthalmology exam was within   
the past 6 months at [facility] in   
[place]  
HTN: Ms. Guillen indicates that she   
is feeling well and denies any   
symptoms  
referable to elevated blood   
pressure. Specifically denies   
headache, chest pain,  
palpitations, dyspnea, and   
peripheral edema. Patient denies   
any side effects of  
her medication(s) and is compliant   
with their regimen. She does check   
BP's away  
from this office with average BP's   
in the 110s/80s range. Navdeep   
works out  
regularly 7 times per week with   
walking. She watches her diet for   
sodium, low  
fat and low cholesterol most of   
the time.  
Last 3 Encounter BP Readings:  
Date: BP:  
2024 116/78  
2024 99/69  
2024 146/80  
Right Breast CA: Had lumpectomy in   
2017. Follows with oncology and   
still on  
aromasin  
Allergies starting this morning   
with runny nose and itchy eyes   
with sneezing.  
Denies fever, chills, headaches,   
dizziness, cough, wheezing, or   
shortness of  
breath.  
REVIEW OF SYSTEMS  
See HPI  
PAST MEDICAL HISTORY  
Diagnosis Date  
Abnormal mammogram 2021  
Achilles tendon pain 2015  
Ankle weakness 2015  
Chronic pain of left ankle   
12/15/2016  
Colon abnormality 2014  
Thickening of the ascending colon   
seen on the recent CT scan.   
Patient has had a  
colonoscopy around 4 years ago.   
Records were obtained for when   
they were done, 4  
years ago , in [facility].   
her colonoscopy was completely   
normal, no  
thickening, and the biopsy too was   
normal except for lymphoid   
aggregates.  
Diverticulitis   
Treated by Dr. Patricio at [facility]  
DJD (degenerative joint disease),   
ankle and foot 2016  
DM (diabetes mellitus) (HCC)  
GERD (gastroesophageal reflux   
disease)  
Gross hematuria 05/15/2014  
2014, had hematuria, investigated   
extensively along with cytoscopy,   
a stone  
was found but no signs of any   
malignancy.  
Heel pain, chronic 12/15/2016  
Hepatic steatosis 10/03/2017  
Hiatal hernia 10/03/2017  
Hypertension  
Insomnia  
Kidney stone 05/15/2014  
Morbid obesity (HCC)  
Nephrolithiasis  
Neuritis 2016  
Renal lesion 05/15/2014  
S/P Achilles tendon repair   
2015 sx done  
PAST SURGICAL HISTORY  
Procedure Laterality Date  
BREAST LUMPECTOMY HX Right   
BX BREAST W/DEVICE 1ST LESION   
STEREOTACTIC GUID Right 2021  
CHOLECYSTECTOMY   
gallbladder removal  
COLONOSCOPY   
CT ABDOMEN 2014  
PAST SURGICAL HISTORY OF   
2014  
DANDC hysteroscopy  
PAST SURGICAL HISTORY OF Left   
2015  
Left foot surgery  
S PK LAVH [id number] 10/16/2014  
ALLERGIES Silvadene [Silver   
Sulfadiazine] and Sulfa   
(Sulfonamide Antibiotics)  
MEDICATIONS  
exemestane (AROMASIN) 25 mg tablet   
Take 1 tablet by mouth once daily.  
potassium chloride 20 mEq TbER   
Take 1 tablet by mouth two times a   
day.  
ibuprofen (MOTRIN) 800 mg tablet   
Take 1 tablet by mouth every 8   
hours as needed  
for pain. Take with food.  
metFORMIN ER (GLUCOPHAGE XR) 500   
mg 24 hr tablet Take 1 tablet by   
mouth daily  
with breakfast.  
blood sugar diagnostic (BLOOD   
GLUCOSE TEST) test strip Test   
blood sugars  
2daily. Dx:ucnontrolled diabetes .   
Insulin: Yes  
chlorthalidone (HYGROTON) 25 mg   
tablet Take 0.5 tablets by mouth   
once daily.  
atenolol (TENORMIN) 50 mg tablet   
Take 1 tablet by mouth once daily.  
glipiZIDE (GLUCOTROL XL) 2.5 mg 24   
hr tablet Take 1 tablet by mouth   
once daily.  
lancets (ONE TOUCH DELICA) 33   
gauge misc Test blood sugar(s) 2   
daily. Dx: Type  
2 DM - Controlled E11.9 Insulin:   
Yes  
Blood-Glucose Meter monitoring kit   
Glucose Meter of Choice - Kit -   
Dx: Type 2  
DM - Uncontrolled E11.65  
MV-MN/FOLIC ACID/CALCIUM/VIT K   
(ONE-A-DAY WOMEN'S 50 PLUS ORAL)   
Take 1 tablet  
by mouth once daily.  
FAMILY HISTORY  
Problem Relation Age of Onset  
Ovarian cancer Mother 39  
Heart Father  
Hypertension Father  
Prostate Cancer Father 78  
other (kidney stones) Brother  
Hypertension Brother  
Diabetes Brother  
Seizures Son  
Breast Cancer Other 55 (more   
content not included)...                [facility]  
   
                                                    2024 History of   
Present illness Narrative               Formatting of this note is   
different from the original.  
CC: Patient presents with:  
Recheck: 6 month follow up  
  
HPI  
Navdeep Guillen is a 64 year old   
female who presents today for   
routine follow up.  
  
DIABETES MELLITUS: Ms. Guillen   
denies excessive thirst or   
increased frequency of urination,   
chest pain or dyspnea , numbness,   
tingling or pain in extremities,   
new or unusual visual symptoms,   
low sugar/hypoglycemic reactions,   
weight loss/gain,   
lightheadedness/dizziness, and   
bowel changes/loose stools.   
Follows a diabetic diet most of   
the time. She is compliant with   
medication(s) and is tolerating   
med(s) without any side effects.   
She reports checking her glucose   
on a twice a day schedule with   
sugars in the fasting 130s and   
postprandial 90s-110s range.   
Patient's last HgA1C was  
Hemoglobin A1C (%)  
Date Value  
2024 6.2  
2023 6.5  
07/15/2020 7.7  
2020 7.6  
  
Hemoglobin A1C (POCT) (%)  
Date Value  
10/26/2021 7.1  
)  
Last Ophthalmology exam was within   
the past 6 months at [facility] in   
[place]  
  
HTN: Ms. Guillen indicates that she   
is feeling well and denies any   
symptoms referable to elevated   
blood pressure. Specifically   
denies headache, chest pain,   
palpitations, dyspnea, and   
peripheral edema. Patient denies   
any side effects of her   
medication(s) and is compliant   
with their regimen. She does check   
BP's away from this office with   
average BP's in the 110s/80s   
range. Navdeep works out regularly   
7 times per week with walking. She   
watches her diet for sodium, low   
fat and low cholesterol most of   
the time.  
Last 3 Encounter BP Readings:  
Date: BP:  
2024 116/78  
2024 99/69  
2024 146/80  
  
Right Breast CA: Had lumpectomy in   
2017. Follows with oncology and   
still on aromasin  
  
Allergies starting this morning   
with runny nose and itchy eyes   
with sneezing.  
  
Denies fever, chills, headaches,   
dizziness, cough, wheezing, or   
shortness of breath.  
  
REVIEW OF SYSTEMS  
See HPI  
  
PAST MEDICAL HISTORY  
Diagnosis Date  
Abnormal mammogram 2021  
Achilles tendon pain 2015  
Ankle weakness 2015  
Chronic pain of left ankle   
12/15/2016  
Colon abnormality 2014  
Thickening of the ascending colon   
seen on the recent CT scan.   
Patient has had a colonoscopy   
around 4 years ago. Records were   
obtained for when they were done,   
4 years ago , in [facility]. her colonoscopy was   
completely normal, no thickening,   
and the biopsy too was normal   
except for lymphoid aggregates.  
Diverticulitis   
Treated by Dr. Patricio at [facility]  
DJD (degenerative joint disease),   
ankle and foot 2016  
DM (diabetes mellitus) (HCC)  
GERD (gastroesophageal reflux   
disease)  
Gross hematuria 05/15/2014  
2014, had hematuria, investigated   
extensively along with cytoscopy,   
a stone was found but no signs of   
any malignancy.  
Heel pain, chronic 12/15/2016  
Hepatic steatosis 10/03/2017  
Hiatal hernia 10/03/2017  
Hypertension  
Insomnia  
Kidney stone 05/15/2014  
Morbid obesity (HCC)  
Nephrolithiasis  
Neuritis 2016  
Renal lesion 05/15/2014  
S/P Achilles tendon repair   
2015 sx done  
  
  
PAST SURGICAL HISTORY  
Procedure Laterality Date  
BREAST LUMPECTOMY HX Right 2017  
BX BREAST W/DEVICE 1ST LESION   
STEREOTACTIC GUID Right 2021  
CHOLECYSTECTOMY 2011  
gallbladder removal  
COLONOSCOPY   
CT ABDOMEN 2014  
PAST SURGICAL HISTORY OF   
2014  
D&C hysteroscopy  
PAST SURGICAL HISTORY OF Left   
2015  
Left foot surgery  
S PK LAVH [id number] 10/16/2014  
  
ALLERGIES Silvadene [Silver   
Sulfadiazine] and Sulfa   
(Sulfonamide Antibiotics)  
  
MEDICATIONS  
exemestane (AROMASIN) 25 mg tablet   
Take 1 tablet by mouth once daily.  
potassium chloride 20 mEq TbER   
Take 1 tablet by mouth two times a   
day.  
ibuprofen (MOTRIN) 800 mg tablet   
Take 1 tablet by mouth every 8   
hours as needed for pain. Take   
with food.  
metFORMIN ER (GLUCOPHAGE XR) 500   
mg 24 hr tablet Take 1 tablet by   
mouth daily with breakfast.  
blood sugar diagnostic (BLOOD   
GLUCOSE TEST) test strip Test   
blood sugars 2daily.   
Dx:ucnontrolled diabetes .   
Insulin: Yes  
chlorthalidone (HYGROTON) 25 mg   
tablet Take 0.5 tablets by mouth   
once daily.  
atenolol (TENORMIN) 50 mg tablet   
Take 1 tablet by mouth once daily.  
glipiZIDE (GLUCOTROL XL) 2.5 mg 24   
hr tablet Take 1 tablet by mouth   
once daily.  
lancets (ONE TOUCH DELICA) 33   
gauge misc Test blood sugar(s) 2   
daily. Dx: Type 2 DM - Controlled   
E11.9 Insulin: Yes  
Blood-Glucose Meter monitoring kit   
Glucose Meter of Choice - Kit -   
Dx: Type 2 DM - Uncontrolled   
E11.65  
MV-MN/FOLIC ACID/CALCIUM/VIT K   
(ONE-A-DAY WOMEN'S 50 PLUS ORAL)   
Take 1 tablet by mouth once daily.  
  
  
FAMILY HISTORY  
Problem Relation Age of Onset  
Ovarian cancer Mother 39  
Heart Father  
Hypertension Father  
Prostate Cancer Father 78  
other (kidney stones) Brother  
Hypertension Brother  
Diabetes Brother  
Seizures Son  
Breast Cancer Other 55  
Double first cousin (daughter of   
mother's sister and father's   
brother)  
  
Social History  
  
Tobacco Use  
Smoking status: Former  
Packs/day: 1.00  
Years: 10.00  
Additional pack years: 0.00  
Total pack years: 10.00  
Types: Cigarettes  
Quit date: 5/15/2007  
Years since quittin.1  
Smokeless tobacco: Never  
Vaping Use  
Vaping Use: Never used  
Substance Use Topics  
Alcohol use: No  
Drug use: Not Currently  
Comment: marijuana for insomnia -   
currently not using  
  
PHYSICAL EXAM  
/78   Pulse 64   Resp 16     
Wt (!) 136.5 kg (301 lb)   SpO2   
97%   BMI 49.33 kg/m  
General Appearance: well   
appearing, in no acute distress,   
alert  
Skin: Skin color, texture, turgor   
normal for age;  
Eyes: conjunctiva pink and moist,   
no icterus, sclera white,   
non-injected  
Nose/sinus: Nares normal. Septum   
midline. Mucosa normal. No   
drainage., No sinus tenderness  
Neck: Thyroid normal size and   
symmetric without palpable   
nodules, No adenopathy  
Lymph nodes: No cervical   
lymphadenopathy, No   
supraclavicular lymphadenopathy,   
and No inguinal lymphadenopathy.  
Lungs: Lungs clear to   
auscultation. No wheezing,   
rhonchi, rales.  
Heart: RRR without murmur, gallop,   
or rubs. No ectopy  
  
Health maintenance reviewed with   
patient:  
Pneumococcal Vaccine: 65+(1 of 2 -   
PCV) Never done  
Dilated Retinal Exam due on   
2022  
Covid-19 Vaccine(1 - -   
season) Never done  
Advance Directive Discussion due   
on 2024  
Behavioral Health Screening Never   
done  
DTaP,Tdap,Td Vaccine(2 - Td or   
Tdap) due on 2024  
RSV Vaccine(1 - 1-dose 60+ series)   
due on 2024  
Shingrix Vaccine(1 of 2) due on   
2024  
Influenza Vaccine(Season Ended)   
due on 2024  
HbA1C due on 10/02/2024  
Urine Albumin:Creatinine Ratio due   
on 2024  
Mammogram Screening due on   
2025  
Annual PCP Team Chronic Disease   
Visit due on 2025  
LDL Cholesterol due on 2025  
BP Controlled (<130/80) due on   
2025  
Colorectal Cancer Screening due on   
2028  
Bone Density Screening Completed  
Hepatitis C Screening Completed  
HPV Vaccine Aged Out  
Diabetic Foot Exam Discontinued  
Cervical Cancer Screening   
Discontinued  
  
DATA REVIEWED: Most recent labs  
  
ASSESSMENT/PLAN:  
1. Type 2 diabetes mellitus   
without complication, without   
long-term current use of insulin   
(HCC) - ICD9: 250.00, ICD10: E11.9   
(primary diagnosis)  
- Controlled  
- Continue current medications  
- declines statin therapy at this   
time to lower risk of heart attack   
and stroke  
- METFORMIN  MG   
TABLET,EXTENDED RELEASE 24 HR  
- LIPID PANEL BASIC  
- HEMOGLOBIN A1C  
- COMPLETE BLOOD COUNT  
- COMPREHENSIVE METABOLIC PANEL  
  
2. Primary hypertension - ICD9:   
401.9, ICD10: I10  
- Controlled  
- Continue current medications  
- Recommend home blood pressure   
monitoring, to bring results to   
next visit  
- Encouraged sodium restriction,   
DASH or Mediterranean diet  
- Recommend regular aerobic   
exercise  
- COMPLETE BLOOD COUNT  
- COMPREHENSIVE METABOLIC PANEL  
  
3. Hypercholesterolemia - ICD9:   
272.0, ICD10: E78.00  
Controlled but declines statin   
therapy to decrease risk of heart   
attack and stroke  
- LIPID PANEL BASIC  
- COMPREHENSIVE METABOLIC PANEL  
The 10-year ASCVD risk score   
(Liliana STEWART, et al., 2019) is:   
13.7%  
Values used to calculate the   
score:  
Age: 64 years  
Sex: Female  
Is Non- :   
No  
Diabetic: Yes  
Tobacco smoker: No  
Systolic Blood Pressure: 116 mmHg  
Is BP treated: Yes  
HDL Cholesterol: 37 mg/dL  
Total Cholesterol: 170 mg/dL  
  
4. Malignant neoplasm of   
lower-outer quadrant of right   
breast of female, estrogen   
receptor positive (HCC) - ICD9:   
174.5, V86.0, ICD10: C50.511,   
Z17.0  
Stable  
Continue with recommendations by   
oncology  
  
5. Seasonal allergic rhinitis,   
unspecified trigger - ICD9: 477.9,   
ICD10: J30.2  
Claritin and flonase as discussed  
Follow up if no improvement or   
worsening of symptoms.  
  
Prescription instructions reviewed   
with patient as applicable.   
Potential red flag symptoms   
discussed with the patient.   
Reviewed appropriate action plan   
to take if red flag symptoms   
occur. Patient agreeable to   
treatment plan.  
NIC Barney.CNP  
Electronically signed by Sherly An APRN.CNP at 2024 10:12   
AM EDT  
documented in this encounter            [facility]  
   
                                                    2024 Telephone   
encounter Note                          Formatting of this note might be   
different from the original.  
Duplicate request.  
  
Samantha Hunter LPN  
  
Electronically signed by Samantha Hunter LPN at 2024 9:14 AM   
EDT  
                                        [facility]  
   
                                                    2024 Miscellaneous   
Notes                                   Formatting of this note might be   
different from the original.  
Duplicate request.  
  
Samantha Hunter LPN  
  
Electronically signed by Samantha Hunter LPN at 2024 9:14 AM   
EDT  
documented in this encounter            [facility]  
   
                                                    06- Telephone   
encounter Note                          Formatting of this note is   
different from the original.  
Prescription Refill Information  
  
The patient has been identified by   
name and date of birth: Yes  
  
Caregiver verified no other   
encounters exist for this   
prescription request: Yes  
  
Caregiver confirmed with   
patient/requestor that no other   
refills are due, in the near   
future, with this provider at this   
time: Yes  
  
The last office visit in the   
department: 2024  
  
Does the patient have a future   
office visit with this   
provider/department: Yes  
  
Requested Prescriptions  
  
Pending Prescriptions Disp Refills  
exemestane (AROMASIN) 25 mg tablet   
90 tablet 3  
Sig: Take 1 tablet by mouth once   
daily.  
  
Yoko Mills LPN  
Ila 10, 2024 4:38 PM  
  
  
Electronically signed by Yoko Mills LPN at 06/10/2024 4:39   
PM EDT  
                                        [facility]  
   
                                                    06- Miscellaneous   
Notes                                   Formatting of this note is   
different from the original.  
Prescription Refill Information  
  
The patient has been identified by   
name and date of birth: Yes  
  
Caregiver verified no other   
encounters exist for this   
prescription request: Yes  
  
Caregiver confirmed with   
patient/requestor that no other   
refills are due, in the near   
future, with this provider at this   
time: Yes  
  
The last office visit in the   
department: 2024  
  
Does the patient have a future   
office visit with this   
provider/department: Yes  
  
Requested Prescriptions  
  
Pending Prescriptions Disp Refills  
exemestane (AROMASIN) 25 mg tablet   
90 tablet 3  
Sig: Take 1 tablet by mouth once   
daily.  
  
Yoko Milsl LPN  
Ila 10, 2024 4:38 PM  
  
  
Electronically signed by Yoko Mills LPN at 06/10/2024 4:39   
PM EDT  
documented in this encounter            [facility]  
   
                                        2024 Note     HNO ID: [id number]  
Author: VIKKI POSADA APRN.CNP  
Service: ?  
Author Type: Nurse Practitioner  
Type: Progress Notes  
Filed: 2024 10:34  
Note Text:  
Chief Complaint  
Patient presents with:  
Established Patient  
HPI:  
Navdeep Guillen is a 64 year old   
female who presents here today for   
follow up  
breast cancer.  
Per Dr. Milner/Sumaya's previous note:  
H/o hypertension and borderline   
diabetes/insulin resistant,   
obesity, who  
presented with an abnormal breast   
exam at her PCP office. Subsequent   
diagnostic  
mammogram revealing a lobulated   
lesion in the right breast at the   
lower outer  
quadrant highly suspicious of   
malignancy.  
Patient had a mammotome breast   
biopsy on 3/27/17 that showed   
invasive ductal  
carcinoma, positive for estrogen   
and progesterone receptors,   
equivocal for  
overexpression HER-2/andi.  
She had surgery by Dr. Dueñas, right   
breast lumpectomy and right   
axillary sentinel  
lymph node biopsy on 17 for   
right breast cancer.  
Stage IA- pT1c pN0 (3/3 sentinel   
lymph nodes negative for   
metastatic disease)  
Tumor size 1.5 cm x 1 x 0.9 cm  
Overall grade 2 out of 7  
Margins - 0.4 cm from posterior   
margin  
ER/UT >95%, [id number] not amplified   
by FISH  
Lymph/vasc invasion - none;   
derm/vasc/lymph invasion - none  
Menstrual history: Menarche at age   
11, first pregnancy at age 18, 8   
pregnancy;  
surgical menopause-total abdominal   
hysterectomy age 54. No estrogen   
replacement  
therapy. Family history: Mother   
 of ovarian cancer in her   
40's. No family  
history of breast cancer. She was   
initially started on anastrozole,   
then  
subsequent switch to Aromasin   
because of joint pain.  
RADIATION-17 to 17  
Right breast  
Current treatment:  
1) Aromasin 25 mg once daily  
No new concerns today.  
Appetite: Ok.  Wt. stable. Energy   
level: It's good.   
Denies fevers or recent illness.  
Resp:denies cough or sob,   
+seasonal allergies  
Cardiac:denies chest   
pain/palpitations  
GI:denies abd pain, n/v, moving   
bowels regularly h/o   
diverticulitis  
:denies dysuria/hematuria  
Extrem:denies new pain, L foot   
pain s/p surgery  
Endo:denies hot flash  
Neuro:denies symptoms of   
neuropathy  
Skin:denies rashes/lesions  
Heme:denies bleeding  
The ROS is otherwise negative.  
Past medical history,   
appointments, medications,   
allergies reviewed. No changes.  
EXAM:  
BP 99/69   Pulse 67   Temp 36.6 ?C   
(97.9 ?F) (Temporal)   Wt (!)   
136.8 kg  
(301 lb 9.6 oz)   SpO2 96%   BMI   
49.43 kg/m?  
APPEARANCE Well appearing, alert,   
in no acute distress,   
well-hydrated, well  
nourished.  
HEART RRR with normal S1 and S2,   
no murmurs  
LUNG clear to auscultation  
BREAST FEMALE no mass/nodule b/l,   
R radiation changes  
LYMPH NODES No cervical   
lymphadenopathy, No   
supraclavicular lymphadenopathy,   
and  
No axillary lymphadenopathy.  
ABDOMEN bowel sounds normoactive,   
soft, non-tender  
EXTREMITIES No edema  
NEURO Awake, alert and oriented x   
3, Normal gait, and No involuntary   
motions.  
SKIN Skin color, texture, turgor   
normal, no suspicious rashes or   
lesions  
RADIOLOGY:  
Mammogram 24:  
IMPRESSION: BENIGN FINDING  
There is no mammographic evidence   
of malignancy. A 1 year screening  
mammogram is recommended.  
ASSESSMENT/PLAN:  
1. Malignant neoplasm of   
lower-outer quadrant of right   
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  
estrogen receptor positive (HCC) -   
ICD9: 174.5, V86.0, ICD10:   
C50.511, Z17.0  
pT1c pN0 (3/3 sentinel lymph nodes   
negative for metastatic disease)   
stage IA  
infiltrating ductal carcinoma the   
right breast. ER/UT >95%, [id number]  
non-amplified by FISH.  
- No concerning findings on exam.  
- Overall tolerating aromasin   
well.  
- Reviewed mammogram with pt.  
- Continue aromasin.  
- Mammogram due mid 2025.  
- Follow up in 6 months.  
- Pt. aware to call office with   
any questions/concerns.  
The patient indicates   
understanding of these issues and   
agrees with the plan.  
All documentation from previous   
visit of 10/2/23-Dr. Shell/myself   
was copied and  
pasted, documentation has been   
reviewed and edited as necessary   
for today's  
visit.  
[facility]  
   
                                                    2024 History of   
Present illness Narrative               Formatting of this note might be   
different from the original.  
Chief Complaint  
Patient presents with:  
Established Patient  
  
HPI:  
Navdeep Guillen is a 64 year old   
female who presents here today for   
follow up breast cancer.  
  
Per Dr. Milner/Sumaya's previous note:  
H/o hypertension and borderline   
diabetes/insulin resistant,   
obesity, who presented with an   
abnormal breast exam at her PCP   
office. Subsequent diagnostic   
mammogram revealing a lobulated   
lesion in the right breast at the   
lower outer quadrant highly   
suspicious of malignancy.  
  
Patient had a mammotome breast   
biopsy on 3/27/17 that showed   
invasive ductal carcinoma,   
positive for estrogen and   
progesterone receptors, equivocal   
for overexpression HER-2/andi.  
  
She had surgery by Dr. Dueñas, right   
breast lumpectomy and right   
axillary sentinel lymph node   
biopsy on 17 for right breast   
cancer.  
  
Stage IA- pT1c pN0 (3/3 sentinel   
lymph nodes negative for   
metastatic disease)  
Tumor size 1.5 cm x 1 x 0.9 cm  
Overall grade 2 out of 7  
Margins - 0.4 cm from posterior   
margin  
ER/UT >95%, [id number] not amplified   
by FISH  
Lymph/vasc invasion - none;   
derm/vasc/lymph invasion - none  
  
Menstrual history: Menarche at age   
11, first pregnancy at age 18, 8   
pregnancy; surgical   
menopause-total abdominal   
hysterectomy age 54. No estrogen   
replacement therapy. Family   
history: Mother  of ovarian   
cancer in her 40's. No family   
history of breast cancer. She was   
initially started on anastrozole,   
then subsequent switch to Aromasin   
because of joint pain.  
  
  
RADIATION-17 to 17  
Right breast  
  
  
Current treatment:  
1) Aromasin 25 mg once daily  
  
  
  
  
  
  
  
  
  
  
  
  
No new concerns today.  
  
  
Appetite: Ok.  Wt. stable. Energy   
level: It's good.   
Denies fevers or recent illness.  
Resp:denies cough or sob,   
+seasonal allergies  
Cardiac:denies chest   
pain/palpitations  
GI:denies abd pain, n/v, moving   
bowels regularly h/o   
diverticulitis  
:denies dysuria/hematuria  
Extrem:denies new pain, L foot   
pain s/p surgery  
Endo:denies hot flash  
Neuro:denies symptoms of   
neuropathy  
Skin:denies rashes/lesions  
Heme:denies bleeding  
  
The ROS is otherwise negative.  
  
Past medical history,   
appointments, medications,   
allergies reviewed. No changes.  
  
EXAM:  
BP 99/69   Pulse 67   Temp 36.6 C   
(97.9 F) (Temporal)   Wt (!) 136.8   
kg (301 lb 9.6 oz)   SpO2 96%     
BMI 49.43 kg/m  
  
APPEARANCE Well appearing, alert,   
in no acute distress,   
well-hydrated, well nourished.  
HEART RRR with normal S1 and S2,   
no murmurs  
LUNG clear to auscultation  
BREAST FEMALE no mass/nodule b/l,   
R radiation changes  
LYMPH NODES No cervical   
lymphadenopathy, No   
supraclavicular lymphadenopathy,   
and No axillary lymphadenopathy.  
ABDOMEN bowel sounds normoactive,   
soft, non-tender  
EXTREMITIES No edema  
NEURO Awake, alert and oriented x   
3, Normal gait, and No involuntary   
motions.  
SKIN Skin color, texture, turgor   
normal, no suspicious rashes or   
lesions  
  
RADIOLOGY:  
Mammogram 24:  
IMPRESSION: BENIGN FINDING  
There is no mammographic evidence   
of malignancy. A 1 year screening  
mammogram is recommended.  
  
ASSESSMENT/PLAN:  
1. Malignant neoplasm of   
lower-outer quadrant of right   
breast of female, estrogen   
receptor positive (HCC) - ICD9:   
174.5, V86.0, ICD10: C50.511,   
Z17.0  
pT1c pN0 (3/3 sentinel lymph nodes   
negative for metastatic disease)   
stage IA infiltrating ductal   
carcinoma the right breast. ER/UT   
>95%, Qto7drm non-amplified by   
FISH.  
- No concerning findings on exam.  
- Overall tolerating aromasin   
well.  
- Reviewed mammogram with pt.  
- Continue aromasin.  
- Mammogram due mid 2025.  
- Follow up in 6 months.  
- Pt. aware to call office with   
any questions/concerns.  
  
  
The patient indicates   
understanding of these issues and   
agrees with the plan.  
  
  
All documentation from previous   
visit of 10/2/23-Dr. Shell/myself   
was copied and pasted,   
documentation has been reviewed   
and edited as necessary for   
today's visit.  
  
  
  
[facility]  
  
Electronically signed by   
Vikki Posada APRN.CNP at   
2024 10:34 AM EDT  
documented in this encounter            [facility]  
   
                                        2024 Note     HNO ID: [id number]  
Author: KINZA VALLECILLO MA  
Service: ?  
Author Type: Medical Assistant  
Type: Progress Notes  
Filed: 2024 09:40  
Note Text:  
POPULATION HEALTH NAVIGATION   
OUTREACH  
Action/I  
Last Wellness exam 10/2021  
Reason for Outreach  
Care Gap/HCC or Scheduling   
Wellness Visits  
Care Gaps due:  
Medicare Annual Wellness Visit  
Diabetic Eye Exam  
Patient Contacted:  
Unable or unnecessary to reach   
patient:  
Left message  
Leftronic message sent  
Navigation Signature:  
Kinza Doshi MA  
2024 9:36 AM                   [facility]  
   
                                                    2024 History of   
Present illness Narrative               Formatting of this note is   
different from the original.  
POPULATION HEALTH NAVIGATION   
OUTREACH  
  
Action/  
Last Wellness exam 10/2021  
  
  
Reason for Outreach  
Care Gap/HCC or Scheduling   
Wellness Visits  
  
Care Gaps due:  
Medicare Annual Wellness Visit  
Diabetic Eye Exam  
  
Patient Contacted:  
Unable or unnecessary to reach   
patient:  
Left message  
Leftronic message sent  
  
Navigation Signature:  
  
Kinza Doshi MA  
2024 9:36 AM  
  
  
Electronically signed by Kinza Vallecillo MA at 2024   
9:40 AM EST  
documented in this encounter            [facility]  
   
                                        2024 Note     Patient Outreach (NE  
TNAV)  
----------------------------------  
----------------------------------  
------------  
NAVDEEP GUILLEN ([id number])   
1960 F  
Date Time Provider Department  
3/8/24 KINZA VALLECILLO   
NETNAV  
During your visit today, we   
recorded the following information   
about you:  
Kinza Vallecillo MA   
3/8/2024 9:40 AM Signed  
POPULATION HEALTH NAVIGATION   
OUTREACH  
Action/FYI  
Last Wellness exam 10/2021  
Reason for Outreach  
Care Gap/HCC or Scheduling   
Wellness Visits  
Care Gaps due:  
Medicare Annual Wellness Visit  
Diabetic Eye Exam  
Patient Contacted:  
Unable or unnecessary to reach   
patient:  
Left message  
Gengohart message sent  
Navigation Signature:  
Kinza Doshi MA  
2024 9:36 AM  
Allergies As of Date: 2024   
Noted Allergy Reaction  
SILVADENE (SILVER SULFADIAZINE)   
2017 4 - Hives  
SULFA (SULFONAMIDE ANTIBIOTICS)   
2021 4 - Hives  
Date Reviewed: 2024  
Reviewed by: Kellie Ahmadi LPN -   
Fully Assessed  
Reason for Visit:  
Population Health Navigation   
Outreach [3910] Cmt: [facility] Annual   
Wellness Visit  
Prescriptions as of 2024  
- ciprofloxacin HCl (CIPRO) 500 mg   
tablet  
Take 1 tablet by mouth two times a   
day for 7 days.  
- ibuprofen (MOTRIN) 800 mg tablet  
Take 1 tablet by mouth every 8   
hours as needed for pain. Take   
with food.  
- metFORMIN ER (GLUCOPHAGE XR) 500   
mg 24 hr tablet  
Take 1 tablet by mouth daily with   
breakfast.  
- blood sugar diagnostic (BLOOD   
GLUCOSE TEST) test strip  
Test blood sugars 2daily.   
Dx:ucnontrolled diabetes .   
Insulin: Yes  
- chlorthalidone (HYGROTON) 25 mg   
tablet  
Take 0.5 tablets by mouth once   
daily.  
- atenolol (TENORMIN) 50 mg tablet  
Take 1 tablet by mouth once daily.  
- potassium chloride 20 mEq TbER  
Take 1 tablet by mouth twice   
daily.  
- glipiZIDE (GLUCOTROL XL) 2.5 mg   
24 hr tablet  
Take 1 tablet by mouth once daily.  
- exemestane (AROMASIN) 25 mg   
tablet  
Take 1 tablet by mouth once daily.  
- lancets (ONE TOUCH DELICA) 33   
gauge misc  
Test blood sugar(s) 2 daily. Dx:   
Type 2 DM - Controlled E11.9   
Insulin: Yes  
- Blood-Glucose Meter monitoring   
kit  
Glucose Meter of Choice - Kit -   
Dx: Type 2 DM - Uncontrolled   
E11.65  
- MV-MN/FOLIC ACID/CALCIUM/VIT K   
(ONE-A-DAY WOMEN'S 50 PLUS ORAL)  
Take 1 tablet by mouth once daily.  
Meds Comments as of 2015:  
Patient only took the Etodolac for   
a few days and noticed no   
difference so she  
quit taking this medication.  
Problem List As Of Date 2024   
Noted Resolved  
Morbid obesity with BMI of   
40.0-44.9, adult (HC*2014  
Uterus disorder [N85.9] 2014  
Hypertension [I10] 2014  
Hypercholesterolemia [E78.00]   
2014  
Endometrial hyperplasia without   
atypia, complex*2014  
Uterine prolapse without mention   
of vaginal wal*2014  
Rectocele [N81.6] 2014  
Complex endometrial hyperplasia   
with atypia [N8*2014  
Follow-up examination, following   
other surgery *2015  
Bilateral low back pain with   
left-sided sciatic*2016  
Peroneal tendonitis [M76.70]   
2016  
Controlled diabetes mellitus type   
II without co*2016  
Hyperopia [H52.00] 2016  
Presbyopia [H52.4] 2016  
Benign neoplasm of skin of eyelid   
including can*2016  
Chronic bilateral low back pain   
with left-sided*2016  
Malignant neoplasm of lower-outer   
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  
ER+ UT+ carcinoma of breast (HCC)   
[C50.919, Z17*05/10/2017  
Family history of ovarian cancer   
[Z80.41] 05/10/2017  
GERD (gastroesophageal reflux   
disease) [K21.9]  
Encounter Number: [id number]  
Encounter Status:Closed by KINZA VALLECILLO on 3/8/24               [facility]  
   
                                        2024 Note     HNO ID: [id number]  
Author: MEERA MENCHACA PA-C  
Service: ?  
Author Type: Physician Assistant  
Type: Progress Notes  
Filed: 2024 18:16  
Note Text:  
CC: Patient presents with:  
Same Day Appointment: abdomen   
cramping, left flank pain, pinkish   
when wipes,  
burning at times  
HPI  
Navdeep Guillen is a 63 year old   
female who presents with complaint   
of  
increased frequency, blood in   
urine, and L flank discomfort and   
left abdominal  
discomfort for 1 days. Other   
symptoms include lower back pain   
(right sided) and  
nausea. The patient denies fever,   
vomiting, and vaginal discharge.   
She has  
tried nothing to help to alleviate   
her symptoms. Ms. Guillen has   
history of  
previous UTIs and kidney stones.  
She called her surgeon, Dr. Patricio (gen surg at [facility]) because   
of her hx of  
diverticulitis, for which she just   
had a bout and was txed end of   
January.  
REVIEW OF SYSTEMS  
GI: Positive for abdominal   
discomfort looser stools, Denies   
any melena or  
hematochezia  
All other systems negative.  
PAST MEDICAL HISTORY  
Diagnosis Date  
Abnormal mammogram 2021  
Achilles tendon pain 2015  
Ankle weakness 2015  
Chronic pain of left ankle   
12/15/2016  
Colon abnormality 2014  
Thickening of the ascending colon   
seen on the recent CT scan.   
Patient has had a  
colonoscopy around 4 years ago.   
Records were obtained for when   
they were done, 4  
years ago , in [facility].   
her colonoscopy was completely   
normal, no  
thickening, and the biopsy too was   
normal except for lymphoid   
aggregates.  
Diverticulitis   
Treated by Dr. Patricio at [facility]  
DJD (degenerative joint disease),   
ankle and foot 2016  
DM (diabetes mellitus) (HCC)  
GERD (gastroesophageal reflux   
disease)  
Gross hematuria 05/15/2014  
2014, had hematuria, investigated   
extensively along with cytoscopy,   
a stone  
was found but no signs of any   
malignancy.  
Heel pain, chronic 12/15/2016  
Hepatic steatosis 10/03/2017  
Hiatal hernia 10/03/2017  
Hypertension  
Insomnia  
Kidney stone 05/15/2014  
Morbid obesity (HCC)  
Nephrolithiasis  
Neuritis 2016  
Renal lesion 05/15/2014  
S/P Achilles tendon repair   
2015 sx done  
PAST SURGICAL HISTORY  
Procedure Laterality Date  
BREAST LUMPECTOMY HX Right 2017  
BX BREAST W/DEVICE 1ST LESION   
STEREOTACTIC GUID Right 2021  
CHOLECYSTECTOMY 2011  
gallbladder removal  
COLONOSCOPY   
CT ABDOMEN 2014  
PAST SURGICAL HISTORY OF   
2014  
[facility] hysteroscopy  
PAST SURGICAL HISTORY OF Left   
2015  
Left foot surgery  
S PK LAVH [id number] 10/16/2014  
ALLERGIES Silvadene [Silver   
Sulfadiazine] and Sulfa   
(Sulfonamide Antibiotics)  
MEDICATIONS  
ibuprofen (MOTRIN) 800 mg tablet   
Take 1 tablet by mouth every 8   
hours as needed  
for pain. Take with food.  
metFORMIN ER (GLUCOPHAGE XR) 500   
mg 24 hr tablet Take 1 tablet by   
mouth daily  
with breakfast.  
blood sugar diagnostic (BLOOD   
GLUCOSE TEST) test strip Test   
blood sugars  
2daily. Dx:ucnontrolled diabetes .   
Insulin: Yes  
chlorthalidone (HYGROTON) 25 mg   
tablet Take 0.5 tablets by mouth   
once daily.  
atenolol (TENORMIN) 50 mg tablet   
Take 1 tablet by mouth once daily.  
potassium chloride 20 mEq TbER   
Take 1 tablet by mouth twice   
daily.  
glipiZIDE (GLUCOTROL XL) 2.5 mg 24   
hr tablet Take 1 tablet by mouth   
once daily.  
exemestane (AROMASIN) 25 mg tablet   
Take 1 tablet by mouth once daily.  
azithromycin (ZITHROMAX Z-ALLAN) 250   
mg tablet TAKE 2 TABS ON THE FIRST   
DAY, THEN  
ONE TAB DAILY FOR 4 DAYS.  
lancets (ONE TOUCH DELICA) 33   
gauge misc Test blood sugar(s) 2   
daily. Dx: Type  
2 DM - Controlled E11.9 Insulin:   
Yes  
Blood-Glucose Meter monitoring kit   
Glucose Meter of Choice - Kit -   
Dx: Type 2  
DM - Uncontrolled E11.65  
MV-MN/FOLIC ACID/CALCIUM/VIT K   
(ONE-A-DAY WOMEN'S 50 PLUS ORAL)   
Take 1 tablet  
by mouth once daily.  
FAMILY HISTORY  
Problem Relation Age of Onset  
Ovarian cancer Mother 39  
Heart Father  
Hypertension Father  
Prostate Cancer Father 78  
other (kidney stones) Brother  
Hypertension Brother  
Diabetes Brother  
Seizures Son  
Breast Cancer Other 55  
Double first cousin (daughter of   
mother's sister and father's   
brother)  
Social History  
Tobacco Use  
Smoking status: Former  
Packs/day: 1.00  
Years: 10.00  
Additional pack years: 0.00  
Total pack years: 10.00  
Types: Cigarettes  
Quit date: 5/15/2007  
Years since quittin.8  
Smokeless tobacco: Never  
Vaping Use  
Vaping Use: Never used  
Substance Use Topics  
Alcohol use: No  
Drug use: Not Currently  
Comment: marijuana for insomnia -   
currently not using  
PHYSICAL EXAM  
Pulse 73   Temp 37.1 ?C (98.7 ?F)   
  Resp 16   Ht 166.4 cm (5' 5.5 )   
  Wt  
133.4 kg (294 lb)   SpO2 97%   BMI   
48.18 kg/m?  
General Appearance: well   
appearing, in no acute distress,   
alert, morbidly obese  
Pysch: mood and affect broad and   
appropriate  
Skin: Skin color, texture, turgor   
normal for age; no rashes noted  
Eyes: conjunctiva pink and moist,   
no icterus, sclera white,   
non-injected  
Oropharynx: Moist mucous membranes  
Lymph nodes (more content not   
included)...                            [facility]  
   
                                                    2024 History of   
Present illness Narrative               Formatting of this note is   
different from the original.  
CC: Patient presents with:  
Same Day Appointment: abdomen   
cramping, left flank pain, pinkish   
when wipes, burning at times  
  
HPI  
Navdeep Guillen is a 63 year old   
female who presents with complaint   
of increased frequency, blood in   
urine, and L flank discomfort and   
left abdominal discomfort for 1   
days. Other symptoms include lower   
back pain (right sided) and   
nausea. The patient denies fever,   
vomiting, and vaginal discharge.   
She has tried nothing to help to   
alleviate her symptoms. Ms. Guillen   
has history of previous UTIs and   
kidney stones.  
  
She called her surgeon, Dr. Patricio (gen surg at [facility]) because   
of her hx of diverticulitis, for   
which she just had a bout and was   
txed end of January.  
  
REVIEW OF SYSTEMS  
GI: Positive for abdominal   
discomfort looser stools, Denies   
any melena or hematochezia  
All other systems negative.  
  
PAST MEDICAL HISTORY  
Diagnosis Date  
Abnormal mammogram 2021  
Achilles tendon pain 2015  
Ankle weakness 2015  
Chronic pain of left ankle   
12/15/2016  
Colon abnormality 2014  
Thickening of the ascending colon   
seen on the recent CT scan.   
Patient has had a colonoscopy   
around 4 years ago. Records were   
obtained for when they were done,   
4 years ago , in [facility]. her colonoscopy was   
completely normal, no thickening,   
and the biopsy too was normal   
except for lymphoid aggregates.  
Diverticulitis   
Treated by Dr. Patricio at [facility]  
DJD (degenerative joint disease),   
ankle and foot 2016  
DM (diabetes mellitus) (HCC)  
GERD (gastroesophageal reflux   
disease)  
Gross hematuria 05/15/2014  
2014, had hematuria, investigated   
extensively along with cytoscopy,   
a stone was found but no signs of   
any malignancy.  
Heel pain, chronic 12/15/2016  
Hepatic steatosis 10/03/2017  
Hiatal hernia 10/03/2017  
Hypertension  
Insomnia  
Kidney stone 05/15/2014  
Morbid obesity (HCC)  
Nephrolithiasis  
Neuritis 2016  
Renal lesion 05/15/2014  
S/P Achilles tendon repair   
2015 sx done  
  
  
PAST SURGICAL HISTORY  
Procedure Laterality Date  
BREAST LUMPECTOMY HX Right 2017  
BX BREAST W/DEVICE 1ST LESION   
STEREOTACTIC GUID Right 2021  
CHOLECYSTECTOMY   
gallbladder removal  
COLONOSCOPY   
CT ABDOMEN 2014  
PAST SURGICAL HISTORY OF   
2014  
D&C hysteroscopy  
PAST SURGICAL HISTORY OF Left   
2015  
Left foot surgery  
S PK LAVH [id number] 10/16/2014  
  
ALLERGIES Silvadene [Silver   
Sulfadiazine] and Sulfa   
(Sulfonamide Antibiotics)  
  
MEDICATIONS  
ibuprofen (MOTRIN) 800 mg tablet   
Take 1 tablet by mouth every 8   
hours as needed for pain. Take   
with food.  
metFORMIN ER (GLUCOPHAGE XR) 500   
mg 24 hr tablet Take 1 tablet by   
mouth daily with breakfast.  
blood sugar diagnostic (BLOOD   
GLUCOSE TEST) test strip Test   
blood sugars 2daily.   
Dx:ucnontrolled diabetes .   
Insulin: Yes  
chlorthalidone (HYGROTON) 25 mg   
tablet Take 0.5 tablets by mouth   
once daily.  
atenolol (TENORMIN) 50 mg tablet   
Take 1 tablet by mouth once daily.  
potassium chloride 20 mEq TbER   
Take 1 tablet by mouth twice   
daily.  
glipiZIDE (GLUCOTROL XL) 2.5 mg 24   
hr tablet Take 1 tablet by mouth   
once daily.  
exemestane (AROMASIN) 25 mg tablet   
Take 1 tablet by mouth once daily.  
azithromycin (ZITHROMAX Z-ALLAN) 250   
mg tablet TAKE 2 TABS ON THE FIRST   
DAY, THEN ONE TAB DAILY FOR 4   
DAYS.  
lancets (ONE TOUCH DELICA) 33   
gauge misc Test blood sugar(s) 2   
daily. Dx: Type 2 DM - Controlled   
E11.9 Insulin: Yes  
Blood-Glucose Meter monitoring kit   
Glucose Meter of Choice - Kit -   
Dx: Type 2 DM - Uncontrolled   
E11.65  
MV-MN/FOLIC ACID/CALCIUM/VIT K   
(ONE-A-DAY WOMEN'S 50 PLUS ORAL)   
Take 1 tablet by mouth once daily.  
  
  
FAMILY HISTORY  
Problem Relation Age of Onset  
Ovarian cancer Mother 39  
Heart Father  
Hypertension Father  
Prostate Cancer Father 78  
other (kidney stones) Brother  
Hypertension Brother  
Diabetes Brother  
Seizures Son  
Breast Cancer Other 55  
Double first cousin (daughter of   
mother's sister and father's   
brother)  
  
Social History  
  
Tobacco Use  
Smoking status: Former  
Packs/day: 1.00  
Years: 10.00  
Additional pack years: 0.00  
Total pack years: 10.00  
Types: Cigarettes  
Quit date: 5/15/2007  
Years since quittin.8  
Smokeless tobacco: Never  
Vaping Use  
Vaping Use: Never used  
Substance Use Topics  
Alcohol use: No  
Drug use: Not Currently  
Comment: marijuana for insomnia -   
currently not using  
  
PHYSICAL EXAM  
Pulse 73   Temp 37.1 C (98.7 F)     
Resp 16   Ht 166.4 cm (5' 5.5 )     
Wt 133.4 kg (294 lb)   SpO2 97%     
BMI 48.18 kg/m  
  
General Appearance: well   
appearing, in no acute distress,   
alert, morbidly obese  
Pysch: mood and affect broad and   
appropriate  
Skin: Skin color, texture, turgor   
normal for age; no rashes noted  
Eyes: conjunctiva pink and moist,   
no icterus, sclera white,   
non-injected  
Oropharynx: Moist mucous membranes  
Lymph nodes: No cervical   
lymphadenopathy  
Lungs: Lungs clear to   
auscultation. No wheezing,   
rhonchi, rales.  
Heart: RRR without murmur, gallop,   
or rubs.  
Abdomen: Protuberant abdomen. Mild   
TTP L periumbilical region,   
extending down into LLQ. Bowel   
sounds normal. No masses,   
organomegaly. No rigidity,   
guarding, or other evidence of   
acute abdomen. No CVA tenderness   
bilaterally. Extremities: No   
deformities, edema, skin   
discoloration, clubbing or   
cyanosis.  
Neurological: Gait normal. No   
focal neurological deficits.   
Sensation grossly intact.  
  
ASSESSMENT/PLAN:  
1. Left flank pain - ICD9: 789.09,   
ICD10: R10.9  
UA findings suggestive of UTI, so   
will go ahead and empirically   
treat w/ anitibiotics (cipro in   
the case UTI is negative, and   
potentially a diverticulitis   
component). Recommend plenty of   
fluids and rest. Will send out for   
culture and adjust treatment if   
necessary pending results  
  
- URINE CULTURE  
  
Prescription instructions reviewed   
with patient as applicable.   
Potential red flag symptoms   
discussed with the patient.   
Reviewed appropriate action plan   
to take if red flag symptoms   
occur. Patient agreeable to   
treatment plan.  
  
Meera Menchaca PA-C  
Electronically signed by Meera Menchaca PA-C at 2024 6:16 PM   
EST  
documented in this encounter            [facility]  
   
                                                    2024 Miscellaneous   
Notes                                   Formatting of this note might be   
different from the original.  
2024  
  
PID: [id number] Navdeep Guillen  
[address] [id number]  
  
Dear Ms. Guillen,  
  
We are pleased to inform you that   
the results of your recent breast   
imaging exam on 2024 are   
normal.  
  
Early detection of cancer is very   
important. We also understand   
recommendations regarding breast   
cancer screening are   
controversial. Please discuss with   
your primary care provider which   
strategy is best for you and   
whether a mammogram is right for   
you.  
  
Your imaging studies and report   
will be kept on file at [facility] as part of your permanent   
medical record and are available   
for your continuing care.  
  
Thank you for allowing us to help   
in meeting your health care needs.  
  
Sincerely,  
  
Dr. Mcdermott  
Interpreting Radiologist  
[facility]  
  
(Normal over 40)  
Electronically signed by   
Coordinator, Mammography at   
2024 10:52 AM EST  
documented in this encounter            [facility]  
   
                                                    2024 History of   
Present illness Narrative               Formatting of this note might be   
different from the original.  
Radiology Service Progress Note  
  
PATIENT NAME: Navdeep Guillen  
MRN: [id number]  
  
DATE OF SERVICE: 2024  
TIME: 11:01 AM  
PATIENT IDENTITY VERIFICATION   
COMPLETED USING TWO (2)   
IDENTIFIERS: Name and Date of   
Birth confirmed by patient   
verbally.  
FALL SCREENING: Has the patient   
had 2 falls in the last year or 1   
fall with injury or currently   
using an Ambulatory Assistive   
Device (Walker, Cane, Wheelchair,   
Crutches, etc.)? No  
PATIENT GENDER DATA: Female.   
Pregnancy status: Pregnant: No   
Breastfeeding status: NO.  
PATIENT RELEVANT IMPLANT DATA   
REVIEWED: Not Applicable  
PATIENT PRESENTS WITH AN   
IMPLANTABLE OR ATTACHED MEDICAL   
DEVICE: No  
  
RADIOLOGY DEPARTMENT: Mammography  
  
PERIPHERAL IV DATA: Not applicable  
  
SIGNED BY: RT Que(ADAIR)  
2024 11:01 AM  
  
  
Electronically signed by Italia Barfield RT(R) at 2024 11:01   
AM EST  
documented in this encounter            [facility]  
   
                                        2024 Note     HNO ID: [id number]  
Author: ITALIA BARFIELD RT(R)  
Service: ?  
Author Type: Technologist  
Type: Progress Notes  
Filed: 2024 11:01  
Note Text:  
Radiology Service Progress Note  
PATIENT NAME: Navdeep Guillen  
MRN: [id number]  
DATE OF SERVICE: 2024  
TIME: 11:01 AM  
PATIENT IDENTITY VERIFICATION   
COMPLETED USING TWO (2)   
IDENTIFIERS: Name and  
Date of Birth confirmed by patient   
verbally.  
FALL SCREENING: Has the patient   
had 2 falls in the last year or 1   
fall with  
injury or currently using an   
Ambulatory Assistive Device   
(Walker, Cane,  
Wheelchair, Crutches, etc.)? No  
PATIENT GENDER DATA: Female.   
Pregnancy status: Pregnant: No   
Breastfeeding  
status: NO.  
PATIENT RELEVANT IMPLANT DATA   
REVIEWED: Not Applicable  
PATIENT PRESENTS WITH AN   
IMPLANTABLE OR ATTACHED MEDICAL   
DEVICE: No  
RADIOLOGY DEPARTMENT: Mammography  
PERIPHERAL IV DATA: Not applicable  
SIGNED BY: RT Que(R)  
2024 11:01 AM              [facility]  
   
                                                    2024 History of   
Present illness Narrative               Formatting of this note might be   
different from the original.  
Radiology Service Progress Note  
  
PATIENT NAME: Navdeep Guillen  
MRN: [id number]  
  
DATE OF SERVICE: 2024  
TIME: 10:23 AM  
PATIENT IDENTITY VERIFICATION   
COMPLETED USING TWO (2)   
IDENTIFIERS: Name and Date of   
Birth confirmed by patient   
verbally.  
FALL SCREENING: Has the patient   
had 2 falls in the last year or 1   
fall with injury or currently   
using an Ambulatory Assistive   
Device (Walker, Cane, Wheelchair,   
Crutches, etc.)? Yes, Patient High   
Risk for Falls  
What interventions were put in   
place to prevent falls during this   
visit? Increased Observations by   
Caregivers  
PATIENT GENDER DATA: Female.   
Pregnancy status: Pregnant: No   
Breastfeeding status: NO.  
PATIENT RELEVANT IMPLANT DATA   
REVIEWED: Not Applicable  
PATIENT PRESENTS WITH AN   
IMPLANTABLE OR ATTACHED MEDICAL   
DEVICE: No  
  
RADIOLOGY DEPARTMENT: Bone Density  
  
PERIPHERAL IV DATA: Not applicable  
  
SIGNED BY: RT Imelda(R)  
2024 10:23 AM  
  
  
Electronically signed by Billy Knox RT(R) at 2024   
10:34 AM EST  
documented in this encounter            [facility]  
   
                                        2024 Note     HNO ID: [id number]  
Author: BILLY KNOX RT(ADAIR)  
Service: ?  
Author Type: Technologist  
Type: Progress Notes  
Filed: 2024 10:34  
Note Text:  
Radiology Service Progress Note  
PATIENT NAME: Navdeep Guillen  
MRN: [id number]  
DATE OF SERVICE: 2024  
TIME: 10:23 AM  
PATIENT IDENTITY VERIFICATION   
COMPLETED USING TWO (2)   
IDENTIFIERS: Name and  
Date of Birth confirmed by patient   
verbally.  
FALL SCREENING: Has the patient   
had 2 falls in the last year or 1   
fall with  
injury or currently using an   
Ambulatory Assistive Device   
(Walker, Cane,  
Wheelchair, Crutches, etc.)? Yes,   
Patient High Risk for Falls  
What interventions were put in   
place to prevent falls during this   
visit?  
Increased Observations by   
Caregivers  
PATIENT GENDER DATA: Female.   
Pregnancy status: Pregnant: No   
Breastfeeding  
status: NO.  
PATIENT RELEVANT IMPLANT DATA   
REVIEWED: Not Applicable  
PATIENT PRESENTS WITH AN   
IMPLANTABLE OR ATTACHED MEDICAL   
DEVICE: No  
RADIOLOGY DEPARTMENT: Bone Density  
PERIPHERAL IV DATA: Not applicable  
SIGNED BY: RT Imelda(R)  
2024 10:23 AM              [facility]  
   
                                                    2024 Miscellaneous   
Notes                                   Formatting of this note might be   
different from the original.  
Pt returned call and given Sherly An's instructions and   
recommendations. Pt states she   
will call Dr. Patricio's office to   
set up her follow up. She was   
waiting to hear back from Sherly   
about who she should f/u with.   
Notified to call us back to repeat   
labs when feeling better  
Electronically signed by Amanuel Abreu RN at 2024 10:50 AM   
EST  
Formatting of this note might be   
different from the original.  
Called and left a voicemail for   
the Patient to call back and ask   
for a nurse to receive the   
providers message.  
  
Olimpia Jean RN  
  
Electronically signed by Olimpia Jean RN at 2024 12:48 PM   
EST  
Formatting of this note might be   
different from the original.  
If patient is following closely   
with general surgery then no need   
to follow up with me. Her kidney   
function was slightly decreased in   
ER so needs rechecked after after   
she is feeling better and   
diverticulitis has resolved.  
Thank you  
Sherly An APRN.CNP  
  
Electronically signed by Sherly An APRN.CNP at 2024 12:29   
PM EST  
Formatting of this note might be   
different from the original.  
Records printed and placed on   
provider desk for review.  
Electronically signed by Manisha Jesus Ma at 2024 10:44 AM EST  
Formatting of this note might be   
different from the original.  
Please get [place] ER records so I   
can review them  
Thank you  
Sherly An APRN.CNP  
  
Electronically signed by Sherly An APRN.CNP at 2024 8:40   
AM EST  
Formatting of this note is   
different from the original.  
Patient reports she went to [facility] ER   
on  and was seen for   
diverticulitis.  
  
She was prescribed:  
Amoxicillin-Pot Clavulanate Active   
1 TABLET PO TWICE A DAY  12:00am  
  
Pt states she has not improved   
much because she has only taken   
the antibiotics for 2 days so far.  
  
Patient was recommended to follow   
up with PCP Office and Dr. Patricio. Pt states she sees Dr. Patricio for her diverticulitis.  
  
Pt asking if she should follow-up   
with PCP or not? She does not wish   
to follow up with both PCP and Dr. Patricio, if not necessary.  
  
Please advise patient, if able.   
[phone]  
  
Thank you.  
Electronically signed by Joie Hardwick RN at 2024 2:33 PM   
EST  
documented in this encounter            [facility]  
   
                                        2023 Note     HNO ID: [id number]  
Author: Sherly An APRN.CNP  
Service: ?  
Author Type: Nurse Practitioner  
Type: Progress Notes  
Filed: 2023 4:01 PM  
Note Text:  
CC: Patient presents with:  
F/U 6 months  
HPI  
Navdeep ADAMS Guillen is a 63 year old   
female who presents today for   
routine  
follow up.  
DIABETES MELLITUS: Ms. Guillen   
denies excessive thirst or   
increased  
frequency of urination, chest pain   
or dyspnea , numbness, tingling or   
pain  
in extremities, new or unusual   
visual symptoms, low   
sugar/hypoglycemic  
reactions, weight loss/gain,   
lightheadedness/dizziness. Follows   
a diabetic  
diet most of the time. She is   
compliant with medication(s) and   
is  
tolerating med(s) without any side   
effects. She reports checking her  
glucose on a twice a day schedule   
with sugars in the fasting   
120s-140s and  
postprandial 70s-90s range.   
Patient's last HgA1C was  
Hemoglobin A1C (%)  
Date Value  
2023 6.1  
2023 6.8  
07/15/2020 7.7  
2020 7.6  
Hemoglobin A1C (POCT) (%)  
Date Value  
10/26/2021 7.1  
)  
Last Ophthalmology exam was over a   
year ago.  
HTN: Ms. Guillen indicates that she   
is feeling well and denies any  
symptoms referable to elevated   
blood pressure. Specifically   
denies  
headache, chest pain,   
palpitations, dyspnea, and   
peripheral edema.  
Patient denies any side effects of   
her medication(s) and is compliant   
with  
their regimen. She does check BP's   
away from this office with average  
BP's in the 110s-120s/70s-80   
range. Navdeep gets sporadic   
irregular  
exercise with walking. She watches   
her diet for sodium, low fat and   
low  
cholesterol some of the time.  
Last 3 Encounter BP Readings:  
Date: BP:  
2023 122/76  
10/02/2023 103/67  
2023 118/72  
Obesity: Has difficulty with   
exercise because of arthritic   
pain. Will  
every now and then take an   
ibuprofen to help when she is   
unable to sleep.  
Does not use more than once a week   
or less.  
REVIEW OF SYSTEMS  
General: no fevers, no chills, no   
night sweats, no recurrent   
infections,  
no change in appetite, no change   
in energy, and no significant   
changes in  
weight  
Respiratory: no cough, no   
wheezing, no shortness of breath,   
no hemoptysis  
Cardiovascular: no chest pain, no   
chest pressure, no palpitations,   
and no  
swelling  
Endocrine: no fatigue, no   
polyuria, no polyphagia, and no   
polydipsia  
Neurologic: No headache, weakness,   
numbness, dizziness, memory loss,  
syncope.  
PAST MEDICAL HISTORY  
Diagnosis Date  
Abnormal mammogram 2021  
Achilles tendon pain 2015  
Ankle weakness 2015  
Chronic pain of left ankle   
12/15/2016  
Colon abnormality 2014  
Thickening of the ascending colon   
seen on the recent CT scan.   
Patient has  
had a colonoscopy around 4 years   
ago. Records were obtained for   
when they  
were done, 4 years ago , in   
[facility]. her   
colonoscopy was  
completely normal, no thickening,   
and the biopsy too was normal   
except for  
lymphoid aggregates.  
Diverticulitis   
Treated by Dr. Patricio at [facility]  
DJD (degenerative joint disease),   
ankle and foot 2016  
DM (diabetes mellitus) (HCC)  
GERD (gastroesophageal reflux   
disease)  
Gross hematuria 05/15/2014  
2014, had hematuria, investigated   
extensively along with cytoscopy,   
a  
stone was found but no signs of   
any malignancy.  
Heel pain, chronic 12/15/2016  
Hepatic steatosis 10/03/2017  
Hiatal hernia 10/03/2017  
Hypertension  
Insomnia  
Kidney stone 05/15/2014  
Morbid obesity (HCC)  
Nephrolithiasis  
Neuritis 2016  
Renal lesion 05/15/2014  
S/P Achilles tendon repair   
2015 sx done  
PAST SURGICAL HISTORY  
Procedure Laterality Date  
BREAST LUMPECTOMY HX Right   
BX BREAST W/DEVICE 1ST LESION   
STEREOTACTIC GUID Right 2021  
CHOLECYSTECTOMY 2011  
gallbladder removal  
COLONOSCOPY   
CT ABDOMEN 2014  
PAST SURGICAL HISTORY OF   
2014  
[facility] hysteroscopy  
PAST SURGICAL HISTORY OF Left   
2015  
Left foot surgery  
S PK LAVH [id number] 10/16/2014  
ALLERGIES Silvadene [Silver   
Sulfadiazine] and Sulfa   
(Sulfonamide  
Antibiotics)  
MEDICATIONS  
metFORMIN ER (GLUCOPHAGE XR) 500   
mg 24 hr tablet Take 1 tablet by   
mouth  
daily with breakfast.  
blood sugar diagnostic (BLOOD   
GLUCOSE TEST) test strip Test   
blood sugars  
2daily. Dx:ucnontrolled diabetes .   
Insulin: Yes  
chlorthalidone (HYGROTON) 25 mg   
tablet Take 0.5 tablets by mouth   
once  
daily.  
atenolol (TENORMIN) 50 mg tablet   
Take 1 tablet by mouth once daily.  
potassium chloride 20 mEq TbER   
Take 1 tablet by mouth twice   
daily.  
glipiZIDE (GLUCOTROL XL) 2.5 mg 24   
hr tablet Take 1 tablet by mouth   
once  
daily.  
exemestane (AROMASIN) 25 mg tablet   
Take 1 tablet by mouth once daily.  
ibuprofen (MOTRIN) 800 mg tablet   
Take 1 tablet by mouth every 8   
hours as  
needed for pain. Take with food.  
lancets (ONE TOUCH DELICA) 33   
gauge misc Test blood sugar(s) 2   
daily.  
Dx: Type 2 DM - Controlled E11.9   
Insulin: Yes  
Blood-Glucose Meter monitoring kit   
Glucose Meter of Choice - Kit -   
Dx:  
Type 2 DM - Uncontrolled E11.65  
MV-MN/FOLIC A (more content not   
included)...                            [facility]  
   
                                                    2023 History of   
Present illness Narrative               Formatting of this note is   
different from the original.  
CC: Patient presents with:  
F/U 6 months  
  
HPI  
Navdeep Guillen is a 63 year old   
female who presents today for   
routine follow up.  
  
DIABETES MELLITUS: Ms. Guillen   
denies excessive thirst or   
increased frequency of urination,   
chest pain or dyspnea , numbness,   
tingling or pain in extremities,   
new or unusual visual symptoms,   
low sugar/hypoglycemic reactions,   
weight loss/gain,   
lightheadedness/dizziness. Follows   
a diabetic diet most of the time.   
She is compliant with   
medication(s) and is tolerating   
med(s) without any side effects.   
She reports checking her glucose   
on a twice a day schedule with   
sugars in the fasting 120s-140s   
and postprandial 70s-90s range.   
Patient's last HgA1C was  
Hemoglobin A1C (%)  
Date Value  
2023 6.1  
2023 6.8  
07/15/2020 7.7  
2020 7.6  
  
Hemoglobin A1C (POCT) (%)  
Date Value  
10/26/2021 7.1  
)  
Last Ophthalmology exam was over a   
year ago.  
  
HTN: Ms. Guillen indicates that she   
is feeling well and denies any   
symptoms referable to elevated   
blood pressure. Specifically   
denies headache, chest pain,   
palpitations, dyspnea, and   
peripheral edema. Patient denies   
any side effects of her   
medication(s) and is compliant   
with their regimen. She does check   
BP's away from this office with   
average BP's in the   
110s-120s/70s-80 range. Navdeep   
gets sporadic irregular exercise   
with walking. She watches her diet   
for sodium, low fat and low   
cholesterol some of the time.  
Last 3 Encounter BP Readings:  
Date: BP:  
2023 122/76  
10/02/2023 103/67  
2023 118/72  
  
Obesity: Has difficulty with   
exercise because of arthritic   
pain. Will every now and then take   
an ibuprofen to help when she is   
unable to sleep. Does not use more   
than once a week or less.  
  
REVIEW OF SYSTEMS  
General: no fevers, no chills, no   
night sweats, no recurrent   
infections, no change in appetite,   
no change in energy, and no   
significant changes in weight  
Respiratory: no cough, no   
wheezing, no shortness of breath,   
no hemoptysis  
Cardiovascular: no chest pain, no   
chest pressure, no palpitations,   
and no swelling  
Endocrine: no fatigue, no   
polyuria, no polyphagia, and no   
polydipsia  
Neurologic: No headache, weakness,   
numbness, dizziness, memory loss,   
syncope.  
  
PAST MEDICAL HISTORY  
Diagnosis Date  
Abnormal mammogram 2021  
Achilles tendon pain 2015  
Ankle weakness 2015  
Chronic pain of left ankle   
12/15/2016  
Colon abnormality 2014  
Thickening of the ascending colon   
seen on the recent CT scan.   
Patient has had a colonoscopy   
around 4 years ago. Records were   
obtained for when they were done,   
4 years ago , in [facility]. her colonoscopy was   
completely normal, no thickening,   
and the biopsy too was normal   
except for lymphoid aggregates.  
Diverticulitis   
Treated by Dr. Patricio at [facility]  
DJD (degenerative joint disease),   
ankle and foot 2016  
DM (diabetes mellitus) (HCC)  
GERD (gastroesophageal reflux   
disease)  
Gross hematuria 05/15/2014  
2014, had hematuria, investigated   
extensively along with cytoscopy,   
a stone was found but no signs of   
any malignancy.  
Heel pain, chronic 12/15/2016  
Hepatic steatosis 10/03/2017  
Hiatal hernia 10/03/2017  
Hypertension  
Insomnia  
Kidney stone 05/15/2014  
Morbid obesity (HCC)  
Nephrolithiasis  
Neuritis 2016  
Renal lesion 05/15/2014  
S/P Achilles tendon repair   
2015 sx done  
  
  
PAST SURGICAL HISTORY  
Procedure Laterality Date  
BREAST LUMPECTOMY HX Right   
BX BREAST W/DEVICE 1ST LESION   
STEREOTACTIC GUID Right 2021  
CHOLECYSTECTOMY   
gallbladder removal  
COLONOSCOPY   
CT ABDOMEN 2014  
PAST SURGICAL HISTORY OF   
2014  
[facility] hysteroscopy  
PAST SURGICAL HISTORY OF Left   
2015  
Left foot surgery  
S PK LAVH [id number] 10/16/2014  
  
ALLERGIES Silvadene [Silver   
Sulfadiazine] and Sulfa   
(Sulfonamide Antibiotics)  
  
MEDICATIONS  
metFORMIN ER (GLUCOPHAGE XR) 500   
mg 24 hr tablet Take 1 tablet by   
mouth daily with breakfast.  
blood sugar diagnostic (BLOOD   
GLUCOSE TEST) test strip Test   
blood sugars 2daily.   
Dx:ucnontrolled diabetes .   
Insulin: Yes  
chlorthalidone (HYGROTON) 25 mg   
tablet Take 0.5 tablets by mouth   
once daily.  
atenolol (TENORMIN) 50 mg tablet   
Take 1 tablet by mouth once daily.  
potassium chloride 20 mEq TbER   
Take 1 tablet by mouth twice   
daily.  
glipiZIDE (GLUCOTROL XL) 2.5 mg 24   
hr tablet Take 1 tablet by mouth   
once daily.  
exemestane (AROMASIN) 25 mg tablet   
Take 1 tablet by mouth once daily.  
ibuprofen (MOTRIN) 800 mg tablet   
Take 1 tablet by mouth every 8   
hours as needed for pain. Take   
with food.  
lancets (ONE TOUCH DELICA) 33   
gauge misc Test blood sugar(s) 2   
daily. Dx: Type 2 DM - Controlled   
E11.9 Insulin: Yes  
Blood-Glucose Meter monitoring kit   
Glucose Meter of Choice - Kit -   
Dx: Type 2 DM - Uncontrolled   
E11.65  
MV-MN/FOLIC ACID/CALCIUM/VIT K   
(ONE-A-DAY WOMEN'S 50 PLUS ORAL)   
Take 1 tablet by mouth once daily.  
  
azithromycin (ZITHROMAX Z-ALLAN) 250   
mg tablet TAKE 2 TABS ON THE FIRST   
DAY, THEN ONE TAB DAILY FOR 4   
DAYS.  
  
FAMILY HISTORY  
Problem Relation Age of Onset  
Ovarian cancer Mother 39  
Heart Father  
Hypertension Father  
Prostate Cancer Father 78  
other (kidney stones) Brother  
Hypertension Brother  
Diabetes Brother  
Seizures Son  
Breast Cancer Other 55  
Double first cousin (daughter of   
mother's sister and father's   
brother)  
  
Social History  
  
Tobacco Use  
Smoking status: Former  
Packs/day: 1.00  
Years: 10.00  
Additional pack years: 0.00  
Total pack years: 10.00  
Types: Cigarettes  
Quit date: 5/15/2007  
Years since quittin.5  
Smokeless tobacco: Never  
Vaping Use  
Vaping Use: Never used  
Substance Use Topics  
Alcohol use: No  
Drug use: Not Currently  
Comment: marijuana for insomnia -   
currently not using  
  
PHYSICAL EXAM  
/76 (BP Site: Left Arm, BP   
Position: Sitting, BP Cuff Size:   
Regular Adult)   Pulse 68   Resp   
16   Wt (!) 137.5 kg (303 lb 3.2   
oz)   SpO2 97%   BMI 49.69 kg/m  
General Appearance: well   
appearing, in no acute distress,   
alert  
Pysch: mood and affect broad and   
appropriate  
Skin: Skin color, texture, turgor   
normal for age;  
Eyes: conjunctiva pink and moist,   
no icterus, sclera white,   
non-injected  
Neck: Thyroid normal size and   
symmetric without palpable   
nodules, Neck supple, No   
adenopathy  
Lymph nodes: No cervical   
lymphadenopathy and No   
supraclavicular lymphadenopathy  
Lungs: Lungs clear to   
auscultation. No wheezing,   
rhonchi, rales.  
Heart: RRR without murmur, gallop,   
or rubs. No ectopy  
  
Health maintenance reviewed with   
patient:  
HIV Screening Never done  
Shingrix Vaccine(1 of 2) Never   
done  
Hepatitis B Vaccine(1 of 3 - Risk   
3-dose series) Never done  
RSV Vaccine(1 - 1-dose 60+ series)   
Never done  
Dilated Retinal Exam due on   
2022  
Urine Albumin:Creatinine Ratio due   
on 2023  
Bone Density Screening due on   
2023  
Advance Directive Discussion Never   
done  
Influenza Vaccine(1) due on   
2023  
Diabetic Foot Exam due on   
2023  
HbA1C due on 10/03/2023  
Mammogram Screening due on   
2024  
Covid-19 Vaccine(1) due on   
2024  
Pneumococcal Vaccine: 65+(1 - PCV)   
due on 2024  
LDL Cholesterol due on 2024  
Annual PCP Team Chronic Disease   
Visit due on 2024  
DTaP,Tdap,Td Vaccine(2 - Td or   
Tdap) due on 2024  
BP Controlled (<130/80) due on   
10/02/2024  
Colorectal Cancer Screening due on   
2028  
Depression Assessment Completed  
Hepatitis C Screening Completed  
HPV Vaccine Aged Out  
Pap Testing Discontinued  
  
DATA REVIEWED: No new labs  
  
ASSESSMENT/PLAN:  
1. Controlled type 2 diabetes   
mellitus without complication,   
without long-term current use of   
insulin (HCC) - ICD9: 250.00,   
ICD10: E11.9 (primary diagnosis)  
- Control undetermined, due for   
labs  
- Continue current medications  
- Blood glucose monitoring on a   
once daily schedule  
- Counseled on healthy diet and   
regular exercise  
- Discussed need for and benefit   
of weight loss. BMI 49.69 kg/(m^2)  
- CONSULT TO OPHTHALMOLOGY  
- ALBUMIN/CREAT RATIO RND UR  
- HGB A1C  
- BASIC METABOLIC PNL  
- CBC  
- LIPID PANEL BASIC  
- CBC  
- COMP METABOLIC PANEL  
- HGB A1C  
  
2. Primary hypertension - ICD9:   
401.9, ICD10: I10  
- Controlled  
- Continue current medications  
- Recommend home blood pressure   
monitoring, to bring results to   
next visit  
- Encouraged sodium restriction,   
DASH or Mediterranean diet  
- Recommend regular aerobic   
exercise  
- CBC  
- LIPID PANEL BASIC  
- CBC  
- COMP METABOLIC PANEL  
  
3. Morbid obesity with BMI of   
40.0-44.9, adult (HCC) - ICD9:   
278.01, V85.41, ICD10: E66.01,   
Z68.41  
Weight increasing  
- Behavioral intervention  
- low fat, low sugar, well   
portioned balanced diet  
- at least 30min of aerobic   
exercise 5 days a week  
  
Prescription instructions reviewed   
with patient as applicable.   
Potential red flag symptoms   
discussed with the patient.   
Reviewed appropriate action plan   
to take if red flag symptoms   
occur. Patient agreeable to   
treatment plan.  
Sherly An APRN.CNP  
Electronically signed by Sherly An APRN.CNP at 2023 4:01   
PM EST  
documented in this encounter            [facility]  
   
                                                    2023 Miscellaneous   
Notes                                   Formatting of this note is   
different from the original.  
Patient has been identified by   
name and date of birth: No  
Patient phones for refill(s):  
Requested Prescriptions  
  
Pending Prescriptions Disp Refills  
metFORMIN ER (GLUCOPHAGE XR) 500   
mg 24 hr tablet 180 tablet 3  
Sig: Take 1 tablet by mouth daily   
with breakfast.  
blood sugar diagnostic (BLOOD   
GLUCOSE TEST) test strip 50 Strip   
11  
Sig: Test blood sugars 2daily.   
Dx:ucnontrolled diabetes .   
Insulin: Yes  
chlorthalidone (HYGROTON) 25 mg   
tablet 45 tablet 3  
Sig: Take 0.5 tablets by mouth   
once daily.  
atenolol (TENORMIN) 50 mg tablet   
90 tablet 3  
Sig: Take 1 tablet by mouth once   
daily.  
potassium chloride 20 mEq TbER 180   
tablet 1  
Sig: Take 1 tablet by mouth twice   
daily.  
glipiZIDE XL (GLUCOTROL XL) 2.5 mg   
24 hr tablet 90 tablet 3  
Sig: Take 1 tablet by mouth once   
daily.  
  
Date of last office visit in   
primary care: 23  
Last 2 Encounter Wt Readings:  
Date: Wt:  
2023 132.9 kg (293 lb)  
2023 132 kg (291 lb)  
Previous labs/tests for   
medication: Diabetes:  
Hemoglobin A1C (%)  
Date Value  
2023 6.1  
2023 6.8  
07/15/2020 7.7  
2020 7.6  
  
Hemoglobin A1C (POCT) (%)  
Date Value  
10/26/2021 7.1  
  
Blood Pressure:  
BUN (mg/dL)  
Date Value  
2023 10  
2022 15  
  
Sodium (mmol/L)  
Date Value  
2023 139  
2022 136  
Last 1 Encounter BP Readings:  
Date: BP:  
2023 118/72  
Please advise. Thank you. Kellie Ahmadi LPN  
  
Electronically signed by Kellie Ahmadi LPN at 2023 8:14 AM EDT  
documented in this encounter            [facility]  
   
                                                    2023 Miscellaneous   
Notes                                   Formatting of this note is   
different from the original.  
Patient has been identified by   
name and date of birth: No  
Patient phones for refill(s):  
Requested Prescriptions  
  
Pending Prescriptions Disp Refills  
glipiZIDE XL (GLUCOTROL XL) 2.5 mg   
24 hr tablet [Pharmacy Med Name:   
GLIPIZIDE ER 2.5 MG TB24 2.5   
Tablet] 90 tablet 3  
Sig: TAKE 1 TABLET BY MOUTH ONCE   
DAILY.  
  
Date of last office visit in   
primary care: 23  
Last 2 Encounter Wt Readings:  
Date: Wt:  
2023 132.9 kg (293 lb)  
2023 132 kg (291 lb)  
Previous labs/tests for   
medication: Diabetes:  
Hemoglobin A1C (%)  
Date Value  
2023 6.1  
2023 6.8  
07/15/2020 7.7  
2020 7.6  
  
Hemoglobin A1C (POCT) (%)  
Date Value  
10/26/2021 7.1  
  
Please advise. Thank you. Kellie Walls LPN  
  
Electronically signed by Kellie Walls LPN at 2023 10:42 AM   
EDT  
documented in this encounter            [facility]  
   
                                                    2023 Miscellaneous   
Notes                                   Formatting of this note might be   
different from the original.  
[employer] message sent  
Electronically signed by Rosemary Irvin LPN at 2023 2:12   
PM EDT  
Formatting of this note might be   
different from the original.  
Navdeep,  
  
Your urine culture showed mixed   
organisms which is more supportive   
of a contamination. If you have no   
symptoms and the fluconazole is   
helping with the itching I would   
not treat you.  
  
If you do have symptoms then   
please get back to us and we will   
send you an antibiotic.  
  
RegardsCynthia MD  
  
Electronically signed by Cynthia Douglas MD at 2023 1:45 PM   
EDT  
documented in this encounter            [facility]  
   
                                                    2023 History of   
Present illness Narrative               Formatting of this note is   
different from the original.  
Reason for Visit  
Patient presents with:  
F/U 6 months  
  
Navdeep Guillen is a 63 year old   
female who presents here today for   
Above Complaints..  
  
Health Maintenance  
PNEUMOCOCCAL: 65+(1 - PCV)  
HIV SCREENING  
SHINGRIX VACCINE(1 of 2)  
COLORECTAL CANCER SCREENING  
DILATED RETINAL EXAM  
DEPRESSION ASSESSMENT  
URINE ALBUMIN:CREATININE RATIO  
BONE DENSITY  
ADVANCE DIRECTIVE DISCUSSION  
  
HPI  
  
Patient is here for chronic   
medical conditions follow-up and   
to date on her current   
diverticulitis condition  
  
Reviewed blood work  
  
She had diverticulitis the end of   
January. Was on abx of a couple   
courses. Patient was in the   
hospital with IV for 3 days and   
the on oral abx , one for uti and   
one for diverticulitis , total of   
3/4 courses of abx and then had   
yeast infection and took diflucan.   
Right now she still has discharge   
from the vagina. She is having a   
colonoscopy with Dr Bocanegra.  
  
Also had another breast cancer   
scare but everything is ok as of   
now.  
  
Sugars well controlled .  
  
Patient has lost 10 pounds of   
weight because of the   
diverticulitis , the low fiber is   
giving her constipation.  
  
HPL: Reviewed test results with   
patient , takes medications   
regularly , does not report side   
effects. Conscious to avoid red   
meats, full fat dairy and its by   
products. Exercising 3 to 5 times   
a week.  
  
HTN: BP controlled today. Checks   
BP at home. Compliant with   
medications. Denies any chest   
pain, palpitations, SOB, swelling   
in the feet. Careful with diet to   
avoid salt, trying to eat more   
fruits and vegetables, exercises   
regularly.  
  
Tsh is normal.  
  
No problem-specific Assessment &   
Plan notes found for this   
encounter.  
  
PAST MEDICAL HISTORY  
Diagnosis Date  
Abnormal mammogram 2021  
Achilles tendon pain 2015  
Ankle weakness 2015  
Chronic pain of left ankle   
12/15/2016  
Colon abnormality 2014  
Thickening of the ascending colon   
seen on the recent CT scan.   
Patient has had a colonoscopy   
around 4 years ago. Records were   
obtained for when they were done,   
4 years ago , in [facility]. her colonoscopy was   
completely normal, no thickening,   
and the biopsy too was normal   
except for lymphoid aggregates.  
Diverticulitis   
Treated by Dr. Patricio at [facility]  
DJD (degenerative joint disease),   
ankle and foot 2016  
DM (diabetes mellitus) (HCC)  
GERD (gastroesophageal reflux   
disease)  
Gross hematuria 05/15/2014  
2014, had hematuria, investigated   
extensively along with cytoscopy,   
a stone was found but no signs of   
any malignancy.  
Heel pain, chronic 12/15/2016  
Hepatic steatosis 10/03/2017  
Hiatal hernia 10/03/2017  
Hypertension  
Insomnia  
Kidney stone 05/15/2014  
Morbid obesity (HCC)  
Nephrolithiasis  
Neuritis 2016  
Renal lesion 05/15/2014  
S/P Achilles tendon repair   
2015 sx done  
  
  
PAST SURGICAL HISTORY  
Procedure Laterality Date  
BREAST LUMPECTOMY HX Right 2017  
BX BREAST W/DEVICE 1ST LESION   
STEREOTACTIC GUID Right 2021  
CHOLECYSTECTOMY   
gallbladder removal  
COLONOSCOPY   
CT ABDOMEN 2014  
PAST SURGICAL HISTORY OF   
2014  
D&C hysteroscopy  
PAST SURGICAL HISTORY OF Left   
2015  
Left foot surgery  
S PK LAVH [id number] 10/16/2014  
  
FAMILY HISTORY  
Problem Relation Age of Onset  
Ovarian cancer Mother 39  
Heart Father  
Hypertension Father  
Prostate Cancer Father 78  
other (kidney stones) Brother  
Hypertension Brother  
Diabetes Brother  
Seizures Son  
Breast Cancer Other 55  
Double first cousin (daughter of   
mother's sister and father's   
brother)  
  
Social History  
  
Tobacco Use  
Smoking status: Former  
Packs/day: 1.00  
Years: 10.00  
Pack years: 10.00  
Types: Cigarettes  
Quit date: 5/15/2007  
Years since quitting: 15.9  
Smokeless tobacco: Never  
Vaping Use  
Vaping Use: Never used  
Substance Use Topics  
Alcohol use: No  
Drug use: Not Currently  
Comment: marijuana for insomnia -   
currently not using  
  
Past medical history,   
appointments, medications,   
allergies reviewed.  
Pertinent Lab/Diagnostic Studies   
are reviewed and discussed today  
  
Current Outpatient Medications:  
potassium chloride 20 mEq TbER  
atenolol (TENORMIN) 50 mg tablet  
exemestane (AROMASIN) 25 mg tablet  
chlorthalidone (HYGROTON) 25 mg   
tablet  
metFORMIN ER (GLUCOPHAGE XR) 500   
mg 24 hr tablet  
glipiZIDE (GLUCOTROL XL) 2.5 mg 24   
hr tablet  
blood sugar diagnostic (BLOOD   
GLUCOSE TEST) test strip  
ibuprofen (MOTRIN) 800 mg tablet  
lancets (ONE TOUCH DELICA) 33   
gauge misc  
Blood-Glucose Meter monitoring kit  
MV-MN/FOLIC ACID/CALCIUM/VIT K   
(ONE-A-DAY WOMEN'S 50 PLUS ORAL)  
fluconazole (DIFLUCAN) 150 mg   
tablet  
ciprofloxacin HCl (CIPRO) 500 mg   
tablet  
metroNIDAZOLE (FLAGYL) 500 mg   
tablet  
ascorbic acid, vitamin C, (VITAMIN   
C) 500 mg tablet  
  
Review of Systems  
CONSTITUTIONAL: No fevers, chills   
night sweats, unintended weight   
loss  
CARDIOVASCULAR: No chest pain,   
dyspnea, palpitations, orthopnea,   
PND, ankle edema.  
PULM: No dyspnea, unexplained   
cough.  
GI: No dysphagia/odynophagia,   
problematic reflux, constipation,   
diarrhea, changes in stool habits,   
hematochezia, melena.  
: No new urinary complaints,   
including dysuria, gross hematuria   
or pyuria.  
NEURO: No new balance problems,   
peripheral weakness/paresthesias   
or numbness of concern.  
  
Physical Exam  
/72 (BP Site: Left Arm, BP   
Position: Sitting, BP Cuff Size:   
Large Adult)   Pulse 64   Resp 16   
  Wt 132.9 kg (293 lb)   SpO2 97%   
  BMI 47.29 kg/m  
General appearance: Well   
appearing, alert, in no acute   
distress, well nourished.  
Skin: Skin color, texture, turgor   
normal, no suspicious rashes or   
lesions  
Head: Normocephalic, no masses,   
lesions, tenderness or   
abnormalities  
Eyes: Anicteric sclera. Pupils are   
equally round and reactive to   
light. Extraocular movements are   
intact.  
Lungs: Lungs clear to   
auscultation. No wheezing,   
rhonchi, rales  
Heart: RRR without murmur, gallop,   
or rubs.  
Extremities: No deformities,   
edema, skin discoloration,   
clubbing or cyanosis. Good   
capillary refill.  
  
ASSESSMENT/PLAN:  
1. Type 2 diabetes mellitus   
without complication, without   
long-term current use of insulin   
(HCC) - ICD9: 250.00, ICD10: E11.9   
(primary diagnosis)  
  
- IBUPROFEN 800 MG TABLET  
- ALBUMIN/CREAT RATIO RND UR  
- DEPRESSION SCREENING/ASSESSMENT  
  
2. Screening for osteoporosis -   
ICD9: V82.81, ICD10: Z13.820  
  
- DXA-AXIAL SKELETON  
  
3. Asymptomatic menopause - ICD9:   
V49.81, ICD10: Z78.0  
  
4. Primary hypertension - ICD9:   
401.9, ICD10: I10  
- good control  
- Recommended regular aerobic   
exercise.  
- Recommend home blood pressure   
monitoring, to bring results in on   
next visit  
- Goal of BP <130/80  
  
5. Hypercholesterolemia - ICD9:   
272.0, ICD10: E78.00  
  
6. Diverticulitis - ICD9: 562.11,   
ICD10: K57.92  
We will see what the colonoscopy   
has to say  
  
7. Dysuria - ICD9: 788.1, ICD10:   
R30.0  
acute  
- Patient education for prevention   
given  
- URINE CULTURE  
  
Cynthia Douglas MD  
  
  
Electronically signed by Cynthia Douglas MD at 2023 1:42 PM   
EDT  
documented in this encounter            [facility]  
   
                                                    2023 History of   
Present illness Narrative               Formatting of this note is   
different from the original.  
TELEPHONE FOLLOW-UP ENCOUNTER  
  
Navdeep Guillen [id number]   
has requested a telemedicine   
follow-up visit. Navdeep Guillen   
verbalized informed consent to   
proceed with the telemedicine   
follow-up visit. Navdeep Guillen   
was informed that the details of   
this telephone visit would be   
recorded as part of their   
electronic medical record.  
  
I had a telephone visit with Ms. Guillen today for follow up of   
right stereotactic breast biopsy   
done at [facility] on 3/28/2023.  
Pathology reveals  fat necrosis,   
focal clustered banal   
microcalcifications, no evidence   
of malignancy   
Patient states that she has no   
problems from her biopsy.  
  
PAST MEDICAL HISTORY  
Diagnosis Date  
Abnormal mammogram 2021  
Achilles tendon pain 2015  
Ankle weakness 2015  
Chronic pain of left ankle   
12/15/2016  
Colon abnormality 2014  
Thickening of the ascending colon   
seen on the recent CT scan.   
Patient has had a colonoscopy   
around 4 years ago. Records were   
obtained for when they were done,   
4 years ago , in [facility]. her colonoscopy was   
completely normal, no thickening,   
and the biopsy too was normal   
except for lymphoid aggregates.  
Diverticulitis   
Treated by Dr. Patricio at [facility]  
DJD (degenerative joint disease),   
ankle and foot 2016  
DM (diabetes mellitus) (HCC)  
GERD (gastroesophageal reflux   
disease)  
Gross hematuria 05/15/2014  
2014, had hematuria, investigated   
extensively along with cytoscopy,   
a stone was found but no signs of   
any malignancy.  
Heel pain, chronic 12/15/2016  
Hepatic steatosis 10/03/2017  
Hiatal hernia 10/03/2017  
Hypertension  
Insomnia  
Kidney stone 05/15/2014  
Morbid obesity (HCC)  
Nephrolithiasis  
Neuritis 2016  
Renal lesion 05/15/2014  
S/P Achilles tendon repair   
2015 sx done  
  
PAST SURGICAL HISTORY  
Procedure Laterality Date  
BREAST LUMPECTOMY HX Right 2017  
BX BREAST W/DEVICE 1ST LESION   
STEREOTACTIC GUID Right 2021  
CHOLECYSTECTOMY 2011  
gallbladder removal  
COLONOSCOPY   
CT ABDOMEN 2014  
PAST SURGICAL HISTORY OF   
2014  
D&C hysteroscopy  
PAST SURGICAL HISTORY OF Left   
2015  
Left foot surgery  
S PK LAVH [id number] 10/16/2014  
  
FAMILY HISTORY  
Problem Relation Age of Onset  
Ovarian cancer Mother 39  
Heart Father  
Hypertension Father  
Prostate Cancer Father 78  
other (kidney stones) Brother  
Hypertension Brother  
Diabetes Brother  
Seizures Son  
Breast Cancer Other 55  
Double first cousin (daughter of   
mother's sister and father's   
brother)  
  
Social History  
  
Tobacco Use  
Smoking status: Former  
Packs/day: 1.00  
Years: 10.00  
Pack years: 10.00  
Types: Cigarettes  
Quit date: 5/15/2007  
Years since quitting: 15.9  
Smokeless tobacco: Never  
Vaping Use  
Vaping Use: Never used  
Substance Use Topics  
Alcohol use: No  
Drug use: Not Currently  
Comment: marijuana for insomnia -   
currently not using  
  
Current Outpatient Medications  
Medication Sig Dispense Refill  
fluconazole (DIFLUCAN) 150 mg   
tablet (Patient not taking:   
Reported on 2023)  
ciprofloxacin HCl (CIPRO) 500 mg   
tablet Take 1 tablet by mouth   
twice daily. 20 tablet 0  
metroNIDAZOLE (FLAGYL) 500 mg   
tablet Take 500 mg by mouth three   
times daily. TAKE ONE(2) TABLET   
TWO(2) TIMES DAILY FOR (1) DAY. 0  
potassium chloride 20 mEq TbER   
Take 1 tablet by mouth twice   
daily. 180 tablet 1  
atenolol (TENORMIN) 50 mg tablet   
Take 1 tablet by mouth once daily.   
90 tablet 3  
exemestane (AROMASIN) 25 mg tablet   
TAKE 1 TABLET BY MOUTH ONCE DAILY.   
90 tablet 3  
chlorthalidone (HYGROTON) 25 mg   
tablet Take 0.5 tablets by mouth   
once daily. 45 tablet 3  
metFORMIN ER (GLUCOPHAGE XR) 500   
mg 24 hr tablet Take 1 tablet by   
mouth daily with breakfast. 180   
tablet 3  
glipiZIDE (GLUCOTROL XL) 2.5 mg 24   
hr tablet Take 1 tablet by mouth   
once daily. 90 tablet 3  
blood sugar diagnostic (BLOOD   
GLUCOSE TEST) test strip Test   
blood sugars 2daily.   
Dx:ucnontrolled diabetes .   
Insulin: Yes 50 Strip 11  
ibuprofen (MOTRIN) 800 mg tablet   
Take 1 tablet by mouth every 8   
hours as needed for pain. Take   
with food. 45 tablet 1  
ascorbic acid, vitamin C, (VITAMIN   
C) 500 mg tablet Take 500 mg by   
mouth once daily.  
lancets (ONE TOUCH DELICA) 33   
gauge misc Test blood sugar(s) 2   
daily. Dx: Type 2 DM - Controlled   
E11.9 Insulin: Yes 1 Each 11  
Blood-Glucose Meter monitoring kit   
Glucose Meter of Choice - Kit -   
Dx: Type 2 DM - Uncontrolled   
E11.65 1 Each 0  
MV-MN/FOLIC ACID/CALCIUM/VIT K   
(ONE-A-DAY WOMEN'S 50 PLUS ORAL)   
Take 1 tablet by mouth once daily.  
  
  
No current facility-administered   
medications for this visit.  
  
ALLERGIES  
Allergen Reactions  
Silvadene [Silver S* Hives  
Sulfa (Sulfonamide * Hives  
  
No physical exam performed due to   
telephone encounter.  
  
Assessment  
IMPRESSION  
No evidence of breast cancer from   
biopsy  
  
PLAN  
Reassurance to patient that there   
is no clinical evidence of breast   
malignancy.  
Patient to continue routine breast   
cancer screening - yearly   
mammograms.  
Patient will continue follow up   
with Hem/onc.  
  
I spent 5 minutes in the telephone   
visit.  
I have confirmed and edited as   
necessary, the PFSH and ROS   
obtained by others.  
  
Unrelated to E/M, telemedicine, or   
virtual visit service provided   
within previous 7 days.  
No E/M service or procedure   
anticipated within next 24 hours.  
  
  
Kajal Dueñas MD  
2023  
14:39 PM  
  
  
  
Electronically signed by Kajal Dueñas MD at 2023 4:16   
PM EDT  
documented in this encounter            [facility]  
   
                                                    2023 Miscellaneous   
Notes                                   Formatting of this note might be   
different from the original.  
Navdeep is scheduled for a provider   
specialty phone call on 2023.  
  
Madisyn Parikh RN  
  
Electronically signed by Madisyn Parikh RN at 2023 2:03   
PM EDT  
Formatting of this note might be   
different from the original.  
[facility] - [facility] [ID   
[id number]]  
Electronically signed by Madisyn Parikh RN at 2023 1:09   
PM EDT  
documented in this encounter            [facility]  
   
                                                    2023 Miscellaneous   
Notes                                   Formatting of this note might be   
different from the original.  
Spoke with patient. Patient was   
driving, unable to check schedule.   
Informed to schedule midday and   
mail reminder. Completed.  
Electronically signed by Shirley Jiménez Pss at 2023 4:06 PM   
EDT  
Formatting of this note might be   
different from the original.  
PSS- OV with Vikki in about 6   
months.  
Electronically signed by Samantha Hunter LPN at 2023 3:54 PM   
EDT  
Formatting of this note might be   
different from the original.  
Left detailed message on   
identified voicemail also sent   
information via my chart.  
  
Yoko Avendano LPN  
  
Electronically signed by Yoko Avendano LPN at 2023   
10:19 AM EDT  
Formatting of this note might be   
different from the original.  
If she is tolerating it then I   
would advise 7 to 10 years of   
therapy. OV with Norlina in about 6   
months.  
  
Francis Shell DO  
Electronically signed by Francis Shell DO at 2023 10:02 AM   
EDT  
Formatting of this note might be   
different from the original.  
Patient called stating biopsy was   
benign and that she has been on   
cancer medication for 6 years,   
asking if she is able to   
discontinue. Please advise.  
Electronically signed by Shirley Jiménez Pss at 2023 8:36 AM   
EDT  
documented in this encounter            [facility]  
   
                                                    2023 Miscellaneous   
Notes                                   Formatting of this note might be   
different from the original.  
Patient returned call and went   
over results, notes from express   
care provider with understanding.  
Electronically signed by Saige Garcia LPN at 2023   
10:17 AM EST  
Formatting of this note might be   
different from the original.  
Left message for pt to call back.  
Tami Tolentino MA  
  
Electronically signed by Tami Tolentino MA at 2023 10:05 AM   
EST  
Formatting of this note might be   
different from the original.  
----- Message from NIC Tubbs.CNP sent at   
3/2/2023 7:18 AM EST -----  
Urine culture did not show clear   
evidence of infection. Recommend   
follow up with PCP to ensure   
hematuria has resolved. Kerrie Don CNP  
  
Electronically signed by Tami Tolentino MA at 2023 10:02 AM   
EST  
documented in this encounter            [facility]  
   
                                                    2023 History of   
Present illness Narrative               Formatting of this note is   
different from the original.  
POPULATION HEALTH NAVIGATION   
OUTREACH  
  
Action/FYI  
  
Left message on patient's voice   
mail to return my call. Jesust   
message sent.  
  
Patient is on [facility] for the   
following HM care gaps:  
  
DILATED RETINAL EXAM  
  
COLORECTAL CANCER SCREENING  
  
Advance Directives  
  
  
Patient Identified by Name and   
: NO  
  
Outreach Outcome/Action  
Unable to reach patient: Left   
message  
MyChart message sent  
  
Did you use a PCP flex slot to   
schedule this appointment?  
N/A  
  
Reason for Outreach  
Care Gap or Scheduling/Wellness   
visits  
  
Payer:  
Payor: [facility] MEDICARE / Plan: [facility]   
DUAL COMPLETE HMO SNP / Product   
Type: Medicare /  
  
Care Gap Reviewed::  
Colorectal Cancer Screening  
Diabetic Eye Exam  
  
Reminder: Reminder note to check   
Health Maintenance for items below  
  
Health Maintenance items due:  
COVID-19 VACCINE(1) Never done  
PNEUMOCOCCAL(1 - PCV) Never done  
HIV SCREENING Never done  
SHINGRIX VACCINE(1 of 2) Never   
done  
PAP TESTING due on 2019  
HPV TESTING due on 2019  
COLORECTAL CANCER SCREENING due on   
2021  
DILATED RETINAL EXAM due on   
2022  
DEPRESSION ASSESSMENT Never done  
URINE ALBUMIN:CREATININE RATIO due   
on 2023  
  
Navigation Signature:  
  
Stephanie Hoskins MA  
2023 7:27 AM  
  
  
Electronically signed by Stephanie Hoskins MA at 2023 9:35 AM   
EST  
documented in this encounter            [facility]  
   
                                                    2023 History of   
Present illness Narrative               Formatting of this note is   
different from the original.  
Navdeep Guillen  
1960  
  
REFERRING PHYSICIAN: Francis Shell DO  
  
CHIEF COMPLAINT: Consult (Abnormal   
mammogram)  
  
HPI: The patient is a 62 year old   
female presents with abnormal   
right breast radiographs.  
She has a history of right breast   
cancer. She is s/p right breast   
lumpectomy/SLNBBx and XRT in   
There are pleomorphic scattered   
calcifications that are noted and   
they are in the vicinity of the   
previous surgery.  
She denies palpable breast masses.  
She denies nipple discharge.  
She denies chronic pain to the   
right breast, she notes twinges of   
discomfort in the area,   
intermittently.  
  
She also presents s/p episode of   
acute diverticulitis for which she   
was hospitalized at [facility]. I have   
reviewed the CT scan findings   
obtained there. She is scheduled   
for colonoscopy in the near   
future. She asks about the disease   
progression of diverticular   
disease and why the need for   
colonscopy.  
  
PAST MEDICAL HISTORY  
Diagnosis Date  
Abnormal mammogram 2021  
Achilles tendon pain 2015  
Ankle weakness 2015  
Chronic pain of left ankle   
12/15/2016  
Colon abnormality 2014  
Thickening of the ascending colon   
seen on the recent CT scan.   
Patient has had a colonoscopy   
around 4 years ago. Records were   
obtained for when they were done,   
4 years ago , in [facility]. her colonoscopy was   
completely normal, no thickening,   
and the biopsy too was normal   
except for lymphoid aggregates.  
Diverticulitis   
Treated by Dr. Patricio at [facility]  
DJD (degenerative joint disease),   
ankle and foot 2016  
DM (diabetes mellitus) (HCC)  
GERD (gastroesophageal reflux   
disease)  
Gross hematuria 05/15/2014  
2014, had hematuria, investigated   
extensively along with cytoscopy,   
a stone was found but no signs of   
any malignancy.  
Heel pain, chronic 12/15/2016  
Hepatic steatosis 10/03/2017  
Hiatal hernia 10/03/2017  
Hypertension  
Insomnia  
Kidney stone 05/15/2014  
Morbid obesity (HCC)  
Nephrolithiasis  
Neuritis 2016  
Renal lesion 05/15/2014  
S/P Achilles tendon repair   
2015 sx done  
  
  
PAST SURGICAL HISTORY  
Procedure Laterality Date  
BREAST LUMPECTOMY HX Right   
BX BREAST W/DEVICE 1ST LESION   
STEREOTACTIC GUID Right 2021  
CHOLECYSTECTOMY   
gallbladder removal  
COLONOSCOPY   
CT ABDOMEN 2014  
PAST SURGICAL HISTORY OF   
2014  
D&C hysteroscopy  
PAST SURGICAL HISTORY OF Left   
2015  
Left foot surgery  
S PK LAVH [id number] 10/16/2014  
  
Current Outpatient Medications  
Medication Sig  
potassium chloride 20 mEq TbER   
Take 1 tablet by mouth twice   
daily.  
atenolol (TENORMIN) 50 mg tablet   
Take 1 tablet by mouth once daily.  
exemestane (AROMASIN) 25 mg tablet   
TAKE 1 TABLET BY MOUTH ONCE DAILY.  
chlorthalidone (HYGROTON) 25 mg   
tablet Take 0.5 tablets by mouth   
once daily.  
metFORMIN ER (GLUCOPHAGE XR) 500   
mg 24 hr tablet Take 1 tablet by   
mouth daily with breakfast.  
glipiZIDE (GLUCOTROL XL) 2.5 mg 24   
hr tablet Take 1 tablet by mouth   
once daily.  
blood sugar diagnostic (BLOOD   
GLUCOSE TEST) test strip Test   
blood sugars 2daily.   
Dx:ucnontrolled diabetes .   
Insulin: Yes  
ibuprofen (MOTRIN) 800 mg tablet   
Take 1 tablet by mouth every 8   
hours as needed for pain. Take   
with food.  
lancets (ONE TOUCH DELICA) 33   
gauge misc Test blood sugar(s) 2   
daily. Dx: Type 2 DM - Controlled   
E11.9 Insulin: Yes  
Blood-Glucose Meter monitoring kit   
Glucose Meter of Choice - Kit -   
Dx: Type 2 DM - Uncontrolled   
E11.65  
MV-MN/FOLIC ACID/CALCIUM/VIT K   
(ONE-A-DAY WOMEN'S 50 PLUS ORAL)   
Take 1 tablet by mouth once daily.  
  
fluconazole (DIFLUCAN) 150 mg   
tablet (Patient not taking:   
Reported on 2023)  
ciprofloxacin HCl (CIPRO) 500 mg   
tablet Take 1 tablet by mouth   
twice daily.  
metroNIDAZOLE (FLAGYL) 500 mg   
tablet Take 500 mg by mouth three   
times daily. TAKE ONE(2) TABLET   
TWO(2) TIMES DAILY FOR (1) DAY.  
ascorbic acid, vitamin C, (VITAMIN   
C) 500 mg tablet Take 500 mg by   
mouth once daily.  
  
ALLERGIES: Silvadene [Silver   
Sulfadiazine] and Sulfa   
(Sulfonamide Antibiotics)  
  
PERSONAL HISTORY:  
Social History  
  
Tobacco Use  
Smoking status: Former  
Packs/day: 1.00  
Years: 10.00  
Pack years: 10.00  
Types: Cigarettes  
Quit date: 5/15/2007  
Years since quitting: 15.8  
Smokeless tobacco: Never  
Vaping Use  
Vaping Use: Never used  
Substance Use Topics  
Alcohol use: No  
Drug use: Not Currently  
Comment: marijuana for insomnia -   
currently not using  
  
  
FAMILY HISTORY  
Problem Relation Age of Onset  
Ovarian cancer Mother 39  
Heart Father  
Hypertension Father  
Prostate Cancer Father 78  
other (kidney stones) Brother  
Hypertension Brother  
Diabetes Brother  
Seizures Son  
Breast Cancer Other 55  
Double first cousin (daughter of   
mother's sister and father's   
brother)  
  
The review of systems data was   
entered by the nurse and reviewed   
by me  
  
Nursing Notes:  
Madisyn Parikh RN 2023   
12:50 PM Signed  
REVIEW OF SYSTEMS:  
General: The patient denies   
fatigue, denies weight loss,   
denies weight gain, denies feeling   
hot, and denies feelings of cold.  
Eyes: The patient denies glaucoma,   
denies eye injury/surgery, wears   
glasses or contacts.  
Ear/Nose/Throat: The patient NOTES   
allergies, denies hayfever, denies   
ear infections, and denies bloody   
noses.  
Cardiovascular: The patient denies   
chest pain, denies heart disease,   
NOTES high blood pressure,denies   
cardiac stent, denies prior heart   
attack, denies irregular heart   
beat, denies high cholesterol,   
denies poor circulation, denies   
heart failure, other cardiac   
issues, denies claudication,   
denies cold feet, denies   
peripheral arterial stent.  
Respiratory: The patient denies   
tuberculosis, denies pneumonia,   
denies frequent cough, denies   
pulmonary embolism, denies   
shortness of breath, and denies   
coughing up blood.  
Gastrointestinal: The patient   
denies difficulty swallowing,   
denies acid reflux, denies ulcers,   
denies vomiting, denies   
jaundice/hepatitis, NOTES   
gallbladder problems, denies black   
or tarry stools, denies   
hemorrhoids, denies bleeding from   
rectum, NOTES diverticulitis,   
denies constipation, denies   
diarrhea, denies loss of stool   
control, and denies hernias.  
Kidney/Bladder: The patient NOTES   
kidney stones, NOTES urine   
infections, and NOTES bloody   
urine.  
Skin: The patient denies a history   
of skin cancer, denies   
bleeding/changing moles, and NOTES   
a history of skin rash.  
Neurologic: The patient denies a   
history of epilepsy/convulsions,   
denies headaches, denies   
head/spinal injuries, and denies   
stroke/TIA.  
Psychiatric: The patient denies   
psychiatric medications, denies   
depression, and denies voices,   
denies substance abuse.  
Endocrine: The patient denies   
thyroid disorders, NOTES diabetes,   
and denies hormonal problems.  
Hematologic: The patient denies a   
history of bruising, denies   
bleeding, and denies anemia,   
denies blood clots.  
Infections: The patient denies a   
history of measles and mumps,   
denies rheumatic fever, and denies   
sexually transmitted diseases.  
Musculoskeletal: The patient   
denies back pain/injury, denies   
back problems, denies sciatica,   
NOTES knee/foot trouble, NOTES   
arthritis, or denies gout.  
When was patient's last Mammogram   
screening? 23 and 23  
Last Colonoscopy: 2011  
Madisyn Parikh RN  
  
  
PHYSICAL EXAMINATION:  
General: The patient is 62 year   
old female, well nourished, well   
hydrated in no acute distress. The   
patient is oriented to time,   
place, and person.  
VITALS: Blood pressure 118/72,   
pulse 81, temperature 37 C (98.6   
F), height 167.6 cm (5' 6 ),   
weight 131.1 kg (289 lb), SpO2 96   
%. Body mass index is 46.65 kg/m .  
Head - Normocephalic. EOM intact   
with sclera clear and no icterus   
noted.  
Neck - supple with no jugular   
venous distention noted. Trachea   
is midline. No thyroid enlargement   
or thyroid nodules detected. No   
masses noted.  
Chest/breast - no asymmetry of   
breasts noted, no suspicious skin   
lesions noted, - healed incisional   
sites of upper outer quadrant and   
inferior aspect of right breast,   
no nipple discharge and both   
nipples everted, no breast masses   
noted  
Lungs - clear to auscultation.   
Normal breath sounds. No   
rales/rhonchi/wheezing noted. No   
labored breathing noted, such as   
retractions. No cough heard.  
Heart - normal S1 and S2   
auscultated. No   
rubs/clicks/murmurs noted. Regular   
rate.  
Abdomen - soft and benign.   
Difficult to determine if any   
masses or organomegaly due to body   
habitus.   
Extremities - no calf tenderness   
noted. No pitting edema noted.  
Skin - normal skin integrity.  
Lymph - no cervical adenopathy   
detected, no supraclavicular   
adenopathy detected, no axillary   
adenopathy detected  
Neurological - gait normal, no   
focal deficits noted  
Psych - calm and appropriate  
  
RADIOLOGIC STUDIES: As Noted  
  
Assessment  
IMPRESSION: abnormal   
calcifications on right breast   
mammograms, history of right   
breast cancer  
  
PLAN: I have discussed the above   
with the patient.  
I have reviewed the abnormal   
breast radiographs with the   
patient.  
I have offered right stereotactic   
breast biopsy  
I have explained the procedure to   
the patient.  
I have counseled the patient as to   
the risks of the procedure,   
including but not limited to:  
Infection (increased risk due to   
history of surgery and XRT),   
bleeding, injury to any blood   
vessels/nerves, scar tissue, wound   
infections, complications of  
anesthesia, etc. - the patient   
understands.  
The patient wishes to proceed.  
I have also explained diverticular   
disease to the patient and   
indications for colonoscopy   
(findings of wall thickening on CT   
scan). She was concerned about   
requiring surgery and I have   
explained the indications for   
surgery to the patient. She feels   
better informed and more   
comfortable with her course of   
testing.  
The patient acknowledges above.  
  
I have answered all questions to   
the patient s satisfaction and the   
patient has no further questions.  
  
I have confirmed and edited as   
necessary, the PFSH and ROS   
obtained by others.  
  
Consultation requested by Dr. Francis Shell for an opinion regarding   
patient's abnormal breast   
radiographs. My final   
recommendations will be   
communicated back to the   
requesting physician by way of   
shared Medical record or letter to   
requesting physician via US mail.  
  
.  
Diagnoses: (R92.1) Calcification   
of right breast on mammography  
(K57.30) Diverticulosis of large   
intestine without perforation or   
abscess without bleeding  
(R93.3) Abnormal CT scan,   
gastrointestinal tract  
  
Return to Clinic: The patient will   
be scheduled for stereotactic   
breast biopsy at [facility].  
  
I spent a total of 41 minutes on   
the date of the service which   
included preparing to see the   
patient with review of any   
pertinent laboratory   
studies/radiological   
imaging/medical records from other   
medical facilities such as [facility], face-to-face   
patient care, obtaining oral   
medical history from the patient   
in this encounter, performing a   
medically appropriate examination,   
counseling and educating the   
patient/family/caregiver, and   
ordering and/or scheduling of   
medications/tests/procedures, and   
completing appropriate medical   
documentation.  
  
_____________________________  
Kajal Dueñas MD  
Electronically signed by Kajal Dueñas MD at 2023 7:49   
AM EST  
documented in this encounter            [facility]  
   
                                        2023 Nurse Note Formatting of this  
 note might be   
different from the original.  
REVIEW OF SYSTEMS:  
General: The patient denies   
fatigue, denies weight loss,   
denies weight gain, denies feeling   
hot, and denies feelings of cold.  
Eyes: The patient denies glaucoma,   
denies eye injury/surgery, wears   
glasses or contacts.  
Ear/Nose/Throat: The patient NOTES   
allergies, denies hayfever, denies   
ear infections, and denies bloody   
noses.  
Cardiovascular: The patient denies   
chest pain, denies heart disease,   
NOTES high blood pressure,denies   
cardiac stent, denies prior heart   
attack, denies irregular heart   
beat, denies high cholesterol,   
denies poor circulation, denies   
heart failure, other cardiac   
issues, denies claudication,   
denies cold feet, denies   
peripheral arterial stent.  
Respiratory: The patient denies   
tuberculosis, denies pneumonia,   
denies frequent cough, denies   
pulmonary embolism, denies   
shortness of breath, and denies   
coughing up blood.  
Gastrointestinal: The patient   
denies difficulty swallowing,   
denies acid reflux, denies ulcers,   
denies vomiting, denies   
jaundice/hepatitis, NOTES   
gallbladder problems, denies black   
or tarry stools, denies   
hemorrhoids, denies bleeding from   
rectum, NOTES diverticulitis,   
denies constipation, denies   
diarrhea, denies loss of stool   
control, and denies hernias.  
Kidney/Bladder: The patient NOTES   
kidney stones, NOTES urine   
infections, and NOTES bloody   
urine.  
Skin: The patient denies a history   
of skin cancer, denies   
bleeding/changing moles, and NOTES   
a history of skin rash.  
Neurologic: The patient denies a   
history of epilepsy/convulsions,   
denies headaches, denies   
head/spinal injuries, and denies   
stroke/TIA.  
Psychiatric: The patient denies   
psychiatric medications, denies   
depression, and denies voices,   
denies substance abuse.  
Endocrine: The patient denies   
thyroid disorders, NOTES diabetes,   
and denies hormonal problems.  
Hematologic: The patient denies a   
history of bruising, denies   
bleeding, and denies anemia,   
denies blood clots.  
Infections: The patient denies a   
history of measles and mumps,   
denies rheumatic fever, and denies   
sexually transmitted diseases.  
Musculoskeletal: The patient   
denies back pain/injury, denies   
back problems, denies sciatica,   
NOTES knee/foot trouble, NOTES   
arthritis, or denies gout.  
  
When was patient's last Mammogram   
screening? 23 and 23  
  
Last Colonoscopy: 2011  
  
Madisyn Parikh RN  
Electronically signed by Madisyn Parikh RN at 2023 12:50   
PM EST  
documented in this encounter            [facility]  
   
                                                    2023 History of   
Present illness Narrative               Formatting of this note is   
different from the original.  
Images from the original note were   
not included.  
  
[facility]   
INSTITUTE  
[place] [facility]  
NEW PATIENT CLINIC NOTE  
  
  
SERVICE DATE: 2023  
SERVICE TIME: 12:51 PM  
NAME: Navdeep Guillen  
MRN: [id number]  
  
CHIEF COMPLAINT: Hematuria  
  
HISTORY OF PRESENT ILLNESS:  
Navdeep Guillen is a 62 year old   
female presenting with 2 episodes   
of hematuria , she was found to   
have a UTI with E Coli and was   
given Cipro 500 mg  
The patient reports UTI improved,   
and has not seen blood in urine   
for more than a week. We discussed   
common causes of hematuria and   
reviewed tests  
She had CT and urine culture with   
E coli  
  
LUTS:  
DYSURIA: yes  
URGENCY: Yes  
FREQUENCY:5 per day  
NOCTURIA: 0 per night  
STRAINING TO VOID: No  
EMPTIES COMPLETELY: Yes  
UTI: 1 past 12 months  
GROSS HEMATURIA: yes  
UA DIPSTICK POSITIVE ONLY: yes  
  
Other symptoms:  
  
LABS:  
No results found for: TESTOST  
No results found for: TESTFREE  
No results found for: PSA  
Hematocrit (%)  
Date Value  
2023 45.0  
2023 42.6  
2022 44.1  
2022 45.9  
2021 43.1  
07/15/2020 44.4  
  
No results found for: PSA  
  
Creatinine  
Date Value Ref Range Status  
2023 0.84 0.58 - 0.96 mg/dL   
Final  
2023 0.72 0.58 - 0.96 mg/dL   
Final  
2022 0.70 0.58 - 0.96 mg/dL   
Final  
2022 0.74 0.58 - 0.96 mg/dL   
Final  
  
  
MEDICATIONS:  
  
ciprofloxacin HCl (CIPRO) 500 mg   
tablet Take 1 tablet by mouth   
twice daily.  
metroNIDAZOLE (FLAGYL) 500 mg   
tablet Take 500 mg by mouth three   
times daily. TAKE ONE(2) TABLET   
TWO(2) TIMES DAILY FOR (1) DAY.  
potassium chloride 20 mEq TbER   
Take 1 tablet by mouth twice   
daily.  
atenolol (TENORMIN) 50 mg tablet   
Take 1 tablet by mouth once daily.  
exemestane (AROMASIN) 25 mg tablet   
TAKE 1 TABLET BY MOUTH ONCE DAILY.  
chlorthalidone (HYGROTON) 25 mg   
tablet Take 0.5 tablets by mouth   
once daily.  
metFORMIN ER (GLUCOPHAGE XR) 500   
mg 24 hr tablet Take 1 tablet by   
mouth daily with breakfast.  
glipiZIDE (GLUCOTROL XL) 2.5 mg 24   
hr tablet Take 1 tablet by mouth   
once daily.  
blood sugar diagnostic (BLOOD   
GLUCOSE TEST) test strip Test   
blood sugars 2daily.   
Dx:ucnontrolled diabetes .   
Insulin: Yes  
ibuprofen (MOTRIN) 800 mg tablet   
Take 1 tablet by mouth every 8   
hours as needed for pain. Take   
with food.  
lancets (ONE TOUCH DELICA) 33   
gauge misc Test blood sugar(s) 2   
daily. Dx: Type 2 DM - Controlled   
E11.9 Insulin: Yes  
Blood-Glucose Meter monitoring kit   
Glucose Meter of Choice - Kit -   
Dx: Type 2 DM - Uncontrolled   
E11.65  
MV-MN/FOLIC ACID/CALCIUM/VIT K   
(ONE-A-DAY WOMEN'S 50 PLUS ORAL)   
Take 1 tablet by mouth once daily.  
  
fluconazole (DIFLUCAN) 150 mg   
tablet (Patient not taking:   
Reported on 2023)  
ascorbic acid, vitamin C, (VITAMIN   
C) 500 mg tablet Take 500 mg by   
mouth once daily.  
  
PAST MEDICAL HISTORY:  
  
PAST MEDICAL HISTORY  
Diagnosis Date  
Abnormal mammogram 2021  
Achilles tendon pain 2015  
Ankle weakness 2015  
Chronic pain of left ankle   
12/15/2016  
Colon abnormality 2014  
Thickening of the ascending colon   
seen on the recent CT scan.   
Patient has had a colonoscopy   
around 4 years ago. Records were   
obtained for when they were done,   
4 years ago , in [facility]. her colonoscopy was   
completely normal, no thickening,   
and the biopsy too was normal   
except for lymphoid aggregates.  
DJD (degenerative joint disease),   
ankle and foot 2016  
DM (diabetes mellitus) (HCC)  
GERD (gastroesophageal reflux   
disease)  
Gross hematuria 05/15/2014  
2014, had hematuria, investigated   
extensively along with cytoscopy,   
a stone was found but no signs of   
any malignancy.  
Heel pain, chronic 12/15/2016  
Hepatic steatosis 10/03/2017  
Hiatal hernia 10/03/2017  
Hypertension  
Insomnia  
Kidney stone 05/15/2014  
Morbid obesity (HCC)  
Nephrolithiasis  
Neuritis 2016  
Renal lesion 05/15/2014  
S/P Achilles tendon repair   
2015 sx done  
  
PAST SURGICAL HISTORY:  
  
PAST SURGICAL HISTORY  
Procedure Laterality Date  
BREAST LUMPECTOMY HX Right   
BX BREAST W/DEVICE 1ST LESION   
STEREOTACTIC GUID Right 2021  
CHOLECYSTECTOMY   
gallbladder removal  
COLONOSCOPY   
CT ABDOMEN 2014  
PAST SURGICAL HISTORY OF   
2014  
D&C hysteroscopy  
PAST SURGICAL HISTORY OF Left   
2015  
Left foot surgery  
S PK LAVH [id number] 10/16/2014  
  
FAMILY HISTORY:  
  
FAMILY HISTORY  
Problem Relation Age of Onset  
Ovarian cancer Mother 39  
Heart Father  
Hypertension Father  
Prostate Cancer Father 78  
other (kidney stones) Brother  
Hypertension Brother  
Diabetes Brother  
Seizures Son  
Breast Cancer Other 55  
Double first cousin (daughter of   
mother's sister and father's   
brother)  
  
SOCIAL HISTORY:  
Social Connections: Socially   
Isolated  
Frequency of Communication with   
Friends and Family: More than   
three times a week  
Frequency of Social Gatherings   
with Friends and Family: Once a   
week  
Attends [employer]ical Services: Never  
Active Member of Clubs or   
Organizations: No  
Attends Club or Organization   
Meetings: Never  
Marital Status:   
  
REVIEW OF SYSTEMS:  
GENERAL: No fever, chills, weight   
loss, or fatigue.  
ENMT: Negative  
CARDIOVASCULAR:NO CHEST PAIN,   
PALPITATIONS, ANKLE EDEMA  
RESPIRATORY: No chronic cough,   
wheezing, dyspnea, hemoptysis.  
GENITOURINARY: SEE HPI  
MUSCULOSKELETAL:NO CHRONIC BACK   
PAIN, ARTHRITIS, CHRONIC NECK PAIN  
SKIN: NO VARICOSE VEINS, RASH,   
ABNORMAL ITCHING  
HEME/LYMPH/IMMUNE:Negative for   
prolonged bleeding, bruising   
easily or swollen nodes  
NEUROLOGICAL: NO HEADACHES,   
NUMBNESS, SEIZURES, STROKE  
DIABETES: Yes  
All other systems reviewed and are   
negative  
  
PHYSICAL EXAMINATION:  
Blood pressure 110/80, pulse 86,   
temperature 36.5 C (97.7 F),   
temperature source Temporal,   
height 167.6 cm (5' 6 ), weight   
132.5 kg (292 lb), SpO2 98 %.  
GENERAL: WNL nutrition, no   
deformities, healthy appearing  
NEURO: Awake, alert and oriented x   
3 and Normal gait  
PSYCH: No signs of depression,   
anxiety, or agitation  
ENMT (Ear, Nose, Mouth, Throat):   
No masses, adenopathy, icterus.   
Thyroid nonpalpable  
RESP: NL effort, no retractions or   
purse-lip breathing.  
CV: No extremity swelling,   
varices, edema, pallor, erythema  
GASTROINTESTINAL: Soft, nontender,   
nondistended, no masses.  
HERNIAS: None  
SKIN: No rash, lesions  
No palpable lymphadenopathy  
MUSCULOSKELETAL: Extremities   
normal. No deformities, edema,   
clubbing or skin discoloration.  
  
PROBLEM LIST REVIEW:  
Yes  
  
LABS:  
  
Results for orders placed or   
performed in visit on 23  
UA DIP, URINE (POC)  
Result Value Ref Range  
GLUCOSE UA (POCT) Negative   
Negative mg/dL  
BILIRUBIN UA (POCT) Negative   
Negative  
KETONE UA (POCT) Negative Negative   
mg/dL  
SPECIFIC GRAVITY UA (POCT) 1.020   
1.005 - 1.030  
HEMOGLOBIN/BLOOD UA (POCT) Small   
(A) Negative  
PH UA (POCT) 7.0 4.5 - 8.0  
PROTEIN UA (POCT) 30 (A) Negative   
mg/dL  
UROBILINOGEN UA (POCT) 0.2 Normal   
E.U./dL  
NITRITE UA (POCT) Negative   
Negative  
LEUKOCYTES UA (POCT) Large (A)   
Negative  
COLOR UA (POCT) Yellow  
CLARITY UA (POCT) Clear  
  
Urine Culture: Pending  
  
PROCEDURES:  
  
PVR: 0 ml  
  
IMAGING:  
  
CT Urogram - Scanned into Epic -   
[facility]  
> Normal Kidney and bladder  
  
IMPRESSION/PLAN:  
62 year old female with  
1. Gross hematuria - ICD9: 599.71,   
ICD10: R31.0  
  
> Urine culture today  
> Finish Cipro and Flagyl  
  
I spent a total of 30 minutes on   
the date of the service which   
included preparing to see the   
patient, face to face patient   
care, completing clinical   
documentation, obtaining and/or   
reviewing separately obtained   
history, performing a medically   
appropriate examination,   
counseling and educating the   
patient/family/caregiver, ordering   
medications, tests, or procedures,   
and care coordination.  
  
Julito Osborne, SHANE, MT, PA-C  
  
  
Electronically signed by Julito Osborne PA-C at 2023 1:05 PM   
EST  
Formatting of this note might be   
different from the original.  
Verified name and date of birth.  
  
CC Post Void Residual  
  
HPI:  
Navdeep Guillen is a 62 year old   
female.  
The patient is here now for an   
appointment with SHANE Delaney MT, PA-COV.  
  
Procedure:  
Explained procedure to patient and   
verbalizes understanding.   
Performed a PVR.  
Patient urinated and instructed to   
empty bladder as much as possible   
just prior to having PVR done   
using bladder ultrasound scanner.  
Results of scan: 0 mL  
  
The patient tolerated the   
procedure well.  
  
Plan:  
Appointment with Julito.  
  
  
Electronically signed by Shayna Grady LPN at 2023 1:05 PM EST  
documented in this encounter            [facility]  
   
                                                    2023 Miscellaneous   
Notes                                   Formatting of this note might be   
different from the original.  
Called pt and warmed transferred   
her as directed by scheduling tree   
to [phone]  
Electronically signed by Michelle Varela at 2023 1:48 PM EST  
Formatting of this note might be   
different from the original.  
PSS- please reach out to schedule   
with urology at [place] and notify   
patient.  
  
Samantha Hunter LPN  
  
Electronically signed by Samantha Hunter LPN at 2023 1:23 PM   
EST  
Addended by: FRANCIS SHELL on:   
2023 01:18 PM  
  
Modules accepted: Orders  
  
  
Electronically signed by Francis Shell DO at 2023 1:18 PM   
EST  
Formatting of this note might be   
different from the original.  
Any of the urologists at [place].  
  
Francis Shell DO  
Electronically signed by Francis Shell DO at 2023 1:18 PM   
EST  
Formatting of this note might be   
different from the original.  
Dr. Dejesus's office called to say   
they do not accept patient's   
insurance (Medicaid).  
  
Who would you like patient   
referred to instead?  
  
Samantha Hunter LPN  
  
Electronically signed by Samantha Hunter LPN at 2023 1:14 PM   
EST  
Formatting of this note might be   
different from the original.  
Patient is aware of all   
information and verbalized   
understanding.  
  
Referral faxed to Dr. Dejesus's   
office asking them to reach out to   
patient to schedule.  
  
Samantha Hunter LPN  
  
Electronically signed by Samantha Hunter LPN at 2023 11:34 AM   
EST  
Formatting of this note might be   
different from the original.  
Let her know that I reviewed her   
hospital records. As I discussed   
during the OV I recommend urology   
evaluation for the gross hematuria   
she had. Please make a referral to   
Dr. Dejesus at [facility]. Can let her   
know I talked with Dr. Patricio   
who concurs. Keep appointment with   
Dr. Patricio as well.  
  
Francis Shell DO  
Electronically signed by Francis Shell DO at 2023 11:11 AM   
EST  
documented in this encounter            [facility]  
   
                                                    2023 History of   
Present illness Narrative               Formatting of this note is   
different from the original.  
Per Dr. Milner's most recent OV note.   
Personally reviewed and updated by   
me.DIAGNOSIS: Breast cancer  
  
HPI: The patient is a 62 year-old   
female with history of   
hypertension and borderline   
diabetes/insulin resistant,   
obesity, who presented with an   
abnormal breast exam at her PCP   
office. Subsequent diagnostic   
mammogram revealing a lobulated   
lesion in the right breast at the   
lower outer quadrant highly   
suspicious of malignancy.  
  
Patient had a mammotome breast   
biopsy on 3/27/17 that showed   
invasive ductal carcinoma,   
positive for estrogen and   
progesterone receptors, equivocal   
for overexpression HER-2/andi.  
  
She had surgery by Dr. Dueñas, right   
breast lumpectomy and right   
axillary sentinel lymph node   
biopsy on 17 for right breast   
cancer.  
  
Stage IA- pT1c pN0 (3/3 sentinel   
lymph nodes negative for   
metastatic disease)  
Tumor size 1.5 cm x 1 x 0.9 cm  
Overall grade 2 out of 7  
Margins - 0.4 cm from posterior   
margin  
ER/UT >95%, [id number] not amplified   
by FISH  
Lymph/vasc invasion - none;   
derm/vasc/lymph invasion - none  
  
Menstrual history: Menarche at age   
11, first pregnancy at age 18, 8   
pregnancy; surgical   
menopause-total abdominal   
hysterectomy age 54. No estrogen   
replacement therapy. Family   
history: Mother  of ovarian   
cancer in her 40's. No family   
history of breast cancer. She was   
initially started on anastrozole,   
then subsequent switch to Aromasin   
because of joint pain.  
  
Current treatment:  
1) Aromasin 25 mg once daily  
  
Interim history:  
Tolerating Aromasin well.  
Occasional ache in hands.  
Arthritis pain chronic both knees.  
  
Currently on antibiotics for   
diverticulitis and UTI. Went to ED   
with gross hematuria with clots.   
Was also having pain LLQ. Wasn't   
clearly having dysuria. Had no   
fever. CT of the abdomen pelvis on   
2023 demonstrated numerous   
diverticula projecting from the   
descending and sigmoid colon.   
Along the segment of the colon   
extending from the distal   
descending through the mid to   
distal sigmoid showed mild to   
moderate mural thickening with   
extensive pericolonic stranding.   
There is a broad band of soft   
tissue density containing air   
bubbles extending anteriorly and   
inferiorly from the proximal   
sigmoid colon indicating   
microperforation. No fluid   
collection was observed. Urinary   
bladder was noted to tilt toward   
the soft tissue density but no air   
bubbles were observed within the   
bladder to indicate fistula.   
However the bladder was noted to   
be nearly empty and the bladder   
wall was thickened with   
perivesicular stranding which was   
attributed to either cystitis   
and/or reactive edema from   
adjacent sigmoid inflammation.  
  
Urine culture demonstrated   
11-25,000 CFU's presumptive E.   
coli.  
  
2 sets of blood cultures drawn   
 remain negative through 5   
days.  
  
Gross hematuria resolved and pain   
subsided.  
  
PMH, medications and allergies   
personally reviewed by me today.   
Any changes documented in   
appropriate section.  
  
ROS:  
Constitutional: Denies episodes of   
fever and night sweats.  
Neuro: Denies HA, vertigo,   
dizziness and imbalance. Denies   
symptoms of neuropathy.  
HEENT: No recent change in voice,   
vision or hearing.  
Resp: Denies cough, wheeze and   
hemoptysis. Denies shortness of   
breath at rest. Denies DUBOIS.  
CVS: Denies exertional chest pain,   
PND, orthopnea and LE edema.  
GI: Denies dysgeusia. Denies   
symptoms of stomatitis. Denies   
dysphagia and odynophagia. Some   
nausea due to antibiotics.  
: See above.  
Endo: Denies hot flashes. Denies   
polyuria and polydipsia. Denies   
heat and cold intolerance.  
Musculoskeletal: See above.  
Derm: Denies rash. Denies jaundice   
and diffuse pruritis.  
Heme: Denies unusual bleeding and   
unexplained bruising.  
Psych: Normal mood.  
  
PHYSICAL EXAM:  
Vitals: Blood pressure 115/70,   
pulse 81, temperature 36.8 C (98.2   
F), height 167 cm (5' 5.75 ),   
weight 132.2 kg (291 lb 8 oz),   
SpO2 98 %.  
Well-appearing and in no acute   
distress.  
EYES: Sclerae are anicteric   
bilaterally.  
LYMPHATIC: There is no palpable   
cervical, supraclavicular or   
axillary adenopathy.  
RESPIRATORY: Inspiratory breath   
sounds are of normal intensity in   
all fields. No rales, wheezes or   
rhonchi.  
CARDIOVASCULAR: Rhythm is regular.  
BREAST: Declined chaperone. There   
is some contraction of the right   
breast secondary to radiation. In   
the lower inner quadrant there is   
chronic swelling/edema of the   
skin. No discrete nodule   
appreciated in the breast tissue   
itself although palpatory exam is   
difficult secondary to radiation   
changes. Left breast no concerning   
mass or nodule.  
ABDOMEN: The abdomen is   
nondistended.  
Extremities: No swelling or edema.  
SKIN: No jaundice or rash. No   
petechiae.  
NEUROLOGIC: CNs II-XII are grossly   
intact.  
  
LABS:  
Component Latest Ref Rng & Units   
2023  
WBC 3.70 - 11.00 k/uL 11.42 (H)  
RBC 3.90 - 5.20 m/uL 5.56 (H)  
Hemoglobin 11.5 - 15.5 g/dL 14.7  
Hematocrit 36.0 - 46.0 % 45.0  
MCV 80.0 - 100.0 fL 80.9  
MCH 26.0 - 34.0 pg 26.4  
MCHC 30.5 - 36.0 g/dL 32.7  
RDW-CV 11.5 - 15.0 % 17.6 (H)  
Platelet Count 150 - 400 k/uL 531   
(H)  
MPV 9.0 - 12.7 fL 8.9 (L)  
Neut% % 71.6  
Abs Neut (ANC) 1.45 - 7.50 k/uL   
8.17 (H)  
Lymph% % 17.4  
Abs Lymph 1.00 - 4.00 k/uL 1.99  
Mono% % 6.1  
Abs Mono <0.87 k/uL 0.70  
Eosin% % 3.4  
Abs Eosin <0.46 k/uL 0.39  
Baso% % 0.5  
Abs Baso <0.11 k/uL 0.06  
Immature Gran % % 1.0  
IMMATURE GRANS (ABS) <0.10 k/uL   
0.11 (H)  
NRBC /100 WBC 0.0  
Absolute nRBC <0.01 k/uL <0.01  
DTYPE Auto  
Protein, Total 6.3 - 8.0 g/dL 7.3  
Albumin 3.9 - 4.9 g/dL 3.7 (L)  
Calcium 8.5 - 10.2 mg/dL 9.5  
Bilirubin, Total 0.2 - 1.3 mg/dL   
0.3  
Alkaline Phosphatase 34 - 123 U/L   
70  
AST 13 - 35 U/L 40 (H)  
ALT 7 - 38 U/L 47 (H)  
Glucose 74 - 99 mg/dL 225 (H)  
BUN 7 - 21 mg/dL 9  
Creatinine 0.58 - 0.96 mg/dL 0.84  
Sodium 136 - 144 mmol/L 137  
Potassium 3.7 - 5.1 mmol/L 3.9  
Chloride 97 - 105 mmol/L 101  
CO2 22 - 30 mmol/L 25  
Anion Gap 9 - 18 mmol/L 11  
eGFR >=60 mL/min/1.73m 78  
  
ASSESSMENT/PLAN:  
(C50.511, Z17.0) Malignant   
neoplasm of lower-outer quadrant   
of right breast of female,   
estrogen receptor positive (HCC)   
(primary encounter diagnosis)  
(R92.8) Abnormal mammogram  
Assessment:  
-pT1c pN0 (3/3 sentinel lymph   
nodes negative for metastatic   
disease) stage IA infiltrating   
ductal carcinoma the right breast.   
ER/UT >95%, Rnx0dpo non-amplified   
by FISH.  
-Has been on exemestane  
-Reviewed mammogram. New   
calcifications lower inner   
quadrant right breast.  
-Discussed with Dr. Patricio.  
Plan:  
-Continue exemestane for now.  
-Diagnostic mammogram right breast   
with ultrasound.  
-Further plan pending those   
results.  
  
(R31.0) Gross hematuria  
Assessment:  
-She had painless gross hematuria   
with passing of clots on   
presentation to the ED. UA   
demonstrated low-level E. coli.   
She is a former smoker. I   
discussed and recommended urology   
evaluation for opinion on   
cystoscopy.  
Plan:  
-Referral to Dr. Dejesus.  
  
Portions of this documentation   
were copied and pasted from   
previous office visit notes in   
order to provide a cohesive   
continuity of the history. The   
note has been reviewed and edited   
and updated as necessary.  
  
I spent a total of 50 minutes on   
the date of the service which   
included preparing to see the   
patient, face-to-face patient   
care, completing clinical   
documentation, obtaining and/or   
reviewing separately obtained   
history, performing a medically   
appropriate examination,   
counseling and educating the   
patient/family/caregiver, ordering   
medications, tests, or procedures,   
communicating with other HCPs (not   
separately reported),   
independently interpreting results   
(not separately reported),   
communicating results to the   
patient/family/caregiver, and care   
coordination (not separately   
reported).  
  
Francis Shell DO  
  
Cc: Cynthia Douglas MD  
Electronically signed by Francis Shell DO at 2023 11:10 AM   
EST  
documented in this encounter            [facility]  
   
                                                    2023 Miscellaneous   
Notes                                   Formatting of this note might be   
different from the original.  
2023  
  
PID: [id number] Navdeep Guillen  
[address] [id number]  
  
Dear Ms. Guillen,  
  
Your recent breast imaging exam on   
2023 showed a possible   
finding that requires additional   
imaging studies for a complete   
evaluation. Most such findings are   
probably benign (not cancer).  
  
If you have a healthcare provider   
who ordered/prescribed your   
screening mammogram: Please call   
[phone] or [phone]   
EXT: [phone] to schedule an   
appointment for your additional   
imaging (if you have not already   
done so).  
  
If you DO NOT have a healthcare   
provider (ie you did not have an   
order/prescription for your   
screening mammogram):  
Please call [phone] to   
schedule an appointment for your   
additional imaging (if you have   
not already done so).  
  
You must have an   
order/prescription from your   
physician when calling to schedule   
your appointment. If your   
order/prescription is not   
electronic, you must bring the   
hard copy with you on the day of   
your exam to avoid delays.  
Your imaging studies and reports   
are kept on file at [facility] as part of your permanent   
medical record, and are available   
for your continuing care.  
  
Thank you for allowing us to help   
in meeting your health care needs.  
  
Sincerely,  
  
Dr. Delgadillo  
Interpreting Radiologist  
[facility]  
  
(Additional imaging)  
Electronically signed by   
Mammography Coordinator at   
2023 9:48 AM EST  
documented in this encounter            [facility]  
   
                                                    2023 History of   
Present illness Narrative               Formatting of this note might be   
different from the original.  
Radiology Service Progress Note  
  
PATIENT NAME: Navdeep Guillen  
MRN: [id number]  
  
DATE OF SERVICE: 2023  
TIME: 10:48 AM  
PATIENT IDENTITY VERIFICATION   
COMPLETED USING TWO (2)   
IDENTIFIERS: Name and Date of   
Birth confirmed by patient   
verbally.  
FALL SCREENING: Has the patient   
had 2 falls in the last year or 1   
fall with injury or currently   
using an Ambulatory Assistive   
Device (Walker, Cane, Wheelchair,   
Crutches, etc.)? No  
PATIENT GENDER DATA: Female.   
Pregnancy status: Pregnant: No   
Breastfeeding status: NO.  
PATIENT RELEVANT IMPLANT DATA   
REVIEWED: Not Applicable  
  
RADIOLOGY DEPARTMENT: Mammography  
  
PERIPHERAL IV DATA: Not applicable  
  
SIGNED BY: RT Que(R)  
2023 10:48 AM  
  
  
Electronically signed by Italia Barfield RT(R) at 2023 10:48   
AM EST  
documented in this encounter            [facility]  
   
                                                    2023 History of   
Present illness Narrative               Formatting of this note is   
different from the original.  
Reason for Visit  
Patient presents with:  
Same Day Appointment: right upper   
thigh bump size of pea  
  
Navdeep Guillen is a 62 year old   
female who presents here today for   
Above Complaints.  
  
Health Maintenance  
COVID-19 VACCINE(1)  
PNEUMOCOCCAL(1 - PCV)  
HIV SCREENING  
SHINGRIX VACCINE(1 of 2)  
PAP TESTING  
HPV TESTING  
COLORECTAL CANCER SCREENING  
DILATED RETINAL EXAM  
DEPRESSION ASSESSMENT  
MAMMOGRAM  
URINE ALBUMIN:CREATININE RATIO  
  
HPI  
  
Being treated for diverticulitis   
currently with cipro and flagyl.   
She has been to the emergency   
room, they had her on IV abx and  
On clear liquids, 2 ct scans were   
done. Was there for 3 days, there   
they noted it to be subacute and   
discussed surgery for her. She has   
a follow up with surgeon.. since   
taking abx her sugars are on the   
lower side.She has not taken   
metformin and her sugar was a   
little on the lower side. Still on   
the glipizide. Patient is eating   
different, but not eating lesser   
than normal. She is eating soft   
foods. Patient has intentional   
weight loss. She was not eating   
all that much. She feels bloated   
and gassy with medications.   
Patient does have a follow up   
appointment.  
Has developed fungal infection  
  
She is here today also for a bump   
on the upper thigh, It is actually   
in the lower thing and very   
superficial , around 0.8 cms and   
hard like a cyst, she was   
concerned about dvt but we   
reassure her that it is not likely   
to be from dvt.  
  
No problem-specific Assessment &   
Plan notes found for this   
encounter.  
  
PAST MEDICAL HISTORY  
Diagnosis Date  
Abnormal mammogram 2021  
Achilles tendon pain 2015  
Ankle weakness 2015  
Chronic pain of left ankle   
12/15/2016  
Colon abnormality 2014  
Thickening of the ascending colon   
seen on the recent CT scan.   
Patient has had a colonoscopy   
around 4 years ago. Records were   
obtained for when they were done,   
4 years ago , in [facility]. her colonoscopy was   
completely normal, no thickening,   
and the biopsy too was normal   
except for lymphoid aggregates.  
DJD (degenerative joint disease),   
ankle and foot 2016  
DM (diabetes mellitus) (HCC)  
GERD (gastroesophageal reflux   
disease)  
Gross hematuria 05/15/2014  
2014, had hematuria, investigated   
extensively along with cytoscopy,   
a stone was found but no signs of   
any malignancy.  
Heel pain, chronic 12/15/2016  
Hepatic steatosis 10/03/2017  
Hiatal hernia 10/03/2017  
Hypertension  
Insomnia  
Kidney stone 05/15/2014  
Morbid obesity (HCC)  
Nephrolithiasis  
Neuritis 2016  
Renal lesion 05/15/2014  
S/P Achilles tendon repair   
2015 sx done  
  
  
PAST SURGICAL HISTORY  
Procedure Laterality Date  
BREAST LUMPECTOMY HX Right 2017  
BX BREAST W/DEVICE 1ST LESION   
STEREOTACTIC GUID Right 2021  
CHOLECYSTECTOMY   
gallbladder removal  
COLONOSCOPY   
CT ABDOMEN 2014  
PAST SURGICAL HISTORY OF   
2014  
D&C hysteroscopy  
PAST SURGICAL HISTORY OF Left   
2015  
Left foot surgery  
S PK LAVH [id number] 10/16/2014  
  
FAMILY HISTORY  
Problem Relation Age of Onset  
Ovarian cancer Mother 39  
Heart Father  
Hypertension Father  
Prostate Cancer Father 78  
other (kidney stones) Brother  
Hypertension Brother  
Diabetes Brother  
Seizures Son  
Breast Cancer Other 55  
Double first cousin (daughter of   
mother's sister and father's   
brother)  
  
Social History  
  
Tobacco Use  
Smoking status: Former  
Packs/day: 1.00  
Years: 10.00  
Pack years: 10.00  
Types: Cigarettes  
Quit date: 5/15/2007  
Years since quitting: 15.7  
Smokeless tobacco: Never  
Substance Use Topics  
Alcohol use: No  
Drug use: Yes  
Comment: marijuana for insomnia -   
currently not using  
  
Past medical history,   
appointments, medications,   
allergies reviewed.  
Pertinent Lab/Diagnostic Studies   
are reviewed and discussed today  
  
Current Outpatient Medications:  
potassium chloride 20 mEq TbER  
atenolol (TENORMIN) 50 mg tablet  
exemestane (AROMASIN) 25 mg tablet  
benzonatate (TESSALON PERLES) 100   
mg capsule  
albuterol HFA (PROAIR HFA) 90   
mcg/actuation inhaler  
chlorthalidone (HYGROTON) 25 mg   
tablet  
metFORMIN ER (GLUCOPHAGE XR) 500   
mg 24 hr tablet  
glipiZIDE (GLUCOTROL XL) 2.5 mg 24   
hr tablet  
blood sugar diagnostic (BLOOD   
GLUCOSE TEST) test strip  
ibuprofen (MOTRIN) 800 mg tablet  
ascorbic acid, vitamin C, (VITAMIN   
C) 500 mg tablet  
lancets (ONE TOUCH DELICA) 33   
gauge misc  
Blood-Glucose Meter monitoring kit  
MV-MN/FOLIC ACID/CALCIUM/VIT K   
(ONE-A-DAY WOMEN'S 50 PLUS ORAL)  
  
Review of Systems  
CONSTITUTIONAL: No fevers, chills   
night sweats, unintended weight   
loss  
CARDIOVASCULAR: No chest pain,   
dyspnea, palpitations, orthopnea,   
PND, ankle edema.  
PULM: No dyspnea, unexplained   
cough.  
GI: No dysphagia/odynophagia,   
problematic reflux, constipation,   
diarrhea, changes in stool habits,   
hematochezia, melena.  
: No new urinary complaints,   
including dysuria, gross hematuria   
or pyuria.  
NEURO: No new balance problems,   
peripheral weakness/paresthesias   
or numbness of concern.  
  
Physical Exam  
/82 (BP Site: Left Arm, BP   
Position: Sitting, BP Cuff Size:   
Large Adult)   Pulse 71   Temp   
36.8 C (98.3 F)   Resp 16   Ht   
165.1 cm (5' 5 )   Wt 131.5 kg   
(290 lb)   SpO2 97%   BMI 48.26   
kg/m  
General appearance: Well   
appearing, alert, in no acute   
distress, well nourished.  
Skin: Skin color, texture, turgor   
normal, no suspicious rashes or   
lesions  
Head: Normocephalic, no masses,   
lesions, tenderness or   
abnormalities  
Eyes: Anicteric sclera. Pupils are   
equally round and reactive to   
light. Extraocular movements are   
intact.  
Lungs: Lungs clear to   
auscultation. No wheezing,   
rhonchi, rales  
Heart: RRR without murmur, gallop,   
or rubs.  
Extremities: No deformities,   
edema, skin discoloration,   
clubbing or cyanosis. Good   
capillary refill.  
  
ASSESSMENT/PLAN:  
1. Primary hypertension - ICD9:   
401.9, ICD10: I10 (primary   
diagnosis)  
- good control  
- Recommended regular aerobic   
exercise.  
- Recommend home blood pressure   
monitoring, to bring results in on   
next visit  
- Goal of BP <130/80  
  
2. Controlled type 2 diabetes   
mellitus without complication,   
without long-term current use of   
insulin (HCC) - ICD9: 250.00,   
ICD10: E11.9  
Advised to check sugars more often  
  
3. Diverticulitis - ICD9: 562.11,   
ICD10: K57.92  
She has been told that this is   
subacute and she might need   
surgery in the future. She is   
going to follow-up with surgeon to   
call team. Developed candidiasis   
for which I gave her Diflucan to   
take after her antibiotics.  
Cynthia Douglas MD  
  
  
Electronically signed by Cynthia Douglas MD at 2023 5:52 PM   
EST  
documented in this encounter            [facility]  
   
                                                    2023 Miscellaneous   
Notes                                   Formatting of this note is   
different from the original.  
Patient has been identified by   
name and date of birth: NO  
Patient phones for refill(s):  
Requested Prescriptions  
  
Pending Prescriptions Disp Refills  
potassium chloride 20 mEq TbER 180   
tablet 1  
Sig: Take 1 tablet by mouth twice   
daily.  
  
Date of last office visit in   
primary care: 2022  
Last 2 Encounter Wt Readings:  
Date: Wt:  
2022 137.7 kg (303 lb 9.6   
oz)  
2022 132.9 kg (293 lb)  
Previous labs/tests for   
medication: Not applicable  
Please advise. Thank you. Kellie Walls LPN  
  
Electronically signed by Kellie Walls LPN at 2023 2:21 PM   
EST  
documented in this encounter            [facility]  
   
                                                    2023 Miscellaneous   
Notes                                   Formatting of this note might be   
different from the original.  
Patient calling, she has noticed   
since doubling her potassium she   
is having some constipation.   
States that she bought miralax but   
has not taken any. Wondering if it   
is ok to start with that. She is   
having small bowel movements and   
passing gas. Please advise.  
Electronically signed by Gayla Skaggs LPN at 2023 11:10 AM   
EST  
documented in this encounter            [facility]  
   
                                                    2023 Miscellaneous   
Notes                                   Formatting of this note might be   
different from the original.  
Left below results on identified   
vm.  
Helen Zollinger LPN  
  
Electronically signed by Helen E Zollinger LPN at 2023 1:09   
PM EST  
Formatting of this note might be   
different from the original.  
----- Message from Cynthia Douglas MD sent at 2023 5:43 PM EST   
-----  
Hba1c is a little better navdeep   
and the lipids and thyroid are   
stable  
Electronically signed by Helen E Zollinger LPN at 2023 1:07   
PM EST  
documented in this encounter            [facility]  
   
                                                    2022 Miscellaneous   
Notes                                   Formatting of this note might be   
different from the original.  
Pt is calling for a refill for   
atenolol  
  
LOV: 22  
NOV: 3/27/23  
Last Refill: 22 #90 3   
refills--this order was canceled   
bc pt was going to increase med to   
100mg then her BP's were in normal   
range so she continued at 50mg but   
order was already canceled at   
pharmacy so now pt needs a refill.  
  
Pt reports she is still getting   
good readings with her BP, states   
systolic has not been over 120.  
  
Casie Arellano LPN  
  
  
Electronically signed by Casie Arellano LPN at 2022 12:19 PM   
EST  
documented in this encounter            [facility]  
   
                                        2022 Instructions   
  
  
NIC Tyson.CNP -   
2022 12:25 PM ESTFormatting   
of this note might be different   
from the original.  
COLD AND SINUS PATIENT   
INSTRUCTIONS  
  
AS A DECONGESTANT  
Use a nasal saline 3 times daily  
Directions; 1-2 squirts in each   
nostril  
Saline suggestions; Ocean Spray or   
Little Noses or Salt and Water  
  
AT BEDTIME TO HELP WITH CONGESTION  
Use a cool mist vaporizer  
May use Vicks Vapor Rub on the   
chest  
Elevate the head to aid with   
coughing post nasal drip  
  
ACUTE SINUSITIS OVERVIEW  
Rhinosinusitis, or more commonly   
sinusitis, is the medical term for   
inflammation (swelling) of the   
lining of the sinuses and nose.   
The sinuses are the hollow areas   
within the facial bones that are   
connected to the nasal openings.   
The sinuses are lined with mucous   
membranes, similar to the inside   
of the nose.  
There are two main types of   
sinusitis: acute and chronic.   
Acute sinusitis is inflammation   
that lasts for less than four   
weeks while chronic sinusitis   
lasts for more than 12 weeks.   
Acute sinusitis is common,   
affecting approximately one   
million people per year in the   
[place].  
  
ACUTE SINUSITIS CAUSES  
The most common cause of acute   
sinusitis is a viral infection   
associated with the common cold.   
Bacterial sinusitis occurs much   
less commonly, in only 0.5 to 2   
percent of cases, usually as a   
complication of viral sinusitis.  
Because antibiotics are effective   
only against bacterial, and not   
viral, infections, most people do   
not need antibiotics for acute   
sinusitis.  
  
ACUTE SINUSITIS SYMPTOMS  
Symptoms of acute sinusitis   
include:  
Nasal congestion or blockage  
Thick, yellow to green discharge   
from the nose  
Pain in the teeth  
Pain or pressure in the face that   
is worse when bending forwards  
Other acute sinusitis symptoms can   
include fever (temperature greater   
than 100.4 F or 38 C), fatigue,   
cough, difficulty or inability to   
smell, ear pressure or fullness,   
headache, and bad breath.  
In most cases, these symptoms   
develop over the course of one day   
and begin to improve within seven   
to 10 days.  
  
DO I NEED TO BE EXAMINED?  
It is difficult to know if you   
have a viral or bacterial sinus   
infection initially. However, most   
people with a viral infection   
improve without treatment within   
seven to 10 days after symptoms   
begin. Bacterial sinusitis also   
sometimes improves without   
treatment, although it can also   
worsen and require treatment.  
If one or more of the following   
bothersome symptoms last more than   
seven days, an examination by a   
healthcare provider is   
recommended:  
Thick, yellow to green discharge   
from the nose  
Face or tooth pain, especially if   
it is only on one side  
Tenderness over the maxillary   
sinuses (located on the left and   
right side of the nose, inside the   
cheekbones)  
Symptoms that initially improve   
and then worsen  
  
WHEN TO SEEK IMMEDIATE HELP  
If you have one or more of the   
following symptoms, you should   
seek medical attention immediately   
(even if symptoms have been   
present for less than seven days):  
High fever (>102.5 F or 39.2 C)  
Sudden, severe pain in the face or   
head  
Double vision or difficulty seeing  
Confusion or difficulty thinking   
clearly  
Swelling or redness around one or   
both eyes  
Stiff neck, shortness of breath  
  
ACUTE SINUSITIS TREATMENT  
Initial treatment of a sinus   
infection aims to relieve symptoms   
since almost everyone will improve   
within the first seven to 10 days.   
Experts recommend avoiding   
antibiotics during this time   
unless there is clear evidence of   
a severe bacterial infection.  
  
INITIAL TREATMENT  
Pain relief -- Non-prescription   
pain medications, such as   
acetaminophen (eg, Tylenol ) or   
ibuprofen (eg, Motrin , Advil )   
are recommended for pain.  
Nasal irrigation and saline sprays   
-- Rinsing the nose with a   
salt-water (saline) solution is   
called nasal irrigation or nasal   
lavage. Saline is also available   
in a standard nasal spray,   
although this is not as effective   
as using larger amounts of water   
in an irrigation.  
Nasal irrigation is particularly   
useful for treating drainage down   
the back of the throat, sneezing,   
nasal dryness, and congestion. The   
treatment helps by rinsing out   
allergens and irritants from the   
nose. Saline rinses also clean the   
nasal lining and can be used   
before applying sprays containing   
medications, to get a better   
effect from the medication.  
Nasal lavage with warmed saline   
can be performed as needed, once   
per day, or twice daily for   
increased symptoms. Nasal lavage   
carries few risks when performed   
correctly. Saline nasal sprays and   
irrigation kits can be purchased   
over-the-counter. Saline mixes can   
also be purchased or patients can   
make their own solution.  
A variety of devices, including   
bulb syringes, Neti pots, and   
bottle sprayers, may be used to   
perform nasal lavage; instructions   
for nasal lavage are provided in   
the table. At least 200 mL (about   
3/4 cup) of fluid is recommended   
for each nostril.  
Nasal decongestants -- Nasal   
decongestant sprays, including   
oxymetazoline (Afrin ) and   
phenylephrine (Jesse-synephrine )   
can be used to temporarily treat   
congestion. However, these sprays   
should not be used for more than   
two to three days due to the risk   
of rebound congestion (when the   
nose is congested constantly   
unless the medication is used   
repeatedly).  
Other treatments -- Other   
treatments for congestion, such as   
oral antihistamines (such as   
diphenhydramine/Benadryl ) or zinc   
supplements are not proven to   
improve symptoms of sinusitis and   
can have unwanted side effects.   
Medications to thin secretions   
(such as guaifenesin) may help to   
clear mucus.  
Secondline treatment -- If   
symptoms have not improved in   
seven to ten days, you should   
arrange for medical evaluation.   
You may need further treatment.  
Nasal glucocorticoids -- Nasal   
glucocorticoids (steroids   
delivered by a nasal spray) can   
help to reduce swelling inside the   
nose, usually within two to three   
days. These drugs have few side   
effects and dramatically relieve   
symptoms in most people.  
There are a number of nasal   
glucocorticoids available by   
prescription. These drugs are all   
effective, but differ in how   
frequently they must be used and   
how much they cost.  
You may need to use a nasal   
decongestant for a few days before   
starting a nasal glucocorticoid to   
reduce nasal swelling; this will   
allow the nasal glucocorticoid to   
reach more areas of the nasal   
passages  
Do I need an antibiotic? -- If   
bothersome symptoms of sinusitis   
persist for 10 or more days, it is   
possible that you have bacterial   
sinusitis. The need for   
antibiotics depends upon the   
severity of your symptoms.  
Mild symptoms -- There are two   
possible treatment options if you   
have mild sinusitis symptoms:   
treat with antibiotics or continue   
to watch and wait for one week.  
Watching and waiting is a   
reasonable option because up to 75   
percent of people with bacterial   
sinusitis improve within one month   
without antibiotics. During the   
watch and wait period, treatments   
to improve symptoms are   
recommended.  
If symptoms worsen or do not   
improve after watching and   
waiting, treatment with an   
antibiotic is usually recommended.   
Treatments to relieve symptoms are   
recommended while using   
antibiotics.  
Moderate or severe symptoms --   
Most healthcare providers will   
prescribe an antibiotic for   
moderate to severe symptoms   
(temperature >38.3 C or 101 F   
and/or severe pain that interferes   
with usual activities).  
Treatments to relieve symptoms are   
also recommended during antibiotic   
treatment.  
One of the least expensive and   
most effective antibiotics for   
sinusitis is amoxicillin. An   
alternate antibiotic will be   
prescribed if you are allergic to   
penicillin. Regardless of which   
antibiotic is prescribed, it is   
important to follow the dosing   
instructions carefully and to   
finish the entire course of   
treatment. Taking the medication   
less often than prescribed or   
stopping the medication early can   
lead to complications, such as a   
recurrent infection.  
What if I do not improve with   
treatment? -- If you do not   
improve or worsen after a course   
of antibiotics, you should be   
re-examined.  
In some cases, symptoms of   
sinusitis improve but then recur.   
This is usually because the   
infection was not completely   
eliminated by the antibiotic. An   
alternate antibiotic, extended   
antibiotic treatment, and/or   
further testing may be   
recommended, depending upon your   
individual situation.  
Electronically signed by Jasbir Govea APRN.CNP at 2022   
12:25 PM EST  
  
documented in this encounter            [facility]  
   
                                                    2022 History of   
Present illness Narrative               Formatting of this note is   
different from the original.  
Subjective  
HPI  
Nontoxic-appearing female presents   
urgent care chief complaint cough   
and chest congestion. Duration of   
symptoms 10 days. Associated   
symptoms cough chest congestion   
sinus pressure. States initially   
she did have body aches chills   
sore throat headache. Son had   
similar signs and symptoms. Fever   
body aches and chills headache and   
sore throat has since subsided.   
Presents today with new worsening   
sinus pressure. No OTC medications   
used today. Denies any fever body   
aches chills productive cough   
chest pain shortness of breath   
pleuritic pain hemoptysis nausea   
vomiting abdominal pain change in   
bowel or bladder habits. Past   
medical history prescription   
medication use and allergies   
reviewed.  
  
.Patient presents with:  
Cough: Cough, chest congestion and   
scratchy throat x 10 days  
  
PAST MEDICAL HISTORY  
Diagnosis Date  
Abnormal mammogram 2021  
Achilles tendon pain 2015  
Ankle weakness 2015  
Chronic pain of left ankle   
12/15/2016  
Colon abnormality 2014  
Thickening of the ascending colon   
seen on the recent CT scan.   
Patient has had a colonoscopy   
around 4 years ago. Records were   
obtained for when they were done,   
4 years ago , in [facility]. her colonoscopy was   
completely normal, no thickening,   
and the biopsy too was normal   
except for lymphoid aggregates.  
DJD (degenerative joint disease),   
ankle and foot 2016  
DM (diabetes mellitus) (HCC)  
GERD (gastroesophageal reflux   
disease)  
Gross hematuria 05/15/2014  
2014, had hematuria, investigated   
extensively along with cytoscopy,   
a stone was found but no signs of   
any malignancy.  
Heel pain, chronic 12/15/2016  
Hepatic steatosis 10/03/2017  
Hiatal hernia 10/03/2017  
Hypertension  
Insomnia  
Kidney stone 05/15/2014  
Morbid obesity (HCC)  
Nephrolithiasis  
Neuritis 2016  
Renal lesion 05/15/2014  
S/P Achilles tendon repair   
2015 sx done  
  
  
PAST SURGICAL HISTORY  
Procedure Laterality Date  
BREAST LUMPECTOMY HX Right   
BX BREAST W/DEVICE 1ST LESION   
STEREOTACTIC GUID Right 2021  
CHOLECYSTECTOMY   
gallbladder removal  
COLONOSCOPY   
CT ABDOMEN 2014  
PAST SURGICAL HISTORY OF   
2014  
D&C hysteroscopy  
PAST SURGICAL HISTORY OF Left   
2015  
Left foot surgery  
S PK LAVH [id number] 10/16/2014  
  
ALLERGIES Silvadene [Silver   
Sulfadiazine] and Sulfa   
(Sulfonamide Antibiotics)  
  
MEDICATIONS  
chlorthalidone (HYGROTON) 25 mg   
tablet Take 0.5 tablets by mouth   
once daily.  
potassium chloride 20 mEq TbER   
Take 1 tablet by mouth twice   
daily.  
metFORMIN ER (GLUCOPHAGE XR) 500   
mg 24 hr tablet Take 1 tablet by   
mouth daily with breakfast.  
glipiZIDE (GLUCOTROL XL) 2.5 mg 24   
hr tablet Take 1 tablet by mouth   
once daily.  
blood sugar diagnostic (BLOOD   
GLUCOSE TEST) test strip Test   
blood sugars 2daily.   
Dx:ucnontrolled diabetes .   
Insulin: Yes  
ibuprofen (MOTRIN) 800 mg tablet   
Take 1 tablet by mouth every 8   
hours as needed for pain. Take   
with food.  
exemestane (AROMASIN) 25 mg tablet   
Take 1 tablet by mouth once daily.  
ascorbic acid, vitamin C, (VITAMIN   
C) 500 mg tablet Take 500 mg by   
mouth once daily.  
lancets (ONE TOUCH DELICA) 33   
gauge misc Test blood sugar(s) 2   
daily. Dx: Type 2 DM - Controlled   
E11.9 Insulin: Yes  
Blood-Glucose Meter monitoring kit   
Glucose Meter of Choice - Kit -   
Dx: Type 2 DM - Uncontrolled   
E11.65  
MV-MN/FOLIC ACID/CALCIUM/VIT K   
(ONE-A-DAY WOMEN'S 50 PLUS ORAL)   
Take 1 tablet by mouth once daily.  
  
atenolol (TENORMIN) 100 mg tablet   
Take 1 tablet by mouth once daily.  
Vitamin E, dl, acetate, (VITAMIN   
E) 100 unit capsule Take 100 Units   
by mouth once daily.  
polyethylene glycol 3350 (MIRALAX,   
GLYCOLAX) 17 gram/dose powder Use   
as directed for Miralax / Gatorade   
Bowel Prep Kit  
Gatorade Sports Drink Use as   
directed for Miralax / Gatorade   
Bowel Prep Kit  
cholecalciferol, vitamin D3,   
(VITAMIN D3 ORAL) Take 1 tablet by   
mouth once daily.  
  
FAMILY HISTORY  
Problem Relation Age of Onset  
Ovarian cancer Mother 39  
Heart Father  
Hypertension Father  
Prostate Cancer Father 78  
other (kidney stones) Brother  
Hypertension Brother  
Diabetes Brother  
Seizures Son  
Breast Cancer Other 55  
Double first cousin (daughter of   
mother's sister and father's   
brother)  
  
Social History  
  
Tobacco Use  
Smoking status: Former  
Packs/day: 1.00  
Years: 10.00  
Pack years: 10.00  
Types: Cigarettes  
Quit date: 5/15/2007  
Years since quitting: 15.5  
Smokeless tobacco: Never  
Substance Use Topics  
Alcohol use: No  
Drug use: Yes  
Comment: marijuana for insomnia -   
currently not using  
  
/74   Pulse 65   Temp 36.4 C   
(97.5 F) (Tympanic)   Resp 18   Wt   
(!) 137.7 kg (303 lb 9.6 oz)     
SpO2 97%   BMI 50.52 kg/m  
  
Review of Systems  
Constitutional: Negative for   
chills, fever and malaise/fatigue.  
HENT: Positive for congestion and   
sinus pain. Negative for ear   
discharge, ear pain and sore   
throat.  
Eyes: Negative for blurred vision,   
pain, discharge and redness.  
Respiratory: Positive for cough.   
Negative for hemoptysis, sputum   
production, shortness of breath,   
wheezing and stridor.  
Cardiovascular: Negative for chest   
pain.  
Gastrointestinal: Negative for   
abdominal pain, diarrhea, nausea   
and vomiting.  
Musculoskeletal: Negative for   
myalgias.  
Skin: Negative for itching and   
rash.  
Neurological: Negative for   
dizziness and headaches.  
  
  
Objective  
Physical Exam  
Constitutional:  
General: She is not in acute   
distress.  
Appearance: She is not   
diaphoretic.  
HENT:  
Head: Normocephalic.  
Right Ear: Tympanic membrane, ear   
canal and external ear normal.  
Left Ear: Tympanic membrane, ear   
canal and external ear normal.  
Nose:  
Right Sinus: Frontal sinus   
tenderness present.  
Left Sinus: Frontal sinus   
tenderness present.  
Mouth/Throat:  
Lips: Pink.  
Mouth: Mucous membranes are moist.  
Pharynx: Oropharynx is clear. No   
pharyngeal swelling, oropharyngeal   
exudate, posterior oropharyngeal   
erythema or uvula swelling.  
Eyes:  
Conjunctiva/sclera: Conjunctivae   
normal.  
Pupils: Pupils are equal, round,   
and reactive to light.  
Cardiovascular:  
Rate and Rhythm: Normal rate and   
regular rhythm.  
Heart sounds: Normal heart sounds.  
Pulmonary:  
Effort: Pulmonary effort is   
normal. No tachypnea, accessory   
muscle usage or respiratory   
distress.  
Breath sounds: Normal breath   
sounds. No stridor.  
Abdominal:  
Palpations: Abdomen is soft.  
Tenderness: There is no abdominal   
tenderness.  
Musculoskeletal:  
Cervical back: Normal range of   
motion and neck supple. No   
rigidity or tenderness.  
Lymphadenopathy:  
Cervical: No cervical adenopathy.  
Skin:  
General: Skin is warm and dry.  
Neurological:  
Mental Status: She is alert and   
oriented to person, place, and   
time.  
  
ASSESSMENT/PLAN:  
1. Sinobronchitis - ICD9: 473.9,   
490, ICD10: J32.9, J40  
  
Patient was diagnosed with   
sinobronchitis. Does have a cough.   
No advantageous lung sounds was   
noted. Maxillary sinus pressure.   
More pronounced frontal sinus   
pressure with palpation. Will be   
placed on doxycycline. Has   
tolerated this in the past. Red   
flags for prompt reevaluation   
discussed. Patient was educated on   
supportive therapies. Patient will   
follow up with primary care   
provider as needed. Patient was   
instructed to immediately proceed   
to emergency room for any new,   
worsening, or symptoms lasting   
longer than anticipated. The   
patient's clinical presentation is   
otherwise unremarkable at this   
time. Based on exam and clinical   
finding, the patient is stable for   
discharge. Plan of care was   
discussed with patient. Patient   
verbalizes understanding and   
agrees to plan of care. This note   
was generated using Dragon   
software. It may contain errors in   
wording, punctuation, or spelling.  
  
Jasbir Govea APRN.CNP  
  
  
Electronically signed by Jasbir Govea APRN.CNP at 2022   
12:32 PM EST  
documented in this encounter            [facility]  
   
                                                    2022 Miscellaneous   
Notes                                   Formatting of this note might be   
different from the original.  
Noted.  
Sherly An APRN.CNP  
  
Electronically signed by Sherly An APRN.CNP at 2022 3:25   
PM EDT  
Formatting of this note might be   
different from the original.  
Patient notified, those BP   
readings are without doubling up   
the medication. Advised patient to   
only take one of the atenolol and   
monitor BP and update with any   
changes.  
Electronically signed by Manisha Jesus Ma at 2022 1:16 PM EDT  
Formatting of this note might be   
different from the original.  
Both of those blood pressures are   
normal and actually very good. If   
she is concerned, she can take the   
50 mg of atenolol and update us   
with how her blood pressure is   
doing.  
Thank you  
Sherly An APRN.CNP  
  
Electronically signed by Sherly An APRN.CNP at 2022   
12:44 PM EDT  
Formatting of this note might be   
different from the original.  
Patient reports she was instructed   
yesterday, by Np, to increase her   
atenolol from 50 mg to 100 mg, and   
decreased the chlorthalidone in   
half from 25 mg to 12.5 mg.   
Patient reports her BP last night   
was 120/74 (68), and this morning   
114/63 (68). She is worried the   
increase atenolol would drop it   
too low. Patient has not yet taken   
the atenolol 100 mg. Will wait for   
provider reply.  
Electronically signed by PHU Calderón RN at 2022 11:12 AM   
EDT  
documented in this encounter            [facility]  
   
                                                    2022 History of   
Present illness Narrative               Formatting of this note is   
different from the original.  
POPULATION HEALTH NAVIGATION   
OUTREACH  
  
Action/FYI  
  
PCP appointment needed: NO, has   
appointment scheduled on   
2022 and 3/27/2023  
  
HCC Gaps: N/A  
  
Care Gaps to Address:  
  
COLORECTAL CANCER SCREENING -   
colonoscopy ordered on 2021  
BP CONTROLLED (<130/80) - had    
PCP visit  
DILATED RETINAL EXAM - due   
2022  
Advance Directives Needed  
  
Outcomes:  
Spoke to patient who declined to   
schedule and ended call.  
Message already added to   
appointment notes.  
  
  
Pt identified by name and :   
YES, via phone  
  
Outreach Outcome/Action  
Spoke to patient or caregiver:   
Patient declined  
  
Did you use a PCP flex slot to   
schedule this appointment?  
N/A  
  
Reason for Outreach  
Care Gap or Scheduling/Wellness   
visits  
  
Payer:  
Payor: [facility] MEDICARE / Plan: [facility]   
DUAL COMPLETE HMO SNP / Product   
Type: Medicare /  
  
Care Gap Reviewed::  
Colorectal Cancer Screening  
Diabetic Eye Exam  
  
Reminder: Reminder note to check   
Health Maintenance for items below  
  
Health Maintenance items due:  
COVID-19 VACCINE(1) Never done  
PNEUMOCOCCAL(1 - PCV) Never done  
HIV SCREENING Never done  
SHINGRIX VACCINE(1 of 2) Never   
done  
PAP TESTING due on 2019  
HPV TESTING due on 2019  
COLORECTAL CANCER SCREENING due on   
2021  
DEPRESSION ASSESSMENT Never done  
BP CONTROLLED (<130/80) due on   
10/26/2022  
DILATED RETINAL EXAM due on   
2022  
MAMMOGRAM due on 2023  
  
Message Sent to Practice: No  
Navigation Signature:  
  
Itzel Stone MA  
November 3, 2022 7:30 AM  
  
  
Electronically signed by Itzel Stone MA at 2022 12:04 PM   
EDT  
documented in this encounter            [facility]  
   
                                                    10- Miscellaneous   
Notes                                   Formatting of this note might be   
different from the original.  
Left detailed message on secure   
VM.  
Electronically signed by Manisha Jesus Ma at 10/06/2022 8:53 AM EDT  
Formatting of this note might be   
different from the original.  
Please let patient know her   
potassium level has improved but   
is still low. I increased her   
potassium level to 2 tablets in AM   
and 1 tablet in PM. We can recheck   
potassium level in 2-4 weeks.  
Thank you  
Sherly An APRN.CNP  
  
Electronically signed by Sherly An APRN.CNP at 10/06/2022 8:11   
AM EDT  
documented in this encounter            [facility]  
   
                                                    2022 Miscellaneous   
Notes                                   Formatting of this note is   
different from the original.  
Patient has been identified by   
name and date of birth: Yes  
Patient phones for refill(s):  
Requested Prescriptions  
  
Pending Prescriptions Disp Refills  
atenolol (TENORMIN) 50 mg tablet   
90 tablet 3  
Sig: Take 1 tablet by mouth once   
daily.  
metFORMIN ER (GLUCOPHAGE XR) 500   
mg 24 hr tablet 180 tablet 3  
Sig: Take 1 tablet by mouth daily   
with breakfast.  
potassium chloride (K-TAB) 10 mEq   
tablet 90 tablet 3  
Sig: Take 1 tablet by mouth daily   
with breakfast.  
chlorthalidone (HYGROTON) 25 mg   
tablet 90 tablet 3  
Sig: Take 1 tablet by mouth once   
daily.  
glipiZIDE XL (GLUCOTROL XL) 2.5 mg   
24 hr tablet 90 tablet 3  
Sig: Take 1 tablet by mouth once   
daily.  
blood sugar diagnostic (BLOOD   
GLUCOSE TEST) test strip 50 Strip   
11  
Sig: Test blood sugars 2daily.   
Dx:ucnontrolled diabetes .   
Insulin: Yes  
ibuprofen (MOTRIN) 800 mg tablet   
45 tablet 1  
Sig: Take 1 tablet by mouth every   
8 hours as needed for pain. Take   
with food.  
  
Date of last office visit in   
primary care: 22  
Last 2 Encounter Wt Readings:  
Date: Wt:  
2022 132.9 kg (293 lb)  
2022 137.9 kg (304 lb)  
Previous labs/tests for   
medication: Diabetes:  
Hemoglobin A1C (%)  
Date Value  
2022 7.1  
2022 6.9  
07/15/2020 7.7  
2020 7.6  
  
Hemoglobin A1C (POCT) (%)  
Date Value  
10/26/2021 7.1  
  
Blood Pressure:  
BUN (mg/dL)  
Date Value  
2022 9  
2022 15  
  
Sodium (mmol/L)  
Date Value  
2022 140  
2022 136  
Last 1 Encounter BP Readings:  
Date: BP:  
2022 132/76  
Please advise. Thank you. Kellie Walls LPN  
  
Electronically signed by Kellie Walls LPN at 2022 5:34 PM   
EDT  
documented in this encounter            [facility]  
   
                                                    2022 Miscellaneous   
Notes                                   Formatting of this note might be   
different from the original.  
Patient notified, patient has been   
taking daily, will increase to   
twice daily. Patient states that   
at times when she has a BM, pill   
appears to have not dissolved.   
please file lab order.  
Electronically signed by Manisha Jesus Ma at 2022 5:38 PM EDT  
Formatting of this note might be   
different from the original.  
Please let patient know potassium   
level is low. Has she been taking   
the 10meq supplement daily? If so   
increase to twice daily and we   
will recheck in 1 week. Also,   
Hgba1c is slightly above goal at   
7.1 Continue with eating changes   
as discussed with Dr. Douglas at her   
last appointment.  
Thank you  
NIC Barney.LUIS ENRIQUE  
  
Electronically signed by Sherly An APRN.CNP at 2022 5:17   
PM EDT  
documented in this encounter            [facility]  
   
                                                    2022 Miscellaneous   
Notes                                   Formatting of this note might be   
different from the original.  
Patient notified.  
  
Samantha Hunter LPN  
  
Electronically signed by Samantha Hunter LPN at 2022 2:12 PM   
EDT  
Formatting of this note might be   
different from the original.  
A year supply was sent in 2022.  
Electronically signed by Vikki Posada APRN.CNP at 2022   
1:59 PM EDT  
documented in this encounter            [facility]  
   
                                                    2022 Miscellaneous   
Notes                                   Formatting of this note might be   
different from the original.  
Left detailed message on   
identifiable voicemail.  
Electronically signed by   
Rosemary Irvin LPN at   
2022 1:34 PM EDT  
Formatting of this note might be   
different from the original.  
Thank you for calling,  
  
Yes, we need her to give us labs.,   
orders are placed  
Electronically signed by Cynthia Douglas MD at 2022 1:22 PM   
EDT  
Formatting of this note might be   
different from the original.  
Patient is scheduled to see Dr. Douglas on 22. She would like   
to know if she is to receive lab   
work prior to the visit. There are   
currently no active orders in the   
patient's chart. Please contact   
the patient to advise on plan of   
care. Thank you.  
Electronically signed by Mita Parish at 2022 9:56 AM EDT  
documented in this encounter            [facility]  
   
                                                    2022 History of   
Present illness Narrative                 
  
  
Formatting of this note is   
different from the original.  
POPULATION HEALTH NAVIGATION   
OUTREACH  
  
Action/FYI  
Called and left a message to call   
[phone], to discuss health   
maintenance items that are due.  
Sent My Chart message.  
PCP appt: 22  
My chart activation: active  
Advance directive: needs info  
  
HM due:  
CRS  
FELIX  
AD  
  
  
  
Pt identified by name and : NO  
  
Outreach Outcome/Action  
Unable to reach patient: Left   
message  
MyChart message sent  
  
Did you use a PCP flex slot to   
schedule this appointment?  
N/A  
  
Reason for Outreach  
Care Gap or Scheduling/Wellness   
visits  
  
Payer:  
Payor: [facility] MEDICARE / Plan: [facility]   
DUAL COMPLETE HMO SNP / Product   
Type: Medicare /  
  
Care Gap Reviewed::  
Colorectal Cancer Screening  
Diabetic Eye Exam  
  
Reminder: Reminder note to check   
Health Maintenance for items below  
  
Health Maintenance items due:  
PNEUMOCOCCAL(1 - PCV) Never done  
HIV SCREENING Never done  
PAP TESTING due on 2019  
HPV TESTING due on 2019  
COLORECTAL CANCER SCREENING due on   
2021  
  
Message Sent to Practice: No  
Navigation Signature:  
  
Shayna Mai MA  
2022 1:51 PM  
  
  
  
Electronically signed by Shayna Mai MA at 2022 1:51 PM   
EDTdocumented in this encounter         [facility]  
   
                                                    2022 Miscellaneous   
Notes                                     
  
  
Formatting of this note is   
different from the original.  
Patient's request for medication   
is as follows  
  
Signed Prescriptions Disp Refills  
exemestane (AROMASIN) 25 mg tablet   
80 tablet 3  
Sig: Take 1 tablet by mouth once   
daily.  
GABRIELA: No  
Authorizing Provider: JACQUELYN MILNER  
  
Order entered - please phone   
pharmacy and notify patient.  
  
Jacquelyn Milner MD  
  
  
  
Electronically signed by Jacquelyn Milner MD at 2022 7:17 AM   
EDTdocumented in this encounter         [facility]  
   
                                                    2022 Miscellaneous   
Notes                                     
  
  
Formatting of this note is   
different from the original.  
Patient has been identified by   
name and date of birth: Yes  
Patient phones for refill(s):  
Pending Prescriptions Disp Refills  
IBUPROFEN 800 MG TABLET 45 tablet   
1  
Sig: Take 1 tablet by mouth every   
8 hours as needed for pain. Take   
with food.  
GABRIELA: No  
  
  
Date of last office visit in   
primary care: 3/30/22  
Last 2 Encounter Wt Readings:  
Date: Wt:  
2022 137.9 kg (304 lb)  
10/26/2021 136.1 kg (300 lb)  
Previous labs/tests for   
medication: Not applicable  
Please advise. Thank you. Kellie Walls LPN  
  
  
  
  
Electronically signed by Kellie Walls LPN at 2022 9:39 AM   
EDTdocumented in this encounter         [facility]  
   
                                                    2022 History of   
Present illness Narrative                 
  
  
Formatting of this note is   
different from the original.  
Reason for Visit  
Patient presents with:  
Established Patient: 3month follow   
up  
  
Navdeep Guillen is a 62 year old   
female who presents here today for   
Above Complaints..  
  
Health Maintenance  
HIV SCREENING  
PAP TESTING  
HPV TESTING  
COLORECTAL CANCER SCREENING  
  
HPI  
  
Patient had a fall on the ice In   
, for 6 weeks she was   
struggling with pain. She   
alternated ice and heat. And only   
now she is finally feeling better   
and so [facility], she plans to   
walk.. she does enjoy walking ,   
would like to swim some...  
  
Diabetes Mellitus: Patient does   
check her sugars in the morning   
when she eats and then at night   
when they get up and at night, she   
is 165, average fasting and even   
after food. In the morning after   
she eats she takes it. This   
morning her fasting was around   
156. patient had a hard time with   
her daughter missing in [place].   
She is not able to walk much   
because of her arthritis in the   
feet, in the winter, but now with   
the summer coming up she is happy   
with it.. she is eating a lot of   
salads, apple oranges bananas and   
grapes, snacking on pop corn.   
There is a large scope for   
improvement and I discussed timing   
of eating, and type of food to be   
eaten, time to take care of   
herself and rest. Patient needs   
her eye exam to be done and wanted   
us to find someone for her  
  
HTN: Compliant with medications.   
Denies any chest pain,   
palpitations, or edema. No SOB.   
Doesn't check BP at home   
generally.  
Careful with diet to avoid salt,   
trying to eat more fruits and   
vegetables, exercises regularly.  
  
HPL: Reviewed test results with   
patient , takes medications   
regularly , does not report side   
effects. Conscious to avoid red   
meats, full fat dairy and its by   
products. Exercising 3 to 5 times   
a week.  
  
She has colonoscopy in [place] ,   
unsure of date but she is due for   
that.  
  
  
No problem-specific Assessment &   
Plan notes found for this   
encounter.  
  
PAST MEDICAL HISTORY  
Diagnosis Date  
Abnormal mammogram 2021  
Achilles tendon pain 2015  
Ankle weakness 2015  
Chronic pain of left ankle   
12/15/2016  
Colon abnormality 2014  
Thickening of the ascending colon   
seen on the recent CT scan.   
Patient has had a colonoscopy   
around 4 years ago. Records were   
obtained for when they were done,   
4 years ago , in [facility]. her colonoscopy was   
completely normal, no thickening,   
and the biopsy too was normal   
except for lymphoid aggregates.  
DJD (degenerative joint disease),   
ankle and foot 2016  
DM (diabetes mellitus) (HCC)  
GERD (gastroesophageal reflux   
disease)  
Gross hematuria 05/15/2014  
2014, had hematuria, investigated   
extensively along with cytoscopy,   
a stone was found but no signs of   
any malignancy.  
Heel pain, chronic 12/15/2016  
Hepatic steatosis 10/03/2017  
Hiatal hernia 10/03/2017  
Hypertension  
Insomnia  
Kidney stone 05/15/2014  
Morbid obesity (HCC)  
Nephrolithiasis  
Neuritis 2016  
Renal lesion 05/15/2014  
S/P Achilles tendon repair   
2015 sx done  
  
  
PAST SURGICAL HISTORY  
Procedure Laterality Date  
BREAST LUMPECTOMY HX Right   
BX BREAST W/DEVICE 1ST LESION   
STEREOTACTIC GUID Right 2021  
CHOLECYSTECTOMY   
gallbladder removal  
COLONOSCOPY   
CT ABDOMEN 2014  
PAST SURGICAL HISTORY OF   
2014  
D&C hysteroscopy  
PAST SURGICAL HISTORY OF Left   
2015  
Left foot surgery  
S PK LAVH [id number] 10/16/2014  
  
FAMILY HISTORY  
Problem Relation Age of Onset  
Ovarian cancer Mother 39  
Heart Father  
Hypertension Father  
Prostate Cancer Father 78  
other (kidney stones) Brother  
Hypertension Brother  
Diabetes Brother  
Seizures Son  
Breast Cancer Other 55  
Double first cousin (daughter of   
mother's sister and father's   
brother)  
  
Social History  
  
Tobacco Use  
Smoking status: Former Smoker  
Packs/day: 1.00  
Years: 10.00  
Pack years: 10.00  
Types: Cigarettes  
Quit date: 5/15/2007  
Years since quittin.8  
Smokeless tobacco: Never Used  
Substance Use Topics  
Alcohol use: No  
Drug use: Yes  
Comment: marijuana for insomnia -   
currently not using  
  
Past medical history,   
appointments, medications,   
allergies reviewed.  
Pertinent Lab/Diagnostic Studies   
are reviewed and discussed today  
  
Current Outpatient Medications:  
blood sugar diagnostic (BLOOD   
GLUCOSE TEST) test strip  
Vitamin E, dl, acetate, (VITAMIN   
E) 100 unit capsule  
polyethylene glycol 3350 (MIRALAX,   
GLYCOLAX) 17 gram/dose powder  
Gatorade Sports Drink  
dulaglutide (TRULICITY) 0.75   
mg/0.5 mL pen injector  
ibuprofen (MOTRIN) 800 mg tablet  
atenolol (TENORMIN) 50 mg tablet  
metFORMIN ER (GLUCOPHAGE XR) 500   
mg 24 hr tablet  
potassium chloride (K-TAB) 10 mEq   
tablet  
chlorthalidone (HYGROTON) 25 mg   
tablet  
glipiZIDE (GLUCOTROL XL) 2.5 mg 24   
hr tablet  
exemestane (AROMASIN) 25 mg tablet  
cholecalciferol, vitamin D3,   
(VITAMIN D3 ORAL)  
ascorbic acid, vitamin C, (VITAMIN   
C) 500 mg tablet  
lancets (ONE TOUCH DELICA) 33   
gauge misc  
Blood-Glucose Meter monitoring kit  
MV-MN/FOLIC ACID/CALCIUM/VIT K   
(ONE-A-DAY WOMEN'S 50 PLUS ORAL)  
  
Review of Systems  
CONSTITUTIONAL: No fevers, chills   
night sweats, unintended weight   
loss  
CARDIOVASCULAR: No chest pain,   
dyspnea, palpitations, orthopnea,   
PND, ankle edema.  
PULM: No dyspnea, unexplained   
cough.  
GI: No dysphagia/odynophagia,   
problematic reflux, constipation,   
diarrhea, changes in stool habits,   
hematochezia, melena.  
: No new urinary complaints,   
including dysuria, gross hematuria   
or pyuria.  
NEURO: No new balance problems,   
peripheral weakness/paresthesias   
or numbness of concern.  
  
Physical Exam  
/76 (BP Site: Left Arm, BP   
Position: Sitting, BP Cuff Size:   
Large Adult)   Pulse 75   Temp   
36.9 C (98.4 F)   Resp 16   Ht   
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 without long-term current use of   
insulin (HCC)  
  
documented in this encounter  
[facility]   
  
                                                    Diagnosis  
   
                                                      
  
  
Primary cancer of lower outer quadrant of right female breast (HCC)  
   
                                                      
  
  
Carcinoma of right breast, estrogen and progesterone receptor positive (HCC)  
  
documented in this encounter  
[facility]   
  
                                                    Diagnosis  
   
                                                      
  
  
Controlled type 2 diabetes mellitus without complication, without long-term 
current   
use of insulin (HCC)- Primary  
  
documented in this encounter  
[facility]   
  
                                                    Diagnosis  
   
                                                      
  
  
Primary cancer of lower outer quadrant of right female breast (HCC)  
   
                                                      
  
  
Carcinoma of right breast, estrogen and progesterone receptor positive (HCC)  
  
[facility] this encounter  
[facility]   
  
                                                    Diagnosis  
   
                                                      
  
  
Hypokalemia- Primary  
  
  
Hypopotassemia  
  
documented in this encounter  
[facility]   
  
                                                    Diagnosis  
   
                                                      
  
  
Type 2 diabetes mellitus without complication, without long-term current use of   
insulin (HCC)  
  
documented in this encounter  
[facility]   
  
                                                    Diagnosis  
   
                                                      
  
  
Hypokalemia  
  
  
Hypopotassemia  
  
documented in this encounter  
[facility]   
  
                                                    Diagnosis  
   
                                                      
  
  
Sinobronchitis- Primary  
  
  
Unspecified sinusitis (chronic)  
  
documented in this encounter  
[facility]   
  
                                                    Diagnosis  
   
                                                      
  
  
Primary hypertension- Primary  
  
  
Unspecified essential hypertension  
   
                                                      
  
  
Controlled type 2 diabetes mellitus without complication, without long-term 
current   
use of insulin (HCC)  
   
                                                      
  
  
Diverticulitis  
  
  
Diverticulitis of colon (without mention of hemorrhage)  
   
                                                      
  
  
Candida infection  
  
  
Candidiasis of unspecified site  
  
[facility] this encounter  
[facility]   
  
                                                    Diagnosis  
   
                                                      
  
  
Primary cancer of lower outer quadrant of right female breast (HCC)- Primary  
   
                                                      
  
  
Carcinoma of right breast, estrogen and progesterone receptor positive (HCC)  
  
documented in this encounter  
[facility]   
  
                                                    Diagnosis  
   
                                                      
  
  
Malignant neoplasm of lower-outer quadrant of right breast of female, estrogen   
receptor positive (HCC)- Primary  
   
                                                      
  
  
Abnormal mammogram  
  
  
Abnormal mammogram, unspecified  
   
                                                      
  
  
Gross hematuria  
  
[facility] this encounter  
[facility]   
  
                                                    Diagnosis  
   
                                                      
  
  
Gross hematuria- Primary  
  
documented in this encounter  
[facility]   
  
                                                    Diagnosis  
   
                                                      
  
  
Gross hematuria  
  
documented in this encounter  
[facility] [place] [facility]   
  
                                                    Diagnosis  
   
                                                      
  
  
Calcification of right breast on mammography  
   
                                                      
  
  
Diverticulosis of large intestine without perforation or abscess without 
bleeding  
  
  
Diverticulosis of colon (without mention of hemorrhage)  
   
                                                      
  
  
Abnormal CT scan, gastrointestinal tract  
  
  
Nonspecific (abnormal) findings on radiological and other examination of   
gastrointestinal tract  
  
documented in this encounter  
[facility] [place] [facility]   
  
                                                    Diagnosis  
   
                                                      
  
  
Calcification of right breast on mammography- Primary  
  
documented in this encounter  
[facility]   
  
                                                    Diagnosis  
   
                                                      
  
  
Type 2 diabetes mellitus without complication, without long-term current use of   
insulin (HCC)- Primary  
   
                                                      
  
  
Screening for osteoporosis  
  
  
Special screening for osteoporosis  
   
                                                      
  
  
Asymptomatic menopause  
   
                                                      
  
  
Primary hypertension  
  
  
Unspecified essential hypertension  
   
                                                      
  
  
Hypercholesterolemia  
  
  
Pure hypercholesterolemia  
   
                                                      
  
  
Diverticulitis  
  
  
Diverticulitis of colon (without mention of hemorrhage)  
   
                                                      
  
  
Dysuria  
   
                                                      
  
  
Vaginal yeast infection  
  
  
Candidiasis of vulva and vagina  
   
                                                      
  
  
Morbid obesity with BMI of 40.0-44.9, adult (HCC)  
  
  
Morbid obesity  
  
documented in this encounter  
[facility] [place] [facility]   
  
                                                    Diagnosis  
   
                                                      
  
  
Primary cancer of lower outer quadrant of right female breast (HCC)  
   
                                                      
  
  
Carcinoma of right breast, estrogen and progesterone receptor positive (HCC)  
  
documented in this encounter  
[facility] [place] [facility]   
  
                                                    Diagnosis  
   
                                                      
  
  
Type 2 diabetes mellitus without complication, without long-term current use of   
insulin (HCC)  
  
documented in this encounter  
[facility] [place] [facility]   
  
                                                    Diagnosis  
   
                                                      
  
  
Type 2 diabetes mellitus without complication, without long-term current use of   
insulin (HCC)  
  
documented in this encounter  
[facility] [place] [facility]   
  
                                                    Diagnosis  
   
                                                      
  
  
Primary cancer of lower outer quadrant of right female breast (HCC)  
   
                                                      
  
  
Carcinoma of right breast, estrogen and progesterone receptor positive (HCC)  
   
                                                      
  
  
Encounter for screening mammogram for malignant neoplasm of breast  
  
  
Other screening mammogram  
  
documented in this encounter  
[facility]   
  
                                                    Diagnosis  
   
                                                      
  
  
Controlled type 2 diabetes mellitus without complication, without long-term 
current   
use of insulin (HCC)- Primary  
   
                                                      
  
  
Primary hypertension  
  
  
Unspecified essential hypertension  
   
                                                      
  
  
Morbid obesity with BMI of 40.0-44.9, adult (HCC)  
  
  
Morbid obesity  
  
documented in this encounter  
[facility] [place] [facility]   
  
                                                    Diagnosis  
   
                                                      
  
  
Screening for osteoporosis  
  
  
Special screening for osteoporosis  
  
documented in this encounter  
[facility] [place] [facility]   
  
                                                    Diagnosis  
   
                                                      
  
  
Malignant neoplasm of lower-outer quadrant of right breast of female, estrogen   
receptor positive (HCC) (HCC)  
   
                                                      
  
  
Encounter for screening mammogram for high-risk patient  
  
[facility] this encounter  
[facility]   
  
                                                    Diagnosis  
   
                                                      
  
  
Left flank pain- Primary  
  
  
Abdominal pain, unspecified site  
  
documented in this encounter  
[facility] [place] [facility]   
  
                                                    Diagnosis  
   
                                                      
  
  
Malignant neoplasm of lower-outer quadrant of right breast of female, estrogen   
receptor positive (HCC)- Primary  
   
                                                      
  
  
Morbid obesity with BMI of 40.0-44.9, adult (HCC)  
  
  
Morbid obesity  
  
documented in this encounter  
[facility] [place] [facility]   
  
                                                    Diagnosis  
   
                                                      
  
  
Primary cancer of lower outer quadrant of right female breast (HCC)  
   
                                                      
  
  
Carcinoma of right breast, estrogen and progesterone receptor positive (HCC)  
  
documented in this encounter  
[facility] [place] [facility]   
  
                                                    Diagnosis  
   
                                                      
  
  
Primary cancer of lower outer quadrant of right female breast (HCC)  
   
                                                      
  
  
Carcinoma of right breast, estrogen and progesterone receptor positive (HCC)  
  
documented in this encounter  
[facility] [place] [facility]   
  
                                                    Diagnosis  
   
                                                      
  
  
Type 2 diabetes mellitus without complication, without long-term current use of   
insulin (HCC)- Primary  
   
                                                      
  
  
Primary hypertension  
  
  
Unspecified essential hypertension  
   
                                                      
  
  
Hypercholesterolemia  
  
  
Pure hypercholesterolemia  
   
                                                      
  
  
Malignant neoplasm of lower-outer quadrant of right breast of female, estrogen   
receptor positive (HCC)  
   
                                                      
  
  
Seasonal allergic rhinitis, unspecified trigger  
  
documented in this encounter  
[facility] [place] [facility]   
  
                                                    Diagnosis  
   
                                                      
  
  
Upper respiratory tract infection, unspecified type- Primary  
  
documented in this encounter  
[facility]   
  
                                                    Diagnosis  
   
                                                      
  
  
Gross hematuria- Primary  
   
                                                      
  
  
Colon abnormality  
  
  
Unspecified disorder of intestine  
   
                                                      
  
  
Preventive measure  
  
  
Unspecified prophylactic or treatment measure  
   
                                                      
  
  
Uterus disorder  
  
  
Unspecified disorder of uterus  
   
                                                      
  
  
Morbid obesity with BMI of 40.0-44.9, adult ([facility])  
  
  
Morbid obesity  
   
                                                      
  
  
Morbid obesity with BMI of 40.0-44.9, adult (HCC)- Primary  
  
  
Morbid obesity  
   
                                                      
  
  
Routine health maintenance  
  
  
Routine general medical examination at a health care facility  
   
                                                      
  
  
Colon abnormality  
  
  
Unspecified disorder of intestine  
   
                                                      
  
  
Hypertension  
  
  
Unspecified essential hypertension  
   
                                                      
  
  
Uterus disorder  
  
  
Unspecified disorder of uterus  
   
                                                      
  
  
Morbid obesity with BMI of 40.0-44.9, adult (HCC)- Primary  
  
  
Morbid obesity  
   
                                                      
  
  
Diabetes (HCC)  
  
  
Type II or unspecified type diabetes mellitus without mention of complication, 
not   
stated as uncontrolled  
   
                                                      
  
  
Hypertension  
  
  
Unspecified essential hypertension  
   
                                                      
  
  
Morbid obesity with BMI of 40.0-44.9, adult (HCC)- Primary  
  
  
Morbid obesity  
   
                                                      
  
  
Hypertension  
  
  
Unspecified essential hypertension  
   
                                                      
  
  
Prediabetes  
  
  
Other abnormal glucose  
   
                                                      
  
  
Routine health maintenance  
  
  
Routine general medical examination at a health care facility  
   
                                                      
  
  
Diabetes ([facility])  
  
  
Type II or unspecified type diabetes mellitus without mention of complication, 
not   
stated as uncontrolled  
   
                                                      
  
  
Accessory skin tags  
  
  
Other specified congenital anomaly of skin  
   
                                                      
  
  
Hypercholesterolemia  
  
  
Pure hypercholesterolemia  
   
                                                      
  
  
S/P Achilles tendon repair- Primary  
  
  
Other postprocedural status  
   
                                                      
  
  
Morbid obesity with BMI of 40.0-44.9, adult (HCC)  
  
  
Morbid obesity  
   
                                                      
  
  
Essential hypertension  
  
  
Unspecified essential hypertension  
   
                                                      
  
  
Prediabetes- Primary  
  
  
Other abnormal glucose  
   
                                                      
  
  
Essential hypertension with goal blood pressure less than 140/90  
   
                                                      
  
  
Morbid obesity with BMI of 40.0-44.9, adult (HCC)  
  
  
Morbid obesity  
   
                                                      
  
  
History of environmental allergies  
  
  
Other allergy, other than to medicinal agents  
   
                                                      
  
  
Type 2 diabetes mellitus without complication, without long-term current use of   
insulin (HCC)- Primary  
   
                                                      
  
  
Essential hypertension  
  
  
Unspecified essential hypertension  
   
                                                      
  
  
Encounter for screening mammogram for malignant neoplasm of breast  
  
  
Other screening mammogram  
   
                                                      
  
  
Pain of left heel  
  
  
Pain in limb  
   
                                                      
  
  
Type 2 diabetes mellitus without complication, without long-term current use of   
insulin (HCC)- Primary  
   
                                                      
  
  
Essential hypertension  
  
  
Unspecified essential hypertension  
   
                                                      
  
  
Encounter for screening mammogram for malignant neoplasm of breast  
  
  
Other screening mammogram  
   
                                                      
  
  
Malignant neoplasm of lower-outer quadrant of right breast of female, estrogen   
receptor positive ([facility])- Primary  
   
                                                      
  
  
Encounter for screening mammogram for high-risk patient  
  
documented in this encounter  
[facility] [place] [facility]   
  
                                                    Diagnosis  
   
                                                      
  
  
Diverticular disease- Primary  
  
  
Diverticulosis of colon (without mention of hemorrhage)  
   
                                                      
  
  
Colovesical fistula  
  
  
Intestinovesical fistula  
  
documented in this encounter  
[facility] [place] [facility]   
  
                                                    Diagnosis  
   
                                                      
  
  
Colovesical fistula- Primary  
  
  
Intestinovesical fistula  
  
documented in this encounter  
[facility] [place] [facility]   
  
                                                    Diagnosis  
   
                                                      
  
  
Colovesical fistula- Primary  
  
  
Intestinovesical fistula  
   
                                                      
  
  
Colovesical fistula  
  
  
Intestinovesical fistula  
   
                                                      
  
  
Diverticular disease  
  
  
Diverticulosis of colon (without mention of hemorrhage)  
   
                                                      
  
  
Colovesical fistula  
  
  
Intestinovesical fistula  
   
                                                      
  
  
Diverticular disease  
  
  
Diverticulosis of colon (without mention of hemorrhage)  
  
documented in this encounter  
[facility] [place] [facility]   
  
                                                    Diagnosis  
   
                                                      
  
  
Colovesical fistula- Primary  
  
  
Intestinovesical fistula  
   
                                                      
  
  
Colovesical fistula  
  
  
Intestinovesical fistula  
   
                                                      
  
  
Diverticular disease  
  
  
Diverticulosis of colon (without mention of hemorrhage)  
  
documented in this encounter  
[facility] for referral (narrative)* Diagnostic Procedure Only (Routine)
  - Pending Review  
  
                          Specialty    Diagnoses / Procedures Referred By Contac  
t Referred To Contact  
   
                                        [facility]            
  
  
Diagnoses  
  
  
Abnormal mammogram  
  
  
  
Procedures  
  
  
US BREAST LTD RT  
  
  
US BREAST UNI REAL TIME   
WITH IMAGE LIMITED                        
  
  
Francis Shell,   
  
  
[address]  
  
  
[place] [address]  
  
  
Phone: [phone]  
  
  
Fax: [phone]                         
  
  
[facility]  
  
  
[address]  
  
  
[place] [address]  
  
  
  
                          Referral ID  Status       Reason       Start   
Date                                    Expiration   
Date                                    Visits   
Requested                               Visits   
Authorized  
   
                                        [id number]            Pending   
Review                                    
  
  
Auto-Generat  
ed Referral     2023        3/3/2024        1               1  
  
  
  
  
Electronically signed by Francis Shell DO at 2023 10:13 AM EST  
  
  
* Diagnostic Procedure Only (Routine) - Authorized  
  
                          Specialty    Diagnoses / Procedures Referred By Contac  
t Referred To Contact  
   
                                        [facility]            
  
  
Diagnoses  
  
  
Abnormal mammogram  
  
  
  
Procedures  
  
  
YUAN DIAGNOSTIC RT  
  
  
DIAGNOSTIC MAMMOGRAPHY   
COMPUTER-AIDED DETCJ UNI                  
  
  
Francis Shell DO  
  
  
[address]  
  
  
[place] [address]  
  
  
Phone: [phone]  
  
  
Fax: [phone]                         
  
  
[facility]  
  
  
[address] [place]   
[address]  
  
  
  
                          Referral ID  Status       Reason       Start   
Date                                    Expiration   
Date                                    Visits   
Requested                               Visits   
Authorized  
   
                                [id number]        Authorized        
  
  
Auto-Generat  
ed Referral     2023        3/3/2024        1               1  
  
  
  
  
Electronically signed by Francis Shell DO at 2023 10:13 AM [facility] for referral (narrative)* Diagnostic Procedure Only 
  (Routine) - Closed  
  
                          Specialty    Diagnoses / Procedures Referred By Contac  
t Referred To Contact  
   
                                        [facility]            
  
  
Diagnoses  
  
  
Primary cancer of lower   
outer quadrant of right   
female breast (HCC)  
  
  
Carcinoma of right breast,   
estrogen and progesterone   
receptor positive (HCC)  
  
  
Encounter for screening   
mammogram for malignant   
neoplasm of breast  
  
  
  
Procedures  
  
  
YUAN SCREENING  
  
  
SCREENING MAMMOGRAPHY BI   
2-VIEW BREAST INC CAD                     
  
  
Jacquelyn Milner MD  
  
  
[address] [place] [address]  
  
  
Phone: [phone]  
  
  
Fax: [phone]                         
  
  
[facility]  
  
  
[address] [place]   
[address]  
  
  
  
                    Referral ID Status    Reason    Start Date Expiration Date V  
isits   
Requested                               Visits   
Authorized  
   
                                [id number]        Closed            
  
  
Auto-Generate  
d Referral      2023       3/2/2023        1               1  
  
  
  
  
Electronically signed by Jacquelyn Milner MD at 2023 10:40 AM [facility] for referral (narrative)* Diagnostic Procedure Only 
  (Routine) - Authorized  
  
                          Specialty    Diagnoses / Procedures Referred By Contac  
t Referred To Contact  
   
                                        [facility]            
  
  
Diagnoses  
  
  
Malignant neoplasm of   
lower-outer quadrant of   
right breast of female,   
estrogen receptor positive   
(HCC)  
  
  
Encounter for screening   
mammogram for high-risk   
patient  
  
  
  
Procedures  
  
  
YUAN SCREENING W GALILEA  
  
  
SCREENING DIGITAL BREAST   
TOMOSYNTHESIS BI  
  
  
SCREENING MAMMOGRAPHY BI   
2-VIEW BREAST INC CAD                     
  
  
Vikki Posada,   
NIC.CNP  
  
  
[address]  
  
  
[place] [address]  
  
  
Phone: [phone]  
  
  
Fax: [phone]                         
  
  
[facility]  
  
  
[address] [place]   
[address]  
  
  
  
                          Referral ID  Status       Reason       Start   
Date                                    Expiration   
Date                                    Visits   
Requested                               Visits   
Authorized  
   
                                [id number]        Authorized        
  
  
Auto-Generat  
ed Referral     2024       1               1  
  
  
  
  
Electronically signed by Vikki Posada APRN.CNP at 2024 9:54 AM EST  
  
  
[facility] for visit Narrative* Diagnostic Procedure Only (Routine) 
  - Closed  
  
                          Specialty    Diagnoses / Procedures Referred By Contac  
t Referred To Contact  
   
                                        [facility]            
  
  
Diagnoses  
  
  
Primary cancer of lower   
outer quadrant of right   
female breast (HCC)  
  
  
Carcinoma of right breast,   
estrogen and progesterone   
receptor positive (HCC)  
  
  
Encounter for screening   
mammogram for malignant   
neoplasm of breast  
  
  
  
Procedures  
  
  
YUAN SCREENING  
  
  
SCREENING MAMMOGRAPHY BI   
2-VIEW BREAST INC Jacquelyn Davis MD  
  
  
[address] [place] [address]  
  
  
Phone: [phone]  
  
  
Fax: [phone]                         
  
  
[facility]  
  
  
[address] [place]   
[address]  
  
  
  
                    Referral ID Status    Reason    Start Date Expiration Date V  
isits   
Requested                               Visits   
Authorized  
   
                                [id number]        Closed            
  
  
Auto-Generate  
d Referral      2023       3/2/2023        1               1  
  
  
  
[facility] for visit Narrative* Diagnostic Procedure Only (Routine) 
  - Closed  
  
                          Specialty    Diagnoses / Procedures Referred By Contac  
t Referred To Contact  
   
                                        [facility]            
  
  
Diagnoses  
  
  
Malignant neoplasm of   
lower-outer quadrant of   
right breast of female,   
estrogen receptor positive   
(HCC) (HCC)  
  
  
Encounter for screening   
mammogram for high-risk   
patient  
  
  
  
Procedures  
  
  
YUAN SCREENING W GALILEA  
  
  
SCREENING DIGITAL BREAST   
TOMOSYNTHESIS BI  
  
  
SCREENING MAMMOGRAPHY BI   
2-VIEW BREAST INC Vikki Ambrocio APRN.CNP  
  
  
[address] [place] [address]  
  
  
Phone: [phone]  
  
  
Fax: [phone]                         
  
  
[facility]  
  
  
[address] [place]   
[address]  
  
  
  
                    Referral ID Status    Reason    Start Date Expiration Date V  
isits   
Requested                               Visits   
Authorized  
   
                                [id number]        Closed            
  
  
Auto-Generate  
d Referral      10/2/2023       10/31/2024      1               1  
  
  
  
[facility] for visit Narrative* Auth/Cert (Routine)  
  
                          Specialty    Diagnoses / Procedures Referred By Contac  
t Referred To Contact  
   
                                                              
  
  
Diagnoses  
  
  
Colovesical fistula  
  
  
Diverticular disease  
  
  
  
Procedures  
  
  
UT LAPAROSCOPY COLECTOMY   
PARTIAL W/ANASTOMOSIS  
  
  
UT CYSTO W/INSERT URETERAL   
STENT  
  
  
ROBOTIC ASSISTED   
LAPAROSCOPIC SIGMOID   
COLECTOMY, POSSIBLE   
TRANSANAL EXTRACTION  
  
  
CYSTOSCOPY, WITH URETERAL   
STENT INSERTION                           
  
  
Nick Madsen MD  
  
  
[address] [facility]  
  
  
[address]  
  
  
[place] [address]  
  
  
Phone:   
tel:[phone]  
  
  
fax:[phone]                       
  
  
[facility]  
  
  
[address]  
  
  
[place] [address]  
  
  
Phone:   
tel:[phone]  
  
  
  
                Referral ID Status  Reason  Start Date Expiration Date Visits Re  
quested Visits   
Authorized  
   
                [id number]                                 1       1  
  
  
  
[facility]  
  
Summary Purpose  
  
  
                                                      
  
  
  
Family History  
No Family History Records FoundNo Family History Records FoundNo Family History 
Records FoundNo Family History Records FoundNo Family History Records FoundNo 
Family History Records Found  
  
Advance Directives  
No Advanced Directives Records Found  
  
                                Date Activated  Date Inactivated Comments  
   
                                2025 5:34 PM 2025 6:14 PM   
  
  
  
                                Date Activated  Date Inactivated Comments  
   
                                2025 10:53 AM 2025 5:34 PM   
  
  
  
                                Date Activated  Date Inactivated Comments  
   
                                2025 5:34 PM 2025 6:14 PM   
  
  
  
                                Date Activated  Date Inactivated Comments  
   
                                2025 10:53 AM 2025 5:34 PM   
  
  
  
Reason for Referral  
  
  
                          Specialty    Diagnoses / Procedures Referred By Contac  
t Referred To Contact  
   
                                        Urology               
  
  
Diagnoses  
  
  
Gross hematuria  
  
  
  
Procedures  
  
  
CONSULT TO UROLOGY  
  
  
OFFICE/OUTPATIENT NEW [facility]   
MDM 60-74 MINUTES                         
  
  
Francis Shell,   
  
  
[address] [place] [address]  
  
  
Phone: [phone]  
  
  
Fax: [phone]                         
  
  
  
  
  
                          Referral ID  Status       Reason       Start   
Date                                    Expiration   
Date                                    Visits   
Requested                               Visits   
Authorized  
   
                                [id number]        Authorized        
  
  
PCP Requested   
Referral        2023        1               1  
  
  
  
                          Specialty    Diagnoses / Procedures Referred By Contac  
t Referred To Contact  
   
                                        Ophthalmology         
  
  
Diagnoses  
  
  
Controlled type 2 diabetes   
mellitus without   
complication, without   
long-term current use of   
insulin (HCC)  
  
  
  
Procedures  
  
  
CONSULT TO OPHTHALMOLOGY  
  
  
OFFICE/OUTPATIENT NEW [facility]   
MDM 60-74 MINUTES                         
  
  
Sherly An APRN.CNP  
  
  
[address] [place] [address]  
  
  
Phone: [phone]  
  
  
Fax: [phone]                         
  
  
  
  
  
                          Referral ID  Status       Reason       Start   
Date                                    Expiration   
Date                                    Visits   
Requested                               Visits   
Authorized  
   
                                        [id number]            Pending   
Review                                    
  
  
PCP Requested   
Referral                                  
3                   2024          1                   1  
  
  
  
Additional Source Comments  
  
  
  
                                                    INFORMATION SOURCE (unrecogn  
ized section and content)  
   
                                          
  
  
  
                                        DATE CREATED        AUTHOR  
   
                                2018                      [facility]  
p[facility]  
  
  
  
                                DATE CREATED    AUTHOR          AUTHOR'S ORGANIZ  
ATION  
   
                                2018                      [facility]  
al  
  
  
  
                                DATE CREATED    AUTHOR          AUTHOR'S ORGANIZ  
ATION  
   
                                2019                      [facility]  
  
  
  
                                DATE CREATED    AUTHOR          AUTHOR'S ORGANIZ  
ATION  
   
                                2019                      [facility]  
  
  
  
                                DATE CREATED    AUTHOR          AUTHOR'S ORGANIZ  
ATION  
   
                                2024                      [facility]  
  
  
  
                                DATE CREATED    AUTHOR          AUTHOR'S ORGANIZ  
ATION  
   
                                2025                      [facility]  
[facility]  
  
  
  
  
  
                                                    Source Comments (unrecognize  
d section and content)  
   
                                                    In the event this informatio  
n is protected by the Federal Confidentiality of   
Alcohol   
and Drug Abuse Patient Records regulations: This information has been disclosed 
to   
you from records protected by Federal confidentiality rules (42 CFR Part 2). The
  
Federal rules prohibit you from making any further disclosure of this 
information   
unless further disclosure is expressly permitted by the written consent of the 
person   
to whom it pertains or as otherwise permitted by 42 CFR Part 2. A general   
authorization for the release of medical or other information is NOT sufficient 
for   
this purpose. The Federal rules restrict any use of the information to 
criminally   
investigate or prosecute any alcohol or drug abuse patient.[facility] CenterIn 
the   
event this information is protected by the Federal Confidentiality of Alcohol 
and   
Drug Abuse Patient Records regulations: This information has been disclosed to 
you   
from records protected by Federal confidentiality rules (42 CFR Part 2). The 
Federal   
rules prohibit you from making any further disclosure of this information unless
  
further disclosure is expressly permitted by the written consent of the person 
to   
whom it pertains or as otherwise permitted by 42 CFR Part 2. A general 
authorization   
for the release of medical or other information is NOT sufficient for this 
purpose.   
The Federal rules restrict any use of the information to criminally investigate 
or   
prosecute any alcohol or drug abuse patient.[facility] CenterIn the event this   
information is protected by the Federal Confidentiality of Alcohol and Drug 
Abuse   
Patient Records regulations: This information has been disclosed to you from 
records   
protected by Federal confidentiality rules (42 CFR Part 2). The Federal rules   
prohibit you from making any further disclosure of this information unless 
further   
disclosure is expressly permitted by the written consent of the person to whom 
it   
pertains or as otherwise permitted by 42 CFR Part 2. A general authorization for
the   
release of medical or other information is NOT sufficient for this purpose. The   
Federal rules restrict any use of the information to criminally investigate or   
prosecute any alcohol or drug abuse patient.[facility] CenterIn the event this   
information is protected by the Federal Confidentiality of Alcohol and Drug 
Abuse   
Patient Records regulations: This information has been disclosed to you from 
records   
protected by Federal confidentiality rules (42 CFR Part 2). The Federal rules   
prohibit you from making any further disclosure of this information unless 
further   
disclosure is expressly permitted by the written consent of the person to whom 
it   
pertains or as otherwise permitted by 42 CFR Part 2. A general authorization for
the   
release of medical or other information is NOT sufficient for this purpose. The   
Federal rules restrict any use of the information to criminally investigate or   
prosecute any alcohol or drug abuse patient.[facility] CenterIn the event this   
information is protected by the Federal Confidentiality of Alcohol and Drug 
Abuse   
Patient Records regulations: This information has been disclosed to you from 
records   
protected by Federal confidentiality rules (42 CFR Part 2). The Federal rules   
prohibit you from making any further disclosure of this information unless 
further   
disclosure is expressly permitted by the written consent of the person to whom 
it   
pertains or as otherwise permitted by 42 CFR Part 2. A general authorization for
the   
release of medical or other information is NOT sufficient for this purpose. The   
Federal rules restrict any use of the information to criminally investigate or   
prosecute any alcohol or drug abuse patient.[facility] CenterIn the event this   
information is protected by the Federal Confidentiality of Alcohol and Drug 
Abuse   
Patient Records regulations: This information has been disclosed to you from 
records   
protected by Federal confidentiality rules (42 CFR Part 2). The Federal rules   
prohibit you from making any further disclosure of this information unless 
further   
disclosure is expressly permitted by the written consent of the person to whom 
it   
pertains or as otherwise permitted by 42 CFR Part 2. A general authorization for
the   
release of medical or other information is NOT sufficient for this purpose. The   
Federal rules restrict any use of the information to criminally investigate or   
prosecute any alcohol or drug abuse patient.[facility] CenterIn the event this   
information is protected by the Federal Confidentiality of Alcohol and Drug 
Abuse   
Patient Records regulations: This information has been disclosed to you from 
records   
protected by Federal confidentiality rules (42 CFR Part 2). The Federal rules   
prohibit you from making any further disclosure of this information unless 
further   
disclosure is expressly permitted by the written consent of the person to whom 
it   
pertains or as otherwise permitted by 42 CFR Part 2. A general authorization for
the   
release of medical or other information is NOT sufficient for this purpose. The   
Federal rules restrict any use of the information to criminally investigate or   
prosecute any alcohol or drug abuse patient.[facility] CenterIn the event this   
information is protected by the Federal Confidentiality of Alcohol and Drug 
Abuse   
Patient Records regulations: This information has been disclosed to you from 
records   
protected by Federal confidentiality rules (42 CFR Part 2). The Federal rules   
prohibit you from making any further disclosure of this information unless 
further   
disclosure is expressly permitted by the written consent of the person to whom 
it   
pertains or as otherwise permitted by 42 CFR Part 2. A general authorization for
the   
release of medical or other information is NOT sufficient for this purpose. The   
Federal rules restrict any use of the information to criminally investigate or   
prosecute any alcohol or drug abuse patient.[facility] CenterIn the event this   
information is protected by the Federal Confidentiality of Alcohol and Drug 
Abuse   
Patient Records regulations: This information has been disclosed to you from 
records   
protected by Federal confidentiality rules (42 CFR Part 2). The Federal rules   
prohibit you from making any further disclosure of this information unless 
further   
disclosure is expressly permitted by the written consent of the person to whom 
it   
pertains or as otherwise permitted by 42 CFR Part 2. A general authorization for
the   
release of medical or other information is NOT sufficient for this purpose. The   
Federal rules restrict any use of the information to criminally investigate or   
prosecute any alcohol or drug abuse patient.[facility] CenterIn the event this   
information is protected by the Federal Confidentiality of Alcohol and Drug 
Abuse   
Patient Records regulations: This information has been disclosed to you from 
records   
protected by Federal confidentiality rules (42 CFR Part 2). The Federal rules   
prohibit you from making any further disclosure of this information unless 
further   
disclosure is expressly permitted by the written consent of the person to whom 
it   
pertains or as otherwise permitted by 42 CFR Part 2. A general authorization for
the   
release of medical or other information is NOT sufficient for this purpose. The   
Federal rules restrict any use of the information to criminally investigate or   
prosecute any alcohol or drug abuse patient.[facility] CenterIn the event this   
information is protected by the Federal Confidentiality of Alcohol and Drug 
Abuse   
Patient Records regulations: This information has been disclosed to you from 
records   
protected by Federal confidentiality rules (42 CFR Part 2). The Federal rules   
prohibit you from making any further disclosure of this information unless 
further   
disclosure is expressly permitted by the written consent of the person to whom 
it   
pertains or as otherwise permitted by 42 CFR Part 2. A general authorization for
the   
release of medical or other information is NOT sufficient for this purpose. The   
Federal rules restrict any use of the information to criminally investigate or   
prosecute any alcohol or drug abuse patient.[facility] CenterIn the event this   
information is protected by the Federal Confidentiality of Alcohol and Drug 
Abuse   
Patient Records regulations: This information has been disclosed to you from 
records   
protected by Federal confidentiality rules (42 CFR Part 2). The Federal rules   
prohibit you from making any further disclosure of this information unless 
further   
disclosure is expressly permitted by the written consent of the person to whom 
it   
pertains or as otherwise permitted by 42 CFR Part 2. A general authorization for
the   
release of medical or other information is NOT sufficient for this purpose. The   
Federal rules restrict any use of the information to criminally investigate or   
prosecute any alcohol or drug abuse patient.[facility] CenterIn the event this   
information is protected by the Federal Confidentiality of Alcohol and Drug 
Abuse   
Patient Records regulations: This information has been disclosed to you from 
records   
protected by Federal confidentiality rules (42 CFR Part 2). The Federal rules   
prohibit you from making any further disclosure of this information unless 
further   
disclosure is expressly permitted by the written consent of the person to whom 
it   
pertains or as otherwise permitted by 42 CFR Part 2. A general authorization for
the   
release of medical or other information is NOT sufficient for this purpose. The   
Federal rules restrict any use of the information to criminally investigate or   
prosecute any alcohol or drug abuse patient.[facility] CenterIn the event this   
information is protected by the Federal Confidentiality of Alcohol and Drug 
Abuse   
Patient Records regulations: This information has been disclosed to you from 
records   
protected by Federal confidentiality rules (42 CFR Part 2). The Federal rules   
prohibit you from making any further disclosure of this information unless 
further   
disclosure is expressly permitted by the written consent of the person to whom 
it   
pertains or as otherwise permitted by 42 CFR Part 2. A general authorization for
the   
release of medical or other information is NOT sufficient for this purpose. The   
Federal rules restrict any use of the information to criminally investigate or   
prosecute any alcohol or drug abuse patient.[facility] CenterIn the event this   
information is protected by the Federal Confidentiality of Alcohol and Drug 
Abuse   
Patient Records regulations: This information has been disclosed to you from 
records   
protected by Federal confidentiality rules (42 CFR Part 2). The Federal rules   
prohibit you from making any further disclosure of this information unless 
further   
disclosure is expressly permitted by the written consent of the person to whom 
it   
pertains or as otherwise permitted by 42 CFR Part 2. A general authorization for
the   
release of medical or other information is NOT sufficient for this purpose. The   
Federal rules restrict any use of the information to criminally investigate or   
prosecute any alcohol or drug abuse patient.[facility] CenterIn the event this   
information is protected by the Federal Confidentiality of Alcohol and Drug 
Abuse   
Patient Records regulations: This information has been disclosed to you from 
records   
protected by Federal confidentiality rules (42 CFR Part 2). The Federal rules   
prohibit you from making any further disclosure of this information unless 
further   
disclosure is expressly permitted by the written consent of the person to whom 
it   
pertains or as otherwise permitted by 42 CFR Part 2. A general authorization for
the   
release of medical or other information is NOT sufficient for this purpose. The   
Federal rules restrict any use of the information to criminally investigate or   
prosecute any alcohol or drug abuse patient.[facility] CenterIn the event this   
information is protected by the Federal Confidentiality of Alcohol and Drug 
Abuse   
Patient Records regulations: This information has been disclosed to you from 
records   
protected by Federal confidentiality rules (42 CFR Part 2). The Federal rules   
prohibit you from making any further disclosure of this information unless 
further   
disclosure is expressly permitted by the written consent of the person to whom 
it   
pertains or as otherwise permitted by 42 CFR Part 2. A general authorization for
the   
release of medical or other information is NOT sufficient for this purpose. The   
Federal rules restrict any use of the information to criminally investigate or   
prosecute any alcohol or drug abuse patient.[facility] CenterIn the event this   
information is protected by the Federal Confidentiality of Alcohol and Drug 
Abuse   
Patient Records regulations: This information has been disclosed to you from 
records   
protected by Federal confidentiality rules (42 CFR Part 2). The Federal rules   
prohibit you from making any further disclosure of this information unless 
further   
disclosure is expressly permitted by the written consent of the person to whom 
it   
pertains or as otherwise permitted by 42 CFR Part 2. A general authorization for
the   
release of medical or other information is NOT sufficient for this purpose. The   
Federal rules restrict any use of the information to criminally investigate or   
prosecute any alcohol or drug abuse patient.[facility] CenterIn the event this   
information is protected by the Federal Confidentiality of Alcohol and Drug 
Abuse   
Patient Records regulations: This information has been disclosed to you from 
records   
protected by Federal confidentiality rules (42 CFR Part 2). The Federal rules   
prohibit you from making any further disclosure of this information unless 
further   
disclosure is expressly permitted by the written consent of the person to whom 
it   
pertains or as otherwise permitted by 42 CFR Part 2. A general authorization for
the   
release of medical or other information is NOT sufficient for this purpose. The   
Federal rules restrict any use of the information to criminally investigate or   
prosecute any alcohol or drug abuse patient.[facility] CenterIn the event this   
information is protected by the Federal Confidentiality of Alcohol and Drug 
Abuse   
Patient Records regulations: This information has been disclosed to you from 
records   
protected by Federal confidentiality rules (42 CFR Part 2). The Federal rules   
prohibit you from making any further disclosure of this information unless 
further   
disclosure is expressly permitted by the written consent of the person to whom 
it   
pertains or as otherwise permitted by 42 CFR Part 2. A general authorization for
the   
release of medical or other information is NOT sufficient for this purpose. The   
Federal rules restrict any use of the information to criminally investigate or   
prosecute any alcohol or drug abuse patient.[facility] CenterIn the event this   
information is protected by the Federal Confidentiality of Alcohol and Drug 
Abuse   
Patient Records regulations: This information has been disclosed to you from 
records   
protected by Federal confidentiality rules (42 CFR Part 2). The Federal rules   
prohibit you from making any further disclosure of this information unless 
further   
disclosure is expressly permitted by the written consent of the person to whom 
it   
pertains or as otherwise permitted by 42 CFR Part 2. A general authorization for
the   
release of medical or other information is NOT sufficient for this purpose. The   
Federal rules restrict any use of the information to criminally investigate or   
prosecute any alcohol or drug abuse patient.[facility] CenterIn the event this   
information is protected by the Federal Confidentiality of Alcohol and Drug 
Abuse   
Patient Records regulations: This information has been disclosed to you from 
records   
protected by Federal confidentiality rules (42 CFR Part 2). The Federal rules   
prohibit you from making any further disclosure of this information unless 
further   
disclosure is expressly permitted by the written consent of the person to whom 
it   
pertains or as otherwise permitted by 42 CFR Part 2. A general authorization for
the   
release of medical or other information is NOT sufficient for this purpose. The   
Federal rules restrict any use of the information to criminally investigate or   
prosecute any alcohol or drug abuse patient.[facility] CenterIn the event this   
information is protected by the Federal Confidentiality of Alcohol and Drug 
Abuse   
Patient Records regulations: This information has been disclosed to you from 
records   
protected by Federal confidentiality rules (42 CFR Part 2). The Federal rules   
prohibit you from making any further disclosure of this information unless 
further   
disclosure is expressly permitted by the written consent of the person to whom 
it   
pertains or as otherwise permitted by 42 CFR Part 2. A general authorization for
the   
release of medical or other information is NOT sufficient for this purpose. The   
Federal rules restrict any use of the information to criminally investigate or   
prosecute any alcohol or drug abuse patient.[facility] CenterIn the event this   
information is protected by the Federal Confidentiality of Alcohol and Drug 
Abuse   
Patient Records regulations: This information has been disclosed to you from 
records   
protected by Federal confidentiality rules (42 CFR Part 2). The Federal rules   
prohibit you from making any further disclosure of this information unless 
further   
disclosure is expressly permitted by the written consent of the person to whom 
it   
pertains or as otherwise permitted by 42 CFR Part 2. A general authorization for
the   
release of medical or other information is NOT sufficient for this purpose. The   
Federal rules restrict any use of the information to criminally investigate or   
prosecute any alcohol or drug abuse patient.[facility] CenterIn the event this   
information is protected by the Federal Confidentiality of Alcohol and Drug 
Abuse   
Patient Records regulations: This information has been disclosed to you from 
records   
protected by Federal confidentiality rules (42 CFR Part 2). The Federal rules   
prohibit you from making any further disclosure of this information unless 
further   
disclosure is expressly permitted by the written consent of the person to whom 
it   
pertains or as otherwise permitted by 42 CFR Part 2. A general authorization for
the   
release of medical or other information is NOT sufficient for this purpose. The   
Federal rules restrict any use of the information to criminally investigate or   
prosecute any alcohol or drug abuse patient.[facility] CenterIn the event this   
information is protected by the Federal Confidentiality of Alcohol and Drug 
Abuse   
Patient Records regulations: This information has been disclosed to you from 
records   
protected by Federal confidentiality rules (42 CFR Part 2). The Federal rules   
prohibit you from making any further disclosure of this information unless 
further   
disclosure is expressly permitted by the written consent of the person to whom 
it   
pertains or as otherwise permitted by 42 CFR Part 2. A general authorization for
the   
release of medical or other information is NOT sufficient for this purpose. The   
Federal rules restrict any use of the information to criminally investigate or   
prosecute any alcohol or drug abuse patient.[facility] CenterIn the event this   
information is protected by the Federal Confidentiality of Alcohol and Drug 
Abuse   
Patient Records regulations: This information has been disclosed to you from 
records   
protected by Federal confidentiality rules (42 CFR Part 2). The Federal rules   
prohibit you from making any further disclosure of this information unless 
further   
disclosure is expressly permitted by the written consent of the person to whom 
it   
pertains or as otherwise permitted by 42 CFR Part 2. A general authorization for
the   
release of medical or other information is NOT sufficient for this purpose. The   
Federal rules restrict any use of the information to criminally investigate or   
prosecute any alcohol or drug abuse patient.[facility] CenterIn the event this   
information is protected by the Federal Confidentiality of Alcohol and Drug 
Abuse   
Patient Records regulations: This information has been disclosed to you from 
records   
protected by Federal confidentiality rules (42 CFR Part 2). The Federal rules   
prohibit you from making any further disclosure of this information unless 
further   
disclosure is expressly permitted by the written consent of the person to whom 
it   
pertains or as otherwise permitted by 42 CFR Part 2. A general authorization for
the   
release of medical or other information is NOT sufficient for this purpose. The   
Federal rules restrict any use of the information to criminally investigate or   
prosecute any alcohol or drug abuse patient.[facility] CenterIn the event this   
information is protected by the Federal Confidentiality of Alcohol and Drug 
Abuse   
Patient Records regulations: This information has been disclosed to you from 
records   
protected by Federal confidentiality rules (42 CFR Part 2). The Federal rules   
prohibit you from making any further disclosure of this information unless 
further   
disclosure is expressly permitted by the written consent of the person to whom 
it   
pertains or as otherwise permitted by 42 CFR Part 2. A general authorization for
the   
release of medical or other information is NOT sufficient for this purpose. The   
Federal rules restrict any use of the information to criminally investigate or   
prosecute any alcohol or drug abuse patient.[facility] CenterIn the event this   
information is protected by the Federal Confidentiality of Alcohol and Drug 
Abuse   
Patient Records regulations: This information has been disclosed to you from 
records   
protected by Federal confidentiality rules (42 CFR Part 2). The Federal rules   
prohibit you from making any further disclosure of this information unless 
further   
disclosure is expressly permitted by the written consent of the person to whom 
it   
pertains or as otherwise permitted by 42 CFR Part 2. A general authorization for
the   
release of medical or other information is NOT sufficient for this purpose. The   
Federal rules restrict any use of the information to criminally investigate or   
prosecute any alcohol or drug abuse patient.[facility] CenterIn the event this   
information is protected by the Federal Confidentiality of Alcohol and Drug 
Abuse   
Patient Records regulations: This information has been disclosed to you from 
records   
protected by Federal confidentiality rules (42 CFR Part 2). The Federal rules   
prohibit you from making any further disclosure of this information unless 
further   
disclosure is expressly permitted by the written consent of the person to whom 
it   
pertains or as otherwise permitted by 42 CFR Part 2. A general authorization for
the   
release of medical or other information is NOT sufficient for this purpose. The   
Federal rules restrict any use of the information to criminally investigate or   
prosecute any alcohol or drug abuse patient.[facility] CenterIn the event this   
information is protected by the Federal Confidentiality of Alcohol and Drug 
Abuse   
Patient Records regulations: This information has been disclosed to you from 
records   
protected by Federal confidentiality rules (42 CFR Part 2). The Federal rules   
prohibit you from making any further disclosure of this information unless 
further   
disclosure is expressly permitted by the written consent of the person to whom 
it   
pertains or as otherwise permitted by 42 CFR Part 2. A general authorization for
the   
release of medical or other information is NOT sufficient for this purpose. The   
Federal rules restrict any use of the information to criminally investigate or   
prosecute any alcohol or drug abuse patient.[facility] CenterIn the event this   
information is protected by the Federal Confidentiality of Alcohol and Drug 
Abuse   
Patient Records regulations: This information has been disclosed to you from 
records   
protected by Federal confidentiality rules (42 CFR Part 2). The Federal rules   
prohibit you from making any further disclosure of this information unless 
further   
disclosure is expressly permitted by the written consent of the person to whom 
it   
pertains or as otherwise permitted by 42 CFR Part 2. A general authorization for
the   
release of medical or other information is NOT sufficient for this purpose. The   
Federal rules restrict any use of the information to criminally investigate or   
prosecute any alcohol or drug abuse patient.[facility] CenterIn the event this   
information is protected by the Federal Confidentiality of Alcohol and Drug 
Abuse   
Patient Records regulations: This information has been disclosed to you from 
records   
protected by Federal confidentiality rules (42 CFR Part 2). The Federal rules   
prohibit you from making any further disclosure of this information unless 
further   
disclosure is expressly permitted by the written consent of the person to whom 
it   
pertains or as otherwise permitted by 42 CFR Part 2. A general authorization for
the   
release of medical or other information is NOT sufficient for this purpose. The   
Federal rules restrict any use of the information to criminally investigate or   
prosecute any alcohol or drug abuse patient.[facility] CenterIn the event this   
information is protected by the Federal Confidentiality of Alcohol and Drug 
Abuse   
Patient Records regulations: This information has been disclosed to you from 
records   
protected by Federal confidentiality rules (42 CFR Part 2). The Federal rules   
prohibit you from making any further disclosure of this information unless 
further   
disclosure is expressly permitted by the written consent of the person to whom 
it   
pertains or as otherwise permitted by 42 CFR Part 2. A general authorization for
the   
release of medical or other information is NOT sufficient for this purpose. The   
Federal rules restrict any use of the information to criminally investigate or   
prosecute any alcohol or drug abuse patient.[facility] CenterIn the event this   
information is protected by the Federal Confidentiality of Alcohol and Drug 
Abuse   
Patient Records regulations: This information has been disclosed to you from 
records   
protected by Federal confidentiality rules (42 CFR Part 2). The Federal rules   
prohibit you from making any further disclosure of this information unless 
further   
disclosure is expressly permitted by the written consent of the person to whom 
it   
pertains or as otherwise permitted by 42 CFR Part 2. A general authorization for
the   
release of medical or other information is NOT sufficient for this purpose. The   
Federal rules restrict any use of the information to criminally investigate or   
prosecute any alcohol or drug abuse patient.[facility] CenterIn the event this   
information is protected by the Federal Confidentiality of Alcohol and Drug 
Abuse   
Patient Records regulations: This information has been disclosed to you from 
records   
protected by Federal confidentiality rules (42 CFR Part 2). The Federal rules   
prohibit you from making any further disclosure of this information unless 
further   
disclosure is expressly permitted by the written consent of the person to whom 
it   
pertains or as otherwise permitted by 42 CFR Part 2. A general authorization for
the   
release of medical or other information is NOT sufficient for this purpose. The   
Federal rules restrict any use of the information to criminally investigate or   
prosecute any alcohol or drug abuse patient.[facility] CenterIn the event this   
information is protected by the Federal Confidentiality of Alcohol and Drug 
Abuse   
Patient Records regulations: This information has been disclosed to you from 
records   
protected by Federal confidentiality rules (42 CFR Part 2). The Federal rules   
prohibit you from making any further disclosure of this information unless 
further   
disclosure is expressly permitted by the written consent of the person to whom 
it   
pertains or as otherwise permitted by 42 CFR Part 2. A general authorization for
the   
release of medical or other information is NOT sufficient for this purpose. The   
Federal rules restrict any use of the information to criminally investigate or   
prosecute any alcohol or drug abuse patient.[facility] CenterIn the event this   
information is protected by the Federal Confidentiality of Alcohol and Drug 
Abuse   
Patient Records regulations: This information has been disclosed to you from 
records   
protected by Federal confidentiality rules (42 CFR Part 2). The Federal rules   
prohibit you from making any further disclosure of this information unless 
further   
disclosure is expressly permitted by the written consent of the person to whom 
it   
pertains or as otherwise permitted by 42 CFR Part 2. A general authorization for
the   
release of medical or other information is NOT sufficient for this purpose. The   
Federal rules restrict any use of the information to criminally investigate or   
prosecute any alcohol or drug abuse patient.[facility] CenterIn the event this   
information is protected by the Federal Confidentiality of Alcohol and Drug 
Abuse   
Patient Records regulations: This information has been disclosed to you from 
records   
protected by Federal confidentiality rules (42 CFR Part 2). The Federal rules   
prohibit you from making any further disclosure of this information unless 
further   
disclosure is expressly permitted by the written consent of the person to whom 
it   
pertains or as otherwise permitted by 42 CFR Part 2. A general authorization for
the   
release of medical or other information is NOT sufficient for this purpose. The   
Federal rules restrict any use of the information to criminally investigate or   
prosecute any alcohol or drug abuse patient.[facility] CenterIn the event this   
information is protected by the Federal Confidentiality of Alcohol and Drug 
Abuse   
Patient Records regulations: This information has been disclosed to you from 
records   
protected by Federal confidentiality rules (42 CFR Part 2). The Federal rules   
prohibit you from making any further disclosure of this information unless 
further   
disclosure is expressly permitted by the written consent of the person to whom 
it   
pertains or as otherwise permitted by 42 CFR Part 2. A general authorization for
the   
release of medical or other information is NOT sufficient for this purpose. The   
Federal rules restrict any use of the information to criminally investigate or   
prosecute any alcohol or drug abuse patient.[facility] CenterIn the event this   
information is protected by the Federal Confidentiality of Alcohol and Drug 
Abuse   
Patient Records regulations: This information has been disclosed to you from 
records   
protected by Federal confidentiality rules (42 CFR Part 2). The Federal rules   
prohibit you from making any further disclosure of this information unless 
further   
disclosure is expressly permitted by the written consent of the person to whom 
it   
pertains or as otherwise permitted by 42 CFR Part 2. A general authorization for
the   
release of medical or other information is NOT sufficient for this purpose. The   
Federal rules restrict any use of the information to criminally investigate or   
prosecute any alcohol or drug abuse patient.[facility] CenterIn the event this   
information is protected by the Federal Confidentiality of Alcohol and Drug 
Abuse   
Patient Records regulations: This information has been disclosed to you from 
records   
protected by Federal confidentiality rules (42 CFR Part 2). The Federal rules   
prohibit you from making any further disclosure of this information unless 
further   
disclosure is expressly permitted by the written consent of the person to whom 
it   
pertains or as otherwise permitted by 42 CFR Part 2. A general authorization for
the   
release of medical or other information is NOT sufficient for this purpose. The   
Federal rules restrict any use of the information to criminally investigate or   
prosecute any alcohol or drug abuse patient.[facility] CenterIn the event this   
information is protected by the Federal Confidentiality of Alcohol and Drug 
Abuse   
Patient Records regulations: This information has been disclosed to you from 
records   
protected by Federal confidentiality rules (42 CFR Part 2). The Federal rules   
prohibit you from making any further disclosure of this information unless 
further   
disclosure is expressly permitted by the written consent of the person to whom 
it   
pertains or as otherwise permitted by 42 CFR Part 2. A general authorization for
the   
release of medical or other information is NOT sufficient for this purpose. The   
Federal rules restrict any use of the information to criminally investigate or   
prosecute any alcohol or drug abuse patient.[facility] CenterIn the event this   
information is protected by the Federal Confidentiality of Alcohol and Drug 
Abuse   
Patient Records regulations: This information has been disclosed to you from 
records   
protected by Federal confidentiality rules (42 CFR Part 2). The Federal rules   
prohibit you from making any further disclosure of this information unless 
further   
disclosure is expressly permitted by the written consent of the person to whom 
it   
pertains or as otherwise permitted by 42 CFR Part 2. A general authorization for
the   
release of medical or other information is NOT sufficient for this purpose. The   
Federal rules restrict any use of the information to criminally investigate or   
prosecute any alcohol or drug abuse patient.[facility] CenterIn the event this   
information is protected by the Federal Confidentiality of Alcohol and Drug 
Abuse   
Patient Records regulations: This information has been disclosed to you from 
records   
protected by Federal confidentiality rules (42 CFR Part 2). The Federal rules   
prohibit you from making any further disclosure of this information unless 
further   
disclosure is expressly permitted by the written consent of the person to whom 
it   
pertains or as otherwise permitted by 42 CFR Part 2. A general authorization for
the   
release of medical or other information is NOT sufficient for this purpose. The   
Federal rules restrict any use of the information to criminally investigate or   
prosecute any alcohol or drug abuse patient.[facility] CenterIn the event this   
information is protected by the Federal Confidentiality of Alcohol and Drug 
Abuse   
Patient Records regulations: This information has been disclosed to you from 
records   
protected by Federal confidentiality rules (42 CFR Part 2). The Federal rules   
prohibit you from making any further disclosure of this information unless 
further   
disclosure is expressly permitted by the written consent of the person to whom 
it   
pertains or as otherwise permitted by 42 CFR Part 2. A general authorization for
the   
release of medical or other information is NOT sufficient for this purpose. The   
Federal rules restrict any use of the information to criminally investigate or   
prosecute any alcohol or drug abuse patient.[facility] CenterIn the event this   
information is protected by the Federal Confidentiality of Alcohol and Drug 
Abuse   
Patient Records regulations: This information has been disclosed to you from 
records   
protected by Federal confidentiality rules (42 CFR Part 2). The Federal rules   
prohibit you from making any further disclosure of this information unless 
further   
disclosure is expressly permitted by the written consent of the person to whom 
it   
pertains or as otherwise permitted by 42 CFR Part 2. A general authorization for
the   
release of medical or other information is NOT sufficient for this purpose. The   
Federal rules restrict any use of the information to criminally investigate or   
prosecute any alcohol or drug abuse patient.[facility] CenterIn the event this   
information is protected by the Federal Confidentiality of Alcohol and Drug 
Abuse   
Patient Records regulations: This information has been disclosed to you from 
records   
protected by Federal confidentiality rules (42 CFR Part 2). The Federal rules   
prohibit you from making any further disclosure of this information unless 
further   
disclosure is expressly permitted by the written consent of the person to whom 
it   
pertains or as otherwise permitted by 42 CFR Part 2. A general authorization for
the   
release of medical or other information is NOT sufficient for this purpose. The   
Federal rules restrict any use of the information to criminally investigate or   
prosecute any alcohol or drug abuse patient.[facility] CenterIn the event this   
information is protected by the Federal Confidentiality of Alcohol and Drug 
Abuse   
Patient Records regulations: This information has been disclosed to you from 
records   
protected by Federal confidentiality rules (42 CFR Part 2). The Federal rules   
prohibit you from making any further disclosure of this information unless 
further   
disclosure is expressly permitted by the written consent of the person to whom 
it   
pertains or as otherwise permitted by 42 CFR Part 2. A general authorization for
the   
release of medical or other information is NOT sufficient for this purpose. The   
Federal rules restrict any use of the information to criminally investigate or   
prosecute any alcohol or drug abuse patient.[facility] CenterIn the event this   
information is protected by the Federal Confidentiality of Alcohol and Drug 
Abuse   
Patient Records regulations: This information has been disclosed to you from 
records   
protected by Federal confidentiality rules (42 CFR Part 2). The Federal rules   
prohibit you from making any further disclosure of this information unless 
further   
disclosure is expressly permitted by the written consent of the person to whom 
it   
pertains or as otherwise permitted by 42 CFR Part 2. A general authorization for
the   
release of medical or other information is NOT sufficient for this purpose. The   
Federal rules restrict any use of the information to criminally investigate or   
prosecute any alcohol or drug abuse patient.[facility] CenterIn the event this   
information is protected by the Federal Confidentiality of Alcohol and Drug 
Abuse   
Patient Records regulations: This information has been disclosed to you from 
records   
protected by Federal confidentiality rules (42 CFR Part 2). The Federal rules   
prohibit you from making any further disclosure of this information unless 
further   
disclosure is expressly permitted by the written consent of the person to whom 
it   
pertains or as otherwise permitted by 42 CFR Part 2. A general authorization for
the   
release of medical or other information is NOT sufficient for this purpose. The   
Federal rules restrict any use of the information to criminally investigate or   
prosecute any alcohol or drug abuse patient.[facility] CenterIn the event this   
information is protected by the Federal Confidentiality of Alcohol and Drug 
Abuse   
Patient Records regulations: This information has been disclosed to you from 
records   
protected by Federal confidentiality rules (42 CFR Part 2). The Federal rules   
prohibit you from making any further disclosure of this information unless 
further   
disclosure is expressly permitted by the written consent of the person to whom 
it   
pertains or as otherwise permitted by 42 CFR Part 2. A general authorization for
the   
release of medical or other information is NOT sufficient for this purpose. The   
Federal rules restrict any use of the information to criminally investigate or   
prosecute any alcohol or drug abuse patient.[facility] CenterIn the event this   
information is protected by the Federal Confidentiality of Alcohol and Drug 
Abuse   
Patient Records regulations: This information has been disclosed to you from 
records   
protected by Federal confidentiality rules (42 CFR Part 2). The Federal rules   
prohibit you from making any further disclosure of this information unless 
further   
disclosure is expressly permitted by the written consent of the person to whom 
it   
pertains or as otherwise permitted by 42 CFR Part 2. A general authorization for
the   
release of medical or other information is NOT sufficient for this purpose. The   
Federal rules restrict any use of the information to criminally investigate or   
prosecute any alcohol or drug abuse patient.[facility] CenterIn the event this   
information is protected by the Federal Confidentiality of Alcohol and Drug 
Abuse   
Patient Records regulations: This information has been disclosed to you from 
records   
protected by Federal confidentiality rules (42 CFR Part 2). The Federal rules   
prohibit you from making any further disclosure of this information unless 
further   
disclosure is expressly permitted by the written consent of the person to whom 
it   
pertains or as otherwise permitted by 42 CFR Part 2. A general authorization for
the   
release of medical or other information is NOT sufficient for this purpose. The   
Federal rules restrict any use of the information to criminally investigate or   
prosecute any alcohol or drug abuse patient.[facility] CenterIn the event this   
information is protected by the Federal Confidentiality of Alcohol and Drug 
Abuse   
Patient Records regulations: This information has been disclosed to you from 
records   
protected by Federal confidentiality rules (42 CFR Part 2). The Federal rules   
prohibit you from making any further disclosure of this information unless 
further   
disclosure is expressly permitted by the written consent of the person to whom 
it   
pertains or as otherwise permitted by 42 CFR Part 2. A general authorization for
the   
release of medical or other information is NOT sufficient for this purpose. The   
Federal rules restrict any use of the information to criminally investigate or   
prosecute any alcohol or drug abuse patient.[facility] CenterIn the event this   
information is protected by the Federal Confidentiality of Alcohol and Drug 
Abuse   
Patient Records regulations: This information has been disclosed to you from 
records   
protected by Federal confidentiality rules (42 CFR Part 2). The Federal rules   
prohibit you from making any further disclosure of this information unless 
further   
disclosure is expressly permitted by the written consent of the person to whom 
it   
pertains or as otherwise permitted by 42 CFR Part 2. A general authorization for
the   
release of medical or other information is NOT sufficient for this purpose. The   
Federal rules restrict any use of the information to criminally investigate or   
prosecute any alcohol or drug abuse patient.[facility] CenterIn the event this   
information is protected by the Federal Confidentiality of Alcohol and Drug 
Abuse   
Patient Records regulations: This information has been disclosed to you from 
records   
protected by Federal confidentiality rules (42 CFR Part 2). The Federal rules   
prohibit you from making any further disclosure of this information unless 
further   
disclosure is expressly permitted by the written consent of the person to whom 
it   
pertains or as otherwise permitted by 42 CFR Part 2. A general authorization for
the   
release of medical or other information is NOT sufficient for this purpose. The   
Federal rules restrict any use of the information to criminally investigate or   
prosecute any alcohol or drug abuse patient.[facility] CenterIn the event this   
information is protected by the Federal Confidentiality of Alcohol and Drug 
Abuse   
Patient Records regulations: This information has been disclosed to you from 
records   
protected by Federal confidentiality rules (42 CFR Part 2). The Federal rules   
prohibit you from making any further disclosure of this information unless 
further   
disclosure is expressly permitted by the written consent of the person to whom 
it   
pertains or as otherwise permitted by 42 CFR Part 2. A general authorization for
the   
release of medical or other information is NOT sufficient for this purpose. The   
Federal rules restrict any use of the information to criminally investigate or   
prosecute any alcohol or drug abuse patient.[facility]  
  
  
  
  
                                                    Reason for Visit (unrecogniz  
ed section and content)  
   
                                          
  
  
  
                                        Reason              Comments  
   
                                        Established Patient 3month follow up  
  
  
  
                          Specialty    Diagnoses / Procedures Referred By Contac  
t Referred To Contact  
   
                                                    Internal Medicine /   
INTERNAL MEDICINE                         
  
  
Diagnoses  
  
  
3 month follow up  
  
  
  
Procedures  
  
  
4C EST                                    
  
  
Self                                      
  
  
Cynthia Douglas MD  
  
  
[address] [place] [address]  
  
  
Phone: [phone]  
  
  
Fax: [phone]  
  
  
  
                    Referral ID Status    Reason    Start Date Expiration Date V  
isits   
Requested                               Visits   
Authorized  
   
                                [id number]        Closed            
  
  
Patient   
Cleared   
INN/[facility]   
Payor Auth   
Obtained        2022      1               1  
  
  
  
                                        Reason              Comments  
   
                                        Refill Request        
  
  
  
                                Reason          Onset Date      Comments  
   
                                Refill Request  2022        
  
  
  
                                Reason          Onset Date      Comments  
   
                                Refill Request  2022        
  
  
  
                                Reason          Onset Date      Comments  
   
                                Population Health Navigation Outreach 2022  
      [facility] care gaps  
  
  
  
                                        Reason              Comments  
   
                                        Lab Orders            
   
                                        Orders                
  
  
  
                                Reason          Onset Date      Comments  
   
                                Refill Request  2022        
  
  
  
                                        Reason              Comments  
   
                                        Results               
  
  
  
                                Reason          Onset Date      Comments  
   
                                Population Health Navigation Outreach 2022  
      [facility]  
  
  
  
                                        Reason              Comments  
   
                                        Cough               Cough, chest congest  
ion and scratchy throat x 10 days  
  
  
  
                                Reason          Onset Date      Comments  
   
                                Refill Request  2022        
  
  
  
                                        Reason              Comments  
   
                                        Constipation          
  
  
  
                                Reason          Onset Date      Comments  
   
                                Refill Request  2023        
  
  
  
                                        Reason              Comments  
   
                                        Same Day Appointment right upper thigh b  
ump size of pea  
  
  
  
                                        Reason              Comments  
   
                                        Established Patient   
  
  
  
                          Specialty    Diagnoses / Procedures Referred By Contac  
t Referred To Contact  
   
                                        HEMATOLOGY/ONCOLOGY   
  
  
Diagnoses  
  
  
OV  
  
  
  
Procedures  
  
  
OFFICE VISIT, EST PT.,   
LEVEL 2 TC  
  
  
OV                                        
  
  
Jacquelyn Milner MD  
  
  
[address]  
  
  
[place] [address]  
  
  
Phone: [phone]  
  
  
Fax: [phone]                         
  
  
[facility]  
  
  
[address]  
  
  
[place] [address]  
  
  
Phone: [phone]  
  
  
Fax: [phone]  
  
  
  
                          Referral ID  Status       Reason       Start   
Date                                    Expiration   
Date                                    Visits   
Requested                               Visits   
Authorized  
   
                                        [id number]            Pending   
Review                                    
  
  
OON/Self Pay   
Override                                  
2                   2023           1                   1  
  
  
  
                                        Reason              Comments  
   
                                        Mammogram Result Call Back   
  
  
  
                                        Reason              Comments  
   
                                        Follow Up           Referral to Dr. Cochran  
no  
  
  
  
                                        Reason              Comments  
   
                                        Consult               
   
                                        Blood In Urine        
  
  
  
                          Specialty    Diagnoses / Procedures Referred By Contac  
t Referred To Contact  
   
                                        Urology               
  
  
Diagnoses  
  
  
Gross hematuria  
  
  
  
Procedures  
  
  
CONSULT TO UROLOGY  
  
  
OFFICE/OUTPATIENT NEW HIGH   
MDM 60-74 MINUTES                         
  
  
Francis Shell DO  
  
  
[address]  
  
  
[place] [address]  
  
  
Phone: [phone]  
  
  
Fax: [phone]                         
  
  
  
  
  
                    Referral ID Status    Reason    Start Date Expiration Date V  
isits   
Requested                               Visits   
Authorized  
   
                                [id number]        Closed            
  
  
PCP Requested   
Referral        2023        1               1  
  
  
  
                                Reason          Onset Date      Comments  
   
                                [facility] Health Navigation Outreach 2023  
      [facility] Care Gaps  
  
  
  
                                        Reason              Comments  
   
                                        Consult             Abnormal mammogram  
  
  
  
                                        Reason              Comments  
   
                                        Patient Question      
  
  
  
                                        Reason              Comments  
   
                                        Results             Right breast biopsy   
results  
  
  
  
                                        Reason              Comments  
   
                                        Post Op Follow Up     
  
  
  
                                        Reason              Comments  
   
                                        F/U 6 months          
  
  
  
                                        Reason              Comments  
   
                                        Medication concern    
  
  
  
                                Reason          Onset Date      Comments  
   
                                Refill Request  2023        
  
  
  
                                Reason          Onset Date      Comments  
   
                                Refill Request  2023        
  
  
  
                                        Reason              Comments  
   
                                        F/U 6 months          
  
  
  
                                        Reason              Comments  
   
                                        Patient Question      
   
                                        ER F/U                
  
  
  
                                        Reason              Comments  
   
                                        Same Day Appointment abdomen cramping, l  
eft flank pain, pinkish when wipes,   
burning   
at times  
  
  
  
                                Reason          Onset Date      Comments  
   
                                [facility] Health Navigation Outreach 2024  
      [facility] Annual Wellness Visit  
  
  
  
                                        Reason              Comments  
   
                                        Established Patient   
  
  
  
                                Reason          Onset Date      Comments  
   
                                Refill Request  06/10/2024        
  
  
  
                                        Reason              Comments  
   
                                        Recheck             6 month follow up  
  
  
  
                                        Reason              Comments  
   
                                        Sore Throat         ST, HA x 1 day  
  
  
  
                                Reason          Onset Date      Comments  
   
                                Refill Request  2024        
  
  
  
                                Reason          Onset Date      Comments  
   
                                Population Health Navigation Outreach 2024  
      [facility], [facility],   
[facility]   
[facility]  
  
  
  
                                        Reason              Comments  
   
                                        Lab Orders            
  
  
  
                                        Reason              Comments  
   
                                        Established Patient   
  
  
  
                                        Reason              Comments  
   
                                        New Patient         Diverticular disease  
 w/perf ref from [facility]/PACS has 3   
recent   
ct scan uploaded/colonoscopy report in media, patient was admitted in   
hospital in Sept and Oct, pain in mid lower abd, diarrhea and   
constipation, gassy, sometimes has mucous in stool  
   
                                        Other               Pt unaccompanied  
  
  
  
                                Reason          Onset Date      Comments  
   
                                Refill Request  01/10/2025        
  
  
  
                                Reason          Onset Date      Comments  
   
                                Urinary Catheter Problem 2025        
  
  
  
  
  
                                                    Care Teams (unrecognized sec  
tion and content)  
   
                                          
  
  
  
                      Team Member Relationship Specialty  Start Date End Date  
   
                                                      
  
  
Cynthia Douglas MD  
  
  
[address] [place], [place] [address] [phone] (Work)  
  
  
[phone] (Fax) PCP - General   Internal Medicine 6/3/14            
   
                                                      
  
  
Rosaline Damon MD, MD  
  
  
[address] [place] [address]  
  
  
[phone] (Work)  
  
  
[phone] (Fax) Physician       Radiation Oncology 17           
   
                                                      
  
  
Martha Steel RN                         Specialty Care   
Coordinator         Oncology            18               
  
  
  
                      Team Member Relationship Specialty  Start Date End Date  
   
                                                      
  
  
Cynthia Douglas MD  
  
  
[address] [place], [place] [address] [phone] (Work)  
  
  
[phone] (Fax) PCP - General   Internal Medicine 6/3/14            
   
                                                      
  
  
Rosaline Damon MD, MD  
  
  
[address] [place] [address]  
  
  
[phone] (Work)  
  
  
[phone] (Fax) Physician       Radiation Oncology 17           
   
                                                      
  
  
Martha Steel RN                         Specialty Care   
Coordinator         Oncology            18               
  
  
  
                      Team Member Relationship Specialty  Start Date End Date  
   
                                                      
  
  
Cynthia Douglas MD  
  
  
[address] [place] [address]  
  
  
[phone] (Work)  
  
  
[phone] (Fax) PCP - General   Internal Medicine 6/3/14            
   
                                                      
  
  
Rosaline Damon MD, MD  
  
  
[address] [place] [address]  
  
  
[phone] (Work)  
  
  
[phone] (Fax) Physician       Radiation Oncology 17           
   
                                                      
  
  
Martha Steel RN                         Specialty Care   
Coordinator         Oncology            18               
  
  
  
                      Team Member Relationship Specialty  Start Date End Date  
   
                                                      
  
  
Cynthia Douglas MD  
  
  
[address] [place] [address]  
  
  
[phone] (Work)  
  
  
[phone] (Fax) PCP - General   Internal Medicine 6/3/14            
   
                                                      
  
  
Rosaline Damon MD, MD  
  
  
[address] [place] [address]  
  
  
[phone] (Work)  
  
  
[phone] (Fax) Physician       Radiation Oncology 17           
   
                                                      
  
  
Martha Steel RN                         Specialty Care   
Coordinator         Oncology            18               
  
  
  
                      Team Member Relationship Specialty  Start Date End Date  
   
                                                      
  
  
Cynthia Douglas MD  
  
  
[address] [place] [address]  
  
  
[phone] (Work)  
  
  
[phone] (Fax) PCP - General   Internal Medicine 6/3/14            
   
                                                      
  
  
Rosaline Damon MD, MD  
  
  
[address] [place] [address]  
  
  
[phone] (Work)  
  
  
[phone] (Fax) Physician       Radiation Oncology 17           
   
                                                      
  
  
Martha Steel RN                         Specialty Care   
Coordinator         Oncology            18               
  
  
  
                      Team Member Relationship Specialty  Start Date End Date  
   
                                                      
  
  
Cynthia Douglas MD  
  
  
[address] [place] [address]  
  
  
[phone] (Work)  
  
  
[phone] (Fax) PCP - General   Internal Medicine 6/3/14            
   
                                                      
  
  
Rosaline Damon MD, MD  
  
  
[address] [place] [address]  
  
  
[phone] (Work)  
  
  
[phone] (Fax) Physician       Radiation Oncology 17           
   
                                                      
  
  
Martha Steel RN                         Specialty Care   
Coordinator         Oncology            18               
  
  
  
                      Team Member Relationship Specialty  Start Date End Date  
   
                                                      
  
  
Cynthia Douglas MD  
  
  
[address] [place] [address]  
  
  
[phone] (Work)  
  
  
[phone] (Fax) PCP - General   Internal Medicine 6/3/14            
   
                                                      
  
  
Rosaline Damon MD, MD  
  
  
[address]  
  
  
[place] [address]  
  
  
[phone] (Work)  
  
  
[phone] (Fax) Physician       Radiation Oncology 17           
   
                                                      
  
  
Martha Steel RN                         Specialty Care   
Coordinator         Oncology            18               
  
  
  
                      Team Member Relationship Specialty  Start Date End Date  
   
                                                      
  
  
Cynthia Douglas MD  
  
  
[address] [place] [address]  
  
  
[phone] (Work)  
  
  
[phone] (Fax) PCP - General   Internal Medicine 6/3/14            
   
                                                      
  
  
Rosaline Damon MD, MD  
  
  
[address] [place] [address]  
  
  
[phone] (Work)  
  
  
[phone] (Fax) Physician       Radiation Oncology 17           
   
                                                      
  
  
Martha Steel RN                         Specialty Care   
Coordinator         Oncology            18               
  
  
  
                      Team Member Relationship Specialty  Start Date End Date  
   
                                                      
  
  
Cynthia Douglas MD  
  
  
[address] [place] [address]  
  
  
[phone] (Work)  
  
  
[phone] (Fax) PCP - General   Internal Medicine 6/3/14            
   
                                                      
  
  
Rosaline Damon MD, MD  
  
  
[address] [place] [address]  
  
  
[phone] (Work)  
  
  
[phone] (Fax) Physician       Radiation Oncology 17           
   
                                                      
  
  
Martha Steel RN                         Specialty Care   
Coordinator         Oncology            18               
  
  
  
                      Team Member Relationship Specialty  Start Date End Date  
   
                                                      
  
  
Cynthia Douglas MD  
  
  
[address] [place] [address]  
  
  
[phone] (Work)  
  
  
[phone] (Fax) PCP - General   Internal Medicine 6/3/14            
   
                                                      
  
  
Rosaline Damon MD, MD  
  
  
[address] [place] [address]  
  
  
[phone] (Work)  
  
  
[phone] (Fax) Physician       Radiation Oncology 17           
   
                                                      
  
  
Martha Steel RN                         Specialty Care   
Coordinator         Oncology            18               
  
  
  
                      Team Member Relationship Specialty  Start Date End Date  
   
                                                      
  
  
Cynthia Douglas MD  
  
  
[address] [place] [address]  
  
  
[phone] (Work)  
  
  
[phone] (Fax) PCP - General   Internal Medicine 6/3/14            
   
                                                      
  
  
Rosaline Damon MD, MD  
  
  
[address] [place] [address]  
  
  
[phone] (Work)  
  
  
[phone] (Fax) Physician       Radiation Oncology 17           
   
                                                      
  
  
Martha Steel RN                         Specialty Care   
Coordinator         Oncology            18               
  
  
  
                      Team Member Relationship Specialty  Start Date End Date  
   
                                                      
  
  
Cynthia Douglas MD  
  
  
[address] [facility] [place] [address]  
  
  
[phone] (Work)  
  
  
[phone] (Fax) PCP - General   Internal Medicine 6/3/14            
   
                                                      
  
  
Rosaline Damon MD, MD  
  
  
[address] [place] [facility] [place] [facility] [address]  
  
  
[phone] (Work)  
  
  
[phone] (Fax) Physician       Radiation Oncology 17           
   
                                                      
  
  
Martha Steel RN                         Specialty Care   
Coordinator         Oncology            18               
  
  
  
                      Team Member Relationship Specialty  Start Date End Date  
   
                                                      
  
  
Cynthia Douglas MD  
  
  
[address] [facility] [place] [address]  
  
  
[phone] (Work)  
  
  
[phone] (Fax) PCP - General   Internal Medicine 6/3/14            
   
                                                      
  
  
Rosaline Damon MD, MD  
  
  
[address] [place] [address]  
  
  
[phone] (Work)  
  
  
[phone] (Fax) Physician       Radiation Oncology 17           
   
                                                      
  
  
Martha Steel RN                         Specialty Care   
Coordinator         Oncology            18               
  
  
  
                      Team Member Relationship Specialty  Start Date End Date  
   
                                                      
  
  
Cynthia Douglas MD  
  
  
[address] [facility] [place] [address]  
  
  
[phone] (Work)  
  
  
[phone] (Fax) PCP - General   Internal Medicine 6/3/14            
   
                                                      
  
  
Rosaline Damon MD, MD  
  
  
[address] [place] [facility] [place] [address]  
  
  
[phone] (Work)  
  
  
[phone] (Fax) Physician       Radiation Oncology 17           
   
                                                      
  
  
Martha Steel RN                         Specialty Care   
Coordinator         Oncology            18               
  
  
  
                      Team Member Relationship Specialty  Start Date End Date  
   
                                                      
  
  
Cynthia Douglas MD  
  
  
[address] [facility] [place] [address]  
  
  
[phone] (Work)  
  
  
[phone] (Fax) PCP - General   Internal Medicine 6/3/14            
   
                                                      
  
  
Rosaline Damon MD, MD  
  
  
[address]  
  
  
[place] [address]  
  
  
[phone] (Work)  
  
  
[phone] (Fax) Physician       Radiation Oncology 17           
   
                                                      
  
  
Martha Steel RN                         Specialty Care   
Coordinator         Oncology            18               
  
  
  
                      Team Member Relationship Specialty  Start Date End Date  
   
                                                      
  
  
Cynthia Douglas MD  
  
  
[address] [facility] [place] [address]  
  
  
[phone] (Work)  
  
  
[phone] (Fax) PCP - General   Internal Medicine 6/3/14            
   
                                                      
  
  
Rosaline Damon MD, MD  
  
  
[address] [place] [facility] [address]  
  
  
[phone] (Work)  
  
  
[phone] (Fax) Physician       Radiation Oncology 17           
   
                                                      
  
  
Martha Steel RN                         Specialty Care   
Coordinator         Oncology            18               
  
  
  
                      Team Member Relationship Specialty  Start Date End Date  
   
                                                      
  
  
Cynthia Douglas MD  
  
  
[address] [facility] [place] [address]  
  
  
[phone] (Work)  
  
  
[phone] (Fax) PCP - General   Internal Medicine 6/3/14            
   
                                                      
  
  
Rosaline Damon MD, MD  
  
  
[address] [place] [facility] [address]  
  
  
[phone] (Work)  
  
  
[phone] (Fax) Physician       Radiation Oncology 17           
   
                                                      
  
  
Martha Steel RN                         Specialty Care   
Coordinator         Oncology            18               
  
  
  
                      Team Member Relationship Specialty  Start Date End Date  
   
                                                      
  
  
Cynthia Douglas MD  
  
  
[address] [facility] [place] [address]  
  
  
[phone] (Work)  
  
  
[phone] (Fax) PCP - General   Internal Medicine 6/3/14            
   
                                                      
  
  
Rosaline Damon MD, MD  
  
  
[address] [place] [facility] [address]  
  
  
[phone] (Work)  
  
  
[phone] (Fax) Physician       Radiation Oncology 17           
   
                                                      
  
  
Martha Steel RN                         Specialty Care   
Coordinator         Oncology            18               
  
  
  
                      Team Member Relationship Specialty  Start Date End Date  
   
                                                      
  
  
Cynthia Douglas MD  
  
  
[address] [facility] [place] [address]  
  
  
[phone] (Work)  
  
  
[phone] (Fax) PCP - General   Internal Medicine 6/3/14            
   
                                                      
  
  
Rosaline Damon MD, MD  
  
  
[address] [place] [facility] [place] [facility] [address]  
  
  
[phone] (Work)  
  
  
[phone] (Fax) Physician       Radiation Oncology 17           
   
                                                      
  
  
Martha Steel RN                         Specialty Care   
Coordinator         Oncology            18               
  
  
  
                      Team Member Relationship Specialty  Start Date End Date  
   
                                                      
  
  
Cynthia Douglas MD  
  
  
[address] [place] [address]  
  
  
[phone] (Work)  
  
  
[phone] (Fax) PCP - General   Internal Medicine 6/3/14            
   
                                                      
  
  
Rosaline Damon MD, MD  
  
  
[address] [place] [address]  
  
  
[phone] (Work)  
  
  
[phone] (Fax) Physician       Radiation Oncology 17           
   
                                                      
  
  
Martha Steel RN                         Specialty Care   
Coordinator         Oncology            18               
  
  
  
                      Team Member Relationship Specialty  Start Date End Date  
   
                                                      
  
  
Cynthia Douglas MD  
  
  
NPI: [id number]  
  
  
[address] [place] [address]  
  
  
[phone] (Work)  
  
  
[phone] (Fax) PCP - General   Internal Medicine 6/3/14            
   
                                                      
  
  
Rosaline Damon MD, MD  
  
  
NPI: [id number]  
  
  
[address] [place] [address]  
  
  
[phone] (Work)  
  
  
[phone] (Fax) Physician       Radiation Oncology 17           
   
                                                      
  
  
Martha Steel RN                         Specialty Care   
Coordinator         Oncology            18               
  
  
  
                      Team Member Relationship Specialty  Start Date End Date  
   
                                                      
  
  
Cynthia Douglas MD  
  
  
NPI: [id number]  
  
  
[address] [place] [address]  
  
  
[phone] (Work)  
  
  
[phone] (Fax) PCP - General   Internal Medicine 6/3/14            
   
                                                      
  
  
Rosaline Damon MD, MD  
  
  
NPI: [id number]  
  
  
[address] [place] [address]  
  
  
[phone] (Work)  
  
  
[phone] (Fax) Physician       Radiation Oncology 17           
   
                                                      
  
  
Martha Steel RN                         Specialty Care   
Coordinator         Oncology            18               
  
  
  
                      Team Member Relationship Specialty  Start Date End Date  
   
                                                      
  
  
Cynthia Douglas MD  
  
  
NPI: [id number]  
  
  
[address] [place] [address]  
  
  
[phone] (Work)  
  
  
[phone] (Fax) PCP - General   Internal Medicine 6/3/14            
   
                                                      
  
  
Rosaline Damon MD, MD  
  
  
NPI: [id number]  
  
  
[address] [place] [address]  
  
  
[phone] (Work)  
  
  
[phone] (Fax) Physician       Radiation Oncology 17           
   
                                                      
  
  
Martha Steel RN                         Specialty Care   
Coordinator         Oncology            18               
  
  
  
                      Team Member Relationship Specialty  Start Date End Date  
   
                                                      
  
  
Cynthia Douglas MD  
  
  
NPI: [id number]  
  
  
[address] [place] [address]  
  
  
[phone] (Work)  
  
  
[phone] (Fax) PCP - General   Internal Medicine 6/3/14            
   
                                                      
  
  
Rosaline Damon MD, MD  
  
  
NPI: [id number]  
  
  
[address] [place] [address]  
  
  
[phone] (Work)  
  
  
[phone] (Fax) Physician       Radiation Oncology 17           
   
                                                      
  
  
Martha Steel RN                         Specialty Care   
Coordinator         Oncology            18               
  
  
  
                      Team Member Relationship Specialty  Start Date End Date  
   
                                                      
  
  
Cynthia Douglas MD  
  
  
NPI: [id number]  
  
  
[address] [place] [address]  
  
  
[phone] (Work)  
  
  
[phone] (Fax) PCP - General   Internal Medicine 6/3/14            
   
                                                      
  
  
Rosaline Damon MD, MD  
  
  
NPI: [id number]  
  
  
[address] [place] [address]  
  
  
[phone] (Work)  
  
  
[phone] (Fax) Physician       Radiation Oncology 17           
   
                                                      
  
  
Martha Steel RN                         Specialty Care   
Coordinator         Oncology            18               
  
  
  
                      Team Member Relationship Specialty  Start Date End Date  
   
                                                      
  
  
Cynthia Douglas MD  
  
  
NPI: [id number]  
  
  
[address] [facility]  
  
  
[place] [address]  
  
  
[phone] (Work)  
  
  
[phone] (Fax) PCP - General   Internal Medicine 6/3/14            
   
                                                      
  
  
Rosaline Damon MD, MD  
  
  
NPI: [id number]  
  
  
[address] [place] [address]  
  
  
[phone] (Work)  
  
  
[phone] (Fax) Physician       Radiation Oncology 17           
   
                                                      
  
  
Martha Steel RN                         Specialty Care   
Coordinator         Oncology            18               
  
  
  
                      Team Member Relationship Specialty  Start Date End Date  
   
                                                      
  
  
Cynthia Douglas MD  
  
  
NPI: [id number]  
  
  
[address] [facility]  
  
  
[place] [address]  
  
  
[phone] (Work)  
  
  
[phone] (Fax) PCP - General   Internal Medicine 6/3/14            
   
                                                      
  
  
Rosaline Damon MD, MD  
  
  
NPI: [id number]  
  
  
[address] [place] [address]  
  
  
[phone] (Work)  
  
  
[phone] (Fax) Physician       Radiation Oncology 17           
   
                                                      
  
  
Martha Steel RN                         Specialty Care   
Coordinator         Oncology            18               
  
  
  
                      Team Member Relationship Specialty  Start Date End Date  
   
                                                      
  
  
Cynthia Douglas MD  
  
  
NPI: [id number]  
  
  
[address] [place] [address]  
  
  
[phone] (Work)  
  
  
[phone] (Fax) PCP - General   Internal Medicine 6/3/14            
   
                                                      
  
  
Rosaline Damon MD  
  
  
NPI: [id number]  
  
  
[address] [place] [address]  
  
  
[phone] (Work)  
  
  
[phone] (Fax) Physician       Radiation Oncology 17           
   
                                                      
  
  
Martha Steel RN                         Specialty Care   
Coordinator         Oncology            18               
  
  
  
                      Team Member Relationship Specialty  Start Date End Date  
   
                                                      
  
  
Cynthia Douglas MD  
  
  
NPI: [id number]  
  
  
[address] [place] [address]  
  
  
[phone] (Work)  
  
  
[phone] (Fax) PCP - General   Internal Medicine 6/3/14            
   
                                                      
  
  
Rosaline Damon MD  
  
  
NPI: [id number]  
  
  
[address] [place] [address]  
  
  
[phone] (Work)  
  
  
[phone] (Fax) Physician       Radiation Oncology 17           
   
                                                      
  
  
Martha Steel RN                         Specialty Care   
Coordinator         Oncology            18               
  
  
  
                      Team Member Relationship Specialty  Start Date End Date  
   
                                                      
  
  
Cynthia Douglas MD  
  
  
NPI: [id number]  
  
  
[address] [place] [address]  
  
  
[phone] (Work)  
  
  
[phone] (Fax) PCP - General   Internal Medicine 6/3/14            
   
                                                      
  
  
Rosaline Damon MD  
  
  
NPI: [id number]  
  
  
[address] [place] [address]  
  
  
[phone] (Work)  
  
  
[phone] (Fax) Physician       Radiation Oncology 17           
   
                                                      
  
  
Martha Steel RN                         Specialty Care   
Coordinator         Oncology            18               
  
  
  
                      Team Member Relationship Specialty  Start Date End Date  
   
                                                      
  
  
Cynthia Douglas MD  
  
  
NPI: [id number]  
  
  
[address] [place] [address]  
  
  
[phone] (Work)  
  
  
[phone] (Fax) PCP - General   Internal Medicine 6/3/14            
   
                                                      
  
  
Rosaline Damon MD  
  
  
NPI: [id number]  
  
  
[address] [place] [address]  
  
  
[phone] (Work)  
  
  
[phone] (Fax) Physician       Radiation Oncology 17           
   
                                                      
  
  
Martha Steel RN                         Specialty Care   
Coordinator         Oncology            18               
  
  
  
                      Team Member Relationship Specialty  Start Date End Date  
   
                                                      
  
  
Cynthia Douglas MD  
  
  
NPI: [id number]  
  
  
[address] [place] [address]  
  
  
[phone] (Work)  
  
  
[phone] (Fax) PCP - General   Internal Medicine 6/3/14            
   
                                                      
  
  
Rosaline Damon MD  
  
  
NPI: [id number]  
  
  
[address] [place] [address]  
  
  
[phone] (Work)  
  
  
[phone] (Fax) Physician       Radiation Oncology 17           
   
                                                      
  
  
Martha Steel RN                         Specialty Care   
Coordinator         Oncology            18               
  
  
  
                      Team Member Relationship Specialty  Start Date End Date  
   
                                                      
  
  
Cynthia Douglas MD  
  
  
NPI: [id number]  
  
  
[address] [place] [address]  
  
  
[phone] (Work)  
  
  
[phone] (Fax) PCP - General   Internal Medicine 6/3/14            
   
                                                      
  
  
Rosaline Damon MD  
  
  
NPI: [id number]  
  
  
[address] [place] [address]  
  
  
[phone] (Work)  
  
  
[phone] (Fax) Physician       Radiation Oncology 17           
   
                                                      
  
  
Martha Steel RN                         Specialty Care   
Coordinator         Oncology            18               
  
  
  
                      Team Member Relationship Specialty  Start Date End Date  
   
                                                      
  
  
Cynthia Douglas MD  
  
  
NPI: [id number]  
  
  
[address] [place] [address]  
  
  
[phone] (Work)  
  
  
[phone] (Fax) PCP - General   Internal Medicine 6/3/14            
   
                                                      
  
  
Rosaline Damon MD  
  
  
NPI: [id number]  
  
  
[address] [place] [address]  
  
  
[phone] (Work)  
  
  
[phone] (Fax) Physician       Radiation Oncology 17           
   
                                                      
  
  
Martha Steel RN                         Specialty Care   
Coordinator         Oncology            18               
  
  
  
                      Team Member Relationship Specialty  Start Date End Date  
   
                                                      
  
  
Cynthia Douglas MD  
  
  
NPI: [id number]  
  
  
[address] [place] [address]  
  
  
[phone] (Work)  
  
  
[phone] (Fax) PCP - General   Internal Medicine 6/3/14            
   
                                                      
  
  
Rosaline Damon MD  
  
  
NPI: [id number]  
  
  
[address] [place] [address]  
  
  
[phone] (Work)  
  
  
[phone] (Fax) Physician       Radiation Oncology 17           
   
                                                      
  
  
Martha Steel RN                         Specialty Care   
Coordinator         Oncology            18               
  
  
  
                      Team Member Relationship Specialty  Start Date End Date  
   
                                                      
  
  
Cynthia Douglas MD  
  
  
NPI: [id number]  
  
  
[address] [place] [address]  
  
  
[phone] (Work)  
  
  
[phone] (Fax) PCP - General   Internal Medicine 6/3/14            
   
                                                      
  
  
Rosaline Damon MD  
  
  
NPI: [id number]  
  
  
[address] [place] [address]  
  
  
[phone] (Work)  
  
  
[phone] (Fax) Physician       Radiation Oncology 17           
   
                                                      
  
  
Martha Steel RN                         Specialty Care   
Coordinator         Oncology            18               
  
  
  
                      Team Member Relationship Specialty  Start Date End Date  
   
                                                      
  
  
Cynthia Douglas MD  
  
  
NPI: [id number]  
  
  
[address] [place] [address]  
  
  
[phone] (Work)  
  
  
[phone] (Fax) PCP - General   Internal Medicine 6/3/14            
   
                                                      
  
  
Rosaline Damon MD  
  
  
NPI: [id number]  
  
  
[address] [place] [address]  
  
  
[phone] (Work)  
  
  
[phone] (Fax) Physician       Radiation Oncology 17           
   
                                                      
  
  
Martha Steel RN                         Specialty Care   
Coordinator         Oncology            18               
  
  
  
                      Team Member Relationship Specialty  Start Date End Date  
   
                                                      
  
  
Cynthia Douglas MD  
  
  
NPI: [id number]  
  
  
[address] [facility]  
  
  
[place] [address]  
  
  
[phone] (Work)  
  
  
[phone] (Fax) PCP - General   Internal Medicine 6/3/14            
   
                                                      
  
  
Rosaline Damon MD  
  
  
NPI: [id number]  
  
  
[address] [place] [address]  
  
  
[phone] (Work)  
  
  
[phone] (Fax) Physician       Radiation Oncology 17           
   
                                                      
  
  
Martha Steel RN                         Specialty Care   
Coordinator         Oncology            18               
  
  
  
                      Team Member Relationship Specialty  Start Date End Date  
   
                                                      
  
  
Cynthia Douglas MD  
  
  
NPI: [id number]  
  
  
[address] [place] [address]  
  
  
[phone] (Work)  
  
  
[phone] (Fax) PCP - General   Internal Medicine 6/3/14            
   
                                                      
  
  
Rosaline Damon MD  
  
  
NPI: [id number]  
  
  
[address] [place] [address]  
  
  
[phone] (Work)  
  
  
[phone] (Fax) Physician       Radiation Oncology 17           
   
                                                      
  
  
Martha Steel RN                         Specialty Care   
Coordinator         Oncology            18               
  
  
  
                      Team Member Relationship Specialty  Start Date End Date  
   
                                                      
  
  
Cynthia Douglas MD  
  
  
NPI: [id number]  
  
  
[address] [place] [address]  
  
  
[phone] (Work)  
  
  
[phone] (Fax) PCP - General   Internal Medicine 6/3/14            
   
                                                      
  
  
Rosaline Damon MD  
  
  
NPI: [id number]  
  
  
[address] [place] [address]  
  
  
[phone] (Work)  
  
  
[phone] (Fax) Physician       Radiation Oncology 17           
   
                                                      
  
  
Martha Steel RN                         Specialty Care   
Coordinator         Oncology            18               
  
  
  
                      Team Member Relationship Specialty  Start Date End Date  
   
                                                      
  
  
Cynthia Douglas MD  
  
  
NPI: [id number]  
  
  
[address] [place] [address]  
  
  
[phone] (Work)  
  
  
[phone] (Fax) PCP - General   Internal Medicine 6/3/14            
   
                                                      
  
  
Rosaline Damon MD  
  
  
NPI: [id number]  
  
  
[address] [place] [address]  
  
  
[phone] (Work)  
  
  
[phone] (Fax) Physician       Radiation Oncology 17           
   
                                                      
  
  
Martha Steel RN                         Specialty Care   
Coordinator         Oncology            18               
  
  
  
                      Team Member Relationship Specialty  Start Date End Date  
   
                                                      
  
  
Cynthia Douglas MD  
  
  
NPI: [id number]  
  
  
[address] [place] [address]  
  
  
[phone] (Work)  
  
  
[phone] (Fax) PCP - General   Internal Medicine 6/3/14            
   
                                                      
  
  
Rosaline Damon MD  
  
  
NPI: [id number]  
  
  
[address] [place] [address]  
  
  
[phone] (Work)  
  
  
[phone] (Fax) Physician       Radiation Oncology 17           
   
                                                      
  
  
Martha Steel RN                         Specialty Care   
Coordinator         Oncology            18               
  
  
  
                      Team Member Relationship Specialty  Start Date End Date  
   
                                                      
  
  
Cynthia Douglas MD  
  
  
NPI: [id number]  
  
  
[address] [facility]  
  
  
[place] [address]  
  
  
[phone] (Work)  
  
  
[phone] (Fax) PCP - General   Internal Medicine 6/3/14            
   
                                                      
  
  
Rosaline Damon MD  
  
  
NPI: [id number]  
  
  
[address] [place] [address]  
  
  
[phone] (Work)  
  
  
[phone] (Fax) Physician       Radiation Oncology 17           
   
                                                      
  
  
Martha Steel RN                         Specialty Care   
Coordinator         Oncology            18               
  
  
  
                      Team Member Relationship Specialty  Start Date End Date  
   
                                                      
  
  
Cynthia Douglas MD  
  
  
NPI: [id number]  
  
  
[address] [facility] [place] [address]  
  
  
[phone] (Work)  
  
  
[phone] (Fax) PCP - General   Internal Medicine 24           
  
  
  
                      Team Member Relationship Specialty  Start Date End Date  
   
                                                      
  
  
Cynthia Douglas MD  
  
  
NPI: [id number]  
  
  
[address] [facility]  
  
  
[place] [address]  
  
  
[phone] (Work)  
  
  
[phone] (Fax) PCP - General   Internal Medicine 24           
  
  
  
                      Team Member Relationship Specialty  Start Date End Date  
   
                                                      
  
  
Cynthia Douglas MD  
  
  
NPI: [id number]  
  
  
[address] [facility] [place] [address]  
  
  
[phone] (Work)  
  
  
[phone] (Fax) PCP - General   Internal Medicine 24           
  
  
  
                      Team Member Relationship Specialty  Start Date End Date  
   
                                                      
  
  
Cynthia Douglas MD  
  
  
NPI: [id number]  
  
  
[address] [facility] [place] [address]  
  
  
[phone] (Work)  
  
  
[phone] (Fax) PCP - General   Internal Medicine 6/3/14            
   
                                                      
  
  
Rosaline Damon MD  
  
  
NPI: [id number]  
  
  
[address] [place], [place] [address] [phone] (Work)  
  
  
[phone] (Fax) Physician       Radiation Oncology 17           
   
                                                      
  
  
Martha Steel, RN                         Specialty Care   
Coordinator         Oncology            18               
   
                                                      
  
  
Meera Menchaca PA-C  
  
  
NPI: [id number]  
  
  
[address] [place] [address]  
  
  
[phone] (Work)  
  
  
[phone] (Fax) Care Partner    [facility] 24           
   
                                                      
  
  
Sherly An APRN.CNP  
  
  
NPI: [id number]  
  
  
[address] [place], [place] [id number] [phone] (Work)  
  
  
[phone] (Fax) Care Partner    Internal Medicine 24           
   
                                                      
  
  
Lillie Freeman PA-C  
  
  
NPI: [id number]  
  
  
[address] [place], [place] [address] [phone] (Work)  
  
  
[phone] (Fax) Care Partner    [facility] 24           
  
  
  
                      Team Member Relationship Specialty  Start Date End Date  
   
                                                      
  
  
Cynthia Douglas MD  
  
  
NPI: [id number]  
  
  
[address] [place], [place] [address] [phone] (Work)  
  
  
[phone] (Fax) PCP - General   Internal Medicine 24           
  
  
  
                      Team Member Relationship Specialty  Start Date End Date  
   
                                                      
  
  
Cynthia Douglas MD  
  
  
NPI: [id number]  
  
  
[address] [place], [place] [address] [phone] (Work)  
  
  
[phone] (Fax) PCP - General   Internal Medicine 24           
  
  
  
                                    Scheduled  
  
                                                    Active and Recently Administ  
ered Medications (unrecognized section and content)  
   
                                          
  
  
  
                          Medication Order [id number]  
   
                                                      
  
  
acetaminophen (Tylenol)   
tablet 1,000 mg  
1,000 mg, Oral, Every 8   
hours, First dose on 25 at 1745, Phase   
II/On Unit, Maximum dose   
of acetaminophen is 4000   
mg from all sources in 24   
hours.  
                                        0102 (Given -   
Provider: Jewell Chacon RN)0848   
(Given - Provider:   
Imani Means   
RN)1747 (Given -   
Provider: Imani Means, RN)  
                                        0048 (Not Given -   
Provider: Clemente Bergeron, RN -   
Reason:   
Patient/family   
refused - Comment:   
patient reports no   
pain, refused   
tylenol)0845 (Given   
- Provider: Jewell Chacon, RAISSA)1650   
(Given - Provider:   
Jewell Chacon, RAISSA)  
                                        0146 (Not Given -   
Provider: Clemente Bergeron, RN - Reason:   
Patient/family   
refused)0853 (Given -   
Provider: Bladimir Kerns RN)1745 (Canceled   
Entry - Provider:   
Automatic Discharge   
Provider - Comment:   
Automatically canceled   
at discontinue of   
medication order)  
  
   
                                                      
  
  
atenolol (Tenormin)   
tablet 50 mg  
50 mg, Oral, Daily, First   
dose on Sun 25 at   
1200  
                                                    1126 (Given -   
Provider: Jewell Chacon, RAISSA)  
                                        0854 (Not Given -   
Provider: Bladimir Kerns RN - Reason:   
Patient/family   
refused)  
  
   
                                                      
  
  
chlorthalidone (Hygroton)   
tablet 12.5 mg  
12.5 mg, Oral, Daily,   
First dose on Sun 25   
at 1015  
                                                    1152 (Given -   
Provider: Jewell Chacon, RAISSA)  
                                        0900 (Not Given -   
Provider: Bladimir Kerns RN - Reason:   
Patient/family   
refused)  
  
   
                                                      
  
  
enoxaparin (Lovenox)   
syringe 40 mg  
40 mg, SubCUTAneous,   
Every 24 hours scheduled   
(Daily), First dose on   
Sat 25 at 0900,   
Phase II/On Unit,   
Indication of Use:   
Prophylaxis-DVT/PE,   
Indications: Prophylaxis   
of Venous Thromboembolism  
                                        0840 (Given -   
Provider: Imani Means RN)  
                                        0842 (Given -   
Provider: Jewell Chacon, RAISSA)  
                                        0854 (Given -   
Provider: Bladimir Kerns,   
RAISSA)  
  
   
                                                      
  
  
Insulin Lispro (Humalog)   
injection 0-12   
Units(Linked Group 1)  
0-12 Units, SubCUTAneous,   
3 times daily with meals,   
First dose on Sat 25   
at 0800, Phase II/On   
Unit, **Medium Dose   
Correction Algorithm**   
Glucose: Dose: LESS than   
150 No Insulin 150-199 2   
Units 200-249 4 Units   
250-299 6 Units 300-349 8   
Units 350-400  10 Units   
Above 400 12 Units  
                                        0800 (Not Given -   
Provider: Imani Means RN - Reason:   
Order parameters   
not met - Comment:   
bg 146)1200 (Not   
Given - Provider:   
Imani Means RN -   
Reason: Order   
parameters not   
met)1700 (Not Given   
- Provider: Imani Means RN - Reason:   
Order parameters   
not met - Comment:   
bg [phone]  
                                        0803 (Not Given -   
Provider: Jewell Chacon RN -   
Reason: Order   
parameters not   
met)1126 (Not Given   
- Provider: Jewell Chacon RN -   
Reason: Order   
parameters not met   
- Comment: [phone]   
(Not Given -   
Provider: Jewell Chacon RN -   
Reason: Order   
parameters not met)  
                                        0800 (Not Given -   
Provider: Bladimir Kerns RN - Reason: Order   
parameters not   
met)1200 (Not Given -   
Provider: Bladimir Kerns RN - Reason: Order   
parameters not   
met)1700 (Canceled   
Entry - Provider:   
Automatic Discharge   
Provider - Comment:   
Automatically canceled   
at discontinue of   
medication order)  
  
   
                                                      
  
  
Insulin Lispro (Humalog)   
injection 0-12   
Units(Linked Group 1)  
0-12 Units, SubCUTAneous,   
Nightly, First dose on   
25 at 2100,   
Phase II/On Unit, If   
eating or bolus tube   
feeding: **Medium Dose   
Correction Algorithm**   
Glucose: Dose: LESS than   
150 No Insulin 150-199 2   
Units 200-249 4 Units   
250-299 6 Units 300-349 8   
Units 350-400  10 Units   
Above 400 12 Units  
                                        2100 (Not Given -   
Provider: Clemente Bergeron RN -   
Reason: Order   
parameters not met)  
                                        2100 (Not Given -   
Provider: Clemente Bergeron RN -   
Reason: Order   
parameters not met)  
                                          
   
                                                      
  
  
lactated ringers bolus   
1,000 mL (COMPLETED)  
1,000 mL, IntraVENous, at   
500 mL/hr, Administer   
over 2 Hours, Once, On   
Sun 1/26/25 at 1545, For   
1 dose  
                                                    1616 (New Bag -   
Provider: Jewell Chacon RN)1816   
(Stopped -   
Provider: Jewell Chacon RN)  
                                          
   
                                                      
  
  
lactated ringers bolus   
500 mL (COMPLETED)  
500 mL, IntraVENous, at   
250 mL/hr, Administer   
over 2 Hours, Once, On   
Sun 1/26/25 at 1015, For   
1 dose  
                                                    1015 (New Bag -   
Provider: Jewell Chacon, RAISSA)1215   
(Stopped -   
Provider: Jewell Chacon RN)  
                                          
   
                                                      
  
  
potassium chloride CR   
(Klor-Con M10) ER tablet   
20 mEq  
20 mEq, Oral, 2 times   
daily, First dose on Sun   
1/26/25 at 1015, Best   
given with food and   
plenty of water to   
minimize gastric   
irritation. Do not crush   
or chew.  
                                                    1015 (Given -   
Provider: Jewell Chacon RN)2023   
(Given - Provider:   
Clemente Bergeron, RN)  
                                        0853 (Given -   
Provider: Bladimir Kerns,   
RAISSA)  
  
   
                                                      
  
  
sodium chloride 0.9% (NS)   
flush 5-40 mL  
5-40 mL, IntraVENous,   
Every 12 hours, First   
dose on 25 at   
1745, Phase II/On Unit,   
For Line Patency:   
Peripheral IV = 5 mL;   
Midline or Central Line =   
10 mL/lumen. If following   
IV push medication,   
administer flush at same   
rate as the IV push.   
Flush volume is   
determined by type of   
infusion therapy being   
given. For non-viscous   
solutions use: Peripheral   
IV = 5 mL Midline or   
Central Line = 10   
mL/lumen For viscous   
solutions (i.e. blood   
components, parenteral   
nutrition, contrast   
media, or after obtaining   
blood sample) use:   
Peripheral IV = 10 mL   
Midline or Central Line =   
20 mL/lumen  
                                        0545 (Not Given -   
Provider: Jewell Chacon RN -   
Reason: IV Fluids   
Infusing)1745 (Not   
Given - Provider:   
Imani Means, RN -   
Reason: Patient not   
available)  
                                        0048 (Given -   
Provider: Clemente Bergeron RN)1650   
(Given - Provider:   
Jewell Chacon RN)  
                                        0146 (Given -   
Provider: Clemente Bergeron RN)1745   
(Canceled Entry -   
Provider: Automatic   
Discharge Provider -   
Comment: Automatically   
canceled at   
discontinue of   
medication order)  
  
  
                                   Continuous  
  
                          Medication Order 2025  
   
                                                      
  
  
lactated Ringer's (LR) infusion   
(CANCELED)  
50 mL/hr, IntraVENous,   
Continuous, Starting on 25 at 1745, Phase II/On   
Unit  
                                        0045 (New Bag - Provider: Jewell Chacon RN)0800 (Stopped -   
Provider: Imani Means, RN)  
                                                      
  
                                       PRN  
  
                          Medication Order 2025  
   
                                                      
  
  
dextrose 5 % infusion  
100 mL/hr, IntraVENous, PRN,   
Blood sugar less than 70mg/dL,   
Starting on 25 at 2000,   
Phase II/On Unit, Start infusion   
following administration of   
dextrose 50% or glucagon.  
                                                              
   
                                                      
  
  
dextrose 50 % solution 12.5 g  
12.5 g, IntraVENous, PRN, low   
blood sugar, Blood glucose less   
than 70 mg/dL and patient NOT   
ALERT or NPO., Starting on 25 at , Phase II/On   
Unit, If patient does not respond   
within 5 minutes, repeat dose x1.   
Start D5W at 100 mL/hour until   
ordering provider can be reached.   
Repeat blood glucose in 15   
minutes. If blood glucose is less   
than 70 mg/dL, repeat treatment   
and recheck blood glucose in 15   
minutes x2. If using   
Glucostabilizer, dose as   
instructed per system.  
                                                              
   
                                                      
  
  
glucagon (human recombinant)   
injection 1 mg  
1 mg, IntraMUSCular, PRN, low   
blood sugar, Blood glucose less   
than 70 mg/dL and patient NOT   
ALERT or NPO and does not have IV   
access., Starting on 25   
at , Phase II/On Unit, After   
administration, attempt   
intravenous access and start D5W   
at 100 mL/hr. Repeat blood   
glucose in 15 minutes x2 and   
notify provider.  
                                                              
   
                                                      
  
  
glucose oral gel 15 g  
15 g, Oral, As needed, low blood   
sugar, Starting on 25 at   
, Phase II/On Unit, If blood   
glucose less than 50 mg/dL and   
patient ALERT and NOT NPO, give 2   
tubes glucose gel. If blood   
glucose less than 70 mg/dL and   
patient ALERT and NOT NPO, give 1   
tube glucose gel. Repeat blood   
glucose in 15 minutes. If blood   
glucose is less than 70 mg/dL,   
repeat treatment and recheck   
blood glucose in 15 minutes x2   
and notify provider.  
                                                              
   
                                                      
  
  
hydrALAZINE (Apresoline)   
injection 10 mg  
10 mg, IntraVENous, Every 4 hours   
PRN, high blood pressure, 2nd   
line for SBP >160, hold for   
HR>110, Starting on 25   
at , Phase II/On Unit  
                                                              
   
                                                      
  
  
HYDROmorphone (Dilaudid)   
injection 0.5 mg  
0.5 mg, IntraVENous, Every 3   
hours PRN, Break through pain,   
Starting on 25 at 1734,   
Phase II/On Unit, If oral and IV   
narcotics ordered, use oral first   
and only use IV if oral is   
ineffective or cannot take oral.   
Do Not give oral and IV within 1   
hour of each other unless   
specifically ordered.  
                                                              
   
                                                      
  
  
labetalol (Normodyne,Trandate)   
injection 10 mg  
10 mg, IntraVENous, Every 4 hours   
PRN, high blood pressure, 1st   
line SBP > 160, hold for HR <60,   
Starting on 25 at 2000,   
Phase II/On Unit  
                                                              
   
                                                      
  
  
naloxone (Narcan) injection 0.4   
mg  
0.4 mg, IntraVENous, Every 5 min   
PRN, opioid reversal, respiratory   
depression, Starting on 25 at 1845, +++ For RR <10,   
pinpoint pupils, over sedation   
for opioid reversal - MUST notify   
on call provider immediately   
after first dose, may give IM or   
SQ if no IV access +++  
                                                              
   
                                                      
  
  
ondansetron (Zofran) injection 4   
mg(Linked Group 2)  
4 mg, IntraVENous, Every 6 hours   
PRN, nausea, vomiting, Starting   
on 25 at 1734, Phase   
II/On Unit, 1st Line. Give IV if   
patient is unable to take orally.   
If inadequate response within 60   
minutes, proceed to next-line   
agent or contact provider if no   
further options ordered.  
                                                              
   
                                                      
  
  
ondansetron ODT (Zofran-ODT)   
disintegrating tablet 4 mg(Linked   
Group 2)  
4 mg, Oral, Every 8 hours PRN,   
nausea, vomiting, Starting on 25 at 1734, Phase II/On   
Unit, 1st Line. If inadequate   
response within 60 minutes,   
proceed to next-line agent or   
contact provider if no further   
options ordered. Patient should   
allow tablet to dissolve on   
tongue. Do not remove from   
blister pack until just before   
administering.  
                                                              
   
                                                      
  
  
oxyCODONE (Roxicodone) immediate   
release tablet 10 mg(Linked Group   
3)  
10 mg, Oral, Every 4 hours PRN,   
severe pain (7-10), Starting on   
25 at 1734, Phase II/On   
Unit  
                                        0423 (See Alternative -   
Provider: Jewell Chacon RN)  
                                                      
   
                                                      
  
  
oxyCODONE (Roxicodone) immediate   
release tablet 5 mg(Linked Group   
3)  
5 mg, Oral, Every 4 hours PRN,   
moderate pain (4-6), Starting on   
25 at 1734, Phase II/On   
Unit  
                                        0423 (Given - Provider: Jewell Chacon RN - Comment: pt rates   
pain a 9 but is specifically   
requesting to only take Oxy   
5mg.)  
                                                      
   
                                                      
  
  
sodium chloride 0.9 % infusion  
5-250 mL/hr, IntraVENous, PRN, if   
patient receiving piggyback   
infusions and maintenance fluids   
are not ordered OR KVO fluids to   
protect IV site / prevent   
frequent line interruptions /   
long duration, Starting on 25 at 1734, Phase II/On   
Unit, For piggyback infusion,   
administer at same rate as   
piggyback for a total of 25 mL.   
Enter 25 mL into dose field and   
piggyback rate into rate field of   
order. If piggyback is infusing   
at a rate less than 100 mL/hr,   
enter 25 mL into dose field and   
100 mL/hr into rate field of   
order. For KVO fluids, enter rate   
of 20 mL/hr or less into rate   
field of order.  
                                                              
   
                                                      
  
  
sodium chloride 0.9% (NS) flush   
5-40 mL  
5-40 mL, IntraVENous, PRN, line   
care, After every IV line use,   
Starting on 25 at 1734,   
Phase II/On Unit, For Line   
Patency: Peripheral IV = 5 mL;   
Midline or Central Line = 10   
mL/lumen. If following IV push   
medication, administer flush at   
same rate as the IV push. Flush   
volume is determined by type of   
infusion therapy being given. For   
non-viscous solutions use:   
Peripheral IV = 5 mL Midline or   
Central Line = 10 mL/lumen For   
viscous solutions (i.e. blood   
components, parenteral nutrition,   
contrast media, or after   
obtaining blood sample) use:   
Peripheral IV = 10 mL Midline or   
Central Line = 20 mL/lumen  
                                                              
  
                                  Linked Groups  
  
                                                    Order  
   
                                                      
  
  
Group 1:  
  
  
Insulin Lispro (Humalog) injection 0-12 UnitsJump to med  
0-12 Units, SubCUTAneous, 3 times daily with meals, First dose on Sat 1/25/25 at
  
0800, Phase II/On Unit, **Medium Dose Correction Algorithm** Glucose: Dose: LESS
than   
150 No Insulin 150-199 2 Units 200-249 4 Units 250-299 6 Units 300-349 8 Units   
350-400 10 Units Above 400 12 Units  
  
   
                                                      
  
  
And  
  
  
Insulin Lispro (Humalog) injection 0-12 UnitsJump to med  
0-12 Units, SubCUTAneous, Nightly, First dose on 25 at 2100, Phase 
II/On   
Unit, If eating or bolus tube feeding: **Medium Dose Correction Algorithm** 
Glucose:   
Dose: LESS than 150 No Insulin 150-199 2 Units 200-249 4 Units 250-299 6 Units   
300-349 8 Units 350-400 10 Units Above 400 12 Units  
  
   
                                                      
  
  
Group 2:  
  
  
ondansetron ODT (Zofran-ODT) disintegrating tablet 4 mgJump to med  
4 mg, Oral, Every 8 hours PRN, nausea, vomiting, Starting on 25 at 
1734,   
Phase II/On Unit, 1st Line. If inadequate response within 60 minutes, proceed to
  
next-line agent or contact provider if no further options ordered. Patient 
should   
allow tablet to dissolve on tongue. Do not remove from blister pack until just 
before   
administering.  
  
   
                                                      
  
  
Or  
  
  
ondansetron (Zofran) injection 4 mgJump to med  
4 mg, IntraVENous, Every 6 hours PRN, nausea, vomiting, Starting on 25 
at   
1734, Phase II/On Unit, 1st Line. Give IV if patient is unable to take orally. 
If   
inadequate response within 60 minutes, proceed to next-line agent or contact 
provider   
if no further options ordered.  
  
   
                                                      
  
  
Group 3:  
  
  
oxyCODONE (Roxicodone) immediate release tablet 5 mgJump to med  
5 mg, Oral, Every 4 hours PRN, moderate pain (4-6), Starting on 25 at 
1734,   
Phase II/On Unit  
  
   
                                                      
  
  
Or  
  
  
oxyCODONE (Roxicodone) immediate release tablet 10 mgJump to med  
10 mg, Oral, Every 4 hours PRN, severe pain (7-10), Starting on 25 at 
1734,   
Phase II/On Unit  
  
  
  
FOR RECORDS PERTAINING TO PATIENTS WHO ARE OR HAVE BEEN ENROLLED IN A CHEMICAL 
DEPENDENCY/SUBSTANCEABUSE PROGRAM, SOME INFORMATION MAY BE OMITTED. This 
clinical summary was aggregated from multiple sources. Caution should be 
exercised in using it in the provision of clinical care. This summary normalizes
information from multiple sources, and as a consequence, information in this 
document may materially change the coding, format and clinical context of 
patient data. In addition, data may be omitted in some cases. CLINICAL DECISIONS
SHOULD BE BASED ON THE PRIMARY CLINICAL RECORDS. [employer] provides 
no warranty or guarantee of the accuracy or completeness of information in this 
document.

## 2025-02-04 NOTE — CT_ITS
PROCEDURE:  
ABDOMEN/PELVIS W IV CONT ONLY  
   
REASON FOR EXAM:  
   
Pelvic pain.  Diverticulitis.  Colectomy dated 01/24/2025.  .  
   
TECHNIQUE:  
Axial CT images of the abdomen and pelvis was performed with IV contrast 
enhancement.  
IV CONTRAST: 94 cc of Isovue 370  
   
COMPARISON:  
12/17/2024  
   
FINDINGS:  
Lung bases: Trace atelectasis within the lung bases and lingula.  Few 
nonspecific calcifications within the right breast.  
   
Liver: 5 mm hypodensity within the right hepatic lobe, too small to accurately 
characterize, most likely representing a tiny  
cyst.  No enhancing masses are seen.  
   
Gallbladder: Prior cholecystectomy.  
   
Spleen: Probable splenic cyst measuring 5.5 mm, too small to accurately 
characterize.  No splenomegaly.  
   
Pancreas: Intact.  No peripancreatic inflammatory changes.  No drainable fluid 
collections.  
   
Adrenals: No adrenal masses are identified.  
   
Kidneys: Kidneys enhance symmetrically.  Mild extrarenal pelvises bilaterally.  
Nonobstructing calculus within the inferior pole  
of the left kidney measuring 1 cm.  Small exophytic renal cyst on the left 
measuring 7 mm, too small to accurately characterize.  
No hydronephrosis bilaterally.  
   
Bladder: Evans catheter seen within the decompressed urinary bladder.  Small 
amounts of non dependent gas within the urinary  
bladder likely secondary to Evans placement.  
   
Reproductive Organs: Uterus is surgically absent.  
   
Bowel: No bowel obstruction is identified.  Mild-to-moderate amounts of fecal 
retention.  There has been interval resection  
involving portions of the mid to distal sigmoid colon.  Occasional diverticula 
involving the remaining large bowel.  
   
Appendix: Appendix is not well seen.  No inflammatory changes within the 
suspected location of the appendix.  
   
Lymph nodes: No suspicious lymph node enlargement.  
   
Vasculature: Major vascular structures are unremarkable.  
   
Peritoneum / Retroperitoneum: Scattered small amounts of free air within the 
lower abdomenand within the pelvis, likely  
postsurgical in etiology.  Minimal nonspecific stranding within the central 
lower pelvis likely postsurgical in etiology.  There  
is stranding within the subcutaneous tissues overlying the right pelvis, with 
ill-defined fluid measuring approximately 2.0 x 5.0  
x 4.8 cm.  
   
Bones: Osseous structures about the abdomen and pelvis appear intact.  Trace 
grade 1 anterolisthesis of L4 on L5.  Minimal  
retrolisthesis of L5 on S1.  Mild spondylotic changes seen at the lumbosacral 
junction  
   
ORDER #: [id number] CT/Abdomen/Pelvis W IV Cont ONLY  
IMPRESSION:  
1. Interval resection involving portions of the mid to distal sigmoid colon.  
2. Small amounts of scattered free air within the lower abdomen and pelvis, lik
ely postsurgical in etiology, given history.  
3. Minimal nonspecific stranding within the central lower pelvis, most likely p
ostsurgical in etiology.  
4. Stranding within the subcutaneous tissues overlying the right pelvis with il
l-defined fluid measuring up to 2.0 x 5.0 x 4.8  
cm.  Findings could represent postsurgical changes, developing seroma or hemato
ma.  Infectious process however not entirely  
excluded.  Correlate clinically.  
5. Occasional diverticula involving the remaining large bowel.  
6. Nonobstructing calculus within the left kidney, inferiorly measuring up to 1
.0 cm.  Negative for hydronephrosis, bilaterally.  
7. Small exophytic renal cyst on the left measuring 0.7 cm, too small to accura
tely characterize.  
8. Probable subcentimeter liver and splenic cysts, as described.  
9. Additional findings, as detailed above  
   
   
One or more dose reduction techniques were used (e.g., Automated exposure contr
ol, adjustment of the mA and/or kV according to  
patient size, use of iterative reconstruction technique).  
   
Electronically Signed By: Jasbir Quiros DO on 02/05/2025 00:21  
[facility]

## 2025-02-05 VITALS
RESPIRATION RATE: 18 BRPM | HEART RATE: 66 BPM | SYSTOLIC BLOOD PRESSURE: 118 MMHG | DIASTOLIC BLOOD PRESSURE: 75 MMHG | OXYGEN SATURATION: 97 % | TEMPERATURE: 97.5 F

## 2025-02-05 VITALS
TEMPERATURE: 97.3 F | DIASTOLIC BLOOD PRESSURE: 73 MMHG | SYSTOLIC BLOOD PRESSURE: 118 MMHG | HEART RATE: 66 BPM | RESPIRATION RATE: 18 BRPM | OXYGEN SATURATION: 97 %

## 2025-02-05 LAB
ALANINE AMINOTRANSFER ALT/SGPT: 49 U/L (ref 13–56)
ALBUMIN SERPL-MCNC: 3 G/DL (ref 3.2–5)
ALKALINE PHOSPHATASE: 99 U/L (ref 45–117)
ANION GAP: 8 (ref 5–15)
AST(SGOT): 23 U/L (ref 15–37)
BILIRUB DIRECT SERPL-MCNC: 0.17 MG/DL (ref 0–0.3)
BUN SERPL-MCNC: 7 MG/DL (ref 7–18)
BUN/CREAT RATIO: 8.8 RATIO (ref 10–20)
CALCIUM SERPL-MCNC: 9.8 MG/DL (ref 8.5–10.1)
CARBON DIOXIDE: 26 MMOL/L (ref 21–32)
CHLORIDE: 104 MMOL/L (ref 98–107)
DEPRECATED RDW RBC: 46.7 FL (ref 35.1–43.9)
ERYTHROCYTE [DISTWIDTH] IN BLOOD: 15.3 % (ref 11.6–14.6)
EST GLOM FILT RATE - AFR AMER: 93 ML/MIN (ref 60–?)
ESTIMATED CREATININE CLEARANCE: 96.11 ML/MIN
GLOBULIN: 4.4 G/DL (ref 2.2–4.2)
GLUCOSE: 104 MG/DL (ref 74–106)
HCT VFR BLD AUTO: 43.1 % (ref 37–47)
HEMOGLOBIN: 14.2 G/DL (ref 12–15)
HGB BLD-MCNC: 14.2 G/DL (ref 12–15)
IMMATURE GRANULOCYTES COUNT: 0.05 X10^3/UL (ref 0–0)
LIPASE: 28 U/L (ref 13–75)
MAGNESIUM: 2 MG/DL (ref 1.6–2.6)
MCV RBC: 84.5 FL (ref 81–99)
MEAN CORP HGB CONC: 32.9 G/DL (ref 32–36)
MEAN PLATELET VOL.: 9.9 FL (ref 6.2–12)
MUCOUS THREADS URNS QL MICRO: (no result) /HPF
NRBC FLAGGED BY ANALYZER: 0 % (ref 0–5)
PLATELET # BLD: 387 K/MM3 (ref 150–450)
PLATELET COUNT: 387 K/MM3 (ref 150–450)
POTASSIUM: 3.5 MMOL/L (ref 3.5–5.1)
RBC # BLD AUTO: 5.1 M/MM3 (ref 4.2–5.4)
RBC DISTRIBUTION WIDTH CV: 15.3 % (ref 11.6–14.6)
RBC DISTRIBUTION WIDTH SD: 46.7 FL (ref 35.1–43.9)
RBC UR QL: (no result) /HPF (ref 0–5)
SQUAMOUS URNS QL MICRO: (no result) /HPF (ref 5–10)
URINE PRESERVATIVE: (no result)
WBC # BLD AUTO: 10.6 K/MM3 (ref 4.4–11)
WHITE BLOOD COUNT: 10.6 K/MM3 (ref 4.4–11)

## 2025-02-05 NOTE — EDS_ITS
HPI    
History of Present Illness    
Chief Complaint:  Complaint    
Informant: patient    
Narrative    
Narrative:     
Patient is a 64-year-old female with past medical history of remote breast   
cancer and hypertension and numbness independent diabetes who had to undergo a   
hemicolectomy roughly 2 weeks ago.  She states she had to have a Evans catheter   
placed secondary to this.  She was on antibiotics initially but has been off of   
them for approximately 7 to 10 days.  She states in the last 1 to 2 days she has  
noticed irritation in the lower abdomen and has felt some irritation at where   
the catheter enters.  She denies any fevers or chills nausea or vomiting but has  
concern for potential infection based on her new symptoms and therefore comes in  
for evaluation    
    
[facility]    
Medical History (Updated 02/05/25 @ 05:58 by Dr. Marques Mckee, DO)    
    
Diverticulitis    
Kidney stones    
Wears glasses    
Cancer    
Rash    
Diabetes    
Arthritis    
History of diverticulitis    
History of stress test    
Invasive ductal carcinoma of right breast    
Hypertension    
Former smoker    
Bone spur    
HTN (hypertension)    
    
    
                                Home Medications    
    
    
    
?Medication ?Instructions ?Recorded ?Last Taken ?Type    
     
                          multivit-iron 18 mg-folic acid 400 1 ea PO QODAY suppl    
ememnt 04/10/17 10/01/24     
History    
    
                                        mcg-calcium 500 mg-minerals tablet        
    
                                        (Women's Daily Formula)        
     
                          atenolol 50 mg tablet     50 mg PO DAILY blood pressur    
e 01/22/23 10/01/24 History    
     
                          chlorthalidone 25 mg tablet 12.5 mg PO DAILY water pil    
l 01/22/23 10/01/24     
History    
     
                          exemestane 25 mg tablet   25 mg PO DAILY breast cancer    
 01/22/23 10/01/24 History    
     
                          glipizide 2.5 mg tablet, extended 2.5 mg PO DAILY diab    
etes 01/22/23 10/01/24     
History    
    
                                        release 24 hr        
     
                          potassium chloride 20 mEq 20 meq PO BID supplement 01/    
22/23 10/01/24 History    
    
                                        tablet,extended release        
     
                          metformin 500 mg tablet,extended 500 mg PO DAILY 10/01    
/24 10/01/24 History    
    
                                        release 24 hr        
     
                          L.acidophil,salivari-Bifido 1 cap PO BID 30 days #60 c    
aps 10/03/24 Unknown Rx    
    
                                        bifidum-Strep thermoph 175 mg        
    
                                        capsule        
     
                          amoxicillin 875 mg-potassium 1 tab PO BID 10 days #20     
tabs 10/03/24 Unknown Rx    
    
                                        clavulanate 125 mg tablet        
     
                          fluconazole 150 mg tablet 150 mg PO ONCE #1 TAB 10/23/    
24 Unknown Rx    
     
                          ciprofloxacin HCl 500 mg tablet 500 mg PO BID Divertic    
ulitis 14 12/17/24 Unknown    
Rx    
    
                          (Cipro)                   days #28 tabs       
     
                          metronidazole 500 mg tablet 500 mg PO Q8H Diverticulit    
is 14 12/17/24 Unknown Rx    
    
                                        days #42 tabs       
     
                          cephalexin 500 mg capsule 500 mg PO TID 7 days #21 cap    
s 02/05/25 Unknown Rx    
     
                          fluconazole 150 mg tablet See Rx Instructions .Route 0    
2/05/25 Unknown Rx    
    
                                        .COMPLEX #2 tabs       
    
    
    
                                            
    
    
    
Allergy/AdvReac Type Severity Reaction Status Date / Time    
     
silver sulfadiazine (From Allergy  Hives Verified 02/04/25 22:53    
    
Silvadene)         
    
    
    
Family History (Reviewed 12/17/24 @ 13:26 by Dr. Carlos Enrique Cunningham MD)    
Other   Cancer    
    
    
    
Surgical History (Updated 02/04/25 @ 23:04 by Chrissy Burnett)    
    
H/O colectomy    
History of colonoscopy    
History of foot surgery    
S/P lumpectomy, right breast    
History of cholecystectomy    
H/O: hysterectomy    
    
    
Social History (Reviewed 12/17/24 @ 13:26 by Dr. Carlos Enrique Cunningham MD)    
Smoking Status:  Former smoker     
    
    
    
ROS    
ROS ED    
Constitutional    
Constitutional ED: Denies chills or fever(s)    
ENT    
ENT ED: Denies sore throat    
Cardiovascular    
Cardiovascular: Denies chest pain    
Respiratory/Chest    
Respiratory/Chest: Denies cough or dyspnea    
Gastrointestinal    
Gastrointestinal: Reports abdominal pain; Denies diarrhea, nausea or vomiting    
Genitourinary    
Genitourinary ED: Reports dysuria    
Musculoskeletal    
Musculoskeletal: Reports back pain    
Integumentary    
Denies rash    
Neurologic    
Neurologic: Denies headache(s)    
Hematologic/Lymphatic    
Hematologic/Lymphatic: Denies easy bleeding or easy bruising    
    
EXAM    
Physical Exam    
Const    
Vital Signs:     
    
                                            
    
    
    
 02/04/25    
22:53 02/05/25    
00:53 02/05/25    
01:29    
     
Temperature 96.8 F L 97.3 F L 97.5 F L    
     
Temperature Source Temporal Oral     
     
Pulse Rate 82 66 66    
     
Respiratory Rate 15 18 18    
     
Blood Pressure 153/105 H 118/73 118/75    
     
Blood Pressure Mean 121 88 89    
     
Pulse Ox 98 97 97    
     
Oxygen Delivery Method Room Air Room Air     
    
    
    
    
Positive well nourished, well developed and obese    
General Appearance ED: well developed    
Nutritional Appearance: obese    
HEENT    
HEENT Narrative:     
Normocephalic atraumatic    
Eyes    
PERRL and EOMs intact bilaterally    
General Eye ED: Negative for scleral icterus    
Neck    
supple    
Resp    
normal respiratory effort and clear to auscultation bilaterally    
Cardio    
regular rate and regular rhythm    
Rate: other Other Details: Heart is regular rate and rhythm     
     
Radial and carotid pulses are equal and symmetric    
GI    
non-distended    
GI Narrative:     
Abdomen is obese soft and nondistended with normoactive bowel sounds.  There are  
postsurgical wounds that are clean dry and intact without secondary findings to   
suggest infection.  There is mild pain on palpation along the lower abdomen   
diffusely without voluntary guarding or rigidity.  No pulsatile mass or fluid   
wave    
Auscultation: normoactive bowel sounds    
Palpation: soft    
Back/Spine    
no CVA tenderness    
Extremity    
normal to inspection    
Neuro    
oriented x3, CN's II-XII intact bilaterally and no sensory deficits noted    
Sensorium / Orientation: alert    
Motor Exam: strength 5/5 throughout    
Psych    
Mood & Affect: anxious    
Skin    
Skin Narrative:     
Postoperative wounds as documented above    
    
MDM    
MDM    
MDM Narrative    
Medical decision making narrative:     
Patient arrived to the ER hypertensive but otherwise with stable vitals.  She   
reported discomfort in the lower abdomen/suprapubic region and therefore there   
is concern for postoperative infection versus UTI and patient could have   
potential intestinal abscess or dehiscence of the intestinal anastomoses.    
Secondary to his basic blood work with urine sample as well as a CT with IV   
contrast was ordered.  Patient's white count is normal there is no left shift   
there is no lactic acidosis.  She does not have signs of BRI or clinically   
significant electrolyte abnormalities.  Urine sample does show changes   
concerning for potential infection but this is nitrate negative and the catheter  
has been placed for approximately 2 weeks indicating this is most likely   
colonization.  However in order to ensure that this is not a true UTI the urine   
to be sent for culture and I will start her on antibiotics until the culture   
results based on her sensation of discomfort.  The CT scan did show changes in   
the right pelvis consistent with a seroma or hematoma.  The radiologist did   
state that infection was not 100% excluded but the patient does not have a fever  
she does not have a white count or left shift or lactic acidosis and therefore I  
feel this is most likely a seroma.  This is most likely the cause of the   
patient's discomfort in the lower abdomen but as the urine shows changes   
concerning for developing infection I will place her on outpatient antibiotics   
until the culture results.  This plan of care was discussed with the patient she  
is agreeable to it but at this time vitals are stable and her abdomen remains   
soft and nonsurgical and she is otherwise safe for discharge with outpatient   
follow-up    
History & Record Review    
Discussion w/independent historian: Patient    
Lab Data    
Attestation: I reviewed the patient's lab results.    
Labs:     
    
                         Laboratory Results - last 24 hr    
    
    
    
  02/04/25 02/04/25    
    
  23:32 23:38    
     
WBC   10.6    
     
RBC   5.10    
     
Hgb   14.2    
     
Hct   43.1    
     
MCV   84.5    
     
MCH   27.8    
     
MCHC   32.9    
     
RDW Std Deviation   46.7 H    
     
RDW Coeff of Elzbieta   15.3 H    
     
Plt Count   387    
     
MPV   9.9    
     
Immature Gran % (Auto)   0.500    
     
Neut % (Auto)   66.2    
     
Lymph % (Auto)   19.8    
     
Mono % (Auto)   9.2    
     
Eos % (Auto)   3.7    
     
Baso % (Auto)   0.6    
     
Absolute Neuts (auto)   7.1    
     
Absolute Lymphs (auto)   2.10    
     
Nucleated RBC %   0    
     
Sodium   138    
     
Potassium   3.5    
     
Chloride   104    
     
Carbon Dioxide   26.0    
     
Anion Gap   8    
     
BUN   7    
     
Creatinine   0.80    
     
Estim Creat Clear Calc   96.11    
     
Est GFR (MDRD) Af Amer   93    
     
Est GFR (MDRD) Non-Af   77    
     
BUN/Creatinine Ratio   8.8 L    
     
Glucose   104    
     
Lactic Acid   1.0    
     
Calcium   9.8    
     
Magnesium   2.0    
     
Total Bilirubin   0.50    
     
Direct Bilirubin   0.17    
     
AST   23    
     
ALT   49    
     
Alkaline Phosphatase   99    
     
Total Protein   7.4    
     
Albumin   3.0 L    
     
Globulin   4.4 H    
     
Lipase   28    
     
Urine Color  Yellow     
     
Urine Clarity  Clear     
     
Urine pH  7.0     
     
Ur Specific Gravity  1.005     
     
Urine Protein  15 H     
     
Urine Glucose (UA)  Normal     
     
Urine Ketones  Negative     
     
Urine Occult Blood  150 H     
     
Urine Nitrite  Negative     
     
Urine Bilirubin  Negative     
     
Urine Urobilinogen  Normal     
     
Ur Leukocyte Esterase  25 H     
     
Urine RBC  25-50 SEEN     
     
Urine WBC  5-10 SEEN     
     
Ur Squamous Epith Cells  0-5 SEEN     
     
Urine Bacteria  1+     
     
Urine Mucus  2+     
    
    
    
    
Radiography    
Diagnostic Testing:     
    
Clinical Impression(s) from Imaging Studies    
    
Abdomen/Pelvis CT  02/04/25 23:15    
IMPRESSION:    
1. Interval resection involving portions of the mid to distal sigmoid colon.    
2. Small amounts of scattered free air within the lower abdomen and pelvis,   
likely postsurgical in etiology, given history.    
3. Minimal nonspecific stranding within the central lower pelvis, most likely   
postsurgical in etiology.    
4. Stranding within the subcutaneous tissues overlying the right pelvis with   
ill-defined fluid measuring up to 2.0 x 5.0 x 4.8    
cm.  Findings could represent postsurgical changes, developing seroma or   
hematoma.  Infectious process however not entirely    
excluded.  Correlate clinically.    
5. Occasional diverticula involving the remaining large bowel.    
6. Nonobstructing calculus within the left kidney, inferiorly measuring up to 1  
.0 cm.  Negative for hydronephrosis, bilaterally.    
7. Small exophytic renal cyst on the left measuring 0.7 cm, too small to   
accurately characterize.    
8. Probable subcentimeter liver and splenic cysts, as described.    
9. Additional findings, as detailed above    
     
     
One or more dose reduction techniques were used (e.g., Automated exposure   
control, adjustment of the mA and/or kV according to    
patient size, use of iterative reconstruction technique).    
     
Electronically Signed By: Jasbir Quiros DO on 02/05/2025 00:21    
Reading Location: [facility]    
    
    
    
    
    
Discharge Plan    
Triage    
Chief Complaint:  Complaint    
    
ED Provider: Marques Mckee    
    
Dx/Rx/DC Orders    
Clinical Impression:    
 UTI (urinary tract infection), Postoperative seroma, Non-insulin dependent   
diabetes mellitus, Hypertension    
    
    
Instructions:  ED Seroma, Postsurgical, ED UTIs Women    
    
Prescriptions:    
New    
  cephalexin 500 mg capsule     
   500 mg PO TID 7 Days Qty: 21 0RF    
  fluconazole 150 mg tablet     
   See Rx Instructions  .ROUTE .COMPLEX Qty: 2 0RF    
   Rx Instructions:    
   1 pill by mouth at the start of antibiotics and 1 pill by mouth once   
antibiotics are finished    
    
No Action    
  Women's Daily Formula 1 EACH tablet     
   1 ea PO QODAY     
  exemestane 25 mg tablet     
   25 mg PO DAILY     
  atenolol 50 mg tablet     
   50 mg PO DAILY     
  chlorthalidone 25 mg tablet     
   12.5 mg PO DAILY     
  glipizide 2.5 mg tablet extended release 24hr     
   2.5 mg PO DAILY     
  potassium chloride 20 mEq tablet extended release     
   20 meq PO BID     
  ciprofloxacin HCl [Cipro] 500 mg tablet     
   500 mg PO BID 14 Days Qty: 28 0RF    
  metronidazole 500 mg tablet     
   500 mg PO Q8H 14 Days Qty: 42 0RF    
  metformin 500 mg tablet extended release 24 hr     
   500 mg PO DAILY     
  L.acidoph,saliva-B.bif-S.therm 175 mg Capsule     
   1 cap PO BID 30 Days Qty: 60 0RF    
  amoxicillin-pot clavulanate 875-125 mg tablet     
   1 tab PO BID 10 Days Qty: 20 0RF    
  fluconazole 150 mg tablet     
   150 mg PO ONCE Qty: 1 0RF    
   Rx Instructions:    
   as a single dose    
    
Primary Care Provider: Estela Okeefe

## 2025-02-05 NOTE — [id number]
HPI
History of Present Illness
Chief Complaint:  Complaint
Informant: patient
Narrative
Narrative: 
Patient is a 64-year-old female with past medical history of remote breast cancer and hypertension and numbness independent diabetes who had to undergo a hemicolectomy roughly 2 weeks ago.  She states she had to have a Evans catheter placed 
secondary to this.  She was on antibiotics initially but has been off of them for approximately 7 to 10 days.  She states in the last 1 to 2 days she has noticed irritation in the lower abdomen and has felt some irritation at where the catheter 
enters.  She denies any fevers or chills nausea or vomiting but has concern for potential infection based on her new symptoms and therefore comes in for evaluation

[facility]
Medical History (Updated 02/05/25 @ 05:58 by Dr. Marques Mckee, DO)

Diverticulitis
Kidney stones
Wears glasses
Cancer
Rash
Diabetes
Arthritis
History of diverticulitis
History of stress test
Invasive ductal carcinoma of right breast
Hypertension
Former smoker
Bone spur
HTN (hypertension)


Home Medications

?Medication ?Instructions ?Recorded ?Last Taken ?Type
multivit-iron 18 mg-folic acid 400 1 ea PO QODAY supplememnt 04/10/17 10/01/24 History
mcg-calcium 500 mg-minerals tablet    
(Women's Daily Formula)    
atenolol 50 mg tablet 50 mg PO DAILY blood pressure 01/22/23 10/01/24 History
chlorthalidone 25 mg tablet 12.5 mg PO DAILY water pill 01/22/23 10/01/24 History
exemestane 25 mg tablet 25 mg PO DAILY breast cancer 01/22/23 10/01/24 History
glipizide 2.5 mg tablet, extended 2.5 mg PO DAILY diabetes 01/22/23 10/01/24 History
release 24 hr    
potassium chloride 20 mEq 20 meq PO BID supplement 01/22/23 10/01/24 History
tablet,extended release    
metformin 500 mg tablet,extended 500 mg PO DAILY 10/01/24 10/01/24 History
release 24 hr    
L.acidophil,salivari-Bifido 1 cap PO BID 30 days #60 caps 10/03/24 Unknown Rx
bifidum-Strep thermoph 175 mg    
capsule    
amoxicillin 875 mg-potassium 1 tab PO BID 10 days #20 tabs 10/03/24 Unknown Rx
clavulanate 125 mg tablet    
fluconazole 150 mg tablet 150 mg PO ONCE #1 TAB 10/23/24 Unknown Rx
ciprofloxacin HCl 500 mg tablet 500 mg PO BID Diverticulitis 14 12/17/24 Unknown Rx
(Cipro) days #28 tabs   
metronidazole 500 mg tablet 500 mg PO Q8H Diverticulitis 14 12/17/24 Unknown Rx
 days #42 tabs   
cephalexin 500 mg capsule 500 mg PO TID 7 days #21 caps 02/05/25 Unknown Rx
fluconazole 150 mg tablet See Rx Instructions .Route 02/05/25 Unknown Rx
 .COMPLEX #2 tabs   




Allergy/AdvReac Type Severity Reaction Status Date / Time
silver sulfadiazine (From Allergy  Hives Verified 02/04/25 22:53
Silvadene)     


Family History (Reviewed 12/17/24 @ 13:26 by Dr. Carlos Enrique Cunningham MD)
Other
 Cancer



Surgical History (Updated 02/04/25 @ 23:04 by Chrissy Burnett)

H/O colectomy
History of colonoscopy
History of foot surgery
S/P lumpectomy, right breast
History of cholecystectomy
H/O: hysterectomy


Social History (Reviewed 12/17/24 @ 13:26 by Dr. Carlos Enrique Cunningham MD)
Smoking Status:  Former smoker 



ROS
ROS ED
Constitutional
Constitutional ED: Denies chills or fever(s)
ENT
ENT ED: Denies sore throat
Cardiovascular
Cardiovascular: Denies chest pain
Respiratory/Chest
Respiratory/Chest: Denies cough or dyspnea
Gastrointestinal
Gastrointestinal: Reports abdominal pain; Denies diarrhea, nausea or vomiting
Genitourinary
Genitourinary ED: Reports dysuria
Musculoskeletal
Musculoskeletal: Reports back pain
Integumentary
Denies rash
Neurologic
Neurologic: Denies headache(s)
Hematologic/Lymphatic
Hematologic/Lymphatic: Denies easy bleeding or easy bruising

EXAM
Physical Exam
Const
Vital Signs: 



 02/04/25
22:53 02/05/25
00:53 02/05/25
01:29
Temperature 96.8 F L 97.3 F L 97.5 F L
Temperature Source Temporal Oral 
Pulse Rate 82 66 66
Respiratory Rate 15 18 18
Blood Pressure 153/105 H 118/73 118/75
Blood Pressure Mean 121 88 89
Pulse Ox 98 97 97
Oxygen Delivery Method Room Air Room Air 



Positive well nourished, well developed and obese
General Appearance ED: well developed
Nutritional Appearance: obese
HEENT
HEENT Narrative: 
Normocephalic atraumatic
Eyes
PERRL and EOMs intact bilaterally
General Eye ED: Negative for scleral icterus
Neck
supple
Resp
normal respiratory effort and clear to auscultation bilaterally
Cardio
regular rate and regular rhythm
Rate: other Other Details: Heart is regular rate and rhythm 
 
Radial and carotid pulses are equal and symmetric
GI
non-distended
GI Narrative: 
Abdomen is obese soft and nondistended with normoactive bowel sounds.  There are postsurgical wounds that are clean dry and intact without secondary findings to suggest infection.  There is mild pain on palpation along the lower abdomen diffusely 
without voluntary guarding or rigidity.  No pulsatile mass or fluid wave
Auscultation: normoactive bowel sounds
Palpation: soft
Back/Spine
no CVA tenderness
Extremity
normal to inspection
Neuro
oriented x3, CN's II-XII intact bilaterally and no sensory deficits noted
Sensorium / Orientation: alert
Motor Exam: strength 5/5 throughout
Psych
Mood & Affect: anxious
Skin
Skin Narrative: 
Postoperative wounds as documented above

MDM
MDM
MDM Narrative
Medical decision making narrative: 
Patient arrived to the ER hypertensive but otherwise with stable vitals.  She reported discomfort in the lower abdomen/suprapubic region and therefore there is concern for postoperative infection versus UTI and patient could have potential 
intestinal abscess or dehiscence of the intestinal anastomoses.  Secondary to his basic blood work with urine sample as well as a CT with IV contrast was ordered.  Patient's white count is normal there is no left shift there is no lactic acidosis.  
She does not have signs of BRI or clinically significant electrolyte abnormalities.  Urine sample does show changes concerning for potential infection but this is nitrate negative and the catheter has been placed for approximately 2 weeks indicating 
this is most likely colonization.  However in order to ensure that this is not a true UTI the urine to be sent for culture and I will start her on antibiotics until the culture results based on her sensation of discomfort.  The CT scan did show 
changes in the right pelvis consistent with a seroma or hematoma.  The radiologist did state that infection was not 100% excluded but the patient does not have a fever she does not have a white count or left shift or lactic acidosis and therefore I 
feel this is most likely a seroma.  This is most likely the cause of the patient's discomfort in the lower abdomen but as the urine shows changes concerning for developing infection I will place her on outpatient antibiotics until the culture 
results.  This plan of care was discussed with the patient she is agreeable to it but at this time vitals are stable and her abdomen remains soft and nonsurgical and she is otherwise safe for discharge with outpatient follow-up
History & Record Review
Discussion w/independent historian: Patient
Lab Data
Attestation: I reviewed the patient's lab results.
Labs: 

Laboratory Results - last 24 hr

  02/04/25 02/04/25
  23:32 23:38
WBC   10.6
RBC   5.10
Hgb   14.2
Hct   43.1
MCV   84.5
MCH   27.8
MCHC   32.9
RDW Std Deviation   46.7 H
RDW Coeff of Elzbieta   15.3 H
Plt Count   387
MPV   9.9
Immature Gran % (Auto)   0.500
Neut % (Auto)   66.2
Lymph % (Auto)   19.8
Mono % (Auto)   9.2
Eos % (Auto)   3.7
Baso % (Auto)   0.6
Absolute Neuts (auto)   7.1
Absolute Lymphs (auto)   2.10
Nucleated RBC %   0
Sodium   138
Potassium   3.5
Chloride   104
Carbon Dioxide   26.0
Anion Gap   8
BUN   7
Creatinine   0.80
Estim Creat Clear Calc   96.11
Est GFR (MDRD) Af Amer   93
Est GFR (MDRD) Non-Af   77
BUN/Creatinine Ratio   8.8 L
Glucose   104
Lactic Acid   1.0
Calcium   9.8
Magnesium   2.0
Total Bilirubin   0.50
Direct Bilirubin   0.17
AST   23
ALT   49
Alkaline Phosphatase   99
Total Protein   7.4
Albumin   3.0 L
Globulin   4.4 H
Lipase   28
Urine Color  Yellow 
Urine Clarity  Clear 
Urine pH  7.0 
Ur Specific Gravity  1.005 
Urine Protein  15 H 
Urine Glucose (UA)  Normal 
Urine Ketones  Negative 
Urine Occult Blood  150 H 
Urine Nitrite  Negative 
Urine Bilirubin  Negative 
Urine Urobilinogen  Normal 
Ur Leukocyte Esterase  25 H 
Urine RBC  25-50 SEEN 
Urine WBC  5-10 SEEN 
Ur Squamous Epith Cells  0-5 SEEN 
Urine Bacteria  1+ 
Urine Mucus  2+ 



Radiography
Diagnostic Testing: 

Clinical Impression(s) from Imaging Studies

Abdomen/Pelvis CT  02/04/25 23:15
IMPRESSION:
1. Interval resection involving portions of the mid to distal sigmoid colon.
2. Small amounts of scattered free air within the lower abdomen and pelvis, likely postsurgical in etiology, given history.
3. Minimal nonspecific stranding within the central lower pelvis, most likely postsurgical in etiology.
4. Stranding within the subcutaneous tissues overlying the right pelvis with ill-defined fluid measuring up to 2.0 x 5.0 x 4.8
cm.  Findings could represent postsurgical changes, developing seroma or hematoma.  Infectious process however not entirely
excluded.  Correlate clinically.
5. Occasional diverticula involving the remaining large bowel.
6. Nonobstructing calculus within the left kidney, inferiorly measuring up to 1.0 cm.  Negative for hydronephrosis, bilaterally.
7. Small exophytic renal cyst on the left measuring 0.7 cm, too small to accurately characterize.
8. Probable subcentimeter liver and splenic cysts, as described.
9. Additional findings, as detailed above
 
 
One or more dose reduction techniques were used (e.g., Automated exposure control, adjustment of the mA and/or kV according to
patient size, use of iterative reconstruction technique).
 
Electronically Signed By: Jasbir Quiros DO on 02/05/2025 00:21
Reading Location: [facility]





Discharge Plan
Triage
Chief Complaint:  Complaint

ED Provider: Marques Mckee

Dx/Rx/DC Orders
Clinical Impression:
 UTI (urinary tract infection), Postoperative seroma, Non-insulin dependent diabetes mellitus, Hypertension


Instructions:  ED Seroma, Postsurgical, ED UTIs Women

Prescriptions:
New
  cephalexin 500 mg capsule 
   500 mg PO TID 7 Days Qty: 21 0RF
  fluconazole 150 mg tablet 
   See Rx Instructions  .ROUTE .COMPLEX Qty: 2 0RF
   Rx Instructions:
   1 pill by mouth at the start of antibiotics and 1 pill by mouth once antibiotics are finished

No Action
  Women's Daily Formula 1 EACH tablet 
   1 ea PO QODAY 
  exemestane 25 mg tablet 
   25 mg PO DAILY 
  atenolol 50 mg tablet 
   50 mg PO DAILY 
  chlorthalidone 25 mg tablet 
   12.5 mg PO DAILY 
  glipizide 2.5 mg tablet extended release 24hr 
   2.5 mg PO DAILY 
  potassium chloride 20 mEq tablet extended release 
   20 meq PO BID 
  ciprofloxacin HCl [Cipro] 500 mg tablet 
   500 mg PO BID 14 Days Qty: 28 0RF
  metronidazole 500 mg tablet 
   500 mg PO Q8H 14 Days Qty: 42 0RF
  metformin 500 mg tablet extended release 24 hr 
   500 mg PO DAILY 
  L.acidoph,saliva-B.bif-S.therm 175 mg Capsule 
   1 cap PO BID 30 Days Qty: 60 0RF
  amoxicillin-pot clavulanate 875-125 mg tablet 
   1 tab PO BID 10 Days Qty: 20 0RF
  fluconazole 150 mg tablet 
   150 mg PO ONCE Qty: 1 0RF
   Rx Instructions:
   as a single dose

Primary Care Provider: Estela Okeefe

Referrals:
Estela Okeefe MD [Primary Care Provider] - 
Nick Osborn MD [Non-Staff] - 

Activity Restrictions/Additional Instructions:
Your CT scan showed normal/healing postoperative changes there is a small seroma which is a natural fluid collection in the right lower pelvis which is most likely the cause of your symptoms.  However as your urine sample does show mild changes for 
infection it will be sent for culture and in the meantime take the antibiotic as directed to cover for any potential infection.  Follow-up with your surgeon as previously directed and return to the ER should you have any further concerns

Print Language: English

Disposition
***Disposition***: Home, Self Care

Discharge Date/Time: 02/05/25 01:32